# Patient Record
Sex: MALE | Race: WHITE | Employment: OTHER | URBAN - NONMETROPOLITAN AREA
[De-identification: names, ages, dates, MRNs, and addresses within clinical notes are randomized per-mention and may not be internally consistent; named-entity substitution may affect disease eponyms.]

---

## 2017-01-05 ENCOUNTER — COMMUNICATION - GICH (OUTPATIENT)
Dept: INTERNAL MEDICINE | Facility: OTHER | Age: 67
End: 2017-01-05

## 2017-01-06 ENCOUNTER — AMBULATORY - GICH (OUTPATIENT)
Dept: SCHEDULING | Facility: OTHER | Age: 67
End: 2017-01-06

## 2017-01-11 ENCOUNTER — COMMUNICATION - GICH (OUTPATIENT)
Dept: UROLOGY | Facility: OTHER | Age: 67
End: 2017-01-11

## 2017-01-13 ENCOUNTER — AMBULATORY - GICH (OUTPATIENT)
Dept: SCHEDULING | Facility: OTHER | Age: 67
End: 2017-01-13

## 2017-01-13 ENCOUNTER — COMMUNICATION - GICH (OUTPATIENT)
Dept: INTERNAL MEDICINE | Facility: OTHER | Age: 67
End: 2017-01-13

## 2017-02-03 ENCOUNTER — HISTORY (OUTPATIENT)
Dept: INTERNAL MEDICINE | Facility: OTHER | Age: 67
End: 2017-02-03

## 2017-02-03 ENCOUNTER — OFFICE VISIT - GICH (OUTPATIENT)
Dept: INTERNAL MEDICINE | Facility: OTHER | Age: 67
End: 2017-02-03

## 2017-02-03 DIAGNOSIS — R04.0 EPISTAXIS: ICD-10-CM

## 2017-02-03 DIAGNOSIS — D69.59 OTHER SECONDARY THROMBOCYTOPENIA: ICD-10-CM

## 2017-02-03 DIAGNOSIS — E78.2 MIXED HYPERLIPIDEMIA: ICD-10-CM

## 2017-02-03 DIAGNOSIS — J30.9 ALLERGIC RHINITIS: ICD-10-CM

## 2017-02-03 DIAGNOSIS — E11.9 TYPE 2 DIABETES MELLITUS WITHOUT COMPLICATIONS (H): ICD-10-CM

## 2017-02-03 DIAGNOSIS — J34.89 OTHER SPECIFIED DISORDERS OF NOSE AND NASAL SINUSES: ICD-10-CM

## 2017-02-03 DIAGNOSIS — I10 ESSENTIAL (PRIMARY) HYPERTENSION: ICD-10-CM

## 2017-02-03 DIAGNOSIS — T45.515A: ICD-10-CM

## 2017-02-03 DIAGNOSIS — Z79.4 LONG TERM CURRENT USE OF INSULIN (H): ICD-10-CM

## 2017-02-03 DIAGNOSIS — H10.13 ACUTE ATOPIC CONJUNCTIVITIS OF BOTH EYES: ICD-10-CM

## 2017-02-03 ASSESSMENT — PATIENT HEALTH QUESTIONNAIRE - PHQ9: SUM OF ALL RESPONSES TO PHQ QUESTIONS 1-9: 0

## 2017-02-07 ENCOUNTER — AMBULATORY - GICH (OUTPATIENT)
Dept: EDUCATION SERVICES | Facility: OTHER | Age: 67
End: 2017-02-07

## 2017-02-07 DIAGNOSIS — E11.9 TYPE 2 DIABETES MELLITUS WITHOUT COMPLICATIONS (H): ICD-10-CM

## 2017-02-07 DIAGNOSIS — Z79.4 LONG TERM CURRENT USE OF INSULIN (H): ICD-10-CM

## 2017-02-14 ENCOUNTER — COMMUNICATION - GICH (OUTPATIENT)
Dept: INTERNAL MEDICINE | Facility: OTHER | Age: 67
End: 2017-02-14

## 2017-02-14 DIAGNOSIS — I25.10 ATHEROSCLEROTIC HEART DISEASE OF NATIVE CORONARY ARTERY WITHOUT ANGINA PECTORIS: ICD-10-CM

## 2017-02-14 DIAGNOSIS — I25.83 CORONARY ATHEROSCLEROSIS DUE TO LIPID RICH PLAQUE (CODE): ICD-10-CM

## 2017-02-24 ENCOUNTER — AMBULATORY - GICH (OUTPATIENT)
Dept: SCHEDULING | Facility: OTHER | Age: 67
End: 2017-02-24

## 2017-03-12 ENCOUNTER — HISTORY (OUTPATIENT)
Dept: EMERGENCY MEDICINE | Facility: OTHER | Age: 67
End: 2017-03-12

## 2017-03-24 ENCOUNTER — COMMUNICATION - GICH (OUTPATIENT)
Dept: INTERNAL MEDICINE | Facility: OTHER | Age: 67
End: 2017-03-24

## 2017-03-30 ENCOUNTER — AMBULATORY - GICH (OUTPATIENT)
Dept: SCHEDULING | Facility: OTHER | Age: 67
End: 2017-03-30

## 2017-03-30 ENCOUNTER — HISTORY (OUTPATIENT)
Dept: INTERNAL MEDICINE | Facility: OTHER | Age: 67
End: 2017-03-30

## 2017-03-30 ENCOUNTER — OFFICE VISIT - GICH (OUTPATIENT)
Dept: INTERNAL MEDICINE | Facility: OTHER | Age: 67
End: 2017-03-30

## 2017-03-30 DIAGNOSIS — E11.65 TYPE 2 DIABETES MELLITUS WITH HYPERGLYCEMIA (H): ICD-10-CM

## 2017-03-30 DIAGNOSIS — Z79.4 LONG TERM CURRENT USE OF INSULIN (H): ICD-10-CM

## 2017-03-30 DIAGNOSIS — I10 ESSENTIAL (PRIMARY) HYPERTENSION: ICD-10-CM

## 2017-03-30 DIAGNOSIS — R05.9 COUGH: ICD-10-CM

## 2017-03-30 DIAGNOSIS — Z02.89 ENCOUNTER FOR OTHER ADMINISTRATIVE EXAMINATIONS: ICD-10-CM

## 2017-03-30 DIAGNOSIS — E11.9 TYPE 2 DIABETES MELLITUS WITHOUT COMPLICATIONS (H): ICD-10-CM

## 2017-03-30 DIAGNOSIS — N18.30 CHRONIC KIDNEY DISEASE, STAGE III (MODERATE) (H): ICD-10-CM

## 2017-03-30 DIAGNOSIS — G47.419 NARCOLEPSY WITHOUT CATAPLEXY: ICD-10-CM

## 2017-03-30 DIAGNOSIS — E78.2 MIXED HYPERLIPIDEMIA: ICD-10-CM

## 2017-03-30 DIAGNOSIS — E11.22 TYPE 2 DIABETES MELLITUS WITH DIABETIC CHRONIC KIDNEY DISEASE (H): ICD-10-CM

## 2017-03-30 LAB
A/G RATIO - HISTORICAL: 1.5 (ref 1–2)
ALBUMIN SERPL-MCNC: 4.6 G/DL (ref 3.5–5.7)
ALP SERPL-CCNC: 61 IU/L (ref 34–104)
ALT (SGPT) - HISTORICAL: 32 IU/L (ref 7–52)
ANION GAP - HISTORICAL: 12 (ref 5–18)
AST SERPL-CCNC: 26 IU/L (ref 13–39)
BILIRUB SERPL-MCNC: 0.7 MG/DL (ref 0.3–1)
BUN SERPL-MCNC: 30 MG/DL (ref 7–25)
BUN/CREAT RATIO - HISTORICAL: 16
CALCIUM SERPL-MCNC: 9.6 MG/DL (ref 8.6–10.3)
CHLORIDE SERPLBLD-SCNC: 102 MMOL/L (ref 98–107)
CHOL/HDL RATIO - HISTORICAL: 2.78
CHOLESTEROL TOTAL: 100 MG/DL
CO2 SERPL-SCNC: 25 MMOL/L (ref 21–31)
CREAT SERPL-MCNC: 1.85 MG/DL (ref 0.7–1.3)
ERYTHROCYTE [DISTWIDTH] IN BLOOD BY AUTOMATED COUNT: 12.6 % (ref 11.5–15.5)
ESTIMATED AVERAGE GLUCOSE: 194 MG/DL
GFR IF NOT AFRICAN AMERICAN - HISTORICAL: 37 ML/MIN/1.73M2
GLOBULIN - HISTORICAL: 3 G/DL (ref 2–3.7)
GLUCOSE SERPL-MCNC: 110 MG/DL (ref 70–105)
HCT VFR BLD AUTO: 46.4 % (ref 37–53)
HDLC SERPL-MCNC: 36 MG/DL (ref 23–92)
HEMOGLOBIN A1C MONITORING (POCT) - HISTORICAL: 8.4 % (ref 4–6.2)
HEMOGLOBIN: 15.5 G/DL (ref 13.5–17.5)
LDLC SERPL CALC-MCNC: 36 MG/DL
MCH RBC QN AUTO: 33.1 PG (ref 26–34)
MCHC RBC AUTO-ENTMCNC: 33.4 G/DL (ref 32–36)
MCV RBC AUTO: 99 FL (ref 80–100)
NON-HDL CHOLESTEROL - HISTORICAL: 64 MG/DL
PATIENT STATUS - HISTORICAL: NORMAL
PLATELET # BLD AUTO: 260 THOU/CU MM (ref 140–440)
PMV BLD: 6 FL (ref 6.5–11)
POTASSIUM SERPL-SCNC: 4.3 MMOL/L (ref 3.5–5.1)
PROT SERPL-MCNC: 7.6 G/DL (ref 6.4–8.9)
RED BLOOD COUNT - HISTORICAL: 4.69 MIL/CU MM (ref 4.3–5.9)
SODIUM SERPL-SCNC: 139 MMOL/L (ref 133–143)
TRIGL SERPL-MCNC: 141 MG/DL
WHITE BLOOD COUNT - HISTORICAL: 7.1 THOU/CU MM (ref 4.5–11)

## 2017-03-30 ASSESSMENT — PATIENT HEALTH QUESTIONNAIRE - PHQ9: SUM OF ALL RESPONSES TO PHQ QUESTIONS 1-9: 0

## 2017-03-31 ENCOUNTER — COMMUNICATION - GICH (OUTPATIENT)
Dept: INTERNAL MEDICINE | Facility: OTHER | Age: 67
End: 2017-03-31

## 2017-04-05 ENCOUNTER — AMBULATORY - GICH (OUTPATIENT)
Dept: SCHEDULING | Facility: OTHER | Age: 67
End: 2017-04-05

## 2017-04-05 ENCOUNTER — COMMUNICATION - GICH (OUTPATIENT)
Dept: INTERNAL MEDICINE | Facility: OTHER | Age: 67
End: 2017-04-05

## 2017-04-05 ENCOUNTER — AMBULATORY - GICH (OUTPATIENT)
Dept: FAMILY MEDICINE | Facility: OTHER | Age: 67
End: 2017-04-05

## 2017-04-06 ENCOUNTER — AMBULATORY - GICH (OUTPATIENT)
Dept: SCHEDULING | Facility: OTHER | Age: 67
End: 2017-04-06

## 2017-04-06 ENCOUNTER — COMMUNICATION - GICH (OUTPATIENT)
Dept: INTERNAL MEDICINE | Facility: OTHER | Age: 67
End: 2017-04-06

## 2017-04-12 ENCOUNTER — AMBULATORY - GICH (OUTPATIENT)
Dept: SCHEDULING | Facility: OTHER | Age: 67
End: 2017-04-12

## 2017-04-12 ENCOUNTER — COMMUNICATION - GICH (OUTPATIENT)
Dept: INTERNAL MEDICINE | Facility: OTHER | Age: 67
End: 2017-04-12

## 2017-05-09 ENCOUNTER — AMBULATORY - GICH (OUTPATIENT)
Dept: SCHEDULING | Facility: OTHER | Age: 67
End: 2017-05-09

## 2017-05-11 ENCOUNTER — COMMUNICATION - GICH (OUTPATIENT)
Dept: INTERNAL MEDICINE | Facility: OTHER | Age: 67
End: 2017-05-11

## 2017-05-11 DIAGNOSIS — E11.9 TYPE 2 DIABETES MELLITUS WITHOUT COMPLICATIONS (H): ICD-10-CM

## 2017-05-11 DIAGNOSIS — Z79.4 LONG TERM CURRENT USE OF INSULIN (H): ICD-10-CM

## 2017-05-15 ENCOUNTER — AMBULATORY - GICH (OUTPATIENT)
Dept: EDUCATION SERVICES | Facility: OTHER | Age: 67
End: 2017-05-15

## 2017-05-15 DIAGNOSIS — N18.30 CHRONIC KIDNEY DISEASE, STAGE III (MODERATE) (H): ICD-10-CM

## 2017-05-15 DIAGNOSIS — E11.65 TYPE 2 DIABETES MELLITUS WITH HYPERGLYCEMIA (H): ICD-10-CM

## 2017-05-15 DIAGNOSIS — E11.22 TYPE 2 DIABETES MELLITUS WITH DIABETIC CHRONIC KIDNEY DISEASE (H): ICD-10-CM

## 2017-06-05 ENCOUNTER — COMMUNICATION - GICH (OUTPATIENT)
Dept: INTERNAL MEDICINE | Facility: OTHER | Age: 67
End: 2017-06-05

## 2017-06-09 ENCOUNTER — COMMUNICATION - GICH (OUTPATIENT)
Dept: INTERNAL MEDICINE | Facility: OTHER | Age: 67
End: 2017-06-09

## 2017-06-09 ENCOUNTER — AMBULATORY - GICH (OUTPATIENT)
Dept: SCHEDULING | Facility: OTHER | Age: 67
End: 2017-06-09

## 2017-06-09 DIAGNOSIS — I25.83 CORONARY ATHEROSCLEROSIS DUE TO LIPID RICH PLAQUE (CODE): ICD-10-CM

## 2017-06-09 DIAGNOSIS — I25.10 ATHEROSCLEROTIC HEART DISEASE OF NATIVE CORONARY ARTERY WITHOUT ANGINA PECTORIS: ICD-10-CM

## 2017-06-13 ENCOUNTER — AMBULATORY - GICH (OUTPATIENT)
Dept: SCHEDULING | Facility: OTHER | Age: 67
End: 2017-06-13

## 2017-06-13 ENCOUNTER — COMMUNICATION - GICH (OUTPATIENT)
Dept: INTERNAL MEDICINE | Facility: OTHER | Age: 67
End: 2017-06-13

## 2017-06-29 ENCOUNTER — OFFICE VISIT - GICH (OUTPATIENT)
Dept: INTERNAL MEDICINE | Facility: OTHER | Age: 67
End: 2017-06-29

## 2017-06-29 ENCOUNTER — HISTORY (OUTPATIENT)
Dept: INTERNAL MEDICINE | Facility: OTHER | Age: 67
End: 2017-06-29

## 2017-06-29 DIAGNOSIS — M48.02 SPINAL STENOSIS OF CERVICAL REGION: ICD-10-CM

## 2017-06-29 DIAGNOSIS — E11.22 TYPE 2 DIABETES MELLITUS WITH DIABETIC CHRONIC KIDNEY DISEASE (H): ICD-10-CM

## 2017-06-29 DIAGNOSIS — M50.30 OTHER CERVICAL DISC DEGENERATION, UNSPECIFIED CERVICAL REGION: ICD-10-CM

## 2017-06-29 DIAGNOSIS — N18.30 CHRONIC KIDNEY DISEASE, STAGE III (MODERATE) (H): ICD-10-CM

## 2017-06-29 DIAGNOSIS — E78.2 MIXED HYPERLIPIDEMIA: ICD-10-CM

## 2017-06-29 DIAGNOSIS — L29.89 OTHER PRURITUS: ICD-10-CM

## 2017-06-29 DIAGNOSIS — M54.12 RADICULOPATHY OF CERVICAL REGION: ICD-10-CM

## 2017-06-29 DIAGNOSIS — E11.65 TYPE 2 DIABETES MELLITUS WITH HYPERGLYCEMIA (H): ICD-10-CM

## 2017-06-29 DIAGNOSIS — E03.9 HYPOTHYROIDISM: ICD-10-CM

## 2017-06-29 DIAGNOSIS — I10 ESSENTIAL (PRIMARY) HYPERTENSION: ICD-10-CM

## 2017-06-29 DIAGNOSIS — G47.419 NARCOLEPSY WITHOUT CATAPLEXY: ICD-10-CM

## 2017-06-29 DIAGNOSIS — Z02.89 ENCOUNTER FOR OTHER ADMINISTRATIVE EXAMINATIONS: ICD-10-CM

## 2017-06-29 DIAGNOSIS — M46.92 INFLAMMATORY SPONDYLOPATHY OF CERVICAL REGION (H): ICD-10-CM

## 2017-06-29 LAB
A/G RATIO - HISTORICAL: 1.7 (ref 1–2)
ALBUMIN SERPL-MCNC: 4.2 G/DL (ref 3.5–5.7)
ALP SERPL-CCNC: 58 IU/L (ref 34–104)
ALT (SGPT) - HISTORICAL: 26 IU/L (ref 7–52)
ANION GAP - HISTORICAL: 8 (ref 5–18)
AST SERPL-CCNC: 22 IU/L (ref 13–39)
BILIRUB SERPL-MCNC: 0.6 MG/DL (ref 0.3–1)
BUN SERPL-MCNC: 20 MG/DL (ref 7–25)
BUN/CREAT RATIO - HISTORICAL: 12
CALCIUM SERPL-MCNC: 9.1 MG/DL (ref 8.6–10.3)
CHLORIDE SERPLBLD-SCNC: 106 MMOL/L (ref 98–107)
CHOL/HDL RATIO - HISTORICAL: 3.06
CHOLESTEROL TOTAL: 101 MG/DL
CO2 SERPL-SCNC: 21 MMOL/L (ref 21–31)
CREAT SERPL-MCNC: 1.67 MG/DL (ref 0.7–1.3)
ERYTHROCYTE [DISTWIDTH] IN BLOOD BY AUTOMATED COUNT: 13.2 % (ref 11.5–15.5)
ESTIMATED AVERAGE GLUCOSE: 186 MG/DL
GFR IF NOT AFRICAN AMERICAN - HISTORICAL: 41 ML/MIN/1.73M2
GLOBULIN - HISTORICAL: 2.5 G/DL (ref 2–3.7)
GLUCOSE SERPL-MCNC: 121 MG/DL (ref 70–105)
HCT VFR BLD AUTO: 44.3 % (ref 37–53)
HDLC SERPL-MCNC: 33 MG/DL (ref 23–92)
HEMOGLOBIN A1C MONITORING (POCT) - HISTORICAL: 8.1 % (ref 4–6.2)
HEMOGLOBIN: 15 G/DL (ref 13.5–17.5)
LDLC SERPL CALC-MCNC: 35 MG/DL
MCH RBC QN AUTO: 32.8 PG (ref 26–34)
MCHC RBC AUTO-ENTMCNC: 33.9 G/DL (ref 32–36)
MCV RBC AUTO: 97 FL (ref 80–100)
NON-HDL CHOLESTEROL - HISTORICAL: 68 MG/DL
PATIENT STATUS - HISTORICAL: ABNORMAL
PLATELET # BLD AUTO: 207 THOU/CU MM (ref 140–440)
PMV BLD: 8.6 FL (ref 6.5–11)
POTASSIUM SERPL-SCNC: 5.3 MMOL/L (ref 3.5–5.1)
PROT SERPL-MCNC: 6.7 G/DL (ref 6.4–8.9)
RED BLOOD COUNT - HISTORICAL: 4.58 MIL/CU MM (ref 4.3–5.9)
SODIUM SERPL-SCNC: 135 MMOL/L (ref 133–143)
TRIGL SERPL-MCNC: 166 MG/DL
TSH - HISTORICAL: 0.43 UIU/ML (ref 0.34–5.6)
WHITE BLOOD COUNT - HISTORICAL: 5.4 THOU/CU MM (ref 4.5–11)

## 2017-06-29 ASSESSMENT — PATIENT HEALTH QUESTIONNAIRE - PHQ9: SUM OF ALL RESPONSES TO PHQ QUESTIONS 1-9: 0

## 2017-06-30 ENCOUNTER — COMMUNICATION - GICH (OUTPATIENT)
Dept: INTERNAL MEDICINE | Facility: OTHER | Age: 67
End: 2017-06-30

## 2017-08-01 ENCOUNTER — COMMUNICATION - GICH (OUTPATIENT)
Dept: INTERNAL MEDICINE | Facility: OTHER | Age: 67
End: 2017-08-01

## 2017-08-01 DIAGNOSIS — R21 RASH AND OTHER NONSPECIFIC SKIN ERUPTION: ICD-10-CM

## 2017-08-02 ENCOUNTER — COMMUNICATION - GICH (OUTPATIENT)
Dept: INTERNAL MEDICINE | Facility: OTHER | Age: 67
End: 2017-08-02

## 2017-08-08 ENCOUNTER — OFFICE VISIT - GICH (OUTPATIENT)
Dept: INTERNAL MEDICINE | Facility: OTHER | Age: 67
End: 2017-08-08

## 2017-08-08 ENCOUNTER — HISTORY (OUTPATIENT)
Dept: INTERNAL MEDICINE | Facility: OTHER | Age: 67
End: 2017-08-08

## 2017-08-08 DIAGNOSIS — K59.00 CONSTIPATION: ICD-10-CM

## 2017-08-08 DIAGNOSIS — E11.65 TYPE 2 DIABETES MELLITUS WITH HYPERGLYCEMIA (H): ICD-10-CM

## 2017-08-08 DIAGNOSIS — D69.59 OTHER SECONDARY THROMBOCYTOPENIA: ICD-10-CM

## 2017-08-08 DIAGNOSIS — T45.515A: ICD-10-CM

## 2017-08-08 DIAGNOSIS — E11.22 TYPE 2 DIABETES MELLITUS WITH DIABETIC CHRONIC KIDNEY DISEASE (H): ICD-10-CM

## 2017-08-08 DIAGNOSIS — N18.30 CHRONIC KIDNEY DISEASE, STAGE III (MODERATE) (H): ICD-10-CM

## 2017-08-08 DIAGNOSIS — G89.29 OTHER CHRONIC PAIN: ICD-10-CM

## 2017-08-08 DIAGNOSIS — M54.50 LOW BACK PAIN: ICD-10-CM

## 2017-08-08 ASSESSMENT — PATIENT HEALTH QUESTIONNAIRE - PHQ9: SUM OF ALL RESPONSES TO PHQ QUESTIONS 1-9: 0

## 2017-08-30 ENCOUNTER — OFFICE VISIT - GICH (OUTPATIENT)
Dept: INTERNAL MEDICINE | Facility: OTHER | Age: 67
End: 2017-08-30

## 2017-08-30 ENCOUNTER — HISTORY (OUTPATIENT)
Dept: INTERNAL MEDICINE | Facility: OTHER | Age: 67
End: 2017-08-30

## 2017-08-30 DIAGNOSIS — L03.116 CELLULITIS OF LEFT LOWER EXTREMITY: ICD-10-CM

## 2017-08-30 DIAGNOSIS — E11.65 TYPE 2 DIABETES MELLITUS WITH HYPERGLYCEMIA (H): ICD-10-CM

## 2017-08-30 DIAGNOSIS — I50.32 CHRONIC DIASTOLIC HEART FAILURE (H): ICD-10-CM

## 2017-08-30 DIAGNOSIS — I83.029 VARICOSE VEINS OF LEFT LOWER EXTREMITY WITH ULCER (H): ICD-10-CM

## 2017-08-30 DIAGNOSIS — N18.30 CHRONIC KIDNEY DISEASE, STAGE III (MODERATE) (H): ICD-10-CM

## 2017-08-30 DIAGNOSIS — L97.929 VARICOSE VEINS OF LEFT LOWER EXTREMITY WITH ULCER (H): ICD-10-CM

## 2017-08-30 DIAGNOSIS — E11.22 TYPE 2 DIABETES MELLITUS WITH DIABETIC CHRONIC KIDNEY DISEASE (H): ICD-10-CM

## 2017-08-30 DIAGNOSIS — R60.9 EDEMA: ICD-10-CM

## 2017-09-05 ENCOUNTER — OFFICE VISIT - GICH (OUTPATIENT)
Dept: INTERNAL MEDICINE | Facility: OTHER | Age: 67
End: 2017-09-05

## 2017-09-05 ENCOUNTER — HISTORY (OUTPATIENT)
Dept: INTERNAL MEDICINE | Facility: OTHER | Age: 67
End: 2017-09-05

## 2017-09-05 DIAGNOSIS — L97.929 VARICOSE VEINS OF LEFT LOWER EXTREMITY WITH ULCER (H): ICD-10-CM

## 2017-09-05 DIAGNOSIS — R60.9 EDEMA: ICD-10-CM

## 2017-09-05 DIAGNOSIS — L03.116 CELLULITIS OF LEFT LOWER EXTREMITY: ICD-10-CM

## 2017-09-05 DIAGNOSIS — I83.029 VARICOSE VEINS OF LEFT LOWER EXTREMITY WITH ULCER (H): ICD-10-CM

## 2017-09-19 ENCOUNTER — OFFICE VISIT - GICH (OUTPATIENT)
Dept: INTERNAL MEDICINE | Facility: OTHER | Age: 67
End: 2017-09-19

## 2017-09-19 ENCOUNTER — HISTORY (OUTPATIENT)
Dept: INTERNAL MEDICINE | Facility: OTHER | Age: 67
End: 2017-09-19

## 2017-09-19 DIAGNOSIS — R60.9 EDEMA: ICD-10-CM

## 2017-09-19 DIAGNOSIS — L97.929 VARICOSE VEINS OF LEFT LOWER EXTREMITY WITH ULCER (H): ICD-10-CM

## 2017-09-19 DIAGNOSIS — I83.029 VARICOSE VEINS OF LEFT LOWER EXTREMITY WITH ULCER (H): ICD-10-CM

## 2017-10-02 ENCOUNTER — COMMUNICATION - GICH (OUTPATIENT)
Dept: EDUCATION SERVICES | Facility: OTHER | Age: 67
End: 2017-10-02

## 2017-10-02 DIAGNOSIS — N18.30 CHRONIC KIDNEY DISEASE, STAGE III (MODERATE) (H): ICD-10-CM

## 2017-10-02 DIAGNOSIS — E11.22 TYPE 2 DIABETES MELLITUS WITH DIABETIC CHRONIC KIDNEY DISEASE (H): ICD-10-CM

## 2017-10-02 DIAGNOSIS — E11.65 TYPE 2 DIABETES MELLITUS WITH HYPERGLYCEMIA (H): ICD-10-CM

## 2017-10-04 ENCOUNTER — AMBULATORY - GICH (OUTPATIENT)
Dept: LAB | Facility: OTHER | Age: 67
End: 2017-10-04

## 2017-10-04 DIAGNOSIS — Z79.4 LONG TERM CURRENT USE OF INSULIN (H): ICD-10-CM

## 2017-10-04 DIAGNOSIS — I10 ESSENTIAL (PRIMARY) HYPERTENSION: ICD-10-CM

## 2017-10-04 DIAGNOSIS — E11.9 TYPE 2 DIABETES MELLITUS WITHOUT COMPLICATIONS (H): ICD-10-CM

## 2017-10-04 LAB
A/G RATIO - HISTORICAL: 1.7 (ref 1–2)
ALBUMIN SERPL-MCNC: 4.3 G/DL (ref 3.5–5.7)
ALP SERPL-CCNC: 63 IU/L (ref 34–104)
ALT (SGPT) - HISTORICAL: 29 IU/L (ref 7–52)
ANION GAP - HISTORICAL: 7 (ref 5–18)
AST SERPL-CCNC: 23 IU/L (ref 13–39)
BILIRUB SERPL-MCNC: 0.5 MG/DL (ref 0.3–1)
BUN SERPL-MCNC: 26 MG/DL (ref 7–25)
BUN/CREAT RATIO - HISTORICAL: 17
CALCIUM SERPL-MCNC: 9.5 MG/DL (ref 8.6–10.3)
CHLORIDE SERPLBLD-SCNC: 107 MMOL/L (ref 98–107)
CO2 SERPL-SCNC: 22 MMOL/L (ref 21–31)
CREAT SERPL-MCNC: 1.56 MG/DL (ref 0.7–1.3)
ERYTHROCYTE [DISTWIDTH] IN BLOOD BY AUTOMATED COUNT: 12.8 % (ref 11.5–15.5)
ESTIMATED AVERAGE GLUCOSE: 169 MG/DL
GFR IF NOT AFRICAN AMERICAN - HISTORICAL: 45 ML/MIN/1.73M2
GLOBULIN - HISTORICAL: 2.5 G/DL (ref 2–3.7)
GLUCOSE SERPL-MCNC: 126 MG/DL (ref 70–105)
HCT VFR BLD AUTO: 42.1 % (ref 37–53)
HEMOGLOBIN A1C MONITORING (POCT) - HISTORICAL: 7.5 % (ref 4–6.2)
HEMOGLOBIN: 14.1 G/DL (ref 13.5–17.5)
MCH RBC QN AUTO: 32.7 PG (ref 26–34)
MCHC RBC AUTO-ENTMCNC: 33.5 G/DL (ref 32–36)
MCV RBC AUTO: 98 FL (ref 80–100)
PLATELET # BLD AUTO: 213 THOU/CU MM (ref 140–440)
PMV BLD: 8.4 FL (ref 6.5–11)
POTASSIUM SERPL-SCNC: 5.1 MMOL/L (ref 3.5–5.1)
PROT SERPL-MCNC: 6.8 G/DL (ref 6.4–8.9)
RED BLOOD COUNT - HISTORICAL: 4.31 MIL/CU MM (ref 4.3–5.9)
SODIUM SERPL-SCNC: 136 MMOL/L (ref 133–143)
WHITE BLOOD COUNT - HISTORICAL: 5.4 THOU/CU MM (ref 4.5–11)

## 2017-10-05 ENCOUNTER — OFFICE VISIT - GICH (OUTPATIENT)
Dept: INTERNAL MEDICINE | Facility: OTHER | Age: 67
End: 2017-10-05

## 2017-10-05 ENCOUNTER — HISTORY (OUTPATIENT)
Dept: INTERNAL MEDICINE | Facility: OTHER | Age: 67
End: 2017-10-05

## 2017-10-05 DIAGNOSIS — Z79.4 LONG TERM CURRENT USE OF INSULIN (H): ICD-10-CM

## 2017-10-05 DIAGNOSIS — R60.9 EDEMA: ICD-10-CM

## 2017-10-05 DIAGNOSIS — N18.30 CHRONIC KIDNEY DISEASE, STAGE III (MODERATE) (H): ICD-10-CM

## 2017-10-05 DIAGNOSIS — T45.515A: ICD-10-CM

## 2017-10-05 DIAGNOSIS — E11.22 TYPE 2 DIABETES MELLITUS WITH DIABETIC CHRONIC KIDNEY DISEASE (H): ICD-10-CM

## 2017-10-05 DIAGNOSIS — E78.2 MIXED HYPERLIPIDEMIA: ICD-10-CM

## 2017-10-05 DIAGNOSIS — R09.89 OTHER SPECIFIED SYMPTOMS AND SIGNS INVOLVING THE CIRCULATORY AND RESPIRATORY SYSTEMS: ICD-10-CM

## 2017-10-05 DIAGNOSIS — Z02.89 ENCOUNTER FOR OTHER ADMINISTRATIVE EXAMINATIONS: ICD-10-CM

## 2017-10-05 DIAGNOSIS — I10 ESSENTIAL (PRIMARY) HYPERTENSION: ICD-10-CM

## 2017-10-05 DIAGNOSIS — Z23 ENCOUNTER FOR IMMUNIZATION: ICD-10-CM

## 2017-10-05 DIAGNOSIS — Z95.1 PRESENCE OF AORTOCORONARY BYPASS GRAFT: ICD-10-CM

## 2017-10-05 DIAGNOSIS — Z96.41 PRESENCE OF INSULIN PUMP: ICD-10-CM

## 2017-10-05 DIAGNOSIS — G47.419 NARCOLEPSY WITHOUT CATAPLEXY: ICD-10-CM

## 2017-10-05 DIAGNOSIS — D69.59 OTHER SECONDARY THROMBOCYTOPENIA: ICD-10-CM

## 2017-10-05 DIAGNOSIS — I50.32 CHRONIC DIASTOLIC HEART FAILURE (H): ICD-10-CM

## 2017-10-05 ASSESSMENT — PATIENT HEALTH QUESTIONNAIRE - PHQ9: SUM OF ALL RESPONSES TO PHQ QUESTIONS 1-9: 0

## 2017-10-27 ENCOUNTER — HISTORY (OUTPATIENT)
Dept: INTERNAL MEDICINE | Facility: OTHER | Age: 67
End: 2017-10-27

## 2017-10-27 ENCOUNTER — OFFICE VISIT - GICH (OUTPATIENT)
Dept: INTERNAL MEDICINE | Facility: OTHER | Age: 67
End: 2017-10-27

## 2017-10-27 ENCOUNTER — OFFICE VISIT - GICH (OUTPATIENT)
Dept: UROLOGY | Facility: OTHER | Age: 67
End: 2017-10-27

## 2017-10-27 ENCOUNTER — HISTORY (OUTPATIENT)
Dept: UROLOGY | Facility: OTHER | Age: 67
End: 2017-10-27

## 2017-10-27 DIAGNOSIS — D69.59 OTHER SECONDARY THROMBOCYTOPENIA: ICD-10-CM

## 2017-10-27 DIAGNOSIS — Z96.41 PRESENCE OF INSULIN PUMP: ICD-10-CM

## 2017-10-27 DIAGNOSIS — M62.838 OTHER MUSCLE SPASM: ICD-10-CM

## 2017-10-27 DIAGNOSIS — I25.2 OLD MYOCARDIAL INFARCTION: ICD-10-CM

## 2017-10-27 DIAGNOSIS — N18.30 CHRONIC KIDNEY DISEASE, STAGE III (MODERATE) (H): ICD-10-CM

## 2017-10-27 DIAGNOSIS — M54.41 LOW BACK PAIN WITH RIGHT-SIDED SCIATICA: ICD-10-CM

## 2017-10-27 DIAGNOSIS — M54.42 LOW BACK PAIN WITH LEFT-SIDED SCIATICA: ICD-10-CM

## 2017-10-27 DIAGNOSIS — N52.9 MALE ERECTILE DYSFUNCTION: ICD-10-CM

## 2017-10-27 DIAGNOSIS — Z79.4 LONG TERM CURRENT USE OF INSULIN (H): ICD-10-CM

## 2017-10-27 DIAGNOSIS — E11.22 TYPE 2 DIABETES MELLITUS WITH DIABETIC CHRONIC KIDNEY DISEASE (H): ICD-10-CM

## 2017-10-27 DIAGNOSIS — T45.515A: ICD-10-CM

## 2017-11-03 ENCOUNTER — COMMUNICATION - GICH (OUTPATIENT)
Dept: PHARMACY | Facility: OTHER | Age: 67
End: 2017-11-03

## 2017-11-03 ENCOUNTER — COMMUNICATION - GICH (OUTPATIENT)
Dept: INTERNAL MEDICINE | Facility: OTHER | Age: 67
End: 2017-11-03

## 2017-11-03 DIAGNOSIS — M54.42 LOW BACK PAIN WITH LEFT-SIDED SCIATICA: ICD-10-CM

## 2017-11-03 DIAGNOSIS — M54.41 LOW BACK PAIN WITH RIGHT-SIDED SCIATICA: ICD-10-CM

## 2017-11-13 ENCOUNTER — COMMUNICATION - GICH (OUTPATIENT)
Dept: INTERNAL MEDICINE | Facility: OTHER | Age: 67
End: 2017-11-13

## 2017-11-13 DIAGNOSIS — M54.50 LOW BACK PAIN: ICD-10-CM

## 2017-11-13 DIAGNOSIS — M47.9 SPONDYLOSIS: ICD-10-CM

## 2017-11-13 DIAGNOSIS — M48.02 SPINAL STENOSIS OF CERVICAL REGION: ICD-10-CM

## 2017-11-13 DIAGNOSIS — G89.29 OTHER CHRONIC PAIN: ICD-10-CM

## 2017-11-13 DIAGNOSIS — M50.30 OTHER CERVICAL DISC DEGENERATION, UNSPECIFIED CERVICAL REGION: ICD-10-CM

## 2017-11-21 ENCOUNTER — AMBULATORY - GICH (OUTPATIENT)
Dept: SCHEDULING | Facility: OTHER | Age: 67
End: 2017-11-21

## 2017-11-24 ENCOUNTER — COMMUNICATION - GICH (OUTPATIENT)
Dept: INTERNAL MEDICINE | Facility: OTHER | Age: 67
End: 2017-11-24

## 2017-12-02 ENCOUNTER — COMMUNICATION - GICH (OUTPATIENT)
Dept: INTERNAL MEDICINE | Facility: OTHER | Age: 67
End: 2017-12-02

## 2017-12-02 DIAGNOSIS — E11.9 TYPE 2 DIABETES MELLITUS WITHOUT COMPLICATIONS (H): ICD-10-CM

## 2017-12-02 DIAGNOSIS — Z79.4 LONG TERM CURRENT USE OF INSULIN (H): ICD-10-CM

## 2017-12-18 ENCOUNTER — HOSPITAL ENCOUNTER (OUTPATIENT)
Dept: RADIOLOGY | Facility: OTHER | Age: 67
End: 2017-12-18
Attending: INTERNAL MEDICINE

## 2017-12-18 ENCOUNTER — HISTORY (OUTPATIENT)
Dept: INTERNAL MEDICINE | Facility: OTHER | Age: 67
End: 2017-12-18

## 2017-12-18 ENCOUNTER — OFFICE VISIT - GICH (OUTPATIENT)
Dept: INTERNAL MEDICINE | Facility: OTHER | Age: 67
End: 2017-12-18

## 2017-12-18 ENCOUNTER — AMBULATORY - GICH (OUTPATIENT)
Dept: INTERNAL MEDICINE | Facility: OTHER | Age: 67
End: 2017-12-18

## 2017-12-18 DIAGNOSIS — M45.6 ANKYLOSING SPONDYLITIS OF LUMBAR REGION (H): ICD-10-CM

## 2017-12-18 DIAGNOSIS — M54.16 RADICULOPATHY OF LUMBAR REGION: ICD-10-CM

## 2017-12-18 DIAGNOSIS — G89.29 OTHER CHRONIC PAIN: ICD-10-CM

## 2017-12-18 DIAGNOSIS — E78.2 MIXED HYPERLIPIDEMIA: ICD-10-CM

## 2017-12-18 LAB
C-REACTIVE PROTEIN - HISTORICAL: <1 MG/DL
CHOL/HDL RATIO - HISTORICAL: 2.97
CHOLESTEROL TOTAL: 101 MG/DL
ERYTHROCYTE [SEDIMENTATION RATE] IN BLOOD: 7 MM/HR
HDLC SERPL-MCNC: 34 MG/DL (ref 23–92)
LDLC SERPL CALC-MCNC: 43 MG/DL
NON-HDL CHOLESTEROL - HISTORICAL: 67 MG/DL
PROVIDER ORDERDED STATUS - HISTORICAL: NORMAL
RHEUMATOID FACTOR - HISTORICAL: <14 IU/ML
TRIGL SERPL-MCNC: 119 MG/DL

## 2017-12-18 ASSESSMENT — PATIENT HEALTH QUESTIONNAIRE - PHQ9: SUM OF ALL RESPONSES TO PHQ QUESTIONS 1-9: 0

## 2017-12-19 LAB — CCP ANTIBODY,IGG/IGA - HISTORICAL: <4.6 CU

## 2017-12-20 ENCOUNTER — HOSPITAL ENCOUNTER (OUTPATIENT)
Dept: RADIOLOGY | Facility: OTHER | Age: 67
End: 2017-12-20
Attending: INTERNAL MEDICINE

## 2017-12-20 DIAGNOSIS — M54.16 RADICULOPATHY OF LUMBAR REGION: ICD-10-CM

## 2017-12-20 DIAGNOSIS — M45.6 ANKYLOSING SPONDYLITIS OF LUMBAR REGION (H): ICD-10-CM

## 2017-12-20 DIAGNOSIS — G89.29 OTHER CHRONIC PAIN: ICD-10-CM

## 2017-12-20 LAB
HLA-B27 QL: NEGATIVE
INTERPRETATION - HISTORICAL: NORMAL

## 2017-12-27 NOTE — PROGRESS NOTES
Patient Information     Patient Name MRMoses Oliveira [id number] Male 1950      Progress Notes by Jorge Barnett MD at 10/5/2017 11:00 AM     Author:  Jorge Barnett MD Service:  (none) Author Type:  Physician     Filed:  10/5/2017  1:35 PM Encounter Date:  10/5/2017 Status:  Signed     :  Jorge Barnett MD (Physician)            Nursing Notes:   Barbara Connor  10/5/2017 11:36 AM  Signed  Previous A1C is at goal of <8  HEMOGLOBIN A1C MONITORING (POCT)    Date Value   10/04/2017 7.5 % (H)   2013 7.4 % NGSP (H)     Urine microalbumin:creatine: 1.64  Foot exam unkown  Eye exam 2017   Patient is not a current smoker  Patient is on a daily aspirin  Patient is on a Statin.  Blood pressure today of  is at the goal of <139/89 for diabetics.    Barbara Connor LPN..............10/5/2017 11:23 AM    Moses Whittaker presents to clinic today for:   Chief Complaint    Patient presents with      Diabetes     HPI: Mr. Whittaker is a 67 y.o. male who presents today for evaluation of above.     (E11.22,  N18.3,  Z79.4) Controlled type 2 diabetes mellitus with stage 3 chronic kidney disease, with long-term current use of insulin (HC)  (primary encounter diagnosis)  (Z96.41) Insulin pump in place  (D69.59,  T45.515A) Platelet inhibition due to Plavix -- On Plavix (Brand Name due to generic intolerance) For Life.   (E78.2) Mixed hyperlipidemia  (I10) Hypertension  (I50.32) Chronic diastolic heart failure - Mild - 2014 ECHO - EF 65%  (Z23) Need for vaccination with 13-polyvalent pneumococcal conjugate vaccine  (Z95.1) S/P CABG x 2  (R60.9) Edema, unspecified type  (R09.89) Ejection fraction < 50%  (I50.32) Chronic diastolic heart failure (HC)  (G47.419) Narcolepsy without cataplexy  (Z02.89) Pain medication agreement - 2017     Diabetes - now controlled. Having problems with his insulin pump. thinks he might have to go on insulin shots for a while.  He wants to get the insulin pump  without a connection tube.    -- For the longest time, his diabetes was uncontrolled.  Chart updated today.  HEMOGLOBIN A1C MONITORING (POCT)    Date Value   10/04/2017 7.5 % (H)   2013 7.4 % NGSP (H)     patient has coronary artery disease, lifetime need of platelets.  He uses brand name due to intolerance of generic Lidex.  Needs refills.    Hyperlipidemia, currently on Crestor 10 mg daily.  Last Lipids:  Chol: 2017 101   166  HDL: 2017 33   LDL: 2017 35    hypertension, currently well controlled.  Tolerating medications.  Needs refills today.    Diastolic heart failure, chronic, has been stable.  Denies exertional chest pain or heaviness.  Has some chronic lower extremity edema issues.  Does take diuretics.  He's been wearing his compression stockings more recently.  Doesn't watch his salt intake but has a hard time with this at times.    Narcolepsy with cataplexy, chronic issue.  Takes combination of dextroamphetamine as well as methylphenidate.   website reviewed.  No abnormal findings noted.  Proper medication use and misuse reviewed.  Patient has been using medications appropriately.  -- Medications refilled.    Mr. Stokes Body mass index is 30.51 kg/(m^2). This is out of the normal range for a 67 y.o. Normal range for ages 18+ is between 18.5 and 24.9. To lose weight we reviewed risks and benefits of appropriate options such as diet, exercise, and medications. Patient's strategy will be  self-directed nutrition plan and self-directed exercise program   BP Readings from Last 1 Encounters:10/05/17 : 138/74  Mr. Stokes blood pressure is out of the normal range for adults. Per JNC-8 guidelines normal adult blood pressure is < 120/80, pre-hypertensive is between 120/80 and 139/89, and hypertension is 140/90 or greater. Risks of hypertension were discussed. Patient's strategy will be weight loss, increased activity and reduced salt intake    Functional Capacity: > 4 METS.    Reports that he can climb a flight of stairs without any chest pain/heaviness or shortness of breath.   Patient reports no current symptoms of fevers, chills, nausea/vomiting.   No cough. No shortness of breath.   No change in bowel/bladder habits. No melena, hematochezia. No Hematuria.   No rashes. No palpitations.  No orthopnea/paroxysmal nocturnal dyspnea   No vision or hearing issues.   No significant mood issues   No bruising.     [place]:  No flowsheet data found.    PHQ9:  PHQ Depression Screening 9/19/2017 10/5/2017   Date of PHQ exam (doc flow) 9/19/2017 10/5/2017   1. Lack of interest/pleasure 0 - Not at all 0 - Not at all   2. Feeling down/depressed 0 - Not at all 0 - Not at all   PHQ-2 TOTAL SCORE 0 0   3. Trouble sleeping - 0 - Not at all   4. Decreased energy - 0 - Not at all   5. Appetite change - 0 - Not at all   6. Feelings of failure - 0 - Not at all   7. Trouble concentrating - 0 - Not at all   8. Activity level - 0 - Not at all   9. Hurting yourself - 0 - Not at all   PHQ-9 TOTAL SCORE - 0   PHQ-9 Severity Level - none   Functional Impairment - not difficult at all   Some recent data might be hidden          I have personally reviewed the past medical history, past surgical history, medications, allergies, family and social history as listed below, on 10/5/2017.    Patient Active Problem List       Diagnosis  Date Noted     Intermittent constipation  08/08/2017     Chronic low back pain  08/08/2017     Skin rash  08/01/2017     Controlled type 2 diabetes mellitus with stage 3 chronic kidney disease, with long-term current use of insulin (HC)  03/30/2017     Erectile dysfunction  09/26/2016     Trigger point with neck pain  07/14/2016     Trigger point with back pain  07/14/2016     Controlled type 2 diabetes mellitus with insulin therapy (HC)  03/10/2016     Insulin pump in place  03/10/2016     Pain medication agreement - 6/29/2017 12/17/2015     Trigger point of extremity  12/17/2015      Myofacial muscle pain  12/17/2015     Greater trochanteric bursitis of left hip  06/24/2015     Blister of toe of right foot  12/09/2014     Cervical stenosis of spinal canal  06/17/2014     Facet arthritis of cervical region (HC)  06/17/2014     Degenerative disc disease, cervical  06/17/2014     Left arm numbness  06/05/2014     Cervical radicular pain  06/05/2014     Left atrial enlargement - Moderate - 1/20/2014 ECHO  01/23/2014     S/P coronary artery stent placement  01/10/2014     Old inferior wall myocardial infarction  01/10/2014     S/P CABG x 2  01/10/2014     Hypertension  01/10/2014     Platelet inhibition due to Plavix -- On Plavix (Brand Name due to generic intolerance) For Life.   01/10/2014     Myalgia  01/10/2014     CKD (chronic kidney disease) stage 3, GFR 30-59 ml/min  01/10/2014     Chronic diastolic heart failure - Mild - 1/20/2014 ECHO - EF 65%  01/10/2014     1/20/2014 ECHO FINAL IMPRESSION:   1. Normal left ventricular size and systolic function. Estimated ejection fraction 65%.   2. Mild increase in wall thickness of the ventricular septum with hypokinesis of the basilar segment of the ventricular septum and inferior wall.   3. Mild to moderate left atrial enlargement.   4. Trace tricuspid regurgitation.   5. Mild left ventricular diastolic dysfunction.         ACP (advance care planning)  12/05/2013     Diabetic neuropathy, painful (HC)  04/22/2013     G E R D  05/17/2012     OBESITY  05/17/2012     NARCOLEPSY       Total dose recommended by pulmonology he is dextro- amphetamine 40 mg plus   methylphenidate 40 mg in divided doses per day.  Total of 80 mg of short   acting amphetamines daily.          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       ANGIOPLASTY  10/01    Angioplasty with 2 vessel CABG the following week from the lt internal mammary to the lt anterior descending and right radial free graft       ANGIOPLASTY  04/2003     APPENDECTOMY OPEN  1967     CARPAL TUNNEL RELEASE  11/15/01    Right carpal tunnel release by Dr. Mace       CARPAL TUNNEL RELEASE  01/2004    Left carpal tunnel release        COLONOSCOPY  01/08/2016    -- Dr. De LaC ruz -- polypectomy        COLONOSCOPY SCREENING  1999     IR ANGIOGRAM FEMORAL/EXTREMITY (IA)  09/03    Unremarkable angiogram at [facility]       IR ANGIOGRAM FEMORAL/EXTREMITY (IA)  2/26/2007    Unremarkable coronary angiogram at [facility].       PTCA  10/05/2004    Angioplasty        PTCA  02/2005    Angioplasty with stent.         PTCA  03/02/2005    Angioplasty with stent.        PTCA  09/23/2005    Angioplasty without stent       ROTATOR CUFF REPAIR  02/06/2006    Left rotator cuff repair and bone spur removal by Dr. Giraldo in [place]       Current Outpatient Prescriptions       Medication  Sig Dispense Refill     alcohol swabs (ALCOHOL PREP PADS) For home use. 1 box 0     aspirin 325 mg tablet Take  by mouth.       blood sugar diagnostic (ONE TOUCH ULTRA TEST) strip  Dispense test strips covered by the patient insurance. Test 10 times per day. 900 Each 3     Blood-Glucose Meter (ACCU-CHEK ABBI PLUS METER) Dispense glucose meter, test strips and lancets covered by the patient insurance. Test 10 times per day. 1 Device 0     clopidogrel (PLAVIX) 75 mg tablet Take 1 tablet by mouth once daily. 90 tablet 4     codeine-guaiFENesin (ROBITUSSIN AC)  mg/5 mL liquid Take 10 mL by mouth every 4 hours if needed for Cough. Max dose 60 mL per 24 hrs. 200 mL 0     desoximetasone 0.25% topical (TOPICORT) 0.25 % cream Apply twice daily 180 g 3     Dextroamphetamine Sulfate (DEXTROSTAT) 10 mg tablet Take 1 tablet by mouth four times daily - for 10/5/2017 360 tablet 0     Dextroamphetamine Sulfate (DEXTROSTAT) 10 mg tablet Take 1 tablet by mouth four times daily - for 11/30/17 360 tablet 0     Dextromethorphan-guaFENesin (MUCINEX DM)  mg tablet Take 1 tablet by mouth 2 times daily if needed for Cough. - OTC / Generic Okay 60 tablet 1     diclofenac (VOLTAREN) 75 mg delayed-release tablet TAKE 1 TABLET BY MOUTH FOUR TIMES DAILY 360 tablet 3     docusate (COLACE) 100 mg capsule Take 1-2 capsules by mouth 2 times daily. -- AS needed for constipation prevention 120 capsule 3     fexofenadine (ALLEGRA ALLERGY) 60 mg tablet Take 1 tablet by mouth 2 times daily. AS NEEDED for Allergy symptoms 60 tablet 3     fish oil-omega-3 fatty acids (FISH OIL) 1,000-340 mg capsule Take  by mouth.       flaxseed oil 1,000 mg cap Take  by mouth.       furosemide (LASIX) 40 mg tablet TAKE 2 TO 4 TABLETS BY MOUTH DAILY OR AS INSTRUCTED FOR LEG SWELLING 360 tablet 4     HUMALOG 100 unit/mL injection INJECT UNDER THE SKIN USING INSULIN PUMP. MAX DOSE  UNITS DAILY. 180 mL 1     hydrALAZINE (APRESOLINE) 25 mg tablet Take 1-2 tablets by mouth 4 times daily. - Take lowest effective dose for blood pressure management 720 tablet 3     HYDROcodone-acetaminophen, 5-325 mg, (NORCO) per tablet Take 1 tablet by  "mouth every 6 hours if needed  for Pain - for on or after 07/27/17 60 tablet 0     Insulin Needles, Disposable, (BD INSULIN PEN NEEDLE UF) 29 x 1/2 \" For administering insulin at home. 600 Each 2     ipratropium (ATROVENT HFA) 17 mcg/actuation inhaler Inhale 2 Puffs by mouth 4 times daily. 1 Inhaler 0     IV Administration Set (INFUSION SET) iset As directed.  0     lancets (ONE TOUCH ULTRASOFT LANCETS) Test 10 times per day. 600 Each 6     levothyroxine (SYNTHROID) 125 mcg tablet Take 1 tablet by mouth every morning. 90 tablet 4     MEDICAL SUPPLY, MISCELLANEOUS (GRADUATED COMPRESSION STOCKINGS) For personal use. Length: calf Strength: 20-30 mmHg 1 Packet 1     methylphenidate HCl (RITALIN) 10 mg tablet One tablet by mouth four times daily - for 10/5/2017 360 tablet 0     methylphenidate HCl (RITALIN) 10 mg tablet One tablet by mouth four times daily - for 11/30/17 360 tablet 0     metoprolol succinate (TOPROL XL) 50 mg sustained-release tablet Take 1 tablet by mouth 2 times daily. 180 tablet 3     nitroglycerin (NITROSTAT) 0.4 mg sublingual tablet Place 1 tablet under the tongue every 5 minutes if needed for Chest Pain. 3 Bottle 1     nystatin (MYCOSTATIN) cream Apply  topically to affected area(s) 2 times daily. For yeast skin fold rash -- vs Topical OTC Anti-fungals okay - monistat, lotrimin 30 g 3     Psyllium Seed-Sucrose (METAMUCIL, SUGAR,) powder Take 1 tsp by mouth 3 times daily. - for constipation prevention  0     ranitidine (ZANTAC) 150 mg tablet Take 1 tablet by mouth 2 times daily. 180 tablet 3     ranolazine (RANEXA) 500 mg Controlled-Release tablet Take 2 tablets by mouth 2 times daily. -- cancel other Rx -- give 3 month supply 360 tablet 3     rosuvastatin (CRESTOR) 10 mg tablet TAKE 1 TABLET BY MOUTH TWICE DAILY 180 tablet 3     sodium chloride-aloe vera (SALINE NASAL, ALOE VERA,) nasal gel Inhale  in the nostril(s) every hour if needed for Nasal Dryness. 14.1 g 3     Sub-Q Infusion Pump Access " (ACCU-CHEK SPIRIT CARTRIDGE SYS) [facility] As directed.  0     valsartan (DIOVAN) 160 mg tablet Take 1 tablet by mouth 2 times daily. 180 tablet 3     Allergies     Allergen  Reactions     Gabapentin Mental Status Change     Bextra [Valdecoxib] Edema     Lipitor [Atorvastatin] Hives     Lisinopril Cough     Lyrica [Pregabalin] Mental Status Change     Novolog [Insulin Aspart] Rash     Family History       Problem   Relation Age of Onset     Heart Disease  Mother      Heart problems       Heart Disease  Other      Heart problems       Heart Disease  Other      Heart problems       Other  Son      Ankylosing spondylitis        Other  Brother       with sudden death following shoulder surgery 2012 -- was off his Plavix for 10 days pre-operatively.       Family Status     Relation  Status     Brother      with sudden death following shoulder surgery 2012 -- was off his Plavix for 10 days pre-operatively.      Son Alive     Mother      Other      Other      Son      Brother      Social History     Social History        Marital status:       Spouse name: N/A     Number of children:  N/A     Years of education:  N/A     Social History Main Topics        Smoking status:  Never Smoker     Smokeless tobacco:  Never Used     Alcohol use  No     Drug use:  No     Sexual activity:  Yes     Partners: Female     Other Topics  Concern     Not on file      Social History Narrative     He is on Social Security Disability for coronary artery disease and cervical arthritis and disk disease.   Dax obed.  No tobacco.             Pertinent ROS was performed and was negative as noted in HPI above.     EXAM:   Vitals:     10/05/17 1130   BP: 138/74   Pulse: 68   Weight: 94.4 kg (208 lb 2 oz)     BP Readings from Last 3 Encounters:    10/05/17 138/74   17 138/60   17 138/76     Wt Readings from Last 3 Encounters:    10/05/17 94.4 kg (208 lb 2 oz)   17 93 kg (205 lb)   17 93.4 kg (206  "lb)     Estimated body mass index is 30.51 kg/(m^2) as calculated from the following:    Height as of 9/19/17: 1.759 m (5' 9.25\").    Weight as of this encounter: 94.4 kg (208 lb 2 oz).     EXAM:  Constitutional: Pleasant, alert, appropriate appearance for age. No acute distress  Eyes:  Extraocular muscles intact, Sclera non-icteric, Conjunctiva without erythema  Lymphatic Exam: Non-palpable nodes in neck, clavicular regions  Pulmonary: Lungs are clear to auscultation bilaterally, without wheezes or crackles  Cardiovascular Exam: regular rate and rhythm, 1-2+ pedal edema present  Gastrointestinal Exam: Obese, Soft, non-tender, non-distended, positive bowel sounds  Integument: No abnormal rashes, sores, or ulcerations noted  Neurologic Exam: CN 3-12 grossly intact   Musculoskeletal Exam: Moves upper and lower extremities symmetrically, No focal weakness  Gait and station appear grossly normal  Psychiatric Exam: Awake and Alert, Affect and mood appropriate  Speech is fluent, Thought process is normal    INVESTIGATIONS:  Results for orders placed or performed in visit on 10/04/17      CBC W PLT NO DIFF      Result  Value Ref Range    WHITE BLOOD COUNT         5.4 4.5 - 11.0 thou/cu mm    RED BLOOD COUNT           4.31 4.30 - 5.90 mil/cu mm    HEMOGLOBIN                14.1 13.5 - 17.5 g/dL    HEMATOCRIT                42.1 37.0 - 53.0 %    MCV                       98 80 - 100 fL    MCH                       32.7 26.0 - 34.0 pg    MCHC                      33.5 32.0 - 36.0 g/dL    RDW                       12.8 11.5 - 15.5 %    PLATELET COUNT            213 140 - 440 thou/cu mm    MPV                       8.4 6.5 - 11.0 fL   COMP METABOLIC PANEL      Result  Value Ref Range    SODIUM 136 133 - 143 mmol/L    POTASSIUM 5.1 3.5 - 5.1 mmol/L    CHLORIDE 107 98 - 107 mmol/L    CO2,TOTAL 22 21 - 31 mmol/L    ANION GAP 7 5 - 18                    GLUCOSE 126 (H) 70 - 105 mg/dL    CALCIUM 9.5 8.6 - 10.3 mg/dL    BUN 26 (H) 7 " - 25 mg/dL    CREATININE 1.56 (H) 0.70 - 1.30 mg/dL    BUN/CREAT RATIO           17                    GFR if African American 54 (L) >60 ml/min/1.73m2    GFR if not African American 45 (L) >60 ml/min/1.73m2    ALBUMIN 4.3 3.5 - 5.7 g/dL    PROTEIN,TOTAL 6.8 6.4 - 8.9 g/dL    GLOBULIN                  2.5 2.0 - 3.7 g/dL    A/G RATIO 1.7 1.0 - 2.0                    BILIRUBIN,TOTAL 0.5 0.3 - 1.0 mg/dL    ALK PHOSPHATASE 63 34 - 104 IU/L    ALT (SGPT) 29 7 - 52 IU/L    AST (SGOT) 23 13 - 39 IU/L   HEMOGLOBIN A1C MONITORING (POCT)      Result  Value Ref Range    HEMOGLOBIN A1C MONITORING (POCT) 7.5 (H) 4.0 - 6.2 %    ESTIMATED AVERAGE GLUCOSE  169 mg/dL       ASSESSMENT AND PLAN:  Moses was seen today for diabetes.    Diagnoses and all orders for this visit:    Controlled type 2 diabetes mellitus with stage 3 chronic kidney disease, with long-term current use of insulin (HC)    Insulin pump in place    Platelet inhibition due to Plavix -- On Plavix (Brand Name due to generic intolerance) For Life.   -     clopidogrel (PLAVIX) 75 mg tablet; Take 1 tablet by mouth once daily.    Mixed hyperlipidemia    Hypertension  -     furosemide (LASIX) 40 mg tablet; TAKE 2 TO 4 TABLETS BY MOUTH DAILY OR AS INSTRUCTED FOR LEG SWELLING  -     hydrALAZINE (APRESOLINE) 25 mg tablet; Take 1-2 tablets by mouth 4 times daily. - Take lowest effective dose for blood pressure management    Chronic diastolic heart failure - Mild - 1/20/2014 ECHO - EF 65%  -     furosemide (LASIX) 40 mg tablet; TAKE 2 TO 4 TABLETS BY MOUTH DAILY OR AS INSTRUCTED FOR LEG SWELLING    Need for vaccination with 13-polyvalent pneumococcal conjugate vaccine  -     Cancel: OMNI PREVNAR 13 (AKA PNEUMOCOCCAL VACCINE 13-VALENT IM)    S/P CABG x 2  -     diclofenac (VOLTAREN) 75 mg delayed-release tablet; TAKE 1 TABLET BY MOUTH FOUR TIMES DAILY    Edema, unspecified type  -     furosemide (LASIX) 40 mg tablet; TAKE 2 TO 4 TABLETS BY MOUTH DAILY OR AS INSTRUCTED FOR LEG  SWELLING    Ejection fraction < 50%  -     furosemide (LASIX) 40 mg tablet; TAKE 2 TO 4 TABLETS BY MOUTH DAILY OR AS INSTRUCTED FOR LEG SWELLING    Chronic diastolic heart failure (HC)  -     furosemide (LASIX) 40 mg tablet; TAKE 2 TO 4 TABLETS BY MOUTH DAILY OR AS INSTRUCTED FOR LEG SWELLING    Narcolepsy without cataplexy  -     methylphenidate HCl (RITALIN) 10 mg tablet; One tablet by mouth four times daily - for 10/5/2017  -     Dextroamphetamine Sulfate (DEXTROSTAT) 10 mg tablet; Take 1 tablet by mouth four times daily - for 10/5/2017  -     methylphenidate HCl (RITALIN) 10 mg tablet; One tablet by mouth four times daily - for 11/30/17  -     Dextroamphetamine Sulfate (DEXTROSTAT) 10 mg tablet; Take 1 tablet by mouth four times daily - for 11/30/17    Pain medication agreement - 6/29/2017  -     methylphenidate HCl (RITALIN) 10 mg tablet; One tablet by mouth four times daily - for 10/5/2017  -     Dextroamphetamine Sulfate (DEXTROSTAT) 10 mg tablet; Take 1 tablet by mouth four times daily - for 10/5/2017  -     methylphenidate HCl (RITALIN) 10 mg tablet; One tablet by mouth four times daily - for 11/30/17  -     Dextroamphetamine Sulfate (DEXTROSTAT) 10 mg tablet; Take 1 tablet by mouth four times daily - for 11/30/17      lab results and schedule of future lab studies reviewed with patient, reviewed diet, exercise and weight control, recommended sodium restriction, cardiovascular risk and specific lipid/LDL goals reviewed, specific diabetic recommendations low cholesterol diet, weight control and daily exercise discussed, home glucose monitoring emphasized, foot care discussed and Podiatry visits discussed, annual eye examinations at Ophthalmology discussed, glycohemoglobin and other lab monitoring discussed and long term diabetic complications discussed, use of aspirin to prevent MI and TIA's discussed    -- Expected clinical course discussed   -- Medications and their side effects discussed    Moses quintanilla  also recommended to eat a heart-healthy diet, do regular aerobic exercises, maintain a desirable body weight, and avoid tobacco products. These recommendations are from the American Heart Association (AHA) which stresses the importance of lifestyle changes to lower cardiovascular disease risk.     Return in about 3 months (around 1/5/2018) for -- labs in 91+ days with Diabetes clinic appointment with Dr. Barnett 1-2 days later..    Patient Instructions     Labs are looking better.    Kidney function/creatinine has improved.    Hemoglobin A1c is now controlled.    Medications refilled.     Results for orders placed or performed in visit on 10/04/17      CBC W PLT NO DIFF      Result  Value Ref Range    WHITE BLOOD COUNT         5.4 4.5 - 11.0 thou/cu mm    RED BLOOD COUNT           4.31 4.30 - 5.90 mil/cu mm    HEMOGLOBIN                14.1 13.5 - 17.5 g/dL    HEMATOCRIT                42.1 37.0 - 53.0 %    MCV                       98 80 - 100 fL    MCH                       32.7 26.0 - 34.0 pg    MCHC                      33.5 32.0 - 36.0 g/dL    RDW                       12.8 11.5 - 15.5 %    PLATELET COUNT            213 140 - 440 thou/cu mm    MPV                       8.4 6.5 - 11.0 fL   COMP METABOLIC PANEL      Result  Value Ref Range    SODIUM 136 133 - 143 mmol/L    POTASSIUM 5.1 3.5 - 5.1 mmol/L    CHLORIDE 107 98 - 107 mmol/L    CO2,TOTAL 22 21 - 31 mmol/L    ANION GAP 7 5 - 18                    GLUCOSE 126 (H) 70 - 105 mg/dL    CALCIUM 9.5 8.6 - 10.3 mg/dL    BUN 26 (H) 7 - 25 mg/dL    CREATININE 1.56 (H) 0.70 - 1.30 mg/dL    BUN/CREAT RATIO           17                    GFR if African American 54 (L) >60 ml/min/1.73m2    GFR if not African American 45 (L) >60 ml/min/1.73m2    ALBUMIN 4.3 3.5 - 5.7 g/dL    PROTEIN,TOTAL 6.8 6.4 - 8.9 g/dL    GLOBULIN                  2.5 2.0 - 3.7 g/dL    A/G RATIO 1.7 1.0 - 2.0                    BILIRUBIN,TOTAL 0.5 0.3 - 1.0 mg/dL    ALK PHOSPHATASE 63 34 - 104  IU/L    ALT (SGPT) 29 7 - 52 IU/L    AST (SGOT) 23 13 - 39 IU/L   HEMOGLOBIN A1C MONITORING (POCT)      Result  Value Ref Range    HEMOGLOBIN A1C MONITORING (POCT) 7.5 (H) 4.0 - 6.2 %    ESTIMATED AVERAGE GLUCOSE  169 mg/dL          Return for Diabetes labs and clinic follow-up appointment every 3 to 4 months.  --- (Go for about 91 to 100 days)    Schedule lab only appointment --- A few days AFTER: 01/03/18     Schedule clinic appointment with Dr. Barnett -- Same day as labs, or 1-2 days later.     Insurance companies are now grading you and I on your blood sugar control -- Goal is to get your A1c down to 7.9% or lower and NO Smoking!    -- Medicare and most insurance companies, will only cover Hemoglobin A1c labs to be rechecked every 91+ days.      HEMOGLOBIN A1C MONITORING (POCT)    Date Value   10/04/2017 7.5 % (H)   04/02/2013 7.4 % NGSP (H)        Next follow-up appointment with Dr. Barnett should be scheduled:  -- Approximately a few days AFTER: 01/03/18      Jorge Barnett MD

## 2017-12-27 NOTE — PROGRESS NOTES
Patient Information     Patient Name MRN Moses Fairbanks [id number] Male 1950      Progress Notes by Jorge Barnett MD at 2017  2:20 PM     Author:  Jorge Barnett MD Service:  (none) Author Type:  Physician     Filed:  2017  6:04 PM Encounter Date:  2017 Status:  Signed     :  Jorge Barnett MD (Physician)            Nursing Notes:   Barbara Connor  2017  2:40 PM  Signed  Patient presents to the clinic for medication management, last administration of Dextroamphetamine was around 830 today, Methylphenidate was yesterday afternoon.  Patient reports lower back and bilateral hip pain noted over the past several weeks.  Patient denies trauma at this time.      Barbara Connor LPN        2017 2:20 PM    Moses Whittaker presents to clinic today for:   Chief Complaint    Patient presents with      Medication Management     Back Pain     HPI: Mr. Whittaker is a 67 y.o. male who presents today for evaluation of above.     (E11.22,  E11.65,  N18.3) Uncontrolled type 2 diabetes mellitus with stage 3 chronic kidney disease, without long-term current use of insulin (HC)  (primary encounter diagnosis)  (G47.419) NARCOLEPSY  (Z02.89) Pain medication agreement - 2017  (M50.30) Degenerative disc disease, cervical  (M46.92) Facet arthritis of cervical region (HC)  (M48.02) Cervical stenosis of spinal canal  (M54.12) Cervical radicular pain  (L29.8) Jock itch  (I10) Hypertension  (E78.2) Mixed hyperlipidemia  (E03.9) Hypothyroidism, unspecified type     Patient presents for medication follow up as well as diabetes follow-up.  Last hemoglobin A1c was quite elevated.  He is due for labs today.  -- Repeat hemoglobin A1c today shows ongoing elevated glucose levels.    Narcolepsy, chronic.  Doing well with his dextroamphetamine and Ritalin.  Without them he can't drive - he falls asleep.    Controlled substance agreement with degenerative cervical disc disease, facet  arthritis, cervical stenosis with radicular cervical pain   + having a lot more back pain - rarely using hydrocodone.  Like a refill today.  He's had some back injections in the past.    Jock itch - reports got yeast / body fungus into the groin... Thinks got from his wife, who has body fungus exposure.   Tried one OTC -- didn't really help.     Hypertension, controlled today.  Seems to be tolerating medications.  Check labs.    Hyperlipidemia, taking Crestor 10 mg daily.  Last Lipids:  Chol: 2017 101   166  HDL: 2017 33   LDL: 2017 35    Hypothyroidism, taking oral replacement.  Check labs today.  TSH (uIU/mL)    Date Value   2017 0.43     Mr. Whittaker's Body mass index is 30.35 kg/(m^2). This is out of the normal range for a 67 y.o. Normal range for ages 18+ is between 18.5 and 24.9. To lose weight we reviewed risks and benefits of appropriate options such as diet, exercise, and medications. Patient's strategy will be  self-directed nutrition plan and self-directed exercise program   BP Readings from Last 1 Encounters:17 : 138/80  Mr. Stokes blood pressure is out of the normal range for adults. Per JNC-8 guidelines normal adult blood pressure is < 120/80, pre-hypertensive is between 120/80 and 139/89, and hypertension is 140/90 or greater. Risks of hypertension were discussed. Patient's strategy will be weight loss, increased activity and reduced salt intake    Functional Capacity: > or about 4 METS.     + Ranexa has REALLY helped with his exertional angina. Currently up to 3 tablet daily - slowly increased dose, was at BID, now TID.     Patient reports no current symptoms of fevers, chills, nausea/vomiting.   No cough. No shortness of breath.   No change in bowel/bladder habits. No melena, hematochezia. No Hematuria.   No palpitations.  No orthopnea/paroxysmal nocturnal dyspnea   No vision or hearing issues.   No significant mood issues   No bruising.     [place]:  No  flowsheet data found.    PHQ9:  PHQ Depression Screening 3/30/2017 6/29/2017   Date of PHQ exam (doc flow) 3/30/2017 6/29/2017   1. Lack of interest/pleasure 0 - Not at all 0 - Not at all   2. Feeling down/depressed 0 - Not at all 0 - Not at all   PHQ-2 TOTAL SCORE 0 0   3. Trouble sleeping 0 - Not at all 0 - Not at all   4. Decreased energy 0 - Not at all 0 - Not at all   5. Appetite change 0 - Not at all 0 - Not at all   6. Feelings of failure 0 - Not at all 0 - Not at all   7. Trouble concentrating 0 - Not at all 0 - Not at all   8. Activity level 0 - Not at all 0 - Not at all   9. Hurting yourself 0 - Not at all 0 - Not at all   PHQ-9 TOTAL SCORE 0 0   PHQ-9 Severity Level none none   Functional Impairment not difficult at all not difficult at all        I have personally reviewed the past medical history, past surgical history, medications, allergies, family and social history as listed below, on 6/29/2017.    Patient Active Problem List       Diagnosis  Date Noted     Uncontrolled type 2 diabetes mellitus with stage 3 chronic kidney disease, without long-term current use of insulin (HC)  03/30/2017     Erectile dysfunction  09/26/2016     Trigger point with neck pain  07/14/2016     Trigger point with back pain  07/14/2016     Thrombophlebitis of superficial veins of left lower extremity - 6/17/2016 06/17/2016     Controlled type 2 diabetes mellitus with insulin therapy (HC)  03/10/2016     Insulin pump in place  03/10/2016     Pain medication agreement - 6/29/2017 12/17/2015     Trigger point of extremity  12/17/2015     Myofacial muscle pain  12/17/2015     Greater trochanteric bursitis of left hip  06/24/2015     Blister of toe of right foot  12/09/2014     Cervical stenosis of spinal canal  06/17/2014     Facet arthritis of cervical region (HC)  06/17/2014     Degenerative disc disease, cervical  06/17/2014     Left arm numbness  06/05/2014     Cervical radicular pain  06/05/2014     Left atrial  enlargement - Moderate - 1/20/2014 ECHO  01/23/2014     S/P coronary artery stent placement  01/10/2014     Old inferior wall myocardial infarction  01/10/2014     S/P CABG x 2  01/10/2014     Hypertension  01/10/2014     Platelet inhibition due to Plavix -- On Plavix (Brand Name due to generic intolerance) For Life.   01/10/2014     Myalgia  01/10/2014     CKD (chronic kidney disease) stage 3, GFR 30-59 ml/min  01/10/2014     Chronic diastolic heart failure - Mild - 1/20/2014 ECHO - EF 65%  01/10/2014     1/20/2014 ECHO FINAL IMPRESSION:   1. Normal left ventricular size and systolic function. Estimated ejection fraction 65%.   2. Mild increase in wall thickness of the ventricular septum with hypokinesis of the basilar segment of the ventricular septum and inferior wall.   3. Mild to moderate left atrial enlargement.   4. Trace tricuspid regurgitation.   5. Mild left ventricular diastolic dysfunction.         ACP (advance care planning)  12/05/2013     Diabetic neuropathy, painful (HC)  04/22/2013     G E R D  05/17/2012     OBESITY  05/17/2012     NARCOLEPSY       Total dose recommended by pulmonology he is dextro- amphetamine 40 mg plus   methylphenidate 40 mg in divided doses per day.  Total of 80 mg of short   acting amphetamines daily.          DIASTASIS RECTI  10/06/2011     C A D  09/08/2011     ERECTILE DYSFUNCTION  06/30/2011     ANGINA  12/10/2010     ANKLE EDEMA, CHRONIC  10/25/2010     HYPOTHYROIDISM       BACK PAIN, LUMBAR, WITH RADICULOPATHY       OSTEOARTHRITIS, CERVICAL SPINE       DISC DISEASE, CERVICAL       Mixed hyperlipidemia       PEPTIC ULCER DISEASE       Diabetes mellitus type 2, controlled (HC)       Past Medical History:     Diagnosis  Date     CAD (coronary artery disease)     Coronary artery disease, post angioplasty      Carpal tunnel syndrome      Edema     Edema secondary to Bextra      Heart disease     Multiple coronary stents       Helicobacter pylori gastritis      Hematoma  11/2006    Right calf hematoma      Maxillary sinusitis      NARCOLEPSY      Rheumatic fever     Rheumatic fever as a child without valvular heart disease       Past Surgical History:      Procedure  Laterality Date     ANGIOPLASTY  12/00, 04/04/04    Repeat angioplasty with lt internal mammary to the lt anterior descending and lt radial artery from aorta to the diagonal.       ANGIOPLASTY  10/01    Angioplasty with 2 vessel CABG the following week from the lt internal mammary to the lt anterior descending and right radial free graft       ANGIOPLASTY  04/2003     APPENDECTOMY OPEN  1967     CARPAL TUNNEL RELEASE  11/15/01    Right carpal tunnel release by Dr. Mace       CARPAL TUNNEL RELEASE  01/2004    Left carpal tunnel release        COLONOSCOPY SCREENING  1999     IR ANGIOGRAM FEMORAL/EXTREMITY (IA)  09/03    Unremarkable angiogram at [facility]       IR ANGIOGRAM FEMORAL/EXTREMITY (IA)  2/26/2007    Unremarkable coronary angiogram at [facility].       PTCA  10/05/2004    Angioplasty        PTCA  02/2005    Angioplasty with stent.         PTCA  03/02/2005    Angioplasty with stent.        PTCA  09/23/2005    Angioplasty without stent       ROTATOR CUFF REPAIR  02/06/2006    Left rotator cuff repair and bone spur removal by Dr. Giraldo in [place]       Current Outpatient Prescriptions       Medication  Sig Dispense Refill     alcohol swabs (ALCOHOL PREP PADS) For home use. 1 box 0     aspirin 325 mg tablet Take  by mouth.       blood sugar diagnostic (ONE TOUCH ULTRA TEST) strip Dispense test strips covered by the patient insurance. Test 10 times per day. 900 Each 3     Blood-Glucose Meter (ACCU-CHEK ABBI PLUS METER) Dispense glucose meter, test strips and lancets covered by the patient insurance. Test 10 times per day. 1 Device 0     clopidogrel (PLAVIX) 75 mg tablet Take 1 tablet by mouth once daily. 90 tablet 4     codeine-guaiFENesin (ROBITUSSIN AC)  mg/5 mL liquid Take 10 mL by mouth every 4 hours if needed for  "Cough. Max dose 60 mL per 24 hrs. 200 mL 0     desoximetasone 0.25% topical (TOPICORT) 0.25 % cream Apply twice daily 180 g 1     Dextroamphetamine Sulfate (DEXTROSTAT) 10 mg tablet Take 1 tablet by mouth four times daily - for on or after 6/29/2017 360 tablet 0     Dextromethorphan-guaFENesin (MUCINEX DM)  mg tablet Take 1 tablet by mouth 2 times daily if needed for Cough. - OTC / Generic Okay 60 tablet 1     diclofenac (VOLTAREN) 75 mg delayed-release tablet TAKE 1 TABLET BY MOUTH FOUR TIMES DAILY 360 tablet 3     fexofenadine (ALLEGRA ALLERGY) 60 mg tablet Take 1 tablet by mouth 2 times daily. AS NEEDED for Allergy symptoms 60 tablet 3     fish oil-omega-3 fatty acids (FISH OIL) 1,000-340 mg capsule Take  by mouth.       flaxseed oil 1,000 mg cap Take  by mouth.       furosemide (LASIX) 40 mg tablet TAKE 2 TO 4 TABLETS BY MOUTH DAILY OR AS INSTRUCTED FOR LEG SWELLING 360 tablet 4     HUMALOG 100 unit/mL injection INJECT UNDER THE SKIN USING INSULIN PUMP. MAX DOSE  UNITS DAILY. 180 mL 1     hydrALAZINE (APRESOLINE) 25 mg tablet Take 1-2 tablets by mouth 4 times daily. - Take lowest effective dose for blood pressure management 720 tablet 3     HYDROcodone-acetaminophen, 5-325 mg, (NORCO) per tablet Take 1 tablet by mouth every 6 hours if needed  for Pain - for on or after 07/27/17 60 tablet 0     Insulin Needles, Disposable, (BD INSULIN PEN NEEDLE UF) 29 x 1/2 \" For administering insulin at home. 600 Each 2     ipratropium (ATROVENT HFA) 17 mcg/actuation inhaler Inhale 2 Puffs by mouth 4 times daily. 1 Inhaler 0     IV Administration Set (INFUSION SET) iset As directed.  0     lancets (ONE TOUCH ULTRASOFT LANCETS) Test 10 times per day. 600 Each 6     levothyroxine (SYNTHROID) 125 mcg tablet Take 1 tablet by mouth every morning. 90 tablet 4     methylphenidate (RITALIN) 10 mg tablet One tablet by mouth four times daily - for on or after 6/29/2017 360 tablet 0     metoprolol succinate (TOPROL XL) 50 mg " sustained-release tablet Take 1 tablet by mouth 2 times daily. 180 tablet 3     nitroglycerin (NITROSTAT) 0.4 mg sublingual tablet Place 1 tablet under the tongue every 5 minutes if needed for Chest Pain. 3 Bottle 1     nystatin (MYCOSTATIN) cream Apply  topically to affected area(s) 2 times daily. For yeast skin fold rash -- vs Topical OTC Anti-fungals okay - monistat, lotrimin 30 g 3     ranitidine (ZANTAC) 150 mg tablet Take 1 tablet by mouth 2 times daily. 180 tablet 3     ranolazine (RANEXA) 500 mg Controlled-Release tablet Take 2 tablets by mouth 2 times daily. -- cancel other Rx -- give 3 month supply 360 tablet 3     rosuvastatin (CRESTOR) 10 mg tablet TAKE 1 TABLET BY MOUTH TWICE DAILY 180 tablet 3     sodium chloride-aloe vera (SALINE NASAL, ALOE VERA,) nasal gel Inhale  in the nostril(s) every hour if needed for Nasal Dryness. 14.1 g 3     Sub-Q Infusion Pump Access (ACCU-CHEK SPIRIT CARTRIDGE SYS) misc As directed.  0     valsartan (DIOVAN) 160 mg tablet Take 1 tablet by mouth 2 times daily. 180 tablet 3     Allergies     Allergen  Reactions     Gabapentin Mental Status Change     Bextra [Valdecoxib] Edema     Lipitor [Atorvastatin] Hives     Lisinopril Cough     Lyrica [Pregabalin] Mental Status Change     Novolog [Insulin Aspart] Rash     Family History       Problem   Relation Age of Onset     Heart Disease  Mother      Heart problems       Heart Disease  Other      Heart problems       Heart Disease  Other      Heart problems       Other  Son      Ankylosing spondylitis        Other  Brother       with sudden death following shoulder surgery 2012 -- was off his Plavix for 10 days pre-operatively.       Family Status     Relation  Status     Brother      with sudden death following shoulder surgery 2012 -- was off his Plavix for 10 days pre-operatively.      Son Alive     Mother      Other      Other      Son      Brother      Social History     Social History         "Marital status:       Spouse name: N/A     Number of children:  N/A     Years of education:  N/A     Social History Main Topics       Smoking status: Never Smoker     Smokeless tobacco: Never Used     Alcohol use No     Drug use: No     Sexual activity: Yes     Other Topics  Concern     Not on file      Social History Narrative     He is on Social Security Disability for coronary artery disease and cervical arthritis and disk disease.   Jakobd obed.  No tobacco.               Pertinent ROS was performed and was negative as noted in HPI above.     EXAM:   Vitals:     06/29/17 1421   BP: 138/80   Pulse: 64   Weight: 93.9 kg (207 lb)     BP Readings from Last 3 Encounters:    06/29/17 138/80   03/30/17 134/76   03/12/17 154/66     Wt Readings from Last 3 Encounters:    06/29/17 93.9 kg (207 lb)   03/30/17 93.6 kg (206 lb 6 oz)   03/12/17 94.3 kg (208 lb)     Estimated body mass index is 30.35 kg/(m^2) as calculated from the following:    Height as of 3/30/17: 1.759 m (5' 9.25\").    Weight as of this encounter: 93.9 kg (207 lb).     EXAM:  Constitutional: Pleasant, alert, appropriate appearance for age. No acute distress  Lymphatic Exam: Non-palpable nodes in neck, clavicular regions  Pulmonary: Lungs are clear to auscultation bilaterally, without wheezes or crackles  Cardiovascular Exam: regular rate and rhythm, trace pedal edema present  Gastrointestinal Exam: Obese, Soft, non-tender, non-distended, positive bowel sounds  Integument: No abnormal sores, or ulcerations noted -- declined groin evaluation.   Neurologic Exam: CN 3-12 grossly intact   Musculoskeletal Exam: Moves upper and lower extremities symmetrically, No focal weakness  Gait and station appear grossly normal  Psychiatric Exam: Awake and Alert, Affect and mood appropriate  Speech is fluent, Thought process is normal    INVESTIGATIONS:  Results for orders placed or performed in visit on 06/29/17      CBC W PLT NO DIFF      Result  Value Ref Range    " WHITE BLOOD COUNT         5.4 4.5 - 11.0 thou/cu mm    RED BLOOD COUNT           4.58 4.30 - 5.90 mil/cu mm    HEMOGLOBIN                15.0 13.5 - 17.5 g/dL    HEMATOCRIT                44.3 37.0 - 53.0 %    MCV                       97 80 - 100 fL    MCH                       32.8 26.0 - 34.0 pg    MCHC                      33.9 32.0 - 36.0 g/dL    RDW                       13.2 11.5 - 15.5 %    PLATELETCOUNT            207 140 - 440 thou/cu mm    MPV                       8.6 6.5 - 11.0 fL   COMP METABOLIC PANEL      Result  Value Ref Range    SODIUM 135 133 - 143 mmol/L    POTASSIUM 5.3 (H) 3.5 - 5.1 mmol/L    CHLORIDE 106 98 - 107 mmol/L    CO2,TOTAL 21 21 - 31 mmol/L    ANION GAP 8 5 - 18                    GLUCOSE 121 (H) 70 - 105 mg/dL    CALCIUM 9.1 8.6 - 10.3 mg/dL    BUN 20 7 - 25 mg/dL    CREATININE 1.67 (H) 0.70 - 1.30 mg/dL    BUN/CREAT RATIO           12                    GFR if African American 50 (L) >60 ml/min/1.73m2    GFR if not  41 (L) >60 ml/min/1.73m2    ALBUMIN 4.2 3.5 - 5.7 g/dL    PROTEIN,TOTAL 6.7 6.4 - 8.9 g/dL    GLOBULIN                  2.5 2.0 - 3.7 g/dL    A/G RATIO 1.7 1.0 - 2.0                    BILIRUBIN,TOTAL 0.6 0.3 - 1.0 mg/dL    ALK PHOSPHATASE 58 34 - 104 IU/L    ALT (SGPT) 26 7 - 52 IU/L    AST (SGOT) 22 13 - 39 IU/L   HEMOGLOBIN A1C MONITORING (POCT)      Result  Value Ref Range    HEMOGLOBIN A1C MONITORING (POCT) 8.1 (H) 4.0 - 6.2 %    ESTIMATED AVERAGE GLUCOSE  186 mg/dL   LIPID PANEL      Result  Value Ref Range    CHOLESTEROL,TOTAL 101 <200 mg/dL    TRIGLYCERIDES 166 (H) <150 mg/dL    HDL CHOLESTEROL 33 23 - 92 mg/dL    NON-HDL CHOLESTEROL 68 <145 mg/dl    CHOL/HDL RATIO            3.06 <4.50                    LDL CHOLESTEROL 35 <100 mg/dL    PATIENT STATUS            NON-FASTING                   TSH      Result  Value Ref Range    TSH 0.43 0.34 - 5.60 uIU/mL       ASSESSMENT AND PLAN:  Moses was seen today for medication management and back  pain.    Diagnoses and all orders for this visit:    Uncontrolled type 2 diabetes mellitus with stage 3 chronic kidney disease, without long-term current use of insulin (HC)  -     HEMOGLOBIN A1C MONITORING (POCT)    NARCOLEPSY  -     Discontinue: Dextroamphetamine Sulfate (DEXTROSTAT) 10 mg tablet; Take 1 tablet by mouth four times daily - for on or after 08/24/17  -     Discontinue: methylphenidate (RITALIN) 10 mg tablet; One tablet by mouth four times daily - for on or after 08/24/17  -     Dextroamphetamine Sulfate (DEXTROSTAT) 10 mg tablet; Take 1 tablet by mouth four times daily - for on or after 6/29/2017  -     methylphenidate (RITALIN) 10 mg tablet; One tablet by mouth four times daily - for on or after 6/29/2017    Pain medication agreement - 6/29/2017  -     Discontinue: Dextroamphetamine Sulfate (DEXTROSTAT) 10 mg tablet; Take 1 tablet by mouth four times daily - for on or after 08/24/17  -     HYDROcodone-acetaminophen, 5-325 mg, (NORCO) per tablet; Take 1 tablet by mouth every 6 hours if needed  for Pain - for on or after 07/27/17  -     Discontinue: methylphenidate (RITALIN) 10 mg tablet; One tablet by mouth four times daily - for on or after 08/24/17  -     Dextroamphetamine Sulfate (DEXTROSTAT) 10 mg tablet; Take 1 tablet by mouth four times daily - for on or after 6/29/2017  -     methylphenidate (RITALIN) 10 mg tablet; One tablet by mouth four times daily - for on or after 6/29/2017    Degenerative disc disease, cervical  -     HYDROcodone-acetaminophen, 5-325 mg, (NORCO) per tablet; Take 1 tablet by mouth every 6 hours if needed  for Pain - for on or after 07/27/17    Facet arthritis of cervical region (HC)  -     HYDROcodone-acetaminophen, 5-325 mg, (NORCO) per tablet; Take 1 tablet by mouth every 6 hours if needed  for Pain - for on or after 07/27/17    Cervical stenosis of spinal canal  -     HYDROcodone-acetaminophen, 5-325 mg, (NORCO) per tablet; Take 1 tablet by mouth every 6 hours if  needed  for Pain - for on or after 07/27/17    Cervical radicular pain  -     HYDROcodone-acetaminophen, 5-325 mg, (NORCO) per tablet; Take 1 tablet by mouth every 6 hours if needed  for Pain - for on or after 07/27/17    Jock itch  -     nystatin (MYCOSTATIN) cream; Apply  topically to affected area(s) 2 times daily. For yeast skin fold rash -- vs Topical OTC Anti-fungals okay - monistat, lotrimin    Hypertension  -     CBC W PLT NO DIFF  -     COMP METABOLIC PANEL    Mixed hyperlipidemia  -     LIPID PANEL    Hypothyroidism, unspecified type  -     TSH; Future  -     TSH      lab results and schedule of future lab studies reviewed with patient, reviewed diet, exercise and weight control, recommended sodium restriction, cardiovascular risk and specific lipid/LDL goals reviewed, use of Plavix to prevent MI and TIA's discussed    -- Expected clinical course discussed   -- Medications and their side effects discussed    Return in about 3 months (around 9/29/2017) for -- labs in 91+ days with Diabetes clinic appointment with Dr. Barnett 1-2 days later..    Patient Instructions     1. NARCOLEPSY  - Dextroamphetamine Sulfate (DEXTROSTAT) 10 mg tablet; Take 1 tablet by mouth four times daily - for on or after 08/24/17  Dispense: 360 tablet; Refill: 0  - methylphenidate (RITALIN) 10 mg tablet; One tablet by mouth four times daily - for on or after 08/24/17  Dispense: 360 tablet; Refill: 0    2. Pain medication agreement - 6/29/2017  - Dextroamphetamine Sulfate (DEXTROSTAT) 10 mg tablet; Take 1 tablet by mouth four times daily - for on or after 08/24/17  Dispense: 360 tablet; Refill: 0  - HYDROcodone-acetaminophen, 5-325 mg, (NORCO) per tablet; Take 1 tablet by mouth every 6 hours if needed  for Pain - for on or after 07/27/17  Dispense: 60 tablet; Refill: 0  - methylphenidate (RITALIN) 10 mg tablet; One tablet by mouth four times daily - for on or after 08/24/17  Dispense: 360 tablet; Refill: 0    3. Degenerative disc  disease, cervical  - HYDROcodone-acetaminophen, 5-325 mg, (NORCO) per tablet; Take 1 tablet by mouth every 6 hours if needed  for Pain - for on or after 07/27/17  Dispense: 60 tablet; Refill: 0    4. Facet arthritis of cervical region (HC)  - HYDROcodone-acetaminophen, 5-325 mg, (NORCO) per tablet; Take 1 tablet by mouth every 6 hours if needed  for Pain - for on or after 07/27/17  Dispense: 60 tablet; Refill: 0    5. Cervical stenosis of spinal canal  - HYDROcodone-acetaminophen, 5-325 mg, (NORCO) per tablet; Take 1 tablet by mouth every 6 hours if needed  for Pain - for on or after 07/27/17  Dispense: 60 tablet; Refill: 0    6. Cervical radicular pain  - HYDROcodone-acetaminophen, 5-325 mg, (NORCO) per tablet; Take 1 tablet by mouth every 6 hours if needed  for Pain - for on or after 07/27/17  Dispense: 60 tablet; Refill: 0    7. Jock itch  - nystatin (MYCOSTATIN) cream; Apply  topically to affected area(s) 2 times daily. For yeast skin fold rash -- vs Topical OTC Anti-fungals okay - monistat, lotrimin  Dispense: 30 g; Refill: 3      Diabetic labs today.       Return for Diabetes labs and clinic follow-up appointment every 3 to 4 months.  --- (Go for about 91 to 100 days)    Schedule lab only appointment --- A few days AFTER: 09/27/17     Schedule clinic appointment with Dr. Barnett -- Same day as labs, or 1-2 days later.     Insurance companies are now grading you and I on your blood sugar control -- Goal is to get your A1c down to 7.9% or lower and NO Smoking!    -- Medicare and most insurance companies, will only cover Hemoglobin A1c labs to be rechecked every 91+ days.      HEMOGLOBIN A1C MONITORING (POCT)    Date Value   03/30/2017 8.4 % (H)   04/02/2013 7.4 % NGSP (H)        Next follow-up appointment with Dr. Barnett should be scheduled:  -- Approximately a few days AFTER: 09/27/17      Jorge Barnett MD

## 2017-12-27 NOTE — PROGRESS NOTES
Patient Information     Patient Name MRN Sex Moses Ott [id number] Male 1950      Progress Notes by Carolin Zamudio NP at 2017  3:20 PM     Author:  Carolin Zamudio NP Service:  (none) Author Type:  PHYS- Nurse Practitioner     Filed:  2017  4:12 PM Encounter Date:  2017 Status:  Signed     :  Carolin Zamudio NP (PHYS- Nurse Practitioner)            SUBJECTIVE:    Moses Whittaker is a 67 y.o. male who presents for wound follow-up    HPI  he was seen in clinic one week ago for venous stasis ulcer and mild cellulitis. He was started on Keflex. He continues on the antibiotic and has been taking it as prescribed. He denies pain in the left leg. He does feel that the wounds are improving. He has not noticed any redness or warmth. He has been wearing antiembolism hose but does not have compression stockings. He has not been elevating legs above heart. He states that he cut out his sodium intake. He feels the wounds and edema are improving.  Allergies     Allergen  Reactions     Gabapentin Mental Status Change     Bextra [Valdecoxib] Edema     Lipitor [Atorvastatin] Hives     Lisinopril Cough     Lyrica [Pregabalin] Mental Status Change     Novolog [Insulin Aspart] Rash   ,   Current Outpatient Prescriptions on File Prior to Visit       Medication  Sig Dispense Refill     alcohol swabs (ALCOHOL PREP PADS) For home use. 1 box 0     aspirin 325 mg tablet Take  by mouth.       blood sugar diagnostic (ONE TOUCH ULTRA TEST) strip Dispense test strips covered by the patient insurance. Test 10 times per day. 900 Each 3     Blood-Glucose Meter (ACCU-CHEK ABBI PLUS METER) Dispense glucose meter, test strips and lancets covered by the patient insurance. Test 10 times per day. 1 Device 0     cephalexin (KEFLEX) 500 mg capsule Take 1 capsule by mouth 3 times daily for 7 days. 21 capsule 0     clopidogrel (PLAVIX) 75 mg tablet Take 1 tablet by mouth once daily. 90 tablet 4  "    codeine-guaiFENesin (ROBITUSSIN AC)  mg/5 mL liquid Take 10 mL by mouth every 4 hours if needed for Cough. Max dose 60 mL per 24 hrs. 200 mL 0     desoximetasone 0.25% topical (TOPICORT) 0.25 % cream Apply twice daily 180 g 3     Dextroamphetamine Sulfate (DEXTROSTAT) 10 mg tablet Take 1 tablet by mouth four times daily - for on or after 6/29/2017 360 tablet 0     Dextromethorphan-guaFENesin (MUCINEX DM)  mg tablet Take 1 tablet by mouth 2 times daily if needed for Cough. - OTC / Generic Okay 60 tablet 1     diclofenac (VOLTAREN) 75 mg delayed-release tablet TAKE 1 TABLET BY MOUTH FOUR TIMES DAILY 360 tablet 3     docusate (COLACE) 100 mg capsule Take 1-2 capsules by mouth 2 times daily. -- AS needed for constipation prevention 120 capsule 3     fexofenadine (ALLEGRA ALLERGY) 60 mg tablet Take 1 tablet by mouth 2 times daily. AS NEEDED for Allergy symptoms 60 tablet 3     fish oil-omega-3 fatty acids (FISH OIL) 1,000-340 mg capsule Take  by mouth.       flaxseed oil 1,000 mg cap Take  by mouth.       furosemide (LASIX) 40 mg tablet TAKE 2 TO 4 TABLETS BY MOUTH DAILY OR AS INSTRUCTED FOR LEG SWELLING 360 tablet 4     HUMALOG 100 unit/mL injection INJECT UNDER THE SKIN USING INSULIN PUMP. MAX DOSE  UNITS DAILY. 180 mL 1     hydrALAZINE (APRESOLINE) 25 mg tablet Take 1-2 tablets by mouth 4 times daily. - Take lowest effective dose for blood pressure management 720 tablet 3     HYDROcodone-acetaminophen, 5-325 mg, (NORCO) per tablet Take 1 tablet by mouth every 6 hours if needed  for Pain - for on or after 07/27/17 60 tablet 0     Insulin Needles, Disposable, (BD INSULIN PEN NEEDLE UF) 29 x 1/2 \" For administering insulin at home. 600 Each 2     ipratropium (ATROVENT HFA) 17 mcg/actuation inhaler Inhale 2 Puffs by mouth 4 times daily. 1 Inhaler 0     IV Administration Set (INFUSION SET) iset As directed.  0     lancets (ONE TOUCH ULTRASOFT LANCETS) Test 10 times per day. 600 Each 6     " levothyroxine (SYNTHROID) 125 mcg tablet Take 1 tablet by mouth every morning. 90 tablet 4     methylphenidate (RITALIN) 10 mg tablet One tablet by mouth four times daily - for on or after 6/29/2017 360 tablet 0     metoprolol succinate (TOPROL XL) 50 mg sustained-release tablet Take 1 tablet by mouth 2 times daily. 180 tablet 3     nitroglycerin (NITROSTAT) 0.4 mg sublingual tablet Place 1 tablet under the tongue every 5 minutes if needed for Chest Pain. 3 Bottle 1     nystatin (MYCOSTATIN) cream Apply  topically to affected area(s) 2 times daily. For yeast skin fold rash -- vs Topical OTC Anti-fungals okay - monistat, lotrimin 30 g 3     Psyllium Seed-Sucrose (METAMUCIL, SUGAR,) powder Take 1 tsp by mouth 3 times daily. - for constipation prevention  0     ranitidine (ZANTAC) 150 mg tablet Take 1 tablet by mouth 2 times daily. 180 tablet 3     ranolazine (RANEXA) 500 mg Controlled-Release tablet Take 2 tablets by mouth 2 times daily. -- cancel other Rx -- give 3 month supply 360 tablet 3     rosuvastatin (CRESTOR) 10 mg tablet TAKE 1 TABLET BY MOUTH TWICE DAILY 180 tablet 3     sodium chloride-aloe vera (SALINE NASAL, ALOE VERA,) nasal gel Inhale  in the nostril(s) every hour if needed for Nasal Dryness. 14.1 g 3     Sub-Q Infusion Pump Access (ACCU-CHEK SPIRIT CARTRIDGE SYS) misc As directed.  0     valsartan (DIOVAN) 160 mg tablet Take 1 tablet by mouth 2 times daily. 180 tablet 3     No current facility-administered medications on file prior to visit.     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 Affect normal. Alert and oriented ×4. Left lower extremity with 1-2 plus edema. There are 2 small superficial ulcers noted. These are located at the left lateral calf. Proximal ulcer measures 1.5 x 0.3 cm and distal ulcer measures 0.2 x 0.2 cm with granulating wound bed. He has multiple varicosities in the lower extremities. Evidence of cellulitis to include erythema, warmth and tenderness have resolved. Left foot DP PT intact. Capillary refill less than 3 seconds. Wounds are cleansed with saline wash and 4 inch Allevyn gentle border lite dressings applied    ASSESSMENT/PLAN:    ICD-10-CM    1. Venous ulcer of left lower extremity with varicose veins (HC) I83.029 MEDICAL SUPPLY, MISCELLANEOUS (GRADUATED COMPRESSION STOCKINGS)   2. Cellulitis of left lower extremity L03.116    3. Chronic edema R60.9         Plan:  Prescription sent for compression stockings 20-30 mmHg to be placed in the morning and removed at bedtime. He is to wear these every day to reduce edema. He will continue with dressing change every other day. He will cleanse wound with soapy water, rinse clear and patent dry then applied the Allevyn dressing which is provided to him. He should be changed every other day.  He will finish out course of antibiotics and if symptoms of cellulitis return he needs to be seen otherwise will see him back in 10 days.        "

## 2017-12-27 NOTE — PROGRESS NOTES
Patient Information     Patient Name Moses Coffey [id number] Male 1950      Progress Notes by Jason Moran MD at 10/27/2017  9:15 AM     Author:  Jason Moran MD Service:  (none) Author Type:  Physician     Filed:  10/27/2017  9:57 AM Encounter Date:  10/27/2017 Status:  Signed     :  Jason Moran MD (Physician)            Type of Visit  Established    Chief Complaint  ED    HPI  Mr. Whittaker is a 67 y.o. male who follows up with ED.  His ED issues started about 8 years ago.  He has tried rubber bands in the past.  Currently he is using Trimix VI 0.41mL.  He rates his erectile rigidity as 8-9/10 with treatment.  He has avoided PDE5 inhibitors in the past due to nitro use.  He did have one erection in the last year that approached 5 hours.  This was a one time event.      Review of Systems  I reviewed the ROS the patient today.    Nursing Notes:   Anny Laurent  10/27/2017  9:41 AM  Unsigned  Patient is here today for a follow up erectile dysfunction.     Review of Systems:    Weight loss:    yes     Recent fever/chills:  No   Night sweats:   yes  Current skin rash:  No   Recent hair loss:  No  Heat intolerance:  No   Cold intolerance:  yes  Chest pain:   yes   Palpitations:   No  Shortness of breath:  No   Wheezing:   yes  Constipation:    yes   Diarrhea:   yes   Nausea:   yes   Vomiting:   No   Kidney/side pain:  yes   Back pain:   yes  Frequent headaches:  No   Dizziness:     No  Leg swelling:   yes   Calf pain:    Yes    Anny Laurent LPN......................10/27/2017 9:41 AM    Family History  Family History       Problem   Relation Age of Onset     Heart Disease  Mother      Heart problems       Heart Disease  Other      Heart problems       Heart Disease  Other      Heart problems       Other  Son      Ankylosing spondylitis        Other  Brother       with sudden death following shoulder surgery 2012 -- was off his Plavix for 10 days pre-operatively.          Physical Exam  Vitals:     10/27/17 0942   BP: 160/72   Resp: 20   Weight: 92.1 kg (203 lb)   Constitutional: No acute distress.  Alert and cooperative   Head: NCAT  Eyes: Conjunctivae normal  Cardiovascular: Regular rate.  Pulmonary/Chest: Respirations are even and non-labored bilaterally, no audible wheezing  Abdominal: Soft. No distension, tenderness, masses or guarding.   Neurological: A + O x 3.  Cranial Nerves II-XII grossly intact.  Extremities: JAGDISH x 4, Warm. No clubbing.  No cyanosis.    Skin: Pink, warm and dry.  No visible rashes noted.  Psychiatric:  Normal mood and affect  Back:  No left CVA tenderness.  No right CVA tenderness.  Genitourinary:  Nonpalpable bladder    Assessment  Mr. Whittaker is a 67 y.o. male who follows up with ED doing well on Trimix.  We again discussed the importance of need for treatment for erections over 4 hours.    Plan  Continue Trimix VI 0.41mL.  Follow up in 1 year or sooner if there are issues

## 2017-12-27 NOTE — PROGRESS NOTES
Patient Information     Patient Name MRN Moses Fairbanks [id number] Male 1950      Progress Notes by oJrge Barnett MD at 2017 12:40 PM     Author:  Jorge Barnett MD Service:  (none) Author Type:  Physician     Filed:  2017  1:35 PM Encounter Date:  2017 Status:  Signed     :  Jorge Barnett MD (Physician)            Nursing Notes:   Barbara Connor  2017  1:15 PM  Signed  Patient presents to the clinic for concerns about constipation and lower back pain noted over the past month.    Barbara Connor LPN        2017 12:53 PM    Moses Whittaker presents to clinic today for:   Chief Complaint    Patient presents with      Constipation     Back Pain     HPI: Mr. Whittaker is a 67 y.o. male who presents today for evaluation of above.     (K59.00) Intermittent constipation  (primary encounter diagnosis)  (E11.22,  E11.65,  N18.3) Uncontrolled type 2 diabetes mellitus with stage 3 chronic kidney disease, without long-term current use of insulin (HC)  (D69.59,  T45.515A) Platelet inhibition due to Plavix -- On Plavix (Brand Name due to generic intolerance) For Life.   (M54.5,  G89.29) Chronic low back pain, unspecified back pain laterality, with sciatica presence unspecified     Intermittent constipation, states that he's been not having a bowel movement for up to 4 days at a time.  He gets a lot of low back pressure and pain after he has waited a number of days.  After having a bowel movement his back pain gets better.  He also notes his back pain is better if he has more frequent bowel movements or even having one every day.    He just bought some Metamucil but has not yet started it.  He is not taking any stool softeners.    Advised that he start Metamucil daily, consider changing to Benefiber if getting a lot of gas and flatulence, otherwise also starting some stool softeners like docusate or Colace.  Prescription printed for docusate/Colace today.    Uncontrolled type  2 diabetes, insulin-dependent.  Uses insulin pump.  States that his highest blood sugar for many days now was 177 yesterday.  2 days ago his morning blood sugar was 79.  This morning was 53.  Reports his average blood sugar for the past week or so has been anywhere from 100 up to 150.    + Reports that he typically gets in trouble when he falls asleep and then almost as a sleepwalking eating, binge eating episode.  He has is once in a while and then his blood sugars in the morning are really high.    Patient has peripheral vascular disease as well as coronary artery disease.  Needs Plavix for life.    Mr. Whittaker's Body mass index is 29.96 kg/(m^2). This is out of the normal range for a 67 y.o. Normal range for ages 18+ is between 18.5 and 24.9. To lose weight we reviewed risks and benefits of appropriate options such as diet, exercise, and medications. Patient's strategy will be  self-directed nutrition plan and self-directed exercise program   BP Readings from Last 1 Encounters:08/08/17 : 136/82  Mr. Stokes blood pressure is out of the normal range for adults. Per JNC-8 guidelines normal adult blood pressure is < 120/80, pre-hypertensive is between 120/80 and 139/89, and hypertension is 140/90 or greater. Risks of hypertension were discussed. Patient's strategy will be weight loss, increased activity and reduced salt intake    Functional Capacity: > 4 METS.   Reports that he can climb a flight of stairs without any chest pain/heaviness or shortness of breath.   Patient reports no current symptoms of fevers, chills, nausea/vomiting.   No cough. No shortness of breath.   No change in bladder habits. No melena, hematochezia. No Hematuria.     --> No bloody or tarry stools.  He is wondering about need for colonoscopy.  He had one performed in early January 2016.  Polyp was removed.  Surgeon recommended recheck in 3 years.    No rashes. No palpitations.  No orthopnea/paroxysmal nocturnal dyspnea   No vision or  hearing issues.   No significant mood issues   -- he is wondering about getting back steroid injections, advised he will need to be off Plavix for probably one week is also causes blood sugars to escalate.  Advised against random back steroid injections.  He does find the inversion table helpful if used sparingly.    No bruising.     [place]:  No flowsheet data found.    PHQ9:  PHQ Depression Screening 6/29/2017 8/8/2017   Date of PHQ exam (doc flow) 6/29/2017 8/8/2017   1. Lack of interest/pleasure 0 - Not at all 0 - Not at all   2. Feeling down/depressed 0 - Not at all 0 - Not at all   PHQ-2 TOTAL SCORE 0 0   3. Trouble sleeping 0 - Not at all 0 - Not at all   4. Decreased energy 0 - Not at all 0 - Not at all   5. Appetite change 0 - Not at all 0 - Not at all   6. Feelings of failure 0 - Not at all 0 - Not at all   7. Trouble concentrating 0 - Not at all 0 - Not at all   8. Activity level 0 - Not at all 0 - Not at all   9. Hurting yourself 0 - Not at all 0 - Not at all   PHQ-9 TOTAL SCORE 0 0   PHQ-9 Severity Level none none   Functional Impairment not difficult at all not difficult at all   Some recent data might be hidden          I have personally reviewed the past medical history, past surgical history, medications, allergies, family and social history as listed below, on 8/8/2017.    Patient Active Problem List       Diagnosis  Date Noted     Intermittent constipation  08/08/2017     Chronic low back pain  08/08/2017     Skin rash  08/01/2017     Uncontrolled type 2 diabetes mellitus with stage 3 chronic kidney disease, without long-term current use of insulin (HC)  03/30/2017     Erectile dysfunction  09/26/2016     Trigger point with neck pain  07/14/2016     Trigger point with back pain  07/14/2016     Thrombophlebitis of superficial veins of left lower extremity - 6/17/2016 06/17/2016     Controlled type 2 diabetes mellitus with insulin therapy (HC)  03/10/2016     Insulin pump in place  03/10/2016     Pain  medication agreement - 6/29/2017 12/17/2015     Trigger point of extremity  12/17/2015     Myofacial muscle pain  12/17/2015     Greater trochanteric bursitis of left hip  06/24/2015     Blister of toe of right foot  12/09/2014     Cervical stenosis of spinal canal  06/17/2014     Facet arthritis of cervical region (HC)  06/17/2014     Degenerative disc disease, cervical  06/17/2014     Left arm numbness  06/05/2014     Cervical radicular pain  06/05/2014     Left atrial enlargement - Moderate - 1/20/2014 ECHO  01/23/2014     S/P coronary artery stent placement  01/10/2014     Old inferior wall myocardial infarction  01/10/2014     S/P CABG x 2  01/10/2014     Hypertension  01/10/2014     Platelet inhibition due to Plavix -- On Plavix (Brand Name due to generic intolerance) For Life.   01/10/2014     Myalgia  01/10/2014     CKD (chronic kidney disease) stage 3, GFR 30-59 ml/min  01/10/2014     Chronic diastolic heart failure - Mild - 1/20/2014 ECHO - EF 65%  01/10/2014     1/20/2014 ECHO FINAL IMPRESSION:   1. Normal left ventricular size and systolic function. Estimated ejection fraction 65%.   2. Mild increase in wall thickness of the ventricular septum with hypokinesis of the basilar segment of the ventricular septum and inferior wall.   3. Mild to moderate left atrial enlargement.   4. Trace tricuspid regurgitation.   5. Mild left ventricular diastolic dysfunction.         ACP (advance care planning)  12/05/2013     Diabetic neuropathy, painful (HC)  04/22/2013     G E R D  05/17/2012     OBESITY  05/17/2012     NARCOLEPSY       Total dose recommended by pulmonology he is dextro- amphetamine 40 mg plus   methylphenidate 40 mg in divided doses per day.  Total of 80 mg of short   acting amphetamines daily.          DIASTASIS RECTI  10/06/2011     C A D  09/08/2011     ERECTILE DYSFUNCTION  06/30/2011     ANGINA  12/10/2010     ANKLE EDEMA, CHRONIC  10/25/2010     HYPOTHYROIDISM       BACK PAIN, LUMBAR, WITH  RADICULOPATHY       OSTEOARTHRITIS, CERVICAL SPINE       DISC DISEASE, CERVICAL       Mixed hyperlipidemia       PEPTIC ULCER DISEASE       Diabetes mellitus type 2, controlled (HC)       Past Medical History:     Diagnosis  Date     CAD (coronary artery disease)     Coronary artery disease, post angioplasty      Carpal tunnel syndrome      Edema     Edema secondary to Bextra      Heart disease     Multiple coronary stents       Helicobacter pylori gastritis      Hematoma 11/2006    Right calf hematoma      Maxillary sinusitis      NARCOLEPSY      Rheumatic fever     Rheumatic fever as a child without valvular heart disease       Past Surgical History:      Procedure  Laterality Date     ANGIOPLASTY  12/00, 04/04/04    Repeat angioplasty with lt internal mammary to the lt anterior descending and lt radial artery from aorta to the diagonal.       ANGIOPLASTY  10/01    Angioplasty with 2 vessel CABG the following week from the lt internal mammary to the lt anterior descending and right radial free graft       ANGIOPLASTY  04/2003     APPENDECTOMY OPEN  1967     CARPAL TUNNEL RELEASE  11/15/01    Right carpal tunnel release by Dr. Mace       CARPAL TUNNEL RELEASE  01/2004    Left carpal tunnel release        COLONOSCOPY  01/08/2016    -- Dr. De La Cruz -- polypectomy        COLONOSCOPY SCREENING  1999     IR ANGIOGRAM FEMORAL/EXTREMITY (IA)  09/03    Unremarkable angiogram at [facility]       IR ANGIOGRAM FEMORAL/EXTREMITY (IA)  2/26/2007    Unremarkable coronary angiogram at [facility].       PTCA  10/05/2004    Angioplasty        PTCA  02/2005    Angioplasty with stent.         PTCA  03/02/2005    Angioplasty with stent.        PTCA  09/23/2005    Angioplasty without stent       ROTATOR CUFF REPAIR  02/06/2006    Left rotator cuff repair and bone spur removal by Dr. Giraldo in [place]       Current Outpatient Prescriptions       Medication  Sig Dispense Refill     alcohol swabs (ALCOHOL PREP PADS) For home use. 1 box 0     aspirin 325  mg tablet Take  by mouth.       blood sugar diagnostic (ONE TOUCH ULTRA TEST) strip Dispense test strips covered by the patient insurance. Test 10 times per day. 900 Each 3     Blood-Glucose Meter (ACCU-CHEK ABBI PLUS METER) Dispense glucose meter, test strips and lancets covered by the patient insurance. Test 10 times per day. 1 Device 0     clopidogrel (PLAVIX) 75 mg tablet Take 1 tablet by mouth once daily. 90 tablet 4     codeine-guaiFENesin (ROBITUSSIN AC)  mg/5 mL liquid Take 10 mL by mouth every 4 hours if needed for Cough. Max dose 60 mL per 24 hrs. 200 mL 0     desoximetasone 0.25% topical (TOPICORT) 0.25 % cream Apply twice daily 180 g 3     Dextroamphetamine Sulfate (DEXTROSTAT) 10 mg tablet Take 1 tablet by mouth four times daily - for on or after 6/29/2017 360 tablet 0     Dextromethorphan-guaFENesin (MUCINEX DM)  mg tablet Take 1 tablet by mouth 2 times daily if needed for Cough. - OTC / Generic Okay 60 tablet 1     diclofenac (VOLTAREN) 75 mg delayed-release tablet TAKE 1 TABLET BY MOUTH FOUR TIMES DAILY 360 tablet 3     docusate (COLACE) 100 mg capsule Take 1-2 capsules by mouth 2 times daily. -- AS needed for constipation prevention 120 capsule 3     fexofenadine (ALLEGRA ALLERGY) 60 mg tablet Take 1 tablet by mouth 2 times daily. AS NEEDED for Allergy symptoms 60 tablet 3     fish oil-omega-3 fatty acids (FISH OIL) 1,000-340 mg capsule Take  by mouth.       flaxseed oil 1,000 mg cap Take  by mouth.       furosemide (LASIX) 40 mg tablet TAKE 2 TO 4 TABLETS BY MOUTH DAILY OR AS INSTRUCTED FOR LEG SWELLING 360 tablet 4     HUMALOG 100 unit/mL injection INJECT UNDER THE SKIN USING INSULIN PUMP. MAX DOSE  UNITS DAILY. 180 mL 1     hydrALAZINE (APRESOLINE) 25 mg tablet Take 1-2 tablets by mouth 4 times daily. - Take lowest effective dose for blood pressure management 720 tablet 3     HYDROcodone-acetaminophen, 5-325 mg, (NORCO) per tablet Take 1 tablet by mouth every 6 hours if needed   "for Pain - for on or after 07/27/17 60 tablet 0     Insulin Needles, Disposable, (BD INSULIN PEN NEEDLE UF) 29 x 1/2 \" For administering insulin at home. 600 Each 2     ipratropium (ATROVENT HFA) 17 mcg/actuation inhaler Inhale 2 Puffs by mouth 4 times daily. 1 Inhaler 0     IV Administration Set (INFUSION SET) iset As directed.  0     lancets (ONE TOUCH ULTRASOFT LANCETS) Test 10 times per day. 600 Each 6     levothyroxine (SYNTHROID) 125 mcg tablet Take 1 tablet by mouth every morning. 90 tablet 4     methylphenidate (RITALIN) 10 mg tablet One tablet by mouth four times daily - for on or after 6/29/2017 360 tablet 0     metoprolol succinate (TOPROL XL) 50 mg sustained-release tablet Take 1 tablet by mouth 2 times daily. 180 tablet 3     nitroglycerin (NITROSTAT) 0.4 mg sublingual tablet Place 1 tablet under the tongue every 5 minutes if needed for Chest Pain. 3 Bottle 1     nystatin (MYCOSTATIN) cream Apply  topically to affected area(s) 2 times daily. For yeast skin fold rash -- vs Topical OTC Anti-fungals okay - monistat, lotrimin 30 g 3     Psyllium Seed-Sucrose (METAMUCIL, SUGAR,) powder Take 1 tsp by mouth 3 times daily. - for constipation prevention  0     ranitidine (ZANTAC) 150 mg tablet Take 1 tablet by mouth 2 times daily. 180 tablet 3     ranolazine (RANEXA) 500 mg Controlled-Release tablet Take 2 tablets by mouth 2 times daily. -- cancel other Rx -- give 3 month supply 360 tablet 3     rosuvastatin (CRESTOR) 10 mg tablet TAKE 1 TABLET BY MOUTH TWICE DAILY 180 tablet 3     sodium chloride-aloe vera (SALINE NASAL, ALOE VERA,) nasal gel Inhale  in the nostril(s) every hour if needed for Nasal Dryness. 14.1 g 3     Sub-Q Infusion Pump Access (ACCU-CHEK SPIRIT CARTRIDGE SYS) misc As directed.  0     valsartan (DIOVAN) 160 mg tablet Take 1 tablet by mouth 2 times daily. 180 tablet 3     Allergies     Allergen  Reactions     Gabapentin Mental Status Change     Bextra [Valdecoxib] Edema     Lipitor " "[Atorvastatin] Hives     Lisinopril Cough     Lyrica [Pregabalin] Mental Status Change     Novolog [Insulin Aspart] Rash     Family History       Problem   Relation Age of Onset     Heart Disease  Mother      Heart problems       Heart Disease  Other      Heart problems       Heart Disease  Other      Heart problems       Other  Son      Ankylosing spondylitis        Other  Brother       with sudden death following shoulder surgery 2012 -- was off his Plavix for 10 days pre-operatively.       Family Status     Relation  Status     Brother      with sudden death following shoulder surgery 2012 -- was off his Plavix for 10 days pre-operatively.      Son Alive     Mother      Other      Other      Son      Brother      Social History     Social History        Marital status:       Spouse name: N/A     Number of children:  N/A     Years of education:  N/A     Social History Main Topics       Smoking status: Never Smoker     Smokeless tobacco: Never Used     Alcohol use No     Drug use: No     Sexual activity: Yes     Other Topics  Concern     Not on file      Social History Narrative     He is on Social Security Disability for coronary artery disease and cervical arthritis and disk disease.   Jakobd obed.  No tobacco.             Pertinent ROS was performed and was negative as noted in HPI above.     EXAM:   Vitals:     17 1254   BP: 136/82   Pulse: 64   Temp: 97.9  F (36.6  C)   TempSrc: Tympanic   Weight: 92.7 kg (204 lb 6 oz)     BP Readings from Last 3 Encounters:    17 136/82   17 138/80   17 134/76     Wt Readings from Last 3 Encounters:    17 92.7 kg (204 lb 6 oz)   17 93.9 kg (207 lb)   17 93.6 kg (206 lb 6 oz)     Estimated body mass index is 29.96 kg/(m^2) as calculated from the following:    Height as of 3/30/17: 1.759 m (5' 9.25\").    Weight as of this encounter: 92.7 kg (204 lb 6 oz).     EXAM:  Constitutional: Pleasant, alert, " appropriate appearance for age. No acute distress  Lymphatic Exam: Non-palpable nodes in neck, clavicular regions  Pulmonary: Lungs are clear to auscultation bilaterally, without wheezes or crackles  Cardiovascular Exam: regular rate and rhythm, trace pedal edema present  Gastrointestinal Exam: Obese, Soft, non-tender, non-distended, positive bowel sounds  Integument: No abnormal rashes, sores, or ulcerations noted  Neurologic Exam: CN 3-12 grossly intact   Musculoskeletal Exam: Moves upper and lower extremities symmetrically, No focal weakness  Gait and station appear grossly normal  Psychiatric Exam: Awake and Alert, Affect and mood appropriate  Speech is fluent, Thought process is normal    INVESTIGATIONS:  Results for orders placed or performed in visit on 06/29/17      CBC W PLT NO DIFF      Result  Value Ref Range    WHITE BLOOD COUNT         5.4 4.5 - 11.0 thou/cu mm    RED BLOOD COUNT           4.58 4.30 - 5.90 mil/cu mm    HEMOGLOBIN                15.0 13.5 - 17.5 g/dL    HEMATOCRIT                44.3 37.0 - 53.0 %    MCV                       97 80 - 100 fL    MCH                       32.8 26.0 - 34.0 pg    MCHC                      33.9 32.0 - 36.0 g/dL    RDW                       13.2 11.5 - 15.5 %    PLATELET COUNT            207 140 - 440 thou/cu mm    MPV                       8.6 6.5 - 11.0 fL   COMP METABOLIC PANEL      Result  Value Ref Range    SODIUM 135 133 - 143 mmol/L    POTASSIUM 5.3 (H) 3.5 - 5.1 mmol/L    CHLORIDE 106 98 - 107 mmol/L    CO2,TOTAL 21 21 - 31 mmol/L    ANION GAP 8 5 - 18                    GLUCOSE 121 (H) 70 - 105 mg/dL    CALCIUM 9.1 8.6 - 10.3 mg/dL    BUN 20 7 - 25 mg/dL    CREATININE 1.67 (H) 0.70 - 1.30 mg/dL    BUN/CREAT RATIO           12                    GFR if African American 50 (L) >60 ml/min/1.73m2    GFR if not  41 (L) >60 ml/min/1.73m2    ALBUMIN 4.2 3.5 - 5.7 g/dL    PROTEIN,TOTAL 6.7 6.4 - 8.9 g/dL    GLOBULIN                  2.5 2.0 -  3.7 g/dL    A/G RATIO 1.7 1.0 - 2.0                    BILIRUBIN,TOTAL 0.6 0.3 - 1.0 mg/dL    ALK PHOSPHATASE 58 34 - 104 IU/L    ALT (SGPT) 26 7 - 52 IU/L    AST (SGOT) 22 13 - 39 IU/L   HEMOGLOBIN A1C MONITORING (POCT)      Result  Value Ref Range    HEMOGLOBIN A1C MONITORING (POCT) 8.1 (H) 4.0 - 6.2 %    ESTIMATED AVERAGE GLUCOSE  186 mg/dL   LIPID PANEL      Result  Value Ref Range    CHOLESTEROL,TOTAL 101 <200 mg/dL    TRIGLYCERIDES 166 (H) <150 mg/dL    HDL CHOLESTEROL 33 23 - 92 mg/dL    NON-HDL CHOLESTEROL 68 <145 mg/dl    CHOL/HDL RATIO            3.06 <4.50                    LDL CHOLESTEROL 35 <100 mg/dL    PATIENT STATUS            NON-FASTING                   TSH      Result  Value Ref Range    TSH 0.43 0.34 - 5.60 uIU/mL       ASSESSMENT AND PLAN:  Moses was seen today for constipation and back pain.    Diagnoses and all orders for this visit:    Intermittent constipation  -     docusate (COLACE) 100 mg capsule; Take 1-2 capsules by mouth 2 times daily. -- AS needed for constipation prevention    Uncontrolled type 2 diabetes mellitus with stage 3 chronic kidney disease, without long-term current use of insulin (HC)    Platelet inhibition due to Plavix -- On Plavix (Brand Name due to generic intolerance) For Life.     Chronic low back pain, unspecified back pain laterality, with sciatica presence unspecified      lab results and schedule of future lab studies reviewed with patient, reviewed diet, exercise and weight control, cardiovascular risk and specific lipid/LDL goals reviewed, specific diabetic recommendations low cholesterol diet, weight control and daily exercise discussed, home glucose monitoring emphasized, foot care discussed and Podiatry visits discussed, annual eye examinations at Ophthalmology discussed, glycohemoglobin and other lab monitoring discussed and long term diabetic complications discussed, use of aspirin and plavix to prevent MI and TIA's discussed    -- Expected clinical  course discussed   -- Medications and their side effects discussed    Return in about 2 months (around 10/8/2017) for -- labs every 91+ days with Diabetes clinic appointment with Dr. Barnett 1-2 days later..    Patient Instructions     1. Intermittent constipation  Start:   - Psyllium Seed-Sucrose (METAMUCIL, SUGAR,) powder; Take 1 tsp by mouth 3 times daily. - for constipation prevention; Refill: 0    - docusate (COLACE) 100 mg capsule; Take 1-2 capsules by mouth 2 times daily. -- AS needed for constipation prevention  Dispense: 120 capsule; Refill: 3    -- Make sure you're drinking an adequate amount of water on a daily basis.  This can also contribute to constipation.    2. Uncontrolled type 2 diabetes mellitus with stage 3 chronic kidney disease, without long-term current use of insulin (HC)  -- continue insulin adjustment for your diabetes.     3. Platelet inhibition due to Plavix -- On Plavix (Brand Name due to generic intolerance) For Life.   -- really don't recommend going off Plavix unless really needed.     Return for Diabetes labs and clinic follow-up appointment every 3 to 4 months.  --- (Go for about 91 to 100 days)    Schedule lab only appointment --- A few days AFTER: 10/07/17     Schedule clinic appointment with Dr. Barnett -- Same day as labs, or 1-2 days later.     Insurance companies are now grading you and I on your blood sugar control -- Goal is to get your A1c down to 7.9% or lower and NO Smoking!    -- Medicare and most insurance companies, will only cover Hemoglobin A1c labs to be rechecked every 91+ days.      HEMOGLOBIN A1C MONITORING (POCT)    Date Value   06/29/2017 8.1 % (H)   04/02/2013 7.4 % NGSP (H)        Next follow-up appointment with Dr. Barnett should be scheduled:  -- Approximately a few days AFTER: 10/07/17      Index Faroese All languages   Constipation   ________________________________________________________________________  KEY POINTS    Constipation means that you have  bowel movements less often than normal for you, or that are very hard to pass.    Constipation can often be treated at home by drinking enough liquid to keep your urine light yellow, eating fiber and exercising regularly. Your provider may recommend a stool softener or laxative to help.    If constipation lasts a long time, happens often or you also have other symptoms, you should contact your healthcare provider.  ________________________________________________________________________  What is constipation?  Constipation is a very common condition that happens to almost everyone. It means that you have bowel movements less often than normal for you, such as fewer than 3 times a week. Bowel movements can be very hard and sometimes like small irene.  Normal bowel movements vary from person to person. For some people, having a bowel movement 3 times a day is normal. For others, 3 times a week may be normal.   Constipation that bothers you for 12 weeks or more out of the year, even if it s off and on, is called chronic constipation.   What is the cause?  You may have constipation because:    You wait too long to go to the bathroom after you feel the need to go.    You don t eat enough fiber.    You don t drink enough liquids.    You don't get enough exercise.    You overuse some types of laxatives.    You are taking a medicine that has a side effect of constipation, such as iron pills, antidepressants, or narcotic pain medicine.  Other possible causes are:    Pregnancy    Depression or stress    Some medical conditions and diseases that can cause a partial blockage in your bowels  What are the symptoms?  Symptoms may include:    Small, hard, or dry bowel movements    Uncomfortable or painful bowel movements that are hard to pass    A longer time than usual between bowel movements    Feeling full and heavy, like you have a full belly  How is it treated?  Most of the time you don t need to see your healthcare provider  for treatment. Here are some things you can do to relieve constipation:    Add more fiber to your diet by eating whole-grain bread and cereal, beans, bran muffins, brown rice, and fresh fruit and vegetables.    Exercise regularly. For example, if you are able, walk for at least 30 minutes every day. Remember to start slow (5 to 10 minutes at first, then increase your time each day or two) and check with your healthcare provider before you add any new exercise.    Drink enough liquids each day to keep your urine light yellow in color.    Go to the bathroom whenever you feel like you need to go. Don t wait.  If taking care of yourself at home does not relieve your constipation, your healthcare provider may be able to help. Your provider may recommend a stool softener or laxative to help you have more bowel movements.    Stool softeners are medicines that make your bowel movements easier to pass.    Bulk laxatives, such as fiber supplements, pull water into the bowels. Extra water in the bowel makes the stool larger, softer, and easier to pass.    Lubricant laxatives, such as mineral oil, keep water in the bowels, which makes the stool softer and easier to pass.    Stimulant laxatives, such as milk of magnesia and some other laxatives, help the bowel muscles push the stool through the bowel.  Laxatives may be used for a short time. Try not to use them for more than 1 week. Many people find fiber supplements, like Metamucil, Citrucel, or other psyllium products, to be helpful, but sometimes these products can make constipation worse.  Enemas are another way to help you have a bowel movement. Your healthcare provider will tell you how to give yourself an enema if he or she recommends it.  Tell your healthcare provider if:    You have both bouts of constipation and bouts of diarrhea.    You have pain during bowel movements or for some time afterward.    Your bowel movements are dark or tar-colored or have blood in  them.    You are losing weight without trying.  Tell your healthcare provider about all of the medicines and supplements that you take. Ask if any of the products you are using may be causing constipation.  If you have developed constipation recently and it s lasted 3 or more weeks, see your health care provider to make sure you don t have a medical problem causing the constipation.  How can I prevent constipation?  You may be able to prevent constipation by drinking plenty of water; eating fresh fruits, vegetables and whole grains; and exercising regularly.   Developed by [employer].  Adult Advisor 2016.3 published by [employer].  Last modified: 2015-02-12  Last reviewed: 2014-12-05  This content is reviewed periodically and is subject to change as new health information becomes available. The information is intended to inform and educate and is not a replacement for medical evaluation, advice, diagnosis or treatment by a healthcare professional.  References   Adult Advisor 2016.3 Index    Copyright   2016 [employer], a division of [employer] All rights reserved.           Jorge Barnett MD

## 2017-12-27 NOTE — PROGRESS NOTES
Patient Information     Patient Name MRN Sex Moses Ott [id number] Male 1950      Progress Notes by Carolin Zamudio NP at 2017  3:00 PM     Author:  Carolin Zamudio NP Service:  (none) Author Type:  PHYS- Nurse Practitioner     Filed:  2017  3:56 PM Encounter Date:  2017 Status:  Signed     :  Carolin Zamudio NP (PHYS- Nurse Practitioner)            SUBJECTIVE:    Moses Whittaker is a 67 y.o. male who presents for leg wound and redness.    HPI  he comes to clinic today for a wound at the left leg that is been present for the past 3 weeks. He has had recurrent venous stasis ulcers. He has chronic edema both lower extremities. He is supposed to be wearing compression stockings but he does not do that. He has not been elevating his legs. He did notice this morning that there was some redness along the side of the left lateral leg and he is concerned that he may be developing a cellulitis again. He has had this happen in the past and as needed antibiotics. There is some tenderness over the reddened area. He doesn't feel his legs are more swollen than usual. He states the swelling in both legs goes down overnight. He does have chronic diastolic heart failure. He saw his cardiologist last week who recommended that he be seen by wound care clinic. He also has uncontrolled type 2 diabetes. He states that recently his sugars have been better. His last A1c was in  and was elevated. He has an insulin pump. He denies shortness of breath, chest pain, chest pressure, deep calf pain, worsening of edema.  He has had some problems with bleeding from the wound as he is on both Plavix 75 mg daily and aspirin 325 mg daily. It is not bleeding at this time but has bled intermittently if his pants leg wraps on the wound. He has been covering the wound with a Band-Aid and using antibiotic ointment.  Allergies     Allergen  Reactions     Gabapentin Mental Status Change      Bextra [Valdecoxib] Edema     Lipitor [Atorvastatin] Hives     Lisinopril Cough     Lyrica [Pregabalin] Mental Status Change     Novolog [Insulin Aspart] Rash   ,   Current Outpatient Prescriptions on File Prior to Visit       Medication  Sig Dispense Refill     alcohol swabs (ALCOHOL PREP PADS) For home use. 1 box 0     aspirin 325 mg tablet Take  by mouth.       blood sugar diagnostic (ONE TOUCH ULTRA TEST) strip Dispense test strips covered by the patient insurance. Test 10 times per day. 900 Each 3     Blood-Glucose Meter (ACCU-CHEK ABBI PLUS METER) Dispense glucose meter, test strips and lancets covered by the patient insurance. Test 10 times per day. 1 Device 0     clopidogrel (PLAVIX) 75 mg tablet Take 1 tablet by mouth once daily. 90 tablet 4     codeine-guaiFENesin (ROBITUSSIN AC)  mg/5 mL liquid Take 10 mL by mouth every 4 hours if needed for Cough. Max dose 60 mL per 24 hrs. 200 mL 0     desoximetasone 0.25% topical (TOPICORT) 0.25 % cream Apply twice daily 180 g 3     Dextroamphetamine Sulfate (DEXTROSTAT) 10 mg tablet Take 1 tablet by mouth four times daily - for on or after 6/29/2017 360 tablet 0     Dextromethorphan-guaFENesin (MUCINEX DM)  mg tablet Take 1 tablet by mouth 2 times daily if needed for Cough. - OTC / Generic Okay 60 tablet 1     diclofenac (VOLTAREN) 75 mg delayed-release tablet TAKE 1 TABLET BY MOUTH FOUR TIMES DAILY 360 tablet 3     docusate (COLACE) 100 mg capsule Take 1-2 capsules by mouth 2 times daily. -- AS needed for constipation prevention 120 capsule 3     fexofenadine (ALLEGRA ALLERGY) 60 mg tablet Take 1 tablet by mouth 2 times daily. AS NEEDED for Allergy symptoms 60 tablet 3     fish oil-omega-3 fatty acids (FISH OIL) 1,000-340 mg capsule Take  by mouth.       flaxseed oil 1,000 mg cap Take  by mouth.       furosemide (LASIX) 40 mg tablet TAKE 2 TO 4 TABLETS BY MOUTH DAILY OR AS INSTRUCTED FOR LEG SWELLING 360 tablet 4     HUMALOG 100 unit/mL injection INJECT  "UNDER THE SKIN USING INSULIN PUMP. MAX DOSE  UNITS DAILY. 180 mL 1     hydrALAZINE (APRESOLINE) 25 mg tablet Take 1-2 tablets by mouth 4 times daily. - Take lowest effective dose for blood pressure management 720 tablet 3     HYDROcodone-acetaminophen, 5-325 mg, (NORCO) per tablet Take 1 tablet by mouth every 6 hours if needed  for Pain - for on or after 07/27/17 60 tablet 0     Insulin Needles, Disposable, (BD INSULIN PEN NEEDLE UF) 29 x 1/2 \" For administering insulin at home. 600 Each 2     ipratropium (ATROVENT HFA) 17 mcg/actuation inhaler Inhale 2 Puffs by mouth 4 times daily. 1 Inhaler 0     IV Administration Set (INFUSION SET) iset As directed.  0     lancets (ONE TOUCH ULTRASOFT LANCETS) Test 10 times per day. 600 Each 6     levothyroxine (SYNTHROID) 125 mcg tablet Take 1 tablet by mouth every morning. 90 tablet 4     methylphenidate (RITALIN) 10 mg tablet One tablet by mouth four times daily - for on or after 6/29/2017 360 tablet 0     metoprolol succinate (TOPROL XL) 50 mg sustained-release tablet Take 1 tablet by mouth 2 times daily. 180 tablet 3     nitroglycerin (NITROSTAT) 0.4 mg sublingual tablet Place 1 tablet under the tongue every 5 minutes if needed for Chest Pain. 3 Bottle 1     nystatin (MYCOSTATIN) cream Apply  topically to affected area(s) 2 times daily. For yeast skin fold rash -- vs Topical OTC Anti-fungals okay - monistat, lotrimin 30 g 3     Psyllium Seed-Sucrose (METAMUCIL, SUGAR,) powder Take 1 tsp by mouth 3 times daily. - for constipation prevention  0     ranitidine (ZANTAC) 150 mg tablet Take 1 tablet by mouth 2 times daily. 180 tablet 3     ranolazine (RANEXA) 500 mg Controlled-Release tablet Take 2 tablets by mouth 2 times daily. -- cancel other Rx -- give 3 month supply 360 tablet 3     rosuvastatin (CRESTOR) 10 mg tablet TAKE 1 TABLET BY MOUTH TWICE DAILY 180 tablet 3     sodium chloride-aloe vera (SALINE NASAL, ALOE VERA,) nasal gel Inhale  in the nostril(s) every hour if " "needed for Nasal Dryness. 14.1 g 3     Sub-Q Infusion Pump Access (ACCU-CHEK SPIRIT CARTRIDGE SYS) [facility] As directed.  0     valsartan (DIOVAN) 160 mg tablet Take 1 tablet by mouth 2 times daily. 180 tablet 3     No current facility-administered medications on file prior to visit.     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 Affect normal. Alert and oriented ×4. Skin color pink. Mucous murmurs moist. Lung fields clear to auscultation throughout. Cardiovascular regular, no S3 auscultated. It bilateral extremities with 2+ edema. Hemosiderin changes to skin of lower extremities noted and is bilateral. There is a raised intact 0.5 cm blister along the left lateral calf and a venous stasis ulcer measuring 1.3 x 0.7 cm distal to this blister. No purulent drainage. There is a small amount of serosanguineous drainage there is mild erythema along the left lateral lower extremity just above the ankle. Some tenderness with palpation to this area. No deep calf tenderness. Negative Homans sign. DPPT 2+ bilaterally. Capillary refill less than 3 seconds.  Wound is cleansed with saline wash and 3 angina Allevyn gentle border lite dressing applied over the venous stasis ulcer and the blister.    ASSESSMENT/PLAN:    ICD-10-CM    1. Venous ulcer of left lower extremity with varicose " veins (HC) I83.029    2. Cellulitis of left lower extremity L03.116 cephalexin (KEFLEX) 500 mg capsule   3. Chronic edema R60.9    4. Chronic diastolic heart failure - Mild - 1/20/2014 ECHO - EF 65% I50.32    5. Uncontrolled type 2 diabetes mellitus with stage 3 chronic kidney disease, without long-term current use of insulin (HC) E11.22      E11.65      N18.3         Plan:  Patient has venous stasis ulcer with mild cellulitis. He has chronic venous stasis edema. Encouraged him to begin wearing his compression stockings every day and to try elevating legs above heart 1 hour 3 times daily to her to reduce the edema. Explained the importance in compression therapy for the healing of venous stasis ulcers and to prevent recurrence. He also has chronic diastolic heart failure which is stable. Saw cardiology last week. He has poorly controlled diabetes. He has an insulin pump. He states his sugars are improving. Explained that hyperglycemia slows wound healing and increases risk of infections. We'll also treat him with Keflex 500 mg 3 times daily for the next week. He will follow-up in wound clinic next Tuesday however he needs to be seen sooner if he develops increased swelling, increased pain, fever or worsening of the area of cellulitis or the wound. If all going well next week May consider Unna boot.

## 2017-12-28 NOTE — TELEPHONE ENCOUNTER
Patient Information     Patient Name MRN Moses Fairbanks [id number] Male 1950      Telephone Encounter by Jorge Barnett MD at 2017  9:29 AM     Author:  Jorge Barnett MD Service:  (none) Author Type:  Physician     Filed:  2017  9:30 AM Encounter Date:  2017 Status:  Signed     :  Jorge Barnett MD (Physician)            Recommend avoiding the pool at this time.    Okay to use some Zyrtec and Benadryl -- as needed for itching.    If he develops any signs or symptoms of cellulitis, needs evaluation.    Jorge Barnett MD

## 2017-12-28 NOTE — TELEPHONE ENCOUNTER
Patient Information     Patient Name MRN Moses Fairbanks [id number] Male 1950      Telephone Encounter by Crystal Chau at 2017 10:34 AM     Author:  Crystal Chau Service:  (none) Author Type:  (none)     Filed:  2017 10:35 AM Encounter Date:  2017 Status:  Signed     :  Crystal Chau            Patient requesting work in for 17 for leg issue.

## 2017-12-28 NOTE — TELEPHONE ENCOUNTER
Patient Information     Patient Name MRN Moses Fairbanks [id number] Male 1950      Telephone Encounter by Radha Trammell RN at 10/2/2017  4:29 PM     Author:  Radha Trammell RN Service:  (none) Author Type:  NURS- Registered Nurse     Filed:  10/2/2017  4:35 PM Encounter Date:  10/2/2017 Status:  Signed     :  Radha Trammell RN (NURS- Registered Nurse)            Patient requests HbA1c lab order.  Last A1c 8.1%, 2017.     If accepted as written, please sign pending order.    Thank you,    Radha Trammell RN, BSN, CDE  10/2/2017 4:30 PM

## 2017-12-28 NOTE — PROGRESS NOTES
Patient Information     Patient Name MRN Sex Moses Ott [id number] Male 1950      Progress Notes by Carolin Zamudio NP at 2017  3:20 PM     Author:  Carolin Zamudio NP Service:  (none) Author Type:  PHYS- Nurse Practitioner     Filed:  2017  3:57 PM Encounter Date:  2017 Status:  Signed     :  Carolin Zamudio NP (PHYS- Nurse Practitioner)            SUBJECTIVE:    Moses Whittaker is a 67 y.o. male who presents for follow up on venous stasis ulcer    HPI  patient reports that both venous stasis ulcers have healed. He has been wearing compression stockings off and on. If he wears shorts that he will not wear compression stockings but does wear them with long pants. He is not wearing them today. He states that yesterday he scraped his lateral leg with his fingernail. He did have a bandage over this. There has been scant drainage. It is not painful. He has found that the compression stockings to help the edema. He has been afebrile. There is been no redness or warmth.  Allergies     Allergen  Reactions     Gabapentin Mental Status Change     Bextra [Valdecoxib] Edema     Lipitor [Atorvastatin] Hives     Lisinopril Cough     Lyrica [Pregabalin] Mental Status Change     Novolog [Insulin Aspart] Rash   ,   Current Outpatient Prescriptions on File Prior to Visit       Medication  Sig Dispense Refill     alcohol swabs (ALCOHOL PREP PADS) For home use. 1 box 0     aspirin 325 mg tablet Take  by mouth.       blood sugar diagnostic (ONE TOUCH ULTRA TEST) strip Dispense test strips covered by the patient insurance. Test 10 times per day. 900 Each 3     Blood-Glucose Meter (ACCU-CHEK ABBI PLUS METER) Dispense glucose meter, test strips and lancets covered by the patient insurance. Test 10 times per day. 1 Device 0     clopidogrel (PLAVIX) 75 mg tablet Take 1 tablet by mouth once daily. 90 tablet 4     codeine-guaiFENesin (ROBITUSSIN AC)  mg/5 mL liquid Take 10  "mL by mouth every 4 hours if needed for Cough. Max dose 60 mL per 24 hrs. 200 mL 0     desoximetasone 0.25% topical (TOPICORT) 0.25 % cream Apply twice daily 180 g 3     Dextroamphetamine Sulfate (DEXTROSTAT) 10 mg tablet Take 1 tablet by mouth four times daily - for on or after 6/29/2017 360 tablet 0     Dextromethorphan-guaFENesin (MUCINEX DM)  mg tablet Take 1 tablet by mouth 2 times daily if needed for Cough. - OTC / Generic Okay 60 tablet 1     diclofenac (VOLTAREN) 75 mg delayed-release tablet TAKE 1 TABLET BY MOUTH FOUR TIMES DAILY 360 tablet 3     docusate (COLACE) 100 mg capsule Take 1-2 capsules by mouth 2 times daily. -- AS needed for constipation prevention 120 capsule 3     fexofenadine (ALLEGRA ALLERGY) 60 mg tablet Take 1 tablet by mouth 2 times daily. AS NEEDED for Allergy symptoms 60 tablet 3     fish oil-omega-3 fatty acids (FISH OIL) 1,000-340 mg capsule Take  by mouth.       flaxseed oil 1,000 mg cap Take  by mouth.       furosemide (LASIX) 40 mg tablet TAKE 2 TO 4 TABLETS BY MOUTH DAILY OR AS INSTRUCTED FOR LEG SWELLING 360 tablet 4     HUMALOG 100 unit/mL injection INJECT UNDER THE SKIN USING INSULIN PUMP. MAX DOSE  UNITS DAILY. 180 mL 1     hydrALAZINE (APRESOLINE) 25 mg tablet Take 1-2 tablets by mouth 4 times daily. - Take lowest effective dose for blood pressure management 720 tablet 3     HYDROcodone-acetaminophen, 5-325 mg, (NORCO) per tablet Take 1 tablet by mouth every 6 hours if needed  for Pain - for on or after 07/27/17 60 tablet 0     Insulin Needles, Disposable, (BD INSULIN PEN NEEDLE UF) 29 x 1/2 \" For administering insulin at home. 600 Each 2     ipratropium (ATROVENT HFA) 17 mcg/actuation inhaler Inhale 2 Puffs by mouth 4 times daily. 1 Inhaler 0     IV Administration Set (INFUSION SET) iset As directed.  0     lancets (ONE TOUCH ULTRASOFT LANCETS) Test 10 times per day. 600 Each 6     levothyroxine (SYNTHROID) 125 mcg tablet Take 1 tablet by mouth every morning. 90 " tablet 4     MEDICAL SUPPLY, MISCELLANEOUS (GRADUATED COMPRESSION STOCKINGS) For personal use. Length: calf Strength: 20-30 mmHg 1 Packet 1     methylphenidate (RITALIN) 10 mg tablet One tablet by mouth four times daily - for on or after 6/29/2017 360 tablet 0     metoprolol succinate (TOPROL XL) 50 mg sustained-release tablet Take 1 tablet by mouth 2 times daily. 180 tablet 3     nitroglycerin (NITROSTAT) 0.4 mg sublingual tablet Place 1 tablet under the tongue every 5 minutes if needed for Chest Pain. 3 Bottle 1     nystatin (MYCOSTATIN) cream Apply  topically to affected area(s) 2 times daily. For yeast skin fold rash -- vs Topical OTC Anti-fungals okay - monistat, lotrimin 30 g 3     Psyllium Seed-Sucrose (METAMUCIL, SUGAR,) powder Take 1 tsp by mouth 3 times daily. - for constipation prevention  0     ranitidine (ZANTAC) 150 mg tablet Take 1 tablet by mouth 2 times daily. 180 tablet 3     ranolazine (RANEXA) 500 mg Controlled-Release tablet Take 2 tablets by mouth 2 times daily. -- cancel other Rx -- give 3 month supply 360 tablet 3     rosuvastatin (CRESTOR) 10 mg tablet TAKE 1 TABLET BY MOUTH TWICE DAILY 180 tablet 3     sodium chloride-aloe vera (SALINE NASAL, ALOE VERA,) nasal gel Inhale  in the nostril(s) every hour if needed for Nasal Dryness. 14.1 g 3     Sub-Q Infusion Pump Access (ACCU-CHEK SPIRIT CARTRIDGE SYS) [facility] As directed.  0     valsartan (DIOVAN) 160 mg tablet Take 1 tablet by mouth 2 times daily. 180 tablet 3     No current facility-administered medications on file prior to visit.     and   Past Medical History:     Diagnosis  Date     CAD (coronary artery disease)     Coronary artery disease, post angioplasty      Carpal tunnel syndrome      Edema     Edema secondary to Bextra      Heart disease     Multiple coronary stents       Helicobacter pylori gastritis      Hematoma 11/2006    Right calf hematoma      Maxillary sinusitis      NARCOLEPSY      Rheumatic fever     Rheumatic fever as a  "child without valvular heart disease         REVIEW OF SYSTEMS:  ROS  see history of present illness  OBJECTIVE:  /60  Temp 98.4  F (36.9  C) (Tympanic)  Ht 1.759 m (5' 9.25\")  Wt 93 kg (205 lb)  BMI 30.06 kg/m2    EXAM:   Physical Exam  pleasant gentleman without acute distress. Affect normal. Alert and oriented ×4. Bilateral lower extremities with trace-1+ edema. He has hemosiderin staining. Venous ulcers are healed but he does have a new ulcer along the left lateral calf which measures 0 .5 x 1 cm with pink wound bed. Scant drainage. No surrounding erythema or warmth. Wound is cleansed with saline wash and Band-Aid applied. He is not wearing compression stockings today  ASSESSMENT/PLAN:    ICD-10-CM    1. Venous ulcer of left lower extremity with varicose veins (HC) I83.029    2. Chronic edema R60.9         Plan:  Encouraged compression stocking and and leg elevation twice daily every day. As for this new ulcer recommend cleaning with soapy water, rinse clear and covering with a Band-Aid and change every 1-2 days. Should not leave open to air. If not healed over the next 10 days follow-up, sooner if getting worse especially if symptoms of infection develop.        "

## 2017-12-28 NOTE — TELEPHONE ENCOUNTER
Patient Information     Patient Name MRN Moses Fairbanks [id number] Male 1950      Telephone Encounter by Barbara Connor at 2017 11:00 AM     Author:  Barbara Connor Service:  (none) Author Type:  (none)     Filed:  2017 11:13 AM Encounter Date:  2017 Status:  Signed     :  Barbara Connor            Patient notified that lab result letter was sent today.  Patient only inquired about A1C value at this time.  Patient notified of A1C value at this time.    Barbara Connor LPN        2017 11:13 AM

## 2017-12-28 NOTE — TELEPHONE ENCOUNTER
Patient Information     Patient Name MRN Moses Fairbanks [id number] Male 1950      Telephone Encounter by Barbara Connor at 2017 10:02 AM     Author:  Barbara Connor Service:  (none) Author Type:  (none)     Filed:  2017 10:02 AM Encounter Date:  2017 Status:  Signed     :  Barbara Connor enrollment form completed and awaiting MD signature at this time.    Barbara Connor LPN        2017 10:02 AM

## 2017-12-28 NOTE — TELEPHONE ENCOUNTER
"Patient Information     Patient Name MRN Moses Fairbanks [id number] Male 1950      Telephone Encounter by Harriet Hackett RN at 2017 12:18 PM     Author:  Harriet Hackett RN  Service:  (none) Author Type:  NURS- Registered Nurse     Filed:  2017  1:57 PM  Encounter Date:  2017 Status:  Addendum     :  Harriet Hackett RN (NURS- Registered Nurse)        Related Notes: Original Note by Harriet Hackett RN (NURS- Registered Nurse) filed at 2017 12:41 PM            Patient reports on going symptoms of back pain, constipation, testicle 'ache'.   Patient is aware PCP is out this week and adamantly refuses to see another provider.  Patient states he is well aware of his health care needs and will seek immediate medical attention with any changes. Patient is requesting a work in with PCP when returns. Message left for PCP's nurse to contact patient as requested.     Harriet Hackett RN ....................  2017   12:39 PM  Reason for Disposition    [1] MODERATE back pain (e.g., interferes with normal activities) AND [2] present > 3 days    Answer Assessment - Initial Assessment Questions  1. ONSET: \"When did the pain begin?\"       Back and testicle pain - two weeks -guessing. Thinking I must of strain something but don't think so anymore. Testicles ache. I hope it isn't my kidneys or my bowels. Sometimes constipated. Kidney stones?  No blood in urine. No fever  2. LOCATION: \"Where does it hurt?\" (upper, mid or lower back)      Lower back and I don't feel in both all the time  3. SEVERITY: \"How bad is the pain?\"  (e.g., Scale 1-10; mild, moderate, or severe)    - MILD (1-3): doesn't interfere with normal activities     - MODERATE (4-7): interferes with normal activities or awakens from sleep     - SEVERE (8-10): excruciating pain, unable to do any normal activities       3-10  4. PATTERN: \"Is the pain constant?\" (e.g., yes, no; constant, intermittent)       intermittent  5. RADIATION: " "\"Does the pain shoot into your legs or elsewhere?\"      Testicles , hips , above tailbone  6. CAUSE:  \"What do you think is causing the back pain?\"       I don't know - no sharp pain - no blood in urine  7. BACK OVERUSE:  \"Any recent lifting of heavy objects, strenuous work or exercise?\"      No   8. MEDICATIONS: \"What have you taken so far for the pain?\" (e.g., nothing, acetaminophen, NSAIDS)      Antiinflammatory   9. NEUROLOGIC SYMPTOMS: \"Do you have any weakness, numbness, or problems with bowel/bladder control?\"  Feel kind of weak   10. OTHER SYMPTOMS: \"Do you have any other symptoms?\" (e.g., fever, abdominal pain, burning with urination, blood in urine)        No   11. PREGNANCY: \"Is there any chance you are pregnant?\" (e.g., yes, no; LMP)        na    Protocols used: ADULT BACK PAIN-A-AH          "

## 2017-12-28 NOTE — TELEPHONE ENCOUNTER
Patient Information     Patient Name MRN Moses Fairbanks [id number] Male 1950      Telephone Encounter by Barbara Connor at 2017  2:20 PM     Author:  Barbara Connor Service:  (none) Author Type:  (none)     Filed:  2017  2:23 PM Encounter Date:  2017 Status:  Signed     :  Barbara Connor            Patient placed with Jorge Barnett MD on 17, at 1240 for multiple concerns.    Barbara Connor LPN        2017 2:23 PM

## 2017-12-28 NOTE — PROGRESS NOTES
Patient Information     Patient Name MRN Sex Moses Ott [id number] Male 1950      Progress Notes by Jorge Barnett MD at 10/27/2017 10:20 AM     Author:  Jorge Barnett MD Service:  (none) Author Type:  Physician     Filed:  10/27/2017  4:28 PM Encounter Date:  10/27/2017 Status:  Signed     :  Jorge Barnett MD (Physician)            Nursing Notes:   Barbara Connor  10/27/2017 10:40 AM  Signed  Patient presents for gradual increase in back pain and tightness over the past several days.  Patient reports difficulty with ambulation, sitting helps relieve some of the back pain.      Previous A1C is at goal of <8  HEMOGLOBIN A1C MONITORING (POCT)    Date Value   10/04/2017 7.5 % (H)   2013 7.4 % NGSP (H)     Urine microalbumin:creatine: 1.64  Foot exam unknown  Eye exam 2017  Patient is not a current smoker  Patient is on a daily aspirin  Patient is on a Statin.  Blood pressure today of   is at the goal of <139/89 for diabetics.    Barbara DUKES Nikolas LPN..............10/27/2017 10:19 AM    Moses Whittaker presents to clinic today for:   Chief Complaint    Patient presents with      Back Pain     HPI: Mr. Whittaker is a 67 y.o. male who presents today for evaluation of above.     (M54.42,  M54.41) Acute bilateral low back pain with bilateral sciatica  (primary encounter diagnosis)  (M62.838) Muscle spasm of both lower legs  (E11.22,  N18.3,  Z79.4) Controlled type 2 diabetes mellitus with stage 3 chronic kidney disease, with long-term current use of insulin (HC)  (Z96.41) Insulin pump in place  (D69.59,  T45.515A) Platelet inhibition due to Plavix -- On Plavix (Brand Name due to generic intolerance) For Life.   (I25.2) Old inferior wall myocardial infarction     Patient presents for evaluation of acute low back pain.  States bilateral.  There is some radicular pain down the legs, left side seems worse than right.  States he has had oral prednisone in the past, he is aware that this  will cause his blood sugars to go up.  He is wondering about this as an option for acute treatment.  He is also wondering about some muscle spasm medication options.  -- Start trial of Medrol.  Start trial of muscle relaxers.    Reports the pain is fairly localized in the back.  He did go to the chiropractor a few times which has helped.  States that he woke up this morning and actually felt quite good.  For the last 4-5 nights he's had a really hard time with the pain.  States that prior to going to the chiropractor his pain was greater than 10 out of 10 completely uncontrolled and nothing really seemed to help it.  He even tried 10 mg of hydrocodone and states that that barely took the edge off from a 10 down to a 7.  The shooting pain goes down to about the mid thigh posteriorly.  No bowel or bladder incontinence issues.  No leg sores.  No focal weakness.  Symptoms started in the past week or so.    Diabetes, has insulin pump.  Most recent numbers have been controlled.  He did have uncontrolled diabetes for a while.  HEMOGLOBIN A1C MONITORING (POCT)    Date Value   10/04/2017 7.5 % (H)   04/02/2013 7.4 % NGSP (H)     patient has history of heart disease, needs lifelong platelet inhibition with Plavix.  Denies exertional chest pain or heaviness.    Mr. Whittaker's Body mass index is 29.62 kg/(m^2). This is out of the normal range for a 67 y.o. Normal range for ages 18+ is between 18.5 and 24.9. To lose weight we reviewed risks and benefits of appropriate options such as diet, exercise, and medications. Patient's strategy will be  none; patient is not ready to act   BP Readings from Last 1 Encounters:10/27/17 : 158/78  Mr. Stokes blood pressure is out of the normal range for adults. Per JNC-8 guidelines normal adult blood pressure is < 120/80, pre-hypertensive is between 120/80 and 139/89, and hypertension is 140/90 or greater. Risks of hypertension were discussed. Patient's strategy will be reduced salt  intake    Functional Capacity: normally > 4 METS.   Patient reports no current symptoms of fevers, chills, nausea/vomiting.   No cough. No shortness of breath.   No change in bowel/bladder habits. No melena, hematochezia. No Hematuria.   No rashes. No palpitations.  No orthopnea/paroxysmal nocturnal dyspnea   No vision or hearing issues.   No significant mood issues   No bruising.     [place]:  No flowsheet data found.    PHQ9:  PHQ Depression Screening 10/5/2017 10/27/2017   Date of PHQ exam (doc flow) 10/5/2017 10/27/2017   1. Lack of interest/pleasure 0 - Not at all 0 - Not at all   2. Feeling down/depressed 0 - Not at all 0 - Not at all   PHQ-2 TOTAL SCORE 0 0   3. Trouble sleeping 0 - Not at all 0 - Not at all   4. Decreased energy 0 - Not at all 0 - Not at all   5. Appetite change 0 - Not at all 0 - Not at all   6. Feelings of failure 0 - Not at all 0 - Not at all   7. Trouble concentrating 0 - Not at all 0 - Not at all   8. Activity level 0 - Not at all 0 - Not at all   9. Hurting yourself 0 - Not at all 0 - Not at all   PHQ-9 TOTAL SCORE 0 0   PHQ-9 Severity Level none none   Functional Impairment not difficult at all not difficult at all   Some recent data might be hidden          I have personally reviewed the past medical history, past surgical history, medications, allergies, family and social history as listed below, on 10/27/2017.    Patient Active Problem List       Diagnosis  Date Noted     Acute bilateral low back pain with bilateral sciatica  10/27/2017     Intermittent constipation  08/08/2017     Chronic low back pain  08/08/2017     Skin rash  08/01/2017     Controlled type 2 diabetes mellitus with stage 3 chronic kidney disease, with long-term current use of insulin (HC)  03/30/2017     Erectile dysfunction  09/26/2016     Trigger point with neck pain  07/14/2016     Trigger point with back pain  07/14/2016     Insulin pump in place  03/10/2016     Pain medication agreement - 6/29/2017 12/17/2015      Trigger point of extremity  12/17/2015     Myofacial muscle pain  12/17/2015     Greater trochanteric bursitis of left hip  06/24/2015     Blister of toe of right foot  12/09/2014     Cervical stenosis of spinal canal  06/17/2014     Facet arthritis of cervical region (HC)  06/17/2014     Degenerative disc disease, cervical  06/17/2014     Left arm numbness  06/05/2014     Cervical radicular pain  06/05/2014     Left atrial enlargement - Moderate - 1/20/2014 ECHO  01/23/2014     S/P coronary artery stent placement  01/10/2014     Old inferior wall myocardial infarction  01/10/2014     S/P CABG x 2  01/10/2014     Hypertension  01/10/2014     Platelet inhibition due to Plavix -- On Plavix (Brand Name due to generic intolerance) For Life.   01/10/2014     Myalgia  01/10/2014     CKD (chronic kidney disease) stage 3, GFR 30-59 ml/min  01/10/2014     Chronic diastolic heart failure - Mild - 1/20/2014 ECHO - EF 65%  01/10/2014     1/20/2014 ECHO FINAL IMPRESSION:   1. Normal left ventricular size and systolic function. Estimated ejection fraction 65%.   2. Mild increase in wall thickness of the ventricular septum with hypokinesis of the basilar segment of the ventricular septum and inferior wall.   3. Mild to moderate left atrial enlargement.   4. Trace tricuspid regurgitation.   5. Mild left ventricular diastolic dysfunction.         ACP (advance care planning)  12/05/2013     Diabetic neuropathy, painful (HC)  04/22/2013     G E R D  05/17/2012     OBESITY  05/17/2012     NARCOLEPSY       Total dose recommended by pulmonology he is dextro- amphetamine 40 mg plus   methylphenidate 40 mg in divided doses per day.  Total of 80 mg of short   acting amphetamines daily.          DIASTASIS RECTI  10/06/2011     C A D  09/08/2011     ERECTILE DYSFUNCTION  06/30/2011     ANGINA  12/10/2010     ANKLE EDEMA, CHRONIC  10/25/2010     HYPOTHYROIDISM       BACK PAIN, LUMBAR, WITH RADICULOPATHY       OSTEOARTHRITIS, CERVICAL SPINE        DISC DISEASE, CERVICAL       Mixed hyperlipidemia       PEPTIC ULCER DISEASE       Past Medical History:     Diagnosis  Date     CAD (coronary artery disease)     Coronary artery disease, post angioplasty      Carpal tunnel syndrome      Edema     Edema secondary to Bextra      Heart disease     Multiple coronary stents       Helicobacter pylori gastritis      Hematoma 11/2006    Right calf hematoma      Maxillary sinusitis      NARCOLEPSY      Rheumatic fever     Rheumatic fever as a child without valvular heart disease       Past Surgical History:      Procedure  Laterality Date     ANGIOPLASTY  12/00, 04/04/04    Repeat angioplasty with lt internal mammary to the lt anterior descending and lt radial artery from aorta to the diagonal.       ANGIOPLASTY  10/01    Angioplasty with 2 vessel CABG the following week from the lt internal mammary to the lt anterior descending and right radial free graft       ANGIOPLASTY  04/2003     APPENDECTOMY OPEN  1967     CARPAL TUNNEL RELEASE  11/15/01    Right carpal tunnel release by Dr. Mace       CARPAL TUNNEL RELEASE  01/2004    Left carpal tunnel release        COLONOSCOPY  01/08/2016    -- Dr. De La Cruz -- polypectomy        COLONOSCOPY SCREENING  1999     IR ANGIOGRAM FEMORAL/EXTREMITY (IA)  09/03    Unremarkable angiogram at [facility]       IR ANGIOGRAM FEMORAL/EXTREMITY (IA)  2/26/2007    Unremarkable coronary angiogram at [facility].       PTCA  10/05/2004    Angioplasty        PTCA  02/2005    Angioplasty with stent.         PTCA  03/02/2005    Angioplasty with stent.        PTCA  09/23/2005    Angioplasty without stent       ROTATOR CUFF REPAIR  02/06/2006    Left rotator cuff repair and bone spur removal by Dr. Giraldo in [place]       Current Outpatient Prescriptions       Medication  Sig Dispense Refill     alcohol swabs (ALCOHOL PREP PADS) For home use. 1 box 0     aspirin 325 mg tablet Take  by mouth.       blood sugar diagnostic (ONE TOUCH ULTRA TEST) strip Dispense test  strips covered by the patient insurance. Test 10 times per day. 900 Each 3     Blood-Glucose Meter (ACCU-CHEK ABBI PLUS METER) Dispense glucose meter, test strips and lancets covered by the patient insurance. Test 10 times per day. 1 Device 0     clopidogrel (PLAVIX) 75 mg tablet Take 1 tablet by mouth once daily. 90 tablet 4     codeine-guaiFENesin (ROBITUSSIN AC)  mg/5 mL liquid Take 10 mL by mouth every 4 hours if needed for Cough. Max dose 60 mL per 24 hrs. 200 mL 0     desoximetasone 0.25% topical (TOPICORT) 0.25 % cream Apply twice daily 180 g 3     Dextroamphetamine Sulfate (DEXTROSTAT) 10 mg tablet Take 1 tablet by mouth four times daily - for 10/5/2017 360 tablet 0     Dextroamphetamine Sulfate (DEXTROSTAT) 10 mg tablet Take 1 tablet by mouth four times daily - for 11/30/17 360 tablet 0     Dextromethorphan-guaFENesin (MUCINEX DM)  mg tablet Take 1 tablet by mouth 2 times daily if needed for Cough. - OTC / Generic Okay 60 tablet 1     diclofenac (VOLTAREN) 75 mg delayed-release tablet TAKE 1 TABLET BY MOUTH FOUR TIMES DAILY 360 tablet 3     docusate (COLACE) 100 mg capsule Take 1-2 capsules by mouth 2 times daily. -- AS needed for constipation prevention 120 capsule 3     fexofenadine (ALLEGRA ALLERGY) 60 mg tablet Take 1 tablet by mouth 2 times daily. AS NEEDED for Allergy symptoms 60 tablet 3     fish oil-omega-3 fatty acids (FISH OIL) 1,000-340 mg capsule Take  by mouth.       flaxseed oil 1,000 mg cap Take  by mouth.       furosemide (LASIX) 40 mg tablet TAKE 2 TO 4 TABLETS BY MOUTH DAILY OR AS INSTRUCTED FOR LEG SWELLING 360 tablet 4     HUMALOG 100 unit/mL injection INJECT UNDER THE SKIN USING INSULIN PUMP. MAX DOSE  UNITS DAILY. 180 mL 1     hydrALAZINE (APRESOLINE) 25 mg tablet Take 1-2 tablets by mouth 4 times daily. - Take lowest effective dose for blood pressure management 720 tablet 3     HYDROcodone-acetaminophen, 5-325 mg, (NORCO) per tablet Take 1 tablet by mouth every 6  "hours if needed  for Pain - for on or after 07/27/17 60 tablet 0     Insulin Needles, Disposable, (BD INSULIN PEN NEEDLE UF) 29 x 1/2 \" For administering insulin at home. 600 Each 2     ipratropium (ATROVENT HFA) 17 mcg/actuation inhaler Inhale 2 Puffs by mouth 4 times daily. 1 Inhaler 0     IV Administration Set (INFUSION SET) iset As directed.  0     lancets (ONE TOUCH ULTRASOFT LANCETS) Test 10 times per day. 600 Each 6     levothyroxine (SYNTHROID) 125 mcg tablet Take 1 tablet by mouth every morning. 90 tablet 4     MEDICAL SUPPLY, MISCELLANEOUS (GRADUATED COMPRESSION STOCKINGS) For personal use. Length: calf Strength: 20-30 mmHg 1 Packet 1     methylphenidate HCl (RITALIN) 10 mg tablet One tablet by mouth four times daily - for 10/5/2017 360 tablet 0     methylphenidate HCl (RITALIN) 10 mg tablet One tablet by mouth four times daily - for 11/30/17 360 tablet 0     methylPREDNISolone (MEDROL DOSEPAK) 4 mg tablet Take by mouth as instructed per packaging. -- check cost vs prednisone type dose pack 1 Package 0     metoprolol succinate (TOPROL XL) 50 mg sustained-release tablet Take 1 tablet by mouth 2 times daily. 180 tablet 3     nitroglycerin (NITROSTAT) 0.4 mg sublingual tablet Place 1 tablet under the tongue every 5 minutes if needed for Chest Pain. 3 Bottle 1     nystatin (MYCOSTATIN) cream Apply  topically to affected area(s) 2 times daily. For yeast skin fold rash -- vs Topical OTC Anti-fungals okay - monistat, lotrimin 30 g 3     Psyllium Seed-Sucrose (METAMUCIL, SUGAR,) powder Take 1 tsp by mouth 3 times daily. - for constipation prevention  0     ranitidine (ZANTAC) 150 mg tablet Take 1 tablet by mouth 2 times daily. 180 tablet 3     ranolazine (RANEXA) 500 mg Controlled-Release tablet Take 2 tablets by mouth 2 times daily. -- cancel other Rx -- give 3 month supply 360 tablet 3     rosuvastatin (CRESTOR) 10 mg tablet TAKE 1 TABLET BY MOUTH TWICE DAILY 180 tablet 3     sodium chloride-aloe vera (SALINE " NASAL, ALOE VERA,) nasal gel Inhale  in the nostril(s) every hour if needed for Nasal Dryness. 14.1 g 3     Sub-Q Infusion Pump Access (ACCU-CHEK SPIRIT CARTRIDGE SYS) [facility] [place].  0     tiZANidine (ZANAFLEX) 2 mg tablet Take 1-2 tablets by mouth every 6 hours if needed for Muscle Spasm. 40 tablet 1     valsartan (DIOVAN) 160 mg tablet Take 1 tablet by mouth 2 times daily. 180 tablet 3     Allergies     Allergen  Reactions     Gabapentin Mental Status Change     Bextra [Valdecoxib] Edema     Lipitor [Atorvastatin] Hives     Lisinopril Cough     Lyrica [Pregabalin] Mental Status Change     Novolog [Insulin Aspart] Rash     Family History       Problem   Relation Age of Onset     Heart Disease  Mother      Heart problems       Heart Disease  Other      Heart problems       Heart Disease  Other      Heart problems       Other  Son      Ankylosing spondylitis        Other  Brother       with sudden death following shoulder surgery 2012 -- was off his Plavix for 10 days pre-operatively.       Family Status     Relation  Status     Brother      with sudden death following shoulder surgery 2012 -- was off his Plavix for 10 days pre-operatively.      Son Alive     Mother      Other      Other      Son      Brother      Social History     Social History        Marital status:       Spouse name: N/A     Number of children:  N/A     Years of education:  N/A     Social History Main Topics        Smoking status:  Never Smoker     Smokeless tobacco:  Never Used     Alcohol use  No     Drug use:  No     Sexual activity:  Yes     Partners: Female     Other Topics  Concern     Not on file      Social History Narrative     He is on Social Security Disability for coronary artery disease and cervical arthritis and disk disease.   Dax clay.  No tobacco.             Pertinent ROS was performed and was negative as noted in HPI above.     EXAM:   Vitals:     10/27/17 1021   BP: 158/78   Pulse: 60  "  Weight: 91.6 kg (202 lb)     BP Readings from Last 3 Encounters:    10/27/17 158/78   10/27/17 160/72   10/05/17 138/74     Wt Readings from Last 3 Encounters:    10/27/17 91.6 kg (202 lb)   10/27/17 92.1 kg (203 lb)   10/05/17 94.4 kg (208 lb 2 oz)     Estimated body mass index is 29.62 kg/(m^2) as calculated from the following:    Height as of 9/19/17: 1.759 m (5' 9.25\").    Weight as of this encounter: 91.6 kg (202 lb).     EXAM:  Constitutional: Pleasant, alert, appropriate appearance for age. No acute distress  Lymphatic Exam: Non-palpable nodes in neck, clavicular regions  Pulmonary: Lungs are clear to auscultation bilaterally, without wheezes or crackles  Cardiovascular Exam: regular rate and rhythm  Gastrointestinal Exam: Soft, non-tender, non-distended, positive bowel sounds  Integument: No abnormal rashes, sores, or ulcerations noted  Neurologic Exam: DTRs symmetric at knees.  Musculoskeletal Exam: Left gluteal muscles and upper posterior thigh muscles - muscle spasticity noted to palpation.  Needs to change position frequently due to pain.  Psychiatric Exam: Awake and Alert, Affect and mood appropriate  Speech is fluent, Thought process is normal    INVESTIGATIONS:  Results for orders placed or performed in visit on 10/04/17      CBC W PLT NO DIFF      Result  Value Ref Range    WHITE BLOOD COUNT         5.4 4.5 - 11.0 thou/cu mm    RED BLOOD COUNT           4.31 4.30 - 5.90 mil/cu mm    HEMOGLOBIN                14.1 13.5 - 17.5 g/dL    HEMATOCRIT                42.1 37.0 - 53.0 %    MCV                       98 80 - 100 fL    MCH                       32.7 26.0 - 34.0 pg    MCHC                      33.5 32.0 - 36.0 g/dL    RDW                       12.8 11.5 - 15.5 %    PLATELET COUNT            213 140 - 440 thou/cu mm    MPV                       8.4 6.5 - 11.0 fL   COMP METABOLIC PANEL      Result  Value Ref Range    SODIUM 136 133 - 143 mmol/L    POTASSIUM 5.1 3.5 - 5.1 mmol/L    CHLORIDE 107 98 " - 107 mmol/L    CO2,TOTAL 22 21 - 31 mmol/L    ANION GAP 7 5 - 18                    GLUCOSE 126 (H) 70 - 105 mg/dL    CALCIUM 9.5 8.6 - 10.3 mg/dL    BUN 26 (H) 7 - 25 mg/dL    CREATININE 1.56 (H) 0.70 - 1.30 mg/dL    BUN/CREAT RATIO           17                    GFR if African American 54 (L) >60 ml/min/1.73m2    GFR if not African American 45 (L) >60 ml/min/1.73m2    ALBUMIN 4.3 3.5 - 5.7 g/dL    PROTEIN,TOTAL 6.8 6.4 - 8.9 g/dL    GLOBULIN                  2.5 2.0 - 3.7 g/dL    A/G RATIO 1.7 1.0 - 2.0                    BILIRUBIN,TOTAL 0.5 0.3 - 1.0 mg/dL    ALK PHOSPHATASE 63 34 - 104 IU/L    ALT (SGPT) 29 7 - 52 IU/L    AST (SGOT) 23 13 - 39 IU/L   HEMOGLOBIN A1C MONITORING (POCT)      Result  Value Ref Range    HEMOGLOBIN A1C MONITORING (POCT) 7.5 (H) 4.0 - 6.2 %    ESTIMATED AVERAGE GLUCOSE  169 mg/dL       ASSESSMENT AND PLAN:  Moses was seen today for back pain.    Diagnoses and all orders for this visit:    Acute bilateral low back pain with bilateral sciatica  -     methylPREDNISolone (MEDROL DOSEPAK) 4 mg tablet; Take by mouth as instructed per packaging. -- check cost vs prednisone type dose pack    Muscle spasm of both lower legs  -     tiZANidine (ZANAFLEX) 2 mg tablet; Take 1-2 tablets by mouth every 6 hours if needed for Muscle Spasm.    Controlled type 2 diabetes mellitus with stage 3 chronic kidney disease, with long-term current use of insulin (HC)    Insulin pump in place    Platelet inhibition due to Plavix -- On Plavix (Brand Name due to generic intolerance) For Life.     Old inferior wall myocardial infarction    lab results and schedule of future lab studies reviewed with patient, reviewed diet, exercise and weight control, recommended sodium restriction    -- Expected clinical course discussed   -- Medications and their side effects discussed    Moses is also recommended to eat a heart-healthy diet, do regular aerobic exercises, maintain a desirable body weight, and avoid tobacco  products. These recommendations are from the American Heart Association (AHA) which stresses the importance of lifestyle changes to lower cardiovascular disease risk.    Return if symptoms worsen or fail to improve.    Patient Instructions   1. Acute bilateral low back pain with bilateral sciatica  - methylPREDNISolone (MEDROL DOSEPAK) 4 mg tablet; Take by mouth as instructed per packaging. -- check cost vs prednisone type dose pack  Dispense: 1 Package; Refill: 0    2. Muscle spasm of both lower legs  - tiZANidine (ZANAFLEX) 2 mg tablet; Take 1-2 tablets by mouth every 6 hours if needed for Muscle Spasm.  Dispense: 40 tablet; Refill: 1    Continue chiropractic care.   Continue daily stretching.     Take medications as able, as needed.     -- medrol / prednisone -- will make your glucose levels go way up, increase insulin use as needed to cover the higher glucose levels.     Return as needed for follow-up with Dr. Barnett.    Clinic : [phone]  Appointment line: [phone]      Jorge Barnett MD

## 2017-12-28 NOTE — TELEPHONE ENCOUNTER
Patient Information     Patient Name MRN Moses Fairbanks [id number] Male 1950      Telephone Encounter by Le La LPN Student at 2017 10:25 AM     Author:  Le La LPN Student Service:  (none) Author Type:  NURS- Student Practical Nurse     Filed:  2017 10:26 AM Encounter Date:  2017 Status:  Signed     :  Le La LPN Student (NURS- Student Practical Nurse)            PT NEEDS FORM FILLED OUT, HE IS WONDERING IF HE CAN JUST DROP IT OFF. PLEASE CALL HIM BACK.    Le La, ... 2017 1025

## 2017-12-28 NOTE — TELEPHONE ENCOUNTER
Patient Information     Patient Name MRN Moses Fairbanks [id number] Male 1950      Telephone Encounter by Barbara Connor at 2017  9:30 AM     Author:  Barbara Connor Service:  (none) Author Type:  (none)     Filed:  2017  9:30 AM Encounter Date:  2017 Status:  Signed     :  Barbara Connor            Additional information provided for [employer] form and faxed back.    Barbara Connor LPN        2017 9:30 AM

## 2017-12-28 NOTE — TELEPHONE ENCOUNTER
Patient Information     Patient Name MRN Moses Fairbanks [id number] Male 1950      Telephone Encounter by Barbara Connor at 2017 12:20 PM     Author:  Barbara Connor Service:  (none) Author Type:  (none)     Filed:  2017 12:30 PM Encounter Date:  2017 Status:  Signed     :  Barbara Connor            Patient reports that Prednisone had minimal effect with low back/ bilateral hip discomfort.  Patient was wondering if he should get a refill on the prednisone.  Pain in low back radiates to bilateral legs, 1-2/10 sitting and 8-10/10 with range of motion described as sharp.  Patient was wondering if he could get a referral to [facility] for aquatic therapy-order pending.  Patient continues to have ongoing concerns about upper back pain and bilateral testicle pain.  Patient is hoping aquatic therapy may help with overall wellness.        Please advise.      Barbara Connor LPN        2017 12:27 PM

## 2017-12-28 NOTE — TELEPHONE ENCOUNTER
Patient Information     Patient Name MRN Moses Fairbanks [id number] Male 1950      Telephone Encounter by Sinai Cook at 2017 10:31 AM     Author:  Sinai Cook Service:  (none) Author Type:  (none)     Filed:  2017 10:32 AM Encounter Date:  2017 Status:  Signed     :  Sinai Cook            Patient has a form that needs to be filled out for his [employer]. He states ASAP, explained that he can drop it off. If Jorge Barnett MD is able to fill it out without a visit he will, if not a nurse will call him back and help him set up a appointment.  Sinai Cook LPN.......................... 2017  10:31 AM

## 2017-12-28 NOTE — PATIENT INSTRUCTIONS
Patient Information     Patient Name MRN Moses Fairbanks [id number] Male 1950      Patient Instructions by Jorge Barnett MD at 10/5/2017 11:00 AM     Author:  Jorge Barnett MD  Service:  (none) Author Type:  Physician     Filed:  10/5/2017 11:44 AM  Encounter Date:  10/5/2017 Status:  Addendum     :  Jorge Barnett MD (Physician)        Related Notes: Original Note by Jorge Barnett MD (Physician) filed at 10/5/2017 11:40 AM            Labs are looking better.    Kidney function/creatinine has improved.    Hemoglobin A1c is now controlled.    Medications refilled.     Results for orders placed or performed in visit on 10/04/17      CBC W PLT NO DIFF      Result  Value Ref Range    WHITE BLOOD COUNT         5.4 4.5 - 11.0 thou/cu mm    RED BLOOD COUNT           4.31 4.30 - 5.90 mil/cu mm    HEMOGLOBIN                14.1 13.5 - 17.5 g/dL    HEMATOCRIT                42.1 37.0 - 53.0 %    MCV                       98 80 - 100 fL    MCH                       32.7 26.0 - 34.0 pg    MCHC                      33.5 32.0 - 36.0 g/dL    RDW                       12.8 11.5 - 15.5 %    PLATELET COUNT            213 140 - 440 thou/cu mm    MPV                       8.4 6.5 - 11.0 fL   COMP METABOLIC PANEL      Result  Value Ref Range    SODIUM 136 133 - 143 mmol/L    POTASSIUM 5.1 3.5 - 5.1 mmol/L    CHLORIDE 107 98 - 107 mmol/L    CO2,TOTAL 22 21 - 31 mmol/L    ANION GAP 7 5 - 18                    GLUCOSE 126 (H) 70 - 105 mg/dL    CALCIUM 9.5 8.6 - 10.3 mg/dL    BUN 26 (H) 7 - 25 mg/dL    CREATININE 1.56 (H) 0.70 - 1.30 mg/dL    BUN/CREAT RATIO           17                    GFR if African American 54 (L) >60 ml/min/1.73m2    GFR if not African American 45 (L) >60 ml/min/1.73m2    ALBUMIN 4.3 3.5 - 5.7 g/dL    PROTEIN,TOTAL 6.8 6.4 - 8.9 g/dL    GLOBULIN                  2.5 2.0 - 3.7 g/dL    A/G RATIO 1.7 1.0 - 2.0                    BILIRUBIN,TOTAL 0.5 0.3 - 1.0 mg/dL    ALK  PHOSPHATASE 63 34 - 104 IU/L    ALT (SGPT) 29 7 - 52 IU/L    AST (SGOT) 23 13 - 39 IU/L   HEMOGLOBIN A1C MONITORING (POCT)      Result  Value Ref Range    HEMOGLOBIN A1C MONITORING (POCT) 7.5 (H) 4.0 - 6.2 %    ESTIMATED AVERAGE GLUCOSE  169 mg/dL          Return for Diabetes labs and clinic follow-up appointment every 3 to 4 months.  --- (Go for about 91 to 100 days)    Schedule lab only appointment --- A few days AFTER: 01/03/18     Schedule clinic appointment with Dr. Barnett -- Same day as labs, or 1-2 days later.     Insurance companies are now grading you and I on your blood sugar control -- Goal is to get your A1c down to 7.9% or lower and NO Smoking!    -- Medicare and most insurance companies, will only cover Hemoglobin A1c labs to be rechecked every 91+ days.      HEMOGLOBIN A1C MONITORING (POCT)    Date Value   10/04/2017 7.5 % (H)   04/02/2013 7.4 % NGSP (H)        Next follow-up appointment with Dr. Barnett should be scheduled:  -- Approximately a few days AFTER: 01/03/18

## 2017-12-28 NOTE — TELEPHONE ENCOUNTER
Patient Information     Patient Name MRN Moses Fairbanks [id number] Male 1950      Telephone Encounter by Barbara Connor at 2017  9:46 AM     Author:  Barbara Connor Service:  (none) Author Type:  (none)     Filed:  2017  9:46 AM Encounter Date:  2017 Status:  Signed     :  Barbara Connor            Patient notified of providers note.      Barbara Connor LPN        2017 9:46 AM

## 2017-12-28 NOTE — TELEPHONE ENCOUNTER
Patient Information     Patient Name MRN Moses Fairbanks [id number] Male 1950      Telephone Encounter by Harriet Hackett RN at 2017  1:57 PM     Author:  Harriet Hackett RN Service:  (none) Author Type:  NURS- Registered Nurse     Filed:  2017  1:58 PM Encounter Date:  2017 Status:  Signed     :  Harriet Hackett RN (NURS- Registered Nurse)            Will route to [facility] per PCP's Nurse. Harriet Hackett RN ....................  2017   1:58 PM

## 2017-12-28 NOTE — TELEPHONE ENCOUNTER
Patient Information     Patient Name MRN Moses Fairbanks [id number] Male 1950      Telephone Encounter by Barbara Connor at 2017 11:44 AM     Author:  Barbara Connor Service:  (none) Author Type:  (none)     Filed:  2017 11:45 AM Encounter Date:  2017 Status:  Signed     :  Barbara Connor            [facility] staff received additional information about recent form, patient has been enrolled at this time.  No further action is needed at this time per [facility] staff.    Barbara Connor LPN        2017 11:45 AM

## 2017-12-28 NOTE — TELEPHONE ENCOUNTER
Patient Information     Patient Name MRN Moses Fairbanks [id number] Male 1950      Telephone Encounter by Getachew Perez at 2017 12:03 PM     Author:  Getachew Perez Service:  (none) Author Type:  (none)     Filed:  2017 12:05 PM Encounter Date:  2017 Status:  Signed     :  Getachew Perez            DJS-Pt called and stated he is having back pain and would like to make sure it is not his kidneys. He scheduled an appt for 17, but stated he would like to get in sooner if possible. Did inform pt that DJS is out of the office this week.  Thank you,  Getachew Perez ....................  2017   12:05 PM

## 2017-12-28 NOTE — TELEPHONE ENCOUNTER
"Patient Information     Patient Name MRN Moses Fairbanks [id number] Male 1950      Telephone Encounter by Barbara Connor at 2017  8:48 AM     Author:  Barbara Connor Service:  (none) Author Type:  (none)     Filed:  2017  9:07 AM Encounter Date:  2017 Status:  Signed     :  Barbara Connor            Patient reports that he noticed a rash on his legs on the  after using the pool.  Patient used the pool again on the , rash then spread to belly, back, armpits, both legs and both hips.  Patient feels that this is related to the Chlorine, areas not affected were covered by swim suit.  Patient did have warmth and a lot of swelling of both legs but that has decreased, \"my legs still feel a little tight, not as bad as last week.\"  Patient did have concerns about Cellulitis of the legs but is no longer concerned because the swelling went down.  Patient has been taking 50 mg of Benadryl at HS for the past 2 nights and tried topical Topicort with minimal effect.  Patient reports an unspecified full body before and was resolved with in 3 days with the combination of Zyrtec and Benadryl.  Patient was wondering if he should try that combination again?      Please advise.      Barbara Connor LPN        2017 9:07 AM          "

## 2017-12-28 NOTE — TELEPHONE ENCOUNTER
Patient Information     Patient Name MRN Moses Fairbanks [id number] Male 1950      Telephone Encounter by Radha Trammell RN at 10/3/2017  2:30 PM     Author:  Radha Trammell RN Service:  (none) Author Type:  NURS- Registered Nurse     Filed:  10/3/2017  2:31 PM Encounter Date:  10/2/2017 Status:  Signed     :  Radha Trammell RN (NURS- Registered Nurse)            Noted.  Thank you.    Radha Trammell RN, BSN, CDE  10/3/2017 2:30 PM

## 2017-12-28 NOTE — PATIENT INSTRUCTIONS
Patient Information     Patient Name MRN Moses Fairbanks [id number] Male 1950      Patient Instructions by Jorge Barnett MD at 2017  1:33 PM     Author:  Jorge Barnett MD  Service:  (none) Author Type:  Physician     Filed:  2017  1:34 PM  Encounter Date:  2017 Status:  Addendum     :  Jorge Barnett MD (Physician)        Related Notes: Original Note by Jorge Barnett MD (Physician) filed at 2017  1:33 PM            1. Intermittent constipation  Start:   - Psyllium Seed-Sucrose (METAMUCIL, SUGAR,) powder; Take 1 tsp by mouth 3 times daily. - for constipation prevention; Refill: 0    - docusate (COLACE) 100 mg capsule; Take 1-2 capsules by mouth 2 times daily. -- AS needed for constipation prevention  Dispense: 120 capsule; Refill: 3    -- Make sure you're drinking an adequate amount of water on a daily basis.  This can also contribute to constipation.    2. Uncontrolled type 2 diabetes mellitus with stage 3 chronic kidney disease, without long-term current use of insulin (HC)  -- continue insulin adjustment for your diabetes.     3. Platelet inhibition due to Plavix -- On Plavix (Brand Name due to generic intolerance) For Life.   -- really don't recommend going off Plavix unless really needed.     Return for Diabetes labs and clinic follow-up appointment every 3 to 4 months.  --- (Go for about 91 to 100 days)    Schedule lab only appointment --- A few days AFTER: 10/07/17     Schedule clinic appointment with Dr. Barnett -- Same day as labs, or 1-2 days later.     Insurance companies are now grading you and I on your blood sugar control -- Goal is to get your A1c down to 7.9% or lower and NO Smoking!    -- Medicare and most insurance companies, will only cover Hemoglobin A1c labs to be rechecked every 91+ days.      HEMOGLOBIN A1C MONITORING (POCT)    Date Value   2017 8.1 % (H)   2013 7.4 % NGSP (H)        Next follow-up appointment with Dr. Barnett  should be scheduled:  -- Approximately a few days AFTER: 10/07/17      Index Polish All languages   Constipation   ________________________________________________________________________  KEY POINTS    Constipation means that you have bowel movements less often than normal for you, or that are very hard to pass.    Constipation can often be treated at home by drinking enough liquid to keep your urine light yellow, eating fiber and exercising regularly. Your provider may recommend a stool softener or laxative to help.    If constipation lasts a long time, happens often or you also have other symptoms, you should contact your healthcare provider.  ________________________________________________________________________  What is constipation?  Constipation is a very common condition that happens to almost everyone. It means that you have bowel movements less often than normal for you, such as fewer than 3 times a week. Bowel movements can be very hard and sometimes like small irene.  Normal bowel movements vary from person to person. For some people, having a bowel movement 3 times a day is normal. For others, 3 times a week may be normal.   Constipation that bothers you for 12 weeks or more out of the year, even if it s off and on, is called chronic constipation.   What is the cause?  You may have constipation because:    You wait too long to go to the bathroom after you feel the need to go.    You don t eat enough fiber.    You don t drink enough liquids.    You don't get enough exercise.    You overuse some types of laxatives.    You are taking a medicine that has a side effect of constipation, such as iron pills, antidepressants, or narcotic pain medicine.  Other possible causes are:    Pregnancy    Depression or stress    Some medical conditions and diseases that can cause a partial blockage in your bowels  What are the symptoms?  Symptoms may include:    Small, hard, or dry bowel movements    Uncomfortable or  painful bowel movements that are hard to pass    A longer time than usual between bowel movements    Feeling full and heavy, like you have a full belly  How is it treated?  Most of the time you don t need to see your healthcare provider for treatment. Here are some things you can do to relieve constipation:    Add more fiber to your diet by eating whole-grain bread and cereal, beans, bran muffins, brown rice, and fresh fruit and vegetables.    Exercise regularly. For example, if you are able, walk for at least 30 minutes every day. Remember to start slow (5 to 10 minutes at first, then increase your time each day or two) and check with your healthcare provider before you add any new exercise.    Drink enough liquids each day to keep your urine light yellow in color.    Go to the bathroom whenever you feel like you need to go. Don t wait.  If taking care of yourself at home does not relieve your constipation, your healthcare provider may be able to help. Your provider may recommend a stool softener or laxative to help you have more bowel movements.    Stool softeners are medicines that make your bowel movements easier to pass.    Bulk laxatives, such as fiber supplements, pull water into the bowels. Extra water in the bowel makes the stool larger, softer, and easier to pass.    Lubricant laxatives, such as mineral oil, keep water in the bowels, which makes the stool softer and easier to pass.    Stimulant laxatives, such as milk of magnesia and some other laxatives, help the bowel muscles push the stool through the bowel.  Laxatives may be used for a short time. Try not to use them for more than 1 week. Many people find fiber supplements, like Metamucil, Citrucel, or other psyllium products, to be helpful, but sometimes these products can make constipation worse.  Enemas are another way to help you have a bowel movement. Your healthcare provider will tell you how to give yourself an enema if he or she recommends  it.  Tell your healthcare provider if:    You have both bouts of constipation and bouts of diarrhea.    You have pain during bowel movements or for some time afterward.    Your bowel movements are dark or tar-colored or have blood in them.    You are losing weight without trying.  Tell your healthcare provider about all of the medicines and supplements that you take. Ask if any of the products you are using may be causing constipation.  If you have developed constipation recently and it s lasted 3 or more weeks, see your health care provider to make sure you don t have a medical problem causing the constipation.  How can I prevent constipation?  You may be able to prevent constipation by drinking plenty of water; eating fresh fruits, vegetables and whole grains; and exercising regularly.   Developed by [employer].  Adult Advisor 2016.3 published by [employer].  Last modified: 2015-02-12  Last reviewed: 2014-12-05  This content is reviewed periodically and is subject to change as new health information becomes available. The information is intended to inform and educate and is not a replacement for medical evaluation, advice, diagnosis or treatment by a healthcare professional.  References   Adult Advisor 2016.3 Index    Copyright   2016 [employer] a division of [employer] All rights reserved.

## 2017-12-28 NOTE — TELEPHONE ENCOUNTER
Patient Information     Patient Name MRN Moses Fairbanks [id number] Male 1950      Telephone Encounter by Barbara Connor at 2017 11:40 AM     Author:  Barbara Connor Service:  (none) Author Type:  (none)     Filed:  2017 11:41 AM Encounter Date:  2017 Status:  Signed     :  Barbara Connor            Patient needs a new prescription to be picked up for Ranexa, this will be faxed with previously completed form.    Barbara Connor LPN        2017 11:40 AM

## 2017-12-28 NOTE — TELEPHONE ENCOUNTER
Patient Information     Patient Name MRN Moses Fairbanks [id number] Male 1950      Telephone Encounter by Barbara Connor at 11/3/2017  1:43 PM     Author:  Barbara Connor Service:  (none) Author Type:  (none)     Filed:  11/3/2017  1:45 PM Encounter Date:  11/3/2017 Status:  Signed     :  Barbara Connor            Patient was asking about Prednisone prescription.  Medrol was ERX to [facility] retail pharmacy, patient notified.      Barbara Connor LPN        11/3/2017 1:44 PM

## 2017-12-28 NOTE — TELEPHONE ENCOUNTER
Patient Information     Patient Name MRN Moses Fairbanks [id number] Male 1950      Telephone Encounter by Concepcion Pederson RPh at 11/3/2017 10:26 AM     Author:  Concepcion Pederson RPh Service:  (none) Author Type:  PHARM- Pharmacist     Filed:  11/3/2017 10:27 AM Encounter Date:  11/3/2017 Status:  Signed     :  Concepcion Pederson RPh (PHARM- Pharmacist)            Pain is starting to improve with use of 1 medrol dose winston. Pt would like to use another winston if possible for further pain relief. He has only been on 1x 5 day course of steroid therapy at this point. Please let pharmacy know if this is not approved. Thanks

## 2017-12-29 ENCOUNTER — COMMUNICATION - GICH (OUTPATIENT)
Dept: INTERNAL MEDICINE | Facility: OTHER | Age: 67
End: 2017-12-29

## 2017-12-29 DIAGNOSIS — I10 ESSENTIAL (PRIMARY) HYPERTENSION: ICD-10-CM

## 2017-12-29 NOTE — PATIENT INSTRUCTIONS
Patient Information     Patient Name MRMoses Oliveira [id number] Male 1950      Patient Instructions by Jorge Barnett MD at 2017  2:20 PM     Author:  Jorge Barnett MD Service:  (none) Author Type:  Physician     Filed:  2017  2:51 PM Encounter Date:  2017 Status:  Signed     :  Jorge Barnett MD (Physician)            1. NARCOLEPSY  - Dextroamphetamine Sulfate (DEXTROSTAT) 10 mg tablet; Take 1 tablet by mouth four times daily - for on or after 17  Dispense: 360 tablet; Refill: 0  - methylphenidate (RITALIN) 10 mg tablet; One tablet by mouth four times daily - for on or after 17  Dispense: 360 tablet; Refill: 0    2. Pain medication agreement - 2017  - Dextroamphetamine Sulfate (DEXTROSTAT) 10 mg tablet; Take 1 tablet by mouth four times daily - for on or after 17  Dispense: 360 tablet; Refill: 0  - HYDROcodone-acetaminophen, 5-325 mg, (NORCO) per tablet; Take 1 tablet by mouth every 6 hours if needed  for Pain - for on or after 17  Dispense: 60 tablet; Refill: 0  - methylphenidate (RITALIN) 10 mg tablet; One tablet by mouth four times daily - for on or after 17  Dispense: 360 tablet; Refill: 0    3. Degenerative disc disease, cervical  - HYDROcodone-acetaminophen, 5-325 mg, (NORCO) per tablet; Take 1 tablet by mouth every 6 hours if needed  for Pain - for on or after 17  Dispense: 60 tablet; Refill: 0    4. Facet arthritis of cervical region (HC)  - HYDROcodone-acetaminophen, 5-325 mg, (NORCO) per tablet; Take 1 tablet by mouth every 6 hours if needed  for Pain - for on or after 17  Dispense: 60 tablet; Refill: 0    5. Cervical stenosis of spinal canal  - HYDROcodone-acetaminophen, 5-325 mg, (NORCO) per tablet; Take 1 tablet by mouth every 6 hours if needed  for Pain - for on or after 17  Dispense: 60 tablet; Refill: 0    6. Cervical radicular pain  - HYDROcodone-acetaminophen, 5-325 mg, (NORCO) per tablet;  Take 1 tablet by mouth every 6 hours if needed  for Pain - for on or after 07/27/17  Dispense: 60 tablet; Refill: 0    7. Jock itch  - nystatin (MYCOSTATIN) cream; Apply  topically to affected area(s) 2 times daily. For yeast skin fold rash -- vs Topical OTC Anti-fungals okay - monistat, lotrimin  Dispense: 30 g; Refill: 3      Diabetic labs today.       Return for Diabetes labs and clinic follow-up appointment every 3 to 4 months.  --- (Go for about 91 to 100 days)    Schedule lab only appointment --- A few days AFTER: 09/27/17     Schedule clinic appointment with Dr. Barnett -- Same day as labs, or 1-2 days later.     Insurance companies are now grading you and I on your blood sugar control -- Goal is to get your A1c down to 7.9% or lower and NO Smoking!    -- Medicare and most insurance companies, will only cover Hemoglobin A1c labs to be rechecked every 91+ days.      HEMOGLOBIN A1C MONITORING (POCT)    Date Value   03/30/2017 8.4 % (H)   04/02/2013 7.4 % NGSP (H)        Next follow-up appointment with Dr. Barnett should be scheduled:  -- Approximately a few days AFTER: 09/27/17

## 2017-12-29 NOTE — PATIENT INSTRUCTIONS
Patient Information     Patient Name MRN Sex Moses Ott [id number] Male 1950      Patient Instructions by Jorge Barnett MD at 10/27/2017 10:20 AM     Author:  Jorge Barnett MD Service:  (none) Author Type:  Physician     Filed:  10/27/2017 10:45 AM Encounter Date:  10/27/2017 Status:  Signed     :  Jorge Barnett MD (Physician)            1. Acute bilateral low back pain with bilateral sciatica  - methylPREDNISolone (MEDROL DOSEPAK) 4 mg tablet; Take by mouth as instructed per packaging. -- check cost vs prednisone type dose pack  Dispense: 1 Package; Refill: 0    2. Muscle spasm of both lower legs  - tiZANidine (ZANAFLEX) 2 mg tablet; Take 1-2 tablets by mouth every 6 hours if needed for Muscle Spasm.  Dispense: 40 tablet; Refill: 1    Continue chiropractic care.   Continue daily stretching.     Take medications as able, as needed.     -- medrol / prednisone -- will make your glucose levels go way up, increase insulin use as needed to cover the higher glucose levels.     Return as needed for follow-up with Dr. Barnett.    Clinic : [phone]  Appointment line: [phone]

## 2017-12-30 NOTE — NURSING NOTE
Patient Information     Patient Name MRN Moses Fairbanks [id number] Male 1950      Nursing Note by Barbara Connor at 10/5/2017 11:00 AM     Author:  Barbara Connor Service:  (none) Author Type:  (none)     Filed:  10/5/2017 11:36 AM Encounter Date:  10/5/2017 Status:  Signed     :  Barbara Connor            Previous A1C is at goal of <8  HEMOGLOBIN A1C MONITORING (POCT)    Date Value   10/04/2017 7.5 % (H)   2013 7.4 % NGSP (H)     Urine microalbumin:creatine: 1.64  Foot exam unkown  Eye exam 2017   Patient is not a current smoker  Patient is on a daily aspirin  Patient is on a Statin.  Blood pressure today of  is at the goal of <139/89 for diabetics.    Barbara Connor LPN..............10/5/2017 11:23 AM

## 2017-12-30 NOTE — NURSING NOTE
Patient Information     Patient Name MRMoses Oliveira [id number] Male 1950      Nursing Note by Anny Laurent at 10/27/2017  9:15 AM     Author:  Anny Laurent Service:  (none) Author Type:  (none)     Filed:  10/27/2017  9:55 AM Encounter Date:  10/27/2017 Status:  Signed     :  Anny Laurent            Patient is here today for a follow up erectile dysfunction.     Review of Systems:    Weight loss:    yes     Recent fever/chills:  No   Night sweats:   yes  Current skin rash:  No   Recent hair loss:  No  Heat intolerance:  No   Cold intolerance:  yes  Chest pain:   yes   Palpitations:   No  Shortness of breath:  No   Wheezing:   yes  Constipation:    yes   Diarrhea:   yes   Nausea:   yes   Vomiting:   No   Kidney/side pain:  yes   Back pain:   yes  Frequent headaches:  No   Dizziness:     No  Leg swelling:   yes   Calf pain:    Yes    Anny Laurent LPN......................10/27/2017 9:41 AM

## 2017-12-30 NOTE — NURSING NOTE
Patient Information     Patient Name MRN Moses Fairbanks [id number] Male 1950      Nursing Note by Shanice Vidales LPN at 2017  3:00 PM     Author:  Shanice Vidales LPN Service:  (none) Author Type:  NURS- Licensed Practical Nurse     Filed:  2017  3:28 PM Encounter Date:  2017 Status:  Signed     :  Shanice Vidales LPN (NURS- Licensed Practical Nurse)            Moses Whittaker is a 67 y.o. male here today for wound care consult for his left leg.  Shanice Vidales LPN.........2017   3:20 PM

## 2017-12-30 NOTE — NURSING NOTE
Patient Information     Patient Name MRN Moses Fairbanks [id number] Male 1950      Nursing Note by Barbara Connor at 10/27/2017 10:20 AM     Author:  Barbara Connor Service:  (none) Author Type:  (none)     Filed:  10/27/2017 10:40 AM Encounter Date:  10/27/2017 Status:  Signed     :  Barbara Connor            Patient presents for gradual increase in back pain and tightness over the past several days.  Patient reports difficulty with ambulation, sitting helps relieve some of the back pain.      Previous A1C is at goal of <8  HEMOGLOBIN A1C MONITORING (POCT)    Date Value   10/04/2017 7.5 % (H)   2013 7.4 % NGSP (H)     Urine microalbumin:creatine: 1.64  Foot exam unknown  Eye exam 2017  Patient is not a current smoker  Patient is on a daily aspirin  Patient is on a Statin.  Blood pressure today of   is at the goal of <139/89 for diabetics.    Barbara Connor LPN..............10/27/2017 10:19 AM

## 2017-12-30 NOTE — NURSING NOTE
Patient Information     Patient Name MRN Moses Fairbanks [id number] Male 1950      Nursing Note by Shanice Vidales LPN at 2017  3:20 PM     Author:  Shanice Vidales LPN Service:  (none) Author Type:  NURS- Licensed Practical Nurse     Filed:  2017  3:54 PM Encounter Date:  2017 Status:  Signed     :  Shanice Vidales LPN (NURS- Licensed Practical Nurse)            Moses Whittaker is a 67 y.o. male here today for wound care of the left leg.  Shanice Vidales LPN.........2017   3:40 PM

## 2017-12-30 NOTE — NURSING NOTE
Patient Information     Patient Name MRN Moses Fairbanks [id number] Male 1950      Nursing Note by Barbara Connor at 2017  2:20 PM     Author:  Barbara Connor Service:  (none) Author Type:  (none)     Filed:  2017  2:40 PM Encounter Date:  2017 Status:  Signed     :  Barbara Connor            Patient presents to the clinic for medication management, last administration of Dextroamphetamine was around 830 today, Methylphenidate was yesterday afternoon.  Patient reports lower back and bilateral hip pain noted over the past several weeks.  Patient denies trauma at this time.      Barbara Connor LPN        2017 2:20 PM

## 2017-12-30 NOTE — NURSING NOTE
Patient Information     Patient Name MRN Moses Fairbanks [id number] Male 1950      Nursing Note by Barbara Connor at 2017 12:40 PM     Author:  Barbara Connor Service:  (none) Author Type:  (none)     Filed:  2017  1:15 PM Encounter Date:  2017 Status:  Signed     :  Barbara Connor            Patient presents to the clinic for concerns about constipation and lower back pain noted over the past month.    Barbara Connor LPN        2017 12:53 PM

## 2017-12-30 NOTE — NURSING NOTE
Patient Information     Patient Name MRN Moses Fairbanks [id number] Male 1950      Nursing Note by Shanice Vidales LPN at 2017  3:20 PM     Author:  Shanice Vidales LPN Service:  (none) Author Type:  NURS- Licensed Practical Nurse     Filed:  2017  3:48 PM Encounter Date:  2017 Status:  Signed     :  Shanice Vidales LPN (NURS- Licensed Practical Nurse)            Moses Whittaker is a 67 y.o. male here today for left leg wound care.  Shanice Vidales LPN.........2017   3:31 PM

## 2018-01-02 ENCOUNTER — COMMUNICATION - GICH (OUTPATIENT)
Dept: INTERNAL MEDICINE | Facility: OTHER | Age: 68
End: 2018-01-02

## 2018-01-02 NOTE — TELEPHONE ENCOUNTER
Patient Information     Patient Name MRMoses Oliveira [id number] Male 1950      Telephone Encounter by Albina Russell at 2017  9:35 AM     Author:  Albina Russell Service:  (none) Author Type:  (none)     Filed:  2017  9:37 AM Encounter Date:  2017 Status:  Signed     :  Albina Russell            Had questions on the dosage of the Trimix. He is getting a supply from [facility]. He will call back on 17 after he gets the medication to verify that the correct dosage was sent.  Albina Russell LPN  2017  9:36 AM

## 2018-01-02 NOTE — TELEPHONE ENCOUNTER
Patient Information     Patient Name MRN Moses Fairbanks [id number] Male 1950      Telephone Encounter by Barbara Connor at 2017  4:59 PM     Author:  Barbara Connor Service:  (none) Author Type:  (none)     Filed:  2017  4:59 PM Encounter Date:  2017 Status:  Signed     :  Barbara Connor            Left message to call back  ...................Barbara Connor LPN  2017   4:59 PM

## 2018-01-02 NOTE — TELEPHONE ENCOUNTER
Patient Information     Patient Name MRN Moses Fairbanks [id number] Male 1950      Telephone Encounter by Barbara Connor at 2017  9:34 AM     Author:  Barbara Connor Service:  (none) Author Type:  (none)     Filed:  2017  9:35 AM Encounter Date:  2017 Status:  Signed     :  Barbara Connor            Patient had questions about ED medication prescribed by Dr. Moran.  Primary nurse in urology reports they will call back patient at this time to assist.    Barbara Connor LPN        2017 9:35 AM

## 2018-01-02 NOTE — TELEPHONE ENCOUNTER
Patient Information     Patient Name MRMoses Oliveira [id number] Male 1950      Telephone Encounter by Albina Russell at 2017  2:30 PM     Author:  Albina Russell Service:  (none) Author Type:  (none)     Filed:  2017  2:34 PM Encounter Date:  2017 Status:  Signed     :  Albina Russell            Stated that the Trimix form [facility] is stronger than the Trimix from [facility]. Asking what dosage he should be taking. Offered a appointment with Dr. Moran. Refused. Wants staff to ask him. Please advise.  Albina Russell LPN  2017  2:33 PM

## 2018-01-03 ENCOUNTER — HISTORY (OUTPATIENT)
Dept: INTERNAL MEDICINE | Facility: OTHER | Age: 68
End: 2018-01-03

## 2018-01-03 ENCOUNTER — COMMUNICATION - GICH (OUTPATIENT)
Dept: UROLOGY | Facility: OTHER | Age: 68
End: 2018-01-03

## 2018-01-03 ENCOUNTER — OFFICE VISIT - GICH (OUTPATIENT)
Dept: INTERNAL MEDICINE | Facility: OTHER | Age: 68
End: 2018-01-03

## 2018-01-03 DIAGNOSIS — M48.02 SPINAL STENOSIS OF CERVICAL REGION: ICD-10-CM

## 2018-01-03 DIAGNOSIS — M99.83 OTHER BIOMECHANICAL LESIONS OF LUMBAR REGION (CODE): ICD-10-CM

## 2018-01-03 DIAGNOSIS — M54.10 RADICULOPATHY: ICD-10-CM

## 2018-01-03 DIAGNOSIS — N50.812 PAIN IN LEFT TESTICLE: ICD-10-CM

## 2018-01-03 DIAGNOSIS — M54.12 RADICULOPATHY OF CERVICAL REGION: ICD-10-CM

## 2018-01-03 DIAGNOSIS — Z02.89 ENCOUNTER FOR OTHER ADMINISTRATIVE EXAMINATIONS: ICD-10-CM

## 2018-01-03 DIAGNOSIS — R31.9 HEMATURIA: ICD-10-CM

## 2018-01-03 DIAGNOSIS — M46.92 INFLAMMATORY SPONDYLOPATHY OF CERVICAL REGION (H): ICD-10-CM

## 2018-01-03 DIAGNOSIS — M50.30 OTHER CERVICAL DISC DEGENERATION, UNSPECIFIED CERVICAL REGION: ICD-10-CM

## 2018-01-03 DIAGNOSIS — G47.419 NARCOLEPSY WITHOUT CATAPLEXY: ICD-10-CM

## 2018-01-03 LAB
BACTERIA URINE: NORMAL BACTERIA/HPF
BILIRUB UR QL: NEGATIVE
CLARITY, URINE: CLEAR CLARITY
COLOR UR: YELLOW COLOR
EPITHELIAL CELLS: NORMAL EPI/HPF
GLUCOSE URINE: NEGATIVE MG/DL
KETONES UR QL: NEGATIVE MG/DL
LEUKOCYTE ESTERASE URINE: NEGATIVE
NITRITE UR QL STRIP: NEGATIVE
OCCULT BLOOD,URINE - HISTORICAL: NEGATIVE
PH UR: 5.5 [PH]
PROTEIN QUALITATIVE,URINE - HISTORICAL: 30 MG/DL
RBC - HISTORICAL: NORMAL /HPF
SP GR UR STRIP: 1.02
UROBILINOGEN,QUALITATIVE - HISTORICAL: NORMAL EU/DL
WBC - HISTORICAL: NORMAL /HPF

## 2018-01-03 ASSESSMENT — PATIENT HEALTH QUESTIONNAIRE - PHQ9: SUM OF ALL RESPONSES TO PHQ QUESTIONS 1-9: 0

## 2018-01-03 NOTE — TELEPHONE ENCOUNTER
Patient Information     Patient Name MRN Moses Fairbanks [id number] Male 1950      Telephone Encounter by Gosselin, Norma J at 2017 11:08 AM     Author:  Gosselin, Norma J Service:  (none) Author Type:  (none)     Filed:  2017 11:28 AM Encounter Date:  2017 Status:  Signed     :  Gosselin, Norma J            Fill out a Quantity limit override for insurance to fill 360 tablets.   Faxed to [facility].  Norma J Gosselin LPN....................  2017   11:28 AM

## 2018-01-03 NOTE — PROGRESS NOTES
"Patient Information     Patient Name MRN Moses Fairbanks [id number] Male 1950      Progress Notes by Radha Trammell RN at 2017  2:30 PM     Author:  Radha Trammell RN Service:  (none) Author Type:  NURS- Registered Nurse     Filed:  2017  8:43 AM Encounter Date:  2017 Status:  Signed     :  Radha Trammell RN (NURS- Registered Nurse)            Diabetes Education Progress Note  General Pump Follow Up Training    Visit included: Exercise, Sensitivity/Correction Factors, Bolus Calculator, Basal Rate, BG Monitoring, Target Blood Glucose, Hyper/Hypoglycemia, Basic Feature/Programming, Multiple Basal Rates and Insulin to Carb Ratio     SUBJECTIVE: Moses Whittaker is a 66 y.o. male who has type 2 diabetes and is here for his General Pump Follow Up training visit.  Patient has had type 2 diabetes for 39 years and has utilized insulin pump therapy for 4 years.    OBJECTIVE:  Testing frequency: multiple times daily  BG average 208 mg/dl.  BG target range (mg/dl): pre-meal: , 2 hr pp: less than 180 mg/dl at bedtime: 100-140 mg/dl.    Pump settings   See Patient Instructions    ASSESSMENT: Patient visits and brings his insulin cartridge with insulin to practice filling the cartridge without bubbles.  Patient completed task without air bubble issues.  Encourage patient to use cartridge one time only and dispose.      Insulin pump upload shows:  Patient filling cartridges, changing infusion sites and filling cannula, as recommended.  Patient is using Bolus Advice feature for meals and most snacks.    BG results:  Patient shows 49% BG in target, 45% above target, 6% below target and 0% hypo.     Average BG:  Fasting 142, pre-Lunch 115. pre-Supper 134, and bedtime 366 mg/dl.     Patient doing well with BG control during the day, but is still consuming large amounts of CHO for his bed snack and falls asleep afterwards.  He states, \"Then I wake up 3 hours later, test my BG and take " "correction.  I know this is not what I should be doing, but am nervous to take the insulin before I eat as I might go low.\"    Discussed CHO and timing of insulin to prevent hypo- and hyper- glycemia after meals.  Patient shares he feels comfortable waiting 10 - 15 minutes after eating to take insulin for CHO.  Discussed taking BG correction insulin before he begins to eat to help decrease hyperglycemia after the meal.  Patient agrees, \"I just have to start doing this.\"    Recommend patient follow up in 1 - 2 weeks to help motivate him to stick to his new plan.    Educational Handouts Given:  Diabetes Success Plan    Patient did verbalize understanding of education as evidenced by stating need to continue testing BG 4+ daily to see if current treatment plan is adequate for DM2 control.     Patient  did demonstrate understanding of education today as evidenced by goal setting.     PLAN:  Insulin Pump Recommendations:  1. No new changes recommended at this time.  2. Begin to enter carb grams within 10 - 15 minutes after eating the bed snack (instead of waiting 3 hours).    3. Remember to test your blood sugar before the bed snack and take correction before you eat, if needed.            Self-Directed Behavior Goal/s set by patient:  (1) Try this new way to help decrease highs during the night.        Time spent with patient was 90 minutes.    Radha Trammell RN, BSN, CDE, CPT  2/7/2017 3:01 PM              "

## 2018-01-03 NOTE — TELEPHONE ENCOUNTER
Patient Information     Patient Name MRN Moses Fairbanks [id number] Male 1950      Telephone Encounter by Albina Russell at 2017  2:50 PM     Author:  Albina Russell Service:  (none) Author Type:  (none)     Filed:  2017  2:51 PM Encounter Date:  2017 Status:  Signed     :  Albina Russell            Spoke with Dr. Moran and he said for pt to use 0.40 ml for the injection dosage for the Trimix. Pt notified.  Albina Russell LPN  2017  2:51 PM

## 2018-01-03 NOTE — TELEPHONE ENCOUNTER
Patient Information     Patient Name MRN Moses Fairbanks [id number] Male 1950      Telephone Encounter by Gosselin, Norma J at 2017  9:57 AM     Author:  Gosselin, Norma J Service:  (none) Author Type:  (none)     Filed:  2017 10:04 AM Encounter Date:  2017 Status:  Signed     :  Gosselin, Norma J            He is going to call his insurance company and find out why they are not covering this amount of medication.    Diclofenac 75 mg tablet.  They will not provide more than 60 tablets/30days.  He will call back after he speaks with his insurance.  Norma J Gosselin LPN....................  2017   10:03 AM

## 2018-01-03 NOTE — NURSING NOTE
Patient Information     Patient Name MRN Moses Fairbanks [id number] Male 1950      Nursing Note by Barbara Connor at 2/3/2017  2:30 PM     Author:  Barbara Connor Service:  (none) Author Type:  (none)     Filed:  2/3/2017  3:05 PM Encounter Date:  2/3/2017 Status:  Signed     :  Barbara Connor            Patient presents to the clinic for watery eyes, red nasal drainage, productive cough with yellow/green/red sputum, post nasal drip, decrease in food/fluid intake, bilateral ear discomfort, fatigue and dry throat noted over the past couple of weeks.  Patient denies fevers/chills at this time.  Patient tried OTC Claritin with no effect.  Glucose has been elevated over the past 3 weeks.  Last eye exam was 1 year ago, upcoming appointment next week.    Barbara Connor LPN        2/3/2017 2:50 PM

## 2018-01-03 NOTE — PATIENT INSTRUCTIONS
Patient Information     Patient Name MRN Moses Fairbanks [id number] Male 1950      Patient Instructions by Jorge Barnett MD at 2/3/2017  2:30 PM     Author:  Jorge Barnett MD  Service:  (none) Author Type:  Physician     Filed:  2/3/2017  3:19 PM  Encounter Date:  2/3/2017 Status:  Addendum     :  Jorge Barnett MD (Physician)        Related Notes: Original Note by Jorge Barnett MD (Physician) filed at 2/3/2017  3:14 PM            NO More candles / smoke exposure.     Get rid of anything that could be causing allergy symptoms.     1. Allergic conjunctivitis and rhinitis, bilateral  2. Nasal dryness  3. Recurrent epistaxis    - fexofenadine (ALLEGRA ALLERGY) 60 mg tablet; Take 1 tablet by mouth 2 times daily. AS NEEDED for Allergy symptoms  Dispense: 60 tablet; Refill: 3    - sodium chloride-aloe vera (SALINE NASAL, ALOE VERA,) nasal gel; Inhale  in the nostril(s) every hour if needed for Nasal Dryness.  Dispense: 14.1 g; Refill: 3     Consider trial of Zyrtec, Claritin, or Allegra -- as needed to help with hives or allergies.      Return for Diabetes labs and clinic follow-up appointment every 3 to 4 months.  --- (Go for about 91 to 100 days)    Schedule lab only appointment --- A few days AFTER: 4 months from now    Schedule clinic appointment with Dr. Barnett -- Same day as labs, or 1-2 days later.     Insurance companies are now grading you and I on your blood sugar control -- Goal is to get your A1c down to 7.9% or lower and NO Smoking!    -- Medicare and most insurance companies, will only cover Hemoglobin A1c labs to be rechecked every 91+ days.      HEMOGLOBIN A1C MONITORING (POCT)    Date Value   10/19/2016 7.4 % (H)   2013 7.4 % NGSP (H)     HEMOGLOBIN A1C GIH (%)    Date Value   2012 8.0 (H)        Next follow-up appointment with Dr. Barnett should be scheduled:  -- Approximately a few days AFTER: 4 months from now        Return as needed for follow-up  with Dr. Barnett.    Clinic : [phone]  Appointment line: [phone]

## 2018-01-03 NOTE — PATIENT INSTRUCTIONS
Patient Information     Patient Name MRN Moses Fairbanks [id number] Male 1950      Patient Instructions by Radha Trammell RN at 2017  2:30 PM     Author:  Radha Trammell RN  Service:  (none) Author Type:  NURS- Registered Nurse     Filed:  2017  4:26 PM  Encounter Date:  2017 Status:  Addendum     :  Radha Trammell RN (NURS- Registered Nurse)        Related Notes: Original Note by Radha Trammell RN (NURS- Registered Nurse) filed at 2017  4:23 PM            Diabetes Goals and Reminders  Your A1c test should be done every 3 months.  Your goal is less than 7%.   Your last result is:  HEMOGLOBIN A1C MONITORING (POCT)    Date Value   10/19/2016 7.4 % (H)   2013 7.4 % NGSP (H)     HEMOGLOBIN A1C GIH (%)    Date Value   2012 8.0 (H)       Your LDL cholesterol test should be done at least once a year.    Your last result is:  LDL CHOLESTEROL (mg/dL)    Date Value   2016 26       Have Blood pressure and weight checked every three months.  Your blood pressure goal is 130/80 or less.  Your last blood pressure reading was: 134/78    Test your blood sugar 4+ times per day.  Your home blood glucose target ranges are:  Before meals:   2 hours after a meal: Less than 180  Bedtime: 100-140    Avoid all tobacco.  Follow your healthy diet and exercise plan.  See the eye doctor every year.  See the dentist every six months.  Have kidney function tested yearly.    Take all medicine as prescribed.  Please let me know if you are having symptoms you don t expect or if you wish to stop any medicine.    Current Pump settings   Pump Type: AccuChek Spirit Combo  Insulin Type: Humalog      Basal: 2.90 units/hour at 12:00am - 12:00am  Total basal in 24 hours = 69.60 units      Bolus:  Insulin to Carb Ratio:    1 unit per 5 grams of carbohydrate at 12:00am - 8:00am.  1 unit per 5 grams of carbohydrate at 8:00am - 1:00pm  1 unit per 5 grams of carbohydrate at 1:00pm -  9:00pm.  1 unit per 5 grams of carbohydrate at 9:00pm - 12:00am      Insulin Sensitivity:    1 unit of insulin will drop your blood sugar 5 mg/dl at 12:00am - 8:00am  1 unit of insulin will drop your blood sugar 8 mg/dl at 8:00am - 12:00am      Target Blood Glucose:     at 12:00am - 12:00am      Active Insulin: 4 hours  Total Daily Dose average: 130.11 units  Basal %: 49.5%  Bolus %: 50.5%      Follow Up Plan  Your future lab plan is:  HgA1c in at anytime, order is waiting.    If you need your cholesterol checked at your next appointment, you should fast 8 to 10 hours before your appointment.  Do not eat or drink anything besides water.  Drink plenty of water and take all your usual medicine.    SUMMARY FOR TODAY'S VISIT    1.  Recommended pump adjustments:    A.  No new settings recommended at this time.    B.  Begin to enter carb grams within 10 - 15 minutes after eating the bed snack (instead of waiting 3 hours).  Remember to test your blood sugar before the bed snack and take correction before you eat, if needed.        2.  Follow up in 1 - 2 weeks.        Radha Trammell, RN, BSN, CDE, CPT  2/7/2017 2:54 PM

## 2018-01-03 NOTE — TELEPHONE ENCOUNTER
Patient Information     Patient Name MRMoses Oliveira [id number] Male 1950      Telephone Encounter by Whitney Guevara at 2017  3:04 PM     Author:  Whitney Guevara Service:  (none) Author Type:  (none)     Filed:  2017  3:07 PM Encounter Date:  2017 Status:  Signed     :  Whitney Guevara            Patient says he wants a prescription for ranexa QID to [facility] . He says he couldn't afford before but thinks he can get a discount now . Has been taking once daily .  Whitney Guevara LPN ....................2017  3:07 PM

## 2018-01-04 NOTE — TELEPHONE ENCOUNTER
Patient Information     Patient Name MRN Moses Fairbanks [id number] Male 1950      Telephone Encounter by Barbara Connor at 3/31/2017  3:43 PM     Author:  Barbara Connor Service:  (none) Author Type:  (none)     Filed:  3/31/2017  4:17 PM Encounter Date:  3/31/2017 Status:  Signed     :  Barbara Connor            Patient indicates that insurance company is faxing over a quantity limitation for Crestor.  Patient indicates that tier exceptions are needed at this time for the following medications: Dextroamphetamine, levothyroxine, metoprolol succinate, hydralazine, methylphenidate.    Barbara Connor LPN        3/31/2017 4:10 PM

## 2018-01-04 NOTE — TELEPHONE ENCOUNTER
Patient Information     Patient Name MRMoses Oliveira [id number] Male 1950      Telephone Encounter by Francy Fuentes LPN at 3/24/2017  1:44 PM     Author:  Francy Fuentes LPN Service:  (none) Author Type:  NURS- Licensed Practical Nurse     Filed:  3/24/2017  1:45 PM Encounter Date:  3/24/2017 Status:  Signed     :  Francy Fuentes LPN (NURS- Licensed Practical Nurse)            Contacted the patient and let him know that his pcp is out of the office until 3-28 and that his nurse will call him back to set up a appointment at that time.  Francy Fuentes LPN......3/24/2017  1:45 PM

## 2018-01-04 NOTE — TELEPHONE ENCOUNTER
Patient Information     Patient Name MRN Moses Fairbanks [id number] Male 1950      Telephone Encounter by Barbara Connor at 2017  2:16 PM     Author:  Barbara Connor Service:  (none) Author Type:  (none)     Filed:  2017  2:17 PM Encounter Date:  3/31/2017 Status:  Signed     :  Barbara Connor            Patient not available at this time, generic voicemail.    Barbara Connor LPN        2017 2:16 PM

## 2018-01-04 NOTE — TELEPHONE ENCOUNTER
Patient Information     Patient Name MRN Moses Fairbanks [id number] Male 1950      Telephone Encounter by Yamilex Conklin at 2017  4:17 PM     Author:  Yamilex Conklin Service:  (none) Author Type:  (none)     Filed:  2017  4:20 PM Encounter Date:  2017 Status:  Signed     :  Yamilex Conklin            Patient called to check on the status of the Quantity Limit forms being done for his prescriptions. Per nurse Barbara, they are almost done and will be completed and faxed in by the end of today.

## 2018-01-04 NOTE — TELEPHONE ENCOUNTER
Patient Information     Patient Name MRN Moses Fairbanks [id number] Male 1950      Telephone Encounter by Barbara Connor at 2017  9:52 AM     Author:  Barbara Connor Service:  (none) Author Type:  (none)     Filed:  2017  9:59 AM Encounter Date:  2017 Status:  Signed     :  Barbara Connor             indicates that the Dextroamphetamine does not need a quantity limit form due to the quantity limit is 6 per day and patient is utilizing 4 per day.  Representative Garfield was able to verbally change the Dextroamphetamine to a Tier Exception.  Then several questions were asked about the Metoprolol Succinate, patient has not utilized Atenolol or Metoprolol IR (no documentation in Epic at this time).  Patient needs sustained release due to the current cocktail of Hypertension medications is keeping patient stable at this time.    Representative indicates that this information will be sent to review at this time.    Barbara Connor LPN        2017 9:59 AM

## 2018-01-04 NOTE — TELEPHONE ENCOUNTER
Patient Information     Patient Name MRN Moses Fairbanks [id number] Male 1950      Telephone Encounter by Barbara Connor at 3/31/2017  1:02 PM     Author:  Barbara Connor Service:  (none) Author Type:  (none)     Filed:  3/31/2017  1:03 PM Encounter Date:  3/31/2017 Status:  Signed     :  Barbara Connor            Prior authorization for Crestor was completed and faxed yesterday and then scanned into the chart yesterday.  Patient notified.    Barbara Connor LPN        3/31/2017 1:03 PM

## 2018-01-04 NOTE — TELEPHONE ENCOUNTER
Patient Information     Patient Name MRN Moses Fairbanks [id number] Male 1950      Telephone Encounter by Jorge Barnett MD at 3/28/2017 11:44 AM     Author:  Jorge Barnett MD Service:  (none) Author Type:  Physician     Filed:  3/28/2017 11:44 AM Encounter Date:  3/24/2017 Status:  Signed     :  Jorge Barnett MD (Physician)            Noted.   appt Thursday opened.     Jorge Barnett MD

## 2018-01-04 NOTE — NURSING NOTE
Patient Information     Patient Name MRN Moses Fairbanks [id number] Male 1950      Nursing Note by Barbara Connor at 3/30/2017  4:10 PM     Author:  Barbara Connor Service:  (none) Author Type:  (none)     Filed:  3/30/2017  4:44 PM Encounter Date:  3/30/2017 Status:  Signed     :  Barbara Connor            Patient presents to the clinic for medication management.  Patient express concerns about still having a cough since diagnosed with Influenza A.    Barbara Connor LPN        3/30/2017 4:30 PM

## 2018-01-04 NOTE — TELEPHONE ENCOUNTER
Patient Information     Patient Name Moses Coffey [id number] Male 1950      Telephone Encounter by Ela Ariza at 3/24/2017 11:44 AM     Author:  Ela Ariza Service:  (none) Author Type:  (none)     Filed:  3/24/2017 11:47 AM Encounter Date:  3/24/2017 Status:  Signed     :  Ela Ariza            Patient needs refills for 2 prescriptions that require a visit with [facility] to refill. Patient will be out of meds on the . [facility] is currently booked next week with the exception of Same Day appts. Please call patient.     Ela Ariza ....................  3/24/2017   11:45 AM

## 2018-01-04 NOTE — TELEPHONE ENCOUNTER
Patient Information     Patient Name MRN Moses Fairbanks [id number] Male 1950      Telephone Encounter by Barbara Connor at 3/28/2017 10:55 AM     Author:  Barbara Cnonor Service:  (none) Author Type:  (none)     Filed:  3/28/2017 10:57 AM Encounter Date:  3/24/2017 Status:  Signed     :  Barbara Connor            Patient indicates that he has 4-5 days worth of ADHD medications left.  Patient also express concerns about still having a productive cough with clear sputum noted since DX: of influenza A on 3-12-17.   Patient uses OTC cough syrup due to codeine making patient feel drowsy.  Patient indicates that Thursday/friday afternoon would work best at this time.    Please advise.    Barbara Connor LPN        3/28/2017 10:57 AM

## 2018-01-04 NOTE — TELEPHONE ENCOUNTER
Patient Information     Patient Name MRN Moses Fairbanks [id number] Male 1950      Telephone Encounter by Kayleigh Rubio at 3/28/2017 12:01 PM     Author:  Kayleigh Rubio Service:  (none) Author Type:  (none)     Filed:  3/28/2017 12:01 PM Encounter Date:  3/24/2017 Status:  Signed     :  Kayleigh Rubio            Spoke with Patient, he is scheduled for Thursday.  Kayleigh Rubio ....................  3/28/2017   12:01 PM

## 2018-01-04 NOTE — TELEPHONE ENCOUNTER
Patient Information     Patient Name MRN Moses Fairbanks [id number] Male 1950      Telephone Encounter by Barbara Connor at 2017  9:07 AM     Author:  Barbara Connor Service:  (none) Author Type:  (none)     Filed:  2017  9:08 AM Encounter Date:  3/31/2017 Status:  Signed     :  Barbara Connor            Tier exception and quantity limit forms for medications completed, faxed to the following: [facility] pharmacy and [facility] pharm[facility].  Forms sent to be scanned at this time.    Barbara Connor LPN        2017 9:08 AM

## 2018-01-04 NOTE — TELEPHONE ENCOUNTER
Patient Information     Patient Name MRMoses Oliveira [id number] Male 1950      Telephone Encounter by Barbara Connor at 2017  1:30 PM     Author:  Barbara Connor Service:  (none) Author Type:  (none)     Filed:  2017  1:30 PM Encounter Date:  2017 Status:  Signed     :  Barbara Connor            See other telephone note.    Barbara oCnnor LPN        2017 1:30 PM

## 2018-01-04 NOTE — TELEPHONE ENCOUNTER
Patient Information     Patient Name MRMoses Oliveira [id number] Male 1950      Telephone Encounter by Barbara Connor at 2017  4:37 PM     Author:  Barbara Connor Service:  (none) Author Type:  (none)     Filed:  2017  4:38 PM Encounter Date:  2017 Status:  Signed     :  Barbara Connor            Insurance company indicates that the Rosuvastatin is still approved and they will refax the information to [facility] at this time.    Barbara Connor LPN        2017 4:38 PM

## 2018-01-04 NOTE — TELEPHONE ENCOUNTER
Patient Information     Patient Name MRMoses Oliveira [id number] Male 1950      Telephone Encounter by Barbara Connor at 2017 11:57 AM     Author:  Barbara Connor Service:  (none) Author Type:  (none)     Filed:  2017 12:28 PM Encounter Date:  3/31/2017 Status:  Signed     :  Barbara Connor            Pharmacist indicates that patient had to fill recent adderall prescriptions at [facility] because the PA that was completed last year indicate [facility] not [facility].  A new PA will need to be completed at a future date and pharmacist indicates that she will call/fax when it is time.    Barbara Connor LPN        2017 11:59 AM

## 2018-01-04 NOTE — PATIENT INSTRUCTIONS
Patient Information     Patient Name MRN Moses Fairbanks [id number] Male 1950      Patient Instructions by Jorge Barnett MD at 3/30/2017  4:10 PM     Author:  Jorge Barnett MD  Service:  (none) Author Type:  Physician     Filed:  3/30/2017  4:57 PM  Encounter Date:  3/30/2017 Status:  Addendum     :  Jorge Barnett MD (Physician)        Related Notes: Original Note by Jorge Barnett MD (Physician) filed at 3/30/2017  4:54 PM            1. Persistent cough for 3 weeks or longer  - Dextromethorphan-guaFENesin (MUCINEX DM)  mg tablet; Take 1 tablet by mouth 2 times daily if needed for Cough. - OTC / Generic Okay  Dispense: 60 tablet; Refill: 1    2. NARCOLEPSY  - methylphenidate (RITALIN) 10 mg tablet; One tablet by mouth four times daily - for on or after 17  Dispense: 360 tablet; Refill: 0  - Dextroamphetamine Sulfate (DEXTROSTAT) 10 mg tablet; Take 1 tablet by mouth four times daily - for on or after 17  Dispense: 360 tablet; Refill: 0    3. Pain medication agreement - 2015  - methylphenidate (RITALIN) 10 mg tablet; One tablet by mouth four times daily - for on or after 17  Dispense: 360 tablet; Refill: 0  - Dextroamphetamine Sulfate (DEXTROSTAT) 10 mg tablet; Take 1 tablet by mouth four times daily - for on or after 17  Dispense: 360 tablet; Refill: 0    4. Mixed hyperlipidemia  - rosuvastatin (CRESTOR) 10 mg tablet; TAKE 1 TABLET BY MOUTH TWICE DAILY  Dispense: 180 tablet; Refill: 3      Use Atrovent inhaler if needed for cough.       Return in 2.5 to 3 months for Med Management appointment and medication refills (BEFORE YOU RUN OUT OF Controlled Medications), or sooner as needed for follow-up with Dr. Barnett.     Clinic : [phone]  Appointment line: [phone]          Return for Diabetes labs and clinic follow-up appointment every 3 to 4 months.  --- (Go for about 91 to 100 days)    Schedule lab only appointment --- A few  days AFTER: 06/28/17     Schedule clinic appointment with Dr. Barnett -- Same day as labs, or 1-2 days later.     Insurance companies are now grading you and I on your blood sugar control -- Goal is to get your A1c down to 7.9% or lower and NO Smoking!    -- Medicare and most insurance companies, will only cover Hemoglobin A1c labs to be rechecked every 91+ days.      HEMOGLOBIN A1C MONITORING (POCT)    Date Value   10/19/2016 7.4 % (H)   04/02/2013 7.4 % NGSP (H)     HEMOGLOBIN A1C GIH (%)    Date Value   07/24/2012 8.0 (H)        Next follow-up appointment with Dr. Barnett should be scheduled:  -- Approximately a few days AFTER: 06/28/17

## 2018-01-05 ENCOUNTER — AMBULATORY - GICH (OUTPATIENT)
Dept: LAB | Facility: OTHER | Age: 68
End: 2018-01-05

## 2018-01-05 DIAGNOSIS — I10 ESSENTIAL (PRIMARY) HYPERTENSION: ICD-10-CM

## 2018-01-05 DIAGNOSIS — E11.9 TYPE 2 DIABETES MELLITUS WITHOUT COMPLICATIONS (H): ICD-10-CM

## 2018-01-05 DIAGNOSIS — R31.9 HEMATURIA: ICD-10-CM

## 2018-01-05 DIAGNOSIS — Z79.4 LONG TERM CURRENT USE OF INSULIN (H): ICD-10-CM

## 2018-01-05 DIAGNOSIS — E78.2 MIXED HYPERLIPIDEMIA: ICD-10-CM

## 2018-01-05 LAB
A/G RATIO - HISTORICAL: 2 (ref 1–2)
ALBUMIN SERPL-MCNC: 4.4 G/DL (ref 3.5–5.7)
ALP SERPL-CCNC: 67 IU/L (ref 34–104)
ALT (SGPT) - HISTORICAL: 26 IU/L (ref 7–52)
ANION GAP - HISTORICAL: 6 (ref 5–18)
AST SERPL-CCNC: 23 IU/L (ref 13–39)
BILIRUB SERPL-MCNC: 0.4 MG/DL (ref 0.3–1)
BUN SERPL-MCNC: 24 MG/DL (ref 7–25)
BUN/CREAT RATIO - HISTORICAL: 15
CALCIUM SERPL-MCNC: 9 MG/DL (ref 8.6–10.3)
CHLORIDE SERPLBLD-SCNC: 107 MMOL/L (ref 98–107)
CHOL/HDL RATIO - HISTORICAL: 3.84
CHOLESTEROL TOTAL: 119 MG/DL
CO2 SERPL-SCNC: 24 MMOL/L (ref 21–31)
CREAT SERPL-MCNC: 1.63 MG/DL (ref 0.7–1.3)
ERYTHROCYTE [DISTWIDTH] IN BLOOD BY AUTOMATED COUNT: 13.1 % (ref 11.5–15.5)
ESTIMATED AVERAGE GLUCOSE: 180 MG/DL
GFR IF NOT AFRICAN AMERICAN - HISTORICAL: 43 ML/MIN/1.73M2
GLOBULIN - HISTORICAL: 2.2 G/DL (ref 2–3.7)
GLUCOSE SERPL-MCNC: 95 MG/DL (ref 70–105)
HCT VFR BLD AUTO: 42.5 % (ref 37–53)
HDLC SERPL-MCNC: 31 MG/DL (ref 23–92)
HEMOGLOBIN A1C MONITORING (POCT) - HISTORICAL: 7.9 % (ref 4–6.2)
HEMOGLOBIN: 13.7 G/DL (ref 13.5–17.5)
LDLC SERPL CALC-MCNC: 9 MG/DL
MCH RBC QN AUTO: 32.4 PG (ref 26–34)
MCHC RBC AUTO-ENTMCNC: 32.2 G/DL (ref 32–36)
MCV RBC AUTO: 101 FL (ref 80–100)
NON-HDL CHOLESTEROL - HISTORICAL: 88 MG/DL
PLATELET # BLD AUTO: 228 THOU/CU MM (ref 140–440)
PMV BLD: 8.7 FL (ref 6.5–11)
POTASSIUM SERPL-SCNC: 5.1 MMOL/L (ref 3.5–5.1)
PROT SERPL-MCNC: 6.6 G/DL (ref 6.4–8.9)
PROVIDER ORDERDED STATUS - HISTORICAL: ABNORMAL
PSA TOTAL (DIAGNOSTIC) - HISTORICAL: 1.52 NG/ML
RED BLOOD COUNT - HISTORICAL: 4.23 MIL/CU MM (ref 4.3–5.9)
SODIUM SERPL-SCNC: 137 MMOL/L (ref 133–143)
TRIGL SERPL-MCNC: 394 MG/DL
WHITE BLOOD COUNT - HISTORICAL: 6.7 THOU/CU MM (ref 4.5–11)

## 2018-01-05 NOTE — PROGRESS NOTES
Patient Information     Patient Name MRN Sex Moses Ott [id number] Male 1950      Progress Notes by Radha Trammell RN at 5/15/2017  8:30 AM     Author:  Radha Trammell RN Service:  (none) Author Type:  NURS- Registered Nurse     Filed:  5/15/2017  5:18 PM Encounter Date:  5/15/2017 Status:  Signed     :  Radha Trammell RN (NURS- Registered Nurse)            Diabetes Education Progress Note  Insulin Pump Management Training    Visit included: Pump Functions, Sensitivity/Correction Factors, Bolus Calculator, Basal Rate, BG Monitoring, Temporary Basal Rates, Target Blood Glucose, Dual/Square/Combination/Extended Wave Boluses, Hyper/Hypoglycemia, Basic Feature/Programming, Bolus types: Meal and Correction, Multiple Basal Rates, Insulin to Carb Ratio and Nutrition/Carbohydrate Counting in Grams     SUBJECTIVE: Moses Whittaker is a 67 y.o. male who has type 2 diabetes and is here for his Pump training visit.  Patient has had type 2 diabetes for 39 years and has utilized insulin pump therapy for 4 years.    OBJECTIVE:  Testing frequency: multiple times daily (6 - 7 times daily)  14-day BG average 209 mg/dl (range 55 - 493 mg/dl).  BG target range (mg/dl): pre-meal: , 2 hr pp: less than 180 mg/dl at bedtime: 100-140 mg/dl.    Pump settings   See Patient Instructions    ASSESSMENT: Understands basic pump functions. Understands protocol of trouble shooting when BGs are high. Practiced extended features of the pump. Discussed and practiced sensitivity and target blood glucoses and when to use them.  Understands the importance of site change every 3 days.  Can state amount of insulin needed to fill reservoir for 3 days, using daily totals and average amount used.      Insulin pump upload shows:  Patient filling cartridges, changing infusion sites and filling cannula, as recommended.  Observed patient's technique for replacing cartridge, filling tubing, infusion set placement, etc.   "Patient used appropriate technique.  Patient is using Bolus Advice feature for some meals and most snacks.  Focused on cutting back on CHO at mealtimes, recommend 60 grams per meal, 0 - 15 grams per snack, limiting snacks to help with weight loss and tighter BG control.      BG results:  Patient shows 52% BG in target, 43% above target, 5% below target and 0% hypo.     Average BG:  Fasting 147, pre-Lunch 155, pre-Supper 150, and bedtime 315 mg/dl.      Discussed D5 Health Goals and patient has met 4 of 5 goals at this time.  (BP less than 140/90, ASA therapy as recommended, statin therapy as recommended, A1c less than 8%, tobacco free).    Discussed 10-year risk of heart disease or stroke, patient ACC-AHA ASCVD risk calculator result is 27.8%.  Discussed recommendations:  Patient is taking aspirin, a statin and blood pressure is under good control.    Focus:  \"Let's try hard to get the A1c under tighter control to help decrease this risk factor even more.\"    Educational Handouts Given:  Site Rotation guide, My Insulin Plan.    Patient did verbalize understanding of education as evidenced by stating need to continue testing BG 6 - 7 x daily to see if current treatment plan is adequate for DM2 control.     Patient  did demonstrate understanding of education today as evidenced by goal setting.       PLAN:  Insulin Pump Recommendations:  1. Keep current settings at this time and focus on decreasing CHO intake at the bed snack.      Self-Directed Behavior Goal/s set by patient:  (1) Try to decrease my high BG readings during the night to help lower my A1c, by consuming less carb at night.      Time spent with patient was 90 minutes.    Radha Trammell RN, BSN, CDE, CPT  5/15/2017 3:24 PM              "

## 2018-01-05 NOTE — TELEPHONE ENCOUNTER
Patient Information     Patient Name MRMoses Oliveira [id number] Male 1950      Telephone Encounter by Penelope Lozada RN at 2017  3:49 PM     Author:  Penelope Lozada RN Service:  (none) Author Type:  NURS- Registered Nurse     Filed:  2017  3:51 PM Encounter Date:  2017 Status:  Signed     :  Penelope Lozada RN (NURS- Registered Nurse)            Diabetes    Office visit in the past 12 months or per provider note.    Last visit with CATALINA MCDOWELL was on: 2017 in [facility]  Next visit with CATALINA MCDOWELL is on: No future appointment listed with this provider  Next visit with Internal Medicine is on: No future appointment listed in this department    Lab test requirements:  HgbA1c annually or per provider note.  HEMOGLOBIN A1C MONITORING (POCT)    Date Value   2017 8.4 % (H)   2013 7.4 % NGSP (H)       Max refill for 12 months from last office visit or per provider note.  Prescription refilled per RN Medication Refill Policy.................... PENELOPE LOZADA RN ....................  2017   3:49 PM

## 2018-01-05 NOTE — PATIENT INSTRUCTIONS
Patient Information     Patient Name MRN Moses Fairbanks [id number] Male 1950      Patient Instructions by Radha Trammell RN at 5/15/2017  8:30 AM     Author:  Radha Trammell RN Service:  (none) Author Type:  NURS- Registered Nurse     Filed:  5/15/2017  4:23 PM Encounter Date:  5/15/2017 Status:  Signed     :  Radha Trammell RN (NURS- Registered Nurse)            Diabetes Goals and Reminders  Your A1c test should be done every 3 months.  Your goal is less than 7.4%.   Your last result is:  HEMOGLOBIN A1C MONITORING (POCT)    Date Value   2017 8.4 % (H)   2013 7.4 % NGSP (H)       Your LDL cholesterol test should be done at least once a year.    Your last result is:  LDL CHOLESTEROL (mg/dL)    Date Value   2017 36       Have Blood pressure and weight checked every three months.  Your blood pressure goal is 140/90 or less.  Your last blood pressure reading was: 134/76    Test your blood sugar 4+ times per day.  Your home blood glucose target ranges are:  Before meals:   2 hours after a meal: Less than 180  Bedtime: 100-140    Avoid all tobacco.  Follow your healthy diet and exercise plan.  See the eye doctor every year.  See the dentist every six months.  Have kidney function tested yearly.    Take all medicine as prescribed.  Please let me know if you are having symptoms you don t expect or if you wish to stop any medicine.    Current Pump settings   Pump Type: AccuChek Spirit Combo  Insulin Type: Humalog      Basal: 2.90 units/hour at 12:00am - 12:00am  Total basal in 24 hours = 69.60 units      Bolus:  Insulin to Carb Ratio:    1 unit per 5 grams of carbohydrate at 12:00am - 12:00am      Insulin Sensitivity:    1 unit of insulin will drop your blood sugar 6 mg/dl at 12:00am - 12:00am      Target Blood Glucose:     at 12:00am - 12:00am      Active Insulin: 4 hours  Total Daily Dose average: 124.66 units  Basal %: 51.3 %  Bolus %: 48.7 %    Follow Up  Plan  Your future lab plan is:  HgA1c at the end of June 2017.    If you need your cholesterol checked at your next appointment, you should fast 8 to 10 hours before your appointment.  Do not eat or drink anything besides water.  Drink plenty of water and take all your usual medicine.    SUMMARY FOR TODAY'S VISIT    1. Recommend carbohydrate counting, 60 grams per meal, 0 - 15 grams per snack (limiting snacks to help with weight loss).    2.  Try to enter all carb consumed into your pump before you eat the carb to help gain tighter control of blood sugars.    3.  Recommend low to moderate exercise, such as walking, working up to a minimum of 30 minutes most days, as tolerated.     4.  Discussed D5 Health Goals and you have met 4 of 5 goals at this time.  (BP less than 140/90, ASA therapy as recommended, statin therapy as recommended, A1c less than 8%, tobacco free).    5.  10-year risk of heart disease or stroke is 27.8%.  You are taking aspirin, a statin and blood pressure is under good control, let's try hard to get the A1c under tighter control to help decrease this risk factor even more.    6.  Please follow up for continued diabetes education, as needed..      Radha Trammell RN, BSN, CDE, CPT  5/15/2017 2:42 PM

## 2018-01-08 ENCOUNTER — HISTORY (OUTPATIENT)
Dept: UROLOGY | Facility: OTHER | Age: 68
End: 2018-01-08

## 2018-01-08 ENCOUNTER — AMBULATORY - GICH (OUTPATIENT)
Dept: SCHEDULING | Facility: OTHER | Age: 68
End: 2018-01-08

## 2018-01-08 ENCOUNTER — OFFICE VISIT - GICH (OUTPATIENT)
Dept: UROLOGY | Facility: OTHER | Age: 68
End: 2018-01-08

## 2018-01-08 DIAGNOSIS — R31.9 HEMATURIA: ICD-10-CM

## 2018-01-08 DIAGNOSIS — N50.812 PAIN IN LEFT TESTICLE: ICD-10-CM

## 2018-01-08 DIAGNOSIS — F52.8 OTHER SEXUAL DYSFUNCTION NOT DUE TO A SUBSTANCE OR KNOWN PHYSIOLOGICAL CONDITION: ICD-10-CM

## 2018-01-09 ENCOUNTER — AMBULATORY - GICH (OUTPATIENT)
Dept: SCHEDULING | Facility: OTHER | Age: 68
End: 2018-01-09

## 2018-01-09 ENCOUNTER — COMMUNICATION - GICH (OUTPATIENT)
Dept: INTERNAL MEDICINE | Facility: OTHER | Age: 68
End: 2018-01-09

## 2018-01-16 ENCOUNTER — COMMUNICATION - GICH (OUTPATIENT)
Dept: INTERNAL MEDICINE | Facility: OTHER | Age: 68
End: 2018-01-16

## 2018-01-16 DIAGNOSIS — E03.4 ACQUIRED ATROPHY OF THYROID: ICD-10-CM

## 2018-01-24 ENCOUNTER — HISTORY (OUTPATIENT)
Dept: INTERNAL MEDICINE | Facility: OTHER | Age: 68
End: 2018-01-24

## 2018-01-24 ENCOUNTER — OFFICE VISIT - GICH (OUTPATIENT)
Dept: INTERNAL MEDICINE | Facility: OTHER | Age: 68
End: 2018-01-24

## 2018-01-24 DIAGNOSIS — I25.2 OLD MYOCARDIAL INFARCTION: ICD-10-CM

## 2018-01-24 DIAGNOSIS — Z95.5 PRESENCE OF CORONARY ANGIOPLASTY IMPLANT AND GRAFT: ICD-10-CM

## 2018-01-24 DIAGNOSIS — R21 RASH AND OTHER NONSPECIFIC SKIN ERUPTION: ICD-10-CM

## 2018-01-24 DIAGNOSIS — K27.9 PEPTIC ULCER WITHOUT HEMORRHAGE OR PERFORATION: ICD-10-CM

## 2018-01-24 DIAGNOSIS — I10 ESSENTIAL (PRIMARY) HYPERTENSION: ICD-10-CM

## 2018-01-24 DIAGNOSIS — Z95.1 PRESENCE OF AORTOCORONARY BYPASS GRAFT: ICD-10-CM

## 2018-01-24 ASSESSMENT — PATIENT HEALTH QUESTIONNAIRE - PHQ9: SUM OF ALL RESPONSES TO PHQ QUESTIONS 1-9: 0

## 2018-01-26 ENCOUNTER — AMBULATORY - GICH (OUTPATIENT)
Dept: SCHEDULING | Facility: OTHER | Age: 68
End: 2018-01-26

## 2018-01-27 VITALS — DIASTOLIC BLOOD PRESSURE: 80 MMHG | HEART RATE: 64 BPM | SYSTOLIC BLOOD PRESSURE: 138 MMHG | WEIGHT: 207 LBS

## 2018-01-27 VITALS
SYSTOLIC BLOOD PRESSURE: 134 MMHG | SYSTOLIC BLOOD PRESSURE: 160 MMHG | BODY MASS INDEX: 29.76 KG/M2 | OXYGEN SATURATION: 97 % | WEIGHT: 203 LBS | RESPIRATION RATE: 20 BRPM | BODY MASS INDEX: 30.57 KG/M2 | TEMPERATURE: 99 F | DIASTOLIC BLOOD PRESSURE: 76 MMHG | WEIGHT: 206.38 LBS | DIASTOLIC BLOOD PRESSURE: 72 MMHG | HEIGHT: 69 IN | HEART RATE: 72 BPM

## 2018-01-27 VITALS
BODY MASS INDEX: 30.51 KG/M2 | OXYGEN SATURATION: 98 % | SYSTOLIC BLOOD PRESSURE: 138 MMHG | TEMPERATURE: 98.6 F | WEIGHT: 206 LBS | SYSTOLIC BLOOD PRESSURE: 138 MMHG | HEIGHT: 69 IN | DIASTOLIC BLOOD PRESSURE: 76 MMHG | HEIGHT: 69 IN | HEIGHT: 69 IN | WEIGHT: 205 LBS | BODY MASS INDEX: 30.96 KG/M2 | TEMPERATURE: 97.8 F | TEMPERATURE: 98.4 F | DIASTOLIC BLOOD PRESSURE: 60 MMHG | WEIGHT: 209 LBS | BODY MASS INDEX: 30.36 KG/M2 | HEART RATE: 99 BPM

## 2018-01-27 VITALS — DIASTOLIC BLOOD PRESSURE: 78 MMHG | SYSTOLIC BLOOD PRESSURE: 158 MMHG | HEART RATE: 60 BPM | WEIGHT: 202 LBS

## 2018-01-27 VITALS
WEIGHT: 208.13 LBS | TEMPERATURE: 97.9 F | HEART RATE: 68 BPM | DIASTOLIC BLOOD PRESSURE: 82 MMHG | WEIGHT: 204.38 LBS | SYSTOLIC BLOOD PRESSURE: 136 MMHG | BODY MASS INDEX: 29.96 KG/M2 | DIASTOLIC BLOOD PRESSURE: 74 MMHG | SYSTOLIC BLOOD PRESSURE: 138 MMHG | BODY MASS INDEX: 30.51 KG/M2 | HEART RATE: 64 BPM

## 2018-01-27 VITALS
TEMPERATURE: 97.7 F | HEIGHT: 69 IN | BODY MASS INDEX: 30.07 KG/M2 | DIASTOLIC BLOOD PRESSURE: 60 MMHG | WEIGHT: 203 LBS | SYSTOLIC BLOOD PRESSURE: 134 MMHG

## 2018-01-31 ENCOUNTER — DOCUMENTATION ONLY (OUTPATIENT)
Dept: FAMILY MEDICINE | Facility: OTHER | Age: 68
End: 2018-01-31

## 2018-01-31 PROBLEM — E03.9 HYPOTHYROIDISM: Status: ACTIVE | Noted: 2018-01-31

## 2018-01-31 PROBLEM — E11.22 CONTROLLED TYPE 2 DIABETES MELLITUS WITH STAGE 3 CHRONIC KIDNEY DISEASE, WITH LONG-TERM CURRENT USE OF INSULIN (H): Status: ACTIVE | Noted: 2017-03-30

## 2018-01-31 PROBLEM — M54.50 CHRONIC LOW BACK PAIN: Status: ACTIVE | Noted: 2017-08-08

## 2018-01-31 PROBLEM — Z79.4 CONTROLLED TYPE 2 DIABETES MELLITUS WITH STAGE 3 CHRONIC KIDNEY DISEASE, WITH LONG-TERM CURRENT USE OF INSULIN (H): Status: ACTIVE | Noted: 2017-03-30

## 2018-01-31 PROBLEM — M47.9 OSTEOARTHRITIS OF SPINE: Status: ACTIVE | Noted: 2018-01-31

## 2018-01-31 PROBLEM — R21 SKIN RASH: Status: ACTIVE | Noted: 2017-08-01

## 2018-01-31 PROBLEM — N18.30 CONTROLLED TYPE 2 DIABETES MELLITUS WITH STAGE 3 CHRONIC KIDNEY DISEASE, WITH LONG-TERM CURRENT USE OF INSULIN (H): Status: ACTIVE | Noted: 2017-03-30

## 2018-01-31 PROBLEM — M48.061 NEUROFORAMINAL STENOSIS OF LUMBAR SPINE: Status: ACTIVE | Noted: 2018-01-03

## 2018-01-31 PROBLEM — M54.42 ACUTE BILATERAL LOW BACK PAIN WITH BILATERAL SCIATICA: Status: ACTIVE | Noted: 2017-10-27

## 2018-01-31 PROBLEM — E78.2 MIXED HYPERLIPIDEMIA: Status: ACTIVE | Noted: 2018-01-31

## 2018-01-31 PROBLEM — K27.9 PEPTIC ULCER DISEASE: Status: ACTIVE | Noted: 2018-01-31

## 2018-01-31 PROBLEM — M54.10 RADICULAR LOW BACK PAIN: Status: ACTIVE | Noted: 2018-01-03

## 2018-01-31 PROBLEM — M50.30 DEGENERATION OF CERVICAL INTERVERTEBRAL DISC: Status: ACTIVE | Noted: 2018-01-31

## 2018-01-31 PROBLEM — G47.419 NARCOLEPSY: Status: ACTIVE | Noted: 2018-01-31

## 2018-01-31 PROBLEM — K59.09 INTERMITTENT CONSTIPATION: Status: ACTIVE | Noted: 2017-08-08

## 2018-01-31 PROBLEM — M54.41 ACUTE BILATERAL LOW BACK PAIN WITH BILATERAL SCIATICA: Status: ACTIVE | Noted: 2017-10-27

## 2018-01-31 PROBLEM — G89.29 CHRONIC LOW BACK PAIN: Status: ACTIVE | Noted: 2017-08-08

## 2018-01-31 RX ORDER — ASPIRIN 325 MG
325 TABLET ORAL DAILY
COMMUNITY
Start: 2005-03-01 | End: 2020-02-18

## 2018-01-31 RX ORDER — ADHESIVE TAPE 1.5"X360"
1 TAPE, NON-MEDICATED TOPICAL
Status: ON HOLD | COMMUNITY
Start: 2017-02-03 | End: 2023-01-01

## 2018-01-31 RX ORDER — LEVOTHYROXINE SODIUM 125 UG/1
1 TABLET ORAL EVERY MORNING
COMMUNITY
Start: 2016-12-21 | End: 2018-06-29

## 2018-01-31 RX ORDER — ROSUVASTATIN CALCIUM 10 MG/1
1 TABLET, COATED ORAL 2 TIMES DAILY
COMMUNITY
Start: 2017-03-30 | End: 2018-04-21

## 2018-01-31 RX ORDER — RANOLAZINE 500 MG/1
2 TABLET, EXTENDED RELEASE ORAL 2 TIMES DAILY
COMMUNITY
Start: 2017-06-09 | End: 2018-05-03

## 2018-01-31 RX ORDER — NITROGLYCERIN 0.4 MG/1
1 TABLET SUBLINGUAL EVERY 5 MIN PRN
Status: ON HOLD | COMMUNITY
Start: 2016-12-21 | End: 2020-03-15

## 2018-01-31 RX ORDER — HYDROCODONE BITARTRATE AND ACETAMINOPHEN 5; 325 MG/1; MG/1
1 TABLET ORAL EVERY 6 HOURS PRN
COMMUNITY
Start: 2018-01-03 | End: 2018-05-03

## 2018-01-31 RX ORDER — FEXOFENADINE HCL 60 MG/1
1 TABLET, FILM COATED ORAL 2 TIMES DAILY PRN
COMMUNITY
Start: 2017-02-03 | End: 2018-03-09

## 2018-01-31 RX ORDER — DICLOFENAC SODIUM 75 MG/1
1 TABLET, DELAYED RELEASE ORAL 4 TIMES DAILY
COMMUNITY
Start: 2017-10-05 | End: 2019-01-28

## 2018-01-31 RX ORDER — NYSTATIN 100000 U/G
CREAM TOPICAL
COMMUNITY
Start: 2017-06-29 | End: 2018-03-09

## 2018-01-31 RX ORDER — METHYLPHENIDATE HYDROCHLORIDE 10 MG/1
1 TABLET ORAL 4 TIMES DAILY
COMMUNITY
Start: 2018-01-03 | End: 2018-06-25

## 2018-01-31 RX ORDER — VALSARTAN 160 MG/1
1 TABLET ORAL 2 TIMES DAILY
COMMUNITY
Start: 2018-01-03 | End: 2018-07-05

## 2018-01-31 RX ORDER — ELECTROLYTES/DEXTROSE
SOLUTION, ORAL ORAL
COMMUNITY
Start: 2014-01-10

## 2018-01-31 RX ORDER — BLOOD-GLUCOSE METER
EACH MISCELLANEOUS
COMMUNITY
Start: 2014-07-23

## 2018-01-31 RX ORDER — FUROSEMIDE 40 MG
2-4 TABLET ORAL DAILY
COMMUNITY
Start: 2017-10-05 | End: 2018-11-27

## 2018-01-31 RX ORDER — METOPROLOL SUCCINATE 50 MG/1
1 TABLET, EXTENDED RELEASE ORAL 2 TIMES DAILY
COMMUNITY
Start: 2016-12-21 | End: 2018-11-27

## 2018-01-31 RX ORDER — DEXTROAMPHETAMINE SULFATE 10 MG/1
1 TABLET ORAL 4 TIMES DAILY
COMMUNITY
Start: 2018-01-03 | End: 2018-03-09 | Stop reason: ALTCHOICE

## 2018-01-31 RX ORDER — CLOPIDOGREL BISULFATE 75 MG/1
1 TABLET ORAL DAILY
COMMUNITY
Start: 2017-10-05 | End: 2018-10-08

## 2018-01-31 RX ORDER — HYDRALAZINE HYDROCHLORIDE 25 MG/1
1-2 TABLET, FILM COATED ORAL 4 TIMES DAILY
COMMUNITY
Start: 2017-10-05 | End: 2018-11-27

## 2018-01-31 RX ORDER — METHYLPHENIDATE HYDROCHLORIDE 10 MG/1
1 TABLET ORAL 4 TIMES DAILY
COMMUNITY
Start: 2018-01-03 | End: 2018-07-03

## 2018-01-31 RX ORDER — DOCUSATE SODIUM 100 MG/1
1-2 CAPSULE, LIQUID FILLED ORAL 2 TIMES DAILY PRN
COMMUNITY
Start: 2017-08-08 | End: 2020-03-15

## 2018-01-31 RX ORDER — METHYLPHENIDATE HYDROCHLORIDE 10 MG/1
1 TABLET ORAL 4 TIMES DAILY
COMMUNITY
Start: 2018-01-03 | End: 2018-03-09

## 2018-01-31 RX ORDER — LANCETS
EACH MISCELLANEOUS
COMMUNITY
Start: 2012-12-27

## 2018-01-31 RX ORDER — CODEINE PHOSPHATE/GUAIFENESIN 10-100MG/5
10 LIQUID (ML) ORAL EVERY 4 HOURS PRN
COMMUNITY
Start: 2017-03-12 | End: 2018-03-09

## 2018-01-31 RX ORDER — METHYLPREDNISOLONE 4 MG
TABLET, DOSE PACK ORAL
COMMUNITY
Start: 2017-11-03 | End: 2018-03-09

## 2018-01-31 RX ORDER — TIZANIDINE 2 MG/1
1-2 TABLET ORAL EVERY 6 HOURS PRN
COMMUNITY
Start: 2017-10-27 | End: 2019-03-08

## 2018-01-31 RX ORDER — DEXTROAMPHETAMINE SULFATE 10 MG/1
1 TABLET ORAL 4 TIMES DAILY
COMMUNITY
Start: 2018-01-03 | End: 2018-06-25

## 2018-01-31 RX ORDER — VITAMIN E 268 MG
1 CAPSULE ORAL DAILY
COMMUNITY
Start: 2007-02-15 | End: 2021-09-17

## 2018-01-31 RX ORDER — DESOXIMETASONE 2.5 MG/G
1 CREAM TOPICAL 2 TIMES DAILY
COMMUNITY
Start: 2017-08-01 | End: 2019-01-28

## 2018-02-04 ASSESSMENT — PATIENT HEALTH QUESTIONNAIRE - PHQ9
SUM OF ALL RESPONSES TO PHQ QUESTIONS 1-9: 0

## 2018-02-09 VITALS
DIASTOLIC BLOOD PRESSURE: 78 MMHG | BODY MASS INDEX: 28.59 KG/M2 | HEART RATE: 64 BPM | SYSTOLIC BLOOD PRESSURE: 138 MMHG | WEIGHT: 195 LBS

## 2018-02-09 VITALS
HEART RATE: 60 BPM | BODY MASS INDEX: 29.93 KG/M2 | DIASTOLIC BLOOD PRESSURE: 68 MMHG | SYSTOLIC BLOOD PRESSURE: 134 MMHG | WEIGHT: 204.13 LBS

## 2018-02-09 VITALS
WEIGHT: 203 LBS | SYSTOLIC BLOOD PRESSURE: 138 MMHG | DIASTOLIC BLOOD PRESSURE: 78 MMHG | RESPIRATION RATE: 16 BRPM | BODY MASS INDEX: 29.76 KG/M2

## 2018-02-09 VITALS
WEIGHT: 205.25 LBS | HEART RATE: 72 BPM | DIASTOLIC BLOOD PRESSURE: 78 MMHG | SYSTOLIC BLOOD PRESSURE: 138 MMHG | BODY MASS INDEX: 30.09 KG/M2

## 2018-02-10 ASSESSMENT — PATIENT HEALTH QUESTIONNAIRE - PHQ9: SUM OF ALL RESPONSES TO PHQ QUESTIONS 1-9: 0

## 2018-02-11 ASSESSMENT — PATIENT HEALTH QUESTIONNAIRE - PHQ9
SUM OF ALL RESPONSES TO PHQ QUESTIONS 1-9: 0
SUM OF ALL RESPONSES TO PHQ QUESTIONS 1-9: 0

## 2018-02-12 NOTE — PROGRESS NOTES
Patient Information     Patient Name MRN Moses Fairbanks [id number] Male 1950      Progress Notes by Jorge Barnett MD at 2017  9:00 AM     Author:  Jorge Barnett MD Service:  (none) Author Type:  Physician     Filed:  2017  5:33 PM Encounter Date:  2017 Status:  Signed     :  Jorge Barnett MD (Physician)            Nursing Notes:   Barbara Connor  2017  9:32 AM  Signed  Patient presents to the clinic for follow up with back pain.      Previous A1C is at goal of <8  HEMOGLOBIN A1C MONITORING (POCT)    Date Value   10/04/2017 7.5 % (H)   2013 7.4 % NGSP (H)     Urine microalbumin:creatine: 1.64  Foot exam unknown  Eye exam 2017    Patient is not a current smoker  Patient is on a daily aspirin  Patient is on a Statin.  Blood pressure today of   is at the goal of <139/89 for diabetics.    Barbara Connor LPN..............2017 9:21 AM    Moses Whittaker presents to clinic today for:   Chief Complaint    Patient presents with      Follow Up     HPI: Mr. Whittaker is a 67 y.o. male who presents today for evaluation of above.     (M54.16,  G89.29) Chronic radicular low back pain  (primary encounter diagnosis)  (M45.6) Ankylosing spondylitis of lumbar region (HC)   (E78.2) Mixed hyperlipidemia     Patient presents for follow-up of low back pain.  His daughter was just diagnosed with ankylosing spondylitis, son was previously diagnosed with this.  He is wondering if he has a same problem.  Has been having more frequent and more significant radicular pain down the legs.  Low back pain localized in the back as well.  -- Reports back pain is currently 5 out of 10.  Sometimes gets quite severe.  Pain was shooting down to his left ankle, down to his right heel.  The radicular pain on the left leg is new in the past few weeks/months.  States symptoms of an slowly worsening the past couple of months.  Current pain medications do very little to help with his  symptoms.  -- He would like some back imaging checked, some lab work checked.  Discussed potential back injection, he would like to have one ordered.  -- Patient is on Plavix due to his coronary artery disease, likely will need to have Plavix held for a few days before back injection.      Hyperlipidemia, currently on Crestor 10 mg.  Would like labs rechecked today.    Mr. Whittaker's Body mass index is 28.59 kg/(m^2). This is out of the normal range for a 67 y.o. Normal range for ages 18+ is between 18.5 and 24.9. To lose weight we reviewed risks and benefits of appropriate options such as diet, exercise, and medications. Patient's strategy will be  self-directed nutrition plan and self-directed exercise program   BP Readings from Last 1 Encounters:12/18/17 : 138/78  Mr. Stokes blood pressure is out of the normal range for adults. Per JNC-8 guidelines normal adult blood pressure is < 120/80, pre-hypertensive is between 120/80 and 139/89, and hypertension is 140/90 or greater. Risks of hypertension were discussed. Patient's strategy will be weight loss, increased activity and reduced salt intake    Functional Capacity: about 4 METS. + limited at times due to low back - more progressive symptoms.   Patient reports no current symptoms of fevers, chills, nausea/vomiting.   No cough. No shortness of breath.   No change in bowel/bladder habits. No melena, hematochezia. No Hematuria.   No rashes. No palpitations.  No orthopnea/paroxysmal nocturnal dyspnea   No vision or hearing issues.   No significant mood issues   No bruising.     [place]:  No flowsheet data found.    PHQ9:  PHQ Depression Screening 10/27/2017 12/18/2017   Date of PHQ exam (doc flow) 10/27/2017 12/18/2017   1. Lack of interest/pleasure 0 - Not at all 0 - Not at all   2. Feeling down/depressed 0 - Not at all 0 - Not at all   PHQ-2 TOTAL SCORE 0 0   3. Trouble sleeping 0 - Not at all 0 - Not at all   4. Decreased energy 0 - Not at all 0 - Not at all   5.  Appetite change 0 - Not at all 0 - Not at all   6. Feelings of failure 0 - Not at all 0 - Not at all   7. Trouble concentrating 0 - Not at all 0 - Not at all   8. Activity level 0 - Not at all 0 - Not at all   9. Hurting yourself 0 - Not at all 0 - Not at all   PHQ-9 TOTAL SCORE 0 0   PHQ-9 Severity Level none none   Functional Impairment not difficult at all not difficult at all   Some recent data might be hidden          I have personally reviewed the past medical history, past surgical history, medications, allergies, family and social history as listed below, on 12/18/2017.    Patient Active Problem List       Diagnosis  Date Noted     Acute bilateral low back pain with bilateral sciatica  10/27/2017     Intermittent constipation  08/08/2017     Chronic low back pain  08/08/2017     Skin rash  08/01/2017     Controlled type 2 diabetes mellitus with stage 3 chronic kidney disease, with long-term current use of insulin (HC)  03/30/2017     Erectile dysfunction  09/26/2016     Trigger point with neck pain  07/14/2016     Trigger point with back pain  07/14/2016     Insulin pump in place  03/10/2016     Pain medication agreement - 6/29/2017 12/17/2015     Trigger point of extremity  12/17/2015     Myofacial muscle pain  12/17/2015     Greater trochanteric bursitis of left hip  06/24/2015     Blister of toe of right foot  12/09/2014     Cervical stenosis of spinal canal  06/17/2014     Facet arthritis of cervical region (HC)  06/17/2014     Degenerative disc disease, cervical  06/17/2014     Left arm numbness  06/05/2014     Cervical radicular pain  06/05/2014     Left atrial enlargement - Moderate - 1/20/2014 ECHO  01/23/2014     S/P coronary artery stent placement  01/10/2014     Old inferior wall myocardial infarction  01/10/2014     S/P CABG x 2  01/10/2014     Hypertension  01/10/2014     Platelet inhibition due to Plavix -- On Plavix (Brand Name due to generic intolerance) For Life.   01/10/2014     Myalgia   01/10/2014     CKD (chronic kidney disease) stage 3, GFR 30-59 ml/min  01/10/2014     Chronic diastolic heart failure - Mild - 1/20/2014 ECHO - EF 65%  01/10/2014     1/20/2014 ECHO FINAL IMPRESSION:   1. Normal left ventricular size and systolic function. Estimated ejection fraction 65%.   2. Mild increase in wall thickness of the ventricular septum with hypokinesis of the basilar segment of the ventricular septum and inferior wall.   3. Mild to moderate left atrial enlargement.   4. Trace tricuspid regurgitation.   5. Mild left ventricular diastolic dysfunction.         ACP (advance care planning)  12/05/2013     Diabetic neuropathy, painful (HC)  04/22/2013     G E R D  05/17/2012     OBESITY  05/17/2012     NARCOLEPSY       Total dose recommended by pulmonology he is dextro- amphetamine 40 mg plus   methylphenidate 40 mg in divided doses per day.  Total of 80 mg of short   acting amphetamines daily.          DIASTASIS RECTI  10/06/2011     C A D  09/08/2011     ERECTILE DYSFUNCTION  06/30/2011     ANGINA  12/10/2010     ANKLE EDEMA, CHRONIC  10/25/2010     HYPOTHYROIDISM       BACK PAIN, LUMBAR, WITH RADICULOPATHY       OSTEOARTHRITIS, CERVICAL SPINE       DISC DISEASE, CERVICAL       Mixed hyperlipidemia       PEPTIC ULCER DISEASE       Past Medical History:     Diagnosis  Date     CAD (coronary artery disease)     Coronary artery disease, post angioplasty      Carpal tunnel syndrome      Edema     Edema secondary to Bextra      Heart disease     Multiple coronary stents       Helicobacter pylori gastritis      Hematoma 11/2006    Right calf hematoma      Maxillary sinusitis      NARCOLEPSY      Rheumatic fever     Rheumatic fever as a child without valvular heart disease       Past Surgical History:      Procedure  Laterality Date     ANGIOPLASTY  12/00, 04/04/04    Repeat angioplasty with lt internal mammary to the lt anterior descending and lt radial artery from aorta to the diagonal.       ANGIOPLASTY   10/01    Angioplasty with 2 vessel CABG the following week from the lt internal mammary to the lt anterior descending and right radial free graft       ANGIOPLASTY  04/2003     APPENDECTOMY OPEN  1967     CARPAL TUNNEL RELEASE  11/15/01    Right carpal tunnel release by Dr. Mace       CARPAL TUNNEL RELEASE  01/2004    Left carpal tunnel release        COLONOSCOPY  01/08/2016    -- Dr. De La Cruz -- polypectomy        COLONOSCOPY SCREENING  1999     IR ANGIOGRAM FEMORAL/EXTREMITY (IA)  09/03    Unremarkable angiogram at [facility]       IR ANGIOGRAM FEMORAL/EXTREMITY (IA)  2/26/2007    Unremarkable coronary angiogram at [facility].       PTCA  10/05/2004    Angioplasty        PTCA  02/2005    Angioplasty with stent.         PTCA  03/02/2005    Angioplasty with stent.        PTCA  09/23/2005    Angioplasty without stent       ROTATOR CUFF REPAIR  02/06/2006    Left rotator cuff repair and bone spur removal by Dr. Giraldo in [place]       Current Outpatient Prescriptions       Medication  Sig Dispense Refill     alcohol swabs (ALCOHOL PREP PADS) For home use. 1 box 0     aspirin 325 mg tablet Take  by mouth.       blood sugar diagnostic (ONE TOUCH ULTRA TEST) strip Dispense test strips covered by the patient insurance. Test 10 times per day. 900 Each 3     Blood-Glucose Meter (ACCU-CHEK ABBI PLUS METER) Dispense glucose meter, test strips and lancets covered by the patient insurance. Test 10 times per day. 1 Device 0     clopidogrel (PLAVIX) 75 mg tablet Take 1 tablet by mouth once daily. 90 tablet 4     codeine-guaiFENesin (ROBITUSSIN AC)  mg/5 mL liquid Take 10 mL by mouth every 4 hours if needed for Cough. Max dose 60 mL per 24 hrs. 200 mL 0     desoximetasone 0.25% topical (TOPICORT) 0.25 % cream Apply twice daily 180 g 3     Dextroamphetamine Sulfate (DEXTROSTAT) 10 mg tablet Take 1 tablet by mouth four times daily - for 10/5/2017 360 tablet 0     Dextroamphetamine Sulfate (DEXTROSTAT) 10 mg tablet Take 1 tablet by mouth  "four times daily - for 11/30/17 360 tablet 0     Dextromethorphan-guaFENesin (MUCINEX DM)  mg tablet Take 1 tablet by mouth 2 times daily if needed for Cough. - OTC / Generic Okay 60 tablet 1     diclofenac (VOLTAREN) 75 mg delayed-release tablet TAKE 1 TABLET BY MOUTH FOUR TIMES DAILY 360 tablet 3     docusate (COLACE) 100 mg capsule Take 1-2 capsules by mouth 2 times daily. -- AS needed for constipation prevention 120 capsule 3     fexofenadine (ALLEGRA ALLERGY) 60 mg tablet Take 1 tablet by mouth 2 times daily. AS NEEDED for Allergy symptoms 60 tablet 3     fish oil-omega-3 fatty acids (FISH OIL) 1,000-340 mg capsule Take  by mouth.       flaxseed oil 1,000 mg cap Take  by mouth.       furosemide (LASIX) 40 mg tablet TAKE 2 TO 4 TABLETS BY MOUTH DAILY OR AS INSTRUCTED FOR LEG SWELLING 360 tablet 4     HUMALOG 100 unit/mL injection INJECT UNDER THE SKIN USING INSULIN PUMP. MAX DOSE  UNITS DAILY. 180 mL 0     hydrALAZINE (APRESOLINE) 25 mg tablet Take 1-2 tablets by mouth 4 times daily. - Take lowest effective dose for blood pressure management 720 tablet 3     HYDROcodone-acetaminophen, 5-325 mg, (NORCO) per tablet Take 1 tablet by mouth every 6 hours if needed  for Pain - for on or after 07/27/17 60 tablet 0     Insulin Needles, Disposable, (BD INSULIN PEN NEEDLE UF) 29 x 1/2 \" For administering insulin at home. 600 Each 2     ipratropium (ATROVENT HFA) 17 mcg/actuation inhaler Inhale 2 Puffs by mouth 4 times daily. 1 Inhaler 0     IV Administration Set (INFUSION SET) iset As directed.  0     lancets (ONE TOUCH ULTRASOFT LANCETS) Test 10 times per day. 600 Each 6     levothyroxine (SYNTHROID) 125 mcg tablet Take 1 tablet by mouth every morning. 90 tablet 4     MEDICAL SUPPLY, MISCELLANEOUS (GRADUATED COMPRESSION STOCKINGS) For personal use. Length: calf Strength: 20-30 mmHg 1 Packet 1     methylphenidate HCl (RITALIN) 10 mg tablet One tablet by mouth four times daily - for 10/5/2017 360 tablet 0     " methylphenidate HCl (RITALIN) 10 mg tablet One tablet by mouth four times daily - for 11/30/17 360 tablet 0     methylPREDNISolone (MEDROL DOSEPAK) 4 mg tablet Take by mouth as instructed per packaging. -- check cost vs prednisone type dose pack 1 Package 0     metoprolol succinate (TOPROL XL) 50 mg sustained-release tablet Take 1 tablet by mouth 2 times daily. 180 tablet 3     nitroglycerin (NITROSTAT) 0.4 mg sublingual tablet Place 1 tablet under the tongue every 5 minutes if needed for Chest Pain. 3 Bottle 1     nystatin (MYCOSTATIN) cream Apply  topically to affected area(s) 2 times daily. For yeast skin fold rash -- vs Topical OTC Anti-fungals okay - monistat, lotrimin 30 g 3     Psyllium Seed-Sucrose (METAMUCIL, SUGAR,) powder Take 1 tsp by mouth 3 times daily. - for constipation prevention  0     ranitidine (ZANTAC) 150 mg tablet Take 1 tablet by mouth 2 times daily. 180 tablet 3     ranolazine (RANEXA) 500 mg Controlled-Release tablet Take 2 tablets by mouth 2 times daily. -- cancel other Rx -- give 3 month supply 360 tablet 3     rosuvastatin (CRESTOR) 10 mg tablet TAKE 1 TABLET BY MOUTH TWICE DAILY 180 tablet 3     sodium chloride-aloe vera (SALINE NASAL, ALOE VERA,) nasal gel Inhale  in the nostril(s) every hour if needed for Nasal Dryness. 14.1 g 3     Sub-Q Infusion Pump Access (ACCU-CHEK SPIRIT CARTRIDGE SYS) misc As directed.  0     tiZANidine (ZANAFLEX) 2 mg tablet Take 1-2 tablets by mouth every 6 hours if needed for Muscle Spasm. 40 tablet 1     valsartan (DIOVAN) 160 mg tablet Take 1 tablet by mouth 2 times daily. 180 tablet 3     Allergies      Allergen   Reactions     Gabapentin  Mental Status Change     Bextra [Valdecoxib]  Edema     Lipitor [Atorvastatin]  Hives     Lisinopril  Cough     Lyrica [Pregabalin]  Mental Status Change     Novolog [Insulin Aspart]  Rash     Other [Unlisted Allergen (Include Detail In Comments)]  Hives     Chlorine water      Family History       Problem   Relation  "Age of Onset     Heart Disease  Mother      Heart problems       Heart Disease  Other      Heart problems       Heart Disease  Other      Heart problems       Ankylosing spondylitis  Son      Ankylosing spondylitis        Other  Brother       with sudden death following shoulder surgery 2012 -- was off his Plavix for 10 days pre-operatively.       Ankylosing spondylitis  Daughter      -- Dx 2017       Family Status     Relation  Status     Brother      with sudden death following shoulder surgery 2012 -- was off his Plavix for 10 days pre-operatively.      Son Alive     Mother      Other      Other      Son      Brother      Daughter      Social History     Social History        Marital status:       Spouse name: N/A     Number of children:  N/A     Years of education:  N/A     Social History Main Topics        Smoking status:  Never Smoker     Smokeless tobacco:  Never Used     Alcohol use  No     Drug use:  No     Sexual activity:  Yes     Partners: Female     Other Topics  Concern     Not on file      Social History Narrative     He is on Social Security Disability for coronary artery disease and cervical arthritis and disk disease.   Dax obed.  No tobacco.             Pertinent ROS was performed and was negative as noted in HPI above.     EXAM:   Vitals:     17 0923   BP: 138/78   Cuff Site: Right Arm   Position: Sitting   Cuff Size: Adult Regular   Pulse: 64   Weight: 88.5 kg (195 lb)     BP Readings from Last 3 Encounters:    17 138/78   10/27/17 158/78   10/27/17 160/72     Wt Readings from Last 3 Encounters:    17 88.5 kg (195 lb)   10/27/17 91.6 kg (202 lb)   10/27/17 92.1 kg (203 lb)     Estimated body mass index is 28.59 kg/(m^2) as calculated from the following:    Height as of 17: 1.759 m (5' 9.25\").    Weight as of this encounter: 88.5 kg (195 lb).     EXAM:  Constitutional: well groomed / good hygiene, casual dress  Lymphatic Exam: " Non-palpable nodes in neck, clavicular regions  Pulmonary: Lungs are clear to auscultation bilaterally, without wheezes or crackles  Cardiovascular Exam: regular rate and rhythm, trace pedal edema present  Gastrointestinal Exam: Obese  Integument: No abnormal rashes, sores, or ulcerations noted  Neurologic Exam: CN 3-12 grossly intact   Musculoskeletal Exam: walks with limp, moves slowly.   Psychiatric Exam: Awake and Alert, Affect and mood appropriate  Speech is fluent, Thought process is normal    INVESTIGATIONS:  Results for orders placed or performed in visit on 12/18/17      LIPID PANEL      Result  Value Ref Range    CHOLESTEROL,TOTAL 101 <200 mg/dL    TRIGLYCERIDES 119 <150 mg/dL    HDL CHOLESTEROL 34 23 - 92 mg/dL    NON-HDL CHOLESTEROL 67 <145 mg/dl    CHOL/HDL RATIO            2.97 <4.50                    LDL CHOLESTEROL 43 <100 mg/dL    PROVIDER ORDERED STATUS RANDOM    SEDIMENTATION RATE      Result  Value Ref Range    SEDIMENTATION RATE        7 <21 mm/hr   RHEUMATOID FACTOR      Result  Value Ref Range    RHEUMATOID FACTOR QUANT. <14.0 <14.0 IU/mL   CRP, inflammatory      Result  Value Ref Range    C-REACTIVE PROTEIN <1.000 <1.000 mg/dL     12/18/2017 - XR SPINE LUMBAR 3 VIEWS     HISTORY:  Chronic radicular low back pain.     TECHNIQUE: 3 views of lumbosacral spine.     COMPARISON: 06/19/2009.     FINDINGS:     Lumbar vertebral body heights are preserved. There is trace retrolisthesis of L2 on L3 and anterolisthesis of L5 on S1. No pars defect is identified. There is moderate levoscoliosis with associated advanced asymmetric degenerative disc and facet disease.       IMPRESSION:      Advanced degenerative changes associated with moderate levoscoliosis.       Electronically Signed By: Oscar Sorto on 12/18/2017 10:50 AM    ASSESSMENT AND PLAN:  Moses was seen today for follow up.    Diagnoses and all orders for this visit:    Chronic radicular low back pain  -     SEDIMENTATION RATE; Future  -      RHEUMATOID FACTOR; Future  -     HLA B27; Future  -     CCP; Future  -     CRP, inflammatory; Future  -     Cancel: XR SPINE LUMBAR 6 VIEWS W FLEX EXT; Future  -     MR SPINE LUMBAR WO; Future  -     AMB CONSULT TO PHYSICAL THERAPY; Future  -     SEDIMENTATION RATE  -     RHEUMATOID FACTOR  -     HLA B27  -     CCP  -     CRP, inflammatory    Ankylosing spondylitis of lumbar region (HC)   -     HLA B27; Future  -     Cancel: XR SPINE LUMBAR 6 VIEWS W FLEX EXT; Future  -     MR SPINE LUMBAR WO; Future  -     AMB CONSULT TO PHYSICAL THERAPY; Future  -     HLA B27    Mixed hyperlipidemia  -     LIPID PANEL    lab results and schedule of future lab studies reviewed with patient, recommended sodium restriction    -- Expected clinical course discussed   -- Medications and their side effects discussed    Moses is also recommended to eat a heart-healthy diet, do regular aerobic exercises, maintain a desirable body weight, and avoid tobacco products. These recommendations are from the American Heart Association (AHA) which stresses the importance of lifestyle changes to lower cardiovascular disease risk.    Return if symptoms worsen or fail to improve.    Patient Instructions   1. Chronic radicular low back pain  - SEDIMENTATION RATE; Future  - RHEUMATOID FACTOR; Future  - HLA B27; Future  - CCP; Future  - CRP, inflammatory; Future  - XR SPINE LUMBAR 6 VIEWS W FLEX EXT; Future  - MR SPINE LUMBAR WO; Future    2. Ankylosing spondylitis of lumbar region (HC)    ---- concerned about recent diagnosis of daughter.    ---- + Degenerative low back changes noted on previous xrays.     - HLA B27; Future    - XR SPINE LUMBAR 6 VIEWS W FLEX EXT -- today.     - MR SPINE LUMBAR WO  - they will call with date/time of appointment.        Physical therapy referral sent  - they will call with date/time of appointment.        Return as needed for follow-up with Dr. Barnett.    Clinic : [phone]  Appointment line:  [phone]      Jorge Barnett MD

## 2018-02-12 NOTE — TELEPHONE ENCOUNTER
Patient Information     Patient Name MRN Moses Fairbanks [id number] Male 1950      Telephone Encounter by Shanice Vidales LPN at 2017 11:21 AM     Author:  Shanice Vidales LPN Service:  (none) Author Type:  NURS- Licensed Practical Nurse     Filed:  2017 11:39 AM Encounter Date:  2017 Status:  Signed     :  Shanice Vidales LPN (NURS- Licensed Practical Nurse)            Patient verified last name and date of birth. Stated that he is in need of some tier exception forms completed for his medications to lower them from a tier 2 down to a tier 1.   He needs tier exceptions for the following medications:  Diclofenac;  Levothyroixine;  Metoprolol succinate;  Ranitidine.  Patient is also needing a lower tier on his desoximetasone but is going to be calling the insurance company to see about a tier 1 medication that is comparable.  Shanice Vidales LPN.........2017   11:39 AM

## 2018-02-12 NOTE — NURSING NOTE
Patient Information     Patient Name MRN Moses Fairbanks [id number] Male 1950      Nursing Note by Barbara Connor at 2017  9:00 AM     Author:  Barbara Connor Service:  (none) Author Type:  (none)     Filed:  2017  9:32 AM Encounter Date:  2017 Status:  Signed     :  Barbara Connor            Patient presents to the clinic for follow up with back pain.      Previous A1C is at goal of <8  HEMOGLOBIN A1C MONITORING (POCT)    Date Value   10/04/2017 7.5 % (H)   2013 7.4 % NGSP (H)     Urine microalbumin:creatine: 1.64  Foot exam unknown  Eye exam 2017    Patient is not a current smoker  Patient is on a daily aspirin  Patient is on a Statin.  Blood pressure today of   is at the goal of <139/89 for diabetics.    Barbara Connor LPN..............2017 9:21 AM

## 2018-02-12 NOTE — TELEPHONE ENCOUNTER
Patient Information     Patient Name MRMoses Oliveira [id number] Male 1950      Telephone Encounter by Margie Longo RN at 2017  3:02 PM     Author:  Margie Longo RN Service:  (none) Author Type:  NURS- Registered Nurse     Filed:  2017  3:06 PM Encounter Date:  2017 Status:  Signed     :  Margie Longo RN (NURS- Registered Nurse)            Diabetes    Office visit in the past 12 months or per provider note.    Last visit with CATALINA MCDOWELL was on: 10/27/2017 in [facility]  Next visit with CATALINA MCDOWELL is on: 2018 in [facility]  Next visit with Internal Medicine is on: 2018 in [facility]    Lab test requirements:  HgbA1c annually or per provider note.  HEMOGLOBIN A1C MONITORING (POCT)    Date Value   10/04/2017 7.5 % (H)   2013 7.4 % NGSP (H)       Max refill for 12 months from last office visit or per provider note.  Prescription refilled per RN Medication Refill Policy.................... MARGIE LONGO RN ....................  2017   3:05 PM

## 2018-02-12 NOTE — TELEPHONE ENCOUNTER
Patient Information     Patient Name MRN Moses Fairbanks [id number] Male 1950      Telephone Encounter by Shanice Vidales LPN at 2017  2:47 PM     Author:  Shanice Vidales LPN Service:  (none) Author Type:  NURS- Licensed Practical Nurse     Filed:  2017  2:49 PM Encounter Date:  2017 Status:  Signed     :  Shanice Vidales LPN (NURS- Licensed Practical Nurse)            Patient returning call, verified patient's last name and date of birth. Stated that he is in need of a tier exception form for the desoximetasone as well.  Shanice Vidales LPN.........2017   2:48 PM

## 2018-02-12 NOTE — TELEPHONE ENCOUNTER
Patient Information     Patient Name MRN Moses Fairbanks [id number] Male 1950      Telephone Encounter by Shanice Vidales LPN at 2017  1:31 PM     Author:  Shanice Vidales LPN Service:  (none) Author Type:  NURS- Licensed Practical Nurse     Filed:  2017  1:31 PM Encounter Date:  2017 Status:  Signed     :  Shanice Vidales LPN (NURS- Licensed Practical Nurse)            Left message for patient to return call.  Shanice Vidales LPN.........2017   1:31 PM

## 2018-02-12 NOTE — PROGRESS NOTES
Patient Information     Patient Name MRN Sex Moses Ott [id number] Male 1950      Progress Notes by Eleanor Edgar at 2017  2:01 PM     Author:  Eleanor Edgar Service:  (none) Author Type:  Other Clinical Staff     Filed:  2017  2:01 PM Date of Service:  2017  2:01 PM Status:  Signed     :  Eleanor Edgar (Other Clinical Staff)            Falls Risk Criteria:    Age 65 and older or under age 4        Sensory deficits    Poor vision    Use of ambulatory aides    Impaired judgment    Unable to walk independently    Meets High Risk criteria for fa             1.  Do you have dizziness or vertigo?    no                    2.  Do you need help standing or walking?   no                 3.  Have you fallen within the last 6 months?    no           4.  Has the patient been fasting?      no       If any risks are marked Yes, the following interventions are utilized:    Do not leave patient unattended     Assist patient in the dressing room and bathroom    Have ambulatory aides available throughout procedure    Involve patient s family if available

## 2018-02-12 NOTE — PATIENT INSTRUCTIONS
Patient Information     Patient Name MRN Sex Moses Ott [id number] Male 1950      Patient Instructions by Jorge Barnett MD at 2017  9:00 AM     Author:  Jorge Barnett MD  Service:  (none) Author Type:  Physician     Filed:  2017  9:47 AM  Encounter Date:  2017 Status:  Addendum     :  Jorge Barnett MD (Physician)        Related Notes: Original Note by Jorge Barnett MD (Physician) filed at 2017  9:40 AM            1. Chronic radicular low back pain  - SEDIMENTATION RATE; Future  - RHEUMATOID FACTOR; Future  - HLA B27; Future  - CCP; Future  - CRP, inflammatory; Future  - XR SPINE LUMBAR 6 VIEWS W FLEX EXT; Future  - MR SPINE LUMBAR WO; Future    2. Ankylosing spondylitis of lumbar region (HC)    ---- concerned about recent diagnosis of daughter.    ---- + Degenerative low back changes noted on previous xrays.     - HLA B27; Future    - XR SPINE LUMBAR 6 VIEWS W FLEX EXT -- today.     - MR SPINE LUMBAR WO  - they will call with date/time of appointment.        Physical therapy referral sent  - they will call with date/time of appointment.        Return as needed for follow-up with Dr. Barnett.    Clinic : [phone]  Appointment line: [phone]

## 2018-02-13 NOTE — NURSING NOTE
Patient Information     Patient Name MRMoses Oliveira [id number] Male 1950      Nursing Note by Barbara Connor at 1/3/2018  8:40 AM     Author:  Barbara Connor Service:  (none) Author Type:  (none)     Filed:  1/3/2018  9:01 AM Encounter Date:  1/3/2018 Status:  Signed     :  Barbara Connor            Patient presents to the clinic for bloody discharge from his penis after intercourse yesterday.  Patient denies any other bloody discharge at this time.    Previous A1C is at goal of <8  HEMOGLOBIN A1C MONITORING (POCT)    Date Value   10/04/2017 7.5 % (H)   2013 7.4 % NGSP (H)     Urine microalbumin:creatine: 1.64  Foot exam unknown-declined  Eye exam 2017    Patient is not a current smoker  Patient is on a daily aspirin  Patient is on a Statin.  Blood pressure today of 140/78 is at the goal of <139/89 for diabetics.    Barbara Connor LPN..............1/3/2018 8:59 AM

## 2018-02-13 NOTE — TELEPHONE ENCOUNTER
Patient Information     Patient Name MRN Moses Fairbanks [id number] Male 1950      Telephone Encounter by Harriet Hackett RN at 2018  7:43 AM     Author:  Harriet Hackett RN Service:  (none) Author Type:  NURS- Registered Nurse     Filed:  2018  7:45 AM Encounter Date:  2018 Status:  Signed     :  Harriet Hackett RN (NURS- Registered Nurse)            Hypothyroidism  Office visit in the past 12 months or per provider note.  Last visit with CATALINA MCDOWELL was on: 2018 in [facility]  Next visit with CATALINA MCDOWELL is on: No future appointment listed with this provider  Next visit with Internal Medicine is on: No future appointment listed in this department  Lab testing requirements:  TSH annually  TSH (uIU/mL)    Date Value   2017 0.43   Max refill for 12 months from last office visit or per provider note.  Prescription refilled per RN Medication Refill Policy.................... Harriet Hackett RN ....................  2018   7:45 AM

## 2018-02-13 NOTE — NURSING NOTE
Patient Information     Patient Name MRN Moses Fairbanks [id number] Male 1950      Nursing Note by Barbara Connor at 2018 11:20 AM     Author:  Barbara Connor Service:  (none) Author Type:  (none)     Filed:  2018 11:54 AM Encounter Date:  2018 Status:  Signed     :  Barbara Connor            Previous A1C is at goal of <8  HEMOGLOBIN A1C MONITORING (POCT)    Date Value   2018 7.9 % (H)   2013 7.4 % NGSP (H)     Urine microalbumin:creatine: 1.64  Foot exam unknown-declines today  Eye exam 2017    Tobacco User No  Patient is on a daily aspirin  Patient is on a Statin.  Blood pressure today of   is at the goal of <139/89 for diabetics.    Patient presents to the clinic for concerns about pressure in testicles primarily the left one.  Patient would like to would like tier exceptions with the following medications: Nitro, Ranitidine, Desoximetasone, Metoprolol.    Barbara Connor LPN        2018 11:38 AM

## 2018-02-13 NOTE — PROGRESS NOTES
Patient Information     Patient Name MRN Moses Fairbanks [id number] Male 1950      Progress Notes by Jason Moran MD at 2018  8:45 AM     Author:  Jason Moran MD  Service:  (none) Author Type:  Physician     Filed:  2018  9:55 AM  Encounter Date:  2018 Status:  Addendum     :  Jason oMran MD (Physician)        Related Notes: Original Note by Jason Moran MD (Physician) filed at 2018  9:52 AM            Type of Visit  EST    Chief Complaint  Hematospermia  ED    HPI  Mr. Whittaker is a 67 y.o. male with history of ED who presents with hematospermia.  Patient has noticed blood in semen starting about 10 days ago.  No blood in urine.  Had recent PSA.  No dysuria or pelvic pain.    Also with ED.  Using Trimix VI, 0.4mL per dose with good results.  Starting using Trimix 2 years ago.  No erections over 4 hours.      Family History  Family History       Problem   Relation Age of Onset     Heart Disease  Mother      Heart problems       Heart Disease  Other      Heart problems       Heart Disease  Other      Heart problems       Ankylosing spondylitis  Son      Ankylosing spondylitis        Other  Brother       with sudden death following shoulder surgery 2012 -- was off his Plavix for 10 days pre-operatively.       Ankylosing spondylitis  Daughter      -- Dx 2017         Review of Systems  I personally reviewed the ROS with the patient.    Nursing Notes:   Albina Russell  2018  9:48 AM  Signed  Here for testicular pain.  Review of Systems:    Weight loss:    No     Recent fever/chills:  No   Night sweats:   Yes  Current skin rash:  No   Recent hair loss:  No  Heat intolerance:  No   Cold intolerance:  No  Chest pain:   Yes   Palpitations:   No  Shortness of breath:  No   Wheezing:   No  Constipation:    No   Diarrhea:   No   Nausea:   No   Vomiting:   No   Kidney/side pain:  Yes   Back pain:   Yes  Frequent headaches:  No   Dizziness:     No  Leg  swelling:   Yes   Calf pain:    Yes    Albina Russell LPN  1/8/2018  9:27 AM            Physical Exam  Vitals:     01/08/18 0929   BP: 138/78   Cuff Site: Right Arm   Position: Sitting   Cuff Size: Adult Large   Resp: 16   Weight: 92.1 kg (203 lb)     Constitutional: No acute distress.  Alert and cooperative   Head: NCAT  Eyes: Conjunctivae normal  Cardiovascular: Regular rate.  Pulmonary/Chest: Respirations are even and non-labored bilaterally, no audible wheezing  Abdominal: Soft. No distension, tenderness, masses or guarding.   Neurological: A + O x 3.  Cranial Nerves II-XII grossly intact.  Extremities: JAGDISH x 4, Warm. No clubbing.  No cyanosis.    Skin: Pink, warm and dry.  No visible rashes noted.  Psychiatric:  Normal mood and affect  Back:  No left CVA tenderness.  No right CVA tenderness.  Genitourinary:  Nonpalpable bladder  :  Prostate 30-40g, no nodules, symmetric    Labs  CREATININE (mg/dL)    Date Value   01/05/2018 1.63 (H)       Recent Labs       01/03/18   0937   COLOR  Yellow   CLARITY  Clear   SPECGRAV  1.025   PHURINE  5.5   UROBILINOGEN  Normal   PROTEINUA  30 A       Recent Labs       01/03/18   0937   GLUCOSEUA  Negative   KETONESUA  Negative   BLOODUA  Negative   NITRITE  Negative   LEUKOCYTE  Negative     Lab Results      Component  Value Date/Time    WBCUA 0-2 01/03/2018 09:37 AM    RBCUA None Seen 01/03/2018 09:37 AM    BACTERIAUA Few 01/03/2018 09:37 AM    EPITHELIALUA None Seen 01/03/2018 09:37 AM       Assessment  Mr. Whittaker is a 67 y.o. male who presents with ED and hematospermia.  Normal OMAYRA, low PSA.    Plan  Continue Trimix VI, 0.4mL per dose for ED.  With normal PSA and OMAYRA no further testing regarding hematospermia

## 2018-02-13 NOTE — TELEPHONE ENCOUNTER
Patient Information     Patient Name MRMoses Oliveira [id number] Male 1950      Telephone Encounter by Ina Rod CNA at 2018  3:20 PM     Author:  Ina Rod CNA Service:  (none) Author Type:  NURS- Nurse Assist/Care Tech     Filed:  2018  3:22 PM Encounter Date:  2018 Status:  Signed     :  Ina Rod CNA (NURS- Nurse Assist/Care Tech)            Patient called in regards to questions about bleeding. Patient says he does not want to disclose information from where he is bleeding. Please call patient back in regards to this.

## 2018-02-13 NOTE — TELEPHONE ENCOUNTER
Patient Information     Patient Name MRN Moses Fairbanks [id number] Male 1950      Telephone Encounter by Kayleigh Rubio at 1/3/2018  9:47 AM     Author:  Kayleigh Rubio Service:  (none) Author Type:  (none)     Filed:  1/3/2018  9:50 AM Encounter Date:  1/3/2018 Status:  Signed     :  Kayleigh Rubio            Patient saw DJS today and a Urology referral was placed for Hematuria, unspecified type and Left testicular pain.  The soonest Moran was available wasn't until 2018.  He didn't like that due to the sensitivity he is having.  He would like to be worked in this week if possible.  Please call to discuss and advise.  Kayleigh Rubio ....................  1/3/2018   9:48 AM

## 2018-02-13 NOTE — PATIENT INSTRUCTIONS
Patient Information     Patient Name MRN Sex Moses Ott [id number] Male 1950      Patient Instructions by Jorge Barnett MD at 1/3/2018  8:40 AM     Author:  Jorge Barnett MD  Service:  (none) Author Type:  Physician     Filed:  1/3/2018  9:22 AM  Encounter Date:  1/3/2018 Status:  Addendum     :  Jorge Barnett MD (Physician)        Related Notes: Original Note by Jorge Barnett MD (Physician) filed at 1/3/2018  9:15 AM            2017 -- MR SPINE LUMBAR WO     HISTORY: Chronic radicular low back pain. .      TECHNIQUE: Sagittal T1, T2 and STIR as well as axial T2 images of the lumbar spine were obtained.     COMPARISON: 2017.     FINDINGS:       Leftward lumbar curvature is redemonstrated. There is degenerative anterolisthesis of L5 on S1 without associated pars defect. Sagittal lordosis is otherwise preserved. Vertebral body heights are preserved.  The vertebral body heights are preserved.  The marrow signal is notable for endplate related degenerative change at L3-4.     The distal cord and conus medullaris have a normal caliber and morphology.  The conus terminates at L2.  No abnormal cord signal is seen in the distal cord or conus.  There are no masses in the spinal canal or paravertebral soft tissues.     Degenerative disk disease includes diffuse desiccation with focal moderate disc height loss at L3-4.     At L1-2, the central spinal canal and neural foramina are patent.     At L2-3, there is a minimal diffuse disc bulge. The central spinal canal and neural foramina are patent.     At L3-4, there is a large diffuse disc bulge with moderate facet and ligamentum flavum hypertrophy. Spinal stenosis is moderate to severe. Foraminal stenoses are severe on the right and moderate on the left.     At L4-5, there is a moderate diffuse disc bulge with severe facet and ligamentum flavum hypertrophy. A right facet effusion is present. Spinal stenosis is moderate to  severe. Foraminal stenoses are severe on the right moderate to severe on the left.     At L5-S1, there is uncovering of the disc, a mild diffuse disc bulge with severe facet hypertrophy. Spinal stenosis is mild with left greater than right subarticular zone narrowing. There is severe left and moderate to severe right foraminal stenosis.     IMPRESSION:     Advanced degenerative changes at L3-4, L4-5 and L5-S1.     Electronically Signed By: Oscar Sorto on 12/20/2017 2:47 PM    1. Hematuria, unspecified type  - URINALYSIS W REFLEX MICROSCOPIC IF POSITIVE; Future  - AMB CONSULT TO UROLOGY; Future  - PSA, TOTAL; Future    2. Left testicular pain  - URINALYSIS W REFLEX MICROSCOPIC IF POSITIVE; Future  - AMB CONSULT TO UROLOGY; Future    3. Narcolepsy without cataplexy  - Dextroamphetamine Sulfate (DEXTROSTAT) 10 mg tablet; Take 1 tablet by mouth four times daily - for 01/31/18  Dispense: 360 tablet; Refill: 0  - Dextroamphetamine Sulfate (DEXTROSTAT) 10 mg tablet; Take 1 tablet by mouth four times daily - for 1/3/2018  Dispense: 360 tablet; Refill: 0  - methylphenidate HCl (RITALIN) 10 mg tablet; One tablet by mouth four times daily - for 01/31/18  Dispense: 360 tablet; Refill: 0  - methylphenidate HCl (RITALIN) 10 mg tablet; One tablet by mouth four times daily - for 1/3/2018  Dispense: 360 tablet; Refill: 0  - Dextroamphetamine Sulfate (DEXTROSTAT) 10 mg tablet; Take 1 tablet by mouth four times daily - for on or after 02/28/18  Dispense: 360 tablet; Refill: 0  - methylphenidate HCl (RITALIN) 10 mg tablet; One tablet by mouth four times daily - for on or after 02/28/18  Dispense: 360 tablet; Refill: 0    4. Pain medication agreement - 6/29/2017  - Dextroamphetamine Sulfate (DEXTROSTAT) 10 mg tablet; Take 1 tablet by mouth four times daily - for 01/31/18  Dispense: 360 tablet; Refill: 0  - Dextroamphetamine Sulfate (DEXTROSTAT) 10 mg tablet; Take 1 tablet by mouth four times daily - for 1/3/2018  Dispense: 360 tablet;  Refill: 0  - HYDROcodone-acetaminophen, 5-325 mg, (NORCO) per tablet; Take 1 tablet by mouth every 6 hours if needed  for Pain - for on or after 1/3/2018  Dispense: 60 tablet; Refill: 0  - methylphenidate HCl (RITALIN) 10 mg tablet; One tablet by mouth four times daily - for 01/31/18  Dispense: 360 tablet; Refill: 0  - methylphenidate HCl (RITALIN) 10 mg tablet; One tablet by mouth four times daily - for 1/3/2018  Dispense: 360 tablet; Refill: 0  - Dextroamphetamine Sulfate (DEXTROSTAT) 10 mg tablet; Take 1 tablet by mouth four times daily - for on or after 02/28/18  Dispense: 360 tablet; Refill: 0  - methylphenidate HCl (RITALIN) 10 mg tablet; One tablet by mouth four times daily - for on or after 02/28/18  Dispense: 360 tablet; Refill: 0    5. Degenerative disc disease, cervical  - HYDROcodone-acetaminophen, 5-325 mg, (NORCO) per tablet; Take 1 tablet by mouth every 6 hours if needed  for Pain - for on or after 1/3/2018  Dispense: 60 tablet; Refill: 0    6. Facet arthritis of cervical region (HC)  7. Cervical stenosis of spinal canal  8. Cervical radicular pain  - HYDROcodone-acetaminophen, 5-325 mg, (NORCO) per tablet; Take 1 tablet by mouth every 6 hours if needed  for Pain - for on or after 1/3/2018  Dispense: 60 tablet; Refill: 0    9. Neuroforaminal stenosis of lumbar spine - Moderately severe / Severe - MRI 12/20/2017  10. Radicular low back pain.    -- Neurosurgery and Physical Medicine and Rehabilitation (PMR) referral sent.    - they will call with date/time of appointment.        Keep lab only appointment on Friday 1/5.     Return in approximately 4 month(s), or sooner as needed for follow-up with Dr. Barnett.  -- Diabetes follow-up appointment.     Clinic : [phone]  Appointment line: [phone]

## 2018-02-13 NOTE — PROGRESS NOTES
Patient Information     Patient Name MRN Sex Moses Ott [id number] Male 1950      Progress Notes by Jorge Barnett MD at 2018 11:20 AM     Author:  Jorge Barnett MD Service:  (none) Author Type:  Physician     Filed:  2018  1:31 PM Encounter Date:  2018 Status:  Signed     :  Jorge Barnett MD (Physician)            Nursing Notes:   Barbara Connor  2018 11:54 AM  Signed  Previous A1C is at goal of <8  HEMOGLOBIN A1C MONITORING (POCT)    Date Value   2018 7.9 % (H)   2013 7.4 % NGSP (H)     Urine microalbumin:creatine: 1.64  Foot exam unknown-declines today  Eye exam 2017    Tobacco User No  Patient is on a daily aspirin  Patient is on a Statin.  Blood pressure today of   is at the goal of <139/89 for diabetics.    Patient presents to the clinic for concerns about pressure in testicles primarily the left one.  Patient would like to would like tier exceptions with the following medications: Nitro, Ranitidine, Desoximetasone, Metoprolol.    Barbara ConnorSARAHY        2018 11:38 AM      Moses Whittaker presents to clinic today for:   Chief Complaint    Patient presents with      Medication Management     HPI: Mr. Whittaker is a 67 y.o. male who presents today for evaluation of above.     (I10) Hypertension  (primary encounter diagnosis)  (K27.9) Peptic ulcer  (R21) Skin rash  (Z95.5) S/P coronary artery stent placement  (I25.2) Old inferior wall myocardial infarction  (Z95.1) S/P CABG x 2     Hypertension, currently controlled.  Tolerating current medication regimen.  We updated his allergy list today, he wanted numerous medications added.  He tolerates Toprol-XL but did not tolerate atenolol.  He tolerates Zantac/ranitidine but did not tolerate Pepcid.  He also tolerated his current topical steroid but did not tolerate the generic one his insurance prefers to cover.  Prescriptions were refilled for his preferred, better tolerated medications  today.      having a lot of chronic low back issues, feels that this is probably causing some of the radiating pain to his testicles and groin.  Has follow up with a back surgeon at the end of next month.    History of old MI, taking Plavix.  Doing well with Toprol-XL, Needs refills today.  Has only rarely needed to use his sublingual nitroglycerin.    Mr. Sosas Body mass index is 29.93 kg/(m^2). This is out of the normal range for a 67 y.o. Normal range for ages 18+ is between 18.5 and 24.9. To lose weight we reviewed risks and benefits of appropriate options such as diet, exercise, and medications. Patient's strategy will be  none; patient is not ready to act   BP Readings from Last 1 Encounters:01/24/18 : 134/68  Mr. Stokes blood pressure is out of the normal range for adults. Per JNC-8 guidelines normal adult blood pressure is < 120/80, pre-hypertensive is between 120/80 and 139/89, and hypertension is 140/90 or greater. Risks of hypertension were discussed. Patient's strategy will be reduced salt intake    Functional Capacity: about 4 METS.   Reports that he can climb a flight of stairs without any chest pain/heaviness or shortness of breath.   Patient reports no current symptoms of fevers, chills, nausea/vomiting.   No cough. No shortness of breath.   No change in bowel/bladder habits. No melena, hematochezia. No Hematuria.   No rashes. No palpitations.  No orthopnea/paroxysmal nocturnal dyspnea   No vision or hearing issues.   No significant mood issues   No bruising.     [place]:  No flowsheet data found.    PHQ9:  PHQ Depression Screening 1/3/2018 1/24/2018   Date of PHQ exam (doc flow) 1/3/2018 1/24/2018   1. Lack of interest/pleasure 0 - Not at all 0 - Not at all   2. Feeling down/depressed 0 - Not at all 0 - Not at all   PHQ-2 TOTAL SCORE 0 0   3. Trouble sleeping 0 - Not at all 0 - Not at all   4. Decreased energy 0 - Not at all 0 - Not at all   5. Appetite change 0 - Not at all 0 - Not at all   6.  Feelings of failure 0 - Not at all 0 - Not at all   7. Trouble concentrating 0 - Not at all 0 - Not at all   8. Activity level 0 - Not at all 0 - Not at all   9. Hurting yourself 0 - Not at all 0 - Not at all   PHQ-9 TOTAL SCORE 0 0   PHQ-9 Severity Level none none   Functional Impairment not difficult at all not difficult at all   Some recent data might be hidden          I have personally reviewed the past medical history, past surgical history, medications, allergies, family and social history as listed below, on 1/24/2018.    Patient Active Problem List       Diagnosis  Date Noted     Neuroforaminal stenosis of lumbar spine - Moderately severe / Severe - MRI 12/20/2017 01/03/2018     Radicular low back pain  01/03/2018     Acute bilateral low back pain with bilateral sciatica  10/27/2017     Intermittent constipation  08/08/2017     Chronic low back pain  08/08/2017     Skin rash  08/01/2017     Controlled type 2 diabetes mellitus with stage 3 chronic kidney disease, with long-term current use of insulin (HC)  03/30/2017     Erectile dysfunction  09/26/2016     Trigger point with neck pain  07/14/2016     Trigger point with back pain  07/14/2016     Insulin pump in place  03/10/2016     Pain medication agreement - 6/29/2017 12/17/2015     Trigger point of extremity  12/17/2015     Myofacial muscle pain  12/17/2015     Greater trochanteric bursitis of left hip  06/24/2015     Blister of toe of right foot  12/09/2014     Cervical stenosis of spinal canal  06/17/2014     Facet arthritis of cervical region (HC)  06/17/2014     Degenerative disc disease, cervical  06/17/2014     Left arm numbness  06/05/2014     Cervical radicular pain  06/05/2014     Left atrial enlargement - Moderate - 1/20/2014 ECHO  01/23/2014     S/P coronary artery stent placement  01/10/2014     Old inferior wall myocardial infarction  01/10/2014     S/P CABG x 2  01/10/2014     Hypertension  01/10/2014     Platelet inhibition due to Plavix  -- On Plavix (Brand Name due to generic intolerance) For Life.   01/10/2014     Myalgia  01/10/2014     CKD (chronic kidney disease) stage 3, GFR 30-59 ml/min  01/10/2014     Chronic diastolic heart failure - Mild - 1/20/2014 ECHO - EF 65%  01/10/2014     1/20/2014 ECHO FINAL IMPRESSION:   1. Normal left ventricular size and systolic function. Estimated ejection fraction 65%.   2. Mild increase in wall thickness of the ventricular septum with hypokinesis of the basilar segment of the ventricular septum and inferior wall.   3. Mild to moderate left atrial enlargement.   4. Trace tricuspid regurgitation.   5. Mild left ventricular diastolic dysfunction.         ACP (advance care planning)  12/05/2013     Diabetic neuropathy, painful (HC)  04/22/2013     G E R D  05/17/2012     OBESITY  05/17/2012     NARCOLEPSY       Total dose recommended by pulmonology he is dextro- amphetamine 40 mg plus   methylphenidate 40 mg in divided doses per day.  Total of 80 mg of short   acting amphetamines daily.          DIASTASIS RECTI  10/06/2011     C A D  09/08/2011     ERECTILE DYSFUNCTION  06/30/2011     ANGINA  12/10/2010     ANKLE EDEMA, CHRONIC  10/25/2010     HYPOTHYROIDISM       BACK PAIN, LUMBAR, WITH RADICULOPATHY       OSTEOARTHRITIS, CERVICAL SPINE       DISC DISEASE, CERVICAL       Mixed hyperlipidemia       PEPTIC ULCER DISEASE       Past Medical History:     Diagnosis  Date     CAD (coronary artery disease)     Coronary artery disease, post angioplasty      Carpal tunnel syndrome      Edema     Edema secondary to Bextra      Heart disease     Multiple coronary stents       Helicobacter pylori gastritis      Hematoma 11/2006    Right calf hematoma      Maxillary sinusitis      NARCOLEPSY      Rheumatic fever     Rheumatic fever as a child without valvular heart disease       Past Surgical History:      Procedure  Laterality Date     ANGIOPLASTY  12/00, 04/04/04    Repeat angioplasty with lt internal mammary to the lt  anterior descending and lt radial artery from aorta to the diagonal.       ANGIOPLASTY  10/01    Angioplasty with 2 vessel CABG the following week from the lt internal mammary to the lt anterior descending and right radial free graft       ANGIOPLASTY  04/2003     APPENDECTOMY OPEN  1967     CARPAL TUNNEL RELEASE  11/15/01    Right carpal tunnel release by Dr. Mace       CARPAL TUNNEL RELEASE  01/2004    Left carpal tunnel release        COLONOSCOPY  01/08/2016    -- Dr. De La Cruz -- polypectomy        COLONOSCOPY SCREENING  1999     IR ANGIOGRAM FEMORAL/EXTREMITY (IA)  09/03    Unremarkable angiogram at [facility]       IR ANGIOGRAM FEMORAL/EXTREMITY (IA)  2/26/2007    Unremarkable coronary angiogram at [facility].       PTCA  10/05/2004    Angioplasty        PTCA  02/2005    Angioplasty with stent.         PTCA  03/02/2005    Angioplasty with stent.        PTCA  09/23/2005    Angioplasty without stent       ROTATOR CUFF REPAIR  02/06/2006    Left rotator cuff repair and bone spur removal by Dr. Giraldo in [place]       Current Outpatient Prescriptions       Medication  Sig Dispense Refill     alcohol swabs (ALCOHOL PREP PADS) For home use. 1 box 0     aspirin 325 mg tablet Take  by mouth.       blood sugar diagnostic (ONE TOUCH ULTRA TEST) strip Dispense test strips covered by the patient insurance. Test 10 times per day. 900 Each 3     Blood-Glucose Meter (ACCU-CHEK ABBI PLUS METER) Dispense glucose meter, test strips and lancets covered by the patient insurance. Test 10 times per day. 1 Device 0     clopidogrel (PLAVIX) 75 mg tablet Take 1 tablet by mouth once daily. 90 tablet 4     codeine-guaiFENesin (ROBITUSSIN AC)  mg/5 mL liquid Take 10 mL by mouth every 4 hours if needed for Cough. Max dose 60 mL per 24 hrs. 200 mL 0     desoximetasone 0.25% topical (TOPICORT) 0.25 % cream Apply twice daily 180 g 3     Dextroamphetamine Sulfate (DEXTROSTAT) 10 mg tablet Take 1 tablet by mouth four times daily - for 01/31/18 360  "tablet 0     Dextroamphetamine Sulfate (DEXTROSTAT) 10 mg tablet Take 1 tablet by mouth four times daily - for 1/3/2018 360 tablet 0     Dextroamphetamine Sulfate (DEXTROSTAT) 10 mg tablet Take 1 tablet by mouth four times daily - for on or after 02/28/18 360 tablet 0     Dextromethorphan-guaFENesin (MUCINEX DM)  mg tablet Take 1 tablet by mouth 2 times daily if needed for Cough. - OTC / Generic Okay 60 tablet 1     diclofenac (VOLTAREN) 75 mg delayed-release tablet TAKE 1 TABLET BY MOUTH FOUR TIMES DAILY 360 tablet 3     DIOVAN 160 mg tablet TAKE ONE TABLET BY MOUTH TWICE DAILY 180 tablet 1     docusate (COLACE) 100 mg capsule Take 1-2 capsules by mouth 2 times daily. -- AS needed for constipation prevention 120 capsule 3     fexofenadine (ALLEGRA ALLERGY) 60 mg tablet Take 1 tablet by mouth 2 times daily. AS NEEDED for Allergy symptoms 60 tablet 3     fish oil-omega-3 fatty acids (FISH OIL) 1,000-340 mg capsule Take  by mouth.       flaxseed oil 1,000 mg cap Take  by mouth.       furosemide (LASIX) 40 mg tablet TAKE 2 TO 4 TABLETS BY MOUTH DAILY OR AS INSTRUCTED FOR LEG SWELLING 360 tablet 4     HUMALOG 100 unit/mL injection INJECT UNDER THE SKIN USING INSULIN PUMP. MAX DOSE  UNITS DAILY. 180 mL 0     hydrALAZINE (APRESOLINE) 25 mg tablet Take 1-2 tablets by mouth 4 times daily. - Take lowest effective dose for blood pressure management 720 tablet 3     HYDROcodone-acetaminophen, 5-325 mg, (NORCO) per tablet Take 1 tablet by mouth every 6 hours if needed  for Pain - for on or after 1/3/2018 60 tablet 0     Insulin Needles, Disposable, (BD INSULIN PEN NEEDLE UF) 29 x 1/2 \" For administering insulin at home. 600 Each 2     ipratropium (ATROVENT HFA) 17 mcg/actuation inhaler Inhale 2 Puffs by mouth 4 times daily. 1 Inhaler 0     IV Administration Set (INFUSION SET) iset As directed.  0     lancets (ONE TOUCH ULTRASOFT LANCETS) Test 10 times per day. 600 Each 6     levothyroxine (SYNTHROID) 125 mcg tablet " TAKE ONE TABLET BY MOUTH ONCE DAILY IN THE MORNING 90 tablet 1     MEDICAL SUPPLY, MISCELLANEOUS (GRADUATED COMPRESSION STOCKINGS) For personal use. Length: calf Strength: 20-30 mmHg 1 Packet 1     methylphenidate HCl (RITALIN) 10 mg tablet One tablet by mouth four times daily - for 01/31/18 360 tablet 0     methylphenidate HCl (RITALIN) 10 mg tablet One tablet by mouth four times daily - for 1/3/2018 360 tablet 0     methylphenidate HCl (RITALIN) 10 mg tablet One tablet by mouth four times daily - for on or after 02/28/18 360 tablet 0     methylPREDNISolone (MEDROL DOSEPAK) 4 mg tablet Take by mouth as instructed per packaging. -- check cost vs prednisone type dose pack 1 Package 0     metoprolol succinate (TOPROL XL) 50 mg sustained-release tablet Take 1 tablet by mouth 2 times daily. 180 tablet 3     nitroglycerin (NITROSTAT) 0.4 mg sublingual tablet Place 1 tablet under the tongue every 5 minutes if needed for Chest Pain. 3 Bottle 1     nystatin (MYCOSTATIN) cream Apply  topically to affected area(s) 2 times daily. For yeast skin fold rash -- vs Topical OTC Anti-fungals okay - monistat, lotrimin 30 g 3     Psyllium Seed-Sucrose (METAMUCIL, SUGAR,) powder Take 1 tsp by mouth 3 times daily. - for constipation prevention  0     ranitidine (ZANTAC) 150 mg tablet Take 1 tablet by mouth 2 times daily. 180 tablet 3     ranolazine (RANEXA) 500 mg Controlled-Release tablet Take 2 tablets by mouth 2 times daily. -- cancel other Rx -- give 3 month supply 360 tablet 3     rosuvastatin (CRESTOR) 10 mg tablet TAKE 1 TABLET BY MOUTH TWICE DAILY 180 tablet 3     sodium chloride-aloe vera (SALINE NASAL, ALOE VERA,) nasal gel Inhale  in the nostril(s) every hour if needed for Nasal Dryness. 14.1 g 3     Sub-Q Infusion Pump Access (ACCU-CHEK SPIRIT CARTRIDGE SYS) [facility] As directed.  0     tiZANidine (ZANAFLEX) 2 mg tablet Take 1-2 tablets by mouth every 6 hours if needed for Muscle Spasm. 40 tablet 1     Allergies      Allergen    Reactions     Atenolol  Intolerance-Can't Take     Atenolol caused constipation and Indigestion -- Toprol XL (Metoprolol Succinate ER) worked and tolerated well      Famotidine  Other - Describe In Comment Field     + Caused Headache and Rash -- tolerated Ranitidine and worked well.      Gabapentin  Mental Status Change     Hydrocortisone  Other - Describe In Comment Field     Burning and stinging sensation with Hydrocortisone - doesn't help itching or rash -- tolerated Desoximetasone cream and worked well.       Bextra [Valdecoxib]  Edema     Lipitor [Atorvastatin]  Hives     Lisinopril  Cough     Lyrica [Pregabalin]  Mental Status Change     Novolog [Insulin Aspart]  Rash     Other [Unlisted Allergen (Include Detail In Comments)]  Hives     Chlorine water      Family History       Problem   Relation Age of Onset     Heart Disease  Mother      Heart problems       Heart Disease  Other      Heart problems       Heart Disease  Other      Heart problems       Ankylosing spondylitis  Son      Ankylosing spondylitis        Other  Brother       with sudden death following shoulder surgery 2012 -- was off his Plavix for 10 days pre-operatively.       Ankylosing spondylitis  Daughter      -- Dx 2017       Family Status     Relation  Status     Brother      with sudden death following shoulder surgery 2012 -- was off his Plavix for 10 days pre-operatively.      Son Alive     Mother      Other      Other      Son      Brother      Daughter      Social History     Social History        Marital status:       Spouse name: N/A     Number of children:  N/A     Years of education:  N/A     Social History Main Topics        Smoking status:  Never Smoker     Smokeless tobacco:  Never Used     Alcohol use  No     Drug use:  No     Sexual activity:  Yes     Partners: Female     Other Topics  Concern     Not on file      Social History Narrative     He is on Social Security Disability for coronary  "artery disease and cervical arthritis and disk disease.   Dax obed.  No tobacco.             Pertinent ROS was performed and was negative as noted in HPI above.     EXAM:   Vitals:     01/24/18 1141   BP: 134/68   Cuff Site: Right Arm   Position: Sitting   Cuff Size: Adult Regular   Pulse: 60   Weight: 92.6 kg (204 lb 2 oz)     BP Readings from Last 3 Encounters:    01/24/18 134/68   01/08/18 138/78   01/03/18 138/78     Wt Readings from Last 3 Encounters:    01/24/18 92.6 kg (204 lb 2 oz)   01/08/18 92.1 kg (203 lb)   01/03/18 93.1 kg (205 lb 4 oz)     Estimated body mass index is 29.93 kg/(m^2) as calculated from the following:    Height as of 9/19/17: 1.759 m (5' 9.25\").    Weight as of this encounter: 92.6 kg (204 lb 2 oz).     EXAM:  Constitutional: well groomed / good hygiene, casual dress  ENT: Normocephalic, Atraumatic  Lymphatic Exam: Non-palpable nodes in neck, clavicular regions  Pulmonary: Lungs are clear to auscultation bilaterally, without wheezes or crackles  Cardiovascular Exam: regular rate and rhythm, no pedal edema  Gastrointestinal Exam: Obese  Soft, non-tender, non-distended, positive bowel sounds   Integument: No abnormal rashes, sores, or ulcerations noted  Neurologic Exam: CN 3-12 grossly intact   Musculoskeletal Exam: Moves upper extremities symmetrically, No focal weakness  Psychiatric Exam: Awake and Alert, Affect and mood appropriate    INVESTIGATIONS:  Results for orders placed or performed in visit on 01/05/18      CBC W PLT NO DIFF      Result  Value Ref Range    WHITE BLOOD COUNT         6.7 4.5 - 11.0 thou/cu mm    RED BLOOD COUNT           4.23 (L) 4.30 - 5.90 mil/cu mm    HEMOGLOBIN                13.7 13.5 - 17.5 g/dL    HEMATOCRIT                42.5 37.0 - 53.0 %    MCV                       101 (H) 80 - 100 fL    MCH                       32.4 26.0 - 34.0 pg    MCHC                      32.2 32.0 - 36.0 g/dL    RDW                       13.1 11.5 - 15.5 %    PLATELET COUNT   "          228 140 - 440 thou/cu mm    MPV                       8.7 6.5 - 11.0 fL   COMP METABOLIC PANEL      Result  Value Ref Range    SODIUM 137 133 - 143 mmol/L    POTASSIUM 5.1 3.5 - 5.1 mmol/L    CHLORIDE 107 98 - 107 mmol/L    CO2,TOTAL 24 21 - 31 mmol/L    ANION GAP 6 5 - 18                    GLUCOSE 95 70 - 105 mg/dL    CALCIUM 9.0 8.6 - 10.3 mg/dL    BUN 24 7 - 25 mg/dL    CREATININE 1.63 (H) 0.70 - 1.30 mg/dL    BUN/CREAT RATIO           15                    GFR if African American 52 (L) >60 ml/min/1.73m2    GFR if not  43 (L) >60 ml/min/1.73m2    ALBUMIN 4.4 3.5 - 5.7 g/dL    PROTEIN,TOTAL 6.6 6.4 - 8.9 g/dL    GLOBULIN                  2.2 2.0 - 3.7 g/dL    A/G RATIO 2.0 1.0 - 2.0                    BILIRUBIN,TOTAL 0.4 0.3 - 1.0 mg/dL    ALK PHOSPHATASE 67 34 - 104 IU/L    ALT (SGPT) 26 7 - 52 IU/L    AST (SGOT) 23 13 - 39 IU/L   HEMOGLOBIN A1C MONITORING (POCT)      Result  Value Ref Range    HEMOGLOBIN A1C MONITORING (POCT) 7.9 (H) 4.0 - 6.2 %    ESTIMATED AVERAGE GLUCOSE  180 mg/dL   LIPID PANEL      Result  Value Ref Range    CHOLESTEROL,TOTAL 119 <200 mg/dL    TRIGLYCERIDES 394 (H) <150 mg/dL    HDL CHOLESTEROL 31 23 - 92 mg/dL    NON-HDL CHOLESTEROL 88 <145 mg/dl    CHOL/HDL RATIO            3.84 <4.50                    LDL CHOLESTEROL 9 <100 mg/dL    PROVIDER ORDERED STATUS RANDOM    PSA, TOTAL      Result  Value Ref Range    PSA TOTAL (DIAGNOSTIC) 1.525 <=3.100 ng/mL       ASSESSMENT AND PLAN:  Moses was seen today for medication management.    Diagnoses and all orders for this visit:    Hypertension  -     metoprolol succinate (TOPROL XL) 50 mg sustained-release tablet; Take 1 tablet by mouth 2 times daily.    Peptic ulcer  -     ranitidine (ZANTAC) 150 mg tablet; Take 1 tablet by mouth 2 times daily.    Skin rash  -     desoximetasone 0.25% topical (TOPICORT) 0.25 % cream; Apply twice daily    S/P coronary artery stent placement  -     metoprolol succinate (TOPROL XL) 50  mg sustained-release tablet; Take 1 tablet by mouth 2 times daily.    Old inferior wall myocardial infarction  -     metoprolol succinate (TOPROL XL) 50 mg sustained-release tablet; Take 1 tablet by mouth 2 times daily.    S/P CABG x 2  -     metoprolol succinate (TOPROL XL) 50 mg sustained-release tablet; Take 1 tablet by mouth 2 times daily.      lab results and schedule of future lab studies reviewed with patient, reviewed diet, exercise and weight control, recommended sodium restriction    -- Expected clinical course discussed   -- Medications and their side effects discussed    Moses is also recommended to eat a heart-healthy diet, do regular aerobic exercises, maintain a desirable body weight, and avoid tobacco products. These recommendations are from the American Heart Association (AHA) which stresses the importance of lifestyle changes to lower cardiovascular disease risk.    Return if symptoms worsen or fail to improve.    Patient Instructions     Allergies      Allergen   Reactions     Atenolol  Intolerance-Can't Take     Atenolol caused constipation and Indigestion -- Toprol XL (Metoprolol Succinate ER) worked and tolerated well      Famotidine  Other - Describe In Comment Field     + Caused Headache and Rash -- tolerated Ranitidine and worked well.      Gabapentin  Mental Status Change     Hydrocortisone  Other - Describe In Comment Field     Burning and stinging sensation with Hydrocortisone - doesn't help itching or rash -- tolerated Desoximetasone cream and worked well.       Bextra [Valdecoxib]  Edema     Lipitor [Atorvastatin]  Hives     Lisinopril  Cough     Lyrica [Pregabalin]  Mental Status Change     Novolog [Insulin Aspart]  Rash     Other [Unlisted Allergen (Include Detail In Comments)]  Hives     Chlorine water        Medications refilled...    1. Peptic ulcer  - ranitidine (ZANTAC) 150 mg tablet; Take 1 tablet by mouth 2 times daily.  Dispense: 180 tablet; Refill: 3    2. Skin rash  -  desoximetasone 0.25% topical (TOPICORT) 0.25 % cream; Apply twice daily  Dispense: 180 g; Refill: 3    3. S/P coronary artery stent placement  4. Old inferior wall myocardial infarction  5. S/P CABG x 2  6. Hypertension  - metoprolol succinate (TOPROL XL) 50 mg sustained-release tablet; Take 1 tablet by mouth 2 times daily.  Dispense: 180 tablet; Refill: 3      Return as needed for follow-up with Dr. Barnett.    Clinic : [phone]  Appointment line: [phone]      Jorge Barnett MD

## 2018-02-13 NOTE — TELEPHONE ENCOUNTER
"Patient Information     Patient Name MRN Moses Fairbanks [id number] Male 1950      Telephone Encounter by Margie Calderón RN at 1/3/2018 10:29 AM     Author:  Margie Calderón RN Service:  (none) Author Type:  NURS- Registered Nurse     Filed:  1/3/2018 10:34 AM Encounter Date:  2017 Status:  Signed     :  Margie Calderón RN (NURS- Registered Nurse)            Angiotensin Receptor Blockers (ARB)    Office visit in the past 12 months or per provider note.    Last visit with CATALINA MCDOWELL was on: 2017 in [facility]  Next visit with CATALINA MCDOWELL is on: 2018 in [facility]  Next visit with Internal Medicine is on: 2018 in [facility]    Lab test requirements:  Creatinine and Potassium annually, if ordering lab, order BMP.  CREATININE (mg/dL)    Date Value   10/04/2017 1.56 (H)     POTASSIUM (mmol/L)    Date Value   10/04/2017 5.1       Max refill for 12 months from last office visit or per provider note    Per OV on 10/5/17  \"  Patient Instructions      Labs are looking better.    Kidney function/creatinine has improved.    Hemoglobin A1c is now controlled\"    Prescription refilled per RN Medication Refill Policy.................... MARGIE CALDERÓN RN ....................  1/3/2018   10:34 AM                "

## 2018-02-13 NOTE — TELEPHONE ENCOUNTER
"Patient Information     Patient Name MRN Moses Fairbanks [id number] Male 1950      Telephone Encounter by Margie Longo RN at 2018  4:33 PM     Author:  Margie Longo RN Service:  (none) Author Type:  NURS- Registered Nurse     Filed:  2018  4:58 PM Encounter Date:  2018 Status:  Signed     :  Margie Longo RN (NURS- Registered Nurse)            Spoke with patient and patient states he had some bleeding from penis during shower today. Patient states has had issues with lower back due to vertebrae. Patient states that he feels pressure in the lower abdominal area.  States may be has a little more frequency than usual after furosemide is out of system.  Patient states is on Plavix and aspirin.  Patient states he urinated and maybe noticed a slight tinge of blood on his underwear from earlier but did not really notice any in toilet.  Denies pain with urination but states has pressure.  Denies fever  Informed patient he should be [facility] and patient states he will not [facility].   He only wants to see Dr. Lambert. Per Dr lambert he can see him tomorrow at 8:40 and patient to arrive earlier to leave urine.  Informed patient of this and he verbalized understanding.  Informed patient if bleeding continues or symptoms worsen he should be seen tonight in ER. Patient states he will.  Patient scheduled for tomorrow with Dr. Lambert.  MARGIE LONGO, GLENDA ....................  2018   4:56 PM    Reason for Disposition    Urinating more frequently than usual (i.e., frequency)    Answer Assessment - Initial Assessment Questions  1. SYMPTOM: \"What's the main symptom you're concerned about?\" (e.g., frequency, incontinence)      Blood from penis this afternoon in shower, patient unsure if from urine, states he may have more frequency than usual when after furosemide pill.  2. ONSET: \"When did the  ________  start?\"      This afternoon after shower  3. PAIN: " "\"Is there any pain?\" If so, ask: \"How bad is it?\" (Scale: 1-10; mild, moderate, severe)      States is having abdominal pressure, having testicle tenderness, states has low back pain but has had this for quite some time.  4. CAUSE: \"What do you think is causing the symptoms?\"      unsure  5. OTHER SYMPTOMS: \"Do you have any other symptoms?\" (e.g., fever, flank pain, blood in urine, pain with urination)      Unsure of other symptoms. Patient states has not gone to bathroom since noticing blood from penis after shower. Denies fever.  6. PREGNANCY: \"Is there any chance you are pregnant?\" \"When was your last menstrual period?\"      n/a    Protocols used: ADULT URINARY SYMPTOMS-A-AH            "

## 2018-02-13 NOTE — PATIENT INSTRUCTIONS
Patient Information     Patient Name MRN Moses Fairbanks [id number] Male 1950      Patient Instructions by Jorge Barnett MD at 2018 11:20 AM     Author:  Jorge Barnett MD Service:  (none) Author Type:  Physician     Filed:  2018 12:04 PM Encounter Date:  2018 Status:  Signed     :  Jorge Barnett MD (Physician)            Allergies      Allergen   Reactions     Atenolol  Intolerance-Can't Take     Atenolol caused constipation and Indigestion -- Toprol XL (Metoprolol Succinate ER) worked and tolerated well      Famotidine  Other - Describe In Comment Field     + Caused Headache and Rash -- tolerated Ranitidine and worked well.      Gabapentin  Mental Status Change     Hydrocortisone  Other - Describe In Comment Field     Burning and stinging sensation with Hydrocortisone - doesn't help itching or rash -- tolerated Desoximetasone cream and worked well.       Bextra [Valdecoxib]  Edema     Lipitor [Atorvastatin]  Hives     Lisinopril  Cough     Lyrica [Pregabalin]  Mental Status Change     Novolog [Insulin Aspart]  Rash     Other [Unlisted Allergen (Include Detail In Comments)]  Hives     Chlorine water        Medications refilled...    1. Peptic ulcer  - ranitidine (ZANTAC) 150 mg tablet; Take 1 tablet by mouth 2 times daily.  Dispense: 180 tablet; Refill: 3    2. Skin rash  - desoximetasone 0.25% topical (TOPICORT) 0.25 % cream; Apply twice daily  Dispense: 180 g; Refill: 3    3. S/P coronary artery stent placement  4. Old inferior wall myocardial infarction  5. S/P CABG x 2  6. Hypertension  - metoprolol succinate (TOPROL XL) 50 mg sustained-release tablet; Take 1 tablet by mouth 2 times daily.  Dispense: 180 tablet; Refill: 3      Return as needed for follow-up with Dr. Barnett.    Clinic : [phone]  Appointment line: [phone]

## 2018-02-13 NOTE — NURSING NOTE
Patient Information     Patient Name MRMoses Oliveira [id number] Male 1950      Nursing Note by Albina Russell at 2018  8:45 AM     Author:  Albina Russell Service:  (none) Author Type:  (none)     Filed:  2018  9:48 AM Encounter Date:  2018 Status:  Signed     :  Albina Russell            Here for testicular pain.  Review of Systems:    Weight loss:    No     Recent fever/chills:  No   Night sweats:   Yes  Current skin rash:  No   Recent hair loss:  No  Heat intolerance:  No   Cold intolerance:  No  Chest pain:   Yes   Palpitations:   No  Shortness of breath:  No   Wheezing:   No  Constipation:    No   Diarrhea:   No   Nausea:   No   Vomiting:   No   Kidney/side pain:  Yes   Back pain:   Yes  Frequent headaches:  No   Dizziness:     No  Leg swelling:   Yes   Calf pain:    Yes    Albina Russell LPN  2018  9:27 AM

## 2018-02-13 NOTE — TELEPHONE ENCOUNTER
Patient Information     Patient Name MRMoses Oliveira [id number] Male 1950      Telephone Encounter by Barbara Connor at 2018 10:37 AM     Author:  Barbara Connor Service:  (none) Author Type:  (none)     Filed:  2018 10:38 AM Encounter Date:  2018 Status:  Signed     :  Barbara Connor            Request for information in regards to Metoprolol Succinate from [facility].    Barbara Connor LPN        2018 10:38 AM

## 2018-02-13 NOTE — PROGRESS NOTES
Patient Information     Patient Name MRMoses Oliveira [id number] Male 1950      Progress Notes by Jorge Barnett MD at 1/3/2018  8:40 AM     Author:  Jorge Barnett MD Service:  (none) Author Type:  Physician     Filed:  1/3/2018  9:40 AM Encounter Date:  1/3/2018 Status:  Signed     :  Jorge Barnett MD (Physician)            Nursing Notes:   Barbara Connor  1/3/2018  9:01 AM  Signed  Patient presents to the clinic for bloody discharge from his penis after intercourse yesterday.  Patient denies any other bloody discharge at this time.    Previous A1C is at goal of <8  HEMOGLOBIN A1C MONITORING (POCT)    Date Value   10/04/2017 7.5 % (H)   2013 7.4 % NGSP (H)     Urine microalbumin:creatine: 1.64  Foot exam unknown-declined  Eye exam 2017    Patient is not a current smoker  Patient is on a daily aspirin  Patient is on a Statin.  Blood pressure today of 140/78 is at the goal of <139/89 for diabetics.    Barbara Connor LPN..............1/3/2018 8:59 AM      Moses Whittaker presents to clinic today for:   Chief Complaint    Patient presents with      Urinary Problem     HPI: Mr. Whittaker is a 67 y.o. male who presents today for evaluation of above.     (R31.9) Hematuria, unspecified type  (primary encounter diagnosis)  (N50.812) Left testicular pain  (G47.419) Narcolepsy without cataplexy  (Z02.89) Pain medication agreement - 2017  (M50.30) Degenerative disc disease, cervical  (M46.92) Facet arthritis of cervical region (HC)  (M48.02) Cervical stenosis of spinal canal  (M54.12) Cervical radicular pain  (M99.83) Neuroforaminal stenosis of lumbar spine - Moderately severe / Severe - MRI 2017  (M54.10) Radicular low back pain     Patient reports having hematuria yesterday.  More bloody penile discharge after sexual intercourse rather than blood in the urine.  States that he was dripping bloody discharge from the end of his meatus.  This is better today.  He had a  lot of testicular, left-sided pain overnight last night.  States that both testicles are bit more sore and sensitive for a while now.  He would like to see urology.  Referral sent.    Narcolepsy, chronic, stable on current medication regimen.  Needs refills today.    Cervical spinal stenosis, rarely using hydrocodone.  He still has at least had tablets left from his last prescription.  He isn't having to use a bit more, in the recent past due to more significant pain issues.  He does not want to use them regularly.  States his lower back is been much more of a problem lately.  He is wondering about injections versus therapy versus neurosurgical consultation.  Recommend seeing neurosurgery.  His MRI was just recently obtained and showed significant neural foraminal stenosis bilaterally.    Patient has heart disease and takes aspirin Plavix.  Advised that he not stop these -- unless absolutely necessary -- due to significant coronary artery disease.  He has high risk of stent thrombosis.  Advised, to avoid back injections and to discuss with surgery to see if patient is a surgical candidate given his current back anatomy.    Mr. Sosas Body mass index is 30.09 kg/(m^2). This is out of the normal range for a 67 y.o. Normal range for ages 18+ is between 18.5 and 24.9. To lose weight we reviewed risks and benefits of appropriate options such as diet, exercise, and medications. Patient's strategy will be  none; patient is not ready to act   BP Readings from Last 1 Encounters:01/03/18 : 138/78  Mr. Stokes blood pressure is out of the normal range for adults. Per JNC-8 guidelines normal adult blood pressure is < 120/80, pre-hypertensive is between 120/80 and 139/89, and hypertension is 140/90 or greater. Risks of hypertension were discussed. Patient's strategy will be weight loss and reduced salt intake    Functional Capacity: about 4 METS. + Having more chronic, more continuous radicular pain down the legs.  States  that he has significant back pain when standing up.  Much better when sitting down.  Patient reports no current symptoms of fevers, chills, nausea/vomiting.   No cough. No shortness of breath.   No change in bowel/bladder habits. No melena, hematochezia.     + Bloody penile discharge noted yesterday.  States that this happened after having sexual intercourse.  Reports history of prostatitis in the past.  No fevers.  No blood noted this morning.  She would like to see urology.  Check urinalysis.  Check PSA.     No rashes. No palpitations.  No orthopnea/paroxysmal nocturnal dyspnea   No vision or hearing issues.   No significant mood issues   No bruising.     [place]:  No flowsheet data found.    PHQ9:  PHQ Depression Screening 12/18/2017 1/3/2018   Date of PHQ exam (doc flow) 12/18/2017 1/3/2018   1. Lack of interest/pleasure 0 - Not at all 0 - Not at all   2. Feeling down/depressed 0 - Not at all 0 - Not at all   PHQ-2 TOTAL SCORE 0 0   3. Trouble sleeping 0 - Not at all 0 - Not at all   4. Decreased energy 0 - Not at all 0 - Not at all   5. Appetite change 0 - Not at all 0 - Not at all   6. Feelings of failure 0 - Not at all 0 - Not at all   7. Trouble concentrating 0 - Not at all 0 - Not at all   8. Activity level 0 - Not at all 0 - Not at all   9. Hurting yourself 0 - Not at all 0 - Not at all   PHQ-9 TOTAL SCORE 0 0   PHQ-9 Severity Level none none   Functional Impairment not difficult at all not difficult at all   Some recent data might be hidden          I have personally reviewed the past medical history, past surgical history, medications, allergies, family and social history as listed below, on 1/3/2018.    Patient Active Problem List       Diagnosis  Date Noted     Neuroforaminal stenosis of lumbar spine - Moderately severe / Severe - MRI 12/20/2017 01/03/2018     Radicular low back pain  01/03/2018     Acute bilateral low back pain with bilateral sciatica  10/27/2017     Intermittent constipation  08/08/2017      Chronic low back pain  08/08/2017     Skin rash  08/01/2017     Controlled type 2 diabetes mellitus with stage 3 chronic kidney disease, with long-term current use of insulin (HC)  03/30/2017     Erectile dysfunction  09/26/2016     Trigger point with neck pain  07/14/2016     Trigger point with back pain  07/14/2016     Insulin pump in place  03/10/2016     Pain medication agreement - 6/29/2017 12/17/2015     Trigger point of extremity  12/17/2015     Myofacial muscle pain  12/17/2015     Greater trochanteric bursitis of left hip  06/24/2015     Blister of toe of right foot  12/09/2014     Cervical stenosis of spinal canal  06/17/2014     Facet arthritis of cervical region (HC)  06/17/2014     Degenerative disc disease, cervical  06/17/2014     Left arm numbness  06/05/2014     Cervical radicular pain  06/05/2014     Left atrial enlargement - Moderate - 1/20/2014 ECHO  01/23/2014     S/P coronary artery stent placement  01/10/2014     Old inferior wall myocardial infarction  01/10/2014     S/P CABG x 2  01/10/2014     Hypertension  01/10/2014     Platelet inhibition due to Plavix -- On Plavix (Brand Name due to generic intolerance) For Life.   01/10/2014     Myalgia  01/10/2014     CKD (chronic kidney disease) stage 3, GFR 30-59 ml/min  01/10/2014     Chronic diastolic heart failure - Mild - 1/20/2014 ECHO - EF 65%  01/10/2014     1/20/2014 ECHO FINAL IMPRESSION:   1. Normal left ventricular size and systolic function. Estimated ejection fraction 65%.   2. Mild increase in wall thickness of the ventricular septum with hypokinesis of the basilar segment of the ventricular septum and inferior wall.   3. Mild to moderate left atrial enlargement.   4. Trace tricuspid regurgitation.   5. Mild left ventricular diastolic dysfunction.         ACP (advance care planning)  12/05/2013     Diabetic neuropathy, painful (HC)  04/22/2013     G E R D  05/17/2012     OBESITY  05/17/2012     NARCOLEPSY       Total dose  recommended by pulmonology he is dextro- amphetamine 40 mg plus   methylphenidate 40 mg in divided doses per day.  Total of 80 mg of short   acting amphetamines daily.          DIASTASIS RECTI  10/06/2011     C A D  09/08/2011     ERECTILE DYSFUNCTION  06/30/2011     ANGINA  12/10/2010     ANKLE EDEMA, CHRONIC  10/25/2010     HYPOTHYROIDISM       BACK PAIN, LUMBAR, WITH RADICULOPATHY       OSTEOARTHRITIS, CERVICAL SPINE       DISC DISEASE, CERVICAL       Mixed hyperlipidemia       PEPTIC ULCER DISEASE       Past Medical History:     Diagnosis  Date     CAD (coronary artery disease)     Coronary artery disease, post angioplasty      Carpal tunnel syndrome      Edema     Edema secondary to Bextra      Heart disease     Multiple coronary stents       Helicobacter pylori gastritis      Hematoma 11/2006    Right calf hematoma      Maxillary sinusitis      NARCOLEPSY      Rheumatic fever     Rheumatic fever as a child without valvular heart disease       Past Surgical History:      Procedure  Laterality Date     ANGIOPLASTY  12/00, 04/04/04    Repeat angioplasty with lt internal mammary to the lt anterior descending and lt radial artery from aorta to the diagonal.       ANGIOPLASTY  10/01    Angioplasty with 2 vessel CABG the following week from the lt internal mammary to the lt anterior descending and right radial free graft       ANGIOPLASTY  04/2003     APPENDECTOMY OPEN  1967     CARPAL TUNNEL RELEASE  11/15/01    Right carpal tunnel release by Dr. Mace       CARPAL TUNNEL RELEASE  01/2004    Left carpal tunnel release        COLONOSCOPY  01/08/2016    -- Dr. De La Cruz -- polypectomy        COLONOSCOPY SCREENING  1999     IR ANGIOGRAM FEMORAL/EXTREMITY (IA)  09/03    Unremarkable angiogram at [facility]       IR ANGIOGRAM FEMORAL/EXTREMITY (IA)  2/26/2007    Unremarkable coronary angiogram at [facility].       PTCA  10/05/2004    Angioplasty        PTCA  02/2005    Angioplasty with stent.         PTCA  03/02/2005    Angioplasty with  stent.        PTCA  09/23/2005    Angioplasty without stent       ROTATOR CUFF REPAIR  02/06/2006    Left rotator cuff repair and bone spur removal by Dr. Giraldo in [place]       Current Outpatient Prescriptions       Medication  Sig Dispense Refill     alcohol swabs (ALCOHOL PREP PADS) For home use. 1 box 0     aspirin 325 mg tablet Take  by mouth.       blood sugar diagnostic (ONE TOUCH ULTRA TEST) strip Dispense test strips covered by the patient insurance. Test 10 times per day. 900 Each 3     Blood-Glucose Meter (ACCU-CHEK ABBI PLUS METER) Dispense glucose meter, test strips and lancets covered by the patient insurance. Test 10 times per day. 1 Device 0     clopidogrel (PLAVIX) 75 mg tablet Take 1 tablet by mouth once daily. 90 tablet 4     codeine-guaiFENesin (ROBITUSSIN AC)  mg/5 mL liquid Take 10 mL by mouth every 4 hours if needed for Cough. Max dose 60 mL per 24 hrs. 200 mL 0     desoximetasone 0.25% topical (TOPICORT) 0.25 % cream Apply twice daily 180 g 3     Dextroamphetamine Sulfate (DEXTROSTAT) 10 mg tablet Take 1 tablet by mouth four times daily - for 01/31/18 360 tablet 0     Dextroamphetamine Sulfate (DEXTROSTAT) 10 mg tablet Take 1 tablet by mouth four times daily - for 1/3/2018 360 tablet 0     Dextroamphetamine Sulfate (DEXTROSTAT) 10 mg tablet Take 1 tablet by mouth four times daily - for on or after 02/28/18 360 tablet 0     Dextromethorphan-guaFENesin (MUCINEX DM)  mg tablet Take 1 tablet by mouth 2 times daily if needed for Cough. - OTC / Generic Okay 60 tablet 1     diclofenac (VOLTAREN) 75 mg delayed-release tablet TAKE 1 TABLET BY MOUTH FOUR TIMES DAILY 360 tablet 3     docusate (COLACE) 100 mg capsule Take 1-2 capsules by mouth 2 times daily. -- AS needed for constipation prevention 120 capsule 3     fexofenadine (ALLEGRA ALLERGY) 60 mg tablet Take 1 tablet by mouth 2 times daily. AS NEEDED for Allergy symptoms 60 tablet 3     fish oil-omega-3 fatty acids (FISH OIL)  "1,000-340 mg capsule Take  by mouth.       flaxseed oil 1,000 mg cap Take  by mouth.       furosemide (LASIX) 40 mg tablet TAKE 2 TO 4 TABLETS BY MOUTH DAILY OR AS INSTRUCTED FOR LEG SWELLING 360 tablet 4     HUMALOG 100 unit/mL injection INJECT UNDER THE SKIN USING INSULIN PUMP. MAX DOSE  UNITS DAILY. 180 mL 0     hydrALAZINE (APRESOLINE) 25 mg tablet Take 1-2 tablets by mouth 4 times daily. - Take lowest effective dose for blood pressure management 720 tablet 3     HYDROcodone-acetaminophen, 5-325 mg, (NORCO) per tablet Take 1 tablet by mouth every 6 hours if needed  for Pain - for on or after 1/3/2018 60 tablet 0     Insulin Needles, Disposable, (BD INSULIN PEN NEEDLE UF) 29 x 1/2 \" For administering insulin at home. 600 Each 2     ipratropium (ATROVENT HFA) 17 mcg/actuation inhaler Inhale 2 Puffs by mouth 4 times daily. 1 Inhaler 0     IV Administration Set (INFUSION SET) iset As directed.  0     lancets (ONE TOUCH ULTRASOFT LANCETS) Test 10 times per day. 600 Each 6     levothyroxine (SYNTHROID) 125 mcg tablet Take 1 tablet by mouth every morning. 90 tablet 4     MEDICAL SUPPLY, MISCELLANEOUS (GRADUATED COMPRESSION STOCKINGS) For personal use. Length: calf Strength: 20-30 mmHg 1 Packet 1     methylphenidate HCl (RITALIN) 10 mg tablet One tablet by mouth four times daily - for 01/31/18 360 tablet 0     methylphenidate HCl (RITALIN) 10 mg tablet One tablet by mouth four times daily - for 1/3/2018 360 tablet 0     methylphenidate HCl (RITALIN) 10 mg tablet One tablet by mouth four times daily - for on or after 02/28/18 360 tablet 0     methylPREDNISolone (MEDROL DOSEPAK) 4 mg tablet Take by mouth as instructed per packaging. -- check cost vs prednisone type dose pack 1 Package 0     metoprolol succinate (TOPROL XL) 50 mg sustained-release tablet Take 1 tablet by mouth 2 times daily. 180 tablet 3     nitroglycerin (NITROSTAT) 0.4 mg sublingual tablet Place 1 tablet under the tongue every 5 minutes if needed " for Chest Pain. 3 Bottle 1     nystatin (MYCOSTATIN) cream Apply  topically to affected area(s) 2 times daily. For yeast skin fold rash -- vs Topical OTC Anti-fungals okay - monistat, lotrimin 30 g 3     Psyllium Seed-Sucrose (METAMUCIL, SUGAR,) powder Take 1 tsp by mouth 3 times daily. - for constipation prevention  0     ranitidine (ZANTAC) 150 mg tablet Take 1 tablet by mouth 2 times daily. 180 tablet 3     ranolazine (RANEXA) 500 mg Controlled-Release tablet Take 2 tablets by mouth 2 times daily. -- cancel other Rx -- give 3 month supply 360 tablet 3     rosuvastatin (CRESTOR) 10 mg tablet TAKE 1 TABLET BY MOUTH TWICE DAILY 180 tablet 3     sodium chloride-aloe vera (SALINE NASAL, ALOE VERA,) nasal gel Inhale  in the nostril(s) every hour if needed for Nasal Dryness. 14.1 g 3     Sub-Q Infusion Pump Access (ACCU-CHEK SPIRIT CARTRIDGE SYS) misc As directed.  0     tiZANidine (ZANAFLEX) 2 mg tablet Take 1-2 tablets by mouth every 6 hours if needed for Muscle Spasm. 40 tablet 1     valsartan (DIOVAN) 160 mg tablet Take 1 tablet by mouth 2 times daily. 180 tablet 3     Allergies      Allergen   Reactions     Gabapentin  Mental Status Change     Bextra [Valdecoxib]  Edema     Lipitor [Atorvastatin]  Hives     Lisinopril  Cough     Lyrica [Pregabalin]  Mental Status Change     Novolog [Insulin Aspart]  Rash     Other [Unlisted Allergen (Include Detail In Comments)]  Hives     Chlorine water      Family History       Problem   Relation Age of Onset     Heart Disease  Mother      Heart problems       Heart Disease  Other      Heart problems       Heart Disease  Other      Heart problems       Ankylosing spondylitis  Son      Ankylosing spondylitis        Other  Brother       with sudden death following shoulder surgery 2012 -- was off his Plavix for 10 days pre-operatively.       Ankylosing spondylitis  Daughter      -- Dx        Family Status     Relation  Status     Brother      with sudden  "death following shoulder surgery April 2012 -- was off his Plavix for 10 days pre-operatively.      Son Alive     Mother      Other      Other      Son      Brother      Daughter      Social History     Social History        Marital status:       Spouse name: N/A     Number of children:  N/A     Years of education:  N/A     Social History Main Topics        Smoking status:  Never Smoker     Smokeless tobacco:  Never Used     Alcohol use  No     Drug use:  No     Sexual activity:  Yes     Partners: Female     Other Topics  Concern     Not on file      Social History Narrative     He is on Social Security Disability for coronary artery disease and cervical arthritis and disk disease.   Dax obed.  No tobacco.               Pertinent ROS was performed and was negative as noted in HPI above.     EXAM:   Vitals:     01/03/18 0857   BP: 138/78   Cuff Site: Right Arm   Position: Sitting   Cuff Size: Adult Regular   Pulse: 72   Weight: 93.1 kg (205 lb 4 oz)     BP Readings from Last 3 Encounters:    01/03/18 138/78   12/18/17 138/78   10/27/17 158/78     Wt Readings from Last 3 Encounters:    01/03/18 93.1 kg (205 lb 4 oz)   12/18/17 88.5 kg (195 lb)   10/27/17 91.6 kg (202 lb)     Estimated body mass index is 30.09 kg/(m^2) as calculated from the following:    Height as of 9/19/17: 1.759 m (5' 9.25\").    Weight as of this encounter: 93.1 kg (205 lb 4 oz).     EXAM:  Constitutional: well groomed / good hygiene, casual dress  ENT: Normocephalic, Atraumatic, Thyroid without nodules or tenderness   Nose/Mouth: Oral pharynx without erythema or exudates, Nose is patent bilaterally, no rhinorrhea and Dental hygeine adequate   Eyes:  Extraocular muscles intact, Sclera non-icteric, Conjunctiva without erythema  Lymphatic Exam: Non-palpable nodes in neck, clavicular regions  Pulmonary: Lungs are clear to auscultation bilaterally, without wheezes or crackles  Cardiovascular Exam: regular rate and rhythm, trace pedal edema " present  Gastrointestinal Exam: Obese  Genitourinary Exam: Urogenital exam Deferred to Urology   Integument: No abnormal rashes, sores, or ulcerations noted  Neurologic Exam: CN 3-12 grossly intact   Musculoskeletal Exam: Walks with slight limp.  Moves slowly.    Psychiatric Exam: Awake and Alert, Affect and mood appropriate  Speech is fluent, Thought process is normal    INVESTIGATIONS:  Results for orders placed or performed in visit on 12/18/17      LIPID PANEL      Result  Value Ref Range    CHOLESTEROL,TOTAL 101 <200 mg/dL    TRIGLYCERIDES 119 <150 mg/dL    HDL CHOLESTEROL 34 23 - 92 mg/dL    NON-HDL CHOLESTEROL 67 <145 mg/dl    CHOL/HDL RATIO            2.97 <4.50                    LDL CHOLESTEROL 43 <100 mg/dL    PROVIDER ORDERED STATUS RANDOM    SEDIMENTATION RATE      Result  Value Ref Range    SEDIMENTATION RATE        7 <21 mm/hr   RHEUMATOID FACTOR      Result  Value Ref Range    RHEUMATOID FACTOR QUANT. <14.0 <14.0 IU/mL   HLA B27      Result  Value Ref Range    HLA B27 ANTIGEN           Negative Not Applicable    INTERPRETATION SEE COMMENTS    CCP      Result  Value Ref Range    CCP Antibody,IgG/IgA <4.6 <=19.9 CU   CRP, inflammatory      Result  Value Ref Range    C-REACTIVE PROTEIN <1.000 <1.000 mg/dL       ASSESSMENT AND PLAN:  Moses was seen today for urinary problem.    Diagnoses and all orders for this visit:    Hematuria, unspecified type  -     URINALYSIS W REFLEX MICROSCOPIC IF POSITIVE; Future  -     AMB CONSULT TO UROLOGY; Future  -     PSA, TOTAL; Future  -     URINALYSIS W REFLEX MICROSCOPIC IF POSITIVE    Left testicular pain  -     URINALYSIS W REFLEX MICROSCOPIC IF POSITIVE; Future  -     AMB CONSULT TO UROLOGY; Future  -     URINALYSIS W REFLEX MICROSCOPIC IF POSITIVE    Narcolepsy without cataplexy  -     Dextroamphetamine Sulfate (DEXTROSTAT) 10 mg tablet; Take 1 tablet by mouth four times daily - for 01/31/18  -     Dextroamphetamine Sulfate (DEXTROSTAT) 10 mg tablet; Take 1  tablet by mouth four times daily - for 1/3/2018  -     methylphenidate HCl (RITALIN) 10 mg tablet; One tablet by mouth four times daily - for 01/31/18  -     methylphenidate HCl (RITALIN) 10 mg tablet; One tablet by mouth four times daily - for 1/3/2018  -     Dextroamphetamine Sulfate (DEXTROSTAT) 10 mg tablet; Take 1 tablet by mouth four times daily - for on or after 02/28/18  -     methylphenidate HCl (RITALIN) 10 mg tablet; One tablet by mouth four times daily - for on or after 02/28/18    Pain medication agreement - 6/29/2017  -     Dextroamphetamine Sulfate (DEXTROSTAT) 10 mg tablet; Take 1 tablet by mouth four times daily - for 01/31/18  -     Dextroamphetamine Sulfate (DEXTROSTAT) 10 mg tablet; Take 1 tablet by mouth four times daily - for 1/3/2018  -     HYDROcodone-acetaminophen, 5-325 mg, (NORCO) per tablet; Take 1 tablet by mouth every 6 hours if needed  for Pain - for on or after 1/3/2018  -     methylphenidate HCl (RITALIN) 10 mg tablet; One tablet by mouth four times daily - for 01/31/18  -     methylphenidate HCl (RITALIN) 10 mg tablet; One tablet by mouth four times daily - for 1/3/2018  -     Dextroamphetamine Sulfate (DEXTROSTAT) 10 mg tablet; Take 1 tablet by mouth four times daily - for on or after 02/28/18  -     methylphenidate HCl (RITALIN) 10 mg tablet; One tablet by mouth four times daily - for on or after 02/28/18    Degenerative disc disease, cervical  -     HYDROcodone-acetaminophen, 5-325 mg, (NORCO) per tablet; Take 1 tablet by mouth every 6 hours if needed  for Pain - for on or after 1/3/2018    Facet arthritis of cervical region (HC)  -     HYDROcodone-acetaminophen, 5-325 mg, (NORCO) per tablet; Take 1 tablet by mouth every 6 hours if needed  for Pain - for on or after 1/3/2018    Cervical stenosis of spinal canal  -     HYDROcodone-acetaminophen, 5-325 mg, (NORCO) per tablet; Take 1 tablet by mouth every 6 hours if needed  for Pain - for on or after 1/3/2018    Cervical radicular  pain  -     HYDROcodone-acetaminophen, 5-325 mg, (NORCO) per tablet; Take 1 tablet by mouth every 6 hours if needed  for Pain - for on or after 1/3/2018    Neuroforaminal stenosis of lumbar spine - Moderately severe / Severe - MRI 12/20/2017  -     AMB CONSULT TO NEUROSURGEON; Future  -     AMB CONSULT TO PHYSICAL MEDICINE REHAB; Future    Radicular low back pain  -     AMB CONSULT TO NEUROSURGEON; Future  -     AMB CONSULT TO PHYSICAL MEDICINE REHAB; Future      reviewed diet, exercise and weight control, recommended sodium restriction, cardiovascular risk and specific lipid/LDL goals reviewed, specific diabetic recommendations low cholesterol diet, weight control and daily exercise discussed and home glucose monitoring emphasized, use of aspirin and plavix to prevent MI and TIA's discussed    -- Expected clinical course discussed   -- Medications and their side effects discussed    Moses is also recommended to eat a heart-healthy diet, do regular aerobic exercises, maintain a desirable body weight, and avoid tobacco products. These recommendations are from the American Heart Association (AHA) which stresses the importance of lifestyle changes to lower cardiovascular disease risk.     Return in about 4 months (around 5/3/2018) for -- Diabetes follow-up appointment. .    Patient Instructions   12/20/2017 -- MR SPINE LUMBAR WO     HISTORY: Chronic radicular low back pain. .      TECHNIQUE: Sagittal T1, T2 and STIR as well as axial T2 images of the lumbar spine were obtained.     COMPARISON: 12/18/2017.     FINDINGS:       Leftward lumbar curvature is redemonstrated. There is degenerative anterolisthesis of L5 on S1 without associated pars defect. Sagittal lordosis is otherwise preserved. Vertebral body heights are preserved.  The vertebral body heights are preserved.  The marrow signal is notable for endplate related degenerative change at L3-4.     The distal cord and conus medullaris have a normal caliber and  morphology.  The conus terminates at L2.  No abnormal cord signal is seen in the distal cord or conus.  There are no masses in the spinal canal or paravertebral soft tissues.     Degenerative disk disease includes diffuse desiccation with focal moderate disc height loss at L3-4.     At L1-2, the central spinal canal and neural foramina are patent.     At L2-3, there is a minimal diffuse disc bulge. The central spinal canal and neural foramina are patent.     At L3-4, there is a large diffuse disc bulge with moderate facet and ligamentum flavum hypertrophy. Spinal stenosis is moderate to severe. Foraminal stenoses are severe on the right and moderate on the left.     At L4-5, there is a moderate diffuse disc bulge with severe facet and ligamentum flavum hypertrophy. A right facet effusion is present. Spinal stenosis is moderate to severe. Foraminal stenoses are severe on the right moderate to severe on the left.     At L5-S1, there is uncovering of the disc, a mild diffuse disc bulge with severe facet hypertrophy. Spinal stenosis is mild with left greater than right subarticular zone narrowing. There is severe left and moderate to severe right foraminal stenosis.     IMPRESSION:     Advanced degenerative changes at L3-4, L4-5 and L5-S1.     Electronically Signed By: Oscar Sorto on 12/20/2017 2:47 PM    1. Hematuria, unspecified type  - URINALYSIS W REFLEX MICROSCOPIC IF POSITIVE; Future  - AMB CONSULT TO UROLOGY; Future  - PSA, TOTAL; Future    2. Left testicular pain  - URINALYSIS W REFLEX MICROSCOPIC IF POSITIVE; Future  - AMB CONSULT TO UROLOGY; Future    3. Narcolepsy without cataplexy  - Dextroamphetamine Sulfate (DEXTROSTAT) 10 mg tablet; Take 1 tablet by mouth four times daily - for 01/31/18  Dispense: 360 tablet; Refill: 0  - Dextroamphetamine Sulfate (DEXTROSTAT) 10 mg tablet; Take 1 tablet by mouth four times daily - for 1/3/2018  Dispense: 360 tablet; Refill: 0  - methylphenidate HCl (RITALIN) 10 mg  tablet; One tablet by mouth four times daily - for 01/31/18  Dispense: 360 tablet; Refill: 0  - methylphenidate HCl (RITALIN) 10 mg tablet; One tablet by mouth four times daily - for 1/3/2018  Dispense: 360 tablet; Refill: 0  - Dextroamphetamine Sulfate (DEXTROSTAT) 10 mg tablet; Take 1 tablet by mouth four times daily - for on or after 02/28/18  Dispense: 360 tablet; Refill: 0  - methylphenidate HCl (RITALIN) 10 mg tablet; One tablet by mouth four times daily - for on or after 02/28/18  Dispense: 360 tablet; Refill: 0    4. Pain medication agreement - 6/29/2017  - Dextroamphetamine Sulfate (DEXTROSTAT) 10 mg tablet; Take 1 tablet by mouth four times daily - for 01/31/18  Dispense: 360 tablet; Refill: 0  - Dextroamphetamine Sulfate (DEXTROSTAT) 10 mg tablet; Take 1 tablet by mouth four times daily - for 1/3/2018  Dispense: 360 tablet; Refill: 0  - HYDROcodone-acetaminophen, 5-325 mg, (NORCO) per tablet; Take 1 tablet by mouth every 6 hours if needed  for Pain - for on or after 1/3/2018  Dispense: 60 tablet; Refill: 0  - methylphenidate HCl (RITALIN) 10 mg tablet; One tablet by mouth four times daily - for 01/31/18  Dispense: 360 tablet; Refill: 0  - methylphenidate HCl (RITALIN) 10 mg tablet; One tablet by mouth four times daily - for 1/3/2018  Dispense: 360 tablet; Refill: 0  - Dextroamphetamine Sulfate (DEXTROSTAT) 10 mg tablet; Take 1 tablet by mouth four times daily - for on or after 02/28/18  Dispense: 360 tablet; Refill: 0  - methylphenidate HCl (RITALIN) 10 mg tablet; One tablet by mouth four times daily - for on or after 02/28/18  Dispense: 360 tablet; Refill: 0    5. Degenerative disc disease, cervical  - HYDROcodone-acetaminophen, 5-325 mg, (NORCO) per tablet; Take 1 tablet by mouth every 6 hours if needed  for Pain - for on or after 1/3/2018  Dispense: 60 tablet; Refill: 0    6. Facet arthritis of cervical region (HC)  7. Cervical stenosis of spinal canal  8. Cervical radicular pain  -  HYDROcodone-acetaminophen, 5-325 mg, (NORCO) per tablet; Take 1 tablet by mouth every 6 hours if needed  for Pain - for on or after 1/3/2018  Dispense: 60 tablet; Refill: 0    9. Neuroforaminal stenosis of lumbar spine - Moderately severe / Severe - MRI 12/20/2017  10. Radicular low back pain.    -- Neurosurgery and Physical Medicine and Rehabilitation (PMR) referral sent.    - they will call with date/time of appointment.        Keep lab only appointment on Friday 1/5.     Return in approximately 4 month(s), or sooner as needed for follow-up with Dr. Barnett.  -- Diabetes follow-up appointment.     Clinic : [phone]  Appointment line: [phone]      Jorge Barnett MD

## 2018-02-13 NOTE — TELEPHONE ENCOUNTER
Patient Information     Patient Name MRMoses Oliveira [id number] Male 1950      Telephone Encounter by Albina Russell at 1/3/2018  3:56 PM     Author:  Albina Russell Service:  (none) Author Type:  (none)     Filed:  1/3/2018  3:57 PM Encounter Date:  1/3/2018 Status:  Signed     :  Albina Russell            Per Dr. Moran he can be seen on Monday.  called him to inform him about it.  Albina Russell LPN  1/3/2018  3:56 PM

## 2018-02-15 ENCOUNTER — TRANSFERRED RECORDS (OUTPATIENT)
Dept: HEALTH INFORMATION MANAGEMENT | Facility: OTHER | Age: 68
End: 2018-02-15

## 2018-02-28 DIAGNOSIS — Z79.4 CONTROLLED TYPE 2 DIABETES MELLITUS WITH STAGE 3 CHRONIC KIDNEY DISEASE, WITH LONG-TERM CURRENT USE OF INSULIN (H): Primary | ICD-10-CM

## 2018-02-28 DIAGNOSIS — N18.30 CONTROLLED TYPE 2 DIABETES MELLITUS WITH STAGE 3 CHRONIC KIDNEY DISEASE, WITH LONG-TERM CURRENT USE OF INSULIN (H): Primary | ICD-10-CM

## 2018-02-28 DIAGNOSIS — E11.22 CONTROLLED TYPE 2 DIABETES MELLITUS WITH STAGE 3 CHRONIC KIDNEY DISEASE, WITH LONG-TERM CURRENT USE OF INSULIN (H): Primary | ICD-10-CM

## 2018-03-01 ENCOUNTER — HOSPITAL ENCOUNTER (OUTPATIENT)
Dept: MRI IMAGING | Facility: OTHER | Age: 68
Discharge: HOME | End: 2018-03-01
Attending: NEUROLOGICAL SURGERY | Admitting: NEUROLOGICAL SURGERY
Payer: MEDICARE

## 2018-03-01 DIAGNOSIS — M54.2 NECK PAIN: ICD-10-CM

## 2018-03-01 PROCEDURE — 72141 MRI NECK SPINE W/O DYE: CPT

## 2018-03-05 ENCOUNTER — TRANSFERRED RECORDS (OUTPATIENT)
Dept: HEALTH INFORMATION MANAGEMENT | Facility: OTHER | Age: 68
End: 2018-03-05

## 2018-03-06 NOTE — TELEPHONE ENCOUNTER
Prescription approved per [facility]. Warning overridden as patient has been taking Humalog with no reaction.   Carine Paulson RN on 3/6/2018 at 2:42 PM

## 2018-03-09 ENCOUNTER — OFFICE VISIT (OUTPATIENT)
Dept: INTERNAL MEDICINE | Facility: OTHER | Age: 68
End: 2018-03-09
Attending: INTERNAL MEDICINE
Payer: MEDICARE

## 2018-03-09 VITALS
TEMPERATURE: 98.3 F | WEIGHT: 204.4 LBS | DIASTOLIC BLOOD PRESSURE: 72 MMHG | HEART RATE: 60 BPM | SYSTOLIC BLOOD PRESSURE: 130 MMHG | BODY MASS INDEX: 29.97 KG/M2

## 2018-03-09 DIAGNOSIS — R68.89 FLU-LIKE SYMPTOMS: Primary | ICD-10-CM

## 2018-03-09 DIAGNOSIS — R05.9 COUGH: ICD-10-CM

## 2018-03-09 PROBLEM — E11.9 DIABETES MELLITUS, TYPE II (H): Status: ACTIVE | Noted: 2017-03-30

## 2018-03-09 LAB
FLUAV+FLUBV RNA SPEC QL NAA+PROBE: NEGATIVE
FLUAV+FLUBV RNA SPEC QL NAA+PROBE: NEGATIVE
RSV RNA SPEC NAA+PROBE: NEGATIVE
SPECIMEN SOURCE: NORMAL

## 2018-03-09 PROCEDURE — 87631 RESP VIRUS 3-5 TARGETS: CPT | Performed by: INTERNAL MEDICINE

## 2018-03-09 PROCEDURE — G0463 HOSPITAL OUTPT CLINIC VISIT: HCPCS

## 2018-03-09 PROCEDURE — 99214 OFFICE O/P EST MOD 30 MIN: CPT | Performed by: INTERNAL MEDICINE

## 2018-03-09 RX ORDER — ALBUTEROL SULFATE 90 UG/1
1-2 AEROSOL, METERED RESPIRATORY (INHALATION) EVERY 4 HOURS PRN
Qty: 1 INHALER | Refills: 1 | Status: SHIPPED | OUTPATIENT
Start: 2018-03-09 | End: 2019-04-11

## 2018-03-09 ASSESSMENT — PAIN SCALES - GENERAL: PAINLEVEL: SEVERE PAIN (6)

## 2018-03-09 NOTE — PROGRESS NOTES
Nursing Notes:   Stephan Akbar, LPN  3/9/2018  1:35 PM  Signed  Moses presents to the clinic today for concerns of flu like symptoms. Patient states that his symptoms include headache, sore throat, nausea and a cough. These symptoms have been going on for 3 days.      Stephan Akbar, SARAHY 03/09/18 1:17 PM            Nursing note reviewed with patient.  Accurracy and completeness verified.   Mr. Whittaker is a 68 year old male who:  Patient presents with:  Cough  Nausea  Headache    HPI     ICD-10-CM    1. Flu-like symptoms R68.89 albuterol (PROAIR HFA/PROVENTIL HFA/VENTOLIN HFA) 108 (90 BASE) MCG/ACT Inhaler     Influenza A and B and RSV PCR   2. Cough R05 albuterol (PROAIR HFA/PROVENTIL HFA/VENTOLIN HFA) 108 (90 BASE) MCG/ACT Inhaler     Influenza A and B and RSV PCR     + Patient states that he has been having significant headaches bodyaches muscle aches started a couple days ago.  Coughing has been worsening the last couple days.  Has had some chills and sweats.    Wife is sick at home with fevers.    Patient has had similar respiratory infections in the past and did very well with some kind of antitussive Delsym cough suppressant as well as an inhaler.  He would like a prescription for albuterol inhaler today.    States that he is having sore throat and some runny nose as well.    He wants influenza testing today.  No rashes or sores.  No excessive bruising or bleeding.  Bowel habits are normal.  No change in urinary habits.  Mood is good.    Blood sugars have been higher the last couple days.    Functional Capacity: about 4 METS.      Review of Systems     REMINGTON:   No flowsheet data found.  PHQ9:  PHQ-9 SCORE 1/3/2018 1/24/2018 3/9/2018   Total Score 0 0 0       I have personally reviewed the past medical history, past surgical history, medications, allergies, family and social history as listed below, on 3/9/2018.    Allergies   Allergen Reactions     Famotidine Other (See Comments)     + Caused Headache and  Rash -- tolerated Ranitidine and worked well.     Gabapentin      Other reaction(s): Mental Status Change     Hydrocortisone Other (See Comments)     Burning and stinging sensation with Hydrocortisone - doesn't help itching or rash -- tolerated Desoximetasone cream and worked well.      Atorvastatin Hives     Lisinopril Cough     No Clinical Screening - See Comments Hives     Chlorine water     Pregabalin      Other reaction(s): Mental Status Change     Valdecoxib Swelling     Insulin Aspart Rash       Current Outpatient Prescriptions   Medication Sig Dispense Refill     albuterol (PROAIR HFA/PROVENTIL HFA/VENTOLIN HFA) 108 (90 BASE) MCG/ACT Inhaler Inhale 1-2 puffs into the lungs every 4 hours as needed for shortness of breath / dyspnea or wheezing 1 Inhaler 1     HUMALOG 100 UNIT/ML injection INJECT UNDER THE SKIN USING INSULIN PUMP. MAX DOSE  UNITS DAILY. 180 mL 0     [employer] ALCOHOL SWABS PADS For home use.       aspirin (GOODSENSE ASPIRIN) 325 MG tablet Take  by mouth.       blood glucose monitoring (ONETOUCH ULTRA) test strip Dispense test strips covered by the patient insurance. Test 10 times per day.       Blood Glucose Monitoring Suppl (GLUCOCOM BLOOD GLUCOSE MONITOR) WAQAS Dispense glucose meter, test strips and lancets covered by the patient insurance. Test 10 times per day.       clopidogrel (PLAVIX) 75 MG tablet Take 1 tablet by mouth daily       desoximetasone (TOPICORT) 0.25 % cream Apply 1 Application topically 2 times daily       dextroamphetamine (DEXTROSTAT) 10 MG tablet Take 1 tablet by mouth 4 times daily - for 01/31/18       diclofenac (VOLTAREN) 75 MG EC tablet Take 1 tablet by mouth 4 times daily       valsartan (DIOVAN) 160 MG tablet Take 1 tablet by mouth 2 times daily       Omega-3 Fatty Acids (FISH OIL PO) Take  by mouth.       Flaxseed, Linseed, (FLAXSEED OIL) 1000 MG CAPS Take  by mouth.       furosemide (LASIX) 40 MG tablet Take 2-4 tablets by mouth daily Or as instructed for leg  swelling       hydrALAZINE (APRESOLINE) 25 MG tablet Take 1-2 tablets by mouth 4 times daily - Take lowest effective dose for blood pressure management       HYDROcodone-acetaminophen (NORCO) 5-325 MG per tablet Take 1 tablet by mouth every 6 hours as needed for pain - for on or after 1/3/2018       Insulin Pen Needle (PEN NEEDLES) 29G X 12MM MISC For administering insulin at home.       IV Sets-Tubing (SOLUTION ADMINISTRATION SET) MISC As directed.       thin (NO BRAND SPECIFIED) lancets Test 10 times per day.       levothyroxine (SYNTHROID/LEVOTHROID) 125 MCG tablet Take 1 tablet by mouth every morning       methylphenidate (RITALIN) 10 MG tablet Take 1 tablet by mouth 4 times daily - for 01/31/18       methylphenidate (RITALIN) 10 MG tablet Take 1 tablet by mouth 4 times daily - for on or after 01/28/18       metoprolol succinate (TOPROL-XL) 50 MG 24 hr tablet Take 1 tablet by mouth 2 times daily       nitroGLYcerin (NITROSTAT) 0.4 MG sublingual tablet Place 1 tablet under the tongue every 5 minutes as needed for chest pain       ranitidine (ZANTAC) 150 MG tablet Take 1 tablet by mouth 2 times daily       ranolazine (RANEXA) 500 MG 12 hr tablet Take 2 tablets by mouth 2 times daily - cancel other Rx - give 3 month supply       rosuvastatin (CRESTOR) 10 MG tablet Take 1 tablet by mouth 2 times daily       Saline GEL Spray 1 spray in nostril every hour as needed For Nasal Dryness       docusate sodium (COLACE) 100 MG capsule Take 1-2 capsules by mouth 2 times daily as needed for constipation prevention       ipratropium (ATROVENT HFA) 17 MCG/ACT Inhaler Inhale 2 puffs into the lungs 4 times daily       PSYLLIUM PO Take 1 tsp. by mouth 3 times daily - for constipation prevention       tiZANidine (ZANAFLEX) 2 MG tablet Take 1-2 tablets by mouth every 6 hours as needed for muscle spasms          Patient Active Problem List    Diagnosis Date Noted     Thoracic or lumbosacral neuritis or radiculitis, unspecified  01/31/2018     Priority: Medium     Degeneration of cervical intervertebral disc 01/31/2018     Priority: Medium     Hypothyroidism 01/31/2018     Priority: Medium     Overview:   IMO Update 10/11       Mixed hyperlipidemia 01/31/2018     Priority: Medium     Narcolepsy 01/31/2018     Priority: Medium     Overview:   Total dose recommended by pulmonology he is dextro- amphetamine 40 mg plus   methylphenidate 40 mg in divided doses per day.  Total of 80 mg of short   acting amphetamines daily.       Osteoarthritis of spine 01/31/2018     Priority: Medium     Peptic ulcer disease 01/31/2018     Priority: Medium     Neuroforaminal stenosis of lumbar spine 01/03/2018     Priority: Medium     Radicular low back pain 01/03/2018     Priority: Medium     Acute bilateral low back pain with bilateral sciatica 10/27/2017     Priority: Medium     Chronic low back pain 08/08/2017     Priority: Medium     Intermittent constipation 08/08/2017     Priority: Medium     Skin rash 08/01/2017     Priority: Medium     Diabetes mellitus, type II (H) 03/30/2017     Priority: Medium     Overview:   Followed by Dr. Little  IMO Update 10/11       Erectile dysfunction 09/26/2016     Priority: Medium     Trigger point with back pain 07/14/2016     Priority: Medium     Trigger point with neck pain 07/14/2016     Priority: Medium     Insulin pump in place 03/10/2016     Priority: Medium     Myofacial muscle pain 12/17/2015     Priority: Medium     Pain medication agreement 12/17/2015     Priority: Medium     Trigger point of extremity 12/17/2015     Priority: Medium     Greater trochanteric bursitis of left hip 06/24/2015     Priority: Medium     Blister of toe of right foot 12/09/2014     Priority: Medium     Cervical stenosis of spinal canal 06/17/2014     Priority: Medium     Degenerative disc disease, cervical 06/17/2014     Priority: Medium     Facet arthritis of cervical region (H) 06/17/2014     Priority: Medium     Cervical radicular  pain 06/05/2014     Priority: Medium     Left arm numbness 06/05/2014     Priority: Medium     Left atrial enlargement 01/23/2014     Priority: Medium     Chronic diastolic heart failure (H) 01/10/2014     Priority: Medium     Overview:   1/20/2014 ECHO FINAL IMPRESSION:   1. Normal left ventricular size and systolic function. Estimated ejection fraction 65%.   2. Mild increase in wall thickness of the ventricular septum with hypokinesis of the basilar segment of the ventricular septum and inferior wall.   3. Mild to moderate left atrial enlargement.   4. Trace tricuspid regurgitation.   5. Mild left ventricular diastolic dysfunction.        CKD (chronic kidney disease) stage 3, GFR 30-59 ml/min 01/10/2014     Priority: Medium     Hypertension 01/10/2014     Priority: Medium     Myalgia 01/10/2014     Priority: Medium     Old inferior wall myocardial infarction 01/10/2014     Priority: Medium     Platelet inhibition due to Plavix 01/10/2014     Priority: Medium     S/P CABG x 2 01/10/2014     Priority: Medium     S/P coronary artery stent placement 01/10/2014     Priority: Medium     ACP (advance care planning) 12/05/2013     Priority: Medium     Painful diabetic neuropathy (H) 04/22/2013     Priority: Medium     Esophageal reflux 05/17/2012     Priority: Medium     Obesity 05/17/2012     Priority: Medium     Diastasis of muscle 10/06/2011     Priority: Medium     Coronary atherosclerosis 09/08/2011     Priority: Medium     Psychosexual dysfunction with inhibited sexual excitement 06/30/2011     Priority: Medium     Angina pectoris (H) 12/10/2010     Priority: Medium     Edema 10/25/2010     Priority: Medium     Chest pain 02/15/2007     Priority: Medium     Overview:   IMO Update 10/11       Congestive heart failure (H) 02/15/2007     Priority: Medium     Overview:   IMO Update 10/11       Coronary atherosclerosis of native coronary artery 02/15/2007     Priority: Medium     Overview:   IMO Update 10/11       Other  specified forms of chronic ischemic heart disease 02/15/2007     Priority: Medium     Peripheral vascular disease (H) 02/15/2007     Priority: Medium     Overview:   IMO Update 10/11       Postsurgical percutaneous transluminal coronary angioplasty status 02/15/2007     Priority: Medium     Overview:   IMO Update 10/11       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     Gastritis without bleeding     No Comments Provided     Heart disease     Multiple coronary stents     Narcolepsy without cataplexy     No Comments Provided     Other injury of unspecified body region, initial encounter (CODE)     11/2006,Right calf hematoma     Rheumatic fever without heart involvement     Rheumatic fever as a child without valvular heart disease     Past Surgical History:   Procedure Laterality Date     ANGIOPLASTY      12/00, 04/04/04,Repeat angioplasty with lt internal mammary to the lt anterior descending and lt radial artery from aorta to the diagonal.     ANGIOPLASTY      10/01,Angioplasty with 2 vessel CABG the following week from the lt internal mammary to the lt anterior descending and right radial free graft     ANGIOPLASTY      04/2003     ARTHROSCOPY SHOULDER ROTATOR CUFF REPAIR      02/06/2006,Left rotator cuff repair and bone spur removal by Dr. Giraldo in [place]     COLONOSCOPY      1999     COLONOSCOPY      01/08/2016,-- Dr. De La Cruz -- polypectomy     OTHER SURGICAL HISTORY      09/03,913426,IR ANGIOGRAM FEMORAL/EXTREMITY ([place]),Unremarkable angiogram at [facility]     OTHER SURGICAL HISTORY      10/05/2004,,PTCA,Angioplasty     OTHER SURGICAL HISTORY      02/2005,,PTCA,Angioplasty with stent.     OTHER SURGICAL HISTORY       2005,,PTCA,Angioplasty with stent.     OTHER SURGICAL HISTORY      2005,,PTCA,Angioplasty without stent     OTHER SURGICAL HISTORY      2007,909274,IR ANGIOGRAM FEMORAL/EXTREMITY ([place]),Unremarkable coronary angiogram at [facility].     OTHER SURGICAL HISTORY      1967,SUR38,APPENDECTOMY OPEN     RELEASE CARPAL TUNNEL      11/15/01,Right carpal tunnel release by Dr. Mace     RELEASE CARPAL TUNNEL      2004,Left carpal tunnel release     Social History     Social History     Marital status:      Spouse name: N/A     Number of children: N/A     Years of education: N/A     Social History Main Topics     Smoking status: Never Smoker     Smokeless tobacco: Never Used     Alcohol use No     Drug use: No      Comment: Drug use: No     Sexual activity: Yes     Partners: Female     Other Topics Concern     None     Social History Narrative    He is on Social Security Disability for coronary artery disease and cervical arthritis and disk disease.   Dax clay.  No tobacco.     Family History   Problem Relation Age of Onset     HEART DISEASE Mother      Heart Disease,Heart problems     HEART DISEASE Other      Heart Disease,Heart problems     HEART DISEASE Other      Heart Disease,Heart problems     Ankylosing Spondylitis Son      Ankylosing spondylitis,Ankylosing spondylitis     Other - See Comments Brother       with sudden death following shoulder surgery 2012 -- was off his Plavix for 10 days pre-operatively.     Ankylosing Spondylitis Daughter      Ankylosing spondylitis,-- Dx        EXAM:   Vitals:    18 1329   BP: 130/72   BP Location: Right arm   Patient Position: Sitting   Cuff Size: Adult Regular   Pulse: 60   Temp: 98.3  F (36.8  C)   TempSrc: Tympanic   Weight: 204 lb 6.4 oz (92.7 kg)       Current Pain Score: Severe Pain (6)     BP Readings from Last 3 Encounters:   18 130/72   18 134/68   18 138/78    Wt Readings from Last 3 Encounters:  "  03/09/18 204 lb 6.4 oz (92.7 kg)   01/24/18 204 lb 2 oz (92.6 kg)   01/08/18 203 lb (92.1 kg)      Estimated body mass index is 29.97 kg/(m^2) as calculated from the following:    Height as of 9/19/17: 5' 9.25\" (1.759 m).    Weight as of this encounter: 204 lb 6.4 oz (92.7 kg).     Physical Exam   Constitutional: He appears well-developed and well-nourished. No distress.   Eyes: Conjunctivae are normal. No scleral icterus.   Neck: No thyromegaly present.   Cardiovascular: Normal rate and regular rhythm.    Pulmonary/Chest: Effort normal. No respiratory distress.   Frequent coughing.  Nonproductive.  Few basilar crackles and scattered wheezes bilaterally.  Mild tachypnea.   Musculoskeletal: He exhibits no tenderness or deformity.   Lymphadenopathy:     He has no cervical adenopathy.   Neurological: He is alert. No cranial nerve deficit.   Skin: Skin is warm and dry.   Somewhat pale appearing   Psychiatric: He has a normal mood and affect.       INVESTIGATIONS:  Results for orders placed or performed in visit on 03/09/18   Influenza A and B and RSV PCR   Result Value Ref Range    Specimen Description Nasal     Influenza A PCR Negative NEG^Negative    Influenza B PCR Negative NEG^Negative    Resp Syncytial Virus Negative NEG^Negative       ASSESSMENT AND PLAN:  Problem List Items Addressed This Visit     None      Visit Diagnoses     Flu-like symptoms    -  Primary    Relevant Medications    albuterol (PROAIR HFA/PROVENTIL HFA/VENTOLIN HFA) 108 (90 BASE) MCG/ACT Inhaler    Other Relevant Orders    Influenza A and B and RSV PCR (Completed)    Cough        Relevant Medications    albuterol (PROAIR HFA/PROVENTIL HFA/VENTOLIN HFA) 108 (90 BASE) MCG/ACT Inhaler    Other Relevant Orders    Influenza A and B and RSV PCR (Completed)        reviewed diet, exercise and weight control, recommended sodium restriction  -- Expected clinical course discussed    -- Medications and their side effects discussed    Patient Instructions "   Delsym can be dosed 10-20 mg by mouth every 4 hours or 30 mg every 6-8 hours.    1. Flu-like symptoms  - albuterol (PROAIR HFA/PROVENTIL HFA/VENTOLIN HFA) 108 (90 BASE) MCG/ACT Inhaler; Inhale 1-2 puffs into the lungs every 4 hours as needed for shortness of breath / dyspnea or wheezing  Dispense: 1 Inhaler; Refill: 1  - Influenza A and B and RSV PCR    2. Cough  - albuterol (PROAIR HFA/PROVENTIL HFA/VENTOLIN HFA) 108 (90 BASE) MCG/ACT Inhaler; Inhale 1-2 puffs into the lungs every 4 hours as needed for shortness of breath / dyspnea or wheezing  Dispense: 1 Inhaler; Refill: 1  - Influenza A and B and RSV PCR    Return as needed for follow-up with Dr. Barnett.    Clinic : [phone]  Appointment line: [phone]      Jorge Barnett MD  Internal Medicine  [facility]

## 2018-03-09 NOTE — PATIENT INSTRUCTIONS
Delsym can be dosed 10-20 mg by mouth every 4 hours or 30 mg every 6-8 hours.    1. Flu-like symptoms  - albuterol (PROAIR HFA/PROVENTIL HFA/VENTOLIN HFA) 108 (90 BASE) MCG/ACT Inhaler; Inhale 1-2 puffs into the lungs every 4 hours as needed for shortness of breath / dyspnea or wheezing  Dispense: 1 Inhaler; Refill: 1  - Influenza A and B and RSV PCR    2. Cough  - albuterol (PROAIR HFA/PROVENTIL HFA/VENTOLIN HFA) 108 (90 BASE) MCG/ACT Inhaler; Inhale 1-2 puffs into the lungs every 4 hours as needed for shortness of breath / dyspnea or wheezing  Dispense: 1 Inhaler; Refill: 1  - Influenza A and B and RSV PCR    Return as needed for follow-up with Dr. Barnett.    Clinic : [phone]  Appointment line: [phone]

## 2018-03-09 NOTE — LETTER
Moses Whittaker  [address] [address]    3/9/2018      Dear Moses Whittaker,    The result of your recent tests are included below:    Influenza A, B, and RSV testing are returned negative/normal.    Results for orders placed or performed in visit on 03/09/18   Influenza A and B and RSV PCR   Result Value Ref Range    Specimen Description Nasal     Influenza A PCR Negative NEG^Negative    Influenza B PCR Negative NEG^Negative    Resp Syncytial Virus Negative NEG^Negative       If you have any further questions or problems, please contact my office at [phone] and schedule an appointment.    Clinic : [phone]  Appointment line: [phone]     Thank you,    Jorge Barnett MD    Internal Medicine  [facility]     Reviewed and electronically signed by provider.

## 2018-03-09 NOTE — MR AVS SNAPSHOT
After Visit Summary   3/9/2018    Moess Whittaker    MRN: [id number]           Patient Information     Date Of Birth          1950        Visit Information        Provider Department      3/9/2018 3:00 PM Jorge Barnett MD [facility]        Today's Diagnoses     Flu-like symptoms    -  1    Cough          Care Instructions    Delsym can be dosed 10-20 mg by mouth every 4 hours or 30 mg every 6-8 hours.    1. Flu-like symptoms  - albuterol (PROAIR HFA/PROVENTIL HFA/VENTOLIN HFA) 108 (90 BASE) MCG/ACT Inhaler; Inhale 1-2 puffs into the lungs every 4 hours as needed for shortness of breath / dyspnea or wheezing  Dispense: 1 Inhaler; Refill: 1  - Influenza A and B and RSV PCR    2. Cough  - albuterol (PROAIR HFA/PROVENTIL HFA/VENTOLIN HFA) 108 (90 BASE) MCG/ACT Inhaler; Inhale 1-2 puffs into the lungs every 4 hours as needed for shortness of breath / dyspnea or wheezing  Dispense: 1 Inhaler; Refill: 1  - Influenza A and B and RSV PCR    Return as needed for follow-up with Dr. Barnett.    Clinic : [phone]  Appointment line: [phone]            Follow-ups after your visit        Follow-up notes from your care team     Return if symptoms worsen or fail to improve.      Your next 10 appointments already scheduled     Mar 09, 2018  3:00 PM CST   [facility]    [address]  [facility] [id number] [phone]            May 03, 2018 11:20 AM CDT   [facility] MD   [facility]    [address]  [facility] [address]   [phone]              Who to contact     If you have questions or need follow up information about today's clinic visit or your schedule please contact [facility] directly at [phone].  Normal or non-critical lab and imaging results will be  communicated to you by MyChart, letter or phone within 4 business days after the clinic has received the results. If you do not hear from us within 7 days, please contact the clinic through MyChart or phone. If you have a critical or abnormal lab result, we will notify you by phone as soon as possible.  Submit refill requests through [facility] or call your pharmacy and they will forward the refill request to us. Please allow 3 business days for your refill to be completed.          Additional Information About Your Visit        Care EveryWhere ID     This is your Care EveryWhere ID. This could be used by other organizations to access your [place] medical records  [id number]        Your Vitals Were     Pulse Temperature BMI (Body Mass Index)             60 98.3  F (36.8  C) (Tympanic) 29.97 kg/m2          Blood Pressure from Last 3 Encounters:   03/09/18 130/72   01/24/18 134/68   01/08/18 138/78    Weight from Last 3 Encounters:   03/09/18 204 lb 6.4 oz (92.7 kg)   01/24/18 204 lb 2 oz (92.6 kg)   01/08/18 203 lb (92.1 kg)              We Performed the Following     Influenza A and B and RSV PCR          Today's Medication Changes          These changes are accurate as of 3/9/18  2:53 PM.  If you have any questions, ask your nurse or doctor.               Start taking these medicines.        Dose/Directions    albuterol 108 (90 BASE) MCG/ACT Inhaler   Commonly known as:  PROAIR HFA/PROVENTIL HFA/VENTOLIN HFA   Used for:  Flu-like symptoms, Cough   Started by:  Jorge Barnett MD        Dose:  1-2 puff   Inhale 1-2 puffs into the lungs every 4 hours as needed for shortness of breath / dyspnea or wheezing   Quantity:  1 Inhaler   Refills:  1         These medicines have changed or have updated prescriptions.        Dose/Directions    dextroamphetamine 10 MG tablet   Commonly known as:  DEXTROSTAT   This may have changed:  Another medication with the same name was removed. Continue taking this medication, and follow  the directions you see here.   Changed by:  Jorge Barnett MD        Dose:  1 tablet   Take 1 tablet by mouth 4 times daily - for 01/31/18   Refills:  0            Where to get your medicines      These medications were sent to [facility] - [place], [place] - [address]  [address], [facility] [address]     Phone:  [phone]     albuterol 108 (90 BASE) MCG/ACT Inhaler                Primary Care Provider Office Phone # Fax #    Jorge Barnett [phone]       [address] [facility] [address]        Equal Access to Services     [facility]: Hadii aad rubio hadasho Sokatrin, waaxda luqadaha, qaybta kaalmada adejunioryada, aries lu . So [facility] [phone].    ATENCIÓN: Si habla español, tiene a hsu disposición servicios gratuitos de asistencia lingüística. [facility] [phone].    We comply with applicable federal civil rights laws and [place] laws. We do not discriminate on the basis of race, color, national origin, age, disability, sex, sexual orientation, or gender identity.            Thank you!     Thank you for choosing [facility] AND [facility] [place]  for your care. Our goal is always to provide you with excellent care. Hearing back from our patients is one way we can continue to improve our services. Please take a few minutes to complete the written survey that you may receive in the mail after your visit with us. Thank you!             Your Updated Medication List - Protect others around you: Learn how to safely use, store and throw away your medicines at www.disposemymeds.org.          This list is accurate as of 3/9/18  2:53 PM.  Always use your most recent med list.                   Brand Name Dispense Instructions for use Diagnosis    albuterol 108 (90 BASE) MCG/ACT Inhaler    PROAIR HFA/PROVENTIL HFA/VENTOLIN HFA    1 Inhaler    Inhale 1-2 puffs into the lungs every 4 hours as needed for shortness of breath  / dyspnea or wheezing    Flu-like symptoms, Cough       clopidogrel 75 MG tablet    PLAVIX     Take 1 tablet by mouth daily        [employer] ALCOHOL SWABS Pads      For home use.        desoximetasone 0.25 % cream    TOPICORT     Apply 1 Application topically 2 times daily        dextroamphetamine 10 MG tablet    DEXTROSTAT     Take 1 tablet by mouth 4 times daily - for 01/31/18        diclofenac 75 MG EC tablet    VOLTAREN     Take 1 tablet by mouth 4 times daily        DIOVAN 160 MG tablet   Generic drug:  valsartan      Take 1 tablet by mouth 2 times daily        docusate sodium 100 MG capsule    COLACE     Take 1-2 capsules by mouth 2 times daily as needed for constipation prevention        FISH OIL PO      Take  by mouth.        flaxseed oil 1000 MG Caps      Take  by mouth.        furosemide 40 MG tablet    LASIX     Take 2-4 tablets by mouth daily Or as instructed for leg swelling        GLUCOCOM BLOOD GLUCOSE MONITOR Cecile      Dispense glucose meter, test strips and lancets covered by the patient insurance. Test 10 times per day.        GOODSENSE ASPIRIN 325 MG tablet   Generic drug:  aspirin      Take  by mouth.        humaLOG 100 UNIT/ML injection   Generic drug:  insulin lispro     180 mL    INJECT UNDER THE SKIN USING INSULIN PUMP. MAX DOSE  UNITS DAILY.    Controlled type 2 diabetes mellitus with stage 3 chronic kidney disease, with long-term current use of insulin (H)       hydrALAZINE 25 MG tablet    APRESOLINE     Take 1-2 tablets by mouth 4 times daily - Take lowest effective dose for blood pressure management        HYDROcodone-acetaminophen 5-325 MG per tablet    NORCO     Take 1 tablet by mouth every 6 hours as needed for pain - for on or after 1/3/2018        ipratropium 17 MCG/ACT Inhaler    ATROVENT HFA     Inhale 2 puffs into the lungs 4 times daily        levothyroxine 125 MCG tablet    SYNTHROID/LEVOTHROID     Take 1 tablet by mouth every morning        * methylphenidate 10 MG tablet     RITALIN     Take 1 tablet by mouth 4 times daily - for 01/31/18        * methylphenidate 10 MG tablet    RITALIN     Take 1 tablet by mouth 4 times daily - for on or after 01/28/18        metoprolol succinate 50 MG 24 hr tablet    TOPROL-XL     Take 1 tablet by mouth 2 times daily        nitroGLYcerin 0.4 MG sublingual tablet    NITROSTAT     Place 1 tablet under the tongue every 5 minutes as needed for chest pain        ONETOUCH ULTRA test strip   Generic drug:  blood glucose monitoring      Dispense test strips covered by the patient insurance. Test 10 times per day.        Pen Needles 29G X 12MM Misc      For administering insulin at home.        PSYLLIUM PO      Take 1 tsp. by mouth 3 times daily - for constipation prevention        ranitidine 150 MG tablet    ZANTAC     Take 1 tablet by mouth 2 times daily        ranolazine 500 MG 12 hr tablet    RANEXA     Take 2 tablets by mouth 2 times daily - cancel other Rx - give 3 month supply        rosuvastatin 10 MG tablet    CRESTOR     Take 1 tablet by mouth 2 times daily        Saline Gel      Spray 1 spray in nostril every hour as needed For Nasal Dryness        Solution Administration Set Misc      As directed.        thin lancets    NO BRAND SPECIFIED     Test 10 times per day.        tiZANidine 2 MG tablet    ZANAFLEX     Take 1-2 tablets by mouth every 6 hours as needed for muscle spasms        * Notice:  This list has 2 medication(s) that are the same as other medications prescribed for you. Read the directions carefully, and ask your doctor or other care provider to review them with you.

## 2018-03-09 NOTE — NURSING NOTE
Moses presents to the clinic today for concerns of flu like symptoms. Patient states that his symptoms include headache, sore throat, nausea and a cough. These symptoms have been going on for 3 days.      Stephan Akbar LPN 03/09/18 1:17 PM

## 2018-03-10 ASSESSMENT — PATIENT HEALTH QUESTIONNAIRE - PHQ9: SUM OF ALL RESPONSES TO PHQ QUESTIONS 1-9: 0

## 2018-03-13 ENCOUNTER — TELEPHONE (OUTPATIENT)
Dept: INTERNAL MEDICINE | Facility: OTHER | Age: 68
End: 2018-03-13

## 2018-03-13 NOTE — TELEPHONE ENCOUNTER
Noted. Sounds like he has a bad viral illness. tamiflu would not help - and has no utility at this time.   Symptomatic treatments OTC recommended.   Jorge Barnett

## 2018-03-13 NOTE — TELEPHONE ENCOUNTER
Patient states he is still experiencing low grade fever, cough, sore throat, lethargy, headache, fatigue and nausea.  He is requesting medication and utilizes [facility] pharmacy.  Carine Alonso LPN............3/13/2018 3:38 PM

## 2018-03-30 ENCOUNTER — TELEPHONE (OUTPATIENT)
Dept: INTERNAL MEDICINE | Facility: OTHER | Age: 68
End: 2018-03-30

## 2018-04-03 ENCOUNTER — TELEPHONE (OUTPATIENT)
Dept: INTERNAL MEDICINE | Facility: OTHER | Age: 68
End: 2018-04-03

## 2018-04-04 NOTE — TELEPHONE ENCOUNTER
[employer] form completed and patient notified it is at [address] window.    Barbara Connor LPN 4/4/2018 5:02 PM

## 2018-04-11 ENCOUNTER — TELEPHONE (OUTPATIENT)
Dept: INTERNAL MEDICINE | Facility: OTHER | Age: 68
End: 2018-04-11

## 2018-04-11 NOTE — TELEPHONE ENCOUNTER
Radha in Diabetic Education notified of this information.      Barbara Connor LPN 4/11/2018 4:18 PM

## 2018-04-11 NOTE — TELEPHONE ENCOUNTER
Patient is calling to inform this writer that Rashmi Aguila needs MD approval to send out diabetic supplies.  Patient informed that out Diabetic Educator Radha usually receives this information.    Radha is currently seeing patients and note was left to contact this writer when available.      Barbara Connor LPN 4/11/2018 1:29 PM

## 2018-04-21 DIAGNOSIS — E78.2 MIXED HYPERLIPIDEMIA: Primary | ICD-10-CM

## 2018-04-24 RX ORDER — ROSUVASTATIN CALCIUM 10 MG/1
TABLET, COATED ORAL
Qty: 180 TABLET | Refills: 3 | Status: SHIPPED | OUTPATIENT
Start: 2018-04-24 | End: 2019-07-18

## 2018-04-25 NOTE — TELEPHONE ENCOUNTER
Spoke with [facility] representative, Navin.  She states a prescription order has been sent to patient's PCP, Dr. Jorge Quintanilla.  Corrected doctor's last name to Dr. Barnett.  She will resend order for diabetes supplies to fax [phone].      She states if patient needs supplies before order is received, Dr. Barnett can call [facility] with VO.  Direct phone number to call:  [phone].    Radha Trammell RN, BSN, CDE  4/25/2018 5:44 PM

## 2018-04-30 NOTE — TELEPHONE ENCOUNTER
On the phone for 10 minutes-on hold for most of it.  Representative from [facility] indicates that diabetic testing supplies were sent out to the patient and received by the patient on 4-24-18.  Representative indicates no further action is needed at this time.        Barbara Connor LPN 4/30/2018 10:21 AM

## 2018-05-02 ENCOUNTER — TELEPHONE (OUTPATIENT)
Dept: INTERNAL MEDICINE | Facility: OTHER | Age: 68
End: 2018-05-02

## 2018-05-02 NOTE — TELEPHONE ENCOUNTER
Incoming letter from [employer] indicating that income documentation was missing, , invalid or illegible.    Patient reports that he already received a phone call from [employer] company in regards to this information.      Barbara Connor LPN 2018 8:46 AM

## 2018-05-03 ENCOUNTER — OFFICE VISIT (OUTPATIENT)
Dept: INTERNAL MEDICINE | Facility: OTHER | Age: 68
End: 2018-05-03
Attending: INTERNAL MEDICINE
Payer: MEDICARE

## 2018-05-03 ENCOUNTER — TELEPHONE (OUTPATIENT)
Dept: INTERNAL MEDICINE | Facility: OTHER | Age: 68
End: 2018-05-03

## 2018-05-03 VITALS
SYSTOLIC BLOOD PRESSURE: 136 MMHG | HEART RATE: 72 BPM | DIASTOLIC BLOOD PRESSURE: 76 MMHG | WEIGHT: 207 LBS | BODY MASS INDEX: 30.35 KG/M2

## 2018-05-03 DIAGNOSIS — N18.30 TYPE 2 DIABETES MELLITUS WITH STAGE 3 CHRONIC KIDNEY DISEASE, WITH LONG-TERM CURRENT USE OF INSULIN (H): Primary | ICD-10-CM

## 2018-05-03 DIAGNOSIS — M50.30 DEGENERATIVE DISC DISEASE, CERVICAL: ICD-10-CM

## 2018-05-03 DIAGNOSIS — I10 BENIGN ESSENTIAL HYPERTENSION: ICD-10-CM

## 2018-05-03 DIAGNOSIS — E11.22 TYPE 2 DIABETES MELLITUS WITH STAGE 3 CHRONIC KIDNEY DISEASE, WITH LONG-TERM CURRENT USE OF INSULIN (H): Primary | ICD-10-CM

## 2018-05-03 DIAGNOSIS — M54.10 RADICULAR LOW BACK PAIN: ICD-10-CM

## 2018-05-03 DIAGNOSIS — E03.4 HYPOTHYROIDISM DUE TO ACQUIRED ATROPHY OF THYROID: ICD-10-CM

## 2018-05-03 DIAGNOSIS — Z96.41 INSULIN PUMP IN PLACE: ICD-10-CM

## 2018-05-03 DIAGNOSIS — Z02.89 PAIN MEDICATION AGREEMENT: ICD-10-CM

## 2018-05-03 DIAGNOSIS — Z79.4 TYPE 2 DIABETES MELLITUS WITH STAGE 3 CHRONIC KIDNEY DISEASE, WITH LONG-TERM CURRENT USE OF INSULIN (H): Primary | ICD-10-CM

## 2018-05-03 DIAGNOSIS — I25.118 ATHEROSCLEROSIS OF NATIVE CORONARY ARTERY OF NATIVE HEART WITH STABLE ANGINA PECTORIS (H): Primary | ICD-10-CM

## 2018-05-03 DIAGNOSIS — I50.32 CHRONIC DIASTOLIC HEART FAILURE (H): ICD-10-CM

## 2018-05-03 DIAGNOSIS — E78.2 MIXED HYPERLIPIDEMIA: ICD-10-CM

## 2018-05-03 LAB
ALBUMIN SERPL-MCNC: 4.1 G/DL (ref 3.5–5.7)
ALBUMIN UR-MCNC: 30 MG/DL
ALP SERPL-CCNC: 53 U/L (ref 34–104)
ALT SERPL W P-5'-P-CCNC: 24 U/L (ref 7–52)
ANION GAP SERPL CALCULATED.3IONS-SCNC: 7 MMOL/L (ref 3–14)
APPEARANCE UR: CLEAR
AST SERPL W P-5'-P-CCNC: 19 U/L (ref 13–39)
BILIRUB SERPL-MCNC: 0.5 MG/DL (ref 0.3–1)
BILIRUB UR QL STRIP: NEGATIVE
BUN SERPL-MCNC: 23 MG/DL (ref 7–25)
CALCIUM SERPL-MCNC: 9.9 MG/DL (ref 8.6–10.3)
CHLORIDE SERPL-SCNC: 105 MMOL/L (ref 98–107)
CHOLEST SERPL-MCNC: 89 MG/DL
CO2 SERPL-SCNC: 26 MMOL/L (ref 21–31)
COLOR UR AUTO: YELLOW
CREAT SERPL-MCNC: 1.42 MG/DL (ref 0.7–1.3)
CREAT UR-MCNC: 103 MG/DL
ERYTHROCYTE [DISTWIDTH] IN BLOOD BY AUTOMATED COUNT: 12.4 % (ref 10–15)
GFR SERPL CREATININE-BSD FRML MDRD: 50 ML/MIN/1.7M2
GLUCOSE SERPL-MCNC: 112 MG/DL (ref 70–105)
GLUCOSE UR STRIP-MCNC: 250 MG/DL
HBA1C MFR BLD: 7.8 % (ref 4–6)
HCT VFR BLD AUTO: 41 % (ref 40–53)
HDLC SERPL-MCNC: 34 MG/DL (ref 23–92)
HGB BLD-MCNC: 14 G/DL (ref 13.3–17.7)
HGB UR QL STRIP: NEGATIVE
KETONES UR STRIP-MCNC: NEGATIVE MG/DL
LDLC SERPL CALC-MCNC: 27 MG/DL
LEUKOCYTE ESTERASE UR QL STRIP: NEGATIVE
MCH RBC QN AUTO: 32.3 PG (ref 26.5–33)
MCHC RBC AUTO-ENTMCNC: 34.1 G/DL (ref 31.5–36.5)
MCV RBC AUTO: 95 FL (ref 78–100)
MICROALBUMIN UR-MCNC: 508 MG/L
MICROALBUMIN/CREAT UR: 493.2 MG/G CR (ref 0–17)
NITRATE UR QL: NEGATIVE
NONHDLC SERPL-MCNC: 55 MG/DL
PH UR STRIP: 5.5 PH (ref 5–7)
PLATELET # BLD AUTO: 202 10E9/L (ref 150–450)
POTASSIUM SERPL-SCNC: 4.1 MMOL/L (ref 3.5–5.1)
PROT SERPL-MCNC: 6.4 G/DL (ref 6.4–8.9)
RBC # BLD AUTO: 4.34 10E12/L (ref 4.4–5.9)
RBC #/AREA URNS AUTO: NORMAL /HPF
SODIUM SERPL-SCNC: 138 MMOL/L (ref 134–144)
SOURCE: ABNORMAL
SP GR UR STRIP: >1.03 (ref 1–1.03)
TRIGL SERPL-MCNC: 139 MG/DL
TSH SERPL DL<=0.05 MIU/L-ACNC: 0.39 IU/ML (ref 0.34–5.6)
UROBILINOGEN UR STRIP-ACNC: 0.2 EU/DL (ref 0.2–1)
WBC # BLD AUTO: 7 10E9/L (ref 4–11)
WBC #/AREA URNS AUTO: NORMAL /HPF

## 2018-05-03 PROCEDURE — 81001 URINALYSIS AUTO W/SCOPE: CPT | Performed by: INTERNAL MEDICINE

## 2018-05-03 PROCEDURE — 80053 COMPREHEN METABOLIC PANEL: CPT | Performed by: INTERNAL MEDICINE

## 2018-05-03 PROCEDURE — 83036 HEMOGLOBIN GLYCOSYLATED A1C: CPT | Performed by: INTERNAL MEDICINE

## 2018-05-03 PROCEDURE — 82043 UR ALBUMIN QUANTITATIVE: CPT | Performed by: INTERNAL MEDICINE

## 2018-05-03 PROCEDURE — G0463 HOSPITAL OUTPT CLINIC VISIT: HCPCS

## 2018-05-03 PROCEDURE — 85027 COMPLETE CBC AUTOMATED: CPT | Performed by: INTERNAL MEDICINE

## 2018-05-03 PROCEDURE — 84443 ASSAY THYROID STIM HORMONE: CPT | Performed by: INTERNAL MEDICINE

## 2018-05-03 PROCEDURE — 99214 OFFICE O/P EST MOD 30 MIN: CPT | Performed by: INTERNAL MEDICINE

## 2018-05-03 PROCEDURE — 36415 COLL VENOUS BLD VENIPUNCTURE: CPT | Performed by: INTERNAL MEDICINE

## 2018-05-03 PROCEDURE — 80061 LIPID PANEL: CPT | Performed by: INTERNAL MEDICINE

## 2018-05-03 RX ORDER — RANOLAZINE 500 MG/1
1000 TABLET, EXTENDED RELEASE ORAL 2 TIMES DAILY
Qty: 360 TABLET | Refills: 3 | Status: SHIPPED | OUTPATIENT
Start: 2018-05-03 | End: 2018-05-03

## 2018-05-03 RX ORDER — RANOLAZINE 500 MG/1
1000 TABLET, EXTENDED RELEASE ORAL 2 TIMES DAILY
Qty: 360 TABLET | Refills: 3 | Status: SHIPPED | OUTPATIENT
Start: 2018-05-03 | End: 2018-05-10

## 2018-05-03 RX ORDER — HYDROCODONE BITARTRATE AND ACETAMINOPHEN 5; 325 MG/1; MG/1
1 TABLET ORAL EVERY 6 HOURS PRN
Qty: 60 TABLET | Refills: 0 | Status: SHIPPED | OUTPATIENT
Start: 2018-05-03 | End: 2018-08-09

## 2018-05-03 ASSESSMENT — ENCOUNTER SYMPTOMS
WOUND: 0
MYALGIAS: 0
DIARRHEA: 0
PALPITATIONS: 0
FEVER: 0
NECK PAIN: 1
EYE PAIN: 0
CONFUSION: 0
BRUISES/BLEEDS EASILY: 0
AGITATION: 0
ARTHRALGIAS: 1
VOMITING: 0
NAUSEA: 0
CHILLS: 0
FATIGUE: 1
DIZZINESS: 0
BACK PAIN: 1
LIGHT-HEADEDNESS: 0
SHORTNESS OF BREATH: 1
ABDOMINAL PAIN: 0
COUGH: 0
NECK STIFFNESS: 1
HEMATURIA: 0
DYSURIA: 0
WHEEZING: 0

## 2018-05-03 ASSESSMENT — ANXIETY QUESTIONNAIRES
1. FEELING NERVOUS, ANXIOUS, OR ON EDGE: NOT AT ALL
7. FEELING AFRAID AS IF SOMETHING AWFUL MIGHT HAPPEN: NOT AT ALL
5. BEING SO RESTLESS THAT IT IS HARD TO SIT STILL: NOT AT ALL
IF YOU CHECKED OFF ANY PROBLEMS ON THIS QUESTIONNAIRE, HOW DIFFICULT HAVE THESE PROBLEMS MADE IT FOR YOU TO DO YOUR WORK, TAKE CARE OF THINGS AT HOME, OR GET ALONG WITH OTHER PEOPLE: NOT DIFFICULT AT ALL
6. BECOMING EASILY ANNOYED OR IRRITABLE: NOT AT ALL
2. NOT BEING ABLE TO STOP OR CONTROL WORRYING: NOT AT ALL
GAD7 TOTAL SCORE: 0
3. WORRYING TOO MUCH ABOUT DIFFERENT THINGS: NOT AT ALL

## 2018-05-03 ASSESSMENT — PAIN SCALES - GENERAL: PAINLEVEL: SEVERE PAIN (6)

## 2018-05-03 ASSESSMENT — PATIENT HEALTH QUESTIONNAIRE - PHQ9: 5. POOR APPETITE OR OVEREATING: NOT AT ALL

## 2018-05-03 NOTE — TELEPHONE ENCOUNTER
Rx for Ranexa extended release 500 mg 2 tablets twice daily.  [facility].   Patient wants to take (2) 500 mg tablets because it easier for him to swallow.  Norma J. Gosselin LPN....................  5/3/2018   2:33 PM

## 2018-05-03 NOTE — PROGRESS NOTES
Nursing Notes:   Barbara Connor LPN  5/3/2018 11:38 AM  Signed  Previous A1C is at goal of <8  No results found for: A1C  Urine microalbumin:creatine: n/a  Foot exam unknown-declines today  Eye exam 02/2018    Tobacco User no  Patient is on a daily aspirin  Patient is on a Statin.  Blood pressure today of:     BP Readings from Last 1 Encounters:   03/09/18 130/72      is at the goal of <139/89 for diabetics.    Patient presents to the clinic for diabetic check, last administration of Pella was last night.    Barbara Connor LPN 5/3/2018 11:31 AM          Nursing note reviewed with patient.  Accurracy and completeness verified.   Mr. Whittaker is a 68 year old male who:  Patient presents with:  Diabetes    HPI     ICD-10-CM    1. Type 2 diabetes mellitus with stage 3 chronic kidney disease, with long-term current use of insulin (H) E11.22 Comprehensive metabolic panel    N18.3 CBC with platelets    Z79.4 Albumin Random Urine Quantitative with Creat Ratio     Hemoglobin A1c     *UA reflex to Microscopic     Comprehensive metabolic panel     CBC with platelets     Albumin Random Urine Quantitative with Creat Ratio     Hemoglobin A1c     *UA reflex to Microscopic     Urine Microscopic   2. Mixed hyperlipidemia E78.2 Lipid Profile     Lipid Profile   3. Chronic diastolic heart failure (H) I50.32    4. Insulin pump in place Z96.41    5. Benign essential hypertension I10    6. Hypothyroidism due to acquired atrophy of thyroid E03.4 Thyrotropin GH     Thyrotropin GH   7. Radicular low back pain M54.10 HYDROcodone-acetaminophen (NORCO) 5-325 MG per tablet   8. Degenerative disc disease, cervical M50.30 HYDROcodone-acetaminophen (NORCO) 5-325 MG per tablet   9. Pain medication agreement Z02.89 HYDROcodone-acetaminophen (NORCO) 5-325 MG per tablet     Diabetes - currently controlled. Uses insulin pump.  Checks his blood sugars multiple times daily.  Blood sugars have been fairly well controlled.  Labs due today.    Hyperlipidemia,  currently on statin therapy.  Takes half tablet twice a day otherwise he gets significant myalgias.  If he splits the dose in half -- is able to have the discomfort of his cholesterol medication spread out throughout the day which he prefers otherwise he gets significant aches and pains and stiffness for half today and cannot really function.    Hypertension, currently well controlled.  Tolerating medication.  Labs today.    Hypothyroidism, taking oral replacement.  Check labs.    Radicular low back pain with degenerative cervical disc disease.  Intermittently using hydrocodone.  Needs refills today.  [facility] website reviewed.  No abnormal findings noted.  Proper medication use and misuse reviewed.  Patient has been using medications appropriately.  Prescriptions refilled x60 tablets.     Functional Capacity: about 4 METS.   Review of Systems   Constitutional: Positive for fatigue. Negative for chills and fever.   HENT: Negative for congestion and hearing loss.    Eyes: Negative for pain and visual disturbance.   Respiratory: Positive for shortness of breath. Negative for cough and wheezing.    Cardiovascular: Positive for chest pain and leg swelling. Negative for palpitations.        + chest pains are much worse recently - ran out of Ranexa.     Needs smaller dose, 4x daily - due to gagging on larger pills.has hard time swallowing the 1000 mg tablets.    Gastrointestinal: Negative for abdominal pain, diarrhea, nausea and vomiting.   Endocrine: Negative for cold intolerance and heat intolerance.   Genitourinary: Negative for dysuria and hematuria.   Musculoskeletal: Positive for arthralgias, back pain, gait problem, neck pain and neck stiffness. Negative for myalgias.   Skin: Negative for pallor, rash and wound.   Allergic/Immunologic: Negative for immunocompromised state.   Neurological: Negative for dizziness and light-headedness.   Hematological: Does not bruise/bleed easily.   Psychiatric/Behavioral: Negative for  agitation and confusion.        REMINGTON:   REMINGTON-7 SCORE 5/3/2018   Total Score 0     PHQ9:  PHQ-9 SCORE 1/24/2018 3/9/2018 5/3/2018   Total Score 0 0 0       I have personally reviewed the past medical history, past surgical history, medications, allergies, family and social history as listed below, on 5/3/2018.    Allergies   Allergen Reactions     Famotidine Other (See Comments)     + Caused Headache and Rash -- tolerated Ranitidine and worked well.     Gabapentin      Other reaction(s): Mental Status Change     Hydrocortisone Other (See Comments)     Burning and stinging sensation with Hydrocortisone - doesn't help itching or rash -- tolerated Desoximetasone cream and worked well.      Atorvastatin Hives     Lisinopril Cough     No Clinical Screening - See Comments Hives     Chlorine water     Pregabalin      Other reaction(s): Mental Status Change     Valdecoxib Swelling     Insulin Aspart Rash       Current Outpatient Prescriptions   Medication Sig Dispense Refill     albuterol (PROAIR HFA/PROVENTIL HFA/VENTOLIN HFA) 108 (90 BASE) MCG/ACT Inhaler Inhale 1-2 puffs into the lungs every 4 hours as needed for shortness of breath / dyspnea or wheezing 1 Inhaler 1     aspirin (GOODSENSE ASPIRIN) 325 MG tablet Take  by mouth.       blood glucose monitoring (ONETOUCH ULTRA) test strip Dispense test strips covered by the patient insurance. Test 10 times per day.       Blood Glucose Monitoring Suppl (GLUCOCOM BLOOD GLUCOSE MONITOR) WAQAS Dispense glucose meter, test strips and lancets covered by the patient insurance. Test 10 times per day.       clopidogrel (PLAVIX) 75 MG tablet Take 1 tablet by mouth daily       [employer] ALCOHOL SWABS PADS For home use.       desoximetasone (TOPICORT) 0.25 % cream Apply 1 Application topically 2 times daily       dextroamphetamine (DEXTROSTAT) 10 MG tablet Take 1 tablet by mouth 4 times daily - for 01/31/18       diclofenac (VOLTAREN) 75 MG EC tablet Take 1 tablet by mouth 4 times daily        docusate sodium (COLACE) 100 MG capsule Take 1-2 capsules by mouth 2 times daily as needed for constipation prevention       Flaxseed, Linseed, (FLAXSEED OIL) 1000 MG CAPS Take  by mouth.       furosemide (LASIX) 40 MG tablet Take 2-4 tablets by mouth daily Or as instructed for leg swelling       HUMALOG 100 UNIT/ML injection INJECT UNDER THE SKIN USING INSULIN PUMP. MAX DOSE  UNITS DAILY. 180 mL 0     hydrALAZINE (APRESOLINE) 25 MG tablet Take 1-2 tablets by mouth 4 times daily - Take lowest effective dose for blood pressure management       HYDROcodone-acetaminophen (NORCO) 5-325 MG per tablet Take 1 tablet by mouth every 6 hours as needed for severe pain 60 tablet 0     Insulin Pen Needle (PEN NEEDLES) 29G X 12MM MISC For administering insulin at home.       ipratropium (ATROVENT HFA) 17 MCG/ACT Inhaler Inhale 2 puffs into the lungs 4 times daily       IV Sets-Tubing (SOLUTION ADMINISTRATION SET) MISC As directed.       levothyroxine (SYNTHROID/LEVOTHROID) 125 MCG tablet Take 1 tablet by mouth every morning       methylphenidate (RITALIN) 10 MG tablet Take 1 tablet by mouth 4 times daily - for 01/31/18       methylphenidate (RITALIN) 10 MG tablet Take 1 tablet by mouth 4 times daily - for on or after 01/28/18       metoprolol succinate (TOPROL-XL) 50 MG 24 hr tablet Take 1 tablet by mouth 2 times daily       nitroGLYcerin (NITROSTAT) 0.4 MG sublingual tablet Place 1 tablet under the tongue every 5 minutes as needed for chest pain       Omega-3 Fatty Acids (FISH OIL PO) Take  by mouth.       PSYLLIUM PO Take 1 tsp. by mouth 3 times daily - for constipation prevention       ranitidine (ZANTAC) 150 MG tablet Take 1 tablet by mouth 2 times daily       rosuvastatin (CRESTOR) 10 MG tablet TAKE ONE TABLET BY MOUTH TWICE DAILY 180 tablet 3     Saline GEL Spray 1 spray in nostril every hour as needed For Nasal Dryness       thin (NO BRAND SPECIFIED) lancets Test 10 times per day.       tiZANidine (ZANAFLEX) 2 MG  tablet Take 1-2 tablets by mouth every 6 hours as needed for muscle spasms       valsartan (DIOVAN) 160 MG tablet Take 1 tablet by mouth 2 times daily       ranolazine (RANEXA) 500 MG 12 hr tablet Take 2 tablets (1,000 mg) by mouth 2 times daily - cancel other Rx - give 3 month supply 360 tablet 3        Patient Active Problem List    Diagnosis Date Noted     Degeneration of cervical intervertebral disc 01/31/2018     Priority: Medium     Hypothyroidism due to acquired atrophy of thyroid 01/31/2018     Priority: Medium     Mixed hyperlipidemia 01/31/2018     Priority: Medium     Narcolepsy 01/31/2018     Priority: Medium     Overview:   Total dose recommended by pulmonology he is dextro- amphetamine 40 mg plus   methylphenidate 40 mg in divided doses per day.  Total of 80 mg of short   acting amphetamines daily.       Osteoarthritis of spine 01/31/2018     Priority: Medium     Peptic ulcer disease 01/31/2018     Priority: Medium     Neuroforaminal stenosis of lumbar spine 01/03/2018     Priority: Medium     Radicular low back pain 01/03/2018     Priority: Medium     Acute bilateral low back pain with bilateral sciatica 10/27/2017     Priority: Medium     Chronic low back pain 08/08/2017     Priority: Medium     Intermittent constipation 08/08/2017     Priority: Medium     Skin rash 08/01/2017     Priority: Medium     Type 2 diabetes mellitus with stage 3 chronic kidney disease, with long-term current use of insulin (H) 03/30/2017     Priority: Medium     Erectile dysfunction 09/26/2016     Priority: Medium     Trigger point with back pain 07/14/2016     Priority: Medium     Trigger point with neck pain 07/14/2016     Priority: Medium     Insulin pump in place 03/10/2016     Priority: Medium     Myofacial muscle pain 12/17/2015     Priority: Medium     Pain medication agreement 12/17/2015     Priority: Medium     Trigger point of extremity 12/17/2015     Priority: Medium     Greater trochanteric bursitis of left hip  06/24/2015     Priority: Medium     Blister of toe of right foot 12/09/2014     Priority: Medium     Cervical stenosis of spinal canal 06/17/2014     Priority: Medium     Degenerative disc disease, cervical 06/17/2014     Priority: Medium     Facet arthritis of cervical region (H) 06/17/2014     Priority: Medium     Cervical radicular pain 06/05/2014     Priority: Medium     Left arm numbness 06/05/2014     Priority: Medium     Left atrial enlargement 01/23/2014     Priority: Medium     Chronic diastolic heart failure (H) 01/10/2014     Priority: Medium     Overview:   1/20/2014 ECHO FINAL IMPRESSION:   1. Normal left ventricular size and systolic function. Estimated ejection fraction 65%.   2. Mild increase in wall thickness of the ventricular septum with hypokinesis of the basilar segment of the ventricular septum and inferior wall.   3. Mild to moderate left atrial enlargement.   4. Trace tricuspid regurgitation.   5. Mild left ventricular diastolic dysfunction.        CKD (chronic kidney disease) stage 3, GFR 30-59 ml/min 01/10/2014     Priority: Medium     Benign essential hypertension 01/10/2014     Priority: Medium     Myalgia 01/10/2014     Priority: Medium     Old inferior wall myocardial infarction 01/10/2014     Priority: Medium     Platelet inhibition due to Plavix 01/10/2014     Priority: Medium     S/P CABG x 2 01/10/2014     Priority: Medium     S/P coronary artery stent placement 01/10/2014     Priority: Medium     ACP (advance care planning) 12/05/2013     Priority: Medium     Painful diabetic neuropathy (H) 04/22/2013     Priority: Medium     Esophageal reflux 05/17/2012     Priority: Medium     Obesity 05/17/2012     Priority: Medium     Diastasis of muscle 10/06/2011     Priority: Medium     Coronary atherosclerosis 09/08/2011     Priority: Medium     Psychosexual dysfunction with inhibited sexual excitement 06/30/2011     Priority: Medium     Angina pectoris (H) 12/10/2010     Priority: Medium      Edema 10/25/2010     Priority: Medium     Chest pain 02/15/2007     Priority: Medium     Overview:   IMO Update 10/11       Congestive heart failure (H) 02/15/2007     Priority: Medium     Overview:   IMO Update 10/11       Atherosclerosis of native coronary artery of native heart with stable angina pectoris (H) 02/15/2007     Priority: Medium     Overview:   IMO Update 10/11       Other specified forms of chronic ischemic heart disease 02/15/2007     Priority: Medium     Peripheral vascular disease (H) 02/15/2007     Priority: Medium     Overview:   IMO Update 10/11       Postsurgical percutaneous transluminal coronary angioplasty status 02/15/2007     Priority: Medium     Overview:   IMO Update 10/11       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     Gastritis without bleeding     No Comments Provided     Heart disease     Multiple coronary stents     Narcolepsy without cataplexy     No Comments Provided     Other injury of unspecified body region, initial encounter (CODE)     11/2006,Right calf hematoma     Rheumatic fever without heart involvement     Rheumatic fever as a child without valvular heart disease     Past Surgical History:   Procedure Laterality Date     ANGIOPLASTY      12/00, 04/04/04,Repeat angioplasty with lt internal mammary to the lt anterior descending and lt radial artery from aorta to the diagonal.     ANGIOPLASTY      10/01,Angioplasty with 2 vessel CABG the following week from the lt internal mammary to the lt anterior descending and right radial free graft     ANGIOPLASTY      04/2003     ARTHROSCOPY SHOULDER ROTATOR CUFF REPAIR      02/06/2006,Left rotator cuff repair and bone spur removal by Dr. Giraldo in  [place]     COLONOSCOPY           COLONOSCOPY      2016,-- Dr. De La Cruz -- polypectomy     OTHER SURGICAL HISTORY      ,[id number],IR ANGIOGRAM FEMORAL/EXTREMITY ([place]),Unremarkable angiogram at [facility]     OTHER SURGICAL HISTORY      10/05/2004,,PTCA,Angioplasty     OTHER SURGICAL HISTORY      2005,,PTCA,Angioplasty with stent.     OTHER SURGICAL HISTORY      2005,,PTCA,Angioplasty with stent.     OTHER SURGICAL HISTORY      2005,,PTCA,Angioplasty without stent     OTHER SURGICAL HISTORY      2007,[id number],IR ANGIOGRAM FEMORAL/EXTREMITY ([place]),Unremarkable coronary angiogram at [facility].     OTHER SURGICAL HISTORY      1967,SUR38,APPENDECTOMY OPEN     RELEASE CARPAL TUNNEL      11/15/01,Right carpal tunnel release by Dr. Mace     RELEASE CARPAL TUNNEL      2004,Left carpal tunnel release     Social History     Social History     Marital status:      Spouse name: N/A     Number of children: N/A     Years of education: N/A     Social History Main Topics     Smoking status: Never Smoker     Smokeless tobacco: Never Used     Alcohol use No     Drug use: No     Sexual activity: Yes     Partners: Female     Other Topics Concern     None     Social History Narrative    He is on Social Security Disability for coronary artery disease and cervical arthritis and disk disease.   Dax obed.  No tobacco.     Family History   Problem Relation Age of Onset     HEART DISEASE Mother      Heart Disease,Heart problems     HEART DISEASE Other      Heart Disease,Heart problems     HEART DISEASE Other      Heart Disease,Heart problems     Ankylosing Spondylitis Son      Ankylosing spondylitis,Ankylosing spondylitis     Other - See Comments Brother       with sudden death following shoulder surgery 2012 -- was off his Plavix for 10 days pre-operatively.     Ankylosing Spondylitis Daughter      Ankylosing spondylitis,-- Dx 2017       EXAM:   Vitals:    18 1132   BP: 136/76   BP  "Location: Right arm   Patient Position: Sitting   Cuff Size: Adult Regular   Pulse: 72   Weight: 207 lb (93.9 kg)       Current Pain Score: Severe Pain (6)     BP Readings from Last 3 Encounters:   05/03/18 136/76   03/09/18 130/72   01/24/18 134/68    Wt Readings from Last 3 Encounters:   05/03/18 207 lb (93.9 kg)   03/09/18 204 lb 6.4 oz (92.7 kg)   01/24/18 204 lb 2 oz (92.6 kg)      Estimated body mass index is 30.35 kg/(m^2) as calculated from the following:    Height as of 9/19/17: 5' 9.25\" (1.759 m).    Weight as of this encounter: 207 lb (93.9 kg).     Physical Exam   Constitutional: He appears well-developed and well-nourished. No distress.   HENT:   Head: Normocephalic and atraumatic.   Eyes: Conjunctivae are normal. No scleral icterus.   Neck: No thyromegaly present.   Cardiovascular: Normal rate and regular rhythm.    Pulmonary/Chest: Effort normal. No respiratory distress.   Abdominal: Soft. There is no tenderness.   Musculoskeletal: He exhibits edema and tenderness.   Lymphadenopathy:     He has no cervical adenopathy.   Neurological: He is alert. No cranial nerve deficit.   Skin: Skin is warm and dry.   Psychiatric: He has a normal mood and affect.       INVESTIGATIONS:  Results for orders placed or performed in visit on 05/03/18   Comprehensive metabolic panel   Result Value Ref Range    Sodium 138 134 - 144 mmol/L    Potassium 4.1 3.5 - 5.1 mmol/L    Chloride 105 98 - 107 mmol/L    Carbon Dioxide 26 21 - 31 mmol/L    Anion Gap 7 3 - 14 mmol/L    Glucose 112 (H) 70 - 105 mg/dL    Urea Nitrogen 23 7 - 25 mg/dL    Creatinine 1.42 (H) 0.70 - 1.30 mg/dL    GFR Estimate 50 (L) >60 mL/min/1.7m2    GFR Estimate If Black 60 (L) >60 mL/min/1.7m2    Calcium 9.9 8.6 - 10.3 mg/dL    Bilirubin Total 0.5 0.3 - 1.0 mg/dL    Albumin 4.1 3.5 - 5.7 g/dL    Protein Total 6.4 6.4 - 8.9 g/dL    Alkaline Phosphatase 53 34 - 104 U/L    ALT 24 7 - 52 U/L    AST 19 13 - 39 U/L   CBC with platelets   Result Value Ref Range    " WBC 7.0 4.0 - 11.0 10e9/L    RBC Count 4.34 (L) 4.4 - 5.9 10e12/L    Hemoglobin 14.0 13.3 - 17.7 g/dL    Hematocrit 41.0 40.0 - 53.0 %    MCV 95 78 - 100 fl    MCH 32.3 26.5 - 33.0 pg    MCHC 34.1 31.5 - 36.5 g/dL    RDW 12.4 10.0 - 15.0 %    Platelet Count 202 150 - 450 10e9/L   Albumin Random Urine Quantitative with Creat Ratio   Result Value Ref Range    Creatinine Urine 103 mg/dL    Albumin Urine mg/L 508 mg/L    Albumin Urine mg/g Cr 493.20 (H) 0 - 17 mg/g Cr   Hemoglobin A1c   Result Value Ref Range    Hemoglobin A1C 7.8 (H) 4.0 - 6.0 %   Lipid Profile   Result Value Ref Range    Cholesterol 89 <200 mg/dL    Triglycerides 139 <150 mg/dL    HDL Cholesterol 34 23 - 92 mg/dL    LDL Cholesterol Calculated 27 <100 mg/dL    Non HDL Cholesterol 55 <130 mg/dL   *UA reflex to Microscopic   Result Value Ref Range    Color Urine Yellow     Appearance Urine Clear     Glucose Urine 250 (A) NEG^Negative mg/dL    Bilirubin Urine Negative NEG^Negative    Ketones Urine Negative NEG^Negative mg/dL    Specific Gravity Urine >1.030 1.003 - 1.035    Blood Urine Negative NEG^Negative    pH Urine 5.5 5.0 - 7.0 pH    Protein Albumin Urine 30 (A) NEG^Negative mg/dL    Urobilinogen Urine 0.2 0.2 - 1.0 EU/dL    Nitrite Urine Negative NEG^Negative    Leukocyte Esterase Urine Negative NEG^Negative    Source Unspecified Urine    Thyrotropin GH   Result Value Ref Range    Thyrotropin 0.39 0.34 - 5.60 IU/mL   Urine Microscopic   Result Value Ref Range    WBC Urine 0 - 5 OTO5^0 - 5 /HPF    RBC Urine O - 2 OTO2^O - 2 /HPF       ASSESSMENT AND PLAN:  Problem List Items Addressed This Visit        Nervous and Auditory    Radicular low back pain    Relevant Medications    HYDROcodone-acetaminophen (NORCO) 5-325 MG per tablet       Endocrine    Type 2 diabetes mellitus with stage 3 chronic kidney disease, with long-term current use of insulin (H) - Primary    Relevant Orders    Comprehensive metabolic panel    CBC with platelets    Albumin Random  Urine Quantitative with Creat Ratio    Hemoglobin A1c    *UA reflex to Microscopic    Urine Microscopic (Completed)    Hypothyroidism due to acquired atrophy of thyroid    Relevant Orders    Thyrotropin GH    Mixed hyperlipidemia    Relevant Orders    Lipid Profile       Circulatory    Chronic diastolic heart failure (H)    Benign essential hypertension       Musculoskeletal and Integumentary    Degenerative disc disease, cervical    Relevant Medications    HYDROcodone-acetaminophen (NORCO) 5-325 MG per tablet       Other    Insulin pump in place    Pain medication agreement    Relevant Medications    HYDROcodone-acetaminophen (NORCO) 5-325 MG per tablet        reviewed diet, exercise and weight control, recommended sodium restriction, cardiovascular risk and specific lipid/LDL goals reviewed, specific diabetic recommendations low cholesterol diet, weight control and daily exercise discussed and home glucose monitoring taught, technique demonstrated, use of Plavix to prevent MI and TIA's discussed  -- Expected clinical course discussed    -- Medications and their side effects discussed    Patient Instructions     Labs today.   Medications refilled.     Return for Diabetes labs and clinic follow-up appointment every 3 to 4 months.  --- (Go for about 91 to 100 days)    Aspects of Diabetes we can improve:  Hemoglobin A1c No results found for: A1C Goal range is under 8. Best is 6.5 to 7   Blood Pressure 136/76 Goal to keep less than 140/90   Tobacco  reports that he has never smoked. He has never used smokeless tobacco. Goal to abstain from tobacco   Aspirin or Plavix Plavix Aspirin or Plavix reduces risk of heart disease and stroke   ACE/ARB Valsartan These medications reduce risk of kidney disease   Cholesterol Rosuvastatin Statins reduce risk of heart disease and stroke   Eye Exam -- Do Yearly -- Annual diabetic eye exam   Healthy weight Body mass index is 30.35 kg/(m^2). Goal BMI under 30, best is under 25.      --  Trying to exercise daily (goal at least 20 min/day) with moderate aerobic activity   -- Eat healthy (resources from ADA at http://www.diabetes.org/)   -- Taking good care of my feet. Consider seeing the Podiatrist   -- Check blood sugars as directed, record in log book and bring to every appointment      Schedule lab only appointment --- A few days AFTER: 8/1/18   Schedule clinic appointment with Dr. Barnett -- Same day as labs, or 1-2 days later.     Insurance companies are now grading you and I on your blood sugar control -- Goal is to get your A1c down to 7.9% or lower and NO Smoking!    -- Medicare and most insurance companies, will only cover Hemoglobin A1c labs to be rechecked every 91+ days.      No results found for: A1C    Next follow-up appointment with Dr. Barnett should be scheduled:  -- Approximately a few days AFTER: 8/1/18       Jorge Barnett MD  Internal Medicine  [facility] and [facility]

## 2018-05-03 NOTE — PATIENT INSTRUCTIONS
Labs today.   Medications refilled.     Return for Diabetes labs and clinic follow-up appointment every 3 to 4 months.  --- (Go for about 91 to 100 days)    Aspects of Diabetes we can improve:  Hemoglobin A1c No results found for: A1C Goal range is under 8. Best is 6.5 to 7   Blood Pressure 136/76 Goal to keep less than 140/90   Tobacco  reports that he has never smoked. He has never used smokeless tobacco. Goal to abstain from tobacco   Aspirin or Plavix Plavix Aspirin or Plavix reduces risk of heart disease and stroke   ACE/ARB Valsartan These medications reduce risk of kidney disease   Cholesterol Rosuvastatin Statins reduce risk of heart disease and stroke   Eye Exam -- Do Yearly -- Annual diabetic eye exam   Healthy weight Body mass index is 30.35 kg/(m^2). Goal BMI under 30, best is under 25.      -- Trying to exercise daily (goal at least 20 min/day) with moderate aerobic activity   -- Eat healthy (resources from ADA at http://www.diabetes.org/)   -- Taking good care of my feet. Consider seeing the Podiatrist   -- Check blood sugars as directed, record in log book and bring to every appointment      Schedule lab only appointment --- A few days AFTER: 8/1/18   Schedule clinic appointment with Dr. Barnett -- Same day as labs, or 1-2 days later.     Insurance companies are now grading you and I on your blood sugar control -- Goal is to get your A1c down to 7.9% or lower and NO Smoking!    -- Medicare and most insurance companies, will only cover Hemoglobin A1c labs to be rechecked every 91+ days.      No results found for: A1C    Next follow-up appointment with Dr. Barnett should be scheduled:  -- Approximately a few days AFTER: 8/1/18

## 2018-05-03 NOTE — NURSING NOTE
Previous A1C is at goal of <8  No results found for: A1C  Urine microalbumin:creatine: n/a  Foot exam unknown-declines today  Eye exam 02/2018    Tobacco User no  Patient is on a daily aspirin  Patient is on a Statin.  Blood pressure today of:     BP Readings from Last 1 Encounters:   03/09/18 130/72      is at the goal of <139/89 for diabetics.    Patient presents to the clinic for diabetic check, last administration of Manning was last night.    Barbara Connor LPN 5/3/2018 11:31 AM

## 2018-05-03 NOTE — LETTER
Moses Whittaker  [address] [address]    5/4/2018      Dear Moses Whittaker,    The result of your recent tests are included below:    Diabetes is controlled, but barely....    To help with weight loss and improve blood sugar control....    -- Try to avoid Carbohydrates as much as possible -- breads, pasta, baked goods, cakes, oatmeal, cold cereal, potatoes.   These are turned to sugar in one metabolic conversion, cause insulin secretion and increased fat deposition / weight gain.      -- Eat more lean meats, proteins, eggs, nuts.      Results for orders placed or performed in visit on 05/03/18   Comprehensive metabolic panel   Result Value Ref Range    Sodium 138 134 - 144 mmol/L    Potassium 4.1 3.5 - 5.1 mmol/L    Chloride 105 98 - 107 mmol/L    Carbon Dioxide 26 21 - 31 mmol/L    Anion Gap 7 3 - 14 mmol/L    Glucose 112 (H) 70 - 105 mg/dL    Urea Nitrogen 23 7 - 25 mg/dL    Creatinine 1.42 (H) 0.70 - 1.30 mg/dL    GFR Estimate 50 (L) >60 mL/min/1.7m2    GFR Estimate If Black 60 (L) >60 mL/min/1.7m2    Calcium 9.9 8.6 - 10.3 mg/dL    Bilirubin Total 0.5 0.3 - 1.0 mg/dL    Albumin 4.1 3.5 - 5.7 g/dL    Protein Total 6.4 6.4 - 8.9 g/dL    Alkaline Phosphatase 53 34 - 104 U/L    ALT 24 7 - 52 U/L    AST 19 13 - 39 U/L   CBC with platelets   Result Value Ref Range    WBC 7.0 4.0 - 11.0 10e9/L    RBC Count 4.34 (L) 4.4 - 5.9 10e12/L    Hemoglobin 14.0 13.3 - 17.7 g/dL    Hematocrit 41.0 40.0 - 53.0 %    MCV 95 78 - 100 fl    MCH 32.3 26.5 - 33.0 pg    MCHC 34.1 31.5 - 36.5 g/dL    RDW 12.4 10.0 - 15.0 %    Platelet Count 202 150 - 450 10e9/L   Albumin Random Urine Quantitative with Creat Ratio   Result Value Ref Range    Creatinine Urine 103 mg/dL    Albumin Urine mg/L 508 mg/L    Albumin Urine mg/g Cr 493.20 (H) 0 - 17 mg/g Cr   Hemoglobin A1c   Result Value Ref Range    Hemoglobin A1C 7.8 (H) 4.0 - 6.0 %   Lipid Profile   Result Value Ref Range    Cholesterol 89 <200 mg/dL     Triglycerides 139 <150 mg/dL    HDL Cholesterol 34 23 - 92 mg/dL    LDL Cholesterol Calculated 27 <100 mg/dL    Non HDL Cholesterol 55 <130 mg/dL   *UA reflex to Microscopic   Result Value Ref Range    Color Urine Yellow     Appearance Urine Clear     Glucose Urine 250 (A) NEG^Negative mg/dL    Bilirubin Urine Negative NEG^Negative    Ketones Urine Negative NEG^Negative mg/dL    Specific Gravity Urine >1.030 1.003 - 1.035    Blood Urine Negative NEG^Negative    pH Urine 5.5 5.0 - 7.0 pH    Protein Albumin Urine 30 (A) NEG^Negative mg/dL    Urobilinogen Urine 0.2 0.2 - 1.0 EU/dL    Nitrite Urine Negative NEG^Negative    Leukocyte Esterase Urine Negative NEG^Negative    Source Unspecified Urine    Thyrotropin GH   Result Value Ref Range    Thyrotropin 0.39 0.34 - 5.60 IU/mL   Urine Microscopic   Result Value Ref Range    WBC Urine 0 - 5 OTO5^0 - 5 /HPF    RBC Urine O - 2 OTO2^O - 2 /HPF       If you have any further questions or problems, please contact my office at [phone] and schedule an appointment.    Clinic : [phone]  Appointment line: [phone]     Thank you,    Jorge Barnett MD    Internal Medicine  [facility]     Reviewed and electronically signed by provider.

## 2018-05-03 NOTE — MR AVS SNAPSHOT
After Visit Summary   5/3/2018    Moses Whittaker    MRN: [id number]           Patient Information     Date Of Birth          1950        Visit Information        Provider Department      5/3/2018 11:20 AM Jorge Barnett MD [facility]        Today's Diagnoses     Type 2 diabetes mellitus with stage 3 chronic kidney disease, with long-term current use of insulin (H)    -  1    Mixed hyperlipidemia        Chronic diastolic heart failure (H)        Insulin pump in place        Benign essential hypertension        Hypothyroidism due to acquired atrophy of thyroid        Radicular low back pain        Degenerative disc disease, cervical        Pain medication agreement          Care Instructions    Labs today.   Medications refilled.     Return for Diabetes labs and clinic follow-up appointment every 3 to 4 months.  --- (Go for about 91 to 100 days)    Aspects of Diabetes we can improve:  Hemoglobin A1c No results found for: A1C Goal range is under 8. Best is 6.5 to 7   Blood Pressure 136/76 Goal to keep less than 140/90   Tobacco  reports that he has never smoked. He has never used smokeless tobacco. Goal to abstain from tobacco   Aspirin or Plavix Plavix Aspirin or Plavix reduces risk of heart disease and stroke   ACE/ARB Valsartan These medications reduce risk of kidney disease   Cholesterol Rosuvastatin Statins reduce risk of heart disease and stroke   Eye Exam -- Do Yearly -- Annual diabetic eye exam   Healthy weight Body mass index is 30.35 kg/(m^2). Goal BMI under 30, best is under 25.      -- Trying to exercise daily (goal at least 20 min/day) with moderate aerobic activity   -- Eat healthy (resources from ADA at http://www.diabetes.org/)   -- Taking good care of my feet. Consider seeing the Podiatrist   -- Check blood sugars as directed, record in log book and bring to every appointment      Schedule lab only appointment --- A few days AFTER: 8/1/18   Schedule clinic  appointment with Dr. Barnett -- Same day as labs, or 1-2 days later.     Insurance companies are now grading you and I on your blood sugar control -- Goal is to get your A1c down to 7.9% or lower and NO Smoking!    -- Medicare and most insurance companies, will only cover Hemoglobin A1c labs to be rechecked every 91+ days.      No results found for: A1C    Next follow-up appointment with Dr. Barnett should be scheduled:  -- Approximately a few days AFTER: 8/1/18             Follow-ups after your visit        Follow-up notes from your care team     Return in about 3 months (around 8/3/2018) for -- labs in 91+ days with Diabetes clinic appointment with Dr. Barnett 1-2 days later..      Future tests that were ordered for you today     Open Standing Orders        Priority Remaining Interval Expires Ordered    Comprehensive metabolic panel Routine 4/4 Every 3 Months 5/3/2019 5/3/2018    CBC with platelets Routine 4/4 Every 3 Months 5/3/2019 5/3/2018    Albumin Random Urine Quantitative with Creat Ratio Routine 4/4 Every 3 Months 5/3/2019 5/3/2018    Hemoglobin A1c Routine 4/4 Every 3 Months 5/3/2019 5/3/2018    Lipid Profile Routine 4/4 Every 3 Months 5/3/2019 5/3/2018    *UA reflex to Microscopic Routine 4/4 Every 3 Months 5/3/2019 5/3/2018    Thyrotropin GH Routine 4/4 Every 3 Months 5/3/2019 5/3/2018            Who to contact     If you have questions or need follow up information about today's clinic visit or your schedule please contact [facility] directly at [phone].  Normal or non-critical lab and imaging results will be communicated to you by MyChart, letter or phone within 4 business days after the clinic has received the results. If you do not hear from us within 7 days, please contact the clinic through MyChart or phone. If you have a critical or abnormal lab result, we will notify you by phone as soon as possible.  Submit refill requests through [employer] or call your pharmacy and  they will forward the refill request to us. Please allow 3 business days for your refill to be completed.          Additional Information About Your Visit        Care EveryWhere ID     This is your Care EveryWhere ID. This could be used by other organizations to access your [facility] medical records  [id number]        Your Vitals Were     Pulse BMI (Body Mass Index)                72 30.35 kg/m2           Blood Pressure from Last 3 Encounters:   05/03/18 136/76   03/09/18 130/72   01/24/18 134/68    Weight from Last 3 Encounters:   05/03/18 207 lb (93.9 kg)   03/09/18 204 lb 6.4 oz (92.7 kg)   01/24/18 204 lb 2 oz (92.6 kg)                 Today's Medication Changes          These changes are accurate as of 5/3/18 11:49 AM.  If you have any questions, ask your nurse or doctor.               These medicines have changed or have updated prescriptions.        Dose/Directions    HYDROcodone-acetaminophen 5-325 MG per tablet   Commonly known as:  NORCO   This may have changed:    - reasons to take this  - additional instructions   Used for:  Radicular low back pain, Degenerative disc disease, cervical, Pain medication agreement   Changed by:  Jorge Barnett MD        Dose:  1 tablet   Take 1 tablet by mouth every 6 hours as needed for severe pain   Quantity:  60 tablet   Refills:  0            Where to get your medicines      Some of these will need a paper prescription and others can be bought over the counter.  Ask your nurse if you have questions.     Bring a paper prescription for each of these medications     HYDROcodone-acetaminophen 5-325 MG per tablet               Information about OPIOIDS     PRESCRIPTION OPIOIDS: WHAT YOU NEED TO KNOW   You have a prescription for an opioid (narcotic) pain medicine. Opioids can cause addiction. If you have a history of chemical dependency of any type, you are at a higher risk of becoming addicted to opioids. Only take this medicine after all other options have been tried.  Take it for as short a time and as few doses as possible.     Do not:    Drive. If you drive while taking these medicines, you could be arrested for driving under the influence (DUI).    Operate heavy machinery    Do any other dangerous activities while taking these medicines.     Drink any alcohol while taking these medicines.      Take with any other medicines that contain acetaminophen. Read all labels carefully. Look for the word  acetaminophen  or  Tylenol.  Ask your pharmacist if you have questions or are unsure.    Store your pills in a secure place, locked if possible. We will not replace any lost or stolen medicine. If you don t finish your medicine, please throw away (dispose) as directed by your pharmacist. The [place] Pollution Control Agency has more information about safe disposal: https://www.pca.[facility].[place]/living-green/managing-unwanted-medications    All opioids tend to cause constipation. Drink plenty of water and eat foods that have a lot of fiber, such as fruits, vegetables, prune juice, apple juice and high-fiber cereal. Take a laxative (Miralax, milk of magnesia, Colace, Senna) if you don t move your bowels at least every other day.          Primary Care Provider Office Phone # Fax #    Jorge Barnett [phone] [address] [facility] [address]        Equal Access to Services     MEGAN FUNK AH: Bimal mirandao Sokatrin, waaxda luqadaha, qaybta kaalmada adeegyada, aries garsia. So [facility] [phone].    ATENCIÓN: Si habla español, tiene a hsu disposición servicios gratuitos de asistencia lingüística. Llame al [phone].    We comply with applicable federal civil rights laws and [place] laws. We do not discriminate on the basis of race, color, national origin, age, disability, sex, sexual orientation, or gender identity.            Thank you!     Thank you for choosing [facility]  for your care. Our  goal is always to provide you with excellent care. Hearing back from our patients is one way we can continue to improve our services. Please take a few minutes to complete the written survey that you may receive in the mail after your visit with us. Thank you!             Your Updated Medication List - Protect others around you: Learn how to safely use, store and throw away your medicines at www.disposemymeds.org.          This list is accurate as of 5/3/18 11:49 AM.  Always use your most recent med list.                   Brand Name Dispense Instructions for use Diagnosis    albuterol 108 (90 Base) MCG/ACT Inhaler    PROAIR HFA/PROVENTIL HFA/VENTOLIN HFA    1 Inhaler    Inhale 1-2 puffs into the lungs every 4 hours as needed for shortness of breath / dyspnea or wheezing    Flu-like symptoms, Cough       clopidogrel 75 MG tablet    PLAVIX     Take 1 tablet by mouth daily        [employer] ALCOHOL SWABS Pads      For home use.        desoximetasone 0.25 % cream    TOPICORT     Apply 1 Application topically 2 times daily        dextroamphetamine 10 MG tablet    DEXTROSTAT     Take 1 tablet by mouth 4 times daily - for 01/31/18        diclofenac 75 MG EC tablet    VOLTAREN     Take 1 tablet by mouth 4 times daily        DIOVAN 160 MG tablet   Generic drug:  valsartan      Take 1 tablet by mouth 2 times daily        docusate sodium 100 MG capsule    COLACE     Take 1-2 capsules by mouth 2 times daily as needed for constipation prevention        FISH OIL PO      Take  by mouth.        flaxseed oil 1000 MG Caps      Take  by mouth.        furosemide 40 MG tablet    LASIX     Take 2-4 tablets by mouth daily Or as instructed for leg swelling        GLUCOCOM BLOOD GLUCOSE MONITOR Cecile      Dispense glucose meter, test strips and lancets covered by the patient insurance. Test 10 times per day.        GOODSENSE ASPIRIN 325 MG tablet   Generic drug:  aspirin      Take  by mouth.        humaLOG 100 UNIT/ML injection   Generic drug:   insulin lispro     180 mL    INJECT UNDER THE SKIN USING INSULIN PUMP. MAX DOSE  UNITS DAILY.    Controlled type 2 diabetes mellitus with stage 3 chronic kidney disease, with long-term current use of insulin (H)       hydrALAZINE 25 MG tablet    APRESOLINE     Take 1-2 tablets by mouth 4 times daily - Take lowest effective dose for blood pressure management        HYDROcodone-acetaminophen 5-325 MG per tablet    NORCO    60 tablet    Take 1 tablet by mouth every 6 hours as needed for severe pain    Radicular low back pain, Degenerative disc disease, cervical, Pain medication agreement       ipratropium 17 MCG/ACT Inhaler    ATROVENT HFA     Inhale 2 puffs into the lungs 4 times daily        levothyroxine 125 MCG tablet    SYNTHROID/LEVOTHROID     Take 1 tablet by mouth every morning        * methylphenidate 10 MG tablet    RITALIN     Take 1 tablet by mouth 4 times daily - for 01/31/18        * methylphenidate 10 MG tablet    RITALIN     Take 1 tablet by mouth 4 times daily - for on or after 01/28/18        metoprolol succinate 50 MG 24 hr tablet    TOPROL-XL     Take 1 tablet by mouth 2 times daily        nitroGLYcerin 0.4 MG sublingual tablet    NITROSTAT     Place 1 tablet under the tongue every 5 minutes as needed for chest pain        ONETOUCH ULTRA test strip   Generic drug:  blood glucose monitoring      Dispense test strips covered by the patient insurance. Test 10 times per day.        Pen Needles 29G X 12MM Misc      For administering insulin at home.        PSYLLIUM PO      Take 1 tsp. by mouth 3 times daily - for constipation prevention        ranitidine 150 MG tablet    ZANTAC     Take 1 tablet by mouth 2 times daily        ranolazine 500 MG 12 hr tablet    RANEXA     Take 2 tablets by mouth 2 times daily - cancel other Rx - give 3 month supply        rosuvastatin 10 MG tablet    CRESTOR    180 tablet    TAKE ONE TABLET BY MOUTH TWICE DAILY    Mixed hyperlipidemia       Saline Gel      Spray 1  spray in nostril every hour as needed For Nasal Dryness        Solution Administration Set Misc      As directed.        thin lancets    NO BRAND SPECIFIED     Test 10 times per day.        tiZANidine 2 MG tablet    ZANAFLEX     Take 1-2 tablets by mouth every 6 hours as needed for muscle spasms        * Notice:  This list has 2 medication(s) that are the same as other medications prescribed for you. Read the directions carefully, and ask your doctor or other care provider to review them with you.

## 2018-05-04 ASSESSMENT — PATIENT HEALTH QUESTIONNAIRE - PHQ9: SUM OF ALL RESPONSES TO PHQ QUESTIONS 1-9: 0

## 2018-05-04 ASSESSMENT — ANXIETY QUESTIONNAIRES: GAD7 TOTAL SCORE: 0

## 2018-05-07 ENCOUNTER — TELEPHONE (OUTPATIENT)
Dept: INTERNAL MEDICINE | Facility: OTHER | Age: 68
End: 2018-05-07

## 2018-05-10 DIAGNOSIS — I25.118 ATHEROSCLEROSIS OF NATIVE CORONARY ARTERY OF NATIVE HEART WITH STABLE ANGINA PECTORIS (H): ICD-10-CM

## 2018-05-10 RX ORDER — RANOLAZINE 500 MG/1
1000 TABLET, EXTENDED RELEASE ORAL 2 TIMES DAILY
Qty: 360 TABLET | Refills: 0 | Status: SHIPPED | OUTPATIENT
Start: 2018-05-10 | End: 2018-07-24

## 2018-05-10 NOTE — TELEPHONE ENCOUNTER
[email or web address] they need a RX for patient's Ranexa to be sent as it was sent to the wrong location it needs to be sent [email or web address] fax # is [phone].Anny Laurent LPN......................5/10/2018 10:40 AM

## 2018-05-10 NOTE — TELEPHONE ENCOUNTER
Spoke with patient he said pharmacy is saying his Ranexa RX is wrong at his pharmacy, will call pharmacy and verify RX.Anny Laurent LPN......................5/10/2018 10:23 AM

## 2018-06-01 DIAGNOSIS — E11.22 CONTROLLED TYPE 2 DIABETES MELLITUS WITH STAGE 3 CHRONIC KIDNEY DISEASE, WITH LONG-TERM CURRENT USE OF INSULIN (H): ICD-10-CM

## 2018-06-01 DIAGNOSIS — Z79.4 CONTROLLED TYPE 2 DIABETES MELLITUS WITH STAGE 3 CHRONIC KIDNEY DISEASE, WITH LONG-TERM CURRENT USE OF INSULIN (H): ICD-10-CM

## 2018-06-01 DIAGNOSIS — N18.30 CONTROLLED TYPE 2 DIABETES MELLITUS WITH STAGE 3 CHRONIC KIDNEY DISEASE, WITH LONG-TERM CURRENT USE OF INSULIN (H): ICD-10-CM

## 2018-06-05 NOTE — TELEPHONE ENCOUNTER
This is a Refill request from: [email or web address]  Name of Medication and Dose:  HUMALOG 100 unit/mL injection    Quantity requested:  180 mL  Last fill date: 3/6/2018  Last Office Visit:  5/3/2018  Narcotic Agreement Signed: SAYDA  PCP: Jorge Barnett MD  Associated Diagnosis: Controlled type 2 diabetes mellitus without complication, with long-term current use of insulin (HC)    Phoebe Braswell LPN Supervisor 6/5/2018   2:25 PM

## 2018-06-25 ENCOUNTER — TELEPHONE (OUTPATIENT)
Dept: INTERNAL MEDICINE | Facility: OTHER | Age: 68
End: 2018-06-25

## 2018-06-25 DIAGNOSIS — G47.419 PRIMARY NARCOLEPSY WITHOUT CATAPLEXY: Primary | ICD-10-CM

## 2018-06-25 DIAGNOSIS — G47.419 NARCOLEPSY: ICD-10-CM

## 2018-06-25 RX ORDER — DEXTROAMPHETAMINE SULFATE 10 MG/1
10 TABLET ORAL 4 TIMES DAILY
Qty: 360 TABLET | Refills: 0 | Status: SHIPPED | OUTPATIENT
Start: 2018-06-25 | End: 2018-07-03

## 2018-06-25 RX ORDER — METHYLPHENIDATE HYDROCHLORIDE 10 MG/1
10 TABLET ORAL 4 TIMES DAILY
Qty: 360 TABLET | Refills: 0 | Status: SHIPPED | OUTPATIENT
Start: 2018-06-25 | End: 2018-07-03

## 2018-06-25 NOTE — TELEPHONE ENCOUNTER
I can only get Tier Exception's, PA's, Quantity Limit Exceptions, Etc on RX's that are prescribed by one of our providers so I will be listing Dr. Barnett on these than, that's another reason that I need them updated in the chart.  Also, per the patient this is for a 90 days supply.  So I was going to do it for a quantity of 360 per 90 days.  Is this correct?  Please let me know when they're updated in the chart and if this is the correct quantity.  Gwen Johnson on 6/25/2018 at 2:02 PM

## 2018-06-25 NOTE — TELEPHONE ENCOUNTER
Called and spoke to patient. I had him give me the names, dose, sig., and past prescribing physicians.  Greg Louis MD was his first proscriber, then Dr. Galan, then Dr. Roly Jarrett.  This is for his Narcolepsy.    Whitney Camacho CMA..............6/25/2018........1:42 PM

## 2018-06-25 NOTE — TELEPHONE ENCOUNTER
This patient left me a voicemail wanting me to get Tier Exceptions on both dextroamphetamine (DEXTROSTAT) & methylphenidate (RITALIN) but they're both listed as historic meds so they're missing the DX, amount to dispense, number of refills, whether it's ASHANTI or not, prescriber, and pharmacy in the chart/med list.  I can't get Tier Exceptions without all of this information.  Can you please update/fix them in th is patient's chart/med list and let me know when they are done so that I can proceed?  Gwen Johnson on 6/25/2018 at 1:18 PM

## 2018-06-26 ENCOUNTER — TELEPHONE (OUTPATIENT)
Dept: INTERNAL MEDICINE | Facility: OTHER | Age: 68
End: 2018-06-26

## 2018-06-26 NOTE — TELEPHONE ENCOUNTER
Tier Exceptions sent for Methylphenidate & Dextroamphetamine Sulfate.  wGen Johnson on 6/26/2018 at 1:11 PM

## 2018-06-26 NOTE — TELEPHONE ENCOUNTER
Patient informed that prescriptions have been updated.  Patient requested Tier Exception at this time.      Barbara Connor LPN 6/26/2018 12:52 PM

## 2018-06-26 NOTE — TELEPHONE ENCOUNTER
Left message to call back...................Barbara Connor LPN  6/26/2018   8:24 AM         Signed prescriptions at [address] window.      Barbara Connor LPN 6/26/2018 8:24 AM

## 2018-06-26 NOTE — TELEPHONE ENCOUNTER
Noted.  Prescription printed ×1 prescription each.  These are controlled medications and typically needs clinic appointment for refills for this.  Jorge Barnett MD

## 2018-06-29 DIAGNOSIS — E03.4 HYPOTHYROIDISM DUE TO ACQUIRED ATROPHY OF THYROID: Primary | ICD-10-CM

## 2018-06-29 RX ORDER — LEVOTHYROXINE SODIUM 125 UG/1
TABLET ORAL
Qty: 90 TABLET | Refills: 2 | Status: SHIPPED | OUTPATIENT
Start: 2018-06-29 | End: 2019-04-19

## 2018-06-29 NOTE — TELEPHONE ENCOUNTER
Prescription approved per [facility] Refill Protocol.  LOV and TSH: 5/3/18    Margie Calderón RN on 6/29/2018 at 2:47 PM

## 2018-07-03 ENCOUNTER — TELEPHONE (OUTPATIENT)
Dept: INTERNAL MEDICINE | Facility: OTHER | Age: 68
End: 2018-07-03

## 2018-07-03 DIAGNOSIS — G47.419 PRIMARY NARCOLEPSY WITHOUT CATAPLEXY: ICD-10-CM

## 2018-07-03 RX ORDER — DEXTROAMPHETAMINE SULFATE 10 MG/1
10 TABLET ORAL 4 TIMES DAILY
Qty: 120 TABLET | Refills: 0 | Status: SHIPPED | OUTPATIENT
Start: 2018-07-03 | End: 2018-08-09

## 2018-07-03 RX ORDER — METHYLPHENIDATE HYDROCHLORIDE 10 MG/1
10 TABLET ORAL 4 TIMES DAILY
Qty: 120 TABLET | Refills: 0 | Status: SHIPPED | OUTPATIENT
Start: 2018-07-03 | End: 2018-08-09

## 2018-07-03 NOTE — TELEPHONE ENCOUNTER
Patient indicates that he is going to need a 30 day supply of Dextroamphetamine and Methylphenidate.  90 day supply prescription was at [address] window and is now back at this nursing station.  Patient is in the process of finding out what pharmacy is considered a preferred pharmacy per his insurance and if they carry the specific  that he requests.  Patient is willing to come in ASAP to get these prescriptions because he is out.      Barbara Connor LPN 7/3/2018 2:25 PM

## 2018-07-05 DIAGNOSIS — I10 HYPERTENSION, UNSPECIFIED TYPE: Primary | ICD-10-CM

## 2018-07-09 NOTE — TELEPHONE ENCOUNTER
PLEASE REVIEW, SIGN AND SEND AS APPROPRIATE: THANK YOU.    VALSARTAN 160MG TAB  Last Written Prescription Date:  1/3/18  Last Fill Quantity: 180,   # refills: 1  Last Office Visit: 5/3/18  Future Office visit:    Next 5 appointments (look out 90 days)     Aug 09, 2018 11:20 AM CDT   Office Visit with Jorge Barnett MD   [facility] [place] [facility]    [address]  [place] [address]   [phone]                   Routing refill request to provider for review/approval because:  Angiotensin-II Receptors Failed7/9 1:15 PM   - Normal serum creatinine on file in past 12 months     Ela Hernandez, RN on 7/9/2018 at 1:21 PM

## 2018-07-10 RX ORDER — VALSARTAN 160 MG/1
TABLET ORAL
Qty: 180 TABLET | Refills: 3 | Status: SHIPPED | OUTPATIENT
Start: 2018-07-10 | End: 2018-08-09

## 2018-07-16 ENCOUNTER — TELEPHONE (OUTPATIENT)
Dept: INTERNAL MEDICINE | Facility: OTHER | Age: 68
End: 2018-07-16

## 2018-07-17 ENCOUNTER — TELEPHONE (OUTPATIENT)
Dept: INTERNAL MEDICINE | Facility: OTHER | Age: 68
End: 2018-07-17

## 2018-07-17 NOTE — TELEPHONE ENCOUNTER
"Patient is wondering if he can an office visit to discuss medication changes.  Patient says that pharmacy recalled Valsartan-\"bad ingredient.\"  Patient also had to change manufacture with the Plavix.  Patient indicates that he will contact the previous manufacture but if they do not provide any of the local pharmacies he may need to change the medication.    Patient indicates that he has enough medication to last until next week.    Barbara Connor LPN 7/17/2018 9:37 AM       "

## 2018-07-17 NOTE — TELEPHONE ENCOUNTER
Patient asked for Diagnosis and Code for Ritalin and Dextrostat.      Barbara Connor LPN 7/17/2018 3:26 PM

## 2018-07-17 NOTE — TELEPHONE ENCOUNTER
Noted. Have him talk with [facility] pharmacist -- we can try to get things squared out.   Jorge Barnett MD

## 2018-07-23 NOTE — PROGRESS NOTES
Patient Information     Patient Name  Moses Whittaker MRN  [id number] Sex  Male   1950      Letter by Jorge Barnett MD at      Author:  Jorge Barnett MD Service:  (none) Author Type:  (none)    Filed:   Encounter Date:  2018 Status:  (Other)            Moses Whittaker  [address] [facility] [address]          2018    Dear Mr. Whittaker:    Following are the tests completed during your last clinic visit.  The results of these tests are included below.      Hemoglobin A1c has gone up.    Your kidney function/creatinine has improved.        To help with weight loss and improve blood sugar control....    -- Try to avoid Carbohydrates as much as possible -- breads, pasta, baked goods, cakes, oatmeal, cold cereal, potatoes.   These are turned to sugar in one metabolic conversion, cause insulin secretion and increased fat deposition / weight gain.      -- Eat more lean meats, proteins, eggs, nuts.       Results for orders placed or performed in visit on 18      CBC W PLT NO DIFF      Result  Value Ref Range    WHITE BLOOD COUNT         6.7 4.5 - 11.0 thou/cu mm    RED BLOOD COUNT           4.23 (L) 4.30 - 5.90 mil/cu mm    HEMOGLOBIN                13.7 13.5 - 17.5 g/dL    HEMATOCRIT                42.5 37.0 - 53.0 %    MCV                       101 (H) 80 - 100 fL    MCH                       32.4 26.0 - 34.0 pg    MCHC                      32.2 32.0 - 36.0 g/dL    RDW                       13.1 11.5 - 15.5 %    PLATELET COUNT            228 140 - 440 thou/cu mm    MPV                       8.7 6.5 - 11.0 fL   COMP METABOLIC PANEL      Result  Value Ref Range    SODIUM 137 133 - 143 mmol/L    POTASSIUM 5.1 3.5 - 5.1 mmol/L    CHLORIDE 107 98 - 107 mmol/L    CO2,TOTAL 24 21 - 31 mmol/L    ANION GAP 6 5 - 18                    GLUCOSE 95 70 - 105 mg/dL    CALCIUM 9.0 8.6 - 10.3 mg/dL    BUN 24 7 - 25 mg/dL    CREATININE 1.63 (H) 0.70 - 1.30 mg/dL    BUN/CREAT RATIO            15                    GFR if African American 52 (L) >60 ml/min/1.73m2    GFR if not  43 (L) >60 ml/min/1.73m2    ALBUMIN 4.4 3.5 - 5.7 g/dL    PROTEIN,TOTAL 6.6 6.4 - 8.9 g/dL    GLOBULIN                  2.2 2.0 - 3.7 g/dL    A/G RATIO 2.0 1.0 - 2.0                    BILIRUBIN,TOTAL 0.4 0.3 - 1.0 mg/dL    ALK PHOSPHATASE 67 34 - 104 IU/L    ALT (SGPT) 26 7 - 52 IU/L    AST (SGOT) 23 13 - 39 IU/L   HEMOGLOBIN A1C MONITORING (POCT)      Result  Value Ref Range    HEMOGLOBIN A1C MONITORING (POCT) 7.9 (H) 4.0 - 6.2 %    ESTIMATED AVERAGE GLUCOSE  180 mg/dL   LIPID PANEL      Result  Value Ref Range    CHOLESTEROL,TOTAL 119 <200 mg/dL    TRIGLYCERIDES 394 (H) <150 mg/dL    HDL CHOLESTEROL 31 23 - 92 mg/dL    NON-HDL CHOLESTEROL 88 <145 mg/dl    CHOL/HDL RATIO            3.84 <4.50                    LDL CHOLESTEROL 9 <100 mg/dL    PROVIDER ORDERED STATUS RANDOM    PSA, TOTAL      Result  Value Ref Range    PSA TOTAL (DIAGNOSTIC) 1.525 <=3.100 ng/mL         If you have any further questions or problems contact my office at  [phone]   --- or send [employer] message --- otherwise schedule an appointment.    Clinic : [phone]  Appointment line: [phone]     Thank you,    Jorge Barnett MD    Internal Medicine  [facility] and [facility]     Reviewed and electronically signed by provider.

## 2018-07-23 NOTE — PROGRESS NOTES
Patient Information     Patient Name  Moses Whittaker MRN  [id number] Sex  Male   1950      Letter by Jorge Barnett MD at      Author:  Jorge Barnett MD Service:  (none) Author Type:  (none)    Filed:   Encounter Date:  3/30/2017 Status:  (Other)           Moses Whittaker  [address] [facility] [address]          2017    Dear Mr. Whittaker:    Following are the tests completed during your last clinic visit.  The results of these tests are included below.      Your diabetes is now uncontrolled.  We need to get this under better control.    Insurance companies are now grading you and I on your blood sugar control -- Goal is to get your A1c down to 7.9% or lower!    -- Medicare and most insurance companies, will only cover Hemoglobin A1c labs to be rechecked every 91+ days.  (Schedule appointment every  days)    HEMOGLOBIN A1C MONITORING (POCT)    Date Value   2017 8.4 % (H)       Return for Diabetes labs and clinic follow-up appointment every 3 to 4 months.  --- (Go for about 91 to 100 days)        Schedule lab only appointment --- A few days AFTER: 17     Schedule clinic appointment with Dr. Barnett -- Same day as labs, or 1-2 days later.     Insurance companies are now grading you and I on your blood sugar control -- Goal is to get your A1c down to 7.9% or lower and NO Smoking!    -- Medicare and most insurance companies, will only cover Hemoglobin A1c labs to be rechecked every 91+ days.      HEMOGLOBIN A1C MONITORING (POCT)    Date Value   2017 8.4 % (H)   2013 7.4 % NGSP (H)     HEMOGLOBIN A1C GIH (%)    Date Value   2012 8.0 (H)        Next follow-up appointment with Dr. Barnett should be scheduled:  -- Approximately a few days AFTER: 17        Results for orders placed or performed in visit on 17      CBC W PLT NO DIFF      Result  Value Ref Range    WHITE BLOOD COUNT         7.1 4.5 - 11.0 thou/cu mm    RED BLOOD COUNT            4.69 4.30 - 5.90 mil/cu mm    HEMOGLOBIN                15.5 13.5 - 17.5 g/dL    HEMATOCRIT                46.4 37.0 - 53.0 %    MCV                       99 80 - 100 fL    MCH                       33.1 26.0 - 34.0 pg    MCHC                      33.4 32.0 - 36.0 g/dL    RDW                       12.6 11.5 - 15.5 %    PLATELET COUNT            260 140 - 440 thou/cu mm    MPV                       6.0 (L) 6.5 - 11.0 fL   COMP METABOLIC PANEL      Result  Value Ref Range    SODIUM 139 133 - 143 mmol/L    POTASSIUM 4.3 3.5 - 5.1 mmol/L    CHLORIDE 102 98 - 107 mmol/L    CO2,TOTAL 25 21 - 31 mmol/L    ANION GAP 12 5 - 18                    GLUCOSE 110 (H) 70 - 105 mg/dL    CALCIUM 9.6 8.6 - 10.3 mg/dL    BUN 30 (H) 7 - 25 mg/dL    CREATININE 1.85 (H) 0.70 - 1.30 mg/dL    BUN/CREAT RATIO           16                    GFR if African American 45 (L) >60 ml/min/1.73m2    GFR if not African American 37 (L) >60 ml/min/1.73m2    ALBUMIN 4.6 3.5 - 5.7 g/dL    PROTEIN,TOTAL 7.6 6.4 - 8.9 g/dL    GLOBULIN                  3.0 2.0 - 3.7 g/dL    A/G RATIO 1.5 1.0 - 2.0                    BILIRUBIN,TOTAL 0.7 0.3 - 1.0 mg/dL    ALK PHOSPHATASE 61 34 - 104 IU/L    ALT (SGPT) 32 7 - 52 IU/L    AST (SGOT) 26 13 - 39 IU/L   HEMOGLOBIN A1C MONITORING (POCT)      Result  Value Ref Range    HEMOGLOBIN A1C MONITORING (POCT) 8.4 (H) 4.0 - 6.2 %    ESTIMATED AVERAGE GLUCOSE  194 mg/dL   LIPID PANEL      Result  Value Ref Range    CHOLESTEROL,TOTAL 100 <200 mg/dL    TRIGLYCERIDES 141 <150 mg/dL    HDL CHOLESTEROL 36 23 - 92 mg/dL    NON-HDL CHOLESTEROL 64 <145 mg/dl    CHOL/HDL RATIO            2.78 <4.50                    LDL CHOLESTEROL 36 <100 mg/dL    PATIENT STATUS            NOT GIVEN                         If you have any further questions or problems contact my office at  [phone]   --- or send [employer] message --- otherwise schedule an appointment.    Clinic : [phone]  Appointment line: [phone]     Thank  Jorge heck MD    Internal Medicine  [facility] and [facility]     Reviewed and electronically signed by provider.

## 2018-07-23 NOTE — PROGRESS NOTES
Patient Information     Patient Name  Moses Whittaker MRN  [id number] Sex  Male   1950      Letter by Jorge Barnett MD at      Author:  Jorge Barnett MD Service:  (none) Author Type:  (none)    Filed:   Date of Service:   Status:  (Other)           Moses Whittaker  [address] [facility] [address]          2017    Dear Mr. Whittaker:    Following are the tests completed during your last clinic visit.  The results of these tests are included below.      2017 - MR SPINE LUMBAR WO    HISTORY: Chronic radicular low back pain. .     TECHNIQUE: Sagittal T1, T2 and STIR as well as axial T2 images of the lumbar spine were obtained.    COMPARISON: 2017.    FINDINGS:      Leftward lumbar curvature is redemonstrated. There is degenerative anterolisthesis of L5 on S1 without associated pars defect. Sagittal lordosis is otherwise preserved. Vertebral body heights are preserved.  The vertebral body heights are preserved.  The marrow signal is notable for endplate related degenerative change at L3-4.    The distal cord and conus medullaris have a normal caliber and morphology.  The conus terminates at L2.  No abnormal cord signal is seen in the distal cord or conus.  There are no masses in the spinal canal or paravertebral soft tissues.    Degenerative disk disease includes diffuse desiccation with focal moderate disc height loss at L3-4.    At L1-2, the central spinal canal and neural foramina are patent.    At L2-3, there is a minimal diffuse disc bulge. The central spinal canal and neural foramina are patent.    At L3-4, there is a large diffuse disc bulge with moderate facet and ligamentum flavum hypertrophy. Spinal stenosis is moderate to severe. Foraminal stenoses are severe on the right and moderate on the left.    At L4-5, there is a moderate diffuse disc bulge with severe facet and ligamentum flavum hypertrophy. A right facet effusion is present. Spinal stenosis is  moderate to severe. Foraminal stenoses are severe on the right moderate to severe on the left.    At L5-S1, there is uncovering of the disc, a mild diffuse disc bulge with severe facet hypertrophy. Spinal stenosis is mild with left greater than right subarticular zone narrowing. There is severe left and moderate to severe right foraminal stenosis.    IMPRESSION:    Advanced degenerative changes at L3-4, L4-5 and L5-S1.    Electronically Signed By: Oscar Sorto on 12/20/2017 2:47 PM    If you have any further questions or problems contact my office at  [phone]   --- or send [employer] message --- otherwise schedule an appointment.    Clinic : [phone]  Appointment line: [phone]     Thank you,    Jorge Barnett MD    Internal Medicine  [facility] and [facility]     Reviewed and electronically signed by provider.

## 2018-07-23 NOTE — PROGRESS NOTES
Patient Information     Patient Name  Moses Whittaker MRN  [id number] Sex  Male   1950      Letter by Jorge Barnett MD at      Author:  Jorge Barnett MD Service:  (none) Author Type:  (none)    Filed:   Encounter Date:  2017 Status:  (Other)           Moses Whittaker  [address] [facility] [address]          2017    Dear Mr. Whittaker:    Following are the tests completed during your last clinic visit.  The results of these tests are included below.      Your HLA-B27 labs came back normal.    Rheumatoid arthritis lab was normal.    Results for orders placed or performed in visit on 17      LIPID PANEL      Result  Value Ref Range    CHOLESTEROL,TOTAL 101 <200 mg/dL    TRIGLYCERIDES 119 <150 mg/dL    HDL CHOLESTEROL 34 23 - 92 mg/dL    NON-HDL CHOLESTEROL 67 <145 mg/dl    CHOL/HDL RATIO            2.97 <4.50                    LDL CHOLESTEROL 43 <100 mg/dL    PROVIDER ORDERED STATUS RANDOM    SEDIMENTATION RATE      Result  Value Ref Range    SEDIMENTATION RATE        7 <21 mm/hr   RHEUMATOID FACTOR      Result  Value Ref Range    RHEUMATOID FACTOR QUANT. <14.0 <14.0 IU/mL   HLA B27      Result  Value Ref Range    HLA B27 ANTIGEN           Negative Not Applicable    INTERPRETATION SEE COMMENTS    CCP      Result  Value Ref Range    CCP Antibody,IgG/IgA <4.6 <=19.9 CU   CRP, inflammatory      Result  Value Ref Range    C-REACTIVE PROTEIN <1.000 <1.000 mg/dL         If you have any further questions or problems contact my office at  [phone]   --- or send [employer] message --- otherwise schedule an appointment.    Clinic : [phone]  Appointment line: [phone]     Thank you,    Jorge Barnett MD    Internal Medicine  [facility] and [facility]     Reviewed and electronically signed by provider.

## 2018-07-23 NOTE — PROGRESS NOTES
Patient Information     Patient Name  Moses Whittaker MRN  [id number] Sex  Male   1950      Letter by Jorge Barnett MD at      Author:  Jorge Barnett MD Service:  (none) Author Type:  (none)    Filed:   Encounter Date:  2017 Status:  (Other)           Moses Whittaker  [address] [facility] [address]          2017    Dear Mr. Whittaker:    Following are the tests completed during your last clinic visit.  The results of these tests are included below.      Kidney function is stable - and has actually improved slightly.    Hemoglobin A1c is still uncontrolled.  -- Try to walk after every meal, this can lower your blood sugar spikes after eating.  -- Increase your insulin dosing if needed to help improve your blood sugars.    Blood counts are normal.    Thyroid - TSH - is normal.    Cholesterol levels look good.    -- labs in 91+ days with Diabetes clinic appointment with Dr. Barnett 1-2 days later.    Results for orders placed or performed in visit on 17      CBC W PLT NO DIFF      Result  Value Ref Range    WHITE BLOOD COUNT         5.4 4.5 - 11.0 thou/cu mm    RED BLOOD COUNT           4.58 4.30 - 5.90 mil/cu mm    HEMOGLOBIN                15.0 13.5 - 17.5 g/dL    HEMATOCRIT                44.3 37.0 - 53.0 %    MCV                       97 80 - 100 fL    MCH                       32.8 26.0 - 34.0 pg    MCHC                      33.9 32.0 - 36.0 g/dL    RDW                       13.2 11.5 - 15.5 %    PLATELET COUNT            207 140 - 440 thou/cu mm    MPV                       8.6 6.5 - 11.0 fL   COMP METABOLIC PANEL      Result  Value Ref Range    SODIUM 135 133 - 143 mmol/L    POTASSIUM 5.3 (H) 3.5 - 5.1 mmol/L    CHLORIDE 106 98 - 107 mmol/L    CO2,TOTAL 21 21 - 31 mmol/L    ANION GAP 8 5 - 18                    GLUCOSE 121 (H) 70 - 105 mg/dL    CALCIUM 9.1 8.6 - 10.3 mg/dL    BUN 20 7 - 25 mg/dL    CREATININE 1.67 (H) 0.70 - 1.30 mg/dL    BUN/CREAT RATIO            12                    GFR if African American 50 (L) >60 ml/min/1.73m2    GFR if not  41 (L) >60 ml/min/1.73m2    ALBUMIN 4.2 3.5 - 5.7 g/dL    PROTEIN,TOTAL 6.7 6.4 - 8.9 g/dL    GLOBULIN                  2.5 2.0 - 3.7 g/dL    A/G RATIO 1.7 1.0 - 2.0                    BILIRUBIN,TOTAL 0.6 0.3 - 1.0 mg/dL    ALK PHOSPHATASE 58 34 - 104 IU/L    ALT (SGPT) 26 7 - 52 IU/L    AST (SGOT) 22 13 - 39 IU/L   HEMOGLOBIN A1C MONITORING (POCT)      Result  Value Ref Range    HEMOGLOBIN A1C MONITORING (POCT) 8.1 (H) 4.0 - 6.2 %    ESTIMATED AVERAGE GLUCOSE  186 mg/dL   LIPID PANEL      Result  Value Ref Range    CHOLESTEROL,TOTAL 101 <200 mg/dL    TRIGLYCERIDES 166 (H) <150 mg/dL    HDL CHOLESTEROL 33 23 - 92 mg/dL    NON-HDL CHOLESTEROL 68 <145 mg/dl    CHOL/HDL RATIO            3.06 <4.50                    LDL CHOLESTEROL 35 <100 mg/dL    PATIENT STATUS            NON-FASTING                   TSH      Result  Value Ref Range    TSH 0.43 0.34 - 5.60 uIU/mL         If you have any further questions or problems contact my office at  [phone]   --- or send [employer] message --- otherwise schedule an appointment.    Clinic : [phone]  Appointment line: [phone]     Thank you,    Jorge Barnett MD    Internal Medicine  [facility] and [facility]     Reviewed and electronically signed by provider.

## 2018-07-24 DIAGNOSIS — I25.118 ATHEROSCLEROSIS OF NATIVE CORONARY ARTERY OF NATIVE HEART WITH STABLE ANGINA PECTORIS (H): ICD-10-CM

## 2018-07-24 RX ORDER — RANOLAZINE 500 MG/1
1000 TABLET, EXTENDED RELEASE ORAL 2 TIMES DAILY
Qty: 360 TABLET | Refills: 3 | Status: SHIPPED | OUTPATIENT
Start: 2018-07-24 | End: 2019-03-08

## 2018-07-24 NOTE — PROGRESS NOTES
Patient Information     Patient Name  Moses Whittaker MRN  [id number] Sex  Male   1950      Letter by Jorge Barnett MD at      Author:  Jorge Barnett MD Service:  (none) Author Type:  (none)    Filed:   Encounter Date:  1/3/2018 Status:  (Other)           Moses Whittaker  [address] [facility] [address]          2018    Dear Mr. Whittaker:    Following are the tests completed during your last clinic visit.  The results of these tests are included below.      Results for orders placed or performed in visit on 18      URINALYSIS W REFLEX MICROSCOPIC IF POSITIVE      Result  Value Ref Range    COLOR                     Yellow Yellow Color    CLARITY                   Clear Clear Clarity    SPECIFIC GRAVITY,URINE    1.025 1.010, 1.015, 1.020, 1.025                    PH,URINE                  5.5 6.0, 7.0, 8.0, 5.5, 6.5, 7.5, 8.5                    UROBILINOGEN,QUALITATIVE  Normal Normal EU/dl    PROTEIN, URINE 30 (A) Negative mg/dL    GLUCOSE, URINE Negative Negative mg/dL    KETONES,URINE             Negative Negative mg/dL    BILIRUBIN,URINE           Negative Negative                    OCCULT BLOOD,URINE        Negative Negative                    NITRITE                   Negative Negative                    LEUKOCYTE ESTERASE        Negative Negative                   URINALYSIS MICROSCOPIC      Result  Value Ref Range    RBC None Seen 0-2, None Seen /HPF    WBC 0-2 0-2, 3-5, None Seen /HPF    BACTERIA                  Few None Seen, Rare, Occasional, Few Bacteria/HPF    EPITHELIAL CELLS          None Seen None Seen, Few Epi/HPF         If you have any further questions or problems contact my office at  [phone]   --- or send [employer] message --- otherwise schedule an appointment.    Clinic : [phone]  Appointment line: [phone]     Thank you,    Jorge Barnett MD    Internal Medicine  [facility] and [facility]     Reviewed and  electronically signed by provider.

## 2018-07-24 NOTE — PROGRESS NOTES
Patient Information     Patient Name  Moses Whittaker MRN  [id number] Sex  Male   1950      Letter by Jorge Barnett MD at      Author:  Jorge Barnett MD Service:  (none) Author Type:  (none)    Filed:   Encounter Date:  2017 Status:  (Other)           Moses Whittaker  [address] [facility] [address]          2017    Dear Mr. Whittaker:    Following are the tests completed during your last clinic visit.  The results of these tests are included below.      2017 - XR SPINE LUMBAR 3 VIEWS     HISTORY:  Chronic radicular low back pain.     TECHNIQUE: 3 views of lumbosacral spine.     COMPARISON: 2009.     FINDINGS:     Lumbar vertebral body heights are preserved. There is trace retrolisthesis of L2 on L3 and anterolisthesis of L5 on S1. No pars defect is identified. There is moderate levoscoliosis with associated advanced asymmetric degenerative disc and facet disease.       IMPRESSION:      Advanced degenerative changes associated with moderate levoscoliosis.       Electronically Signed By: Oscar Sorto on 2017 10:50 AM    Results for orders placed or performed in visit on 17      LIPID PANEL      Result  Value Ref Range    CHOLESTEROL,TOTAL 101 <200 mg/dL    TRIGLYCERIDES 119 <150 mg/dL    HDL CHOLESTEROL 34 23 - 92 mg/dL    NON-HDL CHOLESTEROL 67 <145 mg/dl    CHOL/HDL RATIO            2.97 <4.50                    LDL CHOLESTEROL 43 <100 mg/dL    PROVIDER ORDERED STATUS RANDOM    SEDIMENTATION RATE      Result  Value Ref Range    SEDIMENTATION RATE        7 <21 mm/hr   RHEUMATOID FACTOR      Result  Value Ref Range    RHEUMATOID FACTOR QUANT. <14.0 <14.0 IU/mL   CRP, inflammatory      Result  Value Ref Range    C-REACTIVE PROTEIN <1.000 <1.000 mg/dL         If you have any further questions or problems contact my office at  [phone]   --- or send [employer] message --- otherwise schedule an appointment.    Clinic :  [phone]  Appointment line: [phone]     Thank you,    Jorge Barnett MD    Internal Medicine  [facility] and [facility]     Reviewed and electronically signed by provider.

## 2018-07-25 ENCOUNTER — TELEPHONE (OUTPATIENT)
Dept: INTERNAL MEDICINE | Facility: OTHER | Age: 68
End: 2018-07-25

## 2018-07-25 NOTE — TELEPHONE ENCOUNTER
[facility] sent the request for the ICD-10 code for Renexa.  Code added and this was faxed back./        Barbara Connor LPN 7/25/2018 8:29 AM

## 2018-08-09 ENCOUNTER — OFFICE VISIT (OUTPATIENT)
Dept: INTERNAL MEDICINE | Facility: OTHER | Age: 68
End: 2018-08-09
Attending: INTERNAL MEDICINE
Payer: MEDICARE

## 2018-08-09 VITALS
WEIGHT: 203 LBS | HEART RATE: 64 BPM | BODY MASS INDEX: 29.76 KG/M2 | DIASTOLIC BLOOD PRESSURE: 84 MMHG | SYSTOLIC BLOOD PRESSURE: 144 MMHG

## 2018-08-09 DIAGNOSIS — G47.419 PRIMARY NARCOLEPSY WITHOUT CATAPLEXY: ICD-10-CM

## 2018-08-09 DIAGNOSIS — M54.10 RADICULAR LOW BACK PAIN: ICD-10-CM

## 2018-08-09 DIAGNOSIS — Z02.89 PAIN MEDICATION AGREEMENT: ICD-10-CM

## 2018-08-09 DIAGNOSIS — E78.2 MIXED HYPERLIPIDEMIA: ICD-10-CM

## 2018-08-09 DIAGNOSIS — M50.30 DEGENERATIVE DISC DISEASE, CERVICAL: ICD-10-CM

## 2018-08-09 DIAGNOSIS — E03.4 HYPOTHYROIDISM DUE TO ACQUIRED ATROPHY OF THYROID: ICD-10-CM

## 2018-08-09 DIAGNOSIS — I10 BENIGN ESSENTIAL HYPERTENSION: Primary | ICD-10-CM

## 2018-08-09 LAB
ALBUMIN SERPL-MCNC: 3.9 G/DL (ref 3.5–5.7)
ALBUMIN UR-MCNC: 100 MG/DL
ALP SERPL-CCNC: 49 U/L (ref 34–104)
ALT SERPL W P-5'-P-CCNC: 28 U/L (ref 7–52)
ANION GAP SERPL CALCULATED.3IONS-SCNC: 5 MMOL/L (ref 3–14)
APPEARANCE UR: CLEAR
AST SERPL W P-5'-P-CCNC: 23 U/L (ref 13–39)
BILIRUB SERPL-MCNC: 0.5 MG/DL (ref 0.3–1)
BILIRUB UR QL STRIP: NEGATIVE
BUN SERPL-MCNC: 24 MG/DL (ref 7–25)
CALCIUM SERPL-MCNC: 9.4 MG/DL (ref 8.6–10.3)
CHLORIDE SERPL-SCNC: 107 MMOL/L (ref 98–107)
CHOLEST SERPL-MCNC: 89 MG/DL
CO2 SERPL-SCNC: 26 MMOL/L (ref 21–31)
COLOR UR AUTO: YELLOW
CREAT SERPL-MCNC: 1.5 MG/DL (ref 0.7–1.3)
ERYTHROCYTE [DISTWIDTH] IN BLOOD BY AUTOMATED COUNT: 12.8 % (ref 10–15)
GFR SERPL CREATININE-BSD FRML MDRD: 47 ML/MIN/1.7M2
GLUCOSE SERPL-MCNC: 84 MG/DL (ref 70–105)
GLUCOSE UR STRIP-MCNC: 250 MG/DL
HBA1C MFR BLD: 8 % (ref 4–6)
HCT VFR BLD AUTO: 42.7 % (ref 40–53)
HDLC SERPL-MCNC: 30 MG/DL (ref 23–92)
HGB BLD-MCNC: 14.2 G/DL (ref 13.3–17.7)
HGB UR QL STRIP: NEGATIVE
KETONES UR STRIP-MCNC: NEGATIVE MG/DL
LDLC SERPL CALC-MCNC: 38 MG/DL
LEUKOCYTE ESTERASE UR QL STRIP: NEGATIVE
MCH RBC QN AUTO: 32.6 PG (ref 26.5–33)
MCHC RBC AUTO-ENTMCNC: 33.3 G/DL (ref 31.5–36.5)
MCV RBC AUTO: 98 FL (ref 78–100)
MUCOUS THREADS #/AREA URNS LPF: PRESENT /LPF
NITRATE UR QL: NEGATIVE
NONHDLC SERPL-MCNC: 59 MG/DL
PH UR STRIP: 6 PH (ref 5–9)
PLATELET # BLD AUTO: 191 10E9/L (ref 150–450)
POTASSIUM SERPL-SCNC: 4.8 MMOL/L (ref 3.5–5.1)
PROT SERPL-MCNC: 6.7 G/DL (ref 6.4–8.9)
RBC # BLD AUTO: 4.35 10E12/L (ref 4.4–5.9)
RBC #/AREA URNS AUTO: ABNORMAL /HPF
SODIUM SERPL-SCNC: 138 MMOL/L (ref 134–144)
SOURCE: ABNORMAL
SP GR UR STRIP: >1.03 (ref 1–1.03)
TRIGL SERPL-MCNC: 106 MG/DL
TSH SERPL DL<=0.05 MIU/L-ACNC: 0.41 IU/ML (ref 0.34–5.6)
UROBILINOGEN UR STRIP-ACNC: 0.2 EU/DL (ref 0.2–1)
WBC # BLD AUTO: 5.7 10E9/L (ref 4–11)
WBC #/AREA URNS AUTO: ABNORMAL /HPF

## 2018-08-09 PROCEDURE — G0463 HOSPITAL OUTPT CLINIC VISIT: HCPCS

## 2018-08-09 PROCEDURE — 80053 COMPREHEN METABOLIC PANEL: CPT | Performed by: INTERNAL MEDICINE

## 2018-08-09 PROCEDURE — 85027 COMPLETE CBC AUTOMATED: CPT | Performed by: INTERNAL MEDICINE

## 2018-08-09 PROCEDURE — 84443 ASSAY THYROID STIM HORMONE: CPT | Performed by: INTERNAL MEDICINE

## 2018-08-09 PROCEDURE — 36415 COLL VENOUS BLD VENIPUNCTURE: CPT | Performed by: INTERNAL MEDICINE

## 2018-08-09 PROCEDURE — 83036 HEMOGLOBIN GLYCOSYLATED A1C: CPT | Performed by: INTERNAL MEDICINE

## 2018-08-09 PROCEDURE — 82043 UR ALBUMIN QUANTITATIVE: CPT | Performed by: INTERNAL MEDICINE

## 2018-08-09 PROCEDURE — 80061 LIPID PANEL: CPT | Performed by: INTERNAL MEDICINE

## 2018-08-09 PROCEDURE — 99214 OFFICE O/P EST MOD 30 MIN: CPT | Performed by: INTERNAL MEDICINE

## 2018-08-09 PROCEDURE — 81001 URINALYSIS AUTO W/SCOPE: CPT | Performed by: INTERNAL MEDICINE

## 2018-08-09 RX ORDER — HYDROCODONE BITARTRATE AND ACETAMINOPHEN 5; 325 MG/1; MG/1
1 TABLET ORAL EVERY 6 HOURS PRN
Qty: 60 TABLET | Refills: 0 | Status: SHIPPED | OUTPATIENT
Start: 2018-09-06 | End: 2018-08-09

## 2018-08-09 RX ORDER — LOSARTAN POTASSIUM 50 MG/1
50 TABLET ORAL
Qty: 180 TABLET | Refills: 3 | Status: SHIPPED | OUTPATIENT
Start: 2018-08-09 | End: 2018-11-27

## 2018-08-09 RX ORDER — METHYLPHENIDATE HYDROCHLORIDE 10 MG/1
10 TABLET ORAL 4 TIMES DAILY
Qty: 360 TABLET | Refills: 0 | Status: SHIPPED | OUTPATIENT
Start: 2018-08-09 | End: 2018-09-19

## 2018-08-09 RX ORDER — HYDROCODONE BITARTRATE AND ACETAMINOPHEN 5; 325 MG/1; MG/1
1 TABLET ORAL EVERY 6 HOURS PRN
Qty: 60 TABLET | Refills: 0 | Status: SHIPPED | OUTPATIENT
Start: 2018-08-09 | End: 2018-08-09

## 2018-08-09 RX ORDER — DEXTROAMPHETAMINE SULFATE 10 MG/1
10 TABLET ORAL 4 TIMES DAILY
Qty: 360 TABLET | Refills: 0 | Status: SHIPPED | OUTPATIENT
Start: 2018-08-09 | End: 2018-09-19

## 2018-08-09 RX ORDER — HYDROCODONE BITARTRATE AND ACETAMINOPHEN 5; 325 MG/1; MG/1
1 TABLET ORAL EVERY 6 HOURS PRN
Qty: 60 TABLET | Refills: 0 | Status: SHIPPED | OUTPATIENT
Start: 2018-10-04 | End: 2019-12-19

## 2018-08-09 ASSESSMENT — ANXIETY QUESTIONNAIRES
GAD7 TOTAL SCORE: 0
1. FEELING NERVOUS, ANXIOUS, OR ON EDGE: NOT AT ALL
6. BECOMING EASILY ANNOYED OR IRRITABLE: NOT AT ALL
IF YOU CHECKED OFF ANY PROBLEMS ON THIS QUESTIONNAIRE, HOW DIFFICULT HAVE THESE PROBLEMS MADE IT FOR YOU TO DO YOUR WORK, TAKE CARE OF THINGS AT HOME, OR GET ALONG WITH OTHER PEOPLE: NOT DIFFICULT AT ALL
2. NOT BEING ABLE TO STOP OR CONTROL WORRYING: NOT AT ALL
3. WORRYING TOO MUCH ABOUT DIFFERENT THINGS: NOT AT ALL
5. BEING SO RESTLESS THAT IT IS HARD TO SIT STILL: NOT AT ALL
7. FEELING AFRAID AS IF SOMETHING AWFUL MIGHT HAPPEN: NOT AT ALL

## 2018-08-09 ASSESSMENT — PATIENT HEALTH QUESTIONNAIRE - PHQ9: 5. POOR APPETITE OR OVEREATING: NOT AT ALL

## 2018-08-09 ASSESSMENT — PAIN SCALES - GENERAL: PAINLEVEL: EXTREME PAIN (8)

## 2018-08-09 ASSESSMENT — ENCOUNTER SYMPTOMS
CONFUSION: 0
CHILLS: 0
EYE PAIN: 0
VOMITING: 0
AGITATION: 0
HEMATURIA: 0
DYSURIA: 0
LIGHT-HEADEDNESS: 0
ARTHRALGIAS: 0
FATIGUE: 0
ABDOMINAL PAIN: 0
MYALGIAS: 0
WHEEZING: 0
PALPITATIONS: 0
DIZZINESS: 0
DIARRHEA: 0
BRUISES/BLEEDS EASILY: 0
NAUSEA: 0
FEVER: 0
COUGH: 0
SHORTNESS OF BREATH: 0

## 2018-08-09 NOTE — PATIENT INSTRUCTIONS
Medications refilled.     Labs today.     To help with weight loss and improve blood sugar control....    -- Try to avoid Carbohydrates as much as possible -- breads, pasta, baked goods, cakes, oatmeal, cold cereal, potatoes.   These are turned to sugar in one metabolic conversion, cause insulin secretion and increased fat deposition / weight gain.      -- Eat more lean meats, proteins, eggs, nuts.      Return in approximately 3 month(s), or sooner as needed for follow-up with Dr. Barnett.  - Med Refills and Diabetes follow-up.     Clinic : [phone]  Appointment line: [phone]

## 2018-08-09 NOTE — MR AVS SNAPSHOT
After Visit Summary   8/9/2018    Moses Whittaker    MRN: [id number]           Patient Information     Date Of Birth          1950        Visit Information        Provider Department      8/9/2018 11:20 AM Jorge Barnett MD [facility]        Today's Diagnoses     Benign essential hypertension    -  1    Radicular low back pain        Degenerative disc disease, cervical        Pain medication agreement - 8/9/2018        Primary narcolepsy without cataplexy        Type 2 diabetes mellitus with stage 3 chronic kidney disease, with long-term current use of insulin (H)          Care Instructions    Medications refilled.     Labs today.     To help with weight loss and improve blood sugar control....    -- Try to avoid Carbohydrates as much as possible -- breads, pasta, baked goods, cakes, oatmeal, cold cereal, potatoes.   These are turned to sugar in one metabolic conversion, cause insulin secretion and increased fat deposition / weight gain.      -- Eat more lean meats, proteins, eggs, nuts.      Return in approximately 3 month(s), or sooner as needed for follow-up with Dr. Barnett.  - Med Refills and Diabetes follow-up.     Clinic : [phone]  Appointment line: [phone]            Follow-ups after your visit        Follow-up notes from your care team     Return in about 3 months (around 11/9/2018) for - Med Refills.      Who to contact     If you have questions or need follow up information about today's clinic visit or your schedule please contact [facility] directly at [phone].  Normal or non-critical lab and imaging results will be communicated to you by MyChart, letter or phone within 4 business days after the clinic has received the results. If you do not hear from us within 7 days, please contact the clinic through MyChart or phone. If you have a critical or abnormal lab result, we will notify you by phone as soon as  "possible.  Submit refill requests through [employer] or call your pharmacy and they will forward the refill request to us. Please allow 3 business days for your refill to be completed.          Additional Information About Your Visit        [employer] Information     [employer] lets you send messages to your doctor, view your test results, renew your prescriptions, schedule appointments and more. To sign up, go to [email or web address] . Click on \"Log in\" on the left side of the screen, which will take you to the Welcome page. Then click on \"Sign up Now\" on the right side of the page.     You will be asked to enter the access code listed below, as well as some personal information. Please follow the directions to create your username and password.     Your access code is: [email or web address]  Expires: 2018 12:33 PM     Your access code will  in 90 days. If you need help or a new code, please call your [facility] clinic or [phone].        Care EveryWhere ID     This is your Care EveryWhere ID. This could be used by other organizations to access your [facility] medical records  [id number]        Your Vitals Were     Pulse BMI (Body Mass Index)                64 29.76 kg/m2           Blood Pressure from Last 3 Encounters:   18 144/84   18 136/76   18 130/72    Weight from Last 3 Encounters:   18 203 lb (92.1 kg)   18 207 lb (93.9 kg)   18 204 lb 6.4 oz (92.7 kg)              Today, you had the following     No orders found for display         Today's Medication Changes          These changes are accurate as of 18 12:33 PM.  If you have any questions, ask your nurse or doctor.               Start taking these medicines.        Dose/Directions    HYDROcodone-acetaminophen 5-325 MG per tablet   Commonly known as:  NORCO   Used for:  Radicular low back pain, Degenerative disc disease, cervical, Pain medication agreement   Started by:  Jorge Barnett MD        Dose:  1 tablet   Start " taking on:  10/4/2018   Take 1 tablet by mouth every 6 hours as needed for severe pain   Quantity:  60 tablet   Refills:  0       losartan 50 MG tablet   Commonly known as:  COZAAR   Used for:  Benign essential hypertension   Started by:  Jorge Barnett MD        Dose:  50 mg   Take 1 tablet (50 mg) by mouth 2 times daily -- START when out of Valsartan -- needs BID -- has trouble swallowing large pills   Quantity:  180 tablet   Refills:  3         Stop taking these medicines if you haven't already. Please contact your care team if you have questions.     valsartan 160 MG tablet   Commonly known as:  DIOVAN   Stopped by:  Jorge Barnett MD                Where to get your medicines      These medications were sent to [facility] [address] [place] [id number]     Phone:  [phone]     losartan 50 MG tablet         Some of these will need a paper prescription and others can be bought over the counter.  Ask your nurse if you have questions.     Bring a paper prescription for each of these medications     dextroamphetamine 10 MG tablet    HYDROcodone-acetaminophen 5-325 MG per tablet    methylphenidate 10 MG tablet               Information about OPIOIDS     PRESCRIPTION OPIOIDS: WHAT YOU NEED TO KNOW   We gave you an opioid (narcotic) pain medicine. It is important to manage your pain, but opioids are not always the best choice. You should first try all the other options your care team gave you. Take this medicine for as short a time (and as few doses) as possible.    Some activities can increase your pain, such as bandage changes or therapy sessions. It may help to take your pain medicine 30 to 60 minutes before these activities. Reduce your stress by getting enough sleep, working on hobbies you enjoy and practicing relaxation or meditation. Talk to your care team about ways to manage your pain beyond prescription opioids.    These medicines  have risks:    DO NOT drive when on new or higher doses of pain medicine. These medicines can affect your alertness and reaction times, and you could be arrested for driving under the influence (DUI). If you need to use opioids long-term, talk to your care team about driving.    DO NOT operate heavy machinery    DO NOT do any other dangerous activities while taking these medicines.    DO NOT drink any alcohol while taking these medicines.     If the opioid prescribed includes acetaminophen, DO NOT take with any other medicines that contain acetaminophen. Read all labels carefully. Look for the word  acetaminophen  or  Tylenol.  Ask your pharmacist if you have questions or are unsure.    You can get addicted to pain medicines, especially if you have a history of addiction (chemical, alcohol or substance dependence). Talk to your care team about ways to reduce this risk.    All opioids tend to cause constipation. Drink plenty of water and eat foods that have a lot of fiber, such as fruits, vegetables, prune juice, apple juice and high-fiber cereal. Take a laxative (Miralax, milk of magnesia, Colace, Senna) if you don t move your bowels at least every other day. Other side effects include upset stomach, sleepiness, dizziness, throwing up, tolerance (needing more of the medicine to have the same effect), physical dependence and slowed breathing.    Store your pills in a secure place, locked if possible. We will not replace any lost or stolen medicine. If you don t finish your medicine, please throw away (dispose) as directed by your pharmacist. The [place] [employer]ution [employer] has more information about safe disposal: [email or web address]         Primary Care Provider Office Phone # Fax #    Jorge Barnett [phone] [address] [place] [address]        Equal Access to Services     MEGAN FUNK AH: Bimal Gruber  watommieda lurichelleadaha, qaybta kaalkadeem petit, aries marksaasusy ah. So [facility] [phone].    ATENCIÓN: Si amaya lomas, tiene a hsu disposición servicios gratuitos de asistencia lingüística. Bryanna al [phone].    We comply with applicable federal civil rights laws and [place] laws. We do not discriminate on the basis of race, color, national origin, age, disability, sex, sexual orientation, or gender identity.            Thank you!     Thank you for choosing [facility]  for your care. Our goal is always to provide you with excellent care. Hearing back from our patients is one way we can continue to improve our services. Please take a few minutes to complete the written survey that you may receive in the mail after your visit with us. Thank you!             Your Updated Medication List - Protect others around you: Learn how to safely use, store and throw away your medicines at www.disposemymeds.org.          This list is accurate as of 8/9/18 12:33 PM.  Always use your most recent med list.                   Brand Name Dispense Instructions for use Diagnosis    albuterol 108 (90 Base) MCG/ACT Inhaler    PROAIR HFA/PROVENTIL HFA/VENTOLIN HFA    1 Inhaler    Inhale 1-2 puffs into the lungs every 4 hours as needed for shortness of breath / dyspnea or wheezing    Flu-like symptoms, Cough       clopidogrel 75 MG tablet    PLAVIX     Take 1 tablet by mouth daily        [employer] ALCOHOL SWABS Pads      For home use.        desoximetasone 0.25 % cream    TOPICORT     Apply 1 Application topically 2 times daily        dextroamphetamine 10 MG tablet    DEXTROSTAT    360 tablet    Take 1 tablet (10 mg) by mouth 4 times daily    Primary narcolepsy without cataplexy       diclofenac 75 MG EC tablet    VOLTAREN     Take 1 tablet by mouth 4 times daily        docusate sodium 100 MG capsule    COLACE     Take 1-2 capsules by mouth 2 times daily as needed for constipation prevention         FISH OIL PO      Take  by mouth.        flaxseed oil 1000 MG Caps      Take  by mouth.        furosemide 40 MG tablet    LASIX     Take 2-4 tablets by mouth daily Or as instructed for leg swelling        GLUCOCOM BLOOD GLUCOSE MONITOR Cecile      Dispense glucose meter, test strips and lancets covered by the patient insurance. Test 10 times per day.        GOODSENSE ASPIRIN 325 MG tablet   Generic drug:  aspirin      Take  by mouth.        humaLOG 100 UNIT/ML injection   Generic drug:  insulin lispro     180 mL    INJECT UNDER THE SKIN USING INSULIN PUMP. MAX DAILY DOSE  UNITS    Controlled type 2 diabetes mellitus with stage 3 chronic kidney disease, with long-term current use of insulin (H)       hydrALAZINE 25 MG tablet    APRESOLINE     Take 1-2 tablets by mouth 4 times daily - Take lowest effective dose for blood pressure management        HYDROcodone-acetaminophen 5-325 MG per tablet   Start taking on:  10/4/2018    NORCO    60 tablet    Take 1 tablet by mouth every 6 hours as needed for severe pain    Radicular low back pain, Degenerative disc disease, cervical, Pain medication agreement       ipratropium 17 MCG/ACT Inhaler    ATROVENT HFA     Inhale 2 puffs into the lungs 4 times daily        levothyroxine 125 MCG tablet    SYNTHROID/LEVOTHROID    90 tablet    TAKE ONE TABLET BY MOUTH ONCE DAILY IN THE MORNING    Hypothyroidism due to acquired atrophy of thyroid       losartan 50 MG tablet    COZAAR    180 tablet    Take 1 tablet (50 mg) by mouth 2 times daily -- START when out of Valsartan -- needs BID -- has trouble swallowing large pills    Benign essential hypertension       methylphenidate 10 MG tablet    RITALIN    360 tablet    Take 1 tablet (10 mg) by mouth 4 times daily    Primary narcolepsy without cataplexy       metoprolol succinate 50 MG 24 hr tablet    TOPROL-XL     Take 1 tablet by mouth 2 times daily        nitroGLYcerin 0.4 MG sublingual tablet    NITROSTAT     Place 1 tablet under the  tongue every 5 minutes as needed for chest pain        ONETOUCH ULTRA test strip   Generic drug:  blood glucose monitoring      Dispense test strips covered by the patient insurance. Test 10 times per day.        Pen Needles 29G X 12MM Misc      For administering insulin at home.        PSYLLIUM PO      Take 1 tsp. by mouth 3 times daily - for constipation prevention        ranitidine 150 MG tablet    ZANTAC     Take 1 tablet by mouth 2 times daily        ranolazine 500 MG 12 hr tablet    RANEXA    360 tablet    Take 2 tablets (1,000 mg) by mouth 2 times daily - cancel other Rx - give 3 month supply    Atherosclerosis of native coronary artery of native heart with stable angina pectoris (H)       rosuvastatin 10 MG tablet    CRESTOR    180 tablet    TAKE ONE TABLET BY MOUTH TWICE DAILY    Mixed hyperlipidemia       Saline Gel      Spray 1 spray in nostril every hour as needed For Nasal Dryness        Solution Administration Set Misc      As directed.        thin lancets    NO BRAND SPECIFIED     Test 10 times per day.        tiZANidine 2 MG tablet    ZANAFLEX     Take 1-2 tablets by mouth every 6 hours as needed for muscle spasms

## 2018-08-09 NOTE — PROGRESS NOTES
"Nursing Notes:   Barbara Connor LPN  8/9/2018 12:20 PM  Signed  Patient presents to the clinic for multiple concerns: Laceration on the back of the right lower leg (2 weeks), sores/discoloration of both feet with concerns about losing the nail on the big toe of both feet.    Previous A1C is at goal of <8  Lab Results   Component Value Date    A1C 7.8 05/03/2018     Urine microalbumin:creatine: n/a  Foot exam unknown-declines  Eye exam     Tobacco User no  Patient is on a daily aspirin  Patient is on a Statin.  Blood pressure today of:     BP Readings from Last 1 Encounters:   05/03/18 136/76      is at the goal of <139/89 for diabetics.    Chief Complaint   Patient presents with     Clinic Care Coordination - Follow-up       Initial There were no vitals taken for this visit. Estimated body mass index is 30.35 kg/(m^2) as calculated from the following:    Height as of 9/19/17: 5' 9.25\" (1.759 m).    Weight as of 5/3/18: 207 lb (93.9 kg).  Medication Reconciliation: complete    Barbara Connor LPN    Nursing note reviewed with patient.  Accurracy and completeness verified.   Mr. Whittaker is a 68 year old male who:  Patient presents with:  Clinic Care Coordination - Follow-up    HPI     ICD-10-CM    1. Benign essential hypertension I10 losartan (COZAAR) 50 MG tablet     Urine Microscopic   2. Radicular low back pain M54.10 HYDROcodone-acetaminophen (NORCO) 5-325 MG per tablet     DISCONTINUED: HYDROcodone-acetaminophen (NORCO) 5-325 MG per tablet     DISCONTINUED: HYDROcodone-acetaminophen (NORCO) 5-325 MG per tablet   3. Degenerative disc disease, cervical M50.30 HYDROcodone-acetaminophen (NORCO) 5-325 MG per tablet     DISCONTINUED: HYDROcodone-acetaminophen (NORCO) 5-325 MG per tablet     DISCONTINUED: HYDROcodone-acetaminophen (NORCO) 5-325 MG per tablet   4. Pain medication agreement - 8/9/2018 Z02.89 HYDROcodone-acetaminophen (NORCO) 5-325 MG per tablet     DISCONTINUED: HYDROcodone-acetaminophen (NORCO) " 5-325 MG per tablet     DISCONTINUED: HYDROcodone-acetaminophen (NORCO) 5-325 MG per tablet   5. Primary narcolepsy without cataplexy G47.419 methylphenidate (RITALIN) 10 MG tablet     dextroamphetamine (DEXTROSTAT) 10 MG tablet   6. Mixed hyperlipidemia E78.2 Lipid Profile   7. Hypothyroidism due to acquired atrophy of thyroid E03.4 Thyrotropin GH   8. Uncontrolled type 2 diabetes mellitus with stage 3 chronic kidney disease, with long-term current use of insulin (H) E11.22 Comprehensive metabolic panel    E11.65 CBC with platelets    N18.3 Hemoglobin A1c    Z79.4 Albumin Random Urine Quantitative with Creat Ratio     *UA reflex to Microscopic     Hypertension, currently on valsartan.  Due to medication issues, drug impurities, needs to change to a different ARB.  Start losartan.  Patient has significant issues with swallowing large pills, many of his pills he takes half dose twice daily rather than full dose once daily to his pills are smaller to swallow.  Start losartan 50 mg twice daily.    Radicular low back pain with cervical disc disease.  Still regularly using [place].   website reviewed.  No abnormal findings noted.  Proper medication use and misuse reviewed.  Patient has been using medications appropriately.  Her scription is refilled ×3 months.  Patient is not on any benzodiazepines.  He continues to limit use as much as able.    Narcolepsy without cataplexy, still using dextroamphetamine and Ritalin.  Adjusts dose throughout the day based on his activity and symptoms.  Refills today.  Typically gets 3 month supply per prescription.    Type 2 diabetes, has been controlled, labs today actually show hemoglobin A1c is uncontrolled.  Hemoglobin A1c now 8.0%.    Hyperlipidemia - continues on low-dose crestor. Tolerating okay. Any higher dose and gets myalgias.     Hypothyroidism - taking oral replacement. Tolerating well.     Functional Capacity: about 4 METS.   Reports that he can climb a flight of stairs  without any chest pain/heaviness or shortness of breath.   No orthopnea/paroxysmal nocturnal dyspnea  Review of Systems   Constitutional: Negative for chills, fatigue and fever.   HENT: Negative for congestion and hearing loss.    Eyes: Negative for pain and visual disturbance.   Respiratory: Negative for cough, shortness of breath and wheezing.    Cardiovascular: Negative for chest pain and palpitations.   Gastrointestinal: Negative for abdominal pain, diarrhea, nausea and vomiting.   Endocrine: Negative for cold intolerance and heat intolerance.   Genitourinary: Negative for dysuria and hematuria.   Musculoskeletal: Negative for arthralgias and myalgias.   Skin: Negative for pallor.        + few foot sores. Medial right 2nd toe. Left 4th toe.      + pre-ulcerative foot callouses noted on exam today.    Allergic/Immunologic: Negative for immunocompromised state.   Neurological: Negative for dizziness and light-headedness.        Narcolepsy   Hematological: Does not bruise/bleed easily.   Psychiatric/Behavioral: Negative for agitation and confusion.      REMINGTON:   REMINGTON-7 SCORE 5/3/2018 8/9/2018   Total Score 0 0     PHQ9:  PHQ-9 SCORE 3/9/2018 5/3/2018 8/9/2018   Total Score 0 0 0       I have personally reviewed the past medical history, past surgical history, medications, allergies, family and social history as listed below, on 8/9/2018.    Allergies   Allergen Reactions     Famotidine Other (See Comments)     + Caused Headache and Rash -- tolerated Ranitidine and worked well.     Gabapentin      Other reaction(s): Mental Status Change     Hydrocortisone Other (See Comments)     Burning and stinging sensation with Hydrocortisone - doesn't help itching or rash -- tolerated Desoximetasone cream and worked well.      Atorvastatin Hives     Lisinopril Cough     No Clinical Screening - See Comments Hives     Chlorine water     Pregabalin      Other reaction(s): Mental Status Change     Valdecoxib Swelling     Insulin  Aspart Rash       Current Outpatient Prescriptions   Medication Sig Dispense Refill     albuterol (PROAIR HFA/PROVENTIL HFA/VENTOLIN HFA) 108 (90 BASE) MCG/ACT Inhaler Inhale 1-2 puffs into the lungs every 4 hours as needed for shortness of breath / dyspnea or wheezing 1 Inhaler 1     aspirin (GOODSENSE ASPIRIN) 325 MG tablet Take  by mouth.       blood glucose monitoring (ONETOUCH ULTRA) test strip Dispense test strips covered by the patient insurance. Test 10 times per day.       Blood Glucose Monitoring Suppl (GLUCOCOM BLOOD GLUCOSE MONITOR) WAQAS Dispense glucose meter, test strips and lancets covered by the patient insurance. Test 10 times per day.       clopidogrel (PLAVIX) 75 MG tablet Take 1 tablet by mouth daily       [employer] ALCOHOL SWABS PADS For home use.       desoximetasone (TOPICORT) 0.25 % cream Apply 1 Application topically 2 times daily       dextroamphetamine (DEXTROSTAT) 10 MG tablet Take 1 tablet (10 mg) by mouth 4 times daily 360 tablet 0     diclofenac (VOLTAREN) 75 MG EC tablet Take 1 tablet by mouth 4 times daily       docusate sodium (COLACE) 100 MG capsule Take 1-2 capsules by mouth 2 times daily as needed for constipation prevention       Flaxseed, Linseed, (FLAXSEED OIL) 1000 MG CAPS Take  by mouth.       furosemide (LASIX) 40 MG tablet Take 2-4 tablets by mouth daily Or as instructed for leg swelling       HUMALOG 100 UNIT/ML injection INJECT UNDER THE SKIN USING INSULIN PUMP. MAX DAILY DOSE  UNITS 180 mL 3     hydrALAZINE (APRESOLINE) 25 MG tablet Take 1-2 tablets by mouth 4 times daily - Take lowest effective dose for blood pressure management       [START ON 10/4/2018] HYDROcodone-acetaminophen (NORCO) 5-325 MG per tablet Take 1 tablet by mouth every 6 hours as needed for severe pain 60 tablet 0     Insulin Pen Needle (PEN NEEDLES) 29G X 12MM MISC For administering insulin at home.       ipratropium (ATROVENT HFA) 17 MCG/ACT Inhaler Inhale 2 puffs into the lungs 4 times daily        IV Sets-Tubing (SOLUTION ADMINISTRATION SET) MISC As directed.       levothyroxine (SYNTHROID/LEVOTHROID) 125 MCG tablet TAKE ONE TABLET BY MOUTH ONCE DAILY IN THE MORNING 90 tablet 2     losartan (COZAAR) 50 MG tablet Take 1 tablet (50 mg) by mouth 2 times daily -- START when out of Valsartan -- needs BID -- has trouble swallowing large pills 180 tablet 3     methylphenidate (RITALIN) 10 MG tablet Take 1 tablet (10 mg) by mouth 4 times daily 360 tablet 0     metoprolol succinate (TOPROL-XL) 50 MG 24 hr tablet Take 1 tablet by mouth 2 times daily       nitroGLYcerin (NITROSTAT) 0.4 MG sublingual tablet Place 1 tablet under the tongue every 5 minutes as needed for chest pain       Omega-3 Fatty Acids (FISH OIL PO) Take  by mouth.       PSYLLIUM PO Take 1 tsp. by mouth 3 times daily - for constipation prevention       ranitidine (ZANTAC) 150 MG tablet Take 1 tablet by mouth 2 times daily       ranolazine (RANEXA) 500 MG 12 hr tablet Take 2 tablets (1,000 mg) by mouth 2 times daily - cancel other Rx - give 3 month supply 360 tablet 3     rosuvastatin (CRESTOR) 10 MG tablet TAKE ONE TABLET BY MOUTH TWICE DAILY 180 tablet 3     Saline GEL Spray 1 spray in nostril every hour as needed For Nasal Dryness       thin (NO BRAND SPECIFIED) lancets Test 10 times per day.       tiZANidine (ZANAFLEX) 2 MG tablet Take 1-2 tablets by mouth every 6 hours as needed for muscle spasms          Patient Active Problem List    Diagnosis Date Noted     Degeneration of cervical intervertebral disc 01/31/2018     Priority: Medium     Hypothyroidism due to acquired atrophy of thyroid 01/31/2018     Priority: Medium     Mixed hyperlipidemia 01/31/2018     Priority: Medium     Primary narcolepsy without cataplexy 01/31/2018     Priority: Medium     Overview:   Total dose recommended by pulmonology he is dextro- amphetamine 40 mg plus   methylphenidate 40 mg in divided doses per day.  Total of 80 mg of short   acting amphetamines daily.        Osteoarthritis of spine 01/31/2018     Priority: Medium     Peptic ulcer disease 01/31/2018     Priority: Medium     Neuroforaminal stenosis of lumbar spine 01/03/2018     Priority: Medium     Radicular low back pain 01/03/2018     Priority: Medium     Acute bilateral low back pain with bilateral sciatica 10/27/2017     Priority: Medium     Chronic low back pain 08/08/2017     Priority: Medium     Intermittent constipation 08/08/2017     Priority: Medium     Skin rash 08/01/2017     Priority: Medium     Uncontrolled type 2 diabetes mellitus with stage 3 chronic kidney disease, with long-term current use of insulin (H) 03/30/2017     Priority: Medium     Erectile dysfunction 09/26/2016     Priority: Medium     Trigger point with back pain 07/14/2016     Priority: Medium     Trigger point with neck pain 07/14/2016     Priority: Medium     Insulin pump in place 03/10/2016     Priority: Medium     Myofacial muscle pain 12/17/2015     Priority: Medium     Pain medication agreement - 8/9/2018 12/17/2015     Priority: Medium     Trigger point of extremity 12/17/2015     Priority: Medium     Greater trochanteric bursitis of left hip 06/24/2015     Priority: Medium     Blister of toe of right foot 12/09/2014     Priority: Medium     Cervical stenosis of spinal canal 06/17/2014     Priority: Medium     Degenerative disc disease, cervical 06/17/2014     Priority: Medium     Facet arthritis of cervical region (H) 06/17/2014     Priority: Medium     Cervical radicular pain 06/05/2014     Priority: Medium     Left arm numbness 06/05/2014     Priority: Medium     Left atrial enlargement 01/23/2014     Priority: Medium     Chronic diastolic heart failure (H) 01/10/2014     Priority: Medium     Overview:   1/20/2014 ECHO FINAL IMPRESSION:   1. Normal left ventricular size and systolic function. Estimated ejection fraction 65%.   2. Mild increase in wall thickness of the ventricular septum with hypokinesis of the basilar segment of  the ventricular septum and inferior wall.   3. Mild to moderate left atrial enlargement.   4. Trace tricuspid regurgitation.   5. Mild left ventricular diastolic dysfunction.        CKD (chronic kidney disease) stage 3, GFR 30-59 ml/min 01/10/2014     Priority: Medium     Benign essential hypertension 01/10/2014     Priority: Medium     Myalgia 01/10/2014     Priority: Medium     Old inferior wall myocardial infarction 01/10/2014     Priority: Medium     Platelet inhibition due to Plavix 01/10/2014     Priority: Medium     S/P CABG x 2 01/10/2014     Priority: Medium     S/P coronary artery stent placement 01/10/2014     Priority: Medium     ACP (advance care planning) 12/05/2013     Priority: Medium     Painful diabetic neuropathy (H) 04/22/2013     Priority: Medium     Esophageal reflux 05/17/2012     Priority: Medium     Obesity 05/17/2012     Priority: Medium     Diastasis of muscle 10/06/2011     Priority: Medium     Coronary atherosclerosis 09/08/2011     Priority: Medium     Psychosexual dysfunction with inhibited sexual excitement 06/30/2011     Priority: Medium     Angina pectoris (H) 12/10/2010     Priority: Medium     Edema 10/25/2010     Priority: Medium     Chest pain 02/15/2007     Priority: Medium     Overview:   IMO Update 10/11       Congestive heart failure (H) 02/15/2007     Priority: Medium     Overview:   IMO Update 10/11       Atherosclerosis of native coronary artery of native heart with stable angina pectoris (H) 02/15/2007     Priority: Medium     Overview:   IMO Update 10/11       Other specified forms of chronic ischemic heart disease 02/15/2007     Priority: Medium     Peripheral vascular disease (H) 02/15/2007     Priority: Medium     Overview:   IMO Update 10/11       Postsurgical percutaneous transluminal coronary angioplasty status 02/15/2007     Priority: Medium     Overview:   IMO Update 10/11       Past Medical History:   Diagnosis Date     Atherosclerotic heart disease of native  coronary artery without angina pectoris     Coronary artery disease, post angioplasty     Carpal tunnel syndrome     No Comments Provided     Chronic maxillary sinusitis     No Comments Provided     Edema     Edema secondary to Bextra     Gastritis without bleeding     No Comments Provided     Heart disease     Multiple coronary stents     Narcolepsy without cataplexy     No Comments Provided     Other injury of unspecified body region, initial encounter (CODE)     11/2006,Right calf hematoma     Rheumatic fever without heart involvement     Rheumatic fever as a child without valvular heart disease     Past Surgical History:   Procedure Laterality Date     ANGIOPLASTY      12/00, 04/04/04,Repeat angioplasty with lt internal mammary to the lt anterior descending and lt radial artery from aorta to the diagonal.     ANGIOPLASTY      10/01,Angioplasty with 2 vessel CABG the following week from the lt internal mammary to the lt anterior descending and right radial free graft     ANGIOPLASTY      04/2003     ARTHROSCOPY SHOULDER ROTATOR CUFF REPAIR      02/06/2006,Left rotator cuff repair and bone spur removal by Dr. Giraldo in [place]     COLONOSCOPY      1999     COLONOSCOPY      01/08/2016,-- Dr. De La Cruz -- polypectomy     OTHER SURGICAL HISTORY      09/03,729361,IR ANGIOGRAM FEMORAL/EXTREMITY ([place]),Unremarkable angiogram at [facility]     OTHER SURGICAL HISTORY      10/05/2004,,PTCA,Angioplasty     OTHER SURGICAL HISTORY      02/2005,,PTCA,Angioplasty with stent.     OTHER SURGICAL HISTORY      03/02/2005,,PTCA,Angioplasty with stent.     OTHER SURGICAL HISTORY      09/23/2005,,PTCA,Angioplasty without stent     OTHER SURGICAL HISTORY      2/26/2007,[id number],IR ANGIOGRAM FEMORAL/EXTREMITY ([place]),Unremarkable coronary angiogram at [facility].     OTHER SURGICAL HISTORY      1967,SUR38,APPENDECTOMY OPEN     RELEASE CARPAL TUNNEL      11/15/01,Right carpal tunnel release by Dr. Mace     RELEASE CARPAL TUNNEL       "2004,Left carpal tunnel release     Social History     Social History     Marital status:      Spouse name: N/A     Number of children: N/A     Years of education: N/A     Social History Main Topics     Smoking status: Never Smoker     Smokeless tobacco: Never Used     Alcohol use No     Drug use: No     Sexual activity: Yes     Partners: Female     Other Topics Concern     None     Social History Narrative    He is on Social Security Disability for coronary artery disease and cervical arthritis and disk disease.   Dax obed.  No tobacco.     Family History   Problem Relation Age of Onset     HEART DISEASE Mother      Heart Disease,Heart problems     HEART DISEASE Other      Heart Disease,Heart problems     HEART DISEASE Other      Heart Disease,Heart problems     Ankylosing Spondylitis Son      Ankylosing spondylitis,Ankylosing spondylitis     Other - See Comments Brother       with sudden death following shoulder surgery 2012 -- was off his Plavix for 10 days pre-operatively.     Ankylosing Spondylitis Daughter      Ankylosing spondylitis,-- Dx 2017       EXAM:   Vitals:    18 1156   BP: 144/84   BP Location: Right arm   Patient Position: Sitting   Cuff Size: Adult Regular   Pulse: 64   Weight: 203 lb (92.1 kg)       Current Pain Score: Extreme Pain (8)     BP Readings from Last 3 Encounters:   18 144/84   18 136/76   18 130/72    Wt Readings from Last 3 Encounters:   18 203 lb (92.1 kg)   18 207 lb (93.9 kg)   18 204 lb 6.4 oz (92.7 kg)      Estimated body mass index is 29.76 kg/(m^2) as calculated from the following:    Height as of 17: 5' 9.25\" (1.759 m).    Weight as of this encounter: 203 lb (92.1 kg).     Physical Exam   Constitutional: He appears well-developed and well-nourished.   Eyes: No scleral icterus.   Cardiovascular: Normal rate and regular rhythm.    Pulmonary/Chest: Effort normal.   Abdominal: Soft.   Insulin pump noted.  "   Musculoskeletal: He exhibits edema. He exhibits no tenderness.   Lymphadenopathy:     He has no cervical adenopathy.   Neurological: He is alert. No cranial nerve deficit.   Skin: Skin is warm and dry. Rash noted.   + pre-ulcerative foot callouses noted on exam today.    Psychiatric: He has a normal mood and affect.      INVESTIGATIONS:  Results for orders placed or performed in visit on 08/09/18   Comprehensive metabolic panel   Result Value Ref Range    Sodium 138 134 - 144 mmol/L    Potassium 4.8 3.5 - 5.1 mmol/L    Chloride 107 98 - 107 mmol/L    Carbon Dioxide 26 21 - 31 mmol/L    Anion Gap 5 3 - 14 mmol/L    Glucose 84 70 - 105 mg/dL    Urea Nitrogen 24 7 - 25 mg/dL    Creatinine 1.50 (H) 0.70 - 1.30 mg/dL    GFR Estimate 47 (L) >60 mL/min/1.7m2    GFR Estimate If Black 56 (L) >60 mL/min/1.7m2    Calcium 9.4 8.6 - 10.3 mg/dL    Bilirubin Total 0.5 0.3 - 1.0 mg/dL    Albumin 3.9 3.5 - 5.7 g/dL    Protein Total 6.7 6.4 - 8.9 g/dL    Alkaline Phosphatase 49 34 - 104 U/L    ALT 28 7 - 52 U/L    AST 23 13 - 39 U/L   CBC with platelets   Result Value Ref Range    WBC 5.7 4.0 - 11.0 10e9/L    RBC Count 4.35 (L) 4.4 - 5.9 10e12/L    Hemoglobin 14.2 13.3 - 17.7 g/dL    Hematocrit 42.7 40.0 - 53.0 %    MCV 98 78 - 100 fl    MCH 32.6 26.5 - 33.0 pg    MCHC 33.3 31.5 - 36.5 g/dL    RDW 12.8 10.0 - 15.0 %    Platelet Count 191 150 - 450 10e9/L   Hemoglobin A1c   Result Value Ref Range    Hemoglobin A1C 8.0 (H) 4.0 - 6.0 %   Lipid Profile   Result Value Ref Range    Cholesterol 89 <200 mg/dL    Triglycerides 106 <150 mg/dL    HDL Cholesterol 30 23 - 92 mg/dL    LDL Cholesterol Calculated 38 <100 mg/dL    Non HDL Cholesterol 59 <130 mg/dL   Thyrotropin GH   Result Value Ref Range    Thyrotropin 0.41 0.34 - 5.60 IU/mL   *UA reflex to Microscopic   Result Value Ref Range    Color Urine Yellow     Appearance Urine Clear     Glucose Urine 250 (A) NEG^Negative mg/dL    Bilirubin Urine Negative NEG^Negative    Ketones Urine  Negative NEG^Negative mg/dL    Specific Gravity Urine >1.030 (H) 1.000 - 1.030    Blood Urine Negative NEG^Negative    pH Urine 6.0 5.0 - 9.0 pH    Protein Albumin Urine 100 (A) NEG^Negative mg/dL    Urobilinogen Urine 0.2 0.2 - 1.0 EU/dL    Nitrite Urine Negative NEG^Negative    Leukocyte Esterase Urine Negative NEG^Negative    Source Midstream Urine    Urine Microscopic   Result Value Ref Range    WBC Urine 0 - 5 OTO5^0 - 5 /HPF    RBC Urine O - 2 OTO2^O - 2 /HPF    Mucous Urine Present (A) NEG^Negative /LPF       ASSESSMENT AND PLAN:  Problem List Items Addressed This Visit        Nervous and Auditory    Radicular low back pain    Relevant Medications    HYDROcodone-acetaminophen (NORCO) 5-325 MG per tablet (Start on 10/4/2018)       Endocrine    Uncontrolled type 2 diabetes mellitus with stage 3 chronic kidney disease, with long-term current use of insulin (H)    Hypothyroidism due to acquired atrophy of thyroid    Mixed hyperlipidemia       Circulatory    Benign essential hypertension - Primary    Relevant Medications    losartan (COZAAR) 50 MG tablet    Other Relevant Orders    Urine Microscopic (Completed)       Musculoskeletal and Integumentary    Degenerative disc disease, cervical    Relevant Medications    HYDROcodone-acetaminophen (NORCO) 5-325 MG per tablet (Start on 10/4/2018)       Other    Primary narcolepsy without cataplexy    Relevant Medications    methylphenidate (RITALIN) 10 MG tablet    dextroamphetamine (DEXTROSTAT) 10 MG tablet    Pain medication agreement - 8/9/2018    Relevant Medications    HYDROcodone-acetaminophen (NORCO) 5-325 MG per tablet (Start on 10/4/2018)        reviewed diet, exercise and weight control, recommended sodium restriction, cardiovascular risk and specific lipid/LDL goals reviewed, specific diabetic recommendations low cholesterol diet, weight control and daily exercise discussed and home glucose monitoring taught, technique demonstrated, use of aspirin to prevent MI  and TIA's discussed  -- Expected clinical course discussed    -- Medications and their side effects discussed    Patient Instructions   Medications refilled.     Labs today.     To help with weight loss and improve blood sugar control....    -- Try to avoid Carbohydrates as much as possible -- breads, pasta, baked goods, cakes, oatmeal, cold cereal, potatoes.   These are turned to sugar in one metabolic conversion, cause insulin secretion and increased fat deposition / weight gain.      -- Eat more lean meats, proteins, eggs, nuts.      Return in approximately 3 month(s), or sooner as needed for follow-up with Dr. Barnett.  - Med Refills and Diabetes follow-up.     Clinic : [phone]  Appointment line: [phone]      Jorge Barnett MD  Internal Medicine

## 2018-08-09 NOTE — LETTER
Moses Whittaker  [address] [facility] [address]    8/10/2018      Dear Moses Whittaker,    The result of your recent tests are included below:    Cholesterol levels look good.    Your diabetes control is very poor.  Hemoglobin A1c is now 8%.    To help with weight loss and improve blood sugar control....    -- Try to avoid Carbohydrates as much as possible -- breads, pasta, baked goods, cakes, oatmeal, cold cereal, potatoes.   These are turned to sugar in one metabolic conversion, cause insulin secretion and increased fat deposition / weight gain.      -- Eat more lean meats, proteins, eggs, nuts.     Results for orders placed or performed in visit on 08/09/18   Comprehensive metabolic panel   Result Value Ref Range    Sodium 138 134 - 144 mmol/L    Potassium 4.8 3.5 - 5.1 mmol/L    Chloride 107 98 - 107 mmol/L    Carbon Dioxide 26 21 - 31 mmol/L    Anion Gap 5 3 - 14 mmol/L    Glucose 84 70 - 105 mg/dL    Urea Nitrogen 24 7 - 25 mg/dL    Creatinine 1.50 (H) 0.70 - 1.30 mg/dL    GFR Estimate 47 (L) >60 mL/min/1.7m2    GFR Estimate If Black 56 (L) >60 mL/min/1.7m2    Calcium 9.4 8.6 - 10.3 mg/dL    Bilirubin Total 0.5 0.3 - 1.0 mg/dL    Albumin 3.9 3.5 - 5.7 g/dL    Protein Total 6.7 6.4 - 8.9 g/dL    Alkaline Phosphatase 49 34 - 104 U/L    ALT 28 7 - 52 U/L    AST 23 13 - 39 U/L   CBC with platelets   Result Value Ref Range    WBC 5.7 4.0 - 11.0 10e9/L    RBC Count 4.35 (L) 4.4 - 5.9 10e12/L    Hemoglobin 14.2 13.3 - 17.7 g/dL    Hematocrit 42.7 40.0 - 53.0 %    MCV 98 78 - 100 fl    MCH 32.6 26.5 - 33.0 pg    MCHC 33.3 31.5 - 36.5 g/dL    RDW 12.8 10.0 - 15.0 %    Platelet Count 191 150 - 450 10e9/L   Hemoglobin A1c   Result Value Ref Range    Hemoglobin A1C 8.0 (H) 4.0 - 6.0 %   Lipid Profile   Result Value Ref Range    Cholesterol 89 <200 mg/dL    Triglycerides 106 <150 mg/dL    HDL Cholesterol 30 23 - 92 mg/dL    LDL Cholesterol Calculated 38 <100 mg/dL    Non HDL Cholesterol 59 <130 mg/dL    Thyrotropin GH   Result Value Ref Range    Thyrotropin 0.41 0.34 - 5.60 IU/mL   *UA reflex to Microscopic   Result Value Ref Range    Color Urine Yellow     Appearance Urine Clear     Glucose Urine 250 (A) NEG^Negative mg/dL    Bilirubin Urine Negative NEG^Negative    Ketones Urine Negative NEG^Negative mg/dL    Specific Gravity Urine >1.030 (H) 1.000 - 1.030    Blood Urine Negative NEG^Negative    pH Urine 6.0 5.0 - 9.0 pH    Protein Albumin Urine 100 (A) NEG^Negative mg/dL    Urobilinogen Urine 0.2 0.2 - 1.0 EU/dL    Nitrite Urine Negative NEG^Negative    Leukocyte Esterase Urine Negative NEG^Negative    Source Midstream Urine    Urine Microscopic   Result Value Ref Range    WBC Urine 0 - 5 OTO5^0 - 5 /HPF    RBC Urine O - 2 OTO2^O - 2 /HPF    Mucous Urine Present (A) NEG^Negative /LPF       If you have any further questions or problems, please contact my office at [phone] and schedule an appointment.    Clinic : [phone]  Appointment line: [phone]     Thank you,    Jorge Barnett MD    Internal Medicine  [facility]     Reviewed and electronically signed by provider.

## 2018-08-09 NOTE — NURSING NOTE
"Patient presents to the clinic for multiple concerns: Laceration on the back of the right lower leg (2 weeks), sores/discoloration of both feet with concerns about losing the nail on the big toe of both feet.    Previous A1C is at goal of <8  Lab Results   Component Value Date    A1C 7.8 05/03/2018     Urine microalbumin:creatine: n/a  Foot exam unknown-declines  Eye exam     Tobacco User no  Patient is on a daily aspirin  Patient is on a Statin.  Blood pressure today of:     BP Readings from Last 1 Encounters:   05/03/18 136/76      is at the goal of <139/89 for diabetics.    Chief Complaint   Patient presents with     Clinic Care Coordination - Follow-up       Initial There were no vitals taken for this visit. Estimated body mass index is 30.35 kg/(m^2) as calculated from the following:    Height as of 9/19/17: 5' 9.25\" (1.759 m).    Weight as of 5/3/18: 207 lb (93.9 kg).  Medication Reconciliation: complete    Barbara Connor LPN    "

## 2018-08-10 ENCOUNTER — TELEPHONE (OUTPATIENT)
Dept: INTERNAL MEDICINE | Facility: OTHER | Age: 68
End: 2018-08-10

## 2018-08-10 LAB
CREAT UR-MCNC: 174 MG/DL
MICROALBUMIN UR-MCNC: 780 MG/L
MICROALBUMIN/CREAT UR: 448.28 MG/G CR (ref 0–17)

## 2018-08-10 ASSESSMENT — ANXIETY QUESTIONNAIRES: GAD7 TOTAL SCORE: 0

## 2018-08-10 ASSESSMENT — PATIENT HEALTH QUESTIONNAIRE - PHQ9: SUM OF ALL RESPONSES TO PHQ QUESTIONS 1-9: 0

## 2018-08-10 NOTE — TELEPHONE ENCOUNTER
Received fax from [facility] that patient does not want to switch medication.     Roberta Caicedo on 8/10/2018 at 3:30 PM

## 2018-08-13 NOTE — TELEPHONE ENCOUNTER
After verification, patient notified of lab results per letter.  Carlie Molina LPN ....................8/13/2018   8:49 AM

## 2018-09-17 ENCOUNTER — TELEPHONE (OUTPATIENT)
Dept: INTERNAL MEDICINE | Facility: OTHER | Age: 68
End: 2018-09-17

## 2018-09-17 NOTE — TELEPHONE ENCOUNTER
DJS-Pt called and stated he needs to get in for a med refill and he would like to get in at the end of the week if possible. He requested to talk to Barbara didn't want to schedule anything at this time. He requested no message be left he would like to talk to someone in person. Will be home for a while now and than again this afternoon. Please call pt to advise.  Getachew Perez on 9/17/2018 at 1:36 PM

## 2018-09-18 NOTE — TELEPHONE ENCOUNTER
Patient placed with Jorge Barnett MD on Friday at 1:20 for medication refills.      Barbara Connor LPN 9/18/2018 10:29 AM

## 2018-09-19 ENCOUNTER — OFFICE VISIT (OUTPATIENT)
Dept: INTERNAL MEDICINE | Facility: OTHER | Age: 68
End: 2018-09-19
Attending: INTERNAL MEDICINE
Payer: COMMERCIAL

## 2018-09-19 VITALS
SYSTOLIC BLOOD PRESSURE: 148 MMHG | DIASTOLIC BLOOD PRESSURE: 80 MMHG | BODY MASS INDEX: 30.07 KG/M2 | WEIGHT: 205.13 LBS | HEART RATE: 60 BPM

## 2018-09-19 DIAGNOSIS — Z96.41 INSULIN PUMP IN PLACE: ICD-10-CM

## 2018-09-19 DIAGNOSIS — G47.419 PRIMARY NARCOLEPSY WITHOUT CATAPLEXY: Primary | ICD-10-CM

## 2018-09-19 DIAGNOSIS — Z02.89 PAIN MEDICATION AGREEMENT: ICD-10-CM

## 2018-09-19 PROCEDURE — 99214 OFFICE O/P EST MOD 30 MIN: CPT | Performed by: INTERNAL MEDICINE

## 2018-09-19 PROCEDURE — G0463 HOSPITAL OUTPT CLINIC VISIT: HCPCS

## 2018-09-19 RX ORDER — METHYLPHENIDATE HYDROCHLORIDE 10 MG/1
10 TABLET ORAL 4 TIMES DAILY
Qty: 360 TABLET | Refills: 0 | Status: SHIPPED | OUTPATIENT
Start: 2018-09-19 | End: 2018-09-19

## 2018-09-19 RX ORDER — DEXTROAMPHETAMINE SULFATE 10 MG/1
10 TABLET ORAL 4 TIMES DAILY
Qty: 360 TABLET | Refills: 0 | Status: SHIPPED | OUTPATIENT
Start: 2018-09-19 | End: 2018-09-19

## 2018-09-19 RX ORDER — METHYLPHENIDATE HYDROCHLORIDE 10 MG/1
10 TABLET ORAL 4 TIMES DAILY
Qty: 360 TABLET | Refills: 0 | Status: SHIPPED | OUTPATIENT
Start: 2018-11-14 | End: 2019-01-07

## 2018-09-19 RX ORDER — DEXTROAMPHETAMINE SULFATE 10 MG/1
10 TABLET ORAL 4 TIMES DAILY
Qty: 360 TABLET | Refills: 0 | Status: SHIPPED | OUTPATIENT
Start: 2018-11-14 | End: 2019-01-07

## 2018-09-19 ASSESSMENT — ANXIETY QUESTIONNAIRES
1. FEELING NERVOUS, ANXIOUS, OR ON EDGE: NOT AT ALL
5. BEING SO RESTLESS THAT IT IS HARD TO SIT STILL: NOT AT ALL
IF YOU CHECKED OFF ANY PROBLEMS ON THIS QUESTIONNAIRE, HOW DIFFICULT HAVE THESE PROBLEMS MADE IT FOR YOU TO DO YOUR WORK, TAKE CARE OF THINGS AT HOME, OR GET ALONG WITH OTHER PEOPLE: NOT DIFFICULT AT ALL
3. WORRYING TOO MUCH ABOUT DIFFERENT THINGS: NOT AT ALL
7. FEELING AFRAID AS IF SOMETHING AWFUL MIGHT HAPPEN: NOT AT ALL
2. NOT BEING ABLE TO STOP OR CONTROL WORRYING: NOT AT ALL
6. BECOMING EASILY ANNOYED OR IRRITABLE: NOT AT ALL
GAD7 TOTAL SCORE: 0

## 2018-09-19 ASSESSMENT — ENCOUNTER SYMPTOMS
DIZZINESS: 0
SHORTNESS OF BREATH: 0
LIGHT-HEADEDNESS: 0
ABDOMINAL PAIN: 0
BACK PAIN: 1
AGITATION: 0
FATIGUE: 0
ARTHRALGIAS: 1
EYE PAIN: 0
VOMITING: 0
BRUISES/BLEEDS EASILY: 0
NAUSEA: 0
WHEEZING: 0
COUGH: 0
HEMATURIA: 0
PALPITATIONS: 0
CONFUSION: 0
DYSURIA: 0
MYALGIAS: 0
FEVER: 0
DIARRHEA: 0
CHILLS: 0

## 2018-09-19 ASSESSMENT — PATIENT HEALTH QUESTIONNAIRE - PHQ9: 5. POOR APPETITE OR OVEREATING: NOT AT ALL

## 2018-09-19 ASSESSMENT — PAIN SCALES - GENERAL: PAINLEVEL: NO PAIN (0)

## 2018-09-19 NOTE — PATIENT INSTRUCTIONS
Medications refilled.     Return in approximately 6 month(s), or sooner as needed for follow-up with Dr. Barnett.  - Med Refills    Clinic : [phone]  Appointment line: [phone]

## 2018-09-19 NOTE — MR AVS SNAPSHOT
After Visit Summary   9/19/2018    Moses Whittaker    MRN: [id number]           Patient Information     Date Of Birth          1950        Visit Information        Provider Department      9/19/2018 10:40 AM Jorge Barnett MD [facility]        Today's Diagnoses     Primary narcolepsy without cataplexy    -  1    Pain medication agreement - 8/9/2018          Care Instructions    Medications refilled.     Return in approximately 6 month(s), or sooner as needed for follow-up with Dr. Barnett.  - Med Refills    Clinic : [phone]  Appointment line: [phone]            Follow-ups after your visit        Follow-up notes from your care team     Return in about 6 months (around 3/19/2019) for - Med Refills.      Your next 10 appointments already scheduled     Nov 09, 2018 11:20 AM CST   SHORT with Jorge Barnett MD   [facility] ([facility])    [address]  [facility] [address] [phone]              Who to contact     If you have questions or need follow up information about today's clinic visit or your schedule please contact [facility] directly at [phone].  Normal or non-critical lab and imaging results will be communicated to you by MyChart, letter or phone within 4 business days after the clinic has received the results. If you do not hear from us within 7 days, please contact the clinic through MyChart or phone. If you have a critical or abnormal lab result, we will notify you by phone as soon as possible.  Submit refill requests through [employer] or call your pharmacy and they will forward the refill request to us. Please allow 3 business days for your refill to be completed.          Additional Information About Your Visit        Care EveryWhere ID     This is your Care EveryWhere ID. This could be used by other organizations to access your [facility]  records  [id number]        Your Vitals Were     Pulse BMI (Body Mass Index)                60 30.07 kg/m2           Blood Pressure from Last 3 Encounters:   09/19/18 148/80   08/09/18 144/84   05/03/18 136/76    Weight from Last 3 Encounters:   09/19/18 205 lb 2 oz (93 kg)   08/09/18 203 lb (92.1 kg)   05/03/18 207 lb (93.9 kg)              Today, you had the following     No orders found for display         Today's Medication Changes          These changes are accurate as of 9/19/18 11:37 AM.  If you have any questions, ask your nurse or doctor.               Start taking these medicines.        Dose/Directions    dextroamphetamine 10 MG tablet   Commonly known as:  DEXTROSTAT   Used for:  Primary narcolepsy without cataplexy, Pain medication agreement   Started by:  Jorge Barnett MD        Dose:  10 mg   Start taking on:  11/14/2018   Take 1 tablet (10 mg) by mouth 4 times daily   Quantity:  360 tablet   Refills:  0       methylphenidate 10 MG tablet   Commonly known as:  RITALIN   Used for:  Primary narcolepsy without cataplexy, Pain medication agreement   Started by:  Jorge Barnett MD        Dose:  10 mg   Start taking on:  11/14/2018   Take 1 tablet (10 mg) by mouth 4 times daily   Quantity:  360 tablet   Refills:  0            Where to get your medicines      Some of these will need a paper prescription and others can be bought over the counter.  Ask your nurse if you have questions.     Bring a paper prescription for each of these medications     dextroamphetamine 10 MG tablet    methylphenidate 10 MG tablet                Primary Care Provider Office Phone # Fax #    Jorge Barnett [phone] [address]  [place] [address]        Equal Access to Services     [facility] : Bimal Gruber, rober lopez, aries rueda. So [facility] [phone].    ATENCIÓN: Si habla español, tiene a hsu disposición  servicios gratuitos de asistencia lingüística. Bryanna goddard [phone].    We comply with applicable federal civil rights laws and [place] laws. We do not discriminate on the basis of race, color, national origin, age, disability, sex, sexual orientation, or gender identity.            Thank you!     Thank you for choosing [facility] AND \[facility]\""  for your care. Our goal is always to provide you with excellent care. Hearing back from our patients is one way we can continue to improve our services. Please take a few minutes to complete the written survey that you may receive in the mail after your visit with us. Thank you!             Your Updated Medication List - Protect others around you: Learn how to safely use, store and throw away your medicines at www.disposemymeds.org.          This list is accurate as of 9/19/18 11:37 AM.  Always use your most recent med list.                   Brand Name Dispense Instructions for use Diagnosis    albuterol 108 (90 Base) MCG/ACT inhaler    PROAIR HFA/PROVENTIL HFA/VENTOLIN HFA    1 Inhaler    Inhale 1-2 puffs into the lungs every 4 hours as needed for shortness of breath / dyspnea or wheezing    Flu-like symptoms, Cough       clopidogrel 75 MG tablet    PLAVIX     Take 1 tablet by mouth daily        [employer] ALCOHOL SWABS Pads      For home use.        desoximetasone 0.25 % cream    TOPICORT     Apply 1 Application topically 2 times daily        dextroamphetamine 10 MG tablet   Start taking on:  11/14/2018    DEXTROSTAT    360 tablet    Take 1 tablet (10 mg) by mouth 4 times daily    Primary narcolepsy without cataplexy, Pain medication agreement       diclofenac 75 MG EC tablet    VOLTAREN     Take 1 tablet by mouth 4 times daily        docusate sodium 100 MG capsule    COLACE     Take 1-2 capsules by mouth 2 times daily as needed for constipation prevention        FISH OIL PO      Take  by mouth.        flaxseed oil 1000 MG Caps      Take  by mouth.        furosemide  40 MG tablet    LASIX     Take 2-4 tablets by mouth daily Or as instructed for leg swelling        GLUCOCOM BLOOD GLUCOSE MONITOR Cecile      Dispense glucose meter, test strips and lancets covered by the patient insurance. Test 10 times per day.        GOODSENSE ASPIRIN 325 MG tablet   Generic drug:  aspirin      Take  by mouth.        humaLOG 100 UNIT/ML injection   Generic drug:  insulin lispro     180 mL    INJECT UNDER THE SKIN USING INSULIN PUMP. MAX DAILY DOSE  UNITS    Controlled type 2 diabetes mellitus with stage 3 chronic kidney disease, with long-term current use of insulin (H)       hydrALAZINE 25 MG tablet    APRESOLINE     Take 1-2 tablets by mouth 4 times daily - Take lowest effective dose for blood pressure management        HYDROcodone-acetaminophen 5-325 MG per tablet   Start taking on:  10/4/2018    NORCO    60 tablet    Take 1 tablet by mouth every 6 hours as needed for severe pain    Radicular low back pain, Degenerative disc disease, cervical, Pain medication agreement       ipratropium 17 MCG/ACT Inhaler    ATROVENT HFA     Inhale 2 puffs into the lungs 4 times daily        levothyroxine 125 MCG tablet    SYNTHROID/LEVOTHROID    90 tablet    TAKE ONE TABLET BY MOUTH ONCE DAILY IN THE MORNING    Hypothyroidism due to acquired atrophy of thyroid       losartan 50 MG tablet    COZAAR    180 tablet    Take 1 tablet (50 mg) by mouth 2 times daily -- START when out of Valsartan -- needs BID -- has trouble swallowing large pills    Benign essential hypertension       methylphenidate 10 MG tablet   Start taking on:  11/14/2018    RITALIN    360 tablet    Take 1 tablet (10 mg) by mouth 4 times daily    Primary narcolepsy without cataplexy, Pain medication agreement       metoprolol succinate 50 MG 24 hr tablet    TOPROL-XL     Take 1 tablet by mouth 2 times daily        nitroGLYcerin 0.4 MG sublingual tablet    NITROSTAT     Place 1 tablet under the tongue every 5 minutes as needed for chest pain         ONETOUCH ULTRA test strip   Generic drug:  blood glucose monitoring      Dispense test strips covered by the patient insurance. Test 10 times per day.        Pen Needles 29G X 12MM Misc      For administering insulin at home.        PSYLLIUM PO      Take 1 tsp. by mouth 3 times daily - for constipation prevention        ranitidine 150 MG tablet    ZANTAC     Take 1 tablet by mouth 2 times daily        ranolazine 500 MG 12 hr tablet    RANEXA    360 tablet    Take 2 tablets (1,000 mg) by mouth 2 times daily - cancel other Rx - give 3 month supply    Atherosclerosis of native coronary artery of native heart with stable angina pectoris (H)       rosuvastatin 10 MG tablet    CRESTOR    180 tablet    TAKE ONE TABLET BY MOUTH TWICE DAILY    Mixed hyperlipidemia       Saline Gel      Spray 1 spray in nostril every hour as needed For Nasal Dryness        Solution Administration Set Misc      As directed.        thin lancets    NO BRAND SPECIFIED     Test 10 times per day.        tiZANidine 2 MG tablet    ZANAFLEX     Take 1-2 tablets by mouth every 6 hours as needed for muscle spasms

## 2018-09-19 NOTE — NURSING NOTE
"Patient presents to the clinic for medication management, last administration of Dextroamphetamine was today at 1015, Methylphenidate was last night about 1845/1900.  Contract updated on 8-9-18.       Chief Complaint   Patient presents with     Recheck Medication       Initial There were no vitals taken for this visit. Estimated body mass index is 29.76 kg/(m^2) as calculated from the following:    Height as of 9/19/17: 5' 9.25\" (1.759 m).    Weight as of 8/9/18: 203 lb (92.1 kg).  Medication Reconciliation: complete    Previous A1C is at goal of <8  Lab Results   Component Value Date    A1C 8.0 08/09/2018    A1C 7.8 05/03/2018     Urine microalbumin:creatine: n/a  Foot exam unknown-declines today  Eye exam 02/2018    Tobacco User no  Patient is on a daily aspirin  Patient is on a Statin.  Blood pressure today of:     BP Readings from Last 1 Encounters:   08/09/18 144/84      is not at the goal of <139/89 for diabetics.    Barbara Connor LPN on 9/19/2018 at 11:05 AM      "

## 2018-09-19 NOTE — PROGRESS NOTES
"Nursing Notes:   Barbara Connor LPN  9/19/2018 11:31 AM  Signed  Patient presents to the clinic for medication management, last administration of Dextroamphetamine was today at 1015, Methylphenidate was last night about 1845/1900.  Contract updated on 8-9-18.       Chief Complaint   Patient presents with     Recheck Medication       Initial There were no vitals taken for this visit. Estimated body mass index is 29.76 kg/(m^2) as calculated from the following:    Height as of 9/19/17: 5' 9.25\" (1.759 m).    Weight as of 8/9/18: 203 lb (92.1 kg).  Medication Reconciliation: complete    Previous A1C is at goal of <8  Lab Results   Component Value Date    A1C 8.0 08/09/2018    A1C 7.8 05/03/2018     Urine microalbumin:creatine: n/a  Foot exam unknown-declines today  Eye exam 02/2018    Tobacco User no  Patient is on a daily aspirin  Patient is on a Statin.  Blood pressure today of:     BP Readings from Last 1 Encounters:   08/09/18 144/84      is not at the goal of <139/89 for diabetics.    Barbara Connor LPN on 9/19/2018 at 11:05 AM      Nursing note reviewed with patient.  Accurracy and completeness verified.   Mr. Whittaker is a 68 year old male who:  Patient presents with:  Recheck Medication    HPI     ICD-10-CM    1. Primary narcolepsy without cataplexy G47.419 dextroamphetamine (DEXTROSTAT) 10 MG tablet     methylphenidate (RITALIN) 10 MG tablet     DISCONTINUED: methylphenidate (RITALIN) 10 MG tablet     DISCONTINUED: dextroamphetamine (DEXTROSTAT) 10 MG tablet   2. Pain medication agreement - 8/9/2018 Z02.89 dextroamphetamine (DEXTROSTAT) 10 MG tablet     methylphenidate (RITALIN) 10 MG tablet     DISCONTINUED: methylphenidate (RITALIN) 10 MG tablet     DISCONTINUED: dextroamphetamine (DEXTROSTAT) 10 MG tablet   3. Uncontrolled type 2 diabetes mellitus with stage 3 chronic kidney disease, with long-term current use of insulin (H) E11.22     E11.65     N18.3     Z79.4    4. Insulin pump in place Z96.41  "     Patient presents for medication refill follow-up.  He has chronic narcolepsy.  Cataplexy.  Current medication regimen has been working for him.  He has been on this current dosing for a number of years now.  He has not had any episodes of passing out while driving anytime recently.  No side effects reported.  Tolerating current medication regimen.  He has a group in the area that are helping him pay for his medications because they are quite expensive.   Proper medication use and misuse reviewed.  Patient has been using medications appropriately.  [facility] website reviewed.  No abnormal findings noted. Prescriptions refilled ×3 months with 1 refill.    Patient also has uncontrolled type 2 diabetes with insulin pump.  Blood sugars have still been high recently.  Advised that he really needs to cut down and his sugar and carbohydrate intake.    Functional Capacity: about 4 METS.   No orthopnea/paroxysmal nocturnal dyspnea  Review of Systems   Constitutional: Negative for chills, fatigue and fever.   HENT: Negative for congestion and hearing loss.    Eyes: Negative for pain and visual disturbance.   Respiratory: Negative for cough, shortness of breath and wheezing.    Cardiovascular: Negative for chest pain and palpitations.   Gastrointestinal: Negative for abdominal pain, diarrhea, nausea and vomiting.   Endocrine: Negative for cold intolerance and heat intolerance.   Genitourinary: Negative for dysuria and hematuria.   Musculoskeletal: Positive for arthralgias, back pain and gait problem. Negative for myalgias.   Skin: Negative for pallor.   Allergic/Immunologic: Negative for immunocompromised state.   Neurological: Negative for dizziness and light-headedness.   Hematological: Does not bruise/bleed easily.   Psychiatric/Behavioral: Negative for agitation and confusion.        + narcolepsy      REMINGTON:   REMINGTON-7 SCORE 5/3/2018 8/9/2018 9/19/2018   Total Score 0 0 0     PHQ9:  PHQ-9 SCORE 5/3/2018 8/9/2018 9/19/2018   Total  Score 0 0 0       I have personally reviewed the past medical history, past surgical history, medications, allergies, family and social history as listed below, on 9/19/2018.    Allergies   Allergen Reactions     Famotidine Other (See Comments)     + Caused Headache and Rash -- tolerated Ranitidine and worked well.     Gabapentin      Other reaction(s): Mental Status Change     Hydrocortisone Other (See Comments)     Burning and stinging sensation with Hydrocortisone - doesn't help itching or rash -- tolerated Desoximetasone cream and worked well.      Atorvastatin Hives     Lisinopril Cough     No Clinical Screening - See Comments Hives     Chlorine water     Pregabalin      Other reaction(s): Mental Status Change     Valdecoxib Swelling     Insulin Aspart Rash       Current Outpatient Prescriptions   Medication Sig Dispense Refill     albuterol (PROAIR HFA/PROVENTIL HFA/VENTOLIN HFA) 108 (90 BASE) MCG/ACT Inhaler Inhale 1-2 puffs into the lungs every 4 hours as needed for shortness of breath / dyspnea or wheezing 1 Inhaler 1     aspirin (GOODSENSE ASPIRIN) 325 MG tablet Take  by mouth.       blood glucose monitoring (ONETOUCH ULTRA) test strip Dispense test strips covered by the patient insurance. Test 10 times per day.       Blood Glucose Monitoring Suppl (GLUCOCOM BLOOD GLUCOSE MONITOR) WAQAS Dispense glucose meter, test strips and lancets covered by the patient insurance. Test 10 times per day.       clopidogrel (PLAVIX) 75 MG tablet Take 1 tablet by mouth daily       [employer] ALCOHOL SWABS PADS For home use.       desoximetasone (TOPICORT) 0.25 % cream Apply 1 Application topically 2 times daily       [START ON 11/14/2018] dextroamphetamine (DEXTROSTAT) 10 MG tablet Take 1 tablet (10 mg) by mouth 4 times daily 360 tablet 0     diclofenac (VOLTAREN) 75 MG EC tablet Take 1 tablet by mouth 4 times daily       docusate sodium (COLACE) 100 MG capsule Take 1-2 capsules by mouth 2 times daily as needed for constipation  prevention       Flaxseed, Linseed, (FLAXSEED OIL) 1000 MG CAPS Take  by mouth.       furosemide (LASIX) 40 MG tablet Take 2-4 tablets by mouth daily Or as instructed for leg swelling       HUMALOG 100 UNIT/ML injection INJECT UNDER THE SKIN USING INSULIN PUMP. MAX DAILY DOSE  UNITS 180 mL 3     hydrALAZINE (APRESOLINE) 25 MG tablet Take 1-2 tablets by mouth 4 times daily - Take lowest effective dose for blood pressure management       [START ON 10/4/2018] HYDROcodone-acetaminophen (NORCO) 5-325 MG per tablet Take 1 tablet by mouth every 6 hours as needed for severe pain 60 tablet 0     Insulin Pen Needle (PEN NEEDLES) 29G X 12MM MISC For administering insulin at home.       ipratropium (ATROVENT HFA) 17 MCG/ACT Inhaler Inhale 2 puffs into the lungs 4 times daily       IV Sets-Tubing (SOLUTION ADMINISTRATION SET) MISC As directed.       levothyroxine (SYNTHROID/LEVOTHROID) 125 MCG tablet TAKE ONE TABLET BY MOUTH ONCE DAILY IN THE MORNING 90 tablet 2     losartan (COZAAR) 50 MG tablet Take 1 tablet (50 mg) by mouth 2 times daily -- START when out of Valsartan -- needs BID -- has trouble swallowing large pills 180 tablet 3     [START ON 11/14/2018] methylphenidate (RITALIN) 10 MG tablet Take 1 tablet (10 mg) by mouth 4 times daily 360 tablet 0     metoprolol succinate (TOPROL-XL) 50 MG 24 hr tablet Take 1 tablet by mouth 2 times daily       nitroGLYcerin (NITROSTAT) 0.4 MG sublingual tablet Place 1 tablet under the tongue every 5 minutes as needed for chest pain       Omega-3 Fatty Acids (FISH OIL PO) Take  by mouth.       PSYLLIUM PO Take 1 tsp. by mouth 3 times daily - for constipation prevention       ranitidine (ZANTAC) 150 MG tablet Take 1 tablet by mouth 2 times daily       ranolazine (RANEXA) 500 MG 12 hr tablet Take 2 tablets (1,000 mg) by mouth 2 times daily - cancel other Rx - give 3 month supply 360 tablet 3     rosuvastatin (CRESTOR) 10 MG tablet TAKE ONE TABLET BY MOUTH TWICE DAILY 180 tablet 3      Saline GEL Spray 1 spray in nostril every hour as needed For Nasal Dryness       thin (NO BRAND SPECIFIED) lancets Test 10 times per day.       tiZANidine (ZANAFLEX) 2 MG tablet Take 1-2 tablets by mouth every 6 hours as needed for muscle spasms          Patient Active Problem List    Diagnosis Date Noted     Degeneration of cervical intervertebral disc 01/31/2018     Priority: Medium     Hypothyroidism due to acquired atrophy of thyroid 01/31/2018     Priority: Medium     Mixed hyperlipidemia 01/31/2018     Priority: Medium     Primary narcolepsy without cataplexy 01/31/2018     Priority: Medium     Overview:   Total dose recommended by pulmonology he is dextro- amphetamine 40 mg plus   methylphenidate 40 mg in divided doses per day.  Total of 80 mg of short   acting amphetamines daily.       Osteoarthritis of spine 01/31/2018     Priority: Medium     Peptic ulcer disease 01/31/2018     Priority: Medium     Neuroforaminal stenosis of lumbar spine 01/03/2018     Priority: Medium     Radicular low back pain 01/03/2018     Priority: Medium     Acute bilateral low back pain with bilateral sciatica 10/27/2017     Priority: Medium     Chronic low back pain 08/08/2017     Priority: Medium     Intermittent constipation 08/08/2017     Priority: Medium     Skin rash 08/01/2017     Priority: Medium     Uncontrolled type 2 diabetes mellitus with stage 3 chronic kidney disease, with long-term current use of insulin (H) 03/30/2017     Priority: Medium     Erectile dysfunction 09/26/2016     Priority: Medium     Trigger point with back pain 07/14/2016     Priority: Medium     Trigger point with neck pain 07/14/2016     Priority: Medium     Insulin pump in place 03/10/2016     Priority: Medium     Myofacial muscle pain 12/17/2015     Priority: Medium     Pain medication agreement - 8/9/2018 12/17/2015     Priority: Medium     Trigger point of extremity 12/17/2015     Priority: Medium     Greater trochanteric bursitis of left hip  06/24/2015     Priority: Medium     Blister of toe of right foot 12/09/2014     Priority: Medium     Cervical stenosis of spinal canal 06/17/2014     Priority: Medium     Degenerative disc disease, cervical 06/17/2014     Priority: Medium     Facet arthritis of cervical region (H) 06/17/2014     Priority: Medium     Cervical radicular pain 06/05/2014     Priority: Medium     Left arm numbness 06/05/2014     Priority: Medium     Left atrial enlargement 01/23/2014     Priority: Medium     Chronic diastolic heart failure (H) 01/10/2014     Priority: Medium     Overview:   1/20/2014 ECHO FINAL IMPRESSION:   1. Normal left ventricular size and systolic function. Estimated ejection fraction 65%.   2. Mild increase in wall thickness of the ventricular septum with hypokinesis of the basilar segment of the ventricular septum and inferior wall.   3. Mild to moderate left atrial enlargement.   4. Trace tricuspid regurgitation.   5. Mild left ventricular diastolic dysfunction.        CKD (chronic kidney disease) stage 3, GFR 30-59 ml/min 01/10/2014     Priority: Medium     Benign essential hypertension 01/10/2014     Priority: Medium     Myalgia 01/10/2014     Priority: Medium     Old inferior wall myocardial infarction 01/10/2014     Priority: Medium     Platelet inhibition due to Plavix 01/10/2014     Priority: Medium     S/P CABG x 2 01/10/2014     Priority: Medium     S/P coronary artery stent placement 01/10/2014     Priority: Medium     ACP (advance care planning) 12/05/2013     Priority: Medium     Painful diabetic neuropathy (H) 04/22/2013     Priority: Medium     Esophageal reflux 05/17/2012     Priority: Medium     Obesity 05/17/2012     Priority: Medium     Diastasis of muscle 10/06/2011     Priority: Medium     Coronary atherosclerosis 09/08/2011     Priority: Medium     Psychosexual dysfunction with inhibited sexual excitement 06/30/2011     Priority: Medium     Angina pectoris (H) 12/10/2010     Priority: Medium      Edema 10/25/2010     Priority: Medium     Chest pain 02/15/2007     Priority: Medium     Overview:   IMO Update 10/11       Congestive heart failure (H) 02/15/2007     Priority: Medium     Overview:   IMO Update 10/11       Atherosclerosis of native coronary artery of native heart with stable angina pectoris (H) 02/15/2007     Priority: Medium     Overview:   IMO Update 10/11       Other specified forms of chronic ischemic heart disease 02/15/2007     Priority: Medium     Peripheral vascular disease (H) 02/15/2007     Priority: Medium     Overview:   IMO Update 10/11       Postsurgical percutaneous transluminal coronary angioplasty status 02/15/2007     Priority: Medium     Overview:   IMO Update 10/11       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     Gastritis without bleeding     No Comments Provided     Heart disease     Multiple coronary stents     Narcolepsy without cataplexy     No Comments Provided     Other injury of unspecified body region, initial encounter (CODE)     11/2006,Right calf hematoma     Rheumatic fever without heart involvement     Rheumatic fever as a child without valvular heart disease     Past Surgical History:   Procedure Laterality Date     ANGIOPLASTY      12/00, 04/04/04,Repeat angioplasty with lt internal mammary to the lt anterior descending and lt radial artery from aorta to the diagonal.     ANGIOPLASTY      10/01,Angioplasty with 2 vessel CABG the following week from the lt internal mammary to the lt anterior descending and right radial free graft     ANGIOPLASTY      04/2003     ARTHROSCOPY SHOULDER ROTATOR CUFF REPAIR      02/06/2006,Left rotator cuff repair and bone spur removal by Dr. Giraldo in  [place]     COLONOSCOPY           COLONOSCOPY      2016,-- Dr. De La Cruz -- polypectomy     OTHER SURGICAL HISTORY      ,[id number],IR ANGIOGRAM FEMORAL/EXTREMITY ([place]),Unremarkable angiogram at [facility]     OTHER SURGICAL HISTORY      10/05/2004,,PTCA,Angioplasty     OTHER SURGICAL HISTORY      2005,,PTCA,Angioplasty with stent.     OTHER SURGICAL HISTORY      2005,,PTCA,Angioplasty with stent.     OTHER SURGICAL HISTORY      2005,,PTCA,Angioplasty without stent     OTHER SURGICAL HISTORY      2007,[id number],IR ANGIOGRAM FEMORAL/EXTREMITY ([place]),Unremarkable coronary angiogram at [facility].     OTHER SURGICAL HISTORY      1967,SUR38,APPENDECTOMY OPEN     RELEASE CARPAL TUNNEL      11/15/01,Right carpal tunnel release by Dr. Mace     RELEASE CARPAL TUNNEL      2004,Left carpal tunnel release     Social History     Social History     Marital status:      Spouse name: N/A     Number of children: N/A     Years of education: N/A     Social History Main Topics     Smoking status: Never Smoker     Smokeless tobacco: Never Used     Alcohol use No     Drug use: No     Sexual activity: Yes     Partners: Female     Other Topics Concern     None     Social History Narrative    He is on Social Security Disability for coronary artery disease and cervical arthritis and disk disease.   Dax obed.  No tobacco.     Family History   Problem Relation Age of Onset     HEART DISEASE Mother      Heart Disease,Heart problems     HEART DISEASE Other      Heart Disease,Heart problems     HEART DISEASE Other      Heart Disease,Heart problems     Ankylosing Spondylitis Son      Ankylosing spondylitis,Ankylosing spondylitis     Other - See Comments Brother       with sudden death following shoulder surgery 2012 -- was off his Plavix for 10 days pre-operatively.     Ankylosing Spondylitis Daughter      Ankylosing spondylitis,-- Dx 2017       EXAM:   Vitals:    18 1106   BP: 148/80   BP  "Location: Right arm   Patient Position: Sitting   Cuff Size: Adult Regular   Pulse: 60   Weight: 205 lb 2 oz (93 kg)       Current Pain Score: No Pain (0)     BP Readings from Last 3 Encounters:   09/19/18 148/80   08/09/18 144/84   05/03/18 136/76    Wt Readings from Last 3 Encounters:   09/19/18 205 lb 2 oz (93 kg)   08/09/18 203 lb (92.1 kg)   05/03/18 207 lb (93.9 kg)      Estimated body mass index is 30.07 kg/(m^2) as calculated from the following:    Height as of 9/19/17: 5' 9.25\" (1.759 m).    Weight as of this encounter: 205 lb 2 oz (93 kg).     Physical Exam   Constitutional: He appears well-developed and well-nourished.   Eyes: No scleral icterus.   Cardiovascular: Normal rate and regular rhythm.    Pulmonary/Chest: Effort normal. No respiratory distress.   Abdominal: Soft.   Musculoskeletal: He exhibits tenderness. He exhibits no edema.   Lymphadenopathy:     He has no cervical adenopathy.   Neurological: He is alert.   Skin: Skin is warm and dry.   Psychiatric: He has a normal mood and affect.        INVESTIGATIONS:  Results for orders placed or performed in visit on 08/09/18   Comprehensive metabolic panel   Result Value Ref Range    Sodium 138 134 - 144 mmol/L    Potassium 4.8 3.5 - 5.1 mmol/L    Chloride 107 98 - 107 mmol/L    Carbon Dioxide 26 21 - 31 mmol/L    Anion Gap 5 3 - 14 mmol/L    Glucose 84 70 - 105 mg/dL    Urea Nitrogen 24 7 - 25 mg/dL    Creatinine 1.50 (H) 0.70 - 1.30 mg/dL    GFR Estimate 47 (L) >60 mL/min/1.7m2    GFR Estimate If Black 56 (L) >60 mL/min/1.7m2    Calcium 9.4 8.6 - 10.3 mg/dL    Bilirubin Total 0.5 0.3 - 1.0 mg/dL    Albumin 3.9 3.5 - 5.7 g/dL    Protein Total 6.7 6.4 - 8.9 g/dL    Alkaline Phosphatase 49 34 - 104 U/L    ALT 28 7 - 52 U/L    AST 23 13 - 39 U/L   CBC with platelets   Result Value Ref Range    WBC 5.7 4.0 - 11.0 10e9/L    RBC Count 4.35 (L) 4.4 - 5.9 10e12/L    Hemoglobin 14.2 13.3 - 17.7 g/dL    Hematocrit 42.7 40.0 - 53.0 %    MCV 98 78 - 100 fl    MCH " 32.6 26.5 - 33.0 pg    MCHC 33.3 31.5 - 36.5 g/dL    RDW 12.8 10.0 - 15.0 %    Platelet Count 191 150 - 450 10e9/L   Hemoglobin A1c   Result Value Ref Range    Hemoglobin A1C 8.0 (H) 4.0 - 6.0 %   Lipid Profile   Result Value Ref Range    Cholesterol 89 <200 mg/dL    Triglycerides 106 <150 mg/dL    HDL Cholesterol 30 23 - 92 mg/dL    LDL Cholesterol Calculated 38 <100 mg/dL    Non HDL Cholesterol 59 <130 mg/dL   Thyrotropin GH   Result Value Ref Range    Thyrotropin 0.41 0.34 - 5.60 IU/mL   Albumin Random Urine Quantitative with Creat Ratio   Result Value Ref Range    Creatinine Urine 174 mg/dL    Albumin Urine mg/L 780 mg/L    Albumin Urine mg/g Cr 448.28 (H) 0 - 17 mg/g Cr   *UA reflex to Microscopic   Result Value Ref Range    Color Urine Yellow     Appearance Urine Clear     Glucose Urine 250 (A) NEG^Negative mg/dL    Bilirubin Urine Negative NEG^Negative    Ketones Urine Negative NEG^Negative mg/dL    Specific Gravity Urine >1.030 (H) 1.000 - 1.030    Blood Urine Negative NEG^Negative    pH Urine 6.0 5.0 - 9.0 pH    Protein Albumin Urine 100 (A) NEG^Negative mg/dL    Urobilinogen Urine 0.2 0.2 - 1.0 EU/dL    Nitrite Urine Negative NEG^Negative    Leukocyte Esterase Urine Negative NEG^Negative    Source Midstream Urine    Urine Microscopic   Result Value Ref Range    WBC Urine 0 - 5 OTO5^0 - 5 /HPF    RBC Urine O - 2 OTO2^O - 2 /HPF    Mucous Urine Present (A) NEG^Negative /LPF       ASSESSMENT AND PLAN:  Problem List Items Addressed This Visit        Endocrine    Uncontrolled type 2 diabetes mellitus with stage 3 chronic kidney disease, with long-term current use of insulin (H)       Other    Insulin pump in place    Primary narcolepsy without cataplexy - Primary    Relevant Medications    dextroamphetamine (DEXTROSTAT) 10 MG tablet (Start on 11/14/2018)    methylphenidate (RITALIN) 10 MG tablet (Start on 11/14/2018)    Pain medication agreement - 8/9/2018    Relevant Medications    dextroamphetamine  (DEXTROSTAT) 10 MG tablet (Start on 11/14/2018)    methylphenidate (RITALIN) 10 MG tablet (Start on 11/14/2018)        reviewed diet, exercise and weight control, recommended sodium restriction, cardiovascular risk and specific lipid/LDL goals reviewed  -- Expected clinical course discussed    -- Medications and their side effects discussed    Patient Instructions   Medications refilled.     Return in approximately 6 month(s), or sooner as needed for follow-up with Dr. Barnett.  - Med Refills    Clinic : [phone]  Appointment line: [phone]      Jorge Barnett MD  Internal Medicine  [facility]

## 2018-09-20 ASSESSMENT — PATIENT HEALTH QUESTIONNAIRE - PHQ9: SUM OF ALL RESPONSES TO PHQ QUESTIONS 1-9: 0

## 2018-09-20 ASSESSMENT — ANXIETY QUESTIONNAIRES: GAD7 TOTAL SCORE: 0

## 2018-10-08 DIAGNOSIS — I25.118 ATHEROSCLEROSIS OF NATIVE CORONARY ARTERY OF NATIVE HEART WITH STABLE ANGINA PECTORIS (H): Primary | ICD-10-CM

## 2018-10-08 RX ORDER — CLOPIDOGREL BISULFATE 75 MG/1
TABLET ORAL
Qty: 90 TABLET | Refills: 0 | Status: SHIPPED | OUTPATIENT
Start: 2018-10-08 | End: 2018-10-09

## 2018-10-08 NOTE — TELEPHONE ENCOUNTER
Called patient and informed him his Plavix was refilled.    Patient states his right arm has been feeling like he has a rash on it , like the hair is standing. States it is itchy, denies any open sores from itching.    Patient states that the only thing that he can think of that has change was the manufacture for his Plavix, he was going to speak with [facility] to have them change manufacturers.     Patient also states he is concerned about the change from Valsartan to Losartan.  States he does not feel that the Losartan is enough.  States he was on Valsartan 160mg twice daily and the Losartan is only 50 mg twice daily.    Patient states he did get it refilled and thought he will just try it and see what happens.  Patient will come in sometime and have BP checked with his home BP cuff to make sure it is working.    Will route to Dr. Barnett for review and any other suggestions for itchy arm or Losartan please advise patient, otherwise patient will follow through with as reported above.    Margie Calderón RN on 10/8/2018 at 4:17 PM

## 2018-10-08 NOTE — TELEPHONE ENCOUNTER
"Prescription approved per [facility] Refill Protocol.  LOV;   Per OV note on 1/3/18  \"Patient has heart disease and takes aspirin Plavix.  Advised that he not stop these -- unless absolutely necessary -- due to significant coronary artery disease.  He has high risk of stent thrombosis.\"  clopidogrel (PLAVIX) 75 mg tablet    Indications: Platelet inhibition due to Plavix Take 1 tablet by mouth once daily. 90 tablet   4 10/05/2017       LOV: 9/19/18  Margie Calderón RN on 10/8/2018 at 3:54 PM    "

## 2018-10-09 RX ORDER — CLOPIDOGREL BISULFATE 75 MG/1
75 TABLET ORAL DAILY
Qty: 90 TABLET | Refills: 3 | Status: SHIPPED | OUTPATIENT
Start: 2018-10-09 | End: 2019-10-21

## 2018-10-10 NOTE — TELEPHONE ENCOUNTER
Patient notified of Dr. Barnett response and verbalized understanding.    Margie Calderón RN on 10/10/2018 at 8:08 AM

## 2018-11-21 ENCOUNTER — TELEPHONE (OUTPATIENT)
Dept: INTERNAL MEDICINE | Facility: OTHER | Age: 68
End: 2018-11-21

## 2018-11-21 DIAGNOSIS — N18.30 TYPE 2 DIABETES MELLITUS WITH STAGE 3 CHRONIC KIDNEY DISEASE, WITH LONG-TERM CURRENT USE OF INSULIN (H): ICD-10-CM

## 2018-11-21 DIAGNOSIS — Z79.4 TYPE 2 DIABETES MELLITUS WITH STAGE 3 CHRONIC KIDNEY DISEASE, WITH LONG-TERM CURRENT USE OF INSULIN (H): ICD-10-CM

## 2018-11-21 DIAGNOSIS — E78.2 MIXED HYPERLIPIDEMIA: ICD-10-CM

## 2018-11-21 DIAGNOSIS — E03.4 HYPOTHYROIDISM DUE TO ACQUIRED ATROPHY OF THYROID: ICD-10-CM

## 2018-11-21 DIAGNOSIS — E11.22 TYPE 2 DIABETES MELLITUS WITH STAGE 3 CHRONIC KIDNEY DISEASE, WITH LONG-TERM CURRENT USE OF INSULIN (H): ICD-10-CM

## 2018-11-21 LAB
ALBUMIN SERPL-MCNC: 4.2 G/DL (ref 3.5–5.7)
ALP SERPL-CCNC: 55 U/L (ref 34–104)
ALT SERPL W P-5'-P-CCNC: 27 U/L (ref 7–52)
ANION GAP SERPL CALCULATED.3IONS-SCNC: 5 MMOL/L (ref 3–14)
AST SERPL W P-5'-P-CCNC: 21 U/L (ref 13–39)
BILIRUB SERPL-MCNC: 0.5 MG/DL (ref 0.3–1)
BUN SERPL-MCNC: 29 MG/DL (ref 7–25)
CALCIUM SERPL-MCNC: 9.4 MG/DL (ref 8.6–10.3)
CHLORIDE SERPL-SCNC: 106 MMOL/L (ref 98–107)
CHOLEST SERPL-MCNC: 92 MG/DL
CO2 SERPL-SCNC: 27 MMOL/L (ref 21–31)
CREAT SERPL-MCNC: 1.74 MG/DL (ref 0.7–1.3)
ERYTHROCYTE [DISTWIDTH] IN BLOOD BY AUTOMATED COUNT: 12.2 % (ref 10–15)
GFR SERPL CREATININE-BSD FRML MDRD: 39 ML/MIN/1.7M2
GLUCOSE SERPL-MCNC: 107 MG/DL (ref 70–105)
HBA1C MFR BLD: 7.5 % (ref 4–6)
HCT VFR BLD AUTO: 46.8 % (ref 40–53)
HDLC SERPL-MCNC: 34 MG/DL (ref 23–92)
HGB BLD-MCNC: 15.2 G/DL (ref 13.3–17.7)
LDLC SERPL CALC-MCNC: 41 MG/DL
MCH RBC QN AUTO: 32.1 PG (ref 26.5–33)
MCHC RBC AUTO-ENTMCNC: 32.5 G/DL (ref 31.5–36.5)
MCV RBC AUTO: 99 FL (ref 78–100)
NONHDLC SERPL-MCNC: 58 MG/DL
PLATELET # BLD AUTO: 187 10E9/L (ref 150–450)
POTASSIUM SERPL-SCNC: 4.6 MMOL/L (ref 3.5–5.1)
PROT SERPL-MCNC: 6.6 G/DL (ref 6.4–8.9)
RBC # BLD AUTO: 4.73 10E12/L (ref 4.4–5.9)
SODIUM SERPL-SCNC: 138 MMOL/L (ref 134–144)
TRIGL SERPL-MCNC: 86 MG/DL
TSH SERPL DL<=0.05 MIU/L-ACNC: 0.32 IU/ML (ref 0.34–5.6)
WBC # BLD AUTO: 5.7 10E9/L (ref 4–11)

## 2018-11-21 PROCEDURE — 85027 COMPLETE CBC AUTOMATED: CPT | Performed by: INTERNAL MEDICINE

## 2018-11-21 PROCEDURE — 83036 HEMOGLOBIN GLYCOSYLATED A1C: CPT | Performed by: INTERNAL MEDICINE

## 2018-11-21 PROCEDURE — 80061 LIPID PANEL: CPT | Performed by: INTERNAL MEDICINE

## 2018-11-21 PROCEDURE — 36415 COLL VENOUS BLD VENIPUNCTURE: CPT | Performed by: INTERNAL MEDICINE

## 2018-11-21 PROCEDURE — 84443 ASSAY THYROID STIM HORMONE: CPT | Performed by: INTERNAL MEDICINE

## 2018-11-21 PROCEDURE — 80053 COMPREHEN METABOLIC PANEL: CPT | Performed by: INTERNAL MEDICINE

## 2018-11-21 NOTE — TELEPHONE ENCOUNTER
Kidney function has dropped slightly, creatinine is a little higher -- make sure he is drinking enough water during the day.   A1c is controlled at 7.5%  Thyroid levels look good.    Jorge Barnett MD

## 2018-11-27 ENCOUNTER — OFFICE VISIT (OUTPATIENT)
Dept: INTERNAL MEDICINE | Facility: OTHER | Age: 68
End: 2018-11-27
Attending: INTERNAL MEDICINE
Payer: COMMERCIAL

## 2018-11-27 VITALS
HEART RATE: 72 BPM | WEIGHT: 201 LBS | DIASTOLIC BLOOD PRESSURE: 88 MMHG | BODY MASS INDEX: 29.47 KG/M2 | SYSTOLIC BLOOD PRESSURE: 138 MMHG

## 2018-11-27 DIAGNOSIS — R20.0 NUMBNESS OF RIGHT HAND: ICD-10-CM

## 2018-11-27 DIAGNOSIS — I10 BENIGN ESSENTIAL HYPERTENSION: ICD-10-CM

## 2018-11-27 DIAGNOSIS — N18.30 CKD (CHRONIC KIDNEY DISEASE) STAGE 3, GFR 30-59 ML/MIN (H): ICD-10-CM

## 2018-11-27 DIAGNOSIS — E11.22 CONTROLLED TYPE 2 DIABETES MELLITUS WITH STAGE 3 CHRONIC KIDNEY DISEASE, WITH LONG-TERM CURRENT USE OF INSULIN (H): Primary | ICD-10-CM

## 2018-11-27 DIAGNOSIS — I50.32 CHRONIC DIASTOLIC HEART FAILURE (H): ICD-10-CM

## 2018-11-27 DIAGNOSIS — Z79.4 CONTROLLED TYPE 2 DIABETES MELLITUS WITH STAGE 3 CHRONIC KIDNEY DISEASE, WITH LONG-TERM CURRENT USE OF INSULIN (H): Primary | ICD-10-CM

## 2018-11-27 DIAGNOSIS — K21.9 GASTROESOPHAGEAL REFLUX DISEASE, ESOPHAGITIS PRESENCE NOT SPECIFIED: ICD-10-CM

## 2018-11-27 DIAGNOSIS — N18.30 CONTROLLED TYPE 2 DIABETES MELLITUS WITH STAGE 3 CHRONIC KIDNEY DISEASE, WITH LONG-TERM CURRENT USE OF INSULIN (H): Primary | ICD-10-CM

## 2018-11-27 PROCEDURE — 99214 OFFICE O/P EST MOD 30 MIN: CPT | Performed by: INTERNAL MEDICINE

## 2018-11-27 PROCEDURE — G0463 HOSPITAL OUTPT CLINIC VISIT: HCPCS

## 2018-11-27 RX ORDER — HYDRALAZINE HYDROCHLORIDE 25 MG/1
25-50 TABLET, FILM COATED ORAL 4 TIMES DAILY
Qty: 360 TABLET | Refills: 11 | Status: SHIPPED | OUTPATIENT
Start: 2018-11-27 | End: 2019-10-21

## 2018-11-27 RX ORDER — FUROSEMIDE 40 MG
80-160 TABLET ORAL DAILY
Qty: 90 TABLET | Refills: 3 | Status: SHIPPED | OUTPATIENT
Start: 2018-11-27 | End: 2019-10-21

## 2018-11-27 RX ORDER — IRBESARTAN 150 MG/1
150-300 TABLET ORAL DAILY
Qty: 180 TABLET | Refills: 3 | Status: SHIPPED | OUTPATIENT
Start: 2018-11-27 | End: 2018-12-27

## 2018-11-27 RX ORDER — METOPROLOL SUCCINATE 50 MG/1
50 TABLET, EXTENDED RELEASE ORAL 2 TIMES DAILY
Qty: 180 TABLET | Refills: 3 | Status: SHIPPED | OUTPATIENT
Start: 2018-11-27 | End: 2019-10-21

## 2018-11-27 ASSESSMENT — ENCOUNTER SYMPTOMS
PALPITATIONS: 0
LIGHT-HEADEDNESS: 0
WHEEZING: 0
SHORTNESS OF BREATH: 0
FATIGUE: 0
NAUSEA: 0
NECK STIFFNESS: 1
AGITATION: 0
ABDOMINAL PAIN: 0
ARTHRALGIAS: 1
EYE PAIN: 0
CHILLS: 0
FEVER: 0
COUGH: 0
DIARRHEA: 0
VOMITING: 0
NECK PAIN: 1
HEMATURIA: 0
DYSURIA: 0
BACK PAIN: 1
CONFUSION: 0
HEADACHES: 1
MYALGIAS: 0
DIZZINESS: 0
BRUISES/BLEEDS EASILY: 0

## 2018-11-27 ASSESSMENT — ANXIETY QUESTIONNAIRES
7. FEELING AFRAID AS IF SOMETHING AWFUL MIGHT HAPPEN: NOT AT ALL
1. FEELING NERVOUS, ANXIOUS, OR ON EDGE: NOT AT ALL
IF YOU CHECKED OFF ANY PROBLEMS ON THIS QUESTIONNAIRE, HOW DIFFICULT HAVE THESE PROBLEMS MADE IT FOR YOU TO DO YOUR WORK, TAKE CARE OF THINGS AT HOME, OR GET ALONG WITH OTHER PEOPLE: NOT DIFFICULT AT ALL
2. NOT BEING ABLE TO STOP OR CONTROL WORRYING: NOT AT ALL
3. WORRYING TOO MUCH ABOUT DIFFERENT THINGS: NOT AT ALL
6. BECOMING EASILY ANNOYED OR IRRITABLE: NOT AT ALL
GAD7 TOTAL SCORE: 0
5. BEING SO RESTLESS THAT IT IS HARD TO SIT STILL: NOT AT ALL

## 2018-11-27 ASSESSMENT — PATIENT HEALTH QUESTIONNAIRE - PHQ9
5. POOR APPETITE OR OVEREATING: NOT AT ALL
SUM OF ALL RESPONSES TO PHQ QUESTIONS 1-9: 0

## 2018-11-27 ASSESSMENT — PAIN SCALES - GENERAL: PAINLEVEL: SEVERE PAIN (6)

## 2018-11-27 NOTE — PATIENT INSTRUCTIONS
Blood pressures are too high.....     Stop Losartan.     Start Irbesartan 150 mg daily -- increase to 300 mg daily after about 1 week.    EMG ordered  - they will call with date/time of appointment.       Blood pressure checks at home - check some in AM, some in Afternoon, some in Evening and record   -- bring these with you to your next appointment.     Goal blood pressures -- less than 140 and less than 90.    -- Ideally would like the numbers about 110-130 and 70-80.  -- If running higher or lower than this on regular basis, will need to adjust your medications.     Results for orders placed or performed in visit on 11/21/18   Comprehensive metabolic panel   Result Value Ref Range    Sodium 138 134 - 144 mmol/L    Potassium 4.6 3.5 - 5.1 mmol/L    Chloride 106 98 - 107 mmol/L    Carbon Dioxide 27 21 - 31 mmol/L    Anion Gap 5 3 - 14 mmol/L    Glucose 107 (H) 70 - 105 mg/dL    Urea Nitrogen 29 (H) 7 - 25 mg/dL    Creatinine 1.74 (H) 0.70 - 1.30 mg/dL    GFR Estimate 39 (L) >60 mL/min/1.7m2    GFR Estimate If Black 48 (L) >60 mL/min/1.7m2    Calcium 9.4 8.6 - 10.3 mg/dL    Bilirubin Total 0.5 0.3 - 1.0 mg/dL    Albumin 4.2 3.5 - 5.7 g/dL    Protein Total 6.6 6.4 - 8.9 g/dL    Alkaline Phosphatase 55 34 - 104 U/L    ALT 27 7 - 52 U/L    AST 21 13 - 39 U/L   CBC with platelets   Result Value Ref Range    WBC 5.7 4.0 - 11.0 10e9/L    RBC Count 4.73 4.4 - 5.9 10e12/L    Hemoglobin 15.2 13.3 - 17.7 g/dL    Hematocrit 46.8 40.0 - 53.0 %    MCV 99 78 - 100 fl    MCH 32.1 26.5 - 33.0 pg    MCHC 32.5 31.5 - 36.5 g/dL    RDW 12.2 10.0 - 15.0 %    Platelet Count 187 150 - 450 10e9/L   Hemoglobin A1c   Result Value Ref Range    Hemoglobin A1C 7.5 (H) 4.0 - 6.0 %   Lipid Profile   Result Value Ref Range    Cholesterol 92 <200 mg/dL    Triglycerides 86 <150 mg/dL    HDL Cholesterol 34 23 - 92 mg/dL    LDL Cholesterol Calculated 41 <100 mg/dL    Non HDL Cholesterol 58 <130 mg/dL   Thyrotropin GH   Result Value Ref Range     Thyrotropin 0.32 (L) 0.34 - 5.60 IU/mL        Return as needed for follow-up with Dr. Barnett.    Clinic : [phone]  Appointment line: [phone]

## 2018-11-27 NOTE — PROGRESS NOTES
"Nursing Notes:   Barbara Connor LPN  11/27/2018  4:47 PM  Signed  Patient presents to the clinic for medication management.  Patient express concerns about elevated blood pressure readings over the past couple of days.  Patient express concerns about right hand numbness on and off over the past month.  Last administration of Ritalin was yesterday around 1800, Dextrostat was yesterday around 1500/1600.  Norco was administered this am due to headache from elevated blood pressure.  Contract updated on 8-9-18.    Previous A1C is at goal of <8  Lab Results   Component Value Date    A1C 7.5 11/21/2018    A1C 8.0 08/09/2018    A1C 7.8 05/03/2018     Urine microalbumin:creatine: n/a  Foot exam unknown-declines today  Eye exam 02/2018    Tobacco User no  Patient is on a daily aspirin  Patient is on a Statin.  Blood pressure today of:     BP Readings from Last 1 Encounters:   09/19/18 148/80      is not at the goal of <139/89 for diabetics.    Chief Complaint   Patient presents with     Recheck Medication       Initial There were no vitals taken for this visit. Estimated body mass index is 30.07 kg/(m^2) as calculated from the following:    Height as of 9/19/17: 5' 9.25\" (1.759 m).    Weight as of 9/19/18: 205 lb 2 oz (93 kg).  Medication Reconciliation: complete    Barbara Connor LPN          Nursing note reviewed with patient.  Accurracy and completeness verified.   Mr. Whittaker is a 68 year old male who:  Patient presents with:  Recheck Medication      ICD-10-CM    1. Controlled type 2 diabetes mellitus with stage 3 chronic kidney disease, with long-term current use of insulin (H) E11.22     N18.3     Z79.4    2. CKD (chronic kidney disease) stage 3, GFR 30-59 ml/min (H) N18.3 hydrALAZINE (APRESOLINE) 25 MG tablet     metoprolol succinate (TOPROL-XL) 50 MG 24 hr tablet   3. Gastroesophageal reflux disease, esophagitis presence not specified K21.9 ranitidine (ZANTAC) 150 MG tablet   4. Benign essential hypertension I10 " irbesartan (AVAPRO) 150 MG tablet   5. Numbness of right hand R20.0 NEUROLOGY ADULT REFERRAL   6. Chronic diastolic heart failure (H) I50.32 furosemide (LASIX) 40 MG tablet     HPI  Type 2 diabetes, previously uncontrolled, now controlled -- he has been trying to really work on his diet better.  Has insulin pump.  Follows with diabetic education closely.    Labs just collected and A1c is now controlled.    Stage III chronic kidney disease, needs refills of his medications.  Advised to avoid NSAIDs.    Hypertension, blood pressures initially were high, states that they have been high at home a few times.  We will change from losartan over to Avapro.  150 mg initially likely then increasing up to 300 mg.  He will check his blood pressures intermittently at home and let us know if he still has a problem after increasing the dose.  Continue hydralazine and metoprolol.  Needs refills.    Heartburn and reflux, ongoing but improved with Zantac.  Needs refills.    Right hand numbness, he is concerned about nerve compression.  He wants to get EMG.  Orders placed.    Chronic diastolic heart failure, has been well controlled.  Continue Lasix.    Hypertension - BP initially 158/88, recheck was 138/88.   -- change medications. Stop losartan --> change to irbesartan.     Functional Capacity: about 4 METS.   No orthopnea/paroxysmal nocturnal dyspnea  Review of Systems   Constitutional: Negative for chills, fatigue and fever.   HENT: Negative for congestion and hearing loss.    Eyes: Negative for pain and visual disturbance.   Respiratory: Negative for cough, shortness of breath and wheezing.    Cardiovascular: Negative for chest pain and palpitations.   Gastrointestinal: Negative for abdominal pain, diarrhea, nausea and vomiting.   Endocrine: Negative for cold intolerance and heat intolerance.   Genitourinary: Negative for dysuria and hematuria.   Musculoskeletal: Positive for arthralgias, back pain, gait problem, neck pain and  neck stiffness. Negative for myalgias.   Skin: Negative for pallor.   Allergic/Immunologic: Negative for immunocompromised state.   Neurological: Positive for headaches (today). Negative for dizziness and light-headedness.   Hematological: Does not bruise/bleed easily.   Psychiatric/Behavioral: Negative for agitation and confusion.      REMINGTON:   REMINGTON-7 SCORE 8/9/2018 9/19/2018 11/27/2018   Total Score 0 0 0     PHQ9:  PHQ-9 SCORE 8/9/2018 9/19/2018 11/27/2018   PHQ-9 Total Score 0 0 0       I have personally reviewed the past medical history, past surgical history, medications, allergies, family and social history as listed below, on 11/27/2018.    Allergies   Allergen Reactions     Famotidine Other (See Comments)     + Caused Headache and Rash -- tolerated Ranitidine and worked well.     Gabapentin      Other reaction(s): Mental Status Change     Hydrocortisone Other (See Comments)     Burning and stinging sensation with Hydrocortisone - doesn't help itching or rash -- tolerated Desoximetasone cream and worked well.      Atorvastatin Hives     Lisinopril Cough     No Clinical Screening - See Comments Hives     Chlorine water     Pregabalin      Other reaction(s): Mental Status Change     Valdecoxib Swelling     Insulin Aspart Rash       Current Outpatient Prescriptions   Medication Sig Dispense Refill     albuterol (PROAIR HFA/PROVENTIL HFA/VENTOLIN HFA) 108 (90 BASE) MCG/ACT Inhaler Inhale 1-2 puffs into the lungs every 4 hours as needed for shortness of breath / dyspnea or wheezing 1 Inhaler 1     aspirin (GOODSENSE ASPIRIN) 325 MG tablet Take  by mouth.       blood glucose monitoring (ONETOUCH ULTRA) test strip Dispense test strips covered by the patient insurance. Test 10 times per day.       Blood Glucose Monitoring Suppl (GLUCOCOM BLOOD GLUCOSE MONITOR) WAQAS Dispense glucose meter, test strips and lancets covered by the patient insurance. Test 10 times per day.       clopidogrel (PLAVIX) 75 MG tablet Take 1  tablet (75 mg) by mouth daily 90 tablet 3     [employer] ALCOHOL SWABS PADS For home use.       desoximetasone (TOPICORT) 0.25 % cream Apply 1 Application topically 2 times daily       dextroamphetamine (DEXTROSTAT) 10 MG tablet Take 1 tablet (10 mg) by mouth 4 times daily 360 tablet 0     diclofenac (VOLTAREN) 75 MG EC tablet Take 1 tablet by mouth 4 times daily       docusate sodium (COLACE) 100 MG capsule Take 1-2 capsules by mouth 2 times daily as needed for constipation prevention       Flaxseed, Linseed, (FLAXSEED OIL) 1000 MG CAPS Take  by mouth.       furosemide (LASIX) 40 MG tablet Take 2-4 tablets ( mg) by mouth daily Or as instructed for leg swelling 90 tablet 3     HUMALOG 100 UNIT/ML injection INJECT UNDER THE SKIN USING INSULIN PUMP. MAX DAILY DOSE  UNITS 180 mL 3     hydrALAZINE (APRESOLINE) 25 MG tablet Take 1-2 tablets (25-50 mg) by mouth 4 times daily - Take lowest effective dose for blood pressure management 360 tablet 11     HYDROcodone-acetaminophen (NORCO) 5-325 MG per tablet Take 1 tablet by mouth every 6 hours as needed for severe pain 60 tablet 0     Insulin Pen Needle (PEN NEEDLES) 29G X 12MM MISC For administering insulin at home.       ipratropium (ATROVENT HFA) 17 MCG/ACT Inhaler Inhale 2 puffs into the lungs 4 times daily       irbesartan (AVAPRO) 150 MG tablet Take 1-2 tablets (150-300 mg) by mouth daily -- STOP Losartan -- needs 2 smaller pills daily 180 tablet 3     IV Sets-Tubing (SOLUTION ADMINISTRATION SET) MISC As directed.       levothyroxine (SYNTHROID/LEVOTHROID) 125 MCG tablet TAKE ONE TABLET BY MOUTH ONCE DAILY IN THE MORNING 90 tablet 2     methylphenidate (RITALIN) 10 MG tablet Take 1 tablet (10 mg) by mouth 4 times daily 360 tablet 0     metoprolol succinate (TOPROL-XL) 50 MG 24 hr tablet Take 1 tablet (50 mg) by mouth 2 times daily 180 tablet 3     nitroGLYcerin (NITROSTAT) 0.4 MG sublingual tablet Place 1 tablet under the tongue every 5 minutes as needed for chest  pain       Omega-3 Fatty Acids (FISH OIL PO) Take  by mouth.       PSYLLIUM PO Take 1 tsp. by mouth 3 times daily - for constipation prevention       ranitidine (ZANTAC) 150 MG tablet Take 1 tablet (150 mg) by mouth 2 times daily 180 tablet 3     ranolazine (RANEXA) 500 MG 12 hr tablet Take 2 tablets (1,000 mg) by mouth 2 times daily - cancel other Rx - give 3 month supply 360 tablet 3     rosuvastatin (CRESTOR) 10 MG tablet TAKE ONE TABLET BY MOUTH TWICE DAILY 180 tablet 3     Saline GEL Spray 1 spray in nostril every hour as needed For Nasal Dryness       thin (NO BRAND SPECIFIED) lancets Test 10 times per day.       tiZANidine (ZANAFLEX) 2 MG tablet Take 1-2 tablets by mouth every 6 hours as needed for muscle spasms          Patient Active Problem List    Diagnosis Date Noted     Degeneration of cervical intervertebral disc 01/31/2018     Priority: Medium     Hypothyroidism due to acquired atrophy of thyroid 01/31/2018     Priority: Medium     Mixed hyperlipidemia 01/31/2018     Priority: Medium     Primary narcolepsy without cataplexy 01/31/2018     Priority: Medium     Overview:   Total dose recommended by pulmonology he is dextro- amphetamine 40 mg plus   methylphenidate 40 mg in divided doses per day.  Total of 80 mg of short   acting amphetamines daily.       Osteoarthritis of spine 01/31/2018     Priority: Medium     Peptic ulcer disease 01/31/2018     Priority: Medium     Neuroforaminal stenosis of lumbar spine 01/03/2018     Priority: Medium     Radicular low back pain 01/03/2018     Priority: Medium     Acute bilateral low back pain with bilateral sciatica 10/27/2017     Priority: Medium     Chronic low back pain 08/08/2017     Priority: Medium     Intermittent constipation 08/08/2017     Priority: Medium     Skin rash 08/01/2017     Priority: Medium     Controlled type 2 diabetes mellitus with stage 3 chronic kidney disease, with long-term current use of insulin (H) 03/30/2017     Priority: Medium      Erectile dysfunction 09/26/2016     Priority: Medium     Trigger point with back pain 07/14/2016     Priority: Medium     Trigger point with neck pain 07/14/2016     Priority: Medium     Insulin pump in place 03/10/2016     Priority: Medium     Myofacial muscle pain 12/17/2015     Priority: Medium     Pain medication agreement - 8/9/2018 12/17/2015     Priority: Medium     Trigger point of extremity 12/17/2015     Priority: Medium     Greater trochanteric bursitis of left hip 06/24/2015     Priority: Medium     Blister of toe of right foot 12/09/2014     Priority: Medium     Cervical stenosis of spinal canal 06/17/2014     Priority: Medium     Degenerative disc disease, cervical 06/17/2014     Priority: Medium     Facet arthritis of cervical region (H) 06/17/2014     Priority: Medium     Cervical radicular pain 06/05/2014     Priority: Medium     Left arm numbness 06/05/2014     Priority: Medium     Left atrial enlargement 01/23/2014     Priority: Medium     Chronic diastolic heart failure (H) 01/10/2014     Priority: Medium     Overview:   1/20/2014 ECHO FINAL IMPRESSION:   1. Normal left ventricular size and systolic function. Estimated ejection fraction 65%.   2. Mild increase in wall thickness of the ventricular septum with hypokinesis of the basilar segment of the ventricular septum and inferior wall.   3. Mild to moderate left atrial enlargement.   4. Trace tricuspid regurgitation.   5. Mild left ventricular diastolic dysfunction.        CKD (chronic kidney disease) stage 3, GFR 30-59 ml/min (H) 01/10/2014     Priority: Medium     Benign essential hypertension 01/10/2014     Priority: Medium     Myalgia 01/10/2014     Priority: Medium     Old inferior wall myocardial infarction 01/10/2014     Priority: Medium     Platelet inhibition due to Plavix 01/10/2014     Priority: Medium     S/P CABG x 2 01/10/2014     Priority: Medium     S/P coronary artery stent placement 01/10/2014     Priority: Medium     ACP  (advance care planning) 12/05/2013     Priority: Medium     Painful diabetic neuropathy (H) 04/22/2013     Priority: Medium     Esophageal reflux 05/17/2012     Priority: Medium     Obesity 05/17/2012     Priority: Medium     Diastasis of muscle 10/06/2011     Priority: Medium     Coronary atherosclerosis 09/08/2011     Priority: Medium     Psychosexual dysfunction with inhibited sexual excitement 06/30/2011     Priority: Medium     Angina pectoris (H) 12/10/2010     Priority: Medium     Edema 10/25/2010     Priority: Medium     Chest pain 02/15/2007     Priority: Medium     Overview:   IMO Update 10/11       Atherosclerosis of native coronary artery of native heart with stable angina pectoris (H) 02/15/2007     Priority: Medium     Overview:   IMO Update 10/11       Other specified forms of chronic ischemic heart disease 02/15/2007     Priority: Medium     Peripheral vascular disease (H) 02/15/2007     Priority: Medium     Overview:   IMO Update 10/11       Postsurgical percutaneous transluminal coronary angioplasty status 02/15/2007     Priority: Medium     Overview:   IMO Update 10/11       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     Gastritis without bleeding     No Comments Provided     Heart disease     Multiple coronary stents     Narcolepsy without cataplexy     No Comments Provided     Other injury of unspecified body region, initial encounter (CODE)     11/2006,Right calf hematoma     Rheumatic fever without heart involvement     Rheumatic fever as a child without valvular heart disease     Past Surgical History:   Procedure Laterality Date     ANGIOPLASTY      12/00, 04/04/04,Repeat angioplasty with lt internal mammary to the lt  anterior descending and lt radial artery from aorta to the diagonal.     ANGIOPLASTY      10/01,Angioplasty with 2 vessel CABG the following week from the lt internal mammary to the lt anterior descending and right radial free graft     ANGIOPLASTY      04/2003     ARTHROSCOPY SHOULDER ROTATOR CUFF REPAIR      02/06/2006,Left rotator cuff repair and bone spur removal by Dr. Giraldo in [place]     COLONOSCOPY      1999     COLONOSCOPY      01/08/2016,-- Dr. De La Cruz -- polypectomy     OTHER SURGICAL HISTORY      09/03,[id number],IR ANGIOGRAM FEMORAL/EXTREMITY ([place]),Unremarkable angiogram at [facility]     OTHER SURGICAL HISTORY      10/05/2004,,PTCA,Angioplasty     OTHER SURGICAL HISTORY      02/2005,,PTCA,Angioplasty with stent.     OTHER SURGICAL HISTORY      03/02/2005,,PTCA,Angioplasty with stent.     OTHER SURGICAL HISTORY      09/23/2005,,PTCA,Angioplasty without stent     OTHER SURGICAL HISTORY      2/26/2007,[id number],IR ANGIOGRAM FEMORAL/EXTREMITY ([place]),Unremarkable coronary angiogram at [facility].     OTHER SURGICAL HISTORY      1967,SUR38,APPENDECTOMY OPEN     RELEASE CARPAL TUNNEL      11/15/01,Right carpal tunnel release by Dr. Mace     RELEASE CARPAL TUNNEL      01/2004,Left carpal tunnel release     Social History     Social History     Marital status:      Spouse name: N/A     Number of children: N/A     Years of education: N/A     Social History Main Topics     Smoking status: Never Smoker     Smokeless tobacco: Never Used     Alcohol use No     Drug use: No     Sexual activity: Yes     Partners: Female     Other Topics Concern     None     Social History Narrative    He is on Social Security Disability for coronary artery disease and cervical arthritis and disk disease.   Dax clay.  No tobacco.     Family History   Problem Relation Age of Onset     Heart Disease Mother      Heart Disease,Heart problems     Heart Disease Other      Heart Disease,Heart problems     Heart Disease Other      Heart  "Disease,Heart problems     Ankylosing Spondylitis Son      Ankylosing spondylitis,Ankylosing spondylitis     Other - See Comments Brother       with sudden death following shoulder surgery 2012 -- was off his Plavix for 10 days pre-operatively.     Ankylosing Spondylitis Daughter      Ankylosing spondylitis,-- Dx 2017       EXAM:   Vitals:    18 1637   BP: 138/88   BP Location: Right arm   Patient Position: Sitting   Cuff Size: Adult Regular   Pulse: 72   Weight: 201 lb (91.2 kg)       Current Pain Score: Severe Pain (6)     BP Readings from Last 3 Encounters:   18 138/88   18 148/80   18 144/84      Wt Readings from Last 3 Encounters:   18 201 lb (91.2 kg)   18 205 lb 2 oz (93 kg)   18 203 lb (92.1 kg)      Estimated body mass index is 29.47 kg/(m^2) as calculated from the following:    Height as of 17: 5' 9.25\" (1.759 m).    Weight as of this encounter: 201 lb (91.2 kg).     Physical Exam   Constitutional: He appears well-developed and well-nourished.   HENT:   Head: Normocephalic and atraumatic.   Eyes: No scleral icterus.   Cardiovascular: Normal rate and regular rhythm.    Abdominal: Soft.   Musculoskeletal: Normal range of motion.   Lymphadenopathy:     He has no cervical adenopathy.   Neurological: He is alert.   Skin: Skin is warm and dry. No rash noted.   Psychiatric: He has a normal mood and affect.      Procedures   INVESTIGATIONS:  Results for orders placed or performed in visit on 18   Comprehensive metabolic panel   Result Value Ref Range    Sodium 138 134 - 144 mmol/L    Potassium 4.6 3.5 - 5.1 mmol/L    Chloride 106 98 - 107 mmol/L    Carbon Dioxide 27 21 - 31 mmol/L    Anion Gap 5 3 - 14 mmol/L    Glucose 107 (H) 70 - 105 mg/dL    Urea Nitrogen 29 (H) 7 - 25 mg/dL    Creatinine 1.74 (H) 0.70 - 1.30 mg/dL    GFR Estimate 39 (L) >60 mL/min/1.7m2    GFR Estimate If Black 48 (L) >60 mL/min/1.7m2    Calcium 9.4 8.6 - 10.3 mg/dL    Bilirubin " Total 0.5 0.3 - 1.0 mg/dL    Albumin 4.2 3.5 - 5.7 g/dL    Protein Total 6.6 6.4 - 8.9 g/dL    Alkaline Phosphatase 55 34 - 104 U/L    ALT 27 7 - 52 U/L    AST 21 13 - 39 U/L   CBC with platelets   Result Value Ref Range    WBC 5.7 4.0 - 11.0 10e9/L    RBC Count 4.73 4.4 - 5.9 10e12/L    Hemoglobin 15.2 13.3 - 17.7 g/dL    Hematocrit 46.8 40.0 - 53.0 %    MCV 99 78 - 100 fl    MCH 32.1 26.5 - 33.0 pg    MCHC 32.5 31.5 - 36.5 g/dL    RDW 12.2 10.0 - 15.0 %    Platelet Count 187 150 - 450 10e9/L   Hemoglobin A1c   Result Value Ref Range    Hemoglobin A1C 7.5 (H) 4.0 - 6.0 %   Lipid Profile   Result Value Ref Range    Cholesterol 92 <200 mg/dL    Triglycerides 86 <150 mg/dL    HDL Cholesterol 34 23 - 92 mg/dL    LDL Cholesterol Calculated 41 <100 mg/dL    Non HDL Cholesterol 58 <130 mg/dL   Thyrotropin GH   Result Value Ref Range    Thyrotropin 0.32 (L) 0.34 - 5.60 IU/mL       ASSESSMENT AND PLAN:  Problem List Items Addressed This Visit        Digestive    Esophageal reflux    Relevant Medications    ranitidine (ZANTAC) 150 MG tablet       Endocrine    Controlled type 2 diabetes mellitus with stage 3 chronic kidney disease, with long-term current use of insulin (H) - Primary       Circulatory    Chronic diastolic heart failure (H)    Relevant Medications    furosemide (LASIX) 40 MG tablet    Benign essential hypertension    Relevant Medications    hydrALAZINE (APRESOLINE) 25 MG tablet    irbesartan (AVAPRO) 150 MG tablet       Urinary    CKD (chronic kidney disease) stage 3, GFR 30-59 ml/min (H)    Relevant Medications    hydrALAZINE (APRESOLINE) 25 MG tablet    metoprolol succinate (TOPROL-XL) 50 MG 24 hr tablet      Other Visit Diagnoses     Numbness of right hand        Relevant Orders    NEUROLOGY ADULT REFERRAL        reviewed diet, exercise and weight control, recommended sodium restriction, cardiovascular risk and specific lipid/LDL goals reviewed, specific diabetic recommendations low cholesterol diet, weight  control and daily exercise discussed  -- Expected clinical course discussed    -- Medications and their side effects discussed    Patient Instructions     Blood pressures are too high.....     Stop Losartan.     Start Irbesartan 150 mg daily -- increase to 300 mg daily after about 1 week.    EMG ordered  - they will call with date/time of appointment.       Blood pressure checks at home - check some in AM, some in Afternoon, some in Evening and record   -- bring these with you to your next appointment.     Goal blood pressures -- less than 140 and less than 90.    -- Ideally would like the numbers about 110-130 and 70-80.  -- If running higher or lower than this on regular basis, will need to adjust your medications.     Results for orders placed or performed in visit on 11/21/18   Comprehensive metabolic panel   Result Value Ref Range    Sodium 138 134 - 144 mmol/L    Potassium 4.6 3.5 - 5.1 mmol/L    Chloride 106 98 - 107 mmol/L    Carbon Dioxide 27 21 - 31 mmol/L    Anion Gap 5 3 - 14 mmol/L    Glucose 107 (H) 70 - 105 mg/dL    Urea Nitrogen 29 (H) 7 - 25 mg/dL    Creatinine 1.74 (H) 0.70 - 1.30 mg/dL    GFR Estimate 39 (L) >60 mL/min/1.7m2    GFR Estimate If Black 48 (L) >60 mL/min/1.7m2    Calcium 9.4 8.6 - 10.3 mg/dL    Bilirubin Total 0.5 0.3 - 1.0 mg/dL    Albumin 4.2 3.5 - 5.7 g/dL    Protein Total 6.6 6.4 - 8.9 g/dL    Alkaline Phosphatase 55 34 - 104 U/L    ALT 27 7 - 52 U/L    AST 21 13 - 39 U/L   CBC with platelets   Result Value Ref Range    WBC 5.7 4.0 - 11.0 10e9/L    RBC Count 4.73 4.4 - 5.9 10e12/L    Hemoglobin 15.2 13.3 - 17.7 g/dL    Hematocrit 46.8 40.0 - 53.0 %    MCV 99 78 - 100 fl    MCH 32.1 26.5 - 33.0 pg    MCHC 32.5 31.5 - 36.5 g/dL    RDW 12.2 10.0 - 15.0 %    Platelet Count 187 150 - 450 10e9/L   Hemoglobin A1c   Result Value Ref Range    Hemoglobin A1C 7.5 (H) 4.0 - 6.0 %   Lipid Profile   Result Value Ref Range    Cholesterol 92 <200 mg/dL    Triglycerides 86 <150 mg/dL    HDL  Cholesterol 34 23 - 92 mg/dL    LDL Cholesterol Calculated 41 <100 mg/dL    Non HDL Cholesterol 58 <130 mg/dL   Thyrotropin GH   Result Value Ref Range    Thyrotropin 0.32 (L) 0.34 - 5.60 IU/mL        Return as needed for follow-up with Dr. Barnett.    Clinic : [phone]  Appointment line: [phone]      Jorge Barnett MD  Internal Medicine  [facility]     Portions of this note were dictated using speech recognition software. The note has been proofread but errors in the text may have been overlooked. Please contact me if there are any concerns regarding the accuracy of the dictation.

## 2018-11-27 NOTE — MR AVS SNAPSHOT
After Visit Summary   11/27/2018    Moses Whittaker    MRN: [id number]           Patient Information     Date Of Birth          1950        Visit Information        Provider Department      11/27/2018 4:20 PM Jorge Barnett MD [facility]        Today's Diagnoses     Controlled type 2 diabetes mellitus with stage 3 chronic kidney disease, with long-term current use of insulin (H)    -  1    CKD (chronic kidney disease) stage 3, GFR 30-59 ml/min (H)        Gastroesophageal reflux disease, esophagitis presence not specified        Benign essential hypertension        Numbness of right hand        Chronic diastolic heart failure (H)          Care Instructions    Blood pressures are too high.....     Stop Losartan.     Start Irbesartan 150 mg daily -- increase to 300 mg daily after about 1 week.    EMG ordered  - they will call with date/time of appointment.       Blood pressure checks at home - check some in AM, some in Afternoon, some in Evening and record   -- bring these with you to your next appointment.     Goal blood pressures -- less than 140 and less than 90.    -- Ideally would like the numbers about 110-130 and 70-80.  -- If running higher or lower than this on regular basis, will need to adjust your medications.     Results for orders placed or performed in visit on 11/21/18   Comprehensive metabolic panel   Result Value Ref Range    Sodium 138 134 - 144 mmol/L    Potassium 4.6 3.5 - 5.1 mmol/L    Chloride 106 98 - 107 mmol/L    Carbon Dioxide 27 21 - 31 mmol/L    Anion Gap 5 3 - 14 mmol/L    Glucose 107 (H) 70 - 105 mg/dL    Urea Nitrogen 29 (H) 7 - 25 mg/dL    Creatinine 1.74 (H) 0.70 - 1.30 mg/dL    GFR Estimate 39 (L) >60 mL/min/1.7m2    GFR Estimate If Black 48 (L) >60 mL/min/1.7m2    Calcium 9.4 8.6 - 10.3 mg/dL    Bilirubin Total 0.5 0.3 - 1.0 mg/dL    Albumin 4.2 3.5 - 5.7 g/dL    Protein Total 6.6 6.4 - 8.9 g/dL    Alkaline Phosphatase 55 34 - 104 U/L     ALT 27 7 - 52 U/L    AST 21 13 - 39 U/L   CBC with platelets   Result Value Ref Range    WBC 5.7 4.0 - 11.0 10e9/L    RBC Count 4.73 4.4 - 5.9 10e12/L    Hemoglobin 15.2 13.3 - 17.7 g/dL    Hematocrit 46.8 40.0 - 53.0 %    MCV 99 78 - 100 fl    MCH 32.1 26.5 - 33.0 pg    MCHC 32.5 31.5 - 36.5 g/dL    RDW 12.2 10.0 - 15.0 %    Platelet Count 187 150 - 450 10e9/L   Hemoglobin A1c   Result Value Ref Range    Hemoglobin A1C 7.5 (H) 4.0 - 6.0 %   Lipid Profile   Result Value Ref Range    Cholesterol 92 <200 mg/dL    Triglycerides 86 <150 mg/dL    HDL Cholesterol 34 23 - 92 mg/dL    LDL Cholesterol Calculated 41 <100 mg/dL    Non HDL Cholesterol 58 <130 mg/dL   Thyrotropin GH   Result Value Ref Range    Thyrotropin 0.32 (L) 0.34 - 5.60 IU/mL        Return as needed for follow-up with Dr. Barnett.    Clinic : [phone]  Appointment line: [phone]            Follow-ups after your visit        Additional Services     NEUROLOGY ADULT REFERRAL       Your provider has referred you for the following:    EMG at [facility] - Dr. Mark  [address] [facility] [address]  Phone: [phone]    Please be aware that coverage of these services is subject to the terms and limitations of your health insurance plan.  Call member services at your health plan with any benefit or coverage questions.      Please bring the following with you to your appointment:    (1) Any X-Rays, CTs or MRIs which have been performed.  Contact the facility where they were done to arrange for  prior to your scheduled appointment.    (2) List of current medications  (3) This referral request   (4) Any documents/labs given to you for this referral                  Your next 10 appointments already scheduled     Dec 10, 2018  2:15 PM CST   [facility]    [address] [facility] [address] [phone]               Who to contact     If you have questions or need follow up information about today's clinic visit or your schedule please contact [facility] directly at [phone].  Normal or non-critical lab and imaging results will be communicated to you by MyChart, letter or phone within 4 business days after the clinic has received the results. If you do not hear from us within 7 days, please contact the clinic through MyChart or phone. If you have a critical or abnormal lab result, we will notify you by phone as soon as possible.  Submit refill requests through [employer] or call your pharmacy and they will forward the refill request to us. Please allow 3 business days for your refill to be completed.          Additional Information About Your Visit        Care EveryWhere ID     This is your Care EveryWhere ID. This could be used by other organizations to access your [facility] medical records  [id number]        Your Vitals Were     Pulse BMI (Body Mass Index)                72 29.47 kg/m2           Blood Pressure from Last 3 Encounters:   11/27/18 138/88   09/19/18 148/80   08/09/18 144/84    Weight from Last 3 Encounters:   11/27/18 201 lb (91.2 kg)   09/19/18 205 lb 2 oz (93 kg)   08/09/18 203 lb (92.1 kg)              We Performed the Following     NEUROLOGY ADULT REFERRAL          Today's Medication Changes          These changes are accurate as of 11/27/18  5:02 PM.  If you have any questions, ask your nurse or doctor.               Start taking these medicines.        Dose/Directions    irbesartan 150 MG tablet   Commonly known as:  AVAPRO   Used for:  Benign essential hypertension   Started by:  Jorge Barnett MD        Dose:  150-300 mg   Take 1-2 tablets (150-300 mg) by mouth daily -- STOP Losartan -- needs 2 smaller pills daily   Quantity:  180 tablet   Refills:  3         Stop taking these medicines if you haven't already. Please contact your care team if you have questions.      losartan 50 MG tablet   Commonly known as:  COZAAR   Stopped by:  Jorge Barnett MD                Where to get your medicines      These medications were sent to [facility] [address] [facility] [address] [address]     Phone:  [phone]     furosemide 40 MG tablet    hydrALAZINE 25 MG tablet    irbesartan 150 MG tablet    metoprolol succinate 50 MG 24 hr tablet    ranitidine 150 MG tablet                Primary Care Provider Office Phone # Fax #    Jorge Barnett [phone]       [address] [facility] [address]        Equal Access to Services     [facility]: Hadii aad rubio hadasho Soomaali, waaxda luqadaha, qaybta kaalmada adejunioryada, aries lu . So [facility] [phone].    ATENCIÓN: Si habla español, tiene a hsu disposición servicios gratuitos de asistencia lingüística. [facility] [phone].    We comply with applicable federal civil rights laws and [place] laws. We do not discriminate on the basis of race, color, national origin, age, disability, sex, sexual orientation, or gender identity.            Thank you!     Thank you for choosing [facility]  for your care. Our goal is always to provide you with excellent care. Hearing back from our patients is one way we can continue to improve our services. Please take a few minutes to complete the written survey that you may receive in the mail after your visit with us. Thank you!             Your Updated Medication List - Protect others around you: Learn how to safely use, store and throw away your medicines at www.disposemymeds.org.          This list is accurate as of 11/27/18  5:02 PM.  Always use your most recent med list.                   Brand Name Dispense Instructions for use Diagnosis    albuterol 108 (90 Base) MCG/ACT inhaler    PROAIR HFA/PROVENTIL HFA/VENTOLIN HFA    1 Inhaler    Inhale 1-2 puffs into the lungs  every 4 hours as needed for shortness of breath / dyspnea or wheezing    Flu-like symptoms, Cough       clopidogrel 75 MG tablet    PLAVIX    90 tablet    Take 1 tablet (75 mg) by mouth daily    Atherosclerosis of native coronary artery of native heart with stable angina pectoris (H)       [employer] ALCOHOL SWABS Pads      For home use.        desoximetasone 0.25 % external cream    TOPICORT     Apply 1 Application topically 2 times daily        dextroamphetamine 10 MG tablet    DEXTROSTAT    360 tablet    Take 1 tablet (10 mg) by mouth 4 times daily    Primary narcolepsy without cataplexy, Pain medication agreement       diclofenac 75 MG EC tablet    VOLTAREN     Take 1 tablet by mouth 4 times daily        docusate sodium 100 MG capsule    COLACE     Take 1-2 capsules by mouth 2 times daily as needed for constipation prevention        FISH OIL PO      Take  by mouth.        flaxseed oil 1000 MG Caps      Take  by mouth.        furosemide 40 MG tablet    LASIX    90 tablet    Take 2-4 tablets ( mg) by mouth daily Or as instructed for leg swelling    Chronic diastolic heart failure (H)       GLUCOCOM BLOOD GLUCOSE MONITOR Cecile      Dispense glucose meter, test strips and lancets covered by the patient insurance. Test 10 times per day.        GOODSENSE ASPIRIN 325 MG tablet   Generic drug:  aspirin      Take  by mouth.        humaLOG 100 UNIT/ML vial   Generic drug:  insulin lispro     180 mL    INJECT UNDER THE SKIN USING INSULIN PUMP. MAX DAILY DOSE  UNITS    Controlled type 2 diabetes mellitus with stage 3 chronic kidney disease, with long-term current use of insulin (H)       hydrALAZINE 25 MG tablet    APRESOLINE    360 tablet    Take 1-2 tablets (25-50 mg) by mouth 4 times daily - Take lowest effective dose for blood pressure management    CKD (chronic kidney disease) stage 3, GFR 30-59 ml/min (H)       HYDROcodone-acetaminophen 5-325 MG tablet    NORCO    60 tablet    Take 1 tablet by mouth every 6 hours  as needed for severe pain    Radicular low back pain, Degenerative disc disease, cervical, Pain medication agreement       ipratropium 17 MCG/ACT inhaler    ATROVENT HFA     Inhale 2 puffs into the lungs 4 times daily        irbesartan 150 MG tablet    AVAPRO    180 tablet    Take 1-2 tablets (150-300 mg) by mouth daily -- STOP Losartan -- needs 2 smaller pills daily    Benign essential hypertension       levothyroxine 125 MCG tablet    SYNTHROID/LEVOTHROID    90 tablet    TAKE ONE TABLET BY MOUTH ONCE DAILY IN THE MORNING    Hypothyroidism due to acquired atrophy of thyroid       methylphenidate 10 MG tablet    RITALIN    360 tablet    Take 1 tablet (10 mg) by mouth 4 times daily    Primary narcolepsy without cataplexy, Pain medication agreement       metoprolol succinate 50 MG 24 hr tablet    TOPROL-XL    180 tablet    Take 1 tablet (50 mg) by mouth 2 times daily    CKD (chronic kidney disease) stage 3, GFR 30-59 ml/min (H)       nitroGLYcerin 0.4 MG sublingual tablet    NITROSTAT     Place 1 tablet under the tongue every 5 minutes as needed for chest pain        ONETOUCH ULTRA test strip   Generic drug:  blood glucose monitoring      Dispense test strips covered by the patient insurance. Test 10 times per day.        Pen Needles 29G X 12MM Misc      For administering insulin at home.        PSYLLIUM PO      Take 1 tsp. by mouth 3 times daily - for constipation prevention        ranitidine 150 MG tablet    ZANTAC    180 tablet    Take 1 tablet (150 mg) by mouth 2 times daily    Gastroesophageal reflux disease, esophagitis presence not specified       ranolazine 500 MG 12 hr tablet    RANEXA    360 tablet    Take 2 tablets (1,000 mg) by mouth 2 times daily - cancel other Rx - give 3 month supply    Atherosclerosis of native coronary artery of native heart with stable angina pectoris (H)       rosuvastatin 10 MG tablet    CRESTOR    180 tablet    TAKE ONE TABLET BY MOUTH TWICE DAILY    Mixed hyperlipidemia        Saline Gel      Spray 1 spray in nostril every hour as needed For Nasal Dryness        Solution Administration Set Misc      As directed.        thin lancets    NO BRAND SPECIFIED     Test 10 times per day.        tiZANidine 2 MG tablet    ZANAFLEX     Take 1-2 tablets by mouth every 6 hours as needed for muscle spasms

## 2018-11-27 NOTE — NURSING NOTE
"Patient presents to the clinic for medication management.  Patient express concerns about elevated blood pressure readings over the past couple of days.  Patient express concerns about right hand numbness on and off over the past month.  Last administration of Ritalin was yesterday around 1800, Dextrostat was yesterday around 1500/1600.  Norco was administered this am due to headache from elevated blood pressure.  Contract updated on 8-9-18.    Previous A1C is at goal of <8  Lab Results   Component Value Date    A1C 7.5 11/21/2018    A1C 8.0 08/09/2018    A1C 7.8 05/03/2018     Urine microalbumin:creatine: n/a  Foot exam unknown-declines today  Eye exam 02/2018    Tobacco User no  Patient is on a daily aspirin  Patient is on a Statin.  Blood pressure today of:     BP Readings from Last 1 Encounters:   09/19/18 148/80      is not at the goal of <139/89 for diabetics.    Chief Complaint   Patient presents with     Recheck Medication       Initial There were no vitals taken for this visit. Estimated body mass index is 30.07 kg/(m^2) as calculated from the following:    Height as of 9/19/17: 5' 9.25\" (1.759 m).    Weight as of 9/19/18: 205 lb 2 oz (93 kg).  Medication Reconciliation: complete    Barbara Connor LPN          "

## 2018-11-28 ASSESSMENT — ANXIETY QUESTIONNAIRES: GAD7 TOTAL SCORE: 0

## 2018-12-10 ENCOUNTER — OFFICE VISIT (OUTPATIENT)
Dept: UROLOGY | Facility: OTHER | Age: 68
End: 2018-12-10
Attending: UROLOGY
Payer: COMMERCIAL

## 2018-12-10 ENCOUNTER — TELEPHONE (OUTPATIENT)
Dept: INTERNAL MEDICINE | Facility: OTHER | Age: 68
End: 2018-12-10

## 2018-12-10 VITALS — BODY MASS INDEX: 29.47 KG/M2 | WEIGHT: 201 LBS | HEART RATE: 60 BPM

## 2018-12-10 DIAGNOSIS — R20.0 BILATERAL ARM NUMBNESS AND TINGLING WHILE SLEEPING: ICD-10-CM

## 2018-12-10 DIAGNOSIS — N52.9 ERECTILE DYSFUNCTION, UNSPECIFIED ERECTILE DYSFUNCTION TYPE: Primary | ICD-10-CM

## 2018-12-10 DIAGNOSIS — R20.0 RIGHT ARM NUMBNESS: ICD-10-CM

## 2018-12-10 DIAGNOSIS — M54.41 ACUTE BILATERAL LOW BACK PAIN WITH BILATERAL SCIATICA: Primary | ICD-10-CM

## 2018-12-10 DIAGNOSIS — R20.2 BILATERAL ARM NUMBNESS AND TINGLING WHILE SLEEPING: ICD-10-CM

## 2018-12-10 DIAGNOSIS — M54.42 ACUTE BILATERAL LOW BACK PAIN WITH BILATERAL SCIATICA: Primary | ICD-10-CM

## 2018-12-10 PROCEDURE — G0463 HOSPITAL OUTPT CLINIC VISIT: HCPCS

## 2018-12-10 PROCEDURE — 99213 OFFICE O/P EST LOW 20 MIN: CPT | Performed by: UROLOGY

## 2018-12-10 ASSESSMENT — PAIN SCALES - GENERAL: PAINLEVEL: SEVERE PAIN (6)

## 2018-12-10 NOTE — PROGRESS NOTES
"Type of Visit  EST    Chief Complaint  ED    HPI  Mr. Whittaker is a 68 y.o. male with history of ED using intracavernosal injections.  Using Trimix VI, 0.42mL per dose with good results.  Starting using Trimix 3 years ago.  No erections over 4 hours.  He has no new concerns or questions today.  He is due for a refill.  He does keep it in the fridge.      Review of Systems  I personally reviewed the ROS with the patient.    Nursing Notes:   Anny Laurent LPN  12/10/2018  3:04 PM  Signed  Chief Complaint   Patient presents with     Follow Up     one year F/U  on Trimix        Initial Pulse 60   Wt 91.2 kg (201 lb)   BMI 29.47 kg/m    Estimated body mass index is 29.47 kg/m  as calculated from the following:    Height as of 9/19/17: 1.759 m (5' 9.25\").    Weight as of this encounter: 91.2 kg (201 lb).  Medication Reconciliation: complete    Anny Laurent LPN     Review of Systems:    Weight loss:    YES    Recent fever/chills:  No   Night sweats:   YES  Current skin rash:  YES   Recent hair loss:  No  Heat intolerance:  No   Cold intolerance:  YES  Chest pain:   No   Palpitations:   No  Shortness of breath:  No   Wheezing:   No  Constipation:    No   Diarrhea:   No   Nausea:   No   Vomiting:   No   Kidney/side pain:  YES   Back pain:   YES  Frequent headaches:  YES   Dizziness:     No  Leg swelling:   YES   Calf pain:    YES      Physical Exam  Pulse 60   Wt 91.2 kg (201 lb)   BMI 29.47 kg/m    Constitutional: No acute distress.  Alert and cooperative   Head: NCAT  Eyes: Conjunctivae normal  Cardiovascular: Regular rate.  Pulmonary/Chest: Respirations are even and non-labored bilaterally, no audible wheezing  Abdominal: Soft. No distension, tenderness, masses or guarding.   Neurological: A + O x 3.  Cranial Nerves II-XII grossly intact.  Extremities: JAGDISH x 4, Warm. No clubbing.  No cyanosis.    Skin: Pink, warm and dry.  No visible rashes noted.  Psychiatric:  Normal mood and affect  Back:  No left CVA " tenderness.  No right CVA tenderness.  Genitourinary:  Nonpalpable bladder  :  Prostate 30-40g, no nodules, symmetric    Labs  CREATININE (mg/dL)   Date Value   01/05/2018 1.63 (H)     Assessment  Mr. Whittaker is a 68 y.o. male who follows up with ED doing well with Trimix    Plan  Continue Trimix VI, 0.42mL per dose for ED.

## 2018-12-10 NOTE — NURSING NOTE
"Chief Complaint   Patient presents with     Follow Up     one year F/U  on Trimix        Initial Pulse 60   Wt 91.2 kg (201 lb)   BMI 29.47 kg/m   Estimated body mass index is 29.47 kg/m  as calculated from the following:    Height as of 9/19/17: 1.759 m (5' 9.25\").    Weight as of this encounter: 91.2 kg (201 lb).  Medication Reconciliation: complete    Anny Laurent LPN     Review of Systems:    Weight loss:    YES    Recent fever/chills:  No   Night sweats:   YES  Current skin rash:  YES   Recent hair loss:  No  Heat intolerance:  No   Cold intolerance:  YES  Chest pain:   No   Palpitations:   No  Shortness of breath:  No   Wheezing:   No  Constipation:    No   Diarrhea:   No   Nausea:   No   Vomiting:   No   Kidney/side pain:  YES   Back pain:   YES  Frequent headaches:  YES   Dizziness:     No  Leg swelling:   YES   Calf pain:    YES      "

## 2018-12-10 NOTE — TELEPHONE ENCOUNTER
EMG ordered of both legs, right arm  - they will call with date/time of appointment.     Jorge Barnett MD

## 2018-12-11 NOTE — TELEPHONE ENCOUNTER
Pt is also requesting EMG done on his left arm too. Order pending with previous request added.  Rowena Akbar

## 2018-12-27 ENCOUNTER — TELEPHONE (OUTPATIENT)
Dept: INTERNAL MEDICINE | Facility: OTHER | Age: 68
End: 2018-12-27

## 2018-12-27 ENCOUNTER — OFFICE VISIT (OUTPATIENT)
Dept: INTERNAL MEDICINE | Facility: OTHER | Age: 68
End: 2018-12-27
Attending: INTERNAL MEDICINE
Payer: MEDICARE

## 2018-12-27 VITALS
TEMPERATURE: 98.2 F | DIASTOLIC BLOOD PRESSURE: 88 MMHG | BODY MASS INDEX: 29.47 KG/M2 | WEIGHT: 201 LBS | HEART RATE: 72 BPM | SYSTOLIC BLOOD PRESSURE: 138 MMHG

## 2018-12-27 DIAGNOSIS — I50.32 CHRONIC DIASTOLIC HEART FAILURE (H): ICD-10-CM

## 2018-12-27 DIAGNOSIS — N18.30 CONTROLLED TYPE 2 DIABETES MELLITUS WITH STAGE 3 CHRONIC KIDNEY DISEASE, WITH LONG-TERM CURRENT USE OF INSULIN (H): ICD-10-CM

## 2018-12-27 DIAGNOSIS — E11.22 CONTROLLED TYPE 2 DIABETES MELLITUS WITH STAGE 3 CHRONIC KIDNEY DISEASE, WITH LONG-TERM CURRENT USE OF INSULIN (H): ICD-10-CM

## 2018-12-27 DIAGNOSIS — N18.30 CKD (CHRONIC KIDNEY DISEASE) STAGE 3, GFR 30-59 ML/MIN (H): ICD-10-CM

## 2018-12-27 DIAGNOSIS — Z79.4 CONTROLLED TYPE 2 DIABETES MELLITUS WITH STAGE 3 CHRONIC KIDNEY DISEASE, WITH LONG-TERM CURRENT USE OF INSULIN (H): ICD-10-CM

## 2018-12-27 DIAGNOSIS — I10 ESSENTIAL HYPERTENSION: Primary | ICD-10-CM

## 2018-12-27 LAB
ANION GAP SERPL CALCULATED.3IONS-SCNC: 6 MMOL/L (ref 3–14)
BUN SERPL-MCNC: 26 MG/DL (ref 7–25)
CALCIUM SERPL-MCNC: 9.3 MG/DL (ref 8.6–10.3)
CHLORIDE SERPL-SCNC: 102 MMOL/L (ref 98–107)
CO2 SERPL-SCNC: 28 MMOL/L (ref 21–31)
CREAT SERPL-MCNC: 1.5 MG/DL (ref 0.7–1.3)
GFR SERPL CREATININE-BSD FRML MDRD: 47 ML/MIN/{1.73_M2}
GLUCOSE SERPL-MCNC: 192 MG/DL (ref 70–105)
POTASSIUM SERPL-SCNC: 3.8 MMOL/L (ref 3.5–5.1)
SODIUM SERPL-SCNC: 136 MMOL/L (ref 134–144)

## 2018-12-27 PROCEDURE — G0463 HOSPITAL OUTPT CLINIC VISIT: HCPCS

## 2018-12-27 PROCEDURE — 99214 OFFICE O/P EST MOD 30 MIN: CPT | Performed by: INTERNAL MEDICINE

## 2018-12-27 PROCEDURE — 36415 COLL VENOUS BLD VENIPUNCTURE: CPT | Performed by: INTERNAL MEDICINE

## 2018-12-27 PROCEDURE — 80048 BASIC METABOLIC PNL TOTAL CA: CPT | Performed by: INTERNAL MEDICINE

## 2018-12-27 RX ORDER — IRBESARTAN 150 MG/1
150 TABLET ORAL 2 TIMES DAILY
Qty: 180 TABLET | Refills: 3 | COMMUNITY
Start: 2018-12-27 | End: 2019-10-21

## 2018-12-27 ASSESSMENT — ENCOUNTER SYMPTOMS
HEADACHES: 1
CHEST TIGHTNESS: 0
ADENOPATHY: 0
HEMATURIA: 0
PALPITATIONS: 0
FEVER: 0
WOUND: 0
SHORTNESS OF BREATH: 0
CONFUSION: 0
BRUISES/BLEEDS EASILY: 0
ARTHRALGIAS: 1
CHILLS: 0
ABDOMINAL PAIN: 0
NECK STIFFNESS: 1
NECK PAIN: 1
BACK PAIN: 1

## 2018-12-27 ASSESSMENT — ANXIETY QUESTIONNAIRES
3. WORRYING TOO MUCH ABOUT DIFFERENT THINGS: NOT AT ALL
1. FEELING NERVOUS, ANXIOUS, OR ON EDGE: NOT AT ALL
7. FEELING AFRAID AS IF SOMETHING AWFUL MIGHT HAPPEN: NOT AT ALL
6. BECOMING EASILY ANNOYED OR IRRITABLE: NOT AT ALL
2. NOT BEING ABLE TO STOP OR CONTROL WORRYING: NOT AT ALL
IF YOU CHECKED OFF ANY PROBLEMS ON THIS QUESTIONNAIRE, HOW DIFFICULT HAVE THESE PROBLEMS MADE IT FOR YOU TO DO YOUR WORK, TAKE CARE OF THINGS AT HOME, OR GET ALONG WITH OTHER PEOPLE: NOT DIFFICULT AT ALL
5. BEING SO RESTLESS THAT IT IS HARD TO SIT STILL: NOT AT ALL
GAD7 TOTAL SCORE: 0

## 2018-12-27 ASSESSMENT — PATIENT HEALTH QUESTIONNAIRE - PHQ9
5. POOR APPETITE OR OVEREATING: NOT AT ALL
SUM OF ALL RESPONSES TO PHQ QUESTIONS 1-9: 0

## 2018-12-27 ASSESSMENT — PAIN SCALES - GENERAL: PAINLEVEL: SEVERE PAIN (7)

## 2018-12-27 NOTE — PATIENT INSTRUCTIONS
Blood pressures have been too high...    Go up to 300 mg daily of Irbesartan.     Increase Hydralazine to 50 mg -- 4 times daily.     Continue metoprolol at 50 mg twice daily for now.   -- if blood pressures get controlled... Could consider reducing Metoprolol to help with your slow / low pulse.     -- we can send in smaller metoprolol pills if needed.       Blood pressure checks at home - check some in AM, some in Afternoon, some in Evening and record   -- bring these with you to your next appointment.     Goal blood pressures -- less than 140 and less than 90.    -- Ideally would like the numbers about 110-130 and 70-80.  -- If running higher or lower than this on regular basis, will need to adjust your medications.       -- Call with update in 2 to 3 weeks.     Return as needed for follow-up with Dr. Barnett.    Clinic : [phone]  Appointment line: [phone]

## 2018-12-27 NOTE — PROGRESS NOTES
"Nursing Notes:   Barbara Connor LPN  12/27/2018  5:00 PM  Signed  Patient presents to the clinic for follow up with ongoing hypertension concerns.      Previous A1C is at goal of <8  Lab Results   Component Value Date    A1C 7.5 11/21/2018    A1C 8.0 08/09/2018    A1C 7.8 05/03/2018     Urine microalbumin:creatine: n/a  Foot exam unknown-declines today  Eye exam 2/2018    Tobacco User no  Patient is on a daily aspirin  Patient is on a Statin.  Blood pressure today of:     BP Readings from Last 1 Encounters:   12/27/18 162/74      is not at the goal of <139/89 for diabetics.    Chief Complaint   Patient presents with     Clinic Care Coordination - Follow-up       Initial /74 (BP Location: Right arm, Patient Position: Sitting, Cuff Size: Adult Regular)   Pulse 72   Temp 98.2  F (36.8  C) (Tympanic)   Wt 91.2 kg (201 lb)   BMI 29.47 kg/m    Estimated body mass index is 29.47 kg/m  as calculated from the following:    Height as of 9/19/17: 1.759 m (5' 9.25\").    Weight as of this encounter: 91.2 kg (201 lb).  Medication Reconciliation: complete    Barbara Connor LPN    Nursing note reviewed with patient.  Accuracy and completeness verified.   Mr. Whittaker is a 68 year old male who:  Patient presents with:  Clinic Care Coordination - Follow-up      ICD-10-CM    1. Essential hypertension I10 Basic Metabolic Panel     Basic Metabolic Panel   2. Controlled type 2 diabetes mellitus with stage 3 chronic kidney disease, with long-term current use of insulin (H) E11.22     N18.3     Z79.4    3. Chronic diastolic heart failure (H) I50.32    4. CKD (chronic kidney disease) stage 3, GFR 30-59 ml/min (H) N18.3      HPI  Patient presents for blood pressure follow-up.  At his last appointment we made some changes and advised that he increased his medications if his blood pressures were not to goal range at home.  Unfortunately he did not do this and has been running with the low doses of the medications since he was here " last.  States that his blood pressures at home have been up into the 170 systolic.    He has had pulse ranges into the mid 40s when he has been up and active.    Advised increased dose of his ARB, increase hydralazine.  If necessary can reduce metoprolol at that time.    Diabetes has been well controlled.  He does have stage III chronic kidney disease.  Recheck metabolic panel today.    Chronic diastolic heart failure, appears stable.  Has some chronic leg swelling.  Takes 40 mg of Lasix most days.  Rarely ever takes more than that.    Functional Capacity: about 4 METS.   No orthopnea/paroxysmal nocturnal dyspnea  Review of Systems   Constitutional: Negative for chills and fever.   HENT: Negative for congestion.    Eyes: Negative for visual disturbance.   Respiratory: Negative for chest tightness and shortness of breath.    Cardiovascular: Positive for chest pain and leg swelling. Negative for palpitations.        + claudication.   + intermittent Angina   Gastrointestinal: Negative for abdominal pain.   Genitourinary: Negative for hematuria.   Musculoskeletal: Positive for arthralgias, back pain, gait problem, neck pain and neck stiffness.   Skin: Negative for rash and wound.   Neurological: Positive for headaches. Negative for syncope.   Hematological: Negative for adenopathy. Does not bruise/bleed easily.   Psychiatric/Behavioral: Negative for confusion.        REMINGTON:   REMINGTON-7 SCORE 9/19/2018 11/27/2018 12/27/2018   Total Score 0 0 0     PHQ9:  PHQ-9 SCORE 9/19/2018 11/27/2018 12/27/2018   PHQ-9 Total Score 0 0 0       I have personally reviewed the past medical history, past surgical history, medications, allergies, family and social history as listed below, on 12/27/2018.    Allergies   Allergen Reactions     Famotidine Other (See Comments)     + Caused Headache and Rash -- tolerated Ranitidine and worked well.     Gabapentin      Other reaction(s): Mental Status Change     Hydrocortisone Other (See Comments)      Burning and stinging sensation with Hydrocortisone - doesn't help itching or rash -- tolerated Desoximetasone cream and worked well.      Atorvastatin Hives     Lisinopril Cough     No Clinical Screening - See Comments Hives     Chlorine water     Norvasc [Amlodipine] Other (See Comments)     -- can't remember, didn't tolerate.      Pregabalin      Other reaction(s): Mental Status Change     Valdecoxib Swelling     Insulin Aspart Rash       Current Outpatient Medications   Medication Sig Dispense Refill     irbesartan (AVAPRO) 150 MG tablet Take 1 tablet (150 mg) by mouth 2 times daily -- needs 2 smaller pills daily 180 tablet 3     albuterol (PROAIR HFA/PROVENTIL HFA/VENTOLIN HFA) 108 (90 BASE) MCG/ACT Inhaler Inhale 1-2 puffs into the lungs every 4 hours as needed for shortness of breath / dyspnea or wheezing 1 Inhaler 1     aspirin (GOODSENSE ASPIRIN) 325 MG tablet Take  by mouth.       blood glucose monitoring (ONETOUCH ULTRA) test strip Dispense test strips covered by the patient insurance. Test 10 times per day.       Blood Glucose Monitoring Suppl ([employer] BLOOD GLUCOSE MONITOR) WAQAS Dispense glucose meter, test strips and lancets covered by the patient insurance. Test 10 times per day.       clopidogrel (PLAVIX) 75 MG tablet Take 1 tablet (75 mg) by mouth daily 90 tablet 3     [employer] ALCOHOL SWABS PADS For home use.       desoximetasone (TOPICORT) 0.25 % cream Apply 1 Application topically 2 times daily       dextroamphetamine (DEXTROSTAT) 10 MG tablet Take 1 tablet (10 mg) by mouth 4 times daily 360 tablet 0     diclofenac (VOLTAREN) 75 MG EC tablet Take 1 tablet by mouth 4 times daily       docusate sodium (COLACE) 100 MG capsule Take 1-2 capsules by mouth 2 times daily as needed for constipation prevention       Flaxseed, Linseed, (FLAXSEED OIL) 1000 MG CAPS Take  by mouth.       furosemide (LASIX) 40 MG tablet Take 2-4 tablets ( mg) by mouth daily Or as instructed for leg swelling 90 tablet 3      HUMALOG 100 UNIT/ML injection INJECT UNDER THE SKIN USING INSULIN PUMP. MAX DAILY DOSE  UNITS 180 mL 3     hydrALAZINE (APRESOLINE) 25 MG tablet Take 1-2 tablets (25-50 mg) by mouth 4 times daily - Take lowest effective dose for blood pressure management 360 tablet 11     HYDROcodone-acetaminophen (NORCO) 5-325 MG per tablet Take 1 tablet by mouth every 6 hours as needed for severe pain 60 tablet 0     Insulin Pen Needle (PEN NEEDLES) 29G X 12MM MISC For administering insulin at home.       ipratropium (ATROVENT HFA) 17 MCG/ACT Inhaler Inhale 2 puffs into the lungs 4 times daily       IV Sets-Tubing (SOLUTION ADMINISTRATION SET) MISC As directed.       levothyroxine (SYNTHROID/LEVOTHROID) 125 MCG tablet TAKE ONE TABLET BY MOUTH ONCE DAILY IN THE MORNING 90 tablet 2     methylphenidate (RITALIN) 10 MG tablet Take 1 tablet (10 mg) by mouth 4 times daily 360 tablet 0     metoprolol succinate (TOPROL-XL) 50 MG 24 hr tablet Take 1 tablet (50 mg) by mouth 2 times daily 180 tablet 3     nitroGLYcerin (NITROSTAT) 0.4 MG sublingual tablet Place 1 tablet under the tongue every 5 minutes as needed for chest pain       Omega-3 Fatty Acids (FISH OIL PO) Take  by mouth.       PSYLLIUM PO Take 1 tsp. by mouth 3 times daily - for constipation prevention       ranitidine (ZANTAC) 150 MG tablet Take 1 tablet (150 mg) by mouth 2 times daily 180 tablet 3     ranolazine (RANEXA) 500 MG 12 hr tablet Take 2 tablets (1,000 mg) by mouth 2 times daily - cancel other Rx - give 3 month supply 360 tablet 3     rosuvastatin (CRESTOR) 10 MG tablet TAKE ONE TABLET BY MOUTH TWICE DAILY 180 tablet 3     Saline GEL Spray 1 spray in nostril every hour as needed For Nasal Dryness       thin (NO BRAND SPECIFIED) lancets Test 10 times per day.       tiZANidine (ZANAFLEX) 2 MG tablet Take 1-2 tablets by mouth every 6 hours as needed for muscle spasms          Patient Active Problem List    Diagnosis Date Noted     Degeneration of cervical  intervertebral disc 01/31/2018     Priority: Medium     Hypothyroidism due to acquired atrophy of thyroid 01/31/2018     Priority: Medium     Mixed hyperlipidemia 01/31/2018     Priority: Medium     Primary narcolepsy without cataplexy 01/31/2018     Priority: Medium     Overview:   Total dose recommended by pulmonology he is dextro- amphetamine 40 mg plus   methylphenidate 40 mg in divided doses per day.  Total of 80 mg of short   acting amphetamines daily.       Osteoarthritis of spine 01/31/2018     Priority: Medium     Peptic ulcer disease 01/31/2018     Priority: Medium     Neuroforaminal stenosis of lumbar spine 01/03/2018     Priority: Medium     Radicular low back pain 01/03/2018     Priority: Medium     Acute bilateral low back pain with bilateral sciatica 10/27/2017     Priority: Medium     Chronic low back pain 08/08/2017     Priority: Medium     Intermittent constipation 08/08/2017     Priority: Medium     Skin rash 08/01/2017     Priority: Medium     Controlled type 2 diabetes mellitus with stage 3 chronic kidney disease, with long-term current use of insulin (H) 03/30/2017     Priority: Medium     Erectile dysfunction 09/26/2016     Priority: Medium     Trigger point with back pain 07/14/2016     Priority: Medium     Trigger point with neck pain 07/14/2016     Priority: Medium     Insulin pump in place 03/10/2016     Priority: Medium     Myofacial muscle pain 12/17/2015     Priority: Medium     Pain medication agreement - 8/9/2018 12/17/2015     Priority: Medium     Trigger point of extremity 12/17/2015     Priority: Medium     Greater trochanteric bursitis of left hip 06/24/2015     Priority: Medium     Blister of toe of right foot 12/09/2014     Priority: Medium     Cervical stenosis of spinal canal 06/17/2014     Priority: Medium     Degenerative disc disease, cervical 06/17/2014     Priority: Medium     Facet arthritis of cervical region (H) 06/17/2014     Priority: Medium     Cervical radicular  pain 06/05/2014     Priority: Medium     Left arm numbness 06/05/2014     Priority: Medium     Left atrial enlargement 01/23/2014     Priority: Medium     Chronic diastolic heart failure (H) 01/10/2014     Priority: Medium     Overview:   1/20/2014 ECHO FINAL IMPRESSION:   1. Normal left ventricular size and systolic function. Estimated ejection fraction 65%.   2. Mild increase in wall thickness of the ventricular septum with hypokinesis of the basilar segment of the ventricular septum and inferior wall.   3. Mild to moderate left atrial enlargement.   4. Trace tricuspid regurgitation.   5. Mild left ventricular diastolic dysfunction.        CKD (chronic kidney disease) stage 3, GFR 30-59 ml/min (H) 01/10/2014     Priority: Medium     Essential hypertension 01/10/2014     Priority: Medium     Myalgia 01/10/2014     Priority: Medium     Old inferior wall myocardial infarction 01/10/2014     Priority: Medium     Platelet inhibition due to Plavix 01/10/2014     Priority: Medium     S/P CABG x 2 01/10/2014     Priority: Medium     S/P coronary artery stent placement 01/10/2014     Priority: Medium     ACP (advance care planning) 12/05/2013     Priority: Medium     Painful diabetic neuropathy (H) 04/22/2013     Priority: Medium     Esophageal reflux 05/17/2012     Priority: Medium     Obesity 05/17/2012     Priority: Medium     Diastasis of muscle 10/06/2011     Priority: Medium     Coronary atherosclerosis 09/08/2011     Priority: Medium     Psychosexual dysfunction with inhibited sexual excitement 06/30/2011     Priority: Medium     Angina pectoris (H) 12/10/2010     Priority: Medium     Edema 10/25/2010     Priority: Medium     Chest pain 02/15/2007     Priority: Medium     Overview:   IMO Update 10/11       Atherosclerosis of native coronary artery of native heart with stable angina pectoris (H) 02/15/2007     Priority: Medium     Overview:   IMO Update 10/11       Other specified forms of chronic ischemic heart  disease 02/15/2007     Priority: Medium     Peripheral vascular disease (H) 02/15/2007     Priority: Medium     Overview:   IMO Update 10/11       Postsurgical percutaneous transluminal coronary angioplasty status 02/15/2007     Priority: Medium     Overview:   IMO Update 10/11       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     Gastritis without bleeding     No Comments Provided     Heart disease     Multiple coronary stents     Narcolepsy without cataplexy     No Comments Provided     Other injury of unspecified body region, initial encounter (CODE)     11/2006,Right calf hematoma     Rheumatic fever without heart involvement     Rheumatic fever as a child without valvular heart disease     Past Surgical History:   Procedure Laterality Date     ANGIOPLASTY      12/00, 04/04/04,Repeat angioplasty with lt internal mammary to the lt anterior descending and lt radial artery from aorta to the diagonal.     ANGIOPLASTY      10/01,Angioplasty with 2 vessel CABG the following week from the lt internal mammary to the lt anterior descending and right radial free graft     ANGIOPLASTY      04/2003     ARTHROSCOPY SHOULDER ROTATOR CUFF REPAIR      02/06/2006,Left rotator cuff repair and bone spur removal by Dr. Giraldo in [place]     COLONOSCOPY      1999     COLONOSCOPY      01/08/2016,-- Dr. De La Cruz -- polypectomy     OTHER SURGICAL HISTORY      09/03,616440,IR ANGIOGRAM FEMORAL/EXTREMITY ([place]),Unremarkable angiogram at [facility]     OTHER SURGICAL HISTORY      10/05/2004,,PTCA,Angioplasty     OTHER SURGICAL HISTORY      02/2005,,PTCA,Angioplasty with stent.     OTHER SURGICAL HISTORY      03/02/2005,,PTCA,Angioplasty with stent.     OTHER  SURGICAL HISTORY      2005,,PTCA,Angioplasty without stent     OTHER SURGICAL HISTORY      2007,[id number],IR ANGIOGRAM FEMORAL/EXTREMITY ([place]),Unremarkable coronary angiogram at [facility].     OTHER SURGICAL HISTORY      1967,SUR38,APPENDECTOMY OPEN     RELEASE CARPAL TUNNEL      11/15/01,Right carpal tunnel release by Dr. Mace     RELEASE CARPAL TUNNEL      2004,Left carpal tunnel release     Social History     Socioeconomic History     Marital status:      Spouse name: None     Number of children: None     Years of education: None     Highest education level: None   Social Needs     Financial resource strain: None     Food insecurity - worry: None     Food insecurity - inability: None     Transportation needs - medical: None     Transportation needs - non-medical: None   Occupational History     None   Tobacco Use     Smoking status: Never Smoker     Smokeless tobacco: Never Used   Substance and Sexual Activity     Alcohol use: No     Alcohol/week: 0.0 oz     Drug use: No     Sexual activity: Yes     Partners: Female   Other Topics Concern     Parent/sibling w/ CABG, MI or angioplasty before 65F 55M? Not Asked   Social History Narrative    He is on Social Security Disability for coronary artery disease and cervical arthritis and disk disease.   Dax clay.  No tobacco.     Family History   Problem Relation Age of Onset     Heart Disease Mother         Heart Disease,Heart problems     Heart Disease Other         Heart Disease,Heart problems     Heart Disease Other         Heart Disease,Heart problems     Ankylosing Spondylitis Son         Ankylosing spondylitis,Ankylosing spondylitis     Other - See Comments Brother          with sudden death following shoulder surgery 2012 -- was off his Plavix for 10 days pre-operatively.     Ankylosing Spondylitis Daughter         Ankylosing spondylitis,-- Dx        EXAM:   Vitals:    18 1644 18 1645 18 1711   BP: 176/80 162/74  "138/88   BP Location: Left arm Right arm    Patient Position: Sitting Sitting    Cuff Size: Adult Regular Adult Regular    Pulse: 72     Temp: 98.2  F (36.8  C)     TempSrc: Tympanic     Weight: 91.2 kg (201 lb)         Current Pain Score: Severe Pain (7)     BP Readings from Last 3 Encounters:   12/27/18 138/88   11/27/18 138/88   09/19/18 148/80      Wt Readings from Last 3 Encounters:   12/27/18 91.2 kg (201 lb)   12/10/18 91.2 kg (201 lb)   11/27/18 91.2 kg (201 lb)      Estimated body mass index is 29.47 kg/m  as calculated from the following:    Height as of 9/19/17: 1.759 m (5' 9.25\").    Weight as of this encounter: 91.2 kg (201 lb).     Physical Exam   Constitutional: He is oriented to person, place, and time. He appears well-developed and well-nourished.   HENT:   Head: Normocephalic and atraumatic.   Eyes: No scleral icterus.   Cardiovascular: Normal rate and regular rhythm.   Pulmonary/Chest: Effort normal. He has no wheezes.   Abdominal: Soft. There is no tenderness.   Musculoskeletal: He exhibits edema.   Lymphadenopathy:     He has no cervical adenopathy.   Neurological: He is alert and oriented to person, place, and time.   Skin: Skin is warm and dry.   Psychiatric: He has a normal mood and affect.      Procedures   INVESTIGATIONS:  Results for orders placed or performed in visit on 12/27/18   Basic Metabolic Panel   Result Value Ref Range    Sodium 136 134 - 144 mmol/L    Potassium 3.8 3.5 - 5.1 mmol/L    Chloride 102 98 - 107 mmol/L    Carbon Dioxide 28 21 - 31 mmol/L    Anion Gap 6 3 - 14 mmol/L    Glucose 192 (H) 70 - 105 mg/dL    Urea Nitrogen 26 (H) 7 - 25 mg/dL    Creatinine 1.50 (H) 0.70 - 1.30 mg/dL    GFR Estimate 47 (L) >60 mL/min/[1.73_m2]    GFR Estimate If Black 56 (L) >60 mL/min/[1.73_m2]    Calcium 9.3 8.6 - 10.3 mg/dL       ASSESSMENT AND PLAN:  Problem List Items Addressed This Visit        Endocrine    Controlled type 2 diabetes mellitus with stage 3 chronic kidney disease, with " long-term current use of insulin (H)       Circulatory    Chronic diastolic heart failure (H)    Essential hypertension - Primary    Relevant Medications    irbesartan (AVAPRO) 150 MG tablet    Other Relevant Orders    Basic Metabolic Panel (Completed)       Urinary    CKD (chronic kidney disease) stage 3, GFR 30-59 ml/min (H)        reviewed diet, exercise and weight control, recommended sodium restriction, cardiovascular risk and specific lipid/LDL goals reviewed, specific diabetic recommendations low cholesterol diet, weight control and daily exercise discussed, use of aspirin to prevent MI and TIA's discussed  -- Expected clinical course discussed    -- Medications and their side effects discussed    Patient Instructions   Blood pressures have been too high...    Go up to 300 mg daily of Irbesartan.     Increase Hydralazine to 50 mg -- 4 times daily.     Continue metoprolol at 50 mg twice daily for now.   -- if blood pressures get controlled... Could consider reducing Metoprolol to help with your slow / low pulse.     -- we can send in smaller metoprolol pills if needed.       Blood pressure checks at home - check some in AM, some in Afternoon, some in Evening and record   -- bring these with you to your next appointment.     Goal blood pressures -- less than 140 and less than 90.    -- Ideally would like the numbers about 110-130 and 70-80.  -- If running higher or lower than this on regular basis, will need to adjust your medications.       -- Call with update in 2 to 3 weeks.     Return as needed for follow-up with Dr. Barnett.    Clinic : [phone]  Appointment line: [phone]      Jorge Barnett MD  Internal Medicine  [facility]     Portions of this note were dictated using speech recognition software. The note has been proofread but errors in the text may have been overlooked. Please contact me if there are any concerns regarding the accuracy of the dictation.

## 2018-12-27 NOTE — LETTER
[facility]  [address]  [place] [address]      2018      Moses Whittaker  [address] [address]          Dear Moses Whittaker,    Thank you for your interest in becoming a [employer] user.     Please access the [employer] web site at [email or web address].     Your access code is: [email or web address]  Expires: 2019  4:49 PM    If you allow your access code to , or if you have any questions please call the [employer] representative at your primary clinic during normal clinic hours.     Sincerely,  [facility]

## 2018-12-27 NOTE — LETTER
December 27, 2018    Moses Whittaker  [address] [address]      Dear Moses Whittaker,    The result of your recent tests are included below:    Creatinine has improved, back to your baseline.    Random blood sugar is quite high.    Results for orders placed or performed in visit on 12/27/18   Basic Metabolic Panel   Result Value Ref Range    Sodium 136 134 - 144 mmol/L    Potassium 3.8 3.5 - 5.1 mmol/L    Chloride 102 98 - 107 mmol/L    Carbon Dioxide 28 21 - 31 mmol/L    Anion Gap 6 3 - 14 mmol/L    Glucose 192 (H) 70 - 105 mg/dL    Urea Nitrogen 26 (H) 7 - 25 mg/dL    Creatinine 1.50 (H) 0.70 - 1.30 mg/dL    GFR Estimate 47 (L) >60 mL/min/[1.73_m2]    GFR Estimate If Black 56 (L) >60 mL/min/[1.73_m2]    Calcium 9.3 8.6 - 10.3 mg/dL       If you have any further questions or problems, please contact my office at [phone] and schedule an appointment.    Clinic : [phone]  Appointment line: [phone]     Thank you,    Jorge Barnett MD    Internal Medicine  [facility]     Reviewed and electronically signed by provider.

## 2018-12-29 ASSESSMENT — ANXIETY QUESTIONNAIRES: GAD7 TOTAL SCORE: 0

## 2019-01-04 ENCOUNTER — TELEPHONE (OUTPATIENT)
Dept: INTERNAL MEDICINE | Facility: OTHER | Age: 69
End: 2019-01-04

## 2019-01-04 DIAGNOSIS — G47.419 PRIMARY NARCOLEPSY WITHOUT CATAPLEXY: ICD-10-CM

## 2019-01-04 DIAGNOSIS — Z02.89 PAIN MEDICATION AGREEMENT: ICD-10-CM

## 2019-01-04 RX ORDER — METHYLPHENIDATE HYDROCHLORIDE 10 MG/1
10 TABLET ORAL 4 TIMES DAILY
Qty: 360 TABLET | Refills: 0 | Status: CANCELLED | OUTPATIENT
Start: 2019-01-04

## 2019-01-04 RX ORDER — DEXTROAMPHETAMINE SULFATE 10 MG/1
10 TABLET ORAL 4 TIMES DAILY
Qty: 360 TABLET | Refills: 0 | Status: CANCELLED | OUTPATIENT
Start: 2019-01-04

## 2019-01-04 NOTE — TELEPHONE ENCOUNTER
Spoke with patient who confirmed his last name and birth date. Patient is calling to request refills of his methylphenidate and dextroamphetamine, patient was in on 12/27 and reports he forgot to request refills. Patient states he is out of both prescriptions.   Shelby Lima LPN 1/4/2019 12:16 PM

## 2019-01-04 NOTE — TELEPHONE ENCOUNTER
Patient does still has scripts and has not tried to submit to pharmacy.  Patient will try and submit and if has issue will let us know   Kim Crisostomo LPN.......1/4/2019 3:41 PM

## 2019-01-07 ENCOUNTER — OFFICE VISIT (OUTPATIENT)
Dept: INTERNAL MEDICINE | Facility: OTHER | Age: 69
End: 2019-01-07
Attending: INTERNAL MEDICINE
Payer: COMMERCIAL

## 2019-01-07 ENCOUNTER — TELEPHONE (OUTPATIENT)
Dept: INTERNAL MEDICINE | Facility: OTHER | Age: 69
End: 2019-01-07

## 2019-01-07 VITALS
HEIGHT: 68 IN | WEIGHT: 197.38 LBS | BODY MASS INDEX: 29.91 KG/M2 | DIASTOLIC BLOOD PRESSURE: 80 MMHG | SYSTOLIC BLOOD PRESSURE: 162 MMHG | HEART RATE: 72 BPM

## 2019-01-07 DIAGNOSIS — Z79.4 CONTROLLED TYPE 2 DIABETES MELLITUS WITH STAGE 3 CHRONIC KIDNEY DISEASE, WITH LONG-TERM CURRENT USE OF INSULIN (H): ICD-10-CM

## 2019-01-07 DIAGNOSIS — Z02.89 PAIN MEDICATION AGREEMENT: ICD-10-CM

## 2019-01-07 DIAGNOSIS — I25.118 ATHEROSCLEROSIS OF NATIVE CORONARY ARTERY OF NATIVE HEART WITH STABLE ANGINA PECTORIS (H): ICD-10-CM

## 2019-01-07 DIAGNOSIS — G47.419 PRIMARY NARCOLEPSY WITHOUT CATAPLEXY: Primary | ICD-10-CM

## 2019-01-07 DIAGNOSIS — E11.22 CONTROLLED TYPE 2 DIABETES MELLITUS WITH STAGE 3 CHRONIC KIDNEY DISEASE, WITH LONG-TERM CURRENT USE OF INSULIN (H): ICD-10-CM

## 2019-01-07 DIAGNOSIS — N18.30 CONTROLLED TYPE 2 DIABETES MELLITUS WITH STAGE 3 CHRONIC KIDNEY DISEASE, WITH LONG-TERM CURRENT USE OF INSULIN (H): ICD-10-CM

## 2019-01-07 PROCEDURE — 99214 OFFICE O/P EST MOD 30 MIN: CPT | Performed by: INTERNAL MEDICINE

## 2019-01-07 PROCEDURE — G0463 HOSPITAL OUTPT CLINIC VISIT: HCPCS

## 2019-01-07 RX ORDER — DEXTROAMPHETAMINE SULFATE 10 MG/1
10 TABLET ORAL 4 TIMES DAILY
Qty: 360 TABLET | Refills: 0 | Status: SHIPPED | OUTPATIENT
Start: 2019-01-07 | End: 2019-03-28

## 2019-01-07 RX ORDER — METHYLPHENIDATE HYDROCHLORIDE 10 MG/1
10 TABLET ORAL 4 TIMES DAILY
Qty: 360 TABLET | Refills: 0 | Status: SHIPPED | OUTPATIENT
Start: 2019-01-07 | End: 2019-03-28

## 2019-01-07 ASSESSMENT — ANXIETY QUESTIONNAIRES
IF YOU CHECKED OFF ANY PROBLEMS ON THIS QUESTIONNAIRE, HOW DIFFICULT HAVE THESE PROBLEMS MADE IT FOR YOU TO DO YOUR WORK, TAKE CARE OF THINGS AT HOME, OR GET ALONG WITH OTHER PEOPLE: NOT DIFFICULT AT ALL
1. FEELING NERVOUS, ANXIOUS, OR ON EDGE: NOT AT ALL
6. BECOMING EASILY ANNOYED OR IRRITABLE: NOT AT ALL
5. BEING SO RESTLESS THAT IT IS HARD TO SIT STILL: NOT AT ALL
7. FEELING AFRAID AS IF SOMETHING AWFUL MIGHT HAPPEN: NOT AT ALL
3. WORRYING TOO MUCH ABOUT DIFFERENT THINGS: NOT AT ALL
GAD7 TOTAL SCORE: 0
2. NOT BEING ABLE TO STOP OR CONTROL WORRYING: NOT AT ALL

## 2019-01-07 ASSESSMENT — ENCOUNTER SYMPTOMS
PALPITATIONS: 0
CHEST TIGHTNESS: 0
WOUND: 0
NECK PAIN: 1
WHEEZING: 0
HEMATURIA: 0
ADENOPATHY: 0
HEADACHES: 1
ARTHRALGIAS: 1
ABDOMINAL PAIN: 0
BRUISES/BLEEDS EASILY: 0
NECK STIFFNESS: 1
CHILLS: 0
SHORTNESS OF BREATH: 1
FEVER: 0
COUGH: 1
BACK PAIN: 1
CONFUSION: 0

## 2019-01-07 ASSESSMENT — PAIN SCALES - GENERAL: PAINLEVEL: SEVERE PAIN (6)

## 2019-01-07 ASSESSMENT — MIFFLIN-ST. JEOR: SCORE: 1636.85

## 2019-01-07 ASSESSMENT — PATIENT HEALTH QUESTIONNAIRE - PHQ9
SUM OF ALL RESPONSES TO PHQ QUESTIONS 1-9: 0
5. POOR APPETITE OR OVEREATING: NOT AT ALL

## 2019-01-07 NOTE — NURSING NOTE
"Patient presents to the clinic for Detrostat and Ritalin refills, last administration was over 24 hours ago-patient ran out.  Contract updated on 8-9-18.    Previous A1C is at goal of <8  Lab Results   Component Value Date    A1C 7.5 11/21/2018    A1C 8.0 08/09/2018    A1C 7.8 05/03/2018     Urine microalbumin:creatine: n/a  Foot exam declines today  Eye exam yearly    Tobacco User no  Patient is on a daily aspirin  Patient is on a Statin.  Blood pressure today of:     BP Readings from Last 1 Encounters:   01/07/19 162/80      is not at the goal of <139/89 for diabetics.    Chief Complaint   Patient presents with     Recheck Medication       Initial /80 (BP Location: Right arm, Patient Position: Sitting, Cuff Size: Adult Regular)   Pulse 72   Ht 1.715 m (5' 7.5\")   Wt 89.5 kg (197 lb 6 oz)   BMI 30.46 kg/m   Estimated body mass index is 30.46 kg/m  as calculated from the following:    Height as of this encounter: 1.715 m (5' 7.5\").    Weight as of this encounter: 89.5 kg (197 lb 6 oz).  Medication Reconciliation: complete    Barbara Connor LPN        "

## 2019-01-07 NOTE — TELEPHONE ENCOUNTER
Patient is due for controlled substance refills, patient is currently out of medications.  Patient coming in today at 11 to see Jorge Barnett MD for medication refills.      Barbara Connor LPN 1/7/2019 10:21 AM

## 2019-01-07 NOTE — PROGRESS NOTES
"Nursing Notes:   Barbara Connor LPN  1/7/2019 11:17 AM  Signed  Patient presents to the clinic for Detrostat and Ritalin refills, last administration was over 24 hours ago-patient ran out.  Contract updated on 8-9-18.    Previous A1C is at goal of <8  Lab Results   Component Value Date    A1C 7.5 11/21/2018    A1C 8.0 08/09/2018    A1C 7.8 05/03/2018     Urine microalbumin:creatine: n/a  Foot exam declines today  Eye exam yearly    Tobacco User no  Patient is on a daily aspirin  Patient is on a Statin.  Blood pressure today of:     BP Readings from Last 1 Encounters:   01/07/19 162/80      is not at the goal of <139/89 for diabetics.    Chief Complaint   Patient presents with     Recheck Medication       Initial /80 (BP Location: Right arm, Patient Position: Sitting, Cuff Size: Adult Regular)   Pulse 72   Ht 1.715 m (5' 7.5\")   Wt 89.5 kg (197 lb 6 oz)   BMI 30.46 kg/m    Estimated body mass index is 30.46 kg/m  as calculated from the following:    Height as of this encounter: 1.715 m (5' 7.5\").    Weight as of this encounter: 89.5 kg (197 lb 6 oz).  Medication Reconciliation: complete    Barbara Connor LPN        Nursing note reviewed with patient.  Accuracy and completeness verified.   Mr. Whittaker is a 68 year old male who:  Patient presents with:  Recheck Medication      ICD-10-CM    1. Primary narcolepsy without cataplexy G47.419 dextroamphetamine (DEXTROSTAT) 10 MG tablet     methylphenidate (RITALIN) 10 MG tablet   2. Pain medication agreement - 8/9/2018 Z02.89 dextroamphetamine (DEXTROSTAT) 10 MG tablet     methylphenidate (RITALIN) 10 MG tablet   3. Atherosclerosis of native coronary artery of native heart with stable angina pectoris (H) I25.118    4. Controlled type 2 diabetes mellitus with stage 3 chronic kidney disease, with long-term current use of insulin (H) E11.22     N18.3     Z79.4      HPI  Patient presents for follow-up of medication management.    Narcolepsy without cataplexy, " chronic.  Overall has been doing quite well with his Dextrostat and Ritalin.  His last prescription refill was in October.  He thought he had another paper prescription at home that was printed out in November but he cannot find it and needs prescription refilled today.  He would like to just do one prescription at a time, 3-month supply, rather than having any extra prescriptions at home because he has a tendency to lose things.  [facility] website reviewed.  No abnormal findings noted.  Proper medication use misuse reviewed.  Patient has been using medications appropriately.  Prescriptions refilled times 3 months.    Coronary artery disease, has stable angina.  Medications have been helping.  Advised to continue.  Blood pressures are well controlled.    Type 2 diabetes, uses insulin.  No hypoglycemia issues.  Blood sugars have been controlled.    + URI symptoms started in past 4 days. Intermittent dry cough.  No fevers or chills.    Functional Capacity: about 4 METS.   Review of Systems   Constitutional: Negative for chills and fever.   HENT: Negative for congestion.    Eyes: Negative for visual disturbance.   Respiratory: Positive for cough and shortness of breath. Negative for chest tightness and wheezing.    Cardiovascular: Positive for chest pain and leg swelling. Negative for palpitations.        + claudication.   + intermittent Angina   Gastrointestinal: Negative for abdominal pain.   Genitourinary: Negative for hematuria.   Musculoskeletal: Positive for arthralgias, back pain, gait problem, neck pain and neck stiffness.   Skin: Negative for rash and wound.   Neurological: Positive for headaches. Negative for syncope.   Hematological: Negative for adenopathy. Does not bruise/bleed easily.   Psychiatric/Behavioral: Negative for confusion.      REMINGTON:   REMINGTON-7 SCORE 11/27/2018 12/27/2018 1/7/2019   Total Score 0 0 0     PHQ9:  PHQ-9 SCORE 11/27/2018 12/27/2018 1/7/2019   PHQ-9 Total Score 0 0 0       I have personally  reviewed the past medical history, past surgical history, medications, allergies, family and social history as listed below, on 1/7/2019.    Allergies   Allergen Reactions     Famotidine Other (See Comments)     + Caused Headache and Rash -- tolerated Ranitidine and worked well.     Gabapentin      Other reaction(s): Mental Status Change     Hydrocortisone Other (See Comments)     Burning and stinging sensation with Hydrocortisone - doesn't help itching or rash -- tolerated Desoximetasone cream and worked well.      Atorvastatin Hives     Lisinopril Cough     No Clinical Screening - See Comments Hives     Chlorine water     Norvasc [Amlodipine] Other (See Comments)     -- can't remember, didn't tolerate.      Pregabalin      Other reaction(s): Mental Status Change     Valdecoxib Swelling     Insulin Aspart Rash       Current Outpatient Medications   Medication Sig Dispense Refill     dextroamphetamine (DEXTROSTAT) 10 MG tablet Take 1 tablet (10 mg) by mouth 4 times daily 360 tablet 0     methylphenidate (RITALIN) 10 MG tablet Take 1 tablet (10 mg) by mouth 4 times daily 360 tablet 0     albuterol (PROAIR HFA/PROVENTIL HFA/VENTOLIN HFA) 108 (90 BASE) MCG/ACT Inhaler Inhale 1-2 puffs into the lungs every 4 hours as needed for shortness of breath / dyspnea or wheezing 1 Inhaler 1     aspirin (GOODSENSE ASPIRIN) 325 MG tablet Take  by mouth.       blood glucose monitoring (ONETOUCH ULTRA) test strip Dispense test strips covered by the patient insurance. Test 10 times per day.       Blood Glucose Monitoring Suppl (GLUCOCOM BLOOD GLUCOSE MONITOR) WAQAS Dispense glucose meter, test strips and lancets covered by the patient insurance. Test 10 times per day.       clopidogrel (PLAVIX) 75 MG tablet Take 1 tablet (75 mg) by mouth daily 90 tablet 3     [employer] ALCOHOL SWABS PADS For home use.       desoximetasone (TOPICORT) 0.25 % cream Apply 1 Application topically 2 times daily       diclofenac (VOLTAREN) 75 MG EC tablet Take 1  tablet by mouth 4 times daily       docusate sodium (COLACE) 100 MG capsule Take 1-2 capsules by mouth 2 times daily as needed for constipation prevention       Flaxseed, Linseed, (FLAXSEED OIL) 1000 MG CAPS Take  by mouth.       furosemide (LASIX) 40 MG tablet Take 2-4 tablets ( mg) by mouth daily Or as instructed for leg swelling 90 tablet 3     HUMALOG 100 UNIT/ML injection INJECT UNDER THE SKIN USING INSULIN PUMP. MAX DAILY DOSE  UNITS 180 mL 3     hydrALAZINE (APRESOLINE) 25 MG tablet Take 1-2 tablets (25-50 mg) by mouth 4 times daily - Take lowest effective dose for blood pressure management 360 tablet 11     HYDROcodone-acetaminophen (NORCO) 5-325 MG per tablet Take 1 tablet by mouth every 6 hours as needed for severe pain 60 tablet 0     Insulin Pen Needle (PEN NEEDLES) 29G X 12MM MISC For administering insulin at home.       ipratropium (ATROVENT HFA) 17 MCG/ACT Inhaler Inhale 2 puffs into the lungs 4 times daily       irbesartan (AVAPRO) 150 MG tablet Take 1 tablet (150 mg) by mouth 2 times daily -- needs 2 smaller pills daily 180 tablet 3     IV Sets-Tubing (SOLUTION ADMINISTRATION SET) MISC As directed.       levothyroxine (SYNTHROID/LEVOTHROID) 125 MCG tablet TAKE ONE TABLET BY MOUTH ONCE DAILY IN THE MORNING 90 tablet 2     metoprolol succinate (TOPROL-XL) 50 MG 24 hr tablet Take 1 tablet (50 mg) by mouth 2 times daily 180 tablet 3     nitroGLYcerin (NITROSTAT) 0.4 MG sublingual tablet Place 1 tablet under the tongue every 5 minutes as needed for chest pain       Omega-3 Fatty Acids (FISH OIL PO) Take  by mouth.       PSYLLIUM PO Take 1 tsp. by mouth 3 times daily - for constipation prevention       ranitidine (ZANTAC) 150 MG tablet Take 1 tablet (150 mg) by mouth 2 times daily 180 tablet 3     ranolazine (RANEXA) 500 MG 12 hr tablet Take 2 tablets (1,000 mg) by mouth 2 times daily - cancel other Rx - give 3 month supply 360 tablet 3     rosuvastatin (CRESTOR) 10 MG tablet TAKE ONE TABLET  BY MOUTH TWICE DAILY 180 tablet 3     Saline GEL Spray 1 spray in nostril every hour as needed For Nasal Dryness       thin (NO BRAND SPECIFIED) lancets Test 10 times per day.       tiZANidine (ZANAFLEX) 2 MG tablet Take 1-2 tablets by mouth every 6 hours as needed for muscle spasms          Patient Active Problem List    Diagnosis Date Noted     Degeneration of cervical intervertebral disc 01/31/2018     Priority: Medium     Hypothyroidism due to acquired atrophy of thyroid 01/31/2018     Priority: Medium     Mixed hyperlipidemia 01/31/2018     Priority: Medium     Primary narcolepsy without cataplexy 01/31/2018     Priority: Medium     Overview:   Total dose recommended by pulmonology he is dextro- amphetamine 40 mg plus   methylphenidate 40 mg in divided doses per day.  Total of 80 mg of short   acting amphetamines daily.       Osteoarthritis of spine 01/31/2018     Priority: Medium     Peptic ulcer disease 01/31/2018     Priority: Medium     Neuroforaminal stenosis of lumbar spine 01/03/2018     Priority: Medium     Radicular low back pain 01/03/2018     Priority: Medium     Acute bilateral low back pain with bilateral sciatica 10/27/2017     Priority: Medium     Chronic low back pain 08/08/2017     Priority: Medium     Intermittent constipation 08/08/2017     Priority: Medium     Skin rash 08/01/2017     Priority: Medium     Controlled type 2 diabetes mellitus with stage 3 chronic kidney disease, with long-term current use of insulin (H) 03/30/2017     Priority: Medium     Erectile dysfunction 09/26/2016     Priority: Medium     Trigger point with back pain 07/14/2016     Priority: Medium     Trigger point with neck pain 07/14/2016     Priority: Medium     Insulin pump in place 03/10/2016     Priority: Medium     Myofacial muscle pain 12/17/2015     Priority: Medium     Pain medication agreement - 8/9/2018 12/17/2015     Priority: Medium     Trigger point of extremity 12/17/2015     Priority: Medium     Greater  trochanteric bursitis of left hip 06/24/2015     Priority: Medium     Blister of toe of right foot 12/09/2014     Priority: Medium     Cervical stenosis of spinal canal 06/17/2014     Priority: Medium     Degenerative disc disease, cervical 06/17/2014     Priority: Medium     Facet arthritis of cervical region (H) 06/17/2014     Priority: Medium     Cervical radicular pain 06/05/2014     Priority: Medium     Left arm numbness 06/05/2014     Priority: Medium     Left atrial enlargement 01/23/2014     Priority: Medium     Chronic diastolic heart failure (H) 01/10/2014     Priority: Medium     Overview:   1/20/2014 ECHO FINAL IMPRESSION:   1. Normal left ventricular size and systolic function. Estimated ejection fraction 65%.   2. Mild increase in wall thickness of the ventricular septum with hypokinesis of the basilar segment of the ventricular septum and inferior wall.   3. Mild to moderate left atrial enlargement.   4. Trace tricuspid regurgitation.   5. Mild left ventricular diastolic dysfunction.        CKD (chronic kidney disease) stage 3, GFR 30-59 ml/min (H) 01/10/2014     Priority: Medium     Essential hypertension 01/10/2014     Priority: Medium     Myalgia 01/10/2014     Priority: Medium     Old inferior wall myocardial infarction 01/10/2014     Priority: Medium     Platelet inhibition due to Plavix 01/10/2014     Priority: Medium     S/P CABG x 2 01/10/2014     Priority: Medium     S/P coronary artery stent placement 01/10/2014     Priority: Medium     ACP (advance care planning) 12/05/2013     Priority: Medium     Painful diabetic neuropathy (H) 04/22/2013     Priority: Medium     Esophageal reflux 05/17/2012     Priority: Medium     Obesity 05/17/2012     Priority: Medium     Diastasis of muscle 10/06/2011     Priority: Medium     Coronary atherosclerosis 09/08/2011     Priority: Medium     Psychosexual dysfunction with inhibited sexual excitement 06/30/2011     Priority: Medium     Angina pectoris (H)  12/10/2010     Priority: Medium     Edema 10/25/2010     Priority: Medium     Chest pain 02/15/2007     Priority: Medium     Overview:   IMO Update 10/11       Atherosclerosis of native coronary artery of native heart with stable angina pectoris (H) 02/15/2007     Priority: Medium     Overview:   IMO Update 10/11       Other specified forms of chronic ischemic heart disease 02/15/2007     Priority: Medium     Peripheral vascular disease (H) 02/15/2007     Priority: Medium     Overview:   IMO Update 10/11       Postsurgical percutaneous transluminal coronary angioplasty status 02/15/2007     Priority: Medium     Overview:   IMO Update 10/11       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     Gastritis without bleeding     No Comments Provided     Heart disease     Multiple coronary stents     Narcolepsy without cataplexy     No Comments Provided     Other injury of unspecified body region, initial encounter (CODE)     11/2006,Right calf hematoma     Rheumatic fever without heart involvement     Rheumatic fever as a child without valvular heart disease     Past Surgical History:   Procedure Laterality Date     ANGIOPLASTY      12/00, 04/04/04,Repeat angioplasty with lt internal mammary to the lt anterior descending and lt radial artery from aorta to the diagonal.     ANGIOPLASTY      10/01,Angioplasty with 2 vessel CABG the following week from the lt internal mammary to the lt anterior descending and right radial free graft     ANGIOPLASTY      04/2003     ARTHROSCOPY SHOULDER ROTATOR CUFF REPAIR      02/06/2006,Left rotator cuff repair and bone spur removal by Dr. Giraldo in [place]     COLONOSCOPY      1999     COLONOSCOPY  01/08/2016     01/08/2016,-- Dr. De La Cruz -- polypectomy     OTHER SURGICAL HISTORY      09/03,[id number],IR ANGIOGRAM FEMORAL/EXTREMITY ([place]),Unremarkable angiogram at [facility]     OTHER SURGICAL HISTORY      10/05/2004,,PTCA,Angioplasty     OTHER SURGICAL HISTORY      02/2005,,PTCA,Angioplasty with stent.     OTHER SURGICAL HISTORY      03/02/2005,,PTCA,Angioplasty with stent.     OTHER SURGICAL HISTORY      09/23/2005,,PTCA,Angioplasty without stent     OTHER SURGICAL HISTORY      2/26/2007,[id number],IR ANGIOGRAM FEMORAL/EXTREMITY ([place]),Unremarkable coronary angiogram at [facility].     OTHER SURGICAL HISTORY      1967,SUR38,APPENDECTOMY OPEN     RELEASE CARPAL TUNNEL      11/15/01,Right carpal tunnel release by Dr. Mace     RELEASE CARPAL TUNNEL      01/2004,Left carpal tunnel release     Social History     Socioeconomic History     Marital status:      Spouse name: None     Number of children: None     Years of education: None     Highest education level: None   Social Needs     Financial resource strain: None     Food insecurity - worry: None     Food insecurity - inability: None     Transportation needs - medical: None     Transportation needs - non-medical: None   Occupational History     None   Tobacco Use     Smoking status: Never Smoker     Smokeless tobacco: Never Used   Substance and Sexual Activity     Alcohol use: No     Alcohol/week: 0.0 oz     Drug use: No     Sexual activity: Yes     Partners: Female   Other Topics Concern     Parent/sibling w/ CABG, MI or angioplasty before 65F 55M? Not Asked   Social History Narrative    He is on Social Security Disability for coronary artery disease and cervical arthritis and disk disease.   Dax clay.  No tobacco.     Family History   Problem Relation Age of Onset     Heart Disease Mother         Heart Disease,Heart problems     Heart Disease Other         Heart Disease,Heart problems     Heart Disease Other         Heart Disease,Heart problems     Ankylosing  "Spondylitis Son         Ankylosing spondylitis,Ankylosing spondylitis     Other - See Comments Brother          with sudden death following shoulder surgery 2012 -- was off his Plavix for 10 days pre-operatively.     Ankylosing Spondylitis Daughter         Ankylosing spondylitis,-- Dx 2017       EXAM:   Vitals:    19 1111   BP: 162/80   BP Location: Right arm   Patient Position: Sitting   Cuff Size: Adult Regular   Pulse: 72   Weight: 89.5 kg (197 lb 6 oz)   Height: 1.715 m (5' 7.5\")       Current Pain Score: Severe Pain (6)     BP Readings from Last 3 Encounters:   19 162/80   18 138/88   18 138/88      Wt Readings from Last 3 Encounters:   19 89.5 kg (197 lb 6 oz)   18 91.2 kg (201 lb)   12/10/18 91.2 kg (201 lb)      Estimated body mass index is 30.46 kg/m  as calculated from the following:    Height as of this encounter: 1.715 m (5' 7.5\").    Weight as of this encounter: 89.5 kg (197 lb 6 oz).     Physical Exam   Constitutional: He is oriented to person, place, and time. He appears well-developed and well-nourished.   HENT:   Head: Normocephalic and atraumatic.   Eyes: No scleral icterus.   Cardiovascular: Normal rate and regular rhythm.   Pulmonary/Chest: Effort normal. He has no wheezes.   + dry cough noted   Abdominal: Soft. There is no tenderness.   Musculoskeletal: He exhibits edema.   Lymphadenopathy:     He has no cervical adenopathy.   Neurological: He is alert and oriented to person, place, and time.   Skin: Skin is warm and dry.   Psychiatric: He has a normal mood and affect.      Procedures   INVESTIGATIONS:  Results for orders placed or performed in visit on 18   Basic Metabolic Panel   Result Value Ref Range    Sodium 136 134 - 144 mmol/L    Potassium 3.8 3.5 - 5.1 mmol/L    Chloride 102 98 - 107 mmol/L    Carbon Dioxide 28 21 - 31 mmol/L    Anion Gap 6 3 - 14 mmol/L    Glucose 192 (H) 70 - 105 mg/dL    Urea Nitrogen 26 (H) 7 - 25 mg/dL    " Creatinine 1.50 (H) 0.70 - 1.30 mg/dL    GFR Estimate 47 (L) >60 mL/min/[1.73_m2]    GFR Estimate If Black 56 (L) >60 mL/min/[1.73_m2]    Calcium 9.3 8.6 - 10.3 mg/dL       ASSESSMENT AND PLAN:  Problem List Items Addressed This Visit        Nervous and Auditory    Atherosclerosis of native coronary artery of native heart with stable angina pectoris (H)       Endocrine    Controlled type 2 diabetes mellitus with stage 3 chronic kidney disease, with long-term current use of insulin (H)       Other    Primary narcolepsy without cataplexy - Primary    Relevant Medications    dextroamphetamine (DEXTROSTAT) 10 MG tablet    methylphenidate (RITALIN) 10 MG tablet    Pain medication agreement - 8/9/2018    Relevant Medications    dextroamphetamine (DEXTROSTAT) 10 MG tablet    methylphenidate (RITALIN) 10 MG tablet        recommended sodium restriction  -- Expected clinical course discussed    -- Medications and their side effects discussed    Patient Instructions   Start some chewable vit C tablets for your cold.     Start Zinc lozenges, use as directed on box.     1. Primary narcolepsy without cataplexy  - dextroamphetamine (DEXTROSTAT) 10 MG tablet; Take 1 tablet (10 mg) by mouth 4 times daily  Dispense: 360 tablet; Refill: 0  - methylphenidate (RITALIN) 10 MG tablet; Take 1 tablet (10 mg) by mouth 4 times daily  Dispense: 360 tablet; Refill: 0    2. Pain medication agreement - 8/9/2018  - dextroamphetamine (DEXTROSTAT) 10 MG tablet; Take 1 tablet (10 mg) by mouth 4 times daily  Dispense: 360 tablet; Refill: 0  - methylphenidate (RITALIN) 10 MG tablet; Take 1 tablet (10 mg) by mouth 4 times daily  Dispense: 360 tablet; Refill: 0    Return in approximately 3 month(s), or sooner as needed for follow-up with Dr. Barnett.  - Med Refills    Clinic : [phone]  Appointment line: [phone]      Jorge Barnett MD  Internal Medicine  [facility] and [facility]     Portions of this note were dictated using  speech recognition software. The note has been proofread but errors in the text may have been overlooked. Please contact me if there are any concerns regarding the accuracy of the dictation.

## 2019-01-07 NOTE — PATIENT INSTRUCTIONS
Start some chewable vit C tablets for your cold.     Start Zinc lozenges, use as directed on box.     1. Primary narcolepsy without cataplexy  - dextroamphetamine (DEXTROSTAT) 10 MG tablet; Take 1 tablet (10 mg) by mouth 4 times daily  Dispense: 360 tablet; Refill: 0  - methylphenidate (RITALIN) 10 MG tablet; Take 1 tablet (10 mg) by mouth 4 times daily  Dispense: 360 tablet; Refill: 0    2. Pain medication agreement - 8/9/2018  - dextroamphetamine (DEXTROSTAT) 10 MG tablet; Take 1 tablet (10 mg) by mouth 4 times daily  Dispense: 360 tablet; Refill: 0  - methylphenidate (RITALIN) 10 MG tablet; Take 1 tablet (10 mg) by mouth 4 times daily  Dispense: 360 tablet; Refill: 0    Return in approximately 3 month(s), or sooner as needed for follow-up with Dr. Barnett.  - Med Refills    Clinic : [phone]  Appointment line: [phone]

## 2019-01-09 ASSESSMENT — ANXIETY QUESTIONNAIRES: GAD7 TOTAL SCORE: 0

## 2019-01-14 ENCOUNTER — DOCUMENTATION ONLY (OUTPATIENT)
Dept: OTHER | Facility: CLINIC | Age: 69
End: 2019-01-14

## 2019-01-17 ENCOUNTER — NURSE TRIAGE (OUTPATIENT)
Dept: INTERNAL MEDICINE | Facility: OTHER | Age: 69
End: 2019-01-17

## 2019-01-17 NOTE — TELEPHONE ENCOUNTER
"Patient notified of providers note, patient declines to come in today.  Patient does not have any family members he would like me to call to get patient to the facility today-patient keeps stating,\"I am not going anywhere today.\"      Barbara Connor LPN 1/17/2019 1:24 PM      "

## 2019-01-17 NOTE — TELEPHONE ENCOUNTER
"S- Ongoing cough, blood clumps in phlegm    B - Hx of heart bypass, CHF. Symptoms started around last visit on 1-7-19, worsening since.      A - Fever on and off, highest 101 yesterday. Coughing severe with blood clumps in greenish phlegm. SOB worse with wheezing  Reported. Cough causes headaches and chest discomfort, ribs and chest are sore. Trying to drink fluids, but says \"i have a hard time with that.\" Reports low blood sugars, weak and too fatigued to be come seen today.      R - Office visit soon, or to come into ED. Per patient too ill to come today, but will have someone bring in or call 911 if symptoms worsening with SOB or chest pain. Wanted appointment tomorrow, scheduled with PCP at 2:20 tomorrow. Carine Paulson RN on 1/17/2019 at 12:45 PM     "

## 2019-01-17 NOTE — TELEPHONE ENCOUNTER
"  Additional Information    Negative: Severe difficulty breathing (e.g., struggling for each breath, speaks in single words)    Negative: Bluish lips, tongue, or face now    Negative: [1] Difficulty breathing AND [2] exposure to flames, smoke, or fumes    Negative: [1] Stridor AND [2] difficulty breathing    Negative: Sounds like a life-threatening emergency to the triager    Answer Assessment - Initial Assessment Questions  1. ONSET: \"When did the cough begin?\"       Since last office visit  2. SEVERITY: \"How bad is the cough today?\"       Coughing during the call, frequent   3. RESPIRATORY DISTRESS: \"Describe your breathing.\"       SOB with it, does feel very weak and light headed  4. FEVER: \"Do you have a fever?\" If so, ask: \"What is your temperature, how was it measured, and when did it start?\"      Fevers on and off, has been 101 at highest  5. SPUTUM: \"Describe the color of your sputum\" (clear, white, yellow, green)      Green   6. HEMOPTYSIS: \"Are you coughing up any blood?\" If so ask: \"How much?\" (flecks, streaks, tablespoons, etc.)      Yes, big clumps of blood in phlegm regularly for the last week and half  7. CARDIAC HISTORY: \"Do you have any history of heart disease?\" (e.g., heart attack, congestive heart failure)       Yes, hx of bypass surgery (his chest and ribs ache). Angina episode in shower one week ago.   8. LUNG HISTORY: \"Do you have any history of lung disease?\"  (e.g., pulmonary embolus, asthma, emphysema)      No   9. PE RISK FACTORS: \"Do you have a history of blood clots?\" (or: recent major surgery, recent prolonged travel, bedridden )      Hx of blood clot in heart  10. OTHER SYMPTOMS: \"Do you have any other symptoms?\" (e.g., runny nose, wheezing, chest pain)        Congested head and chest. SOB, not wheezy  11. PREGNANCY: \"Is there any chance you are pregnant?\" \"When was your last menstrual period?\"          N/A  12. TRAVEL: \"Have you traveled out of the country in the last month?\" (e.g., " travel history, exposures)        No    Protocols used: COUGH - ACUTE PRODUCTIVE-ADULT-AH

## 2019-01-17 NOTE — TELEPHONE ENCOUNTER
He should probably go to the emergency room today and get some IV fluids and evaluation completed --- before he ends up getting so sick he has to get hospitalized.    He should not wait to see me until tomorrow.  We could probably cancel his appointment with me tomorrow.       Jorge Barnett MD

## 2019-01-18 ENCOUNTER — OFFICE VISIT (OUTPATIENT)
Dept: INTERNAL MEDICINE | Facility: OTHER | Age: 69
End: 2019-01-18
Attending: INTERNAL MEDICINE
Payer: MEDICARE

## 2019-01-18 ENCOUNTER — HOSPITAL ENCOUNTER (OUTPATIENT)
Dept: GENERAL RADIOLOGY | Facility: OTHER | Age: 69
Discharge: HOME | End: 2019-01-18
Attending: INTERNAL MEDICINE | Admitting: INTERNAL MEDICINE
Payer: MEDICARE

## 2019-01-18 VITALS
DIASTOLIC BLOOD PRESSURE: 76 MMHG | RESPIRATION RATE: 20 BRPM | WEIGHT: 200.5 LBS | OXYGEN SATURATION: 98 % | SYSTOLIC BLOOD PRESSURE: 126 MMHG | BODY MASS INDEX: 30.94 KG/M2 | HEART RATE: 76 BPM | TEMPERATURE: 98.4 F

## 2019-01-18 DIAGNOSIS — Z79.4 CONTROLLED TYPE 2 DIABETES MELLITUS WITH STAGE 3 CHRONIC KIDNEY DISEASE, WITH LONG-TERM CURRENT USE OF INSULIN (H): ICD-10-CM

## 2019-01-18 DIAGNOSIS — R05.9 COUGH: ICD-10-CM

## 2019-01-18 DIAGNOSIS — R50.9 FEBRILE RESPIRATORY ILLNESS: ICD-10-CM

## 2019-01-18 DIAGNOSIS — J98.9 FEBRILE RESPIRATORY ILLNESS: Primary | ICD-10-CM

## 2019-01-18 DIAGNOSIS — R50.9 FEBRILE RESPIRATORY ILLNESS: Primary | ICD-10-CM

## 2019-01-18 DIAGNOSIS — J98.9 FEBRILE RESPIRATORY ILLNESS: ICD-10-CM

## 2019-01-18 DIAGNOSIS — R06.2 WHEEZING: ICD-10-CM

## 2019-01-18 DIAGNOSIS — E11.22 CONTROLLED TYPE 2 DIABETES MELLITUS WITH STAGE 3 CHRONIC KIDNEY DISEASE, WITH LONG-TERM CURRENT USE OF INSULIN (H): ICD-10-CM

## 2019-01-18 DIAGNOSIS — N18.30 CONTROLLED TYPE 2 DIABETES MELLITUS WITH STAGE 3 CHRONIC KIDNEY DISEASE, WITH LONG-TERM CURRENT USE OF INSULIN (H): ICD-10-CM

## 2019-01-18 PROCEDURE — 94640 AIRWAY INHALATION TREATMENT: CPT | Performed by: INTERNAL MEDICINE

## 2019-01-18 PROCEDURE — G0463 HOSPITAL OUTPT CLINIC VISIT: HCPCS | Mod: 25

## 2019-01-18 PROCEDURE — 25000125 ZZHC RX 250: Performed by: INTERNAL MEDICINE

## 2019-01-18 PROCEDURE — 71046 X-RAY EXAM CHEST 2 VIEWS: CPT

## 2019-01-18 PROCEDURE — 99214 OFFICE O/P EST MOD 30 MIN: CPT | Performed by: INTERNAL MEDICINE

## 2019-01-18 RX ORDER — IPRATROPIUM BROMIDE AND ALBUTEROL SULFATE 2.5; .5 MG/3ML; MG/3ML
3 SOLUTION RESPIRATORY (INHALATION) ONCE
Status: COMPLETED | OUTPATIENT
Start: 2019-01-18 | End: 2019-01-18

## 2019-01-18 RX ORDER — BENZONATATE 100 MG/1
100 CAPSULE ORAL 3 TIMES DAILY PRN
Qty: 30 CAPSULE | Refills: 1 | Status: SHIPPED | OUTPATIENT
Start: 2019-01-18 | End: 2019-03-08

## 2019-01-18 RX ADMIN — IPRATROPIUM BROMIDE AND ALBUTEROL SULFATE 3 ML: .5; 3 SOLUTION RESPIRATORY (INHALATION) at 14:37

## 2019-01-18 ASSESSMENT — ENCOUNTER SYMPTOMS
NECK STIFFNESS: 1
NECK PAIN: 1
FATIGUE: 1
ABDOMINAL PAIN: 0
HEADACHES: 1
WHEEZING: 1
BACK PAIN: 1
CHEST TIGHTNESS: 1
STRIDOR: 0
CONFUSION: 0
HEMATURIA: 0
SHORTNESS OF BREATH: 1
COUGH: 1
ARTHRALGIAS: 1
CHOKING: 0
PALPITATIONS: 0
WOUND: 0
BRUISES/BLEEDS EASILY: 0
ADENOPATHY: 0
FEVER: 1
MYALGIAS: 1
CHILLS: 1

## 2019-01-18 ASSESSMENT — ANXIETY QUESTIONNAIRES
2. NOT BEING ABLE TO STOP OR CONTROL WORRYING: NOT AT ALL
GAD7 TOTAL SCORE: 0
5. BEING SO RESTLESS THAT IT IS HARD TO SIT STILL: NOT AT ALL
3. WORRYING TOO MUCH ABOUT DIFFERENT THINGS: NOT AT ALL
IF YOU CHECKED OFF ANY PROBLEMS ON THIS QUESTIONNAIRE, HOW DIFFICULT HAVE THESE PROBLEMS MADE IT FOR YOU TO DO YOUR WORK, TAKE CARE OF THINGS AT HOME, OR GET ALONG WITH OTHER PEOPLE: NOT DIFFICULT AT ALL
1. FEELING NERVOUS, ANXIOUS, OR ON EDGE: NOT AT ALL
7. FEELING AFRAID AS IF SOMETHING AWFUL MIGHT HAPPEN: NOT AT ALL
6. BECOMING EASILY ANNOYED OR IRRITABLE: NOT AT ALL

## 2019-01-18 ASSESSMENT — PAIN SCALES - GENERAL: PAINLEVEL: SEVERE PAIN (6)

## 2019-01-18 ASSESSMENT — PATIENT HEALTH QUESTIONNAIRE - PHQ9
SUM OF ALL RESPONSES TO PHQ QUESTIONS 1-9: 0
5. POOR APPETITE OR OVEREATING: NOT AT ALL

## 2019-01-18 NOTE — NURSING NOTE
"Patient presents to the clinic for productive cough with red sputum, red nasal drainage, sore throat, post nasal drip and drainage from both ears over the past couple of weeks.  Fever/chills for the past week.      Previous A1C is at goal of <8  Lab Results   Component Value Date    A1C 7.5 11/21/2018    A1C 8.0 08/09/2018    A1C 7.8 05/03/2018     Urine microalbumin:creatine: n/a  Foot exam unknown-declines today  Eye exam 02/2018    Tobacco User no  Patient is on a daily aspirin  Patient is on a Statin.  Blood pressure today of:     BP Readings from Last 1 Encounters:   01/07/19 162/80      is not at the goal of <139/89 for diabetics.    Chief Complaint   Patient presents with     Cough       Initial /76 (BP Location: Right arm, Patient Position: Sitting, Cuff Size: Adult Regular)   Pulse 76   Temp 98.4  F (36.9  C) (Temporal)   Resp 20   Wt 90.9 kg (200 lb 8 oz)   SpO2 98%   BMI 30.94 kg/m   Estimated body mass index is 30.94 kg/m  as calculated from the following:    Height as of 1/7/19: 1.715 m (5' 7.5\").    Weight as of this encounter: 90.9 kg (200 lb 8 oz).  Medication Reconciliation: complete    Barbara Connor LPN          "

## 2019-01-18 NOTE — PROGRESS NOTES
"Nursing Notes:   Barbara Connor LPN  1/18/2019  2:10 PM  Signed  Patient presents to the clinic for productive cough with red sputum, red nasal drainage, sore throat, post nasal drip and drainage from both ears over the past couple of weeks.  Fever/chills for the past week.      Previous A1C is at goal of <8  Lab Results   Component Value Date    A1C 7.5 11/21/2018    A1C 8.0 08/09/2018    A1C 7.8 05/03/2018     Urine microalbumin:creatine: n/a  Foot exam unknown-declines today  Eye exam 02/2018    Tobacco User no  Patient is on a daily aspirin  Patient is on a Statin.  Blood pressure today of:     BP Readings from Last 1 Encounters:   01/07/19 162/80      is not at the goal of <139/89 for diabetics.    Chief Complaint   Patient presents with     Cough       Initial /76 (BP Location: Right arm, Patient Position: Sitting, Cuff Size: Adult Regular)   Pulse 76   Temp 98.4  F (36.9  C) (Temporal)   Resp 20   Wt 90.9 kg (200 lb 8 oz)   SpO2 98%   BMI 30.94 kg/m    Estimated body mass index is 30.94 kg/m  as calculated from the following:    Height as of 1/7/19: 1.715 m (5' 7.5\").    Weight as of this encounter: 90.9 kg (200 lb 8 oz).  Medication Reconciliation: complete    Barbara Connor LPN          Nursing note reviewed with patient.  Accuracy and completeness verified.   Mr. Whittaker is a 68 year old male who:  Patient presents with:  Cough      ICD-10-CM    1. Febrile respiratory illness J98.9 XR Chest 2 Views    R50.9 ipratropium - albuterol 0.5 mg/2.5 mg/3 mL (DUONEB) neb solution 3 mL     INHALATION/NEBULIZER TREATMENT, INITIAL     benzonatate (TESSALON) 100 MG capsule   2. Cough R05 XR Chest 2 Views     ipratropium - albuterol 0.5 mg/2.5 mg/3 mL (DUONEB) neb solution 3 mL     benzonatate (TESSALON) 100 MG capsule     Ipratropium-Albuterol (COMBIVENT RESPIMAT)  MCG/ACT inhaler   3. Controlled type 2 diabetes mellitus with stage 3 chronic kidney disease, with long-term current use of insulin (H) " E11.22     N18.3     Z79.4    4. Wheezing R06.2 XR Chest 2 Views     ipratropium - albuterol 0.5 mg/2.5 mg/3 mL (DUONEB) neb solution 3 mL     INHALATION/NEBULIZER TREATMENT, INITIAL     Ipratropium-Albuterol (COMBIVENT RESPIMAT)  MCG/ACT inhaler     HPI  Patient comes in with febrile respiratory illness.  States that he started getting sick back on January 8 or 9.  He was having some 101 degree fevers for 5 days ago.  Has had some low-grade temperatures since that time.    Wife was also recently sick.    States that he is short of breath at times.  Has had a lot of headaches and body aches in the last week or so.  Symptoms seem to be slowly improving.  He is feeling a little bit better today.    He has been using OTC Delsym cough syrup twice a day.    He has no inhalers at home.    Having a lot of bloody nasal discharge and coughing up chunks of phlegm sometimes with some bloody tinged sputum.  Having some postnasal drip.  Rhinorrhea.    Has some chronic edema.    He was worried about possible pneumonia versus influenza.    Type 2 diabetes, currently well controlled.  Using insulin.  Labs were collected recently.    He is worried about possible pneumonia.  Check chest x-ray today.  DuoNeb treatment given in clinic.  He felt this really helped his breathing.  Chest x-ray returned negative.  No pneumonia noted.    Functional Capacity: normally about  4 METS.   No orthopnea/paroxysmal nocturnal dyspnea  Review of Systems   Constitutional: Positive for chills, fatigue and fever.   HENT: Negative for congestion.    Eyes: Negative for visual disturbance.   Respiratory: Positive for cough, chest tightness, shortness of breath and wheezing. Negative for choking and stridor.    Cardiovascular: Positive for chest pain (chronic, stable) and leg swelling. Negative for palpitations.        + claudication.   + intermittent Angina   Gastrointestinal: Negative for abdominal pain.   Genitourinary: Negative for hematuria.    Musculoskeletal: Positive for arthralgias, back pain, gait problem, myalgias (+ in the past week or so), neck pain and neck stiffness.   Skin: Negative for rash and wound.   Neurological: Positive for headaches. Negative for syncope.   Hematological: Negative for adenopathy. Does not bruise/bleed easily.   Psychiatric/Behavioral: Negative for confusion.        REMINGTON:   REMINGTON-7 SCORE 12/27/2018 1/7/2019 1/18/2019   Total Score 0 0 0     PHQ9:  PHQ-9 SCORE 12/27/2018 1/7/2019 1/18/2019   PHQ-9 Total Score 0 0 0       I have personally reviewed the past medical history, past surgical history, medications, allergies, family and social history as listed below.     Allergies   Allergen Reactions     Famotidine Other (See Comments)     + Caused Headache and Rash -- tolerated Ranitidine and worked well.     Gabapentin      Other reaction(s): Mental Status Change     Hydrocortisone Other (See Comments)     Burning and stinging sensation with Hydrocortisone - doesn't help itching or rash -- tolerated Desoximetasone cream and worked well.      Atorvastatin Hives     Lisinopril Cough     No Clinical Screening - See Comments Hives     Chlorine water     Norvasc [Amlodipine] Other (See Comments)     -- can't remember, didn't tolerate.      Pregabalin      Other reaction(s): Mental Status Change     Valdecoxib Swelling     Insulin Aspart Rash       Current Outpatient Medications   Medication Sig Dispense Refill     albuterol (PROAIR HFA/PROVENTIL HFA/VENTOLIN HFA) 108 (90 BASE) MCG/ACT Inhaler Inhale 1-2 puffs into the lungs every 4 hours as needed for shortness of breath / dyspnea or wheezing 1 Inhaler 1     aspirin (GOODSENSE ASPIRIN) 325 MG tablet Take  by mouth.       benzonatate (TESSALON) 100 MG capsule Take 1 capsule (100 mg) by mouth 3 times daily as needed for cough 30 capsule 1     blood glucose monitoring (ONETOUCH ULTRA) test strip Dispense test strips covered by the patient insurance. Test 10 times per day.       Blood  Glucose Monitoring Suppl (GLUCOCOM BLOOD GLUCOSE MONITOR) WAQAS Dispense glucose meter, test strips and lancets covered by the patient insurance. Test 10 times per day.       clopidogrel (PLAVIX) 75 MG tablet Take 1 tablet (75 mg) by mouth daily 90 tablet 3     [employer] ALCOHOL SWABS PADS For home use.       desoximetasone (TOPICORT) 0.25 % cream Apply 1 Application topically 2 times daily       dextroamphetamine (DEXTROSTAT) 10 MG tablet Take 1 tablet (10 mg) by mouth 4 times daily 360 tablet 0     diclofenac (VOLTAREN) 75 MG EC tablet Take 1 tablet by mouth 4 times daily       docusate sodium (COLACE) 100 MG capsule Take 1-2 capsules by mouth 2 times daily as needed for constipation prevention       Flaxseed, Linseed, (FLAXSEED OIL) 1000 MG CAPS Take  by mouth.       furosemide (LASIX) 40 MG tablet Take 2-4 tablets ( mg) by mouth daily Or as instructed for leg swelling 90 tablet 3     HUMALOG 100 UNIT/ML injection INJECT UNDER THE SKIN USING INSULIN PUMP. MAX DAILY DOSE  UNITS 180 mL 3     hydrALAZINE (APRESOLINE) 25 MG tablet Take 1-2 tablets (25-50 mg) by mouth 4 times daily - Take lowest effective dose for blood pressure management 360 tablet 11     HYDROcodone-acetaminophen (NORCO) 5-325 MG per tablet Take 1 tablet by mouth every 6 hours as needed for severe pain 60 tablet 0     Insulin Pen Needle (PEN NEEDLES) 29G X 12MM MISC For administering insulin at home.       ipratropium (ATROVENT HFA) 17 MCG/ACT Inhaler Inhale 2 puffs into the lungs 4 times daily       Ipratropium-Albuterol (COMBIVENT RESPIMAT)  MCG/ACT inhaler Inhale 1 puff into the lungs 4 times daily 1 Inhaler 0     irbesartan (AVAPRO) 150 MG tablet Take 1 tablet (150 mg) by mouth 2 times daily -- needs 2 smaller pills daily 180 tablet 3     IV Sets-Tubing (SOLUTION ADMINISTRATION SET) MISC As directed.       levothyroxine (SYNTHROID/LEVOTHROID) 125 MCG tablet TAKE ONE TABLET BY MOUTH ONCE DAILY IN THE MORNING 90 tablet 2      methylphenidate (RITALIN) 10 MG tablet Take 1 tablet (10 mg) by mouth 4 times daily 360 tablet 0     metoprolol succinate (TOPROL-XL) 50 MG 24 hr tablet Take 1 tablet (50 mg) by mouth 2 times daily 180 tablet 3     nitroGLYcerin (NITROSTAT) 0.4 MG sublingual tablet Place 1 tablet under the tongue every 5 minutes as needed for chest pain       Omega-3 Fatty Acids (FISH OIL PO) Take  by mouth.       PSYLLIUM PO Take 1 tsp. by mouth 3 times daily - for constipation prevention       ranitidine (ZANTAC) 150 MG tablet Take 1 tablet (150 mg) by mouth 2 times daily 180 tablet 3     ranolazine (RANEXA) 500 MG 12 hr tablet Take 2 tablets (1,000 mg) by mouth 2 times daily - cancel other Rx - give 3 month supply 360 tablet 3     rosuvastatin (CRESTOR) 10 MG tablet TAKE ONE TABLET BY MOUTH TWICE DAILY 180 tablet 3     Saline GEL Spray 1 spray in nostril every hour as needed For Nasal Dryness       thin (NO BRAND SPECIFIED) lancets Test 10 times per day.       tiZANidine (ZANAFLEX) 2 MG tablet Take 1-2 tablets by mouth every 6 hours as needed for muscle spasms          Patient Active Problem List    Diagnosis Date Noted     Degeneration of cervical intervertebral disc 01/31/2018     Priority: Medium     Hypothyroidism due to acquired atrophy of thyroid 01/31/2018     Priority: Medium     Mixed hyperlipidemia 01/31/2018     Priority: Medium     Primary narcolepsy without cataplexy 01/31/2018     Priority: Medium     Overview:   Total dose recommended by pulmonology he is dextro- amphetamine 40 mg plus   methylphenidate 40 mg in divided doses per day.  Total of 80 mg of short   acting amphetamines daily.       Osteoarthritis of spine 01/31/2018     Priority: Medium     Peptic ulcer disease 01/31/2018     Priority: Medium     Neuroforaminal stenosis of lumbar spine 01/03/2018     Priority: Medium     Radicular low back pain 01/03/2018     Priority: Medium     Acute bilateral low back pain with bilateral sciatica 10/27/2017      Priority: Medium     Chronic low back pain 08/08/2017     Priority: Medium     Intermittent constipation 08/08/2017     Priority: Medium     Skin rash 08/01/2017     Priority: Medium     Controlled type 2 diabetes mellitus with stage 3 chronic kidney disease, with long-term current use of insulin (H) 03/30/2017     Priority: Medium     Erectile dysfunction 09/26/2016     Priority: Medium     Trigger point with back pain 07/14/2016     Priority: Medium     Trigger point with neck pain 07/14/2016     Priority: Medium     Insulin pump in place 03/10/2016     Priority: Medium     Myofacial muscle pain 12/17/2015     Priority: Medium     Pain medication agreement - 8/9/2018 12/17/2015     Priority: Medium     Trigger point of extremity 12/17/2015     Priority: Medium     Greater trochanteric bursitis of left hip 06/24/2015     Priority: Medium     Blister of toe of right foot 12/09/2014     Priority: Medium     Cervical stenosis of spinal canal 06/17/2014     Priority: Medium     Degenerative disc disease, cervical 06/17/2014     Priority: Medium     Facet arthritis of cervical region (H) 06/17/2014     Priority: Medium     Cervical radicular pain 06/05/2014     Priority: Medium     Left arm numbness 06/05/2014     Priority: Medium     Left atrial enlargement 01/23/2014     Priority: Medium     Chronic diastolic heart failure (H) 01/10/2014     Priority: Medium     Overview:   1/20/2014 ECHO FINAL IMPRESSION:   1. Normal left ventricular size and systolic function. Estimated ejection fraction 65%.   2. Mild increase in wall thickness of the ventricular septum with hypokinesis of the basilar segment of the ventricular septum and inferior wall.   3. Mild to moderate left atrial enlargement.   4. Trace tricuspid regurgitation.   5. Mild left ventricular diastolic dysfunction.        CKD (chronic kidney disease) stage 3, GFR 30-59 ml/min (H) 01/10/2014     Priority: Medium     Essential hypertension 01/10/2014     Priority:  Medium     Myalgia 01/10/2014     Priority: Medium     Old inferior wall myocardial infarction 01/10/2014     Priority: Medium     Platelet inhibition due to Plavix 01/10/2014     Priority: Medium     S/P CABG x 2 01/10/2014     Priority: Medium     S/P coronary artery stent placement 01/10/2014     Priority: Medium     Painful diabetic neuropathy (H) 04/22/2013     Priority: Medium     Esophageal reflux 05/17/2012     Priority: Medium     Obesity 05/17/2012     Priority: Medium     Diastasis of muscle 10/06/2011     Priority: Medium     Coronary atherosclerosis 09/08/2011     Priority: Medium     Psychosexual dysfunction with inhibited sexual excitement 06/30/2011     Priority: Medium     Angina pectoris (H) 12/10/2010     Priority: Medium     Edema 10/25/2010     Priority: Medium     Chest pain 02/15/2007     Priority: Medium     Overview:   IMO Update 10/11       Atherosclerosis of native coronary artery of native heart with stable angina pectoris (H) 02/15/2007     Priority: Medium     Overview:   IMO Update 10/11       Other specified forms of chronic ischemic heart disease 02/15/2007     Priority: Medium     Peripheral vascular disease (H) 02/15/2007     Priority: Medium     Overview:   IMO Update 10/11       Postsurgical percutaneous transluminal coronary angioplasty status 02/15/2007     Priority: Medium     Overview:   IMO Update 10/11       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     Gastritis without bleeding     No Comments Provided     Heart disease     Multiple coronary stents     Narcolepsy without cataplexy     No Comments Provided     Other injury of unspecified body region, initial encounter (CODE)     11/2006,Right calf  hematoma     Rheumatic fever without heart involvement     Rheumatic fever as a child without valvular heart disease     Past Surgical History:   Procedure Laterality Date     ANGIOPLASTY      12/00, 04/04/04,Repeat angioplasty with lt internal mammary to the lt anterior descending and lt radial artery from aorta to the diagonal.     ANGIOPLASTY      10/01,Angioplasty with 2 vessel CABG the following week from the lt internal mammary to the lt anterior descending and right radial free graft     ANGIOPLASTY      04/2003     ARTHROSCOPY SHOULDER ROTATOR CUFF REPAIR      02/06/2006,Left rotator cuff repair and bone spur removal by Dr. Giraldo in [place]     COLONOSCOPY      1999     COLONOSCOPY  01/08/2016 01/08/2016,-- Dr. De La Cruz -- polypectomy     OTHER SURGICAL HISTORY      09/03,769735,IR ANGIOGRAM FEMORAL/EXTREMITY ([place]),Unremarkable angiogram at [facility]     OTHER SURGICAL HISTORY      10/05/2004,,PTCA,Angioplasty     OTHER SURGICAL HISTORY      02/2005,,PTCA,Angioplasty with stent.     OTHER SURGICAL HISTORY      03/02/2005,,PTCA,Angioplasty with stent.     OTHER SURGICAL HISTORY      09/23/2005,,PTCA,Angioplasty without stent     OTHER SURGICAL HISTORY      2/26/2007,[id number],IR ANGIOGRAM FEMORAL/EXTREMITY ([place]),Unremarkable coronary angiogram at [facility].     OTHER SURGICAL HISTORY      1967,SUR38,APPENDECTOMY OPEN     RELEASE CARPAL TUNNEL      11/15/01,Right carpal tunnel release by Dr. Mace     RELEASE CARPAL TUNNEL      01/2004,Left carpal tunnel release     Social History     Socioeconomic History     Marital status:      Spouse name: None     Number of children: None     Years of education: None     Highest education level: None   Social Needs     Financial resource strain: None     Food insecurity - worry: None     Food insecurity - inability: None     Transportation needs - medical: None     Transportation needs - non-medical: None   Occupational History     None   Tobacco Use      "Smoking status: Never Smoker     Smokeless tobacco: Never Used   Substance and Sexual Activity     Alcohol use: No     Alcohol/week: 0.0 oz     Drug use: No     Sexual activity: Yes     Partners: Female   Other Topics Concern     Parent/sibling w/ CABG, MI or angioplasty before 65F 55M? Not Asked   Social History Narrative    He is on Social Security Disability for coronary artery disease and cervical arthritis and disk disease.   Dax clay.  No tobacco.     Family History   Problem Relation Age of Onset     Heart Disease Mother         Heart Disease,Heart problems     Heart Disease Other         Heart Disease,Heart problems     Heart Disease Other         Heart Disease,Heart problems     Ankylosing Spondylitis Son         Ankylosing spondylitis,Ankylosing spondylitis     Other - See Comments Brother          with sudden death following shoulder surgery 2012 -- was off his Plavix for 10 days pre-operatively.     Ankylosing Spondylitis Daughter         Ankylosing spondylitis,-- Dx 2017       EXAM:   Vitals:    19 1359   BP: 126/76   BP Location: Right arm   Patient Position: Sitting   Cuff Size: Adult Regular   Pulse: 76   Resp: 20   Temp: 98.4  F (36.9  C)   TempSrc: Temporal   SpO2: 98%   Weight: 90.9 kg (200 lb 8 oz)       Current Pain Score: Severe Pain (6)     BP Readings from Last 3 Encounters:   19 126/76   19 162/80   18 138/88      Wt Readings from Last 3 Encounters:   19 90.9 kg (200 lb 8 oz)   19 89.5 kg (197 lb 6 oz)   18 91.2 kg (201 lb)      Estimated body mass index is 30.94 kg/m  as calculated from the following:    Height as of 19: 1.715 m (5' 7.5\").    Weight as of this encounter: 90.9 kg (200 lb 8 oz).     Physical Exam   Constitutional:   Pale and ill-appearing.  Frequent cough.   HENT:   Clear rhinorrhea.  No oral pharyngeal exudates.   Eyes: Conjunctivae are normal. No scleral icterus.   Cardiovascular: Normal rate.   Pulmonary/Chest: He " has wheezes. He has rales.   Frequent coughing   Abdominal: Soft.   Musculoskeletal: He exhibits edema.   Lymphadenopathy:     He has no cervical adenopathy.   Neurological: He is alert.   Skin: There is pallor.   Psychiatric: He has a normal mood and affect.        Procedures   INVESTIGATIONS:  Results for orders placed or performed in visit on 12/27/18   Basic Metabolic Panel   Result Value Ref Range    Sodium 136 134 - 144 mmol/L    Potassium 3.8 3.5 - 5.1 mmol/L    Chloride 102 98 - 107 mmol/L    Carbon Dioxide 28 21 - 31 mmol/L    Anion Gap 6 3 - 14 mmol/L    Glucose 192 (H) 70 - 105 mg/dL    Urea Nitrogen 26 (H) 7 - 25 mg/dL    Creatinine 1.50 (H) 0.70 - 1.30 mg/dL    GFR Estimate 47 (L) >60 mL/min/[1.73_m2]    GFR Estimate If Black 56 (L) >60 mL/min/[1.73_m2]    Calcium 9.3 8.6 - 10.3 mg/dL       ASSESSMENT AND PLAN:  Problem List Items Addressed This Visit        Endocrine    Controlled type 2 diabetes mellitus with stage 3 chronic kidney disease, with long-term current use of insulin (H)      Other Visit Diagnoses     Febrile respiratory illness    -  Primary    Relevant Medications    ipratropium - albuterol 0.5 mg/2.5 mg/3 mL (DUONEB) neb solution 3 mL (Completed)    benzonatate (TESSALON) 100 MG capsule    Other Relevant Orders    XR Chest 2 Views (Completed)    INHALATION/NEBULIZER TREATMENT, INITIAL (Completed)    Cough        Relevant Medications    ipratropium - albuterol 0.5 mg/2.5 mg/3 mL (DUONEB) neb solution 3 mL (Completed)    benzonatate (TESSALON) 100 MG capsule    Ipratropium-Albuterol (COMBIVENT RESPIMAT)  MCG/ACT inhaler    Other Relevant Orders    XR Chest 2 Views (Completed)    Wheezing        Relevant Medications    ipratropium - albuterol 0.5 mg/2.5 mg/3 mL (DUONEB) neb solution 3 mL (Completed)    Ipratropium-Albuterol (COMBIVENT RESPIMAT)  MCG/ACT inhaler    Other Relevant Orders    XR Chest 2 Views (Completed)    INHALATION/NEBULIZER TREATMENT, INITIAL (Completed)     "    recommended sodium restriction  -- Expected clinical course discussed    -- Medications and their side effects discussed    Patient Instructions     1. Febrile respiratory illness  - XR Chest 2 Views; Future  - ipratropium - albuterol 0.5 mg/2.5 mg/3 mL (DUONEB) neb solution 3 mL; Take 3 mLs by nebulization once  - INHALATION/NEBULIZER TREATMENT, INITIAL  - benzonatate (TESSALON) 100 MG capsule; Take 1 capsule (100 mg) by mouth 3 times daily as needed for cough  Dispense: 30 capsule; Refill: 1    2. Cough  - XR Chest 2 Views; Future    -- No pneumonia on chest xray today.     START:   - ipratropium - albuterol 0.5 mg/2.5 mg/3 mL (DUONEB) neb solution 3 mL; Take 3 mLs by nebulization once  - benzonatate (TESSALON) 100 MG capsule; Take 1 capsule (100 mg) by mouth 3 times daily as needed for cough  Dispense: 30 capsule; Refill: 1  - Ipratropium-Albuterol (COMBIVENT RESPIMAT)  MCG/ACT inhaler; Inhale 1 puff into the lungs 4 times daily  Dispense: 1 Inhaler; Refill: 0    3. Controlled type 2 diabetes mellitus with stage 3 chronic kidney disease, with long-term current use of insulin (H)      4. Wheezing      Return as needed for follow-up with Dr. Barnett.    Clinic : [phone]  Appointment line: [phone]        Patient Education     Viral Syndrome (Adult)  A viral illness may cause a number of symptoms such as fever. Other symptoms depend on the part of the body that the virus affects. If it settles in your nose, throat, and lungs, it may cause cough, sore throat, congestion, runny nose, headache, earache and other ear symptoms, or shortness of breath. If it settles in your stomach and intestinal tract, it may cause nausea, vomiting, cramping, and diarrhea. Sometimes it causes generalized symptoms like \"aching all over,\" feeling tired, loss of energy, or loss of appetite.  A viral illness usually lasts anywhere from several days to several weeks, but sometimes it lasts longer. In some cases, a " more serious infection can look like a viral syndrome in the first few days of the illness. You may need another exam and additional tests to know the difference. Watch for the warning signs listed below for when to seek medical advice.  Home care  Follow these guidelines for taking care of yourself at home:    If symptoms are severe, rest at home for the first 2 to 3 days.    Stay away from cigarette smoke - both your smoke and the smoke from others.    You may use over-the-counter acetaminophen or ibuprofen for fever, muscle aching, and headache, unless another medicine was prescribed for this. If you have chronic liver or kidney disease or ever had a stomach ulcer or gastrointestinal bleeding, talk with your healthcare provider before using these medicines. No one who is younger than 18 and ill with a fever should take aspirin. It may cause severe disease or death.    Your appetite may be poor, so a light diet is fine. Avoid dehydration by drinking 8 to 12, 8-ounce glasses of fluids each day. This may include water; orange juice; lemonade; apple, grape, and cranberry juice; clear fruit drinks; electrolyte replacement and sports drinks; and decaffeinated teas and coffee. If you have been diagnosed with a kidney disease, ask your healthcare provider how much and what types of fluids you should drink to prevent dehydration. If you have kidney disease, drinking too much fluid can cause it build up in the your body and be dangerous to your health.    Over-the-counter remedies won't shorten the length of the illness but may be helpful for symptoms such as cough, sore throat, nasal and sinus congestion, or diarrhea. Don't use decongestants if you have high blood pressure.  Follow-up care  Follow up with your healthcare provider if you do not improve over the next week.  Call 911  Call 911 if any of the following occur:    Convulsion    Feeling weak, dizzy, or like you are going to faint    Chest pain, or more than mild  shortness of breath   When to seek medical advice  Call your healthcare provider right away if any of these occur:    Cough with lots of colored sputum (mucus) or blood in your sputum    Chest pain, shortness of breath, wheezing, or trouble breathing    Severe headache; face, neck, or ear pain    Severe, constant pain in the lower right side of your belly (abdominal)    Continued vomiting (can t keep liquids down)    Frequent diarrhea (more than 5 times a day); blood (red or black color) or mucus in diarrhea    Feeling weak, dizzy, or like you are going to faint    Extreme thirst    Fever of 100.4 F (38 C) or higher, or as directed by your healthcare provider  Date Last Reviewed: 4/1/2018 2000-2018 [employer]. [address] [place] [address], [place] [address]. All rights reserved. This information is not intended as a substitute for professional medical care. Always follow your healthcare professional's instructions.             Jorge Barnett MD  Internal Medicine  [facility]     Portions of this note were dictated using speech recognition software. The note has been proofread but errors in the text may have been overlooked. Please contact me if there are any concerns regarding the accuracy of the dictation.

## 2019-01-18 NOTE — PATIENT INSTRUCTIONS
"1. Febrile respiratory illness  - XR Chest 2 Views; Future  - ipratropium - albuterol 0.5 mg/2.5 mg/3 mL (DUONEB) neb solution 3 mL; Take 3 mLs by nebulization once  - INHALATION/NEBULIZER TREATMENT, INITIAL  - benzonatate (TESSALON) 100 MG capsule; Take 1 capsule (100 mg) by mouth 3 times daily as needed for cough  Dispense: 30 capsule; Refill: 1    2. Cough  - XR Chest 2 Views; Future    -- No pneumonia on chest xray today.     START:   - ipratropium - albuterol 0.5 mg/2.5 mg/3 mL (DUONEB) neb solution 3 mL; Take 3 mLs by nebulization once  - benzonatate (TESSALON) 100 MG capsule; Take 1 capsule (100 mg) by mouth 3 times daily as needed for cough  Dispense: 30 capsule; Refill: 1  - Ipratropium-Albuterol (COMBIVENT RESPIMAT)  MCG/ACT inhaler; Inhale 1 puff into the lungs 4 times daily  Dispense: 1 Inhaler; Refill: 0    3. Controlled type 2 diabetes mellitus with stage 3 chronic kidney disease, with long-term current use of insulin (H)      4. Wheezing      Return as needed for follow-up with Dr. Barnett.    Clinic : [phone]  Appointment line: [phone]        Patient Education     Viral Syndrome (Adult)  A viral illness may cause a number of symptoms such as fever. Other symptoms depend on the part of the body that the virus affects. If it settles in your nose, throat, and lungs, it may cause cough, sore throat, congestion, runny nose, headache, earache and other ear symptoms, or shortness of breath. If it settles in your stomach and intestinal tract, it may cause nausea, vomiting, cramping, and diarrhea. Sometimes it causes generalized symptoms like \"aching all over,\" feeling tired, loss of energy, or loss of appetite.  A viral illness usually lasts anywhere from several days to several weeks, but sometimes it lasts longer. In some cases, a more serious infection can look like a viral syndrome in the first few days of the illness. You may need another exam and additional tests to know the " difference. Watch for the warning signs listed below for when to seek medical advice.  Home care  Follow these guidelines for taking care of yourself at home:    If symptoms are severe, rest at home for the first 2 to 3 days.    Stay away from cigarette smoke - both your smoke and the smoke from others.    You may use over-the-counter acetaminophen or ibuprofen for fever, muscle aching, and headache, unless another medicine was prescribed for this. If you have chronic liver or kidney disease or ever had a stomach ulcer or gastrointestinal bleeding, talk with your healthcare provider before using these medicines. No one who is younger than 18 and ill with a fever should take aspirin. It may cause severe disease or death.    Your appetite may be poor, so a light diet is fine. Avoid dehydration by drinking 8 to 12, 8-ounce glasses of fluids each day. This may include water; orange juice; lemonade; apple, grape, and cranberry juice; clear fruit drinks; electrolyte replacement and sports drinks; and decaffeinated teas and coffee. If you have been diagnosed with a kidney disease, ask your healthcare provider how much and what types of fluids you should drink to prevent dehydration. If you have kidney disease, drinking too much fluid can cause it build up in the your body and be dangerous to your health.    Over-the-counter remedies won't shorten the length of the illness but may be helpful for symptoms such as cough, sore throat, nasal and sinus congestion, or diarrhea. Don't use decongestants if you have high blood pressure.  Follow-up care  Follow up with your healthcare provider if you do not improve over the next week.  Call 911  Call 911 if any of the following occur:    Convulsion    Feeling weak, dizzy, or like you are going to faint    Chest pain, or more than mild shortness of breath   When to seek medical advice  Call your healthcare provider right away if any of these occur:    Cough with lots of colored sputum  (mucus) or blood in your sputum    Chest pain, shortness of breath, wheezing, or trouble breathing    Severe headache; face, neck, or ear pain    Severe, constant pain in the lower right side of your belly (abdominal)    Continued vomiting (can t keep liquids down)    Frequent diarrhea (more than 5 times a day); blood (red or black color) or mucus in diarrhea    Feeling weak, dizzy, or like you are going to faint    Extreme thirst    Fever of 100.4 F (38 C) or higher, or as directed by your healthcare provider  Date Last Reviewed: 4/1/2018 2000-2018 [employer]. [address] [place] [address]. All rights reserved. This information is not intended as a substitute for professional medical care. Always follow your healthcare professional's instructions.

## 2019-01-19 ASSESSMENT — ANXIETY QUESTIONNAIRES: GAD7 TOTAL SCORE: 0

## 2019-01-21 ENCOUNTER — TRANSFERRED RECORDS (OUTPATIENT)
Dept: HEALTH INFORMATION MANAGEMENT | Facility: OTHER | Age: 69
End: 2019-01-21

## 2019-01-28 DIAGNOSIS — R21 SKIN RASH: Primary | ICD-10-CM

## 2019-01-28 DIAGNOSIS — Z95.1 S/P CABG X 2: ICD-10-CM

## 2019-01-29 RX ORDER — DICLOFENAC SODIUM 75 MG/1
TABLET, DELAYED RELEASE ORAL
Qty: 360 TABLET | Refills: 3 | Status: SHIPPED | OUTPATIENT
Start: 2019-01-29 | End: 2019-12-19

## 2019-01-29 RX ORDER — DESOXIMETASONE 2.5 MG/G
CREAM TOPICAL
Qty: 180 G | Refills: 3 | Status: SHIPPED | OUTPATIENT
Start: 2019-01-29 | End: 2020-02-13

## 2019-01-29 NOTE — TELEPHONE ENCOUNTER
Routing refill request to provider for review/approval because:  Medication is reported/historical  Labs out of range: creatinine  Protocol failed due to age and High potency steroid not ordered    LOV; 1/18/19  Margie Calderón RN on 1/29/2019 at 3:19 PM

## 2019-02-20 ENCOUNTER — TELEPHONE (OUTPATIENT)
Dept: INTERNAL MEDICINE | Facility: OTHER | Age: 69
End: 2019-02-20

## 2019-02-20 NOTE — TELEPHONE ENCOUNTER
DJS - Patient requesting an appointment for paperwork completed for medication and to discuss vasodialator.

## 2019-02-21 NOTE — TELEPHONE ENCOUNTER
Patient placed with Jorge Barnett MD on 3-7-19, for DM check and Rexena form completion-company requests an office visit with Dianna patel.      Barbara Connor LPN 2/21/2019 8:41 AM

## 2019-03-04 ENCOUNTER — MEDICAL CORRESPONDENCE (OUTPATIENT)
Dept: HEALTH INFORMATION MANAGEMENT | Facility: OTHER | Age: 69
End: 2019-03-04

## 2019-03-08 ENCOUNTER — OFFICE VISIT (OUTPATIENT)
Dept: INTERNAL MEDICINE | Facility: OTHER | Age: 69
End: 2019-03-08
Attending: INTERNAL MEDICINE
Payer: MEDICARE

## 2019-03-08 VITALS
HEART RATE: 64 BPM | RESPIRATION RATE: 20 BRPM | WEIGHT: 201.38 LBS | DIASTOLIC BLOOD PRESSURE: 80 MMHG | BODY MASS INDEX: 31.07 KG/M2 | SYSTOLIC BLOOD PRESSURE: 138 MMHG | TEMPERATURE: 97.5 F

## 2019-03-08 DIAGNOSIS — E03.4 HYPOTHYROIDISM DUE TO ACQUIRED ATROPHY OF THYROID: ICD-10-CM

## 2019-03-08 DIAGNOSIS — E78.2 MIXED HYPERLIPIDEMIA: ICD-10-CM

## 2019-03-08 DIAGNOSIS — E11.22 CONTROLLED TYPE 2 DIABETES MELLITUS WITH STAGE 3 CHRONIC KIDNEY DISEASE, WITH LONG-TERM CURRENT USE OF INSULIN (H): ICD-10-CM

## 2019-03-08 DIAGNOSIS — I10 ESSENTIAL HYPERTENSION: ICD-10-CM

## 2019-03-08 DIAGNOSIS — N18.30 CONTROLLED TYPE 2 DIABETES MELLITUS WITH STAGE 3 CHRONIC KIDNEY DISEASE, WITH LONG-TERM CURRENT USE OF INSULIN (H): ICD-10-CM

## 2019-03-08 DIAGNOSIS — Z79.4 CONTROLLED TYPE 2 DIABETES MELLITUS WITH STAGE 3 CHRONIC KIDNEY DISEASE, WITH LONG-TERM CURRENT USE OF INSULIN (H): ICD-10-CM

## 2019-03-08 DIAGNOSIS — I25.118 ATHEROSCLEROSIS OF NATIVE CORONARY ARTERY OF NATIVE HEART WITH STABLE ANGINA PECTORIS (H): Primary | ICD-10-CM

## 2019-03-08 LAB
ALBUMIN SERPL-MCNC: 4 G/DL (ref 3.5–5.7)
ALBUMIN UR-MCNC: 100 MG/DL
ALP SERPL-CCNC: 63 U/L (ref 34–104)
ALT SERPL W P-5'-P-CCNC: 24 U/L (ref 7–52)
ANION GAP SERPL CALCULATED.3IONS-SCNC: 5 MMOL/L (ref 3–14)
APPEARANCE UR: CLEAR
AST SERPL W P-5'-P-CCNC: 22 U/L (ref 13–39)
BILIRUB SERPL-MCNC: 0.6 MG/DL (ref 0.3–1)
BILIRUB UR QL STRIP: NEGATIVE
BUN SERPL-MCNC: 16 MG/DL (ref 7–25)
CALCIUM SERPL-MCNC: 9.1 MG/DL (ref 8.6–10.3)
CHLORIDE SERPL-SCNC: 103 MMOL/L (ref 98–107)
CHOLEST SERPL-MCNC: 101 MG/DL
CO2 SERPL-SCNC: 27 MMOL/L (ref 21–31)
COLOR UR AUTO: YELLOW
CREAT SERPL-MCNC: 1.51 MG/DL (ref 0.7–1.3)
CREAT UR-MCNC: 122 MG/DL
ERYTHROCYTE [DISTWIDTH] IN BLOOD BY AUTOMATED COUNT: 13 % (ref 10–15)
GFR SERPL CREATININE-BSD FRML MDRD: 46 ML/MIN/{1.73_M2}
GLUCOSE SERPL-MCNC: 150 MG/DL (ref 70–105)
GLUCOSE UR STRIP-MCNC: 250 MG/DL
HBA1C MFR BLD: 7.7 % (ref 4–6)
HCT VFR BLD AUTO: 46.3 % (ref 40–53)
HDLC SERPL-MCNC: 36 MG/DL (ref 23–92)
HGB BLD-MCNC: 15 G/DL (ref 13.3–17.7)
HGB UR QL STRIP: NEGATIVE
KETONES UR STRIP-MCNC: NEGATIVE MG/DL
LDLC SERPL CALC-MCNC: 45 MG/DL
LEUKOCYTE ESTERASE UR QL STRIP: NEGATIVE
MCH RBC QN AUTO: 32.1 PG (ref 26.5–33)
MCHC RBC AUTO-ENTMCNC: 32.4 G/DL (ref 31.5–36.5)
MCV RBC AUTO: 99 FL (ref 78–100)
MICROALBUMIN UR-MCNC: 1280 MG/L
MICROALBUMIN/CREAT UR: 1049.18 MG/G CR (ref 0–17)
NITRATE UR QL: NEGATIVE
NONHDLC SERPL-MCNC: 65 MG/DL
PH UR STRIP: 6 PH (ref 5–9)
PLATELET # BLD AUTO: 195 10E9/L (ref 150–450)
POTASSIUM SERPL-SCNC: 4.1 MMOL/L (ref 3.5–5.1)
PROT SERPL-MCNC: 6.7 G/DL (ref 6.4–8.9)
RBC # BLD AUTO: 4.67 10E12/L (ref 4.4–5.9)
RBC #/AREA URNS AUTO: NORMAL /HPF
SODIUM SERPL-SCNC: 135 MMOL/L (ref 134–144)
SOURCE: ABNORMAL
SP GR UR STRIP: 1.02 (ref 1–1.03)
TRIGL SERPL-MCNC: 101 MG/DL
TSH SERPL DL<=0.05 MIU/L-ACNC: 0.62 IU/ML (ref 0.34–5.6)
UROBILINOGEN UR STRIP-ACNC: 0.2 EU/DL (ref 0.2–1)
WBC # BLD AUTO: 5.8 10E9/L (ref 4–11)
WBC #/AREA URNS AUTO: NORMAL /HPF

## 2019-03-08 PROCEDURE — 80061 LIPID PANEL: CPT | Performed by: INTERNAL MEDICINE

## 2019-03-08 PROCEDURE — 99214 OFFICE O/P EST MOD 30 MIN: CPT | Performed by: INTERNAL MEDICINE

## 2019-03-08 PROCEDURE — 36415 COLL VENOUS BLD VENIPUNCTURE: CPT | Performed by: INTERNAL MEDICINE

## 2019-03-08 PROCEDURE — 84443 ASSAY THYROID STIM HORMONE: CPT | Performed by: INTERNAL MEDICINE

## 2019-03-08 PROCEDURE — 85027 COMPLETE CBC AUTOMATED: CPT | Performed by: INTERNAL MEDICINE

## 2019-03-08 PROCEDURE — G0463 HOSPITAL OUTPT CLINIC VISIT: HCPCS

## 2019-03-08 PROCEDURE — 81001 URINALYSIS AUTO W/SCOPE: CPT | Performed by: INTERNAL MEDICINE

## 2019-03-08 PROCEDURE — 80053 COMPREHEN METABOLIC PANEL: CPT | Performed by: INTERNAL MEDICINE

## 2019-03-08 PROCEDURE — 82043 UR ALBUMIN QUANTITATIVE: CPT | Performed by: INTERNAL MEDICINE

## 2019-03-08 PROCEDURE — 83036 HEMOGLOBIN GLYCOSYLATED A1C: CPT | Performed by: INTERNAL MEDICINE

## 2019-03-08 RX ORDER — RANOLAZINE 500 MG/1
1000 TABLET, EXTENDED RELEASE ORAL 2 TIMES DAILY
Qty: 360 TABLET | Refills: 3 | Status: SHIPPED | OUTPATIENT
Start: 2019-03-08 | End: 2020-02-18

## 2019-03-08 ASSESSMENT — ANXIETY QUESTIONNAIRES
3. WORRYING TOO MUCH ABOUT DIFFERENT THINGS: NOT AT ALL
6. BECOMING EASILY ANNOYED OR IRRITABLE: NOT AT ALL
7. FEELING AFRAID AS IF SOMETHING AWFUL MIGHT HAPPEN: NOT AT ALL
2. NOT BEING ABLE TO STOP OR CONTROL WORRYING: NOT AT ALL
5. BEING SO RESTLESS THAT IT IS HARD TO SIT STILL: NOT AT ALL
GAD7 TOTAL SCORE: 0
1. FEELING NERVOUS, ANXIOUS, OR ON EDGE: NOT AT ALL
IF YOU CHECKED OFF ANY PROBLEMS ON THIS QUESTIONNAIRE, HOW DIFFICULT HAVE THESE PROBLEMS MADE IT FOR YOU TO DO YOUR WORK, TAKE CARE OF THINGS AT HOME, OR GET ALONG WITH OTHER PEOPLE: NOT DIFFICULT AT ALL

## 2019-03-08 ASSESSMENT — ENCOUNTER SYMPTOMS
ABDOMINAL PAIN: 0
WHEEZING: 1
CONFUSION: 0
FATIGUE: 1
ARTHRALGIAS: 1
WOUND: 0
ADENOPATHY: 0
CHOKING: 0
CHILLS: 1
CHEST TIGHTNESS: 1
HEMATURIA: 0
NECK PAIN: 1
COUGH: 1
BACK PAIN: 1
SHORTNESS OF BREATH: 1
FEVER: 1
MYALGIAS: 1
STRIDOR: 0
PALPITATIONS: 0
BRUISES/BLEEDS EASILY: 0
HEADACHES: 1
NECK STIFFNESS: 1

## 2019-03-08 ASSESSMENT — PATIENT HEALTH QUESTIONNAIRE - PHQ9
SUM OF ALL RESPONSES TO PHQ QUESTIONS 1-9: 0
5. POOR APPETITE OR OVEREATING: NOT AT ALL

## 2019-03-08 ASSESSMENT — PAIN SCALES - GENERAL: PAINLEVEL: SEVERE PAIN (7)

## 2019-03-08 NOTE — LETTER
March 8, 2019    [facility]  [address] [facility] [address]      Dear [facility] Wh[facility],    The result of your recent tests are included below:    Thyrotropin/thyroid levels are normal.  Continue current Synthroid dose.    See previous letter for diabetes instructions.    Results for orders placed or performed in visit on 03/08/19   Lipid Profile   Result Value Ref Range    Cholesterol 101 <200 mg/dL    Triglycerides 101 <150 mg/dL    HDL Cholesterol 36 23 - 92 mg/dL    LDL Cholesterol Calculated 45 <100 mg/dL    Non HDL Cholesterol 65 <130 mg/dL   Thyrotropin GH   Result Value Ref Range    Thyrotropin 0.62 0.34 - 5.60 IU/mL   Hemoglobin A1c   Result Value Ref Range    Hemoglobin A1C 7.7 (H) 4.0 - 6.0 %   CBC with platelets   Result Value Ref Range    WBC 5.8 4.0 - 11.0 10e9/L    RBC Count 4.67 4.4 - 5.9 10e12/L    Hemoglobin 15.0 13.3 - 17.7 g/dL    Hematocrit 46.3 40.0 - 53.0 %    MCV 99 78 - 100 fl    MCH 32.1 26.5 - 33.0 pg    MCHC 32.4 31.5 - 36.5 g/dL    RDW 13.0 10.0 - 15.0 %    Platelet Count 195 150 - 450 10e9/L   Comprehensive metabolic panel   Result Value Ref Range    Sodium 135 134 - 144 mmol/L    Potassium 4.1 3.5 - 5.1 mmol/L    Chloride 103 98 - 107 mmol/L    Carbon Dioxide 27 21 - 31 mmol/L    Anion Gap 5 3 - 14 mmol/L    Glucose 150 (H) 70 - 105 mg/dL    Urea Nitrogen 16 7 - 25 mg/dL    Creatinine 1.51 (H) 0.70 - 1.30 mg/dL    GFR Estimate 46 (L) >60 mL/min/[1.73_m2]    GFR Estimate If Black 56 (L) >60 mL/min/[1.73_m2]    Calcium 9.1 8.6 - 10.3 mg/dL    Bilirubin Total 0.6 0.3 - 1.0 mg/dL    Albumin 4.0 3.5 - 5.7 g/dL    Protein Total 6.7 6.4 - 8.9 g/dL    Alkaline Phosphatase 63 34 - 104 U/L    ALT 24 7 - 52 U/L    AST 22 13 - 39 U/L   *UA reflex to Microscopic   Result Value Ref Range    Color Urine Yellow     Appearance Urine Clear     Glucose Urine 250 (A) NEG^Negative mg/dL    Bilirubin Urine Negative NEG^Negative    Ketones Urine Negative NEG^Negative mg/dL    Specific  Gravity Urine 1.025 1.000 - 1.030    Blood Urine Negative NEG^Negative    pH Urine 6.0 5.0 - 9.0 pH    Protein Albumin Urine 100 (A) NEG^Negative mg/dL    Urobilinogen Urine 0.2 0.2 - 1.0 EU/dL    Nitrite Urine Negative NEG^Negative    Leukocyte Esterase Urine Negative NEG^Negative    Source Unspecified Urine    Urine Microscopic   Result Value Ref Range    WBC Urine 0 - 5 OTO5^0 - 5 /HPF    RBC Urine O - 2 OTO2^O - 2 /HPF       If you have any further questions or problems, please contact my office at [phone] and schedule an appointment.    Clinic : [phone]  Appointment line: [phone]     Thank you,    Jorge Barnett MD    Internal Medicine  [facility]     Reviewed and electronically signed by provider.

## 2019-03-08 NOTE — PROGRESS NOTES
"Nursing Notes:   Barbara Connor LPN  3/8/2019 11:55 AM  Signed  Patient presents to the clinic for medication discussion Ranexa.    Previous A1C is at goal of <8  Lab Results   Component Value Date    A1C 7.5 11/21/2018    A1C 8.0 08/09/2018    A1C 7.8 05/03/2018     Urine microalbumin:creatine: n/a  Foot exam unknown-declines today  Eye exam 02/2019    Tobacco User no  Patient is on a daily aspirin  Patient is not on a Statin.  Blood pressure today of:     BP Readings from Last 1 Encounters:   01/18/19 126/76      is at the goal of <139/89 for diabetics.  Chief Complaint   Patient presents with     Recheck Medication       Initial /80 (BP Location: Right arm, Patient Position: Sitting, Cuff Size: Adult Regular)   Pulse 64   Temp 97.5  F (36.4  C) (Tympanic)   Resp 20   Wt 91.3 kg (201 lb 6 oz)   BMI 31.07 kg/m    Estimated body mass index is 31.07 kg/m  as calculated from the following:    Height as of 1/7/19: 1.715 m (5' 7.5\").    Weight as of this encounter: 91.3 kg (201 lb 6 oz).  Medication Reconciliation: complete    Barbara Connor LPN            Nursing note reviewed with patient.  Accuracy and completeness verified.   Mr. Whittaker is a 69 year old male who:  Patient presents with:  Recheck Medication      ICD-10-CM    1. Atherosclerosis of native coronary artery of native heart with stable angina pectoris (H) I25.118 ranolazine (RANEXA) 500 MG 12 hr tablet     Urine Microscopic   2. Controlled type 2 diabetes mellitus with stage 3 chronic kidney disease, with long-term current use of insulin (H) E11.22 Hemoglobin A1c    N18.3 CBC with platelets    Z79.4 Comprehensive metabolic panel     *UA reflex to Microscopic     Albumin Random Urine Quantitative with Creat Ratio   3. Mixed hyperlipidemia E78.2 Lipid Profile   4. Essential hypertension I10    5. Hypothyroidism due to acquired atrophy of thyroid E03.4 Thyrotropin GH     HPI  Coronary artery disease with stable angina pectoris, ongoing.  Needs " refills of Ranexa.     About 1 month ago - was in bathtub and got angina that lasted 30 minutes - (had been out of Ranexa for a couple days).     Continues on Plavix, Crestor    Diabetes, has been well controlled.  Uses insulin pump.  Needs labs.    Stage III chronic kidney disease, discussed avoidance of NSAIDs.  He does use diclofenac due to chronic arthritis pain.  He did not find Tylenol helpful.  Recheck labs.    Mixed hyperlipidemia, currently on Crestor.  Seems to be tolerating okay.  Recheck labs.    Hypertension, initial blood pressures were elevated.  Repeat was improved.  He typically gets blood pressure readings in the 130s systolic at home after sitting and resting for a while.  He has been having more arthritic pain recently and his blood pressures go up with that.    Hypothyroidism, taking oral replacement.  Check labs.  Seems to be doing well.  No side effects reported.    Functional Capacity: less than or about 4 METS. + gets angina (gets quickly if carrying groceries in).     Review of Systems   Constitutional: Positive for chills, fatigue and fever.   HENT: Negative for congestion.    Eyes: Negative for visual disturbance.   Respiratory: Positive for cough, chest tightness, shortness of breath and wheezing. Negative for choking and stridor.    Cardiovascular: Positive for chest pain (chronic, stable) and leg swelling. Negative for palpitations.        + claudication.   + intermittent Angina   Gastrointestinal: Negative for abdominal pain.   Genitourinary: Negative for hematuria.   Musculoskeletal: Positive for arthralgias, back pain, gait problem, myalgias, neck pain and neck stiffness.   Skin: Negative for rash and wound.   Neurological: Positive for headaches. Negative for syncope.   Hematological: Negative for adenopathy. Does not bruise/bleed easily.   Psychiatric/Behavioral: Negative for confusion.        REMINGTON:   REMINGTON-7 SCORE 1/7/2019 1/18/2019 3/8/2019   Total Score 0 0 0     PHQ9:  PHQ-9  SCORE 1/7/2019 1/18/2019 3/8/2019   PHQ-9 Total Score 0 0 0       I have personally reviewed the past medical history, past surgical history, medications, allergies, family and social history as listed below.     Allergies   Allergen Reactions     Famotidine Other (See Comments)     + Caused Headache and Rash -- tolerated Ranitidine and worked well.     Gabapentin      Other reaction(s): Mental Status Change     Hydrocortisone Other (See Comments)     Burning and stinging sensation with Hydrocortisone - doesn't help itching or rash -- tolerated Desoximetasone cream and worked well.      Atorvastatin Hives     Lisinopril Cough     No Clinical Screening - See Comments Hives     Chlorine water     Norvasc [Amlodipine] Other (See Comments)     -- can't remember, didn't tolerate.      Pregabalin      Other reaction(s): Mental Status Change     Valdecoxib Swelling     Insulin Aspart Rash       Current Outpatient Medications   Medication Sig Dispense Refill     albuterol (PROAIR HFA/PROVENTIL HFA/VENTOLIN HFA) 108 (90 BASE) MCG/ACT Inhaler Inhale 1-2 puffs into the lungs every 4 hours as needed for shortness of breath / dyspnea or wheezing 1 Inhaler 1     aspirin (GOODSENSE ASPIRIN) 325 MG tablet Take  by mouth.       blood glucose monitoring (ONETOUCH ULTRA) test strip Dispense test strips covered by the patient insurance. Test 10 times per day.       Blood Glucose Monitoring Suppl (GLUCOCOM BLOOD GLUCOSE MONITOR) WAQAS Dispense glucose meter, test strips and lancets covered by the patient insurance. Test 10 times per day.       clopidogrel (PLAVIX) 75 MG tablet Take 1 tablet (75 mg) by mouth daily 90 tablet 3     [employer] ALCOHOL SWABS PADS For home use.       desoximetasone (TOPICORT) 0.25 % external cream APPLY  CREAM EXTERNALLY TWICE DAILY 180 g 3     dextroamphetamine (DEXTROSTAT) 10 MG tablet Take 1 tablet (10 mg) by mouth 4 times daily 360 tablet 0     diclofenac (VOLTAREN) 75 MG EC tablet TAKE ONE TABLET BY MOUTH 4  TIMES DAILY 360 tablet 3     docusate sodium (COLACE) 100 MG capsule Take 1-2 capsules by mouth 2 times daily as needed for constipation prevention       Flaxseed, Linseed, (FLAXSEED OIL) 1000 MG CAPS Take  by mouth.       furosemide (LASIX) 40 MG tablet Take 2-4 tablets ( mg) by mouth daily Or as instructed for leg swelling 90 tablet 3     HUMALOG 100 UNIT/ML injection INJECT UNDER THE SKIN USING INSULIN PUMP. MAX DAILY DOSE  UNITS 180 mL 3     hydrALAZINE (APRESOLINE) 25 MG tablet Take 1-2 tablets (25-50 mg) by mouth 4 times daily - Take lowest effective dose for blood pressure management 360 tablet 11     HYDROcodone-acetaminophen (NORCO) 5-325 MG per tablet Take 1 tablet by mouth every 6 hours as needed for severe pain 60 tablet 0     Insulin Pen Needle (PEN NEEDLES) 29G X 12MM MISC For administering insulin at home.       ipratropium (ATROVENT HFA) 17 MCG/ACT Inhaler Inhale 2 puffs into the lungs 4 times daily       Ipratropium-Albuterol (COMBIVENT RESPIMAT)  MCG/ACT inhaler Inhale 1 puff into the lungs 4 times daily 1 Inhaler 0     irbesartan (AVAPRO) 150 MG tablet Take 1 tablet (150 mg) by mouth 2 times daily -- needs 2 smaller pills daily 180 tablet 3     IV Sets-Tubing (SOLUTION ADMINISTRATION SET) MISC As directed.       levothyroxine (SYNTHROID/LEVOTHROID) 125 MCG tablet TAKE ONE TABLET BY MOUTH ONCE DAILY IN THE MORNING 90 tablet 2     methylphenidate (RITALIN) 10 MG tablet Take 1 tablet (10 mg) by mouth 4 times daily 360 tablet 0     metoprolol succinate (TOPROL-XL) 50 MG 24 hr tablet Take 1 tablet (50 mg) by mouth 2 times daily 180 tablet 3     nitroGLYcerin (NITROSTAT) 0.4 MG sublingual tablet Place 1 tablet under the tongue every 5 minutes as needed for chest pain       Omega-3 Fatty Acids (FISH OIL PO) Take  by mouth.       PSYLLIUM PO Take 1 tsp. by mouth 3 times daily - for constipation prevention       ranitidine (ZANTAC) 150 MG tablet Take 1 tablet (150 mg) by mouth 2 times  daily 180 tablet 3     ranolazine (RANEXA) 500 MG 12 hr tablet Take 2 tablets (1,000 mg) by mouth 2 times daily - cancel other Rx - give 3 month supply 360 tablet 3     rosuvastatin (CRESTOR) 10 MG tablet TAKE ONE TABLET BY MOUTH TWICE DAILY 180 tablet 3     Saline GEL Spray 1 spray in nostril every hour as needed For Nasal Dryness       thin (NO BRAND SPECIFIED) lancets Test 10 times per day.          Patient Active Problem List    Diagnosis Date Noted     Degeneration of cervical intervertebral disc 01/31/2018     Priority: Medium     Hypothyroidism due to acquired atrophy of thyroid 01/31/2018     Priority: Medium     Mixed hyperlipidemia 01/31/2018     Priority: Medium     Primary narcolepsy without cataplexy 01/31/2018     Priority: Medium     Overview:   Total dose recommended by pulmonology he is dextro- amphetamine 40 mg plus   methylphenidate 40 mg in divided doses per day.  Total of 80 mg of short   acting amphetamines daily.       Osteoarthritis of spine 01/31/2018     Priority: Medium     Peptic ulcer disease 01/31/2018     Priority: Medium     Neuroforaminal stenosis of lumbar spine 01/03/2018     Priority: Medium     Radicular low back pain 01/03/2018     Priority: Medium     Acute bilateral low back pain with bilateral sciatica 10/27/2017     Priority: Medium     Chronic low back pain 08/08/2017     Priority: Medium     Intermittent constipation 08/08/2017     Priority: Medium     Skin rash 08/01/2017     Priority: Medium     Controlled type 2 diabetes mellitus with stage 3 chronic kidney disease, with long-term current use of insulin (H) 03/30/2017     Priority: Medium     Erectile dysfunction 09/26/2016     Priority: Medium     Trigger point with back pain 07/14/2016     Priority: Medium     Trigger point with neck pain 07/14/2016     Priority: Medium     Insulin pump in place 03/10/2016     Priority: Medium     Myofacial muscle pain 12/17/2015     Priority: Medium     Pain medication agreement -  8/9/2018 12/17/2015     Priority: Medium     Trigger point of extremity 12/17/2015     Priority: Medium     Greater trochanteric bursitis of left hip 06/24/2015     Priority: Medium     Blister of toe of right foot 12/09/2014     Priority: Medium     Cervical stenosis of spinal canal 06/17/2014     Priority: Medium     Degenerative disc disease, cervical 06/17/2014     Priority: Medium     Facet arthritis of cervical region (H) 06/17/2014     Priority: Medium     Cervical radicular pain 06/05/2014     Priority: Medium     Left arm numbness 06/05/2014     Priority: Medium     Left atrial enlargement 01/23/2014     Priority: Medium     Chronic diastolic heart failure (H) 01/10/2014     Priority: Medium     Overview:   1/20/2014 ECHO FINAL IMPRESSION:   1. Normal left ventricular size and systolic function. Estimated ejection fraction 65%.   2. Mild increase in wall thickness of the ventricular septum with hypokinesis of the basilar segment of the ventricular septum and inferior wall.   3. Mild to moderate left atrial enlargement.   4. Trace tricuspid regurgitation.   5. Mild left ventricular diastolic dysfunction.        CKD (chronic kidney disease) stage 3, GFR 30-59 ml/min (H) 01/10/2014     Priority: Medium     Essential hypertension 01/10/2014     Priority: Medium     Myalgia 01/10/2014     Priority: Medium     Old inferior wall myocardial infarction 01/10/2014     Priority: Medium     Platelet inhibition due to Plavix 01/10/2014     Priority: Medium     S/P CABG x 2 01/10/2014     Priority: Medium     S/P coronary artery stent placement 01/10/2014     Priority: Medium     Painful diabetic neuropathy (H) 04/22/2013     Priority: Medium     Esophageal reflux 05/17/2012     Priority: Medium     Obesity 05/17/2012     Priority: Medium     Diastasis of muscle 10/06/2011     Priority: Medium     Coronary atherosclerosis 09/08/2011     Priority: Medium     Psychosexual dysfunction with inhibited sexual excitement  06/30/2011     Priority: Medium     Angina pectoris (H) 12/10/2010     Priority: Medium     Edema 10/25/2010     Priority: Medium     Chest pain 02/15/2007     Priority: Medium     Overview:   IMO Update 10/11       Atherosclerosis of native coronary artery of native heart with stable angina pectoris (H) 02/15/2007     Priority: Medium     Overview:   IMO Update 10/11       Other specified forms of chronic ischemic heart disease 02/15/2007     Priority: Medium     Peripheral vascular disease (H) 02/15/2007     Priority: Medium     Overview:   IMO Update 10/11       Postsurgical percutaneous transluminal coronary angioplasty status 02/15/2007     Priority: Medium     Overview:   IMO Update 10/11       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     Gastritis without bleeding     No Comments Provided     Heart disease     Multiple coronary stents     Narcolepsy without cataplexy     No Comments Provided     Other injury of unspecified body region, initial encounter (CODE)     11/2006,Right calf hematoma     Rheumatic fever without heart involvement     Rheumatic fever as a child without valvular heart disease     Past Surgical History:   Procedure Laterality Date     ANGIOPLASTY      12/00, 04/04/04,Repeat angioplasty with lt internal mammary to the lt anterior descending and lt radial artery from aorta to the diagonal.     ANGIOPLASTY      10/01,Angioplasty with 2 vessel CABG the following week from the lt internal mammary to the lt anterior descending and right radial free graft     ANGIOPLASTY      04/2003     ARTHROSCOPY SHOULDER ROTATOR CUFF REPAIR      02/06/2006,Left rotator cuff repair and bone spur removal by Dr. Giraldo in [place]      COLONOSCOPY      1999     COLONOSCOPY  01/08/2016 01/08/2016,-- Dr. De La Cruz -- polypectomy     OTHER SURGICAL HISTORY      09/03,579140,IR ANGIOGRAM FEMORAL/EXTREMITY ([place]),Unremarkable angiogram at [facility]     OTHER SURGICAL HISTORY      10/05/2004,,PTCA,Angioplasty     OTHER SURGICAL HISTORY      02/2005,,PTCA,Angioplasty with stent.     OTHER SURGICAL HISTORY      03/02/2005,,PTCA,Angioplasty with stent.     OTHER SURGICAL HISTORY      09/23/2005,,PTCA,Angioplasty without stent     OTHER SURGICAL HISTORY      2/26/2007,614051,IR ANGIOGRAM FEMORAL/EXTREMITY ([place]),Unremarkable coronary angiogram at [facility].     OTHER SURGICAL HISTORY      1967,SUR38,APPENDECTOMY OPEN     RELEASE CARPAL TUNNEL      11/15/01,Right carpal tunnel release by Dr. Mace     RELEASE CARPAL TUNNEL      01/2004,Left carpal tunnel release     Social History     Socioeconomic History     Marital status:      Spouse name: None     Number of children: None     Years of education: None     Highest education level: None   Occupational History     None   Social Needs     Financial resource strain: None     Food insecurity:     Worry: None     Inability: None     Transportation needs:     Medical: None     Non-medical: None   Tobacco Use     Smoking status: Never Smoker     Smokeless tobacco: Never Used   Substance and Sexual Activity     Alcohol use: No     Alcohol/week: 0.0 oz     Drug use: No     Sexual activity: Yes     Partners: Female   Lifestyle     Physical activity:     Days per week: None     Minutes per session: None     Stress: None   Relationships     Social connections:     Talks on phone: None     Gets together: None     Attends Synagogue service: None     Active member of club or organization: None     Attends meetings of clubs or organizations: None     Relationship status: None     Intimate partner violence:     Fear of current or ex partner: None     Emotionally abused: None     Physically abused: None     Forced  "sexual activity: None   Other Topics Concern     Parent/sibling w/ CABG, MI or angioplasty before 65F 55M? Not Asked   Social History Narrative    He is on Social Security Disability for coronary artery disease and cervical arthritis and disk disease.   Jakobd obed.  No tobacco.     Family History   Problem Relation Age of Onset     Heart Disease Mother         Heart Disease,Heart problems     Heart Disease Other         Heart Disease,Heart problems     Heart Disease Other         Heart Disease,Heart problems     Ankylosing Spondylitis Son         Ankylosing spondylitis,Ankylosing spondylitis     Other - See Comments Brother          with sudden death following shoulder surgery 2012 -- was off his Plavix for 10 days pre-operatively.     Ankylosing Spondylitis Daughter         Ankylosing spondylitis,-- Dx 2017       EXAM:   Vitals:    19 1145   BP: 138/80   BP Location: Right arm   Patient Position: Sitting   Cuff Size: Adult Regular   Pulse: 64   Resp: 20   Temp: 97.5  F (36.4  C)   TempSrc: Tympanic   Weight: 91.3 kg (201 lb 6 oz)       Current Pain Score: Severe Pain (7)     BP Readings from Last 3 Encounters:   19 138/80   19 126/76   19 162/80      Wt Readings from Last 3 Encounters:   19 91.3 kg (201 lb 6 oz)   19 90.9 kg (200 lb 8 oz)   19 89.5 kg (197 lb 6 oz)      Estimated body mass index is 31.07 kg/m  as calculated from the following:    Height as of 19: 1.715 m (5' 7.5\").    Weight as of this encounter: 91.3 kg (201 lb 6 oz).     Physical Exam   Constitutional: He is oriented to person, place, and time. He appears well-developed and well-nourished.   HENT:   Head: Normocephalic and atraumatic.   Eyes: No scleral icterus.   Cardiovascular: Normal rate and regular rhythm.   Pulmonary/Chest: Effort normal. He has no wheezes.   + dry cough noted   Abdominal: Soft. There is no tenderness.   + insulin pump noted   Musculoskeletal: He exhibits edema. "   Lymphadenopathy:     He has no cervical adenopathy.   Neurological: He is alert and oriented to person, place, and time.   Skin: Skin is warm and dry.   Psychiatric: He has a normal mood and affect.      Procedures   INVESTIGATIONS:  Results for orders placed or performed in visit on 03/08/19   Lipid Profile   Result Value Ref Range    Cholesterol 101 <200 mg/dL    Triglycerides 101 <150 mg/dL    HDL Cholesterol 36 23 - 92 mg/dL    LDL Cholesterol Calculated 45 <100 mg/dL    Non HDL Cholesterol 65 <130 mg/dL   Hemoglobin A1c   Result Value Ref Range    Hemoglobin A1C 7.7 (H) 4.0 - 6.0 %   CBC with platelets   Result Value Ref Range    WBC 5.8 4.0 - 11.0 10e9/L    RBC Count 4.67 4.4 - 5.9 10e12/L    Hemoglobin 15.0 13.3 - 17.7 g/dL    Hematocrit 46.3 40.0 - 53.0 %    MCV 99 78 - 100 fl    MCH 32.1 26.5 - 33.0 pg    MCHC 32.4 31.5 - 36.5 g/dL    RDW 13.0 10.0 - 15.0 %    Platelet Count 195 150 - 450 10e9/L   Comprehensive metabolic panel   Result Value Ref Range    Sodium 135 134 - 144 mmol/L    Potassium 4.1 3.5 - 5.1 mmol/L    Chloride 103 98 - 107 mmol/L    Carbon Dioxide 27 21 - 31 mmol/L    Anion Gap 5 3 - 14 mmol/L    Glucose 150 (H) 70 - 105 mg/dL    Urea Nitrogen 16 7 - 25 mg/dL    Creatinine 1.51 (H) 0.70 - 1.30 mg/dL    GFR Estimate 46 (L) >60 mL/min/[1.73_m2]    GFR Estimate If Black 56 (L) >60 mL/min/[1.73_m2]    Calcium 9.1 8.6 - 10.3 mg/dL    Bilirubin Total 0.6 0.3 - 1.0 mg/dL    Albumin 4.0 3.5 - 5.7 g/dL    Protein Total 6.7 6.4 - 8.9 g/dL    Alkaline Phosphatase 63 34 - 104 U/L    ALT 24 7 - 52 U/L    AST 22 13 - 39 U/L   *UA reflex to Microscopic   Result Value Ref Range    Color Urine Yellow     Appearance Urine Clear     Glucose Urine 250 (A) NEG^Negative mg/dL    Bilirubin Urine Negative NEG^Negative    Ketones Urine Negative NEG^Negative mg/dL    Specific Gravity Urine 1.025 1.000 - 1.030    Blood Urine Negative NEG^Negative    pH Urine 6.0 5.0 - 9.0 pH    Protein Albumin Urine 100 (A)  NEG^Negative mg/dL    Urobilinogen Urine 0.2 0.2 - 1.0 EU/dL    Nitrite Urine Negative NEG^Negative    Leukocyte Esterase Urine Negative NEG^Negative    Source Unspecified Urine    Urine Microscopic   Result Value Ref Range    WBC Urine 0 - 5 OTO5^0 - 5 /HPF    RBC Urine O - 2 OTO2^O - 2 /HPF       ASSESSMENT AND PLAN:  Problem List Items Addressed This Visit        Endocrine    Controlled type 2 diabetes mellitus with stage 3 chronic kidney disease, with long-term current use of insulin (H)    Hypothyroidism due to acquired atrophy of thyroid    Mixed hyperlipidemia       Circulatory    Coronary atherosclerosis - Primary    Relevant Medications    ranolazine (RANEXA) 500 MG 12 hr tablet    Other Relevant Orders    Urine Microscopic (Completed)    Essential hypertension        reviewed diet, exercise and weight control, recommended sodium restriction, cardiovascular risk and specific lipid/LDL goals reviewed, specific diabetic recommendations low cholesterol diet, weight control and daily exercise discussed, use of aspirin to prevent MI and TIA's discussed  -- Expected clinical course discussed    -- Medications and their side effects discussed    Patient Instructions     Medications refilled.     Labs today.     Blood pressures at home have been okay. Goal less than 140.     Return for Diabetes labs and clinic follow-up appointment every 3 to 4 months.  --- (Go for about 91 to 100 days)    Aspects of Diabetes we can improve:  Hemoglobin A1c Lab Results   Component Value Date    A1C 7.5 11/21/2018    A1C 8.0 08/09/2018    A1C 7.8 05/03/2018    Goal range is under 8. Best is 6.5 to 7   Blood Pressure 138/80 Goal to keep less than 140/90   Tobacco  reports that  has never smoked. he has never used smokeless tobacco. Goal to abstain from tobacco   Aspirin or Plavix Plavix Aspirin or Plavix reduces risk of heart disease and stroke   ACE/ARB Irbesartan These medications reduce risk of kidney disease   Cholesterol Crestor  Statins reduce risk of heart disease and stroke   Eye Exam -- Do Yearly -- Annual diabetic eye exam   Healthy weight Body mass index is 31.07 kg/m . Goal BMI under 30, best is under 25.      -- Trying to exercise daily (goal at least 20 min/day) with moderate aerobic activity   -- Eat healthy (resources from ADA at http://www.diabetes.org/)   -- Taking good care of my feet. Consider seeing the Podiatrist   -- Check blood sugars as directed, record in log book and bring to every appointment      Schedule lab only appointment --- A few days AFTER: 6/6/19   Schedule clinic appointment with Dr. Barnett -- Same day as labs, or 1-2 days later.     Insurance companies are now grading you and I on your blood sugar control -- Goal is to get your A1c down to 7.9% or lower and NO Smoking!    -- Medicare and most insurance companies, will only cover Hemoglobin A1c labs to be rechecked every 91+ days.      Hemoglobin A1C   Date Value Ref Range Status   11/21/2018 7.5 (H) 4.0 - 6.0 % Final   08/09/2018 8.0 (H) 4.0 - 6.0 % Final       Next follow-up appointment with Dr. Barnett should be scheduled:  -- Approximately a few days AFTER: 6/6/19      Jorge Barnett MD  Internal Medicine  [facility] and [facility]     Portions of this note were dictated using speech recognition software. The note has been proofread but errors in the text may have been overlooked. Please contact me if there are any concerns regarding the accuracy of the dictation.

## 2019-03-08 NOTE — LETTER
March 8, 2019    Moses Whittaker  [address] [facility] [address]      Dear Moses Whittaker,    The result of your recent tests are included below:    Diabetes is controlled but your hemoglobin A1c has gone up some.....      Cholesterol levels look good.      To help with weight loss and improve blood sugar control....    -- Try to avoid Carbohydrates as much as possible -- breads, pasta, baked goods, cakes, oatmeal, cold cereal, potatoes.   These are turned to sugar in one metabolic conversion, cause insulin secretion and increased fat deposition / weight gain.      -- Eat more lean meats, proteins, eggs, nuts, vegetables.        Results for orders placed or performed in visit on 03/08/19   Lipid Profile   Result Value Ref Range    Cholesterol 101 <200 mg/dL    Triglycerides 101 <150 mg/dL    HDL Cholesterol 36 23 - 92 mg/dL    LDL Cholesterol Calculated 45 <100 mg/dL    Non HDL Cholesterol 65 <130 mg/dL   Hemoglobin A1c   Result Value Ref Range    Hemoglobin A1C 7.7 (H) 4.0 - 6.0 %   CBC with platelets   Result Value Ref Range    WBC 5.8 4.0 - 11.0 10e9/L    RBC Count 4.67 4.4 - 5.9 10e12/L    Hemoglobin 15.0 13.3 - 17.7 g/dL    Hematocrit 46.3 40.0 - 53.0 %    MCV 99 78 - 100 fl    MCH 32.1 26.5 - 33.0 pg    MCHC 32.4 31.5 - 36.5 g/dL    RDW 13.0 10.0 - 15.0 %    Platelet Count 195 150 - 450 10e9/L   Comprehensive metabolic panel   Result Value Ref Range    Sodium 135 134 - 144 mmol/L    Potassium 4.1 3.5 - 5.1 mmol/L    Chloride 103 98 - 107 mmol/L    Carbon Dioxide 27 21 - 31 mmol/L    Anion Gap 5 3 - 14 mmol/L    Glucose 150 (H) 70 - 105 mg/dL    Urea Nitrogen 16 7 - 25 mg/dL    Creatinine 1.51 (H) 0.70 - 1.30 mg/dL    GFR Estimate 46 (L) >60 mL/min/[1.73_m2]    GFR Estimate If Black 56 (L) >60 mL/min/[1.73_m2]    Calcium 9.1 8.6 - 10.3 mg/dL    Bilirubin Total 0.6 0.3 - 1.0 mg/dL    Albumin 4.0 3.5 - 5.7 g/dL    Protein Total 6.7 6.4 - 8.9 g/dL    Alkaline Phosphatase 63 34 - 104 U/L    ALT  24 7 - 52 U/L    AST 22 13 - 39 U/L   *UA reflex to Microscopic   Result Value Ref Range    Color Urine Yellow     Appearance Urine Clear     Glucose Urine 250 (A) NEG^Negative mg/dL    Bilirubin Urine Negative NEG^Negative    Ketones Urine Negative NEG^Negative mg/dL    Specific Gravity Urine 1.025 1.000 - 1.030    Blood Urine Negative NEG^Negative    pH Urine 6.0 5.0 - 9.0 pH    Protein Albumin Urine 100 (A) NEG^Negative mg/dL    Urobilinogen Urine 0.2 0.2 - 1.0 EU/dL    Nitrite Urine Negative NEG^Negative    Leukocyte Esterase Urine Negative NEG^Negative    Source Unspecified Urine    Urine Microscopic   Result Value Ref Range    WBC Urine 0 - 5 OTO5^0 - 5 /HPF    RBC Urine O - 2 OTO2^O - 2 /HPF       If you have any further questions or problems, please contact my office at [phone] and schedule an appointment.    Clinic : [phone]  Appointment line: [phone]     Thank you,    Jorge Barnett MD    Internal Medicine  [facility]     Reviewed and electronically signed by provider.

## 2019-03-08 NOTE — PATIENT INSTRUCTIONS
Medications refilled.     Labs today.     Blood pressures at home have been okay. Goal less than 140.     Return for Diabetes labs and clinic follow-up appointment every 3 to 4 months.  --- (Go for about 91 to 100 days)    Aspects of Diabetes we can improve:  Hemoglobin A1c Lab Results   Component Value Date    A1C 7.5 11/21/2018    A1C 8.0 08/09/2018    A1C 7.8 05/03/2018    Goal range is under 8. Best is 6.5 to 7   Blood Pressure 138/80 Goal to keep less than 140/90   Tobacco  reports that  has never smoked. he has never used smokeless tobacco. Goal to abstain from tobacco   Aspirin or Plavix Plavix Aspirin or Plavix reduces risk of heart disease and stroke   ACE/ARB Irbesartan These medications reduce risk of kidney disease   Cholesterol Crestor Statins reduce risk of heart disease and stroke   Eye Exam -- Do Yearly -- Annual diabetic eye exam   Healthy weight Body mass index is 31.07 kg/m . Goal BMI under 30, best is under 25.      -- Trying to exercise daily (goal at least 20 min/day) with moderate aerobic activity   -- Eat healthy (resources from ADA at http://www.diabetes.org/)   -- Taking good care of my feet. Consider seeing the Podiatrist   -- Check blood sugars as directed, record in log book and bring to every appointment      Schedule lab only appointment --- A few days AFTER: 6/6/19   Schedule clinic appointment with Dr. Barnett -- Same day as labs, or 1-2 days later.     Insurance companies are now grading you and I on your blood sugar control -- Goal is to get your A1c down to 7.9% or lower and NO Smoking!    -- Medicare and most insurance companies, will only cover Hemoglobin A1c labs to be rechecked every 91+ days.      Hemoglobin A1C   Date Value Ref Range Status   11/21/2018 7.5 (H) 4.0 - 6.0 % Final   08/09/2018 8.0 (H) 4.0 - 6.0 % Final       Next follow-up appointment with Dr. Barnett should be scheduled:  -- Approximately a few days AFTER: 6/6/19

## 2019-03-08 NOTE — NURSING NOTE
"Patient presents to the clinic for medication discussion Ranexa.    Previous A1C is at goal of <8  Lab Results   Component Value Date    A1C 7.5 11/21/2018    A1C 8.0 08/09/2018    A1C 7.8 05/03/2018     Urine microalbumin:creatine: n/a  Foot exam unknown-declines today  Eye exam 02/2019    Tobacco User no  Patient is on a daily aspirin  Patient is not on a Statin.  Blood pressure today of:     BP Readings from Last 1 Encounters:   01/18/19 126/76      is at the goal of <139/89 for diabetics.  Chief Complaint   Patient presents with     Recheck Medication       Initial /80 (BP Location: Right arm, Patient Position: Sitting, Cuff Size: Adult Regular)   Pulse 64   Temp 97.5  F (36.4  C) (Tympanic)   Resp 20   Wt 91.3 kg (201 lb 6 oz)   BMI 31.07 kg/m   Estimated body mass index is 31.07 kg/m  as calculated from the following:    Height as of 1/7/19: 1.715 m (5' 7.5\").    Weight as of this encounter: 91.3 kg (201 lb 6 oz).  Medication Reconciliation: complete    Barbara Connor LPN            "

## 2019-03-08 NOTE — LETTER
March 11, 2019    Moses Whittaker  [address] [facility] [address]      Dear Moses Whittaker,    The result of your recent tests are included below:    See previous letter for additional details.    Urine protein levels have gone up significantly compared to 3 months ago.    --It is extremely important to have good control of your diabetes and blood pressure.    Results for orders placed or performed in visit on 03/08/19   Lipid Profile   Result Value Ref Range    Cholesterol 101 <200 mg/dL    Triglycerides 101 <150 mg/dL    HDL Cholesterol 36 23 - 92 mg/dL    LDL Cholesterol Calculated 45 <100 mg/dL    Non HDL Cholesterol 65 <130 mg/dL   Thyrotropin GH   Result Value Ref Range    Thyrotropin 0.62 0.34 - 5.60 IU/mL   Hemoglobin A1c   Result Value Ref Range    Hemoglobin A1C 7.7 (H) 4.0 - 6.0 %   CBC with platelets   Result Value Ref Range    WBC 5.8 4.0 - 11.0 10e9/L    RBC Count 4.67 4.4 - 5.9 10e12/L    Hemoglobin 15.0 13.3 - 17.7 g/dL    Hematocrit 46.3 40.0 - 53.0 %    MCV 99 78 - 100 fl    MCH 32.1 26.5 - 33.0 pg    MCHC 32.4 31.5 - 36.5 g/dL    RDW 13.0 10.0 - 15.0 %    Platelet Count 195 150 - 450 10e9/L   Comprehensive metabolic panel   Result Value Ref Range    Sodium 135 134 - 144 mmol/L    Potassium 4.1 3.5 - 5.1 mmol/L    Chloride 103 98 - 107 mmol/L    Carbon Dioxide 27 21 - 31 mmol/L    Anion Gap 5 3 - 14 mmol/L    Glucose 150 (H) 70 - 105 mg/dL    Urea Nitrogen 16 7 - 25 mg/dL    Creatinine 1.51 (H) 0.70 - 1.30 mg/dL    GFR Estimate 46 (L) >60 mL/min/[1.73_m2]    GFR Estimate If Black 56 (L) >60 mL/min/[1.73_m2]    Calcium 9.1 8.6 - 10.3 mg/dL    Bilirubin Total 0.6 0.3 - 1.0 mg/dL    Albumin 4.0 3.5 - 5.7 g/dL    Protein Total 6.7 6.4 - 8.9 g/dL    Alkaline Phosphatase 63 34 - 104 U/L    ALT 24 7 - 52 U/L    AST 22 13 - 39 U/L   *UA reflex to Microscopic   Result Value Ref Range    Color Urine Yellow     Appearance Urine Clear     Glucose Urine 250 (A) NEG^Negative mg/dL     Bilirubin Urine Negative NEG^Negative    Ketones Urine Negative NEG^Negative mg/dL    Specific Gravity Urine 1.025 1.000 - 1.030    Blood Urine Negative NEG^Negative    pH Urine 6.0 5.0 - 9.0 pH    Protein Albumin Urine 100 (A) NEG^Negative mg/dL    Urobilinogen Urine 0.2 0.2 - 1.0 EU/dL    Nitrite Urine Negative NEG^Negative    Leukocyte Esterase Urine Negative NEG^Negative    Source Unspecified Urine    Albumin Random Urine Quantitative with Creat Ratio   Result Value Ref Range    Creatinine Urine 122 mg/dL    Albumin Urine mg/L 1,280 mg/L    Albumin Urine mg/g Cr 1,049.18 (H) 0 - 17 mg/g Cr   Urine Microscopic   Result Value Ref Range    WBC Urine 0 - 5 OTO5^0 - 5 /HPF    RBC Urine O - 2 OTO2^O - 2 /HPF       If you have any further questions or problems, please contact my office at [phone] and schedule an appointment.    Clinic : [phone]  Appointment line: [phone]     Thank you,    Jorge Barnett MD    Internal Medicine  [facility]     Reviewed and electronically signed by provider.

## 2019-03-09 ASSESSMENT — ANXIETY QUESTIONNAIRES: GAD7 TOTAL SCORE: 0

## 2019-03-27 NOTE — PROGRESS NOTES
"Nursing Notes:   Barbara Connor LPN  3/28/2019  3:11 PM  Signed  Patient presents to the clinic for medication management.  Contract updated on 8-9-18.  Last administration of Dextrostat today around 08/0900 and Methylphenidate yesterday around 1700.      Previous A1C is at goal of <8  Lab Results   Component Value Date    A1C 7.7 03/08/2019    A1C 7.5 11/21/2018    A1C 8.0 08/09/2018    A1C 7.8 05/03/2018     Urine microalbumin:creatine: n/a  Foot exam unknown-declines today  Eye exam 02/2019    Tobacco User no  Patient is on a daily aspirin  Patient is on a Statin.  Blood pressure today of:     BP Readings from Last 1 Encounters:   03/08/19 138/80      is at the goal of <139/89 for diabetics.    Chief Complaint   Patient presents with     Recheck Medication       Initial /84 (BP Location: Right arm, Patient Position: Sitting, Cuff Size: Adult Regular)   Pulse 68   Temp 98.5  F (36.9  C) (Tympanic)   Resp 16   Wt 90.8 kg (200 lb 4 oz)   BMI 30.90 kg/m    Estimated body mass index is 30.9 kg/m  as calculated from the following:    Height as of 1/7/19: 1.715 m (5' 7.5\").    Weight as of this encounter: 90.8 kg (200 lb 4 oz).  Medication Reconciliation: complete    Barbara Connor LPN        Nursing note reviewed with patient.  Accuracy and completeness verified.   Mr. Whittaker is a 69 year old male who:  Patient presents with:  Recheck Medication      ICD-10-CM    1. Primary narcolepsy without cataplexy G47.419 dextroamphetamine (DEXTROSTAT) 10 MG tablet     methylphenidate (RITALIN) 10 MG tablet   2. Pain medication agreement - 8/9/2018 Z02.89 dextroamphetamine (DEXTROSTAT) 10 MG tablet     methylphenidate (RITALIN) 10 MG tablet   3. History of colon polyps Z86.010 GASTROENTEROLOGY ADULT REF PROCEDURE ONLY Other     HPI  Patient comes in for follow-up of narcolepsy, medication management.  Current medication regimen has been working.  He saw sleep medicine in the past and current regimen has been " effective. Needing refills today.  Controlled substance agreement is up to date.   Proper medication use and misuse reviewed.  Patient has been using medications appropriately.  Prescriptions printed.    He has history of colon polyps, currently on aspirin and Plavix.  Advised that he would need to stop aspirin and Plavix prior to colonoscopy.  He expresses understanding.  He is hoping that he may only have to hold them for 3 days.  He understands the risks of proceeding with colonoscopy and states that his last colonoscopy 3 years ago he had a very large polyp removed and he would like to proceed with this.    Advised surgical clinic would contact him regarding duration to hold his anti-platelet medications.    Functional Capacity: about 4 METS.   Review of Systems   Constitutional: Negative for chills, fatigue and fever.   HENT: Negative for congestion.    Eyes: Negative for visual disturbance.   Respiratory: Negative for cough, choking, chest tightness, shortness of breath, wheezing and stridor.    Cardiovascular: Positive for chest pain (chronic, stable) and leg swelling. Negative for palpitations.        + claudication.   + intermittent Angina   Gastrointestinal: Negative for abdominal pain.   Genitourinary: Negative for hematuria.   Musculoskeletal: Positive for arthralgias, back pain, gait problem, myalgias, neck pain and neck stiffness.   Skin: Negative for rash and wound.   Neurological: Positive for headaches. Negative for syncope.   Hematological: Negative for adenopathy. Does not bruise/bleed easily.   Psychiatric/Behavioral: Negative for confusion.        REMINGTON:   REMINGTON-7 SCORE 1/18/2019 3/8/2019 3/28/2019   Total Score 0 0 0     PHQ9:  PHQ-9 SCORE 1/18/2019 3/8/2019 3/28/2019   PHQ-9 Total Score 0 0 0       I have personally reviewed the past medical history, past surgical history, medications, allergies, family and social history as listed below.     Allergies   Allergen Reactions     Famotidine Other  (See Comments)     + Caused Headache and Rash -- tolerated Ranitidine and worked well.     Gabapentin      Other reaction(s): Mental Status Change     Hydrocortisone Other (See Comments)     Burning and stinging sensation with Hydrocortisone - doesn't help itching or rash -- tolerated Desoximetasone cream and worked well.      Atorvastatin Hives     Lisinopril Cough     No Clinical Screening - See Comments Hives     Chlorine water     Norvasc [Amlodipine] Other (See Comments)     -- can't remember, didn't tolerate.      Pregabalin      Other reaction(s): Mental Status Change     Valdecoxib Swelling     Insulin Aspart Rash       Current Outpatient Medications   Medication Sig Dispense Refill     albuterol (PROAIR HFA/PROVENTIL HFA/VENTOLIN HFA) 108 (90 BASE) MCG/ACT Inhaler Inhale 1-2 puffs into the lungs every 4 hours as needed for shortness of breath / dyspnea or wheezing 1 Inhaler 1     aspirin (GOODSENSE ASPIRIN) 325 MG tablet Take  by mouth.       blood glucose monitoring (ONETOUCH ULTRA) test strip Dispense test strips covered by the patient insurance. Test 10 times per day.       Blood Glucose Monitoring Suppl (GLUCInternet [employer]wn[employer] BLOOD GLUCOSE MONITOR) WAQAS Dispense glucose meter, test strips and lancets covered by the patient insurance. Test 10 times per day.       clopidogrel (PLAVIX) 75 MG tablet Take 1 tablet (75 mg) by mouth daily 90 tablet 3     [employer] ALCOHOL SWABS PADS For home use.       desoximetasone (TOPICORT) 0.25 % external cream APPLY  CREAM EXTERNALLY TWICE DAILY 180 g 3     dextroamphetamine (DEXTROSTAT) 10 MG tablet Take 1 tablet (10 mg) by mouth 4 times daily 360 tablet 0     diclofenac (VOLTAREN) 75 MG EC tablet TAKE ONE TABLET BY MOUTH 4 TIMES DAILY 360 tablet 3     docusate sodium (COLACE) 100 MG capsule Take 1-2 capsules by mouth 2 times daily as needed for constipation prevention       Flaxseed, Linseed, (FLAXSEED OIL) 1000 MG CAPS Take  by mouth.       furosemide (LASIX) 40 MG tablet Take 2-4  tablets ( mg) by mouth daily Or as instructed for leg swelling 90 tablet 3     HUMALOG 100 UNIT/ML injection INJECT UNDER THE SKIN USING INSULIN PUMP. MAX DAILY DOSE  UNITS 180 mL 3     hydrALAZINE (APRESOLINE) 25 MG tablet Take 1-2 tablets (25-50 mg) by mouth 4 times daily - Take lowest effective dose for blood pressure management 360 tablet 11     HYDROcodone-acetaminophen (NORCO) 5-325 MG per tablet Take 1 tablet by mouth every 6 hours as needed for severe pain 60 tablet 0     Insulin Pen Needle (PEN NEEDLES) 29G X 12MM MISC For administering insulin at home.       ipratropium (ATROVENT HFA) 17 MCG/ACT Inhaler Inhale 2 puffs into the lungs 4 times daily       Ipratropium-Albuterol (COMBIVENT RESPIMAT)  MCG/ACT inhaler Inhale 1 puff into the lungs 4 times daily 1 Inhaler 0     irbesartan (AVAPRO) 150 MG tablet Take 1 tablet (150 mg) by mouth 2 times daily -- needs 2 smaller pills daily 180 tablet 3     IV Sets-Tubing (SOLUTION ADMINISTRATION SET) MISC As directed.       levothyroxine (SYNTHROID/LEVOTHROID) 125 MCG tablet TAKE ONE TABLET BY MOUTH ONCE DAILY IN THE MORNING 90 tablet 2     methylphenidate (RITALIN) 10 MG tablet Take 1 tablet (10 mg) by mouth 4 times daily 360 tablet 0     metoprolol succinate (TOPROL-XL) 50 MG 24 hr tablet Take 1 tablet (50 mg) by mouth 2 times daily 180 tablet 3     nitroGLYcerin (NITROSTAT) 0.4 MG sublingual tablet Place 1 tablet under the tongue every 5 minutes as needed for chest pain       Omega-3 Fatty Acids (FISH OIL PO) Take  by mouth.       PSYLLIUM PO Take 1 tsp. by mouth 3 times daily - for constipation prevention       ranitidine (ZANTAC) 150 MG tablet Take 1 tablet (150 mg) by mouth 2 times daily 180 tablet 3     ranolazine (RANEXA) 500 MG 12 hr tablet Take 2 tablets (1,000 mg) by mouth 2 times daily - cancel other Rx - give 3 month supply 360 tablet 3     rosuvastatin (CRESTOR) 10 MG tablet TAKE ONE TABLET BY MOUTH TWICE DAILY 180 tablet 3     Saline  GEL Spray 1 spray in nostril every hour as needed For Nasal Dryness       thin (NO BRAND SPECIFIED) lancets Test 10 times per day.          Patient Active Problem List    Diagnosis Date Noted     Degeneration of cervical intervertebral disc 01/31/2018     Priority: Medium     Hypothyroidism due to acquired atrophy of thyroid 01/31/2018     Priority: Medium     Mixed hyperlipidemia 01/31/2018     Priority: Medium     Primary narcolepsy without cataplexy 01/31/2018     Priority: Medium     Overview:   Total dose recommended by pulmonology he is dextro- amphetamine 40 mg plus   methylphenidate 40 mg in divided doses per day.  Total of 80 mg of short   acting amphetamines daily.       Osteoarthritis of spine 01/31/2018     Priority: Medium     Peptic ulcer disease 01/31/2018     Priority: Medium     Neuroforaminal stenosis of lumbar spine 01/03/2018     Priority: Medium     Radicular low back pain 01/03/2018     Priority: Medium     Acute bilateral low back pain with bilateral sciatica 10/27/2017     Priority: Medium     Chronic low back pain 08/08/2017     Priority: Medium     Intermittent constipation 08/08/2017     Priority: Medium     Skin rash 08/01/2017     Priority: Medium     Controlled type 2 diabetes mellitus with stage 3 chronic kidney disease, with long-term current use of insulin (H) 03/30/2017     Priority: Medium     Erectile dysfunction 09/26/2016     Priority: Medium     Trigger point with back pain 07/14/2016     Priority: Medium     Trigger point with neck pain 07/14/2016     Priority: Medium     Insulin pump in place 03/10/2016     Priority: Medium     Myofacial muscle pain 12/17/2015     Priority: Medium     Pain medication agreement - 8/9/2018 12/17/2015     Priority: Medium     Trigger point of extremity 12/17/2015     Priority: Medium     Greater trochanteric bursitis of left hip 06/24/2015     Priority: Medium     Blister of toe of right foot 12/09/2014     Priority: Medium     Cervical stenosis  of spinal canal 06/17/2014     Priority: Medium     Degenerative disc disease, cervical 06/17/2014     Priority: Medium     Facet arthritis of cervical region (H) 06/17/2014     Priority: Medium     Cervical radicular pain 06/05/2014     Priority: Medium     Left arm numbness 06/05/2014     Priority: Medium     Left atrial enlargement 01/23/2014     Priority: Medium     Chronic diastolic heart failure (H) 01/10/2014     Priority: Medium     Overview:   1/20/2014 ECHO FINAL IMPRESSION:   1. Normal left ventricular size and systolic function. Estimated ejection fraction 65%.   2. Mild increase in wall thickness of the ventricular septum with hypokinesis of the basilar segment of the ventricular septum and inferior wall.   3. Mild to moderate left atrial enlargement.   4. Trace tricuspid regurgitation.   5. Mild left ventricular diastolic dysfunction.        CKD (chronic kidney disease) stage 3, GFR 30-59 ml/min (H) 01/10/2014     Priority: Medium     Essential hypertension 01/10/2014     Priority: Medium     Myalgia 01/10/2014     Priority: Medium     Old inferior wall myocardial infarction 01/10/2014     Priority: Medium     Platelet inhibition due to Plavix 01/10/2014     Priority: Medium     S/P CABG x 2 01/10/2014     Priority: Medium     S/P coronary artery stent placement 01/10/2014     Priority: Medium     Painful diabetic neuropathy (H) 04/22/2013     Priority: Medium     Esophageal reflux 05/17/2012     Priority: Medium     Obesity 05/17/2012     Priority: Medium     Diastasis of muscle 10/06/2011     Priority: Medium     Coronary atherosclerosis 09/08/2011     Priority: Medium     Psychosexual dysfunction with inhibited sexual excitement 06/30/2011     Priority: Medium     Angina pectoris (H) 12/10/2010     Priority: Medium     Edema 10/25/2010     Priority: Medium     Chest pain 02/15/2007     Priority: Medium     Overview:   IMO Update 10/11       Atherosclerosis of native coronary artery of native heart  with stable angina pectoris (H) 02/15/2007     Priority: Medium     Overview:   IMO Update 10/11       Other specified forms of chronic ischemic heart disease 02/15/2007     Priority: Medium     Peripheral vascular disease (H) 02/15/2007     Priority: Medium     Overview:   IMO Update 10/11       Postsurgical percutaneous transluminal coronary angioplasty status 02/15/2007     Priority: Medium     Overview:   IMO Update 10/11       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     Gastritis without bleeding     No Comments Provided     Heart disease     Multiple coronary stents     Narcolepsy without cataplexy     No Comments Provided     Other injury of unspecified body region, initial encounter (CODE)     11/2006,Right calf hematoma     Rheumatic fever without heart involvement     Rheumatic fever as a child without valvular heart disease     Past Surgical History:   Procedure Laterality Date     ANGIOPLASTY      12/00, 04/04/04,Repeat angioplasty with lt internal mammary to the lt anterior descending and lt radial artery from aorta to the diagonal.     ANGIOPLASTY      10/01,Angioplasty with 2 vessel CABG the following week from the lt internal mammary to the lt anterior descending and right radial free graft     ANGIOPLASTY      04/2003     ARTHROSCOPY SHOULDER ROTATOR CUFF REPAIR      02/06/2006,Left rotator cuff repair and bone spur removal by Dr. Giraldo in [place]     COLONOSCOPY      1999     COLONOSCOPY  01/08/2016 01/08/2016,-- Dr. De La Cruz -- polypectomy     OTHER SURGICAL HISTORY      09/03,[id number],IR ANGIOGRAM FEMORAL/EXTREMITY ([place]),Unremarkable angiogram at [facility]     OTHER SURGICAL HISTORY      10/05/2004,,PTCA,Angioplasty     OTHER  SURGICAL HISTORY      02/2005,,PTCA,Angioplasty with stent.     OTHER SURGICAL HISTORY      03/02/2005,,PTCA,Angioplasty with stent.     OTHER SURGICAL HISTORY      09/23/2005,,PTCA,Angioplasty without stent     OTHER SURGICAL HISTORY      2/26/2007,[id number],IR ANGIOGRAM FEMORAL/EXTREMITY ([place]),Unremarkable coronary angiogram [facility].     OTHER SURGICAL HISTORY      1967,SUR38,APPENDECTOMY OPEN     RELEASE CARPAL TUNNEL      11/15/01,Right carpal tunnel release by Dr. Mace     RELEASE CARPAL TUNNEL      01/2004,Left carpal tunnel release     Social History     Socioeconomic History     Marital status:      Spouse name: None     Number of children: None     Years of education: None     Highest education level: None   Occupational History     None   Social Needs     Financial resource strain: None     Food insecurity:     Worry: None     Inability: None     Transportation needs:     Medical: None     Non-medical: None   Tobacco Use     Smoking status: Never Smoker     Smokeless tobacco: Never Used   Substance and Sexual Activity     Alcohol use: No     Alcohol/week: 0.0 oz     Drug use: No     Sexual activity: Yes     Partners: Female   Lifestyle     Physical activity:     Days per week: None     Minutes per session: None     Stress: None   Relationships     Social connections:     Talks on phone: None     Gets together: None     Attends Worship service: None     Active member of club or organization: None     Attends meetings of clubs or organizations: None     Relationship status: None     Intimate partner violence:     Fear of current or ex partner: None     Emotionally abused: None     Physically abused: None     Forced sexual activity: None   Other Topics Concern     Parent/sibling w/ CABG, MI or angioplasty before 65F 55M? Not Asked   Social History Narrative    He is on Social Security Disability for coronary artery disease and cervical arthritis and disk disease.   Dax clay.  No  "tobacco.     Family History   Problem Relation Age of Onset     Heart Disease Mother         Heart Disease,Heart problems     Heart Disease Other         Heart Disease,Heart problems     Heart Disease Other         Heart Disease,Heart problems     Ankylosing Spondylitis Son         Ankylosing spondylitis,Ankylosing spondylitis     Other - See Comments Brother          with sudden death following shoulder surgery 2012 -- was off his Plavix for 10 days pre-operatively.     Ankylosing Spondylitis Daughter         Ankylosing spondylitis,-- Dx 2017       EXAM:   Vitals:    19 1501   BP: 138/84   BP Location: Right arm   Patient Position: Sitting   Cuff Size: Adult Regular   Pulse: 68   Resp: 16   Temp: 98.5  F (36.9  C)   TempSrc: Tympanic   Weight: 90.8 kg (200 lb 4 oz)       Current Pain Score: Moderate Pain (4)     BP Readings from Last 3 Encounters:   19 138/84   19 138/80   19 126/76      Wt Readings from Last 3 Encounters:   19 90.8 kg (200 lb 4 oz)   19 91.3 kg (201 lb 6 oz)   19 90.9 kg (200 lb 8 oz)      Estimated body mass index is 30.9 kg/m  as calculated from the following:    Height as of 19: 1.715 m (5' 7.5\").    Weight as of this encounter: 90.8 kg (200 lb 4 oz).     Physical Exam   Constitutional: He is oriented to person, place, and time. He appears well-developed and well-nourished.   HENT:   Head: Normocephalic and atraumatic.   Eyes: No scleral icterus.   Cardiovascular: Normal rate and regular rhythm.   Pulmonary/Chest: Effort normal. He has no wheezes.   Abdominal: Soft. There is no tenderness.   + insulin pump noted   Musculoskeletal: He exhibits edema.   Lymphadenopathy:     He has no cervical adenopathy.   Neurological: He is alert and oriented to person, place, and time.   Skin: Skin is warm and dry.   Psychiatric: He has a normal mood and affect.        Procedures   INVESTIGATIONS:  Results for orders placed or performed in visit on " 03/08/19   Lipid Profile   Result Value Ref Range    Cholesterol 101 <200 mg/dL    Triglycerides 101 <150 mg/dL    HDL Cholesterol 36 23 - 92 mg/dL    LDL Cholesterol Calculated 45 <100 mg/dL    Non HDL Cholesterol 65 <130 mg/dL   Thyrotropin GH   Result Value Ref Range    Thyrotropin 0.62 0.34 - 5.60 IU/mL   Hemoglobin A1c   Result Value Ref Range    Hemoglobin A1C 7.7 (H) 4.0 - 6.0 %   CBC with platelets   Result Value Ref Range    WBC 5.8 4.0 - 11.0 10e9/L    RBC Count 4.67 4.4 - 5.9 10e12/L    Hemoglobin 15.0 13.3 - 17.7 g/dL    Hematocrit 46.3 40.0 - 53.0 %    MCV 99 78 - 100 fl    MCH 32.1 26.5 - 33.0 pg    MCHC 32.4 31.5 - 36.5 g/dL    RDW 13.0 10.0 - 15.0 %    Platelet Count 195 150 - 450 10e9/L   Comprehensive metabolic panel   Result Value Ref Range    Sodium 135 134 - 144 mmol/L    Potassium 4.1 3.5 - 5.1 mmol/L    Chloride 103 98 - 107 mmol/L    Carbon Dioxide 27 21 - 31 mmol/L    Anion Gap 5 3 - 14 mmol/L    Glucose 150 (H) 70 - 105 mg/dL    Urea Nitrogen 16 7 - 25 mg/dL    Creatinine 1.51 (H) 0.70 - 1.30 mg/dL    GFR Estimate 46 (L) >60 mL/min/[1.73_m2]    GFR Estimate If Black 56 (L) >60 mL/min/[1.73_m2]    Calcium 9.1 8.6 - 10.3 mg/dL    Bilirubin Total 0.6 0.3 - 1.0 mg/dL    Albumin 4.0 3.5 - 5.7 g/dL    Protein Total 6.7 6.4 - 8.9 g/dL    Alkaline Phosphatase 63 34 - 104 U/L    ALT 24 7 - 52 U/L    AST 22 13 - 39 U/L   *UA reflex to Microscopic   Result Value Ref Range    Color Urine Yellow     Appearance Urine Clear     Glucose Urine 250 (A) NEG^Negative mg/dL    Bilirubin Urine Negative NEG^Negative    Ketones Urine Negative NEG^Negative mg/dL    Specific Gravity Urine 1.025 1.000 - 1.030    Blood Urine Negative NEG^Negative    pH Urine 6.0 5.0 - 9.0 pH    Protein Albumin Urine 100 (A) NEG^Negative mg/dL    Urobilinogen Urine 0.2 0.2 - 1.0 EU/dL    Nitrite Urine Negative NEG^Negative    Leukocyte Esterase Urine Negative NEG^Negative    Source Unspecified Urine    Albumin Random Urine Quantitative  with Creat Ratio   Result Value Ref Range    Creatinine Urine 122 mg/dL    Albumin Urine mg/L 1,280 mg/L    Albumin Urine mg/g Cr 1,049.18 (H) 0 - 17 mg/g Cr   Urine Microscopic   Result Value Ref Range    WBC Urine 0 - 5 OTO5^0 - 5 /HPF    RBC Urine O - 2 OTO2^O - 2 /HPF       ASSESSMENT AND PLAN:  Problem List Items Addressed This Visit        Other    Primary narcolepsy without cataplexy - Primary    Relevant Medications    dextroamphetamine (DEXTROSTAT) 10 MG tablet    methylphenidate (RITALIN) 10 MG tablet    Pain medication agreement - 8/9/2018    Relevant Medications    dextroamphetamine (DEXTROSTAT) 10 MG tablet    methylphenidate (RITALIN) 10 MG tablet      Other Visit Diagnoses     History of colon polyps        Relevant Orders    GASTROENTEROLOGY ADULT REF PROCEDURE ONLY Other        recommended sodium restriction  -- Expected clinical course discussed    -- Medications and their side effects discussed    Patient Instructions   1. Primary narcolepsy without cataplexy  2. Pain medication agreement - 8/9/2018  - dextroamphetamine (DEXTROSTAT) 10 MG tablet; Take 1 tablet (10 mg) by mouth 4 times daily  Dispense: 360 tablet; Refill: 0  - methylphenidate (RITALIN) 10 MG tablet; Take 1 tablet (10 mg) by mouth 4 times daily  Dispense: 360 tablet; Refill: 0    3. History of colon polyps  - GASTROENTEROLOGY ADULT REF PROCEDURE ONLY - colonoscopy ordered  - they will call with date/time of appointment.      -- will need to hold aspirin and Plavix for at least a few days prior to colonoscopy.  Exact duration is up to the surgical team.    Return as needed for follow-up with Dr. Barnett.    Clinic : [phone]  Appointment line: [phone]      Jorge Barnett MD  Internal Medicine  [facility]     Portions of this note were dictated using speech recognition software. The note has been proofread but errors in the text may have been overlooked. Please contact me if there are any  concerns regarding the accuracy of the dictation.

## 2019-03-27 NOTE — H&P (VIEW-ONLY)
"Nursing Notes:   Barbara Connor LPN  3/28/2019  3:11 PM  Signed  Patient presents to the clinic for medication management.  Contract updated on 8-9-18.  Last administration of Dextrostat today around 08/0900 and Methylphenidate yesterday around 1700.      Previous A1C is at goal of <8  Lab Results   Component Value Date    A1C 7.7 03/08/2019    A1C 7.5 11/21/2018    A1C 8.0 08/09/2018    A1C 7.8 05/03/2018     Urine microalbumin:creatine: n/a  Foot exam unknown-declines today  Eye exam 02/2019    Tobacco User no  Patient is on a daily aspirin  Patient is on a Statin.  Blood pressure today of:     BP Readings from Last 1 Encounters:   03/08/19 138/80      is at the goal of <139/89 for diabetics.    Chief Complaint   Patient presents with     Recheck Medication       Initial /84 (BP Location: Right arm, Patient Position: Sitting, Cuff Size: Adult Regular)   Pulse 68   Temp 98.5  F (36.9  C) (Tympanic)   Resp 16   Wt 90.8 kg (200 lb 4 oz)   BMI 30.90 kg/m    Estimated body mass index is 30.9 kg/m  as calculated from the following:    Height as of 1/7/19: 1.715 m (5' 7.5\").    Weight as of this encounter: 90.8 kg (200 lb 4 oz).  Medication Reconciliation: complete    Barbara Connor LPN        Nursing note reviewed with patient.  Accuracy and completeness verified.   Mr. Whittaker is a 69 year old male who:  Patient presents with:  Recheck Medication      ICD-10-CM    1. Primary narcolepsy without cataplexy G47.419 dextroamphetamine (DEXTROSTAT) 10 MG tablet     methylphenidate (RITALIN) 10 MG tablet   2. Pain medication agreement - 8/9/2018 Z02.89 dextroamphetamine (DEXTROSTAT) 10 MG tablet     methylphenidate (RITALIN) 10 MG tablet   3. History of colon polyps Z86.010 GASTROENTEROLOGY ADULT REF PROCEDURE ONLY Other     HPI  Patient comes in for follow-up of narcolepsy, medication management.  Current medication regimen has been working.  He saw sleep medicine in the past and current regimen has been " effective. Needing refills today.  Controlled substance agreement is up to date.   Proper medication use and misuse reviewed.  Patient has been using medications appropriately.  Prescriptions printed.    He has history of colon polyps, currently on aspirin and Plavix.  Advised that he would need to stop aspirin and Plavix prior to colonoscopy.  He expresses understanding.  He is hoping that he may only have to hold them for 3 days.  He understands the risks of proceeding with colonoscopy and states that his last colonoscopy 3 years ago he had a very large polyp removed and he would like to proceed with this.    Advised surgical clinic would contact him regarding duration to hold his anti-platelet medications.    Functional Capacity: about 4 METS.   Review of Systems   Constitutional: Negative for chills, fatigue and fever.   HENT: Negative for congestion.    Eyes: Negative for visual disturbance.   Respiratory: Negative for cough, choking, chest tightness, shortness of breath, wheezing and stridor.    Cardiovascular: Positive for chest pain (chronic, stable) and leg swelling. Negative for palpitations.        + claudication.   + intermittent Angina   Gastrointestinal: Negative for abdominal pain.   Genitourinary: Negative for hematuria.   Musculoskeletal: Positive for arthralgias, back pain, gait problem, myalgias, neck pain and neck stiffness.   Skin: Negative for rash and wound.   Neurological: Positive for headaches. Negative for syncope.   Hematological: Negative for adenopathy. Does not bruise/bleed easily.   Psychiatric/Behavioral: Negative for confusion.        REMINGTON:   REMINGTON-7 SCORE 1/18/2019 3/8/2019 3/28/2019   Total Score 0 0 0     PHQ9:  PHQ-9 SCORE 1/18/2019 3/8/2019 3/28/2019   PHQ-9 Total Score 0 0 0       I have personally reviewed the past medical history, past surgical history, medications, allergies, family and social history as listed below.     Allergies   Allergen Reactions     Famotidine Other  (See Comments)     + Caused Headache and Rash -- tolerated Ranitidine and worked well.     Gabapentin      Other reaction(s): Mental Status Change     Hydrocortisone Other (See Comments)     Burning and stinging sensation with Hydrocortisone - doesn't help itching or rash -- tolerated Desoximetasone cream and worked well.      Atorvastatin Hives     Lisinopril Cough     No Clinical Screening - See Comments Hives     Chlorine water     Norvasc [Amlodipine] Other (See Comments)     -- can't remember, didn't tolerate.      Pregabalin      Other reaction(s): Mental Status Change     Valdecoxib Swelling     Insulin Aspart Rash       Current Outpatient Medications   Medication Sig Dispense Refill     albuterol (PROAIR HFA/PROVENTIL HFA/VENTOLIN HFA) 108 (90 BASE) MCG/ACT Inhaler Inhale 1-2 puffs into the lungs every 4 hours as needed for shortness of breath / dyspnea or wheezing 1 Inhaler 1     aspirin (GOODSENSE ASPIRIN) 325 MG tablet Take  by mouth.       blood glucose monitoring (ONETOUCH ULTRA) test strip Dispense test strips covered by the patient insurance. Test 10 times per day.       Blood Glucose Monitoring Suppl (GLUC[employer]M BLOOD GLUCOSE MONITOR) WAQAS Dispense glucose meter, test strips and lancets covered by the patient insurance. Test 10 times per day.       clopidogrel (PLAVIX) 75 MG tablet Take 1 tablet (75 mg) by mouth daily 90 tablet 3     [employer] ALCOHOL SWABS PADS For home use.       desoximetasone (TOPICORT) 0.25 % external cream APPLY  CREAM EXTERNALLY TWICE DAILY 180 g 3     dextroamphetamine (DEXTROSTAT) 10 MG tablet Take 1 tablet (10 mg) by mouth 4 times daily 360 tablet 0     diclofenac (VOLTAREN) 75 MG EC tablet TAKE ONE TABLET BY MOUTH 4 TIMES DAILY 360 tablet 3     docusate sodium (COLACE) 100 MG capsule Take 1-2 capsules by mouth 2 times daily as needed for constipation prevention       Flaxseed, Linseed, (FLAXSEED OIL) 1000 MG CAPS Take  by mouth.       furosemide (LASIX) 40 MG tablet Take 2-4  tablets ( mg) by mouth daily Or as instructed for leg swelling 90 tablet 3     HUMALOG 100 UNIT/ML injection INJECT UNDER THE SKIN USING INSULIN PUMP. MAX DAILY DOSE  UNITS 180 mL 3     hydrALAZINE (APRESOLINE) 25 MG tablet Take 1-2 tablets (25-50 mg) by mouth 4 times daily - Take lowest effective dose for blood pressure management 360 tablet 11     HYDROcodone-acetaminophen (NORCO) 5-325 MG per tablet Take 1 tablet by mouth every 6 hours as needed for severe pain 60 tablet 0     Insulin Pen Needle (PEN NEEDLES) 29G X 12MM MISC For administering insulin at home.       ipratropium (ATROVENT HFA) 17 MCG/ACT Inhaler Inhale 2 puffs into the lungs 4 times daily       Ipratropium-Albuterol (COMBIVENT RESPIMAT)  MCG/ACT inhaler Inhale 1 puff into the lungs 4 times daily 1 Inhaler 0     irbesartan (AVAPRO) 150 MG tablet Take 1 tablet (150 mg) by mouth 2 times daily -- needs 2 smaller pills daily 180 tablet 3     IV Sets-Tubing (SOLUTION ADMINISTRATION SET) MISC As directed.       levothyroxine (SYNTHROID/LEVOTHROID) 125 MCG tablet TAKE ONE TABLET BY MOUTH ONCE DAILY IN THE MORNING 90 tablet 2     methylphenidate (RITALIN) 10 MG tablet Take 1 tablet (10 mg) by mouth 4 times daily 360 tablet 0     metoprolol succinate (TOPROL-XL) 50 MG 24 hr tablet Take 1 tablet (50 mg) by mouth 2 times daily 180 tablet 3     nitroGLYcerin (NITROSTAT) 0.4 MG sublingual tablet Place 1 tablet under the tongue every 5 minutes as needed for chest pain       Omega-3 Fatty Acids (FISH OIL PO) Take  by mouth.       PSYLLIUM PO Take 1 tsp. by mouth 3 times daily - for constipation prevention       ranitidine (ZANTAC) 150 MG tablet Take 1 tablet (150 mg) by mouth 2 times daily 180 tablet 3     ranolazine (RANEXA) 500 MG 12 hr tablet Take 2 tablets (1,000 mg) by mouth 2 times daily - cancel other Rx - give 3 month supply 360 tablet 3     rosuvastatin (CRESTOR) 10 MG tablet TAKE ONE TABLET BY MOUTH TWICE DAILY 180 tablet 3     Saline  GEL Spray 1 spray in nostril every hour as needed For Nasal Dryness       thin (NO BRAND SPECIFIED) lancets Test 10 times per day.          Patient Active Problem List    Diagnosis Date Noted     Degeneration of cervical intervertebral disc 01/31/2018     Priority: Medium     Hypothyroidism due to acquired atrophy of thyroid 01/31/2018     Priority: Medium     Mixed hyperlipidemia 01/31/2018     Priority: Medium     Primary narcolepsy without cataplexy 01/31/2018     Priority: Medium     Overview:   Total dose recommended by pulmonology he is dextro- amphetamine 40 mg plus   methylphenidate 40 mg in divided doses per day.  Total of 80 mg of short   acting amphetamines daily.       Osteoarthritis of spine 01/31/2018     Priority: Medium     Peptic ulcer disease 01/31/2018     Priority: Medium     Neuroforaminal stenosis of lumbar spine 01/03/2018     Priority: Medium     Radicular low back pain 01/03/2018     Priority: Medium     Acute bilateral low back pain with bilateral sciatica 10/27/2017     Priority: Medium     Chronic low back pain 08/08/2017     Priority: Medium     Intermittent constipation 08/08/2017     Priority: Medium     Skin rash 08/01/2017     Priority: Medium     Controlled type 2 diabetes mellitus with stage 3 chronic kidney disease, with long-term current use of insulin (H) 03/30/2017     Priority: Medium     Erectile dysfunction 09/26/2016     Priority: Medium     Trigger point with back pain 07/14/2016     Priority: Medium     Trigger point with neck pain 07/14/2016     Priority: Medium     Insulin pump in place 03/10/2016     Priority: Medium     Myofacial muscle pain 12/17/2015     Priority: Medium     Pain medication agreement - 8/9/2018 12/17/2015     Priority: Medium     Trigger point of extremity 12/17/2015     Priority: Medium     Greater trochanteric bursitis of left hip 06/24/2015     Priority: Medium     Blister of toe of right foot 12/09/2014     Priority: Medium     Cervical stenosis  of spinal canal 06/17/2014     Priority: Medium     Degenerative disc disease, cervical 06/17/2014     Priority: Medium     Facet arthritis of cervical region (H) 06/17/2014     Priority: Medium     Cervical radicular pain 06/05/2014     Priority: Medium     Left arm numbness 06/05/2014     Priority: Medium     Left atrial enlargement 01/23/2014     Priority: Medium     Chronic diastolic heart failure (H) 01/10/2014     Priority: Medium     Overview:   1/20/2014 ECHO FINAL IMPRESSION:   1. Normal left ventricular size and systolic function. Estimated ejection fraction 65%.   2. Mild increase in wall thickness of the ventricular septum with hypokinesis of the basilar segment of the ventricular septum and inferior wall.   3. Mild to moderate left atrial enlargement.   4. Trace tricuspid regurgitation.   5. Mild left ventricular diastolic dysfunction.        CKD (chronic kidney disease) stage 3, GFR 30-59 ml/min (H) 01/10/2014     Priority: Medium     Essential hypertension 01/10/2014     Priority: Medium     Myalgia 01/10/2014     Priority: Medium     Old inferior wall myocardial infarction 01/10/2014     Priority: Medium     Platelet inhibition due to Plavix 01/10/2014     Priority: Medium     S/P CABG x 2 01/10/2014     Priority: Medium     S/P coronary artery stent placement 01/10/2014     Priority: Medium     Painful diabetic neuropathy (H) 04/22/2013     Priority: Medium     Esophageal reflux 05/17/2012     Priority: Medium     Obesity 05/17/2012     Priority: Medium     Diastasis of muscle 10/06/2011     Priority: Medium     Coronary atherosclerosis 09/08/2011     Priority: Medium     Psychosexual dysfunction with inhibited sexual excitement 06/30/2011     Priority: Medium     Angina pectoris (H) 12/10/2010     Priority: Medium     Edema 10/25/2010     Priority: Medium     Chest pain 02/15/2007     Priority: Medium     Overview:   IMO Update 10/11       Atherosclerosis of native coronary artery of native heart  with stable angina pectoris (H) 02/15/2007     Priority: Medium     Overview:   IMO Update 10/11       Other specified forms of chronic ischemic heart disease 02/15/2007     Priority: Medium     Peripheral vascular disease (H) 02/15/2007     Priority: Medium     Overview:   IMO Update 10/11       Postsurgical percutaneous transluminal coronary angioplasty status 02/15/2007     Priority: Medium     Overview:   IMO Update 10/11       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     Gastritis without bleeding     No Comments Provided     Heart disease     Multiple coronary stents     Narcolepsy without cataplexy     No Comments Provided     Other injury of unspecified body region, initial encounter (CODE)     11/2006,Right calf hematoma     Rheumatic fever without heart involvement     Rheumatic fever as a child without valvular heart disease     Past Surgical History:   Procedure Laterality Date     ANGIOPLASTY      12/00, 04/04/04,Repeat angioplasty with lt internal mammary to the lt anterior descending and lt radial artery from aorta to the diagonal.     ANGIOPLASTY      10/01,Angioplasty with 2 vessel CABG the following week from the lt internal mammary to the lt anterior descending and right radial free graft     ANGIOPLASTY      04/2003     ARTHROSCOPY SHOULDER ROTATOR CUFF REPAIR      02/06/2006,Left rotator cuff repair and bone spur removal by Dr. Giraldo in [place]     COLONOSCOPY      1999     COLONOSCOPY  01/08/2016 01/08/2016,-- Dr. De La Cruz -- polypectomy     OTHER SURGICAL HISTORY      09/03,050121,IR ANGIOGRAM FEMORAL/EXTREMITY ([place]),[facility]remark[facility] angiogram [facility]     OTHER SURGICAL HISTORY      10/05/2004,,PTCA,Angioplasty     OTHER  SURGICAL HISTORY      02/2005,,PTCA,Angioplasty with stent.     OTHER SURGICAL HISTORY      03/02/2005,,PTCA,Angioplasty with stent.     OTHER SURGICAL HISTORY      09/23/2005,,PTCA,Angioplasty without stent     OTHER SURGICAL HISTORY      2/26/2007,[id number],IR ANGIOGRAM FEMORAL/EXTREMITY ([place]),Unremarkable coronary angiogram at [facility].     OTHER SURGICAL HISTORY      1967,SUR38,APPENDECTOMY OPEN     RELEASE CARPAL TUNNEL      11/15/01,Right carpal tunnel release by Dr. Mace     RELEASE CARPAL TUNNEL      01/2004,Left carpal tunnel release     Social History     Socioeconomic History     Marital status:      Spouse name: None     Number of children: None     Years of education: None     Highest education level: None   Occupational History     None   Social Needs     Financial resource strain: None     Food insecurity:     Worry: None     Inability: None     Transportation needs:     Medical: None     Non-medical: None   Tobacco Use     Smoking status: Never Smoker     Smokeless tobacco: Never Used   Substance and Sexual Activity     Alcohol use: No     Alcohol/week: 0.0 oz     Drug use: No     Sexual activity: Yes     Partners: Female   Lifestyle     Physical activity:     Days per week: None     Minutes per session: None     Stress: None   Relationships     Social connections:     Talks on phone: None     Gets together: None     Attends Mandaeism service: None     Active member of club or organization: None     Attends meetings of clubs or organizations: None     Relationship status: None     Intimate partner violence:     Fear of current or ex partner: None     Emotionally abused: None     Physically abused: None     Forced sexual activity: None   Other Topics Concern     Parent/sibling w/ CABG, MI or angioplasty before 65F 55M? Not Asked   Social History Narrative    He is on Social Security Disability for coronary artery disease and cervical arthritis and disk disease.   Dax clay.  No  "tobacco.     Family History   Problem Relation Age of Onset     Heart Disease Mother         Heart Disease,Heart problems     Heart Disease Other         Heart Disease,Heart problems     Heart Disease Other         Heart Disease,Heart problems     Ankylosing Spondylitis Son         Ankylosing spondylitis,Ankylosing spondylitis     Other - See Comments Brother          with sudden death following shoulder surgery 2012 -- was off his Plavix for 10 days pre-operatively.     Ankylosing Spondylitis Daughter         Ankylosing spondylitis,-- Dx 2017       EXAM:   Vitals:    19 1501   BP: 138/84   BP Location: Right arm   Patient Position: Sitting   Cuff Size: Adult Regular   Pulse: 68   Resp: 16   Temp: 98.5  F (36.9  C)   TempSrc: Tympanic   Weight: 90.8 kg (200 lb 4 oz)       Current Pain Score: Moderate Pain (4)     BP Readings from Last 3 Encounters:   19 138/84   19 138/80   19 126/76      Wt Readings from Last 3 Encounters:   19 90.8 kg (200 lb 4 oz)   19 91.3 kg (201 lb 6 oz)   19 90.9 kg (200 lb 8 oz)      Estimated body mass index is 30.9 kg/m  as calculated from the following:    Height as of 19: 1.715 m (5' 7.5\").    Weight as of this encounter: 90.8 kg (200 lb 4 oz).     Physical Exam   Constitutional: He is oriented to person, place, and time. He appears well-developed and well-nourished.   HENT:   Head: Normocephalic and atraumatic.   Eyes: No scleral icterus.   Cardiovascular: Normal rate and regular rhythm.   Pulmonary/Chest: Effort normal. He has no wheezes.   Abdominal: Soft. There is no tenderness.   + insulin pump noted   Musculoskeletal: He exhibits edema.   Lymphadenopathy:     He has no cervical adenopathy.   Neurological: He is alert and oriented to person, place, and time.   Skin: Skin is warm and dry.   Psychiatric: He has a normal mood and affect.        Procedures   INVESTIGATIONS:  Results for orders placed or performed in visit on " 03/08/19   Lipid Profile   Result Value Ref Range    Cholesterol 101 <200 mg/dL    Triglycerides 101 <150 mg/dL    HDL Cholesterol 36 23 - 92 mg/dL    LDL Cholesterol Calculated 45 <100 mg/dL    Non HDL Cholesterol 65 <130 mg/dL   Thyrotropin GH   Result Value Ref Range    Thyrotropin 0.62 0.34 - 5.60 IU/mL   Hemoglobin A1c   Result Value Ref Range    Hemoglobin A1C 7.7 (H) 4.0 - 6.0 %   CBC with platelets   Result Value Ref Range    WBC 5.8 4.0 - 11.0 10e9/L    RBC Count 4.67 4.4 - 5.9 10e12/L    Hemoglobin 15.0 13.3 - 17.7 g/dL    Hematocrit 46.3 40.0 - 53.0 %    MCV 99 78 - 100 fl    MCH 32.1 26.5 - 33.0 pg    MCHC 32.4 31.5 - 36.5 g/dL    RDW 13.0 10.0 - 15.0 %    Platelet Count 195 150 - 450 10e9/L   Comprehensive metabolic panel   Result Value Ref Range    Sodium 135 134 - 144 mmol/L    Potassium 4.1 3.5 - 5.1 mmol/L    Chloride 103 98 - 107 mmol/L    Carbon Dioxide 27 21 - 31 mmol/L    Anion Gap 5 3 - 14 mmol/L    Glucose 150 (H) 70 - 105 mg/dL    Urea Nitrogen 16 7 - 25 mg/dL    Creatinine 1.51 (H) 0.70 - 1.30 mg/dL    GFR Estimate 46 (L) >60 mL/min/[1.73_m2]    GFR Estimate If Black 56 (L) >60 mL/min/[1.73_m2]    Calcium 9.1 8.6 - 10.3 mg/dL    Bilirubin Total 0.6 0.3 - 1.0 mg/dL    Albumin 4.0 3.5 - 5.7 g/dL    Protein Total 6.7 6.4 - 8.9 g/dL    Alkaline Phosphatase 63 34 - 104 U/L    ALT 24 7 - 52 U/L    AST 22 13 - 39 U/L   *UA reflex to Microscopic   Result Value Ref Range    Color Urine Yellow     Appearance Urine Clear     Glucose Urine 250 (A) NEG^Negative mg/dL    Bilirubin Urine Negative NEG^Negative    Ketones Urine Negative NEG^Negative mg/dL    Specific Gravity Urine 1.025 1.000 - 1.030    Blood Urine Negative NEG^Negative    pH Urine 6.0 5.0 - 9.0 pH    Protein Albumin Urine 100 (A) NEG^Negative mg/dL    Urobilinogen Urine 0.2 0.2 - 1.0 EU/dL    Nitrite Urine Negative NEG^Negative    Leukocyte Esterase Urine Negative NEG^Negative    Source Unspecified Urine    Albumin Random Urine Quantitative  with Creat Ratio   Result Value Ref Range    Creatinine Urine 122 mg/dL    Albumin Urine mg/L 1,280 mg/L    Albumin Urine mg/g Cr 1,049.18 (H) 0 - 17 mg/g Cr   Urine Microscopic   Result Value Ref Range    WBC Urine 0 - 5 OTO5^0 - 5 /HPF    RBC Urine O - 2 OTO2^O - 2 /HPF       ASSESSMENT AND PLAN:  Problem List Items Addressed This Visit        Other    Primary narcolepsy without cataplexy - Primary    Relevant Medications    dextroamphetamine (DEXTROSTAT) 10 MG tablet    methylphenidate (RITALIN) 10 MG tablet    Pain medication agreement - 8/9/2018    Relevant Medications    dextroamphetamine (DEXTROSTAT) 10 MG tablet    methylphenidate (RITALIN) 10 MG tablet      Other Visit Diagnoses     History of colon polyps        Relevant Orders    GASTROENTEROLOGY ADULT REF PROCEDURE ONLY Other        recommended sodium restriction  -- Expected clinical course discussed    -- Medications and their side effects discussed    Patient Instructions   1. Primary narcolepsy without cataplexy  2. Pain medication agreement - 8/9/2018  - dextroamphetamine (DEXTROSTAT) 10 MG tablet; Take 1 tablet (10 mg) by mouth 4 times daily  Dispense: 360 tablet; Refill: 0  - methylphenidate (RITALIN) 10 MG tablet; Take 1 tablet (10 mg) by mouth 4 times daily  Dispense: 360 tablet; Refill: 0    3. History of colon polyps  - GASTROENTEROLOGY ADULT REF PROCEDURE ONLY - colonoscopy ordered  - they will call with date/time of appointment.      -- will need to hold aspirin and Plavix for at least a few days prior to colonoscopy.  Exact duration is up to the surgical team.    Return as needed for follow-up with Dr. Barnett.    Clinic : [phone]  Appointment line: [phone]      Jorge Barnett MD  Internal Medicine  [facility]     Portions of this note were dictated using speech recognition software. The note has been proofread but errors in the text may have been overlooked. Please contact me if there are any  concerns regarding the accuracy of the dictation.

## 2019-03-28 ENCOUNTER — OFFICE VISIT (OUTPATIENT)
Dept: INTERNAL MEDICINE | Facility: OTHER | Age: 69
End: 2019-03-28
Attending: INTERNAL MEDICINE
Payer: COMMERCIAL

## 2019-03-28 VITALS
TEMPERATURE: 98.5 F | HEART RATE: 68 BPM | WEIGHT: 200.25 LBS | SYSTOLIC BLOOD PRESSURE: 138 MMHG | BODY MASS INDEX: 30.9 KG/M2 | RESPIRATION RATE: 16 BRPM | DIASTOLIC BLOOD PRESSURE: 84 MMHG

## 2019-03-28 DIAGNOSIS — Z02.89 PAIN MEDICATION AGREEMENT: ICD-10-CM

## 2019-03-28 DIAGNOSIS — Z86.0100 HISTORY OF COLON POLYPS: ICD-10-CM

## 2019-03-28 DIAGNOSIS — G47.419 PRIMARY NARCOLEPSY WITHOUT CATAPLEXY: Primary | ICD-10-CM

## 2019-03-28 PROCEDURE — G0463 HOSPITAL OUTPT CLINIC VISIT: HCPCS

## 2019-03-28 PROCEDURE — 99214 OFFICE O/P EST MOD 30 MIN: CPT | Performed by: INTERNAL MEDICINE

## 2019-03-28 RX ORDER — METHYLPHENIDATE HYDROCHLORIDE 10 MG/1
10 TABLET ORAL 4 TIMES DAILY
Qty: 360 TABLET | Refills: 0 | Status: SHIPPED | OUTPATIENT
Start: 2019-03-28 | End: 2019-06-26

## 2019-03-28 RX ORDER — DEXTROAMPHETAMINE SULFATE 10 MG/1
10 TABLET ORAL 4 TIMES DAILY
Qty: 360 TABLET | Refills: 0 | Status: SHIPPED | OUTPATIENT
Start: 2019-03-28 | End: 2019-06-25

## 2019-03-28 ASSESSMENT — ENCOUNTER SYMPTOMS
BACK PAIN: 1
WHEEZING: 0
HEMATURIA: 0
CHOKING: 0
MYALGIAS: 1
NECK PAIN: 1
HEADACHES: 1
FATIGUE: 0
WOUND: 0
BRUISES/BLEEDS EASILY: 0
FEVER: 0
ARTHRALGIAS: 1
PALPITATIONS: 0
CONFUSION: 0
CHILLS: 0
CHEST TIGHTNESS: 0
NECK STIFFNESS: 1
ABDOMINAL PAIN: 0
ADENOPATHY: 0
COUGH: 0
STRIDOR: 0
SHORTNESS OF BREATH: 0

## 2019-03-28 ASSESSMENT — PATIENT HEALTH QUESTIONNAIRE - PHQ9
5. POOR APPETITE OR OVEREATING: NOT AT ALL
SUM OF ALL RESPONSES TO PHQ QUESTIONS 1-9: 0

## 2019-03-28 ASSESSMENT — ANXIETY QUESTIONNAIRES
6. BECOMING EASILY ANNOYED OR IRRITABLE: NOT AT ALL
5. BEING SO RESTLESS THAT IT IS HARD TO SIT STILL: NOT AT ALL
1. FEELING NERVOUS, ANXIOUS, OR ON EDGE: NOT AT ALL
2. NOT BEING ABLE TO STOP OR CONTROL WORRYING: NOT AT ALL
7. FEELING AFRAID AS IF SOMETHING AWFUL MIGHT HAPPEN: NOT AT ALL
3. WORRYING TOO MUCH ABOUT DIFFERENT THINGS: NOT AT ALL
GAD7 TOTAL SCORE: 0
IF YOU CHECKED OFF ANY PROBLEMS ON THIS QUESTIONNAIRE, HOW DIFFICULT HAVE THESE PROBLEMS MADE IT FOR YOU TO DO YOUR WORK, TAKE CARE OF THINGS AT HOME, OR GET ALONG WITH OTHER PEOPLE: NOT DIFFICULT AT ALL

## 2019-03-28 ASSESSMENT — PAIN SCALES - GENERAL: PAINLEVEL: MODERATE PAIN (4)

## 2019-03-28 NOTE — PATIENT INSTRUCTIONS
1. Primary narcolepsy without cataplexy  2. Pain medication agreement - 8/9/2018  - dextroamphetamine (DEXTROSTAT) 10 MG tablet; Take 1 tablet (10 mg) by mouth 4 times daily  Dispense: 360 tablet; Refill: 0  - methylphenidate (RITALIN) 10 MG tablet; Take 1 tablet (10 mg) by mouth 4 times daily  Dispense: 360 tablet; Refill: 0    3. History of colon polyps  - GASTROENTEROLOGY ADULT REF PROCEDURE ONLY - colonoscopy ordered  - they will call with date/time of appointment.      -- will need to hold aspirin and Plavix for at least a few days prior to colonoscopy.  Exact duration is up to the surgical team.    Return as needed for follow-up with Dr. Barnett.    Clinic : [phone]  Appointment line: [phone]

## 2019-03-28 NOTE — LETTER
[facility]  [address]  [place] [address]  [phone]      2019      Moses Whittaker  [address] [facility] [address]          Dear Moses Whittaker,    Thank you for your interest in becoming a [employer] user.     Please access the [employer] web site at [email or web address].     Your access code is: [email or web address]  Expires: 2019  2:56 PM    If you allow your access code to , or if you have any questions please call the [employer] representative at your primary clinic during normal clinic hours.     Sincerely,  [facility]

## 2019-03-28 NOTE — NURSING NOTE
"Patient presents to the clinic for medication management.  Contract updated on 8-9-18.  Last administration of Dextrostat today around 08/0900 and Methylphenidate yesterday around 1700.      Previous A1C is at goal of <8  Lab Results   Component Value Date    A1C 7.7 03/08/2019    A1C 7.5 11/21/2018    A1C 8.0 08/09/2018    A1C 7.8 05/03/2018     Urine microalbumin:creatine: n/a  Foot exam unknown-declines today  Eye exam 02/2019    Tobacco User no  Patient is on a daily aspirin  Patient is on a Statin.  Blood pressure today of:     BP Readings from Last 1 Encounters:   03/08/19 138/80      is at the goal of <139/89 for diabetics.    Chief Complaint   Patient presents with     Recheck Medication       Initial /84 (BP Location: Right arm, Patient Position: Sitting, Cuff Size: Adult Regular)   Pulse 68   Temp 98.5  F (36.9  C) (Tympanic)   Resp 16   Wt 90.8 kg (200 lb 4 oz)   BMI 30.90 kg/m   Estimated body mass index is 30.9 kg/m  as calculated from the following:    Height as of 1/7/19: 1.715 m (5' 7.5\").    Weight as of this encounter: 90.8 kg (200 lb 4 oz).  Medication Reconciliation: complete    Barbara Connor LPN        "

## 2019-03-29 DIAGNOSIS — Z12.11 ENCOUNTER FOR SCREENING COLONOSCOPY: Primary | ICD-10-CM

## 2019-03-29 ASSESSMENT — ANXIETY QUESTIONNAIRES: GAD7 TOTAL SCORE: 0

## 2019-03-29 NOTE — TELEPHONE ENCOUNTER
Screening Questions for the Scheduling of Screening Colonoscopies   (If Colonoscopy is diagnostic, Provider should review the chart before scheduling.)  Are you younger than 50 or older than 80?  NO  Do you take aspirin or fish oil?  YES -- ASA (if yes, tell patient to stop 1 week prior to Colonoscopy)  Do you take warfarin (Coumadin), clopidogrel (Plavix), apixaban (Eliquis), dabigatram (Pradaxa), rivaroxaban (Xarelto) or any blood thinner? YES -- PLAVIX  Do you use oxygen at home?  NO  Do you have kidney disease? NO  Are you on dialysis? NO  Have you had a stroke or heart attack in the last year? NO  Have you had a stent in your heart or any blood vessel in the last year? NO  Have you had a transplant of any organ? NO  Have you had a colonoscopy or upper endoscopy (EGD) before?  YES         When?  [facility] -- 1/8/2016  Date of scheduled Colonoscopy. 4/18/2019  Provider [facility]  Pharmacy [facility]

## 2019-04-02 RX ORDER — POLYETHYLENE GLYCOL 3350, SODIUM CHLORIDE, SODIUM BICARBONATE, POTASSIUM CHLORIDE 420; 11.2; 5.72; 1.48 G/4L; G/4L; G/4L; G/4L
4000 POWDER, FOR SOLUTION ORAL ONCE
Qty: 4000 ML | Refills: 0 | Status: ON HOLD | OUTPATIENT
Start: 2019-04-02 | End: 2019-04-18

## 2019-04-02 RX ORDER — BISACODYL 5 MG/1
TABLET, DELAYED RELEASE ORAL
Qty: 2 TABLET | Refills: 0 | Status: ON HOLD | OUTPATIENT
Start: 2019-04-02 | End: 2019-04-18

## 2019-04-09 ENCOUNTER — OFFICE VISIT (OUTPATIENT)
Dept: INTERNAL MEDICINE | Facility: OTHER | Age: 69
End: 2019-04-09
Attending: NURSE PRACTITIONER
Payer: MEDICARE

## 2019-04-09 VITALS
SYSTOLIC BLOOD PRESSURE: 138 MMHG | RESPIRATION RATE: 12 BRPM | DIASTOLIC BLOOD PRESSURE: 72 MMHG | HEART RATE: 68 BPM | BODY MASS INDEX: 31.16 KG/M2 | TEMPERATURE: 96.5 F | WEIGHT: 201.9 LBS

## 2019-04-09 DIAGNOSIS — S90.221A SUBUNGUAL HEMATOMA OF TOE OF RIGHT FOOT, INITIAL ENCOUNTER: Primary | ICD-10-CM

## 2019-04-09 PROCEDURE — G0463 HOSPITAL OUTPT CLINIC VISIT: HCPCS

## 2019-04-09 PROCEDURE — 99213 OFFICE O/P EST LOW 20 MIN: CPT | Mod: 25 | Performed by: NURSE PRACTITIONER

## 2019-04-09 PROCEDURE — G0463 HOSPITAL OUTPT CLINIC VISIT: HCPCS | Mod: 25

## 2019-04-09 PROCEDURE — G0127 TRIM NAIL(S): HCPCS | Performed by: NURSE PRACTITIONER

## 2019-04-09 ASSESSMENT — PAIN SCALES - GENERAL: PAINLEVEL: NO PAIN (0)
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
## 2019-04-09 NOTE — NURSING NOTE
"  Previous A1C is at goal of <8  Lab Results  Component Value Date    A1C 7.7 03/08/2019    A1C 7.5 11/21/2018    A1C 8.0 08/09/2018    A1C 7.8 05/03/2018     Urine microalbumin:creatine: n/a  Foot exam unknown  Eye exam 02/2019    Tobacco User no  Patient is on a daily aspirin  Patient is on a Statin.    Chief Complaint   Patient presents with     Consult     Sore On Foot Initial Wound Care        Initial /72 (BP Location: Right arm, Patient Position: Chair, Cuff Size: Adult Large)   Pulse 68   Temp 96.5  F (35.8  C) (Tympanic)   Resp 12   Wt 91.6 kg (201 lb 14.4 oz)   BMI 31.16 kg/m   Estimated body mass index is 31.16 kg/m  as calculated from the following:    Height as of 1/7/19: 1.715 m (5' 7.5\").    Weight as of this encounter: 91.6 kg (201 lb 14.4 oz).  Medication Reconciliation: complete      Bee Rao LPN on 4/9/2019 at 10:19 AM                      "

## 2019-04-11 RX ORDER — UBIDECARENONE 100 MG
100 CAPSULE ORAL DAILY
Status: ON HOLD | COMMUNITY
End: 2023-01-01

## 2019-04-18 ENCOUNTER — ANESTHESIA (OUTPATIENT)
Dept: SURGERY | Facility: OTHER | Age: 69
End: 2019-04-18
Payer: MEDICARE

## 2019-04-18 ENCOUNTER — HOSPITAL ENCOUNTER (OUTPATIENT)
Facility: OTHER | Age: 69
Discharge: HOME | End: 2019-04-18
Attending: SURGERY | Admitting: SURGERY
Payer: MEDICARE

## 2019-04-18 ENCOUNTER — ANESTHESIA EVENT (OUTPATIENT)
Dept: SURGERY | Facility: OTHER | Age: 69
End: 2019-04-18
Payer: MEDICARE

## 2019-04-18 VITALS
HEART RATE: 54 BPM | BODY MASS INDEX: 31.02 KG/M2 | SYSTOLIC BLOOD PRESSURE: 150 MMHG | OXYGEN SATURATION: 94 % | DIASTOLIC BLOOD PRESSURE: 62 MMHG | RESPIRATION RATE: 16 BRPM | TEMPERATURE: 97.3 F | WEIGHT: 201 LBS

## 2019-04-18 PROCEDURE — 45385 COLONOSCOPY W/LESION REMOVAL: CPT | Performed by: SURGERY

## 2019-04-18 PROCEDURE — 45385 COLONOSCOPY W/LESION REMOVAL: CPT | Performed by: NURSE ANESTHETIST, CERTIFIED REGISTERED

## 2019-04-18 PROCEDURE — 40000010 ZZH STATISTIC ANES STAT CODE-CRNA PER MINUTE: Performed by: SURGERY

## 2019-04-18 PROCEDURE — 45385 COLONOSCOPY W/LESION REMOVAL: CPT | Mod: PT | Performed by: SURGERY

## 2019-04-18 PROCEDURE — 25000125 ZZHC RX 250: Performed by: NURSE ANESTHETIST, CERTIFIED REGISTERED

## 2019-04-18 PROCEDURE — 88305 TISSUE EXAM BY PATHOLOGIST: CPT

## 2019-04-18 PROCEDURE — 25000128 H RX IP 250 OP 636: Performed by: NURSE ANESTHETIST, CERTIFIED REGISTERED

## 2019-04-18 PROCEDURE — 25800030 ZZH RX IP 258 OP 636: Performed by: SURGERY

## 2019-04-18 RX ORDER — SODIUM CHLORIDE, SODIUM LACTATE, POTASSIUM CHLORIDE, CALCIUM CHLORIDE 600; 310; 30; 20 MG/100ML; MG/100ML; MG/100ML; MG/100ML
INJECTION, SOLUTION INTRAVENOUS CONTINUOUS
Status: DISCONTINUED | OUTPATIENT
Start: 2019-04-18 | End: 2019-04-18 | Stop reason: HOSPADM

## 2019-04-18 RX ORDER — PROPOFOL 10 MG/ML
INJECTION, EMULSION INTRAVENOUS PRN
Status: DISCONTINUED | OUTPATIENT
Start: 2019-04-18 | End: 2019-04-18

## 2019-04-18 RX ORDER — FLUMAZENIL 0.1 MG/ML
0.2 INJECTION, SOLUTION INTRAVENOUS
Status: DISCONTINUED | OUTPATIENT
Start: 2019-04-18 | End: 2019-04-18 | Stop reason: HOSPADM

## 2019-04-18 RX ORDER — PROPOFOL 10 MG/ML
INJECTION, EMULSION INTRAVENOUS CONTINUOUS PRN
Status: DISCONTINUED | OUTPATIENT
Start: 2019-04-18 | End: 2019-04-18

## 2019-04-18 RX ORDER — LIDOCAINE 40 MG/G
CREAM TOPICAL
Status: DISCONTINUED | OUTPATIENT
Start: 2019-04-18 | End: 2019-04-18 | Stop reason: HOSPADM

## 2019-04-18 RX ORDER — ONDANSETRON 2 MG/ML
4 INJECTION INTRAMUSCULAR; INTRAVENOUS
Status: DISCONTINUED | OUTPATIENT
Start: 2019-04-18 | End: 2019-04-18 | Stop reason: HOSPADM

## 2019-04-18 RX ORDER — NALOXONE HYDROCHLORIDE 0.4 MG/ML
.1-.4 INJECTION, SOLUTION INTRAMUSCULAR; INTRAVENOUS; SUBCUTANEOUS
Status: DISCONTINUED | OUTPATIENT
Start: 2019-04-18 | End: 2019-04-18 | Stop reason: HOSPADM

## 2019-04-18 RX ORDER — LIDOCAINE HYDROCHLORIDE 20 MG/ML
INJECTION, SOLUTION INFILTRATION; PERINEURAL PRN
Status: DISCONTINUED | OUTPATIENT
Start: 2019-04-18 | End: 2019-04-18

## 2019-04-18 RX ADMIN — PROPOFOL 60 MG: 10 INJECTION, EMULSION INTRAVENOUS at 08:22

## 2019-04-18 RX ADMIN — PROPOFOL 20 MG: 10 INJECTION, EMULSION INTRAVENOUS at 08:24

## 2019-04-18 RX ADMIN — PROPOFOL 120 MCG/KG/MIN: 10 INJECTION, EMULSION INTRAVENOUS at 08:22

## 2019-04-18 RX ADMIN — LIDOCAINE HYDROCHLORIDE 40 MG: 20 INJECTION, SOLUTION INFILTRATION; PERINEURAL at 08:22

## 2019-04-18 RX ADMIN — SODIUM CHLORIDE, POTASSIUM CHLORIDE, SODIUM LACTATE AND CALCIUM CHLORIDE: 600; 310; 30; 20 INJECTION, SOLUTION INTRAVENOUS at 08:22

## 2019-04-18 NOTE — ANESTHESIA PREPROCEDURE EVALUATION
Anesthesia Pre-Procedure Evaluation    Patient: Moses Whittaker   MRN: [id number] : 1950          Preoperative Diagnosis: screening    Procedure(s):  COLONOSCOPY    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     Gastritis without bleeding     No Comments Provided     Heart disease     Multiple coronary stents     Narcolepsy without cataplexy     No Comments Provided     Other injury of unspecified body region, initial encounter (CODE)     2006,Right calf hematoma     Rheumatic fever without heart involvement     Rheumatic fever as a child without valvular heart disease     Past Surgical History:   Procedure Laterality Date     ANGIOPLASTY      , 04,Repeat angioplasty with lt internal mammary to the lt anterior descending and lt radial artery from aorta to the diagonal.     ANGIOPLASTY      10/01,Angioplasty with 2 vessel CABG the following week from the lt internal mammary to the lt anterior descending and right radial free graft     ANGIOPLASTY      2003     ARTHROSCOPY SHOULDER ROTATOR CUFF REPAIR      2006,Left rotator cuff repair and bone spur removal by Dr. Giraldo in [place]     COLONOSCOPY           COLONOSCOPY  2016,-- Dr. De La Cruz -- polypectomy     OTHER SURGICAL HISTORY      ,[id number],IR ANGIOGRAM FEMORAL/EXTREMITY ([place]),Unremarkable angiogram at [facility]     OTHER SURGICAL HISTORY      10/05/2004,,PTCA,Angioplasty     OTHER SURGICAL HISTORY      2005,,PTCA,Angioplasty with stent.     OTHER SURGICAL HISTORY      2005,,PTCA,Angioplasty with stent.     OTHER SURGICAL HISTORY      2005,,PTCA,Angioplasty without stent     OTHER SURGICAL HISTORY       2/26/2007,[id number],IR ANGIOGRAM FEMORAL/EXTREMITY (IA),Unremarkable coronary angiogram at [facility].     OTHER SURGICAL HISTORY      1967,SUR38,APPENDECTOMY OPEN     RELEASE CARPAL TUNNEL      11/15/01,Right carpal tunnel release by Dr. Mace     RELEASE CARPAL TUNNEL      01/2004,Left carpal tunnel release       Anesthesia Evaluation     .             ROS/MED HX    ENT/Pulmonary: Comment: Narcolepsy       Neurologic:  - neg neurologic ROS     Cardiovascular:     (+) hypertension-Peripheral Vascular Disease-CAD, angina-with excertion, CABG-. : . CHF etiology: diastolic . FOLEY, . :. .       METS/Exercise Tolerance:     Hematologic:  - neg hematologic  ROS       Musculoskeletal:   (+) arthritis,  other musculoskeletal (lower back pain)- cervical stenosis      GI/Hepatic:     (+) GERD Asymptomatic on medication, bowel prep,       Renal/Genitourinary:     (+) chronic renal disease, type: CRI,       Endo:     (+) type II DM thyroid problem hypothyroidism, Obesity, .      Psychiatric:  - neg psychiatric ROS       Infectious Disease:  - neg infectious disease ROS       Malignancy:      - no malignancy   Other:    - neg other ROS                      Physical Exam  Normal systems: cardiovascular, pulmonary and dental    Airway   Mallampati: II  TM distance: >3 FB  Neck ROM: full    Dental     Cardiovascular   Rhythm and rate: regular and normal      Pulmonary    breath sounds clear to auscultation            Lab Results   Component Value Date    WBC 5.8 03/08/2019    HGB 15.0 03/08/2019    HCT 46.3 03/08/2019     03/08/2019    SED 7 12/18/2017     03/08/2019    POTASSIUM 4.1 03/08/2019    CHLORIDE 103 03/08/2019    CO2 27 03/08/2019    BUN 16 03/08/2019    CR 1.51 (H) 03/08/2019     (H) 03/08/2019    RYAN 9.1 03/08/2019    ALBUMIN 4.0 03/08/2019    PROTTOTAL 6.7 03/08/2019    ALT 24 03/08/2019    AST 22 03/08/2019    ALKPHOS 63 03/08/2019    BILITOTAL 0.6 03/08/2019       Preop Vitals  BP Readings from Last 3  "Encounters:   04/18/19 160/88   04/09/19 138/72   03/28/19 138/84    Pulse Readings from Last 3 Encounters:   04/18/19 72   04/09/19 68   03/28/19 68      Resp Readings from Last 3 Encounters:   04/18/19 16   04/09/19 12   03/28/19 16    SpO2 Readings from Last 3 Encounters:   04/18/19 96%   01/18/19 98%   03/30/17 97%      Temp Readings from Last 1 Encounters:   04/18/19 97.8  F (36.6  C) (Tympanic)    Ht Readings from Last 1 Encounters:   01/07/19 1.715 m (5' 7.5\")      Wt Readings from Last 1 Encounters:   04/18/19 91.2 kg (201 lb)    Estimated body mass index is 31.02 kg/m  as calculated from the following:    Height as of 1/7/19: 1.715 m (5' 7.5\").    Weight as of this encounter: 91.2 kg (201 lb).       Anesthesia Plan      History & Physical Review      ASA Status:  3 .    NPO Status:  > 8 hours    Plan for MAC Reason for MAC:  Chronic cardiopulmonary disease (G9)         Postoperative Care      Consents  Anesthetic plan, risks, benefits and alternatives discussed with:  Patient.  Use of blood products discussed: Yes.   Consented to blood products.  .                 DONNIE Melvin CRNA  "

## 2019-04-18 NOTE — OR NURSING
Patient has been discharged to home at 1030 via ambulatory accompanied by wife    Written discharge instructions were provided to patient.  Prescriptions were N/A. Patient denies any pain, nausea, or dizziness at this time.     Patient and adult caring for them verbalize understanding of discharge instructions including no driving until tomorrow and no longer taking narcotic pain medications - no operating mechanical equipment and no making any important decisions.They understand reason for discharge, and necessary follow-up appointments.      Sarah Law RN

## 2019-04-18 NOTE — ANESTHESIA POSTPROCEDURE EVALUATION
Patient: Moses Whittaker    Procedure(s):  COMBINED COLONOSCOPY, REMOVE TUMOR/POLYP/LESION BY SNARE    Diagnosis:screening  Diagnosis Additional Information: No value filed.    Anesthesia Type:  MAC    Note:  Anesthesia Post Evaluation    Patient location during evaluation: Phase 2  Patient participation: Able to fully participate in evaluation  Level of consciousness: awake and alert  Pain management: adequate  Airway patency: patent  Cardiovascular status: acceptable  Respiratory status: acceptable  Hydration status: acceptable  PONV: none             Last vitals:  Vitals:    04/18/19 0759 04/18/19 0915 04/18/19 0930   BP: 160/88 116/68 141/68   Pulse: 72 65 60   Resp: 16     Temp: 97.8  F (36.6  C) 97.3  F (36.3  C)    SpO2: 96% 94% 95%         Electronically Signed By: DONNIE BURNETT CRNA  April 18, 2019  10:12 AM

## 2019-04-18 NOTE — ANESTHESIA CARE TRANSFER NOTE
Patient: Moses Whittaker    Procedure(s):  COMBINED COLONOSCOPY, REMOVE TUMOR/POLYP/LESION BY SNARE    Diagnosis: screening  Diagnosis Additional Information: No value filed.    Anesthesia Type:   MAC     Note:  Airway :Room Air  Patient transferred to:Phase II  Handoff Report: Identifed the Patient, Identified the Reponsible Provider, Reviewed the pertinent medical history, Discussed the surgical course, Reviewed Intra-OP anesthesia mangement and issues during anesthesia, Set expectations for post-procedure period and Allowed opportunity for questions and acknowledgement of understanding      Vitals: (Last set prior to Anesthesia Care Transfer)    CRNA VITALS  4/18/2019 0840 - 4/18/2019 0911      4/18/2019             Resp Rate (set):  10                Electronically Signed By: DONNIE BURNETT CRNA  April 18, 2019  9:11 AM

## 2019-04-18 NOTE — OP NOTE
PROCEDURE NOTE    SURGEON:Shukri Akbar    PRE-OP DIAGNOSIS:  Screening Colonoscopy      POST-OP DIAGNOSIS: Rectal polyp    PROCEDURE:  Colonoscopy with cold snare    SPECIMEN:  Rectal polyp    ANESTHESIA:  Monitor Anesthesia Care CRNA Independent: Abril Fox APRN CRNA   Coverage requested due to ASA III    ESTIMATED BLOOD LOSS: none    COMPLICATIONS:  None    INDICATION FOR THE PROCEDURE: The patient is a 69 year old male. The patient presents with hx of polyps. I explained to the patient the risks, benefits and alternatives to screening colonoscopy for evaluating for cancer or polyps. We specifically discussed the risks of bleeding, infection, perforation, potential inability to reach the cecum and the risks of sedation. The patient's questions were answered and the patient wished to proceed. Informed consent paperwork was completed.    PROCEDURE: The patient was taken to the endoscopy suite. Appropriate monitors were attached. The patient was placed in the left lateral decubitus position.Timeout was performed confirming the patient's identity and procedure to be performed.  After appropriate sedation was confirmed, digital rectal exam was performed.  There was normal tone and no gross abnormality was noted.  The lubricated colonoscope was introduced into the anus the colon was insufflated with air. The prep quality was adequate. Under direct visualization the scope was advanced to the cecum. The mucosa of the colon was inspected while withdrawing the scope. A 2 mm rectal polyp was noted and removed with cold snare. The scope was retroflexed in the rectum and the anorectal junction was inspected. No abnormalities were noted. The scope was returned to aneutral position and the colon was decompressed. The scope was removed. The patient tolerated the procedure with no immediately apparent complication. The patient was taken to recovery in stable condition.    FOLLOW UP: RECOMMEND high fiber diet, will call  with pathology results.     Shukri Akbar

## 2019-04-18 NOTE — INTERVAL H&P NOTE
I saw and examined Moses Whittaker.  I have reviewed the history and physical and find no changes to the patient's medical status or condition with the exceptions noted below.     Shukri Akbar   8:13 AM 4/18/2019

## 2019-04-19 DIAGNOSIS — E03.4 HYPOTHYROIDISM DUE TO ACQUIRED ATROPHY OF THYROID: ICD-10-CM

## 2019-04-22 RX ORDER — LEVOTHYROXINE SODIUM 125 UG/1
TABLET ORAL
Qty: 90 TABLET | Refills: 3 | Status: SHIPPED | OUTPATIENT
Start: 2019-04-22 | End: 2020-06-18

## 2019-04-22 NOTE — TELEPHONE ENCOUNTER
"  Requested Prescriptions   Pending Prescriptions Disp Refills     levothyroxine (SYNTHROID/LEVOTHROID) 125 MCG tablet [Pharmacy Med Name: LEVOTHYROXIN 125MCG TAB] 90 tablet 2     Sig: TAKE 1 TABLET BY MOUTH ONCE DAILY IN THE MORNING       Thyroid Protocol Passed - 4/22/2019  8:52 AM        Passed - Patient is 12 years or older        Passed - Recent (12 mo) or future (30 days) visit within the authorizing provider's specialty     Patient had office visit in the last 12 months or has a visit in the next 30 days with authorizing provider or within the authorizing provider's specialty.  See \"Patient Info\" tab in inbasket, or \"Choose Columns\" in Meds & Orders section of the refill encounter.              Passed - Medication is active on med list        Passed - Normal TSH on file in past 12 months     No lab results found.           Pt had labs and OV 3/2019. Prescription approved per [facility] Refill Protocol.  Virginie Manning RN on 4/22/2019 at 12:53 PM   "

## 2019-05-09 ENCOUNTER — TELEPHONE (OUTPATIENT)
Dept: INTERNAL MEDICINE | Facility: OTHER | Age: 69
End: 2019-05-09

## 2019-05-09 NOTE — TELEPHONE ENCOUNTER
Spoke with patient who confirmed her last name and birth date. He stated that he had seen a cardiologist in [place] who advised him to see a NP at the [facility] in [place]. Patient stated that he prescribed him metoprolol 50mg take 2 tablets once daily. Patient was unaware that he sent this in, because per [facility] it voided his years worth prescription from Jorge Barnett MD and the new one was only for 45 days. He would like to have Jorge Barnett MD send in a new one the same as what he had from Jorge Barnett MD prior.  He is aware that Jorge Barnett MD is out until Tuesday, please advise  Shelby Lima LPN 5/9/2019 4:11 PM

## 2019-05-14 NOTE — TELEPHONE ENCOUNTER
Spouse informed to tell patient that this writer fixed stuff at [facility] and to call back if there are any other concerns.      Barbara Connor LPN 5/14/2019 9:59 AM

## 2019-05-30 ENCOUNTER — TELEPHONE (OUTPATIENT)
Dept: EDUCATION SERVICES | Facility: OTHER | Age: 69
End: 2019-05-30

## 2019-05-30 NOTE — TELEPHONE ENCOUNTER
"Patient left message, pump \"is starting to act up and stops working.  I can get it started again, but thinking one of these times it will not restart.\"  Patient states this has happened four times over the past few days.  He is wondering what to do.    Radha Trammell RN, BSN, CDE  5/30/2019 8:48 AM     Patient will need to review pump malfunction protocol so if pump is unable to restart, he will be able to manage diabetes with insulin injections until pump can be replaced.      Unfortunately, his pump company ([employer]) is out of the insulin pump business and patient will need to transition to a new pump.     Message left with patient to please schedule visit with [facility] to go over options for a new insulin pump.    Radha Trammell RN, BSN, CDE  5/30/2019 8:51 AM    "

## 2019-05-31 DIAGNOSIS — Z79.4 CONTROLLED TYPE 2 DIABETES MELLITUS WITH STAGE 3 CHRONIC KIDNEY DISEASE, WITH LONG-TERM CURRENT USE OF INSULIN (H): ICD-10-CM

## 2019-05-31 DIAGNOSIS — E11.22 CONTROLLED TYPE 2 DIABETES MELLITUS WITH STAGE 3 CHRONIC KIDNEY DISEASE, WITH LONG-TERM CURRENT USE OF INSULIN (H): ICD-10-CM

## 2019-05-31 DIAGNOSIS — N18.30 CONTROLLED TYPE 2 DIABETES MELLITUS WITH STAGE 3 CHRONIC KIDNEY DISEASE, WITH LONG-TERM CURRENT USE OF INSULIN (H): ICD-10-CM

## 2019-06-04 NOTE — TELEPHONE ENCOUNTER
Routing refill request to provider for review/approval because:  Labs out of range:  BP:     LOV: 4/9/19  Margie Calderón RN on 6/4/2019 at 10:18 AM

## 2019-06-06 NOTE — TELEPHONE ENCOUNTER
Patient phoned, he will schedule visit with [facility] soon for pump consult.    Radha Trammell RN, BSN, CDE  6/6/2019 5:25 PM

## 2019-06-10 ENCOUNTER — ALLIED HEALTH/NURSE VISIT (OUTPATIENT)
Dept: PHARMACY | Facility: CLINIC | Age: 69
End: 2019-06-10
Payer: COMMERCIAL

## 2019-06-10 DIAGNOSIS — I25.118 ATHEROSCLEROSIS OF NATIVE CORONARY ARTERY OF NATIVE HEART WITH STABLE ANGINA PECTORIS (H): ICD-10-CM

## 2019-06-10 DIAGNOSIS — E78.2 MIXED HYPERLIPIDEMIA: ICD-10-CM

## 2019-06-10 DIAGNOSIS — I50.32 CHRONIC DIASTOLIC HEART FAILURE (H): ICD-10-CM

## 2019-06-10 DIAGNOSIS — Z79.4 CONTROLLED TYPE 2 DIABETES MELLITUS WITH STAGE 3 CHRONIC KIDNEY DISEASE, WITH LONG-TERM CURRENT USE OF INSULIN (H): Primary | ICD-10-CM

## 2019-06-10 DIAGNOSIS — K21.9 GASTROESOPHAGEAL REFLUX DISEASE, ESOPHAGITIS PRESENCE NOT SPECIFIED: ICD-10-CM

## 2019-06-10 DIAGNOSIS — E11.22 CONTROLLED TYPE 2 DIABETES MELLITUS WITH STAGE 3 CHRONIC KIDNEY DISEASE, WITH LONG-TERM CURRENT USE OF INSULIN (H): Primary | ICD-10-CM

## 2019-06-10 DIAGNOSIS — M54.5 CHRONIC LOW BACK PAIN, UNSPECIFIED BACK PAIN LATERALITY, WITH SCIATICA PRESENCE UNSPECIFIED: ICD-10-CM

## 2019-06-10 DIAGNOSIS — E03.4 HYPOTHYROIDISM DUE TO ACQUIRED ATROPHY OF THYROID: ICD-10-CM

## 2019-06-10 DIAGNOSIS — I10 ESSENTIAL HYPERTENSION: ICD-10-CM

## 2019-06-10 DIAGNOSIS — I73.9 PERIPHERAL VASCULAR DISEASE (H): ICD-10-CM

## 2019-06-10 DIAGNOSIS — G89.29 CHRONIC LOW BACK PAIN, UNSPECIFIED BACK PAIN LATERALITY, WITH SCIATICA PRESENCE UNSPECIFIED: ICD-10-CM

## 2019-06-10 DIAGNOSIS — G47.419 PRIMARY NARCOLEPSY WITHOUT CATAPLEXY: ICD-10-CM

## 2019-06-10 DIAGNOSIS — N18.30 CONTROLLED TYPE 2 DIABETES MELLITUS WITH STAGE 3 CHRONIC KIDNEY DISEASE, WITH LONG-TERM CURRENT USE OF INSULIN (H): Primary | ICD-10-CM

## 2019-06-10 PROCEDURE — 99207 ZZC NO CHARGE LOS: CPT | Performed by: PHARMACIST

## 2019-06-10 NOTE — LETTER
"[facility]  [address] [place] [id number]  [phone]      June 13, 2019    Moses Whittaker                                                                                                                     [address] [place] [id number]      Dear Moses,    It was nice to speak with you on 6/10/19.  I hope I was able to give you some useful information during our Medication Therapy Management (MTM) visit. The purpose of this visit with a clinical pharmacist was to review the medicines you are currently taking. We want to make sure that you know which medicines to take and what they are for. We also want to make sure all your medicines are working, safe, and as easy to take as possible.    Based upon our conversation continue to take your medications as prescribed. As discussed I do believe that you would benefit from taking your furosemide on a regular schedule to help you body remove some additional fluid and encourage you to try this in addition to monitoring your weight.      In addition to this cover letter you will find a \"Medication Action Plan\" which is a Medicare required document for these types of visits and a Ranexa Connect application form.  You may already be signed up for the Ranexa program from 2019 if you have been receiving medication this year.      I asked around [place] to see if there was a  at [facility] who helps with these applications, but they were not aware of anyone.  You should ask your primary care doctor or cardiologist if they have anyone involved with helping you with this type of application (if needed). Because a company is making generic ranolazine the program sounds like it may be going away based upon the letter you told me about.  This could be a positive in term of lowering the cost but we don't know at this point.     Also during our call you mentioned that you may have to go onto a different insurance " plan in the future.  If you need help with insurance options you could consider calling the Senior Linkage Line at [phone] to see when they have an option to discuss insurance plans for the upcoming year.      Feel free to call if you have any questions or concerns. By working together with you and your doctor, I hope to help you feel confident managing your medicines and improving your quality of life.       Will hinton,         Jr Jaramillo, [facility]  Medication Therapy Management Pharmacist  Pager: [phone]

## 2019-06-10 NOTE — PROGRESS NOTES
"SUBJECTIVE/OBJECTIVE:                           Moses Wihttaker is a 69 year old male called for an initial visit for Medication Therapy Management.  He was referred to me from his [facility] insurance plan.    Chief Complaint: Comprehensive medication review.    Allergies/ADRs: Reviewed in Epic  Tobacco: No tobacco use  Alcohol: none  Caffeine: no caffeine - once in a great while he will have a soda  Activity: \"for my age bracket I get around pretty good yet\"   PMH: Reviewed in Epic    Medication Adherence/Access:  Patient takes medications directly from bottles.  Patient takes medications 2-4 time(s) per day.   Per patient, misses medication 0 times per week.   Medication barriers: affording medications - specifically   The patient fills medications at [place]: NO, fills medications at [facility] in [place].    Diabetes:  Pt currently taking Humalog via insulin pump.  States that he is able to afford his insulin because he is on a pump. Pt is not experiencing side effects.  SMBG: three times daily.   Ranges (patient reported): 100-300s mg/dL  Patient is not experiencing hypoglycemia  Recent symptoms of high blood sugar? none  Eye exam: up to date  Foot exam: due  ACEi/ARB: Yes: irbesartan.   Urine Albumin:   Lab Results   Component Value Date    UMALCR 1,049.18 (H) 03/08/2019    Aspirin: Taking 325mg daily and denies side effects    Narcolepsy: Currently taking Dextrostat 10 mg - four tablets daily (states that this works best for his condition) and methylphenidate 10 mg - two tablets twice daily. Pt finds this to be fairly effective though some days with later drives he is concerned that the benefits will wear off (will hold a half tablet for later sometimes).  He also notes that he does not use these doses everyday. Pt reports no known side effects.     CAD/Hypertension/CHF/PVD: Current medications include clopidogrel 75 mg daily, aspirin 325 mg daily (dose intentional per patient/cardiology), hydralazine 25 mg " four times daily (unclear exact dose that patient is taking - maybe 50 mg twice daily?), irbesartan 150 mg twice daily, metoprolol XL 50 mg twice daily, ranolazine 1,000 mg twice daily and nitroglycerin SL 0.4 mg PRN (rare). He's prescribed furosemide  mg daily, but he does not take because he does not think it helps with his swelling (not clear if it helped with BP). Concerned about the Ranexa Connect program going away - states that he received a letter that states the program will end 12/31/19.  He has a history of significant angina. He has not been able to tolerate amlodipine (unknown) and Imdur gave him headaches. Ranexa is the only thing that seems to work and patient is concerned that the  program he gets assistance from is going away. He was not aware a generic ranolazine had been approved earlier this year (unclear what is being covered by insurance plan. Pt wears compression socks if swelling is bad and if he is not leaving home (does not like to wear with shorts. does self-monitor BP. Home BP monitoring in range of 130-140's systolic over 60-70's diastolic.  Patient reports no current medication side effects.    Arthritis/Back Pain: Currently taking diclofenac 75 mg four times daily and rarely he will take a Norco 5-325 mg PRN. Does manage constipation with PRN docusate. Pt finds this to be fairly effective though does have ongoing issues with pain. Pt reports no known side effects.     Hyperlipidemia: Current therapy includes rosuvastatin 10 mg twice daily (taking 20 mg once daily caused myalgias and he tolerates this better), flaxseed daily, and CoQ10 100 mg daily. His insurance does not like that this dose of statin exceeds his quantity limit.  Pt reports myalgias since on medication.    Hypothyroidism: Patient is taking levothyroxine 125 mcg daily. Patient is having the following symptoms: none.     GERD: Current medications include: Zantac (ranitidine) 150 mg two times daily. Pt  c/o no current symptoms.  Patient feels that current regimen is effective.    Today's Vitals: There were no vitals taken for this visit. - telephone encounter, no vitals    Lab Results   Component Value Date    A1C 7.7 03/08/2019    A1C 7.5 11/21/2018    A1C 8.0 08/09/2018    A1C 7.8 05/03/2018     Recent Labs   Lab Test 03/08/19  1217 11/21/18  1130   CHOL 101 92   HDL 36 34   LDL 45 41   TRIG 101 86       ASSESSMENT:                             Current medications were reviewed today. Medicare Part D topics discussed:Importance of taking medications as prescribed    Medication Adherence: good, pt would benefit from re-applying for Ranexa [employer] program if he has not already done so    Diabetes: Stable. Patient is meeting A1c goal of < 8%.    Narcolepsy: Stable per patient.     CAD/Hypertension/CHF/PVD: Needs Improvement. Pt is not meeting BP goal of <140/90 mmHg. Pt would likely benefit from regular use of his furosemide as prescribed to better control both swelling and potentially lower BP.     Arthritis/Back Pain: Stable per patient.     Hyperlipidemia: Stable. Pt is on high intensity statin which is indicated based on 2013 ACC/AHA guidelines for lipid management.  Pt is adherent to current dosing frequency.    Hypothyroidism: Stable.     GERD: Stable.     PLAN:                            1. Encouraged patient to restart furosemide 40 mg daily to help with edema/blood pressure.  2. Ranexa Connect application information sent to patient. Pt should contact PCP or cardiology office for assistance with paperwork (requires a physician prescription).     I spent 60 minutes with this patient today (an extra 15 minutes was spent creating the Medication Action Plan). A copy of the visit note was provided to the patient's primary care provider.    Will follow up in 4 weeks via phone for follow-up questions.    The patient was mailed a summary of these recommendations as an after visit summary.     Jr Jaramillo, PharmD,  BCACP  Medication Therapy Management Pharmacist  Pager: [phone]

## 2019-06-19 ENCOUNTER — ALLIED HEALTH/NURSE VISIT (OUTPATIENT)
Dept: EDUCATION SERVICES | Facility: OTHER | Age: 69
End: 2019-06-19
Attending: INTERNAL MEDICINE
Payer: MEDICARE

## 2019-06-19 DIAGNOSIS — N18.30 CONTROLLED TYPE 2 DIABETES MELLITUS WITH STAGE 3 CHRONIC KIDNEY DISEASE, WITH LONG-TERM CURRENT USE OF INSULIN (H): Primary | ICD-10-CM

## 2019-06-19 DIAGNOSIS — Z79.4 CONTROLLED TYPE 2 DIABETES MELLITUS WITH STAGE 3 CHRONIC KIDNEY DISEASE, WITH LONG-TERM CURRENT USE OF INSULIN (H): Primary | ICD-10-CM

## 2019-06-19 DIAGNOSIS — E11.22 CONTROLLED TYPE 2 DIABETES MELLITUS WITH STAGE 3 CHRONIC KIDNEY DISEASE, WITH LONG-TERM CURRENT USE OF INSULIN (H): Primary | ICD-10-CM

## 2019-06-19 PROCEDURE — G0108 DIAB MANAGE TRN  PER INDIV: HCPCS

## 2019-06-19 NOTE — PROGRESS NOTES
Diabetes Education Consult for CGM and Insulin Pump    SUBJECTIVE:  Moses Whittaker is an 69 year old male who presents for educationregarding Continuous Glucose Monitoring (CGM) and insulin pump therapy for Type 2 diabetes mellitus.   Age at diagnosis 37.    Current treatment includes SMBG and insulin pump.         No results found for: GLUCOSE  Cholesterol   Date/Time Value Ref Range Status   03/08/2019 12:17  <200 mg/dL Final     HDL Cholesterol   Date/Time Value Ref Range Status   03/08/2019 12:17 PM 37 23 - 92 mg/dL Final     LDL Cholesterol Calculated   Date/Time Value Ref Range Status   03/08/2019 12:17 PM 46 <100 mg/dL Final     Comment:     Desirable:       <100 mg/dl     No components found for: MICROALBCRU, [id number], MICROALBRAND    Social History     Tobacco Use     Smoking status: Never Smoker     Smokeless tobacco: Never Used   Substance Use Topics     Alcohol use: No     Alcohol/week: 0.0 oz       Current Outpatient Rx   Medication Sig Dispense Refill     aspirin (GOODSENSE ASPIRIN) 325 MG tablet Take 325 mg by mouth daily Take  by mouth.       blood glucose monitoring (ONETOUCH ULTRA) test strip Dispense test strips covered by the patient insurance. Test 10 times per day.       Blood Glucose Monitoring Suppl (GLUCOCOM BLOOD GLUCOSE MONITOR) WAQAS Dispense glucose meter, test strips and lancets covered by the patient insurance. Test 10 times per day.       clopidogrel (PLAVIX) 75 MG tablet Take 1 tablet (75 mg) by mouth daily 90 tablet 3     co-enzyme Q-10 100 MG CAPS capsule Take 100 mg by mouth daily        [employer] ALCOHOL SWABS PADS For home use.       desoximetasone (TOPICORT) 0.25 % external cream APPLY  CREAM EXTERNALLY TWICE DAILY 180 g 3     dextroamphetamine (DEXTROSTAT) 10 MG tablet Take 1 tablet (10 mg) by mouth 4 times daily (Patient taking differently: Take 40 mg by mouth daily ) 360 tablet 0     diclofenac (VOLTAREN) 75 MG EC tablet TAKE ONE TABLET BY MOUTH 4 TIMES DAILY 360  tablet 3     docusate sodium (COLACE) 100 MG capsule Take 1-2 capsules by mouth 2 times daily as needed for constipation prevention       Flaxseed, Linseed, (FLAXSEED OIL) 1000 MG CAPS Take 1 capsule by mouth daily        furosemide (LASIX) 40 MG tablet Take 2-4 tablets ( mg) by mouth daily Or as instructed for leg swelling 90 tablet 3     HUMALOG 100 UNIT/ML injection INJECT UNDER THE SKIN USING INSULIN PUMP. MAX DAILY DOSE  UNITS 180 mL 3     hydrALAZINE (APRESOLINE) 25 MG tablet Take 1-2 tablets (25-50 mg) by mouth 4 times daily - Take lowest effective dose for blood pressure management 360 tablet 11     HYDROcodone-acetaminophen (NORCO) 5-325 MG per tablet Take 1 tablet by mouth every 6 hours as needed for severe pain 60 tablet 0     Insulin Pen Needle (PEN NEEDLES) 29G X 12MM MISC For administering insulin at home.       irbesartan (AVAPRO) 150 MG tablet Take 1 tablet (150 mg) by mouth 2 times daily -- needs 2 smaller pills daily 180 tablet 3     IV Sets-Tubing (SOLUTION ADMINISTRATION SET) MISC As directed.       levothyroxine (SYNTHROID/LEVOTHROID) 125 MCG tablet TAKE 1 TABLET BY MOUTH ONCE DAILY IN THE MORNING 90 tablet 3     methylphenidate (RITALIN) 10 MG tablet Take 1 tablet (10 mg) by mouth 4 times daily (Patient taking differently: Take 10 mg by mouth 2 times daily Takes 2 tablets twice daily) 360 tablet 0     metoprolol succinate (TOPROL-XL) 50 MG 24 hr tablet Take 1 tablet (50 mg) by mouth 2 times daily 180 tablet 3     nitroGLYcerin (NITROSTAT) 0.4 MG sublingual tablet Place 1 tablet under the tongue every 5 minutes as needed for chest pain       ranitidine (ZANTAC) 150 MG tablet Take 1 tablet (150 mg) by mouth 2 times daily 180 tablet 3     ranolazine (RANEXA) 500 MG 12 hr tablet Take 2 tablets (1,000 mg) by mouth 2 times daily - cancel other Rx - give 3 month supply 360 tablet 3     rosuvastatin (CRESTOR) 10 MG tablet TAKE ONE TABLET BY MOUTH TWICE DAILY 180 tablet 3     Saline GEL  "Spray 1 spray in nostril every hour as needed For Nasal Dryness       thin (NO BRAND SPECIFIED) lancets Test 10 times per day.          Allergies: Famotidine; Gabapentin; Hydrocortisone; Atorvastatin; Celebrex [celecoxib]; Lisinopril; No clinical screening - see comments; Norvasc [amlodipine]; Pregabalin; Valdecoxib; and Insulin aspart     OBJECTIVE:  There were no vitals taken for this visit.      Patient visits for education using CGM and an insulin pump for tighter control of T2DM.  Discussed how the pump and CGM works as well as benefits and contraindications regarding CGM and pump therapy.      Patient has history of Dexcom G5 CGM, but the sensor gave him an allergic reaction, rash.  He is interested in trying the Dexcom G6 as the sensor has improved and \"looks like it is not all metal like the G5 is.\"    Patient currently using [employer] Spirit Combo insulin pump, but pump is beginning to give him problems, needing to restart the pump sometimes.  It is out of warranty and pump company has dissolved.      Patient most interested in Omnipod and Dexcom G6 CGM.  Paperwork given with take home booklets for further review.  After patient decides if he would like to move forward with the tubeless pump, patient will call representative, with contact information given, to start the insurance benefit investigation process.      PLAN:    Patient will follow up if any further questions or concerns.    Time spent with patient was 60 minutes.     Radha Trammell RN, BSN, CDE  6/19/2019 3:54 PM         "

## 2019-06-20 ENCOUNTER — TELEPHONE (OUTPATIENT)
Dept: INTERNAL MEDICINE | Facility: OTHER | Age: 69
End: 2019-06-20

## 2019-06-20 NOTE — TELEPHONE ENCOUNTER
Patient placed with Jorge Barnett MD 6-26-19, at 3 pm for medication refills.      Barbara Connor LPN 6/20/2019 1:15 PM

## 2019-06-20 NOTE — TELEPHONE ENCOUNTER
JUAN- Pt called and scheduled the first available appt which was 06/26/19.  Pt would like to be worked into the schedule sooner than this, and he stated he prefers an afternoon appt.  He would like a phone call to discuss this. He will be home today after 1PM.     Meche Armstrong on 6/20/2019 at 8:16 AM

## 2019-06-26 ENCOUNTER — OFFICE VISIT (OUTPATIENT)
Dept: INTERNAL MEDICINE | Facility: OTHER | Age: 69
End: 2019-06-26
Attending: INTERNAL MEDICINE
Payer: MEDICARE

## 2019-06-26 VITALS
TEMPERATURE: 98.6 F | RESPIRATION RATE: 16 BRPM | DIASTOLIC BLOOD PRESSURE: 72 MMHG | BODY MASS INDEX: 31.02 KG/M2 | HEART RATE: 72 BPM | WEIGHT: 201 LBS | SYSTOLIC BLOOD PRESSURE: 130 MMHG

## 2019-06-26 DIAGNOSIS — I10 BENIGN ESSENTIAL HYPERTENSION: ICD-10-CM

## 2019-06-26 DIAGNOSIS — G47.419 PRIMARY NARCOLEPSY WITHOUT CATAPLEXY: ICD-10-CM

## 2019-06-26 DIAGNOSIS — E78.2 MIXED HYPERLIPIDEMIA: ICD-10-CM

## 2019-06-26 DIAGNOSIS — E03.4 HYPOTHYROIDISM DUE TO ACQUIRED ATROPHY OF THYROID: ICD-10-CM

## 2019-06-26 DIAGNOSIS — Z02.89 PAIN MEDICATION AGREEMENT: ICD-10-CM

## 2019-06-26 LAB
ALBUMIN SERPL-MCNC: 4 G/DL (ref 3.5–5.7)
ALBUMIN UR-MCNC: 100 MG/DL
ALP SERPL-CCNC: 55 U/L (ref 34–104)
ALT SERPL W P-5'-P-CCNC: 26 U/L (ref 7–52)
ANION GAP SERPL CALCULATED.3IONS-SCNC: 7 MMOL/L (ref 3–14)
APPEARANCE UR: CLEAR
AST SERPL W P-5'-P-CCNC: 21 U/L (ref 13–39)
BACTERIA #/AREA URNS HPF: ABNORMAL /HPF
BILIRUB SERPL-MCNC: 0.7 MG/DL (ref 0.3–1)
BILIRUB UR QL STRIP: NEGATIVE
BUN SERPL-MCNC: 29 MG/DL (ref 7–25)
CALCIUM SERPL-MCNC: 9.4 MG/DL (ref 8.6–10.3)
CHLORIDE SERPL-SCNC: 105 MMOL/L (ref 98–107)
CHOLEST SERPL-MCNC: 91 MG/DL
CO2 SERPL-SCNC: 24 MMOL/L (ref 21–31)
COLOR UR AUTO: YELLOW
CREAT SERPL-MCNC: 1.65 MG/DL (ref 0.7–1.3)
ERYTHROCYTE [DISTWIDTH] IN BLOOD BY AUTOMATED COUNT: 12.5 % (ref 10–15)
GFR SERPL CREATININE-BSD FRML MDRD: 42 ML/MIN/{1.73_M2}
GLUCOSE SERPL-MCNC: 166 MG/DL (ref 70–105)
GLUCOSE UR STRIP-MCNC: 250 MG/DL
HBA1C MFR BLD: 8.1 % (ref 4–6)
HCT VFR BLD AUTO: 42.9 % (ref 40–53)
HDLC SERPL-MCNC: 36 MG/DL (ref 23–92)
HGB BLD-MCNC: 14.7 G/DL (ref 13.3–17.7)
HGB UR QL STRIP: NEGATIVE
KETONES UR STRIP-MCNC: NEGATIVE MG/DL
LDLC SERPL CALC-MCNC: 20 MG/DL
LEUKOCYTE ESTERASE UR QL STRIP: NEGATIVE
MCH RBC QN AUTO: 33.5 PG (ref 26.5–33)
MCHC RBC AUTO-ENTMCNC: 34.3 G/DL (ref 31.5–36.5)
MCV RBC AUTO: 98 FL (ref 78–100)
NITRATE UR QL: NEGATIVE
NON-SQ EPI CELLS #/AREA URNS LPF: ABNORMAL /LPF
NONHDLC SERPL-MCNC: 55 MG/DL
PH UR STRIP: 5 PH (ref 5–9)
PLATELET # BLD AUTO: 197 10E9/L (ref 150–450)
POTASSIUM SERPL-SCNC: 3.9 MMOL/L (ref 3.5–5.1)
PROT SERPL-MCNC: 6.4 G/DL (ref 6.4–8.9)
RBC # BLD AUTO: 4.39 10E12/L (ref 4.4–5.9)
RBC #/AREA URNS AUTO: ABNORMAL /HPF
SODIUM SERPL-SCNC: 136 MMOL/L (ref 134–144)
SOURCE: ABNORMAL
SP GR UR STRIP: >1.03 (ref 1–1.03)
TRIGL SERPL-MCNC: 173 MG/DL
TSH SERPL DL<=0.05 MIU/L-ACNC: 0.33 IU/ML (ref 0.34–5.6)
UROBILINOGEN UR STRIP-ACNC: 0.2 EU/DL (ref 0.2–1)
WBC # BLD AUTO: 6.6 10E9/L (ref 4–11)
WBC #/AREA URNS AUTO: ABNORMAL /HPF

## 2019-06-26 PROCEDURE — 99214 OFFICE O/P EST MOD 30 MIN: CPT | Performed by: INTERNAL MEDICINE

## 2019-06-26 PROCEDURE — 83036 HEMOGLOBIN GLYCOSYLATED A1C: CPT | Mod: ZL | Performed by: INTERNAL MEDICINE

## 2019-06-26 PROCEDURE — 85027 COMPLETE CBC AUTOMATED: CPT | Mod: ZL | Performed by: INTERNAL MEDICINE

## 2019-06-26 PROCEDURE — 36415 COLL VENOUS BLD VENIPUNCTURE: CPT | Mod: ZL | Performed by: INTERNAL MEDICINE

## 2019-06-26 PROCEDURE — G0463 HOSPITAL OUTPT CLINIC VISIT: HCPCS

## 2019-06-26 PROCEDURE — 80053 COMPREHEN METABOLIC PANEL: CPT | Mod: ZL | Performed by: INTERNAL MEDICINE

## 2019-06-26 PROCEDURE — 84443 ASSAY THYROID STIM HORMONE: CPT | Mod: ZL | Performed by: INTERNAL MEDICINE

## 2019-06-26 PROCEDURE — 80061 LIPID PANEL: CPT | Mod: ZL | Performed by: INTERNAL MEDICINE

## 2019-06-26 PROCEDURE — 81001 URINALYSIS AUTO W/SCOPE: CPT | Mod: ZL | Performed by: INTERNAL MEDICINE

## 2019-06-26 PROCEDURE — 82043 UR ALBUMIN QUANTITATIVE: CPT | Mod: ZL | Performed by: INTERNAL MEDICINE

## 2019-06-26 RX ORDER — DEXTROAMPHETAMINE SULFATE 10 MG/1
10 TABLET ORAL 4 TIMES DAILY
Qty: 360 TABLET | Refills: 0 | Status: SHIPPED | OUTPATIENT
Start: 2019-06-26 | End: 2019-10-01

## 2019-06-26 RX ORDER — METHYLPHENIDATE HYDROCHLORIDE 10 MG/1
10 TABLET ORAL 2 TIMES DAILY
Qty: 360 TABLET | Refills: 0 | Status: SHIPPED | OUTPATIENT
Start: 2019-06-26 | End: 2019-10-01

## 2019-06-26 ASSESSMENT — ENCOUNTER SYMPTOMS
CHEST TIGHTNESS: 0
CHOKING: 0
FEVER: 0
ADENOPATHY: 0
HEMATURIA: 0
PALPITATIONS: 0
BRUISES/BLEEDS EASILY: 0
STRIDOR: 0
NECK PAIN: 1
CONFUSION: 0
NECK STIFFNESS: 1
FATIGUE: 0
MYALGIAS: 1
WHEEZING: 0
HEADACHES: 1
COUGH: 0
BACK PAIN: 1
SHORTNESS OF BREATH: 0
WOUND: 0
ABDOMINAL PAIN: 0
ARTHRALGIAS: 1
CHILLS: 0

## 2019-06-26 ASSESSMENT — ANXIETY QUESTIONNAIRES
7. FEELING AFRAID AS IF SOMETHING AWFUL MIGHT HAPPEN: NOT AT ALL
1. FEELING NERVOUS, ANXIOUS, OR ON EDGE: NOT AT ALL
3. WORRYING TOO MUCH ABOUT DIFFERENT THINGS: NOT AT ALL
IF YOU CHECKED OFF ANY PROBLEMS ON THIS QUESTIONNAIRE, HOW DIFFICULT HAVE THESE PROBLEMS MADE IT FOR YOU TO DO YOUR WORK, TAKE CARE OF THINGS AT HOME, OR GET ALONG WITH OTHER PEOPLE: NOT DIFFICULT AT ALL
6. BECOMING EASILY ANNOYED OR IRRITABLE: NOT AT ALL
2. NOT BEING ABLE TO STOP OR CONTROL WORRYING: NOT AT ALL
5. BEING SO RESTLESS THAT IT IS HARD TO SIT STILL: NOT AT ALL
GAD7 TOTAL SCORE: 0

## 2019-06-26 ASSESSMENT — PAIN SCALES - GENERAL: PAINLEVEL: NO PAIN (0)

## 2019-06-26 ASSESSMENT — PATIENT HEALTH QUESTIONNAIRE - PHQ9
SUM OF ALL RESPONSES TO PHQ QUESTIONS 1-9: 0
5. POOR APPETITE OR OVEREATING: NOT AT ALL

## 2019-06-26 NOTE — LETTER
June 26, 2019      Moses Whittaker  [address] [facility] [address] [facility] [address]        Dear ,    We are writing to inform you of your test results.    Thyroid levels are stable.    Cholesterol levels look good.    Diabetes is once again uncontrolled.      To help with weight loss and improve blood sugar control....    -- Try to avoid Carbohydrates as much as possible -- breads, pasta, baked goods, cakes, oatmeal, cold cereal, potatoes.   These are turned to sugar in one metabolic conversion, cause insulin secretion and increased fat deposition / weight gain.      -- Eat more lean meats, proteins, eggs, nuts, vegetables.       Resulted Orders   TSH Reflex GH   Result Value Ref Range    TSH Reflex 0.33 (L) 0.34 - 5.60 IU/mL   Lipid Profile   Result Value Ref Range    Cholesterol 91 <200 mg/dL    Triglycerides 173 (H) <150 mg/dL      Comment:      Borderline high:  150-199 mg/dl  High:             200-499 mg/dl  Very high:       >499 mg/dl      HDL Cholesterol 36 23 - 92 mg/dL    LDL Cholesterol Calculated 20 <100 mg/dL      Comment:      Desirable:       <100 mg/dl    Non HDL Cholesterol 55 <130 mg/dL   Hemoglobin A1c   Result Value Ref Range    Hemoglobin A1C 8.1 (H) 4.0 - 6.0 %   CBC with platelets   Result Value Ref Range    WBC 6.6 4.0 - 11.0 10e9/L    RBC Count 4.39 (L) 4.4 - 5.9 10e12/L    Hemoglobin 14.7 13.3 - 17.7 g/dL    Hematocrit 42.9 40.0 - 53.0 %    MCV 98 78 - 100 fl    MCH 33.5 (H) 26.5 - 33.0 pg    MCHC 34.3 31.5 - 36.5 g/dL    RDW 12.5 10.0 - 15.0 %    Platelet Count 197 150 - 450 10e9/L   Comprehensive metabolic panel   Result Value Ref Range    Sodium 136 134 - 144 mmol/L    Potassium 3.9 3.5 - 5.1 mmol/L    Chloride 105 98 - 107 mmol/L    Carbon Dioxide 24 21 - 31 mmol/L    Anion Gap 7 3 - 14 mmol/L    Glucose 166 (H) 70 - 105 mg/dL    Urea Nitrogen 29 (H) 7 - 25 mg/dL    Creatinine 1.65 (H) 0.70 - 1.30 mg/dL    GFR Estimate 42 (L) >60 mL/min/[1.73_m2]    GFR Estimate If Black 50 (L)  >60 mL/min/[1.73_m2]    Calcium 9.4 8.6 - 10.3 mg/dL    Bilirubin Total 0.7 0.3 - 1.0 mg/dL    Albumin 4.0 3.5 - 5.7 g/dL    Protein Total 6.4 6.4 - 8.9 g/dL    Alkaline Phosphatase 55 34 - 104 U/L    ALT 26 7 - 52 U/L    AST 21 13 - 39 U/L   *UA reflex to Microscopic   Result Value Ref Range    Color Urine Yellow     Appearance Urine Clear     Glucose Urine 250 (A) NEG^Negative mg/dL    Bilirubin Urine Negative NEG^Negative    Ketones Urine Negative NEG^Negative mg/dL    Specific Gravity Urine >1.030 (H) 1.000 - 1.030    Blood Urine Negative NEG^Negative    pH Urine 5.0 5.0 - 9.0 pH    Protein Albumin Urine 100 (A) NEG^Negative mg/dL    Urobilinogen Urine 0.2 0.2 - 1.0 EU/dL    Nitrite Urine Negative NEG^Negative    Leukocyte Esterase Urine Negative NEG^Negative    Source Midstream Urine    Urine Microscopic   Result Value Ref Range    WBC Urine 0 - 5 OTO5^0 - 5 /HPF    RBC Urine O - 2 OTO2^O - 2 /HPF    Squamous Epithelial /LPF Urine Few FEW^Few /LPF    Bacteria Urine Few (A) NEG^Negative /HPF       If you have any questions or concerns, please call the clinic at the number listed above.       Sincerely,        Jorge Barnett MD

## 2019-06-26 NOTE — NURSING NOTE
"Patient presents to the clinic for controlled medication refills.  Last administration of Norco was weeks ago, Dextrostat was today around 1030 Ritalin was yesterday around 1900.  Contract updated on 8-9-18.      Previous A1C is at goal of <8  Lab Results   Component Value Date    A1C 7.7 03/08/2019    A1C 7.5 11/21/2018    A1C 8.0 08/09/2018    A1C 7.8 05/03/2018     Urine microalbumin:creatine: n/a  Foot exam unknown-declines today  Eye exam 02/2019    Tobacco User no  Patient is on a daily aspirin  Patient is on a Statin.  Blood pressure today of:     BP Readings from Last 1 Encounters:   04/18/19 150/62      is not at the goal of <139/89 for diabetics.    Chief Complaint   Patient presents with     Recheck Medication       Initial /72 (BP Location: Right arm, Patient Position: Sitting, Cuff Size: Adult Regular)   Pulse 72   Temp 98.6  F (37  C) (Tympanic)   Resp 16   Wt 91.2 kg (201 lb)   BMI 31.02 kg/m   Estimated body mass index is 31.02 kg/m  as calculated from the following:    Height as of 1/7/19: 1.715 m (5' 7.5\").    Weight as of this encounter: 91.2 kg (201 lb).  Medication Reconciliation: complete    Barbara Connor LPN            "

## 2019-06-26 NOTE — PROGRESS NOTES
"Nursing Notes:   Barbara Connor LPN  6/26/2019  3:56 PM  Signed  Patient presents to the clinic for controlled medication refills.  Last administration of Norco was weeks ago, Dextrostat was today around 1030 Ritalin was yesterday around 1900.  Contract updated on 8-9-18.      Previous A1C is at goal of <8  Lab Results   Component Value Date    A1C 7.7 03/08/2019    A1C 7.5 11/21/2018    A1C 8.0 08/09/2018    A1C 7.8 05/03/2018     Urine microalbumin:creatine: n/a  Foot exam unknown-declines today  Eye exam 02/2019    Tobacco User no  Patient is on a daily aspirin  Patient is on a Statin.  Blood pressure today of:     BP Readings from Last 1 Encounters:   04/18/19 150/62      is not at the goal of <139/89 for diabetics.    Chief Complaint   Patient presents with     Recheck Medication       Initial /72 (BP Location: Right arm, Patient Position: Sitting, Cuff Size: Adult Regular)   Pulse 72   Temp 98.6  F (37  C) (Tympanic)   Resp 16   Wt 91.2 kg (201 lb)   BMI 31.02 kg/m    Estimated body mass index is 31.02 kg/m  as calculated from the following:    Height as of 1/7/19: 1.715 m (5' 7.5\").    Weight as of this encounter: 91.2 kg (201 lb).  Medication Reconciliation: complete    Barbara Connor LPN            Nursing note reviewed with patient.  Accuracy and completeness verified.   Mr. Whittaker is a 69 year old male who:  Patient presents with:  Recheck Medication      ICD-10-CM    1. Uncontrolled type 2 diabetes mellitus with stage 3 chronic kidney disease, with long-term current use of insulin (H) E11.22 Albumin Random Urine Quantitative with Creat Ratio    E11.65 Hemoglobin A1c    N18.3 *UA reflex to Microscopic    Z79.4 Hemoglobin A1c     *UA reflex to Microscopic     Albumin Random Urine Quantitative with Creat Ratio     Urine Microscopic   2. Primary narcolepsy without cataplexy G47.419 dextroamphetamine (DEXTROSTAT) 10 MG tablet     methylphenidate (RITALIN) 10 MG tablet   3. Pain medication " agreement - 8/9/2018 Z02.89 dextroamphetamine (DEXTROSTAT) 10 MG tablet     methylphenidate (RITALIN) 10 MG tablet   4. Hypothyroidism due to acquired atrophy of thyroid E03.4 TSH Reflex GH     TSH Reflex GH   5. Benign essential hypertension I10 Comprehensive metabolic panel     CBC with platelets     CBC with platelets     Comprehensive metabolic panel   6. Mixed hyperlipidemia E78.2 Lipid Profile     Lipid Profile     HPI  Insulin-dependent type 2 diabetes.  Has insulin pump.  Due for labs today.  Previously controlled, hemoglobin A1c now uncontrolled.  Thinks that he had a bad batch of insulin, he just switched to another batch of insulin and his numbers have been better.    Primary narcolepsy, no cataplexy.  Controlled substance agreement is up-to-date.  Uses dextroamphetamine and methylphenidate.  Gets 3-month supply.   website reviewed.  No abnormal findings noted.  Proper medication use and misuse reviewed.  Patient has been using medications appropriately.  Prescriptions refilled x3-month supply.    Hypothyroidism, taking oral replacement.  Check labs.  Has been doing well.  No side effects reported.    Hypertension, currently well controlled.  Tolerating medication regimen.  No side effects reported.  Continue current dosing.    Mixed hyperlipidemia, currently on Crestor 10 mg twice daily.  He has difficult time swallowing larger pills.  He also gets myalgias when taking 20 mg at 1 dose.  Seems to tolerate the lower dose twice daily much better.  Recheck labs.    Functional Capacity: > or about 4 METS.   Review of Systems   Constitutional: Negative for chills, fatigue and fever.   HENT: Negative for congestion.    Eyes: Negative for visual disturbance.   Respiratory: Negative for cough, choking, chest tightness, shortness of breath, wheezing and stridor.    Cardiovascular: Positive for chest pain (chronic, stable) and leg swelling. Negative for palpitations.        + claudication.   + intermittent  "Angina   Gastrointestinal: Negative for abdominal pain.   Genitourinary: Negative for hematuria.   Musculoskeletal: Positive for arthralgias, back pain, gait problem, myalgias, neck pain and neck stiffness.   Skin: Negative for rash and wound.   Neurological: Positive for headaches. Negative for syncope.   Hematological: Negative for adenopathy. Does not bruise/bleed easily.   Psychiatric/Behavioral: Negative for confusion.        REMINGTON:   REMINGTON-7 SCORE 3/8/2019 3/28/2019 6/26/2019   Total Score 0 0 0     PHQ9:  PHQ-9 SCORE 3/8/2019 3/28/2019 6/26/2019   PHQ-9 Total Score 0 0 0       I have personally reviewed the past medical history, past surgical history, medications, allergies, family and social history as listed below.     Allergies   Allergen Reactions     Famotidine Other (See Comments)     + Caused Headache and Rash -- tolerated Ranitidine and worked well.     Gabapentin      Other reaction(s): Mental Status Change \"felt like in outer space\"     Hydrocortisone Other (See Comments)     Burning and stinging sensation with Hydrocortisone - doesn't help itching or rash -- tolerated Desoximetasone cream and worked well.      Atorvastatin Hives     Celebrex [Celecoxib]      Swelling      Lisinopril Cough     No Clinical Screening - See Comments Hives     Chlorine water     Norvasc [Amlodipine] Other (See Comments)     -- can't remember, didn't tolerate.      Pregabalin      Lyrica - Other reaction(s): Mental Status Change     Valdecoxib Swelling     \"bextra\" NSAID     Insulin Aspart Rash       Current Outpatient Medications   Medication Sig Dispense Refill     aspirin (GOODSENSE ASPIRIN) 325 MG tablet Take 325 mg by mouth daily Take  by mouth.       blood glucose monitoring (ONETOUCH ULTRA) test strip Dispense test strips covered by the patient insurance. Test 10 times per day.       Blood Glucose Monitoring Suppl (GLUCOCOM BLOOD GLUCOSE MONITOR) WAQAS Dispense glucose meter, test strips and lancets covered by the " patient insurance. Test 10 times per day.       clopidogrel (PLAVIX) 75 MG tablet Take 1 tablet (75 mg) by mouth daily 90 tablet 3     co-enzyme Q-10 100 MG CAPS capsule Take 100 mg by mouth daily        [employer] ALCOHOL SWABS PADS For home use.       desoximetasone (TOPICORT) 0.25 % external cream APPLY  CREAM EXTERNALLY TWICE DAILY 180 g 3     dextroamphetamine (DEXTROSTAT) 10 MG tablet Take 1 tablet (10 mg) by mouth 4 times daily 360 tablet 0     diclofenac (VOLTAREN) 75 MG EC tablet TAKE ONE TABLET BY MOUTH 4 TIMES DAILY 360 tablet 3     docusate sodium (COLACE) 100 MG capsule Take 1-2 capsules by mouth 2 times daily as needed for constipation prevention       Flaxseed, Linseed, (FLAXSEED OIL) 1000 MG CAPS Take 1 capsule by mouth daily        furosemide (LASIX) 40 MG tablet Take 2-4 tablets ( mg) by mouth daily Or as instructed for leg swelling 90 tablet 3     HUMALOG 100 UNIT/ML injection INJECT UNDER THE SKIN USING INSULIN PUMP. MAX DAILY DOSE  UNITS 180 mL 3     hydrALAZINE (APRESOLINE) 25 MG tablet Take 1-2 tablets (25-50 mg) by mouth 4 times daily - Take lowest effective dose for blood pressure management 360 tablet 11     HYDROcodone-acetaminophen (NORCO) 5-325 MG per tablet Take 1 tablet by mouth every 6 hours as needed for severe pain 60 tablet 0     Insulin Pen Needle (PEN NEEDLES) 29G X 12MM MISC For administering insulin at home.       irbesartan (AVAPRO) 150 MG tablet Take 1 tablet (150 mg) by mouth 2 times daily -- needs 2 smaller pills daily 180 tablet 3     IV Sets-Tubing (SOLUTION ADMINISTRATION SET) MISC As directed.       levothyroxine (SYNTHROID/LEVOTHROID) 125 MCG tablet TAKE 1 TABLET BY MOUTH ONCE DAILY IN THE MORNING 90 tablet 3     methylphenidate (RITALIN) 10 MG tablet Take 1 tablet (10 mg) by mouth 2 times daily Takes 2 tablets twice daily 360 tablet 0     metoprolol succinate (TOPROL-XL) 50 MG 24 hr tablet Take 1 tablet (50 mg) by mouth 2 times daily 180 tablet 3      nitroGLYcerin (NITROSTAT) 0.4 MG sublingual tablet Place 1 tablet under the tongue every 5 minutes as needed for chest pain       ranitidine (ZANTAC) 150 MG tablet Take 1 tablet (150 mg) by mouth 2 times daily 180 tablet 3     ranolazine (RANEXA) 500 MG 12 hr tablet Take 2 tablets (1,000 mg) by mouth 2 times daily - cancel other Rx - give 3 month supply 360 tablet 3     rosuvastatin (CRESTOR) 10 MG tablet TAKE ONE TABLET BY MOUTH TWICE DAILY 180 tablet 3     Saline GEL Spray 1 spray in nostril every hour as needed For Nasal Dryness       thin (NO BRAND SPECIFIED) lancets Test 10 times per day.          Patient Active Problem List    Diagnosis Date Noted     Degeneration of cervical intervertebral disc 01/31/2018     Priority: Medium     Hypothyroidism due to acquired atrophy of thyroid 01/31/2018     Priority: Medium     Mixed hyperlipidemia 01/31/2018     Priority: Medium     Primary narcolepsy without cataplexy 01/31/2018     Priority: Medium     Overview:   Total dose recommended by pulmonology he is dextro- amphetamine 40 mg plus   methylphenidate 40 mg in divided doses per day.  Total of 80 mg of short   acting amphetamines daily.       Osteoarthritis of spine 01/31/2018     Priority: Medium     Peptic ulcer disease 01/31/2018     Priority: Medium     Neuroforaminal stenosis of lumbar spine 01/03/2018     Priority: Medium     Radicular low back pain 01/03/2018     Priority: Medium     Acute bilateral low back pain with bilateral sciatica 10/27/2017     Priority: Medium     Chronic low back pain 08/08/2017     Priority: Medium     Intermittent constipation 08/08/2017     Priority: Medium     Skin rash 08/01/2017     Priority: Medium     Uncontrolled type 2 diabetes mellitus with stage 3 chronic kidney disease, with long-term current use of insulin (H) 03/30/2017     Priority: Medium     Erectile dysfunction 09/26/2016     Priority: Medium     Trigger point with back pain 07/14/2016     Priority: Medium      Trigger point with neck pain 07/14/2016     Priority: Medium     Insulin pump in place 03/10/2016     Priority: Medium     Myofacial muscle pain 12/17/2015     Priority: Medium     Pain medication agreement - 8/9/2018 12/17/2015     Priority: Medium     Trigger point of extremity 12/17/2015     Priority: Medium     Greater trochanteric bursitis of left hip 06/24/2015     Priority: Medium     Blister of toe of right foot 12/09/2014     Priority: Medium     Cervical stenosis of spinal canal 06/17/2014     Priority: Medium     Degenerative disc disease, cervical 06/17/2014     Priority: Medium     Facet arthritis of cervical region 06/17/2014     Priority: Medium     Cervical radicular pain 06/05/2014     Priority: Medium     Left arm numbness 06/05/2014     Priority: Medium     Left atrial enlargement 01/23/2014     Priority: Medium     Chronic diastolic heart failure (H) 01/10/2014     Priority: Medium     Overview:   1/20/2014 ECHO FINAL IMPRESSION:   1. Normal left ventricular size and systolic function. Estimated ejection fraction 65%.   2. Mild increase in wall thickness of the ventricular septum with hypokinesis of the basilar segment of the ventricular septum and inferior wall.   3. Mild to moderate left atrial enlargement.   4. Trace tricuspid regurgitation.   5. Mild left ventricular diastolic dysfunction.        CKD (chronic kidney disease) stage 3, GFR 30-59 ml/min (H) 01/10/2014     Priority: Medium     Essential hypertension 01/10/2014     Priority: Medium     Myalgia 01/10/2014     Priority: Medium     Old inferior wall myocardial infarction 01/10/2014     Priority: Medium     Platelet inhibition due to Plavix 01/10/2014     Priority: Medium     S/P CABG x 2 01/10/2014     Priority: Medium     S/P coronary artery stent placement 01/10/2014     Priority: Medium     Painful diabetic neuropathy (H) 04/22/2013     Priority: Medium     Esophageal reflux 05/17/2012     Priority: Medium     Obesity 05/17/2012      Priority: Medium     Diastasis of muscle 10/06/2011     Priority: Medium     Coronary atherosclerosis 09/08/2011     Priority: Medium     Psychosexual dysfunction with inhibited sexual excitement 06/30/2011     Priority: Medium     Angina pectoris (H) 12/10/2010     Priority: Medium     Edema 10/25/2010     Priority: Medium     Chest pain 02/15/2007     Priority: Medium     Overview:   IMO Update 10/11       Atherosclerosis of native coronary artery of native heart with stable angina pectoris (H) 02/15/2007     Priority: Medium     Overview:   IMO Update 10/11       Other specified forms of chronic ischemic heart disease 02/15/2007     Priority: Medium     Peripheral vascular disease (H) 02/15/2007     Priority: Medium     Overview:   IMO Update 10/11       Postsurgical percutaneous transluminal coronary angioplasty status 02/15/2007     Priority: Medium     Overview:   IMO Update 10/11       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     Gastritis without bleeding     No Comments Provided     Heart disease     Multiple coronary stents     Narcolepsy without cataplexy     No Comments Provided     Other injury of unspecified body region, initial encounter (CODE)     11/2006,Right calf hematoma     Rheumatic fever without heart involvement     Rheumatic fever as a child without valvular heart disease     Past Surgical History:   Procedure Laterality Date     ANGIOPLASTY      12/00, 04/04/04,Repeat angioplasty with lt internal mammary to the lt anterior descending and lt radial artery from aorta to the diagonal.     ANGIOPLASTY      10/01,Angioplasty with 2 vessel CABG the following week from the lt internal mammary to the lt anterior descending  and right radial free graft     ANGIOPLASTY      04/2003     ARTHROSCOPY SHOULDER ROTATOR CUFF REPAIR      02/06/2006,Left rotator cuff repair and bone spur removal by Dr. Giraldo in [place]     COLONOSCOPY      1999     COLONOSCOPY  01/08/2016 01/08/2016,-- Dr. De La Cruz -- polypectomy     COLONOSCOPY  04/18/2019    hyperplastic, follow up 10 years, 4/18/29     OTHER SURGICAL HISTORY      09/03,[id number],IR ANGIOGRAM FEMORAL/EXTREMITY ([place]),Unremarkable angiogram at [facility]     OTHER SURGICAL HISTORY      10/05/2004,,PTCA,Angioplasty     OTHER SURGICAL HISTORY      02/2005,,PTCA,Angioplasty with stent.     OTHER SURGICAL HISTORY      03/02/2005,,PTCA,Angioplasty with stent.     OTHER SURGICAL HISTORY      09/23/2005,,PTCA,Angioplasty without stent     OTHER SURGICAL HISTORY      2/26/2007,[id number],IR ANGIOGRAM FEMORAL/EXTREMITY ([place]),Unremarkable coronary angiogram at [facility].     OTHER SURGICAL HISTORY      1967,SUR38,APPENDECTOMY OPEN     RELEASE CARPAL TUNNEL      11/15/01,Right carpal tunnel release by Dr. Mace     RELEASE CARPAL TUNNEL      01/2004,Left carpal tunnel release     Social History     Socioeconomic History     Marital status:      Spouse name: None     Number of children: None     Years of education: None     Highest education level: None   Occupational History     None   Social Needs     Financial resource strain: None     Food insecurity:     Worry: None     Inability: None     Transportation needs:     Medical: None     Non-medical: None   Tobacco Use     Smoking status: Never Smoker     Smokeless tobacco: Never Used   Substance and Sexual Activity     Alcohol use: No     Alcohol/week: 0.0 oz     Drug use: No     Sexual activity: Yes     Partners: Female   Lifestyle     Physical activity:     Days per week: None     Minutes per session: None     Stress: None   Relationships     Social connections:     Talks on phone: None     Gets together: None     Attends Confucianism service: None  "    Active member of club or organization: None     Attends meetings of clubs or organizations: None     Relationship status: None     Intimate partner violence:     Fear of current or ex partner: None     Emotionally abused: None     Physically abused: None     Forced sexual activity: None   Other Topics Concern     Parent/sibling w/ CABG, MI or angioplasty before 65F 55M? Not Asked   Social History Narrative    He is on Social Security Disability for coronary artery disease and cervical arthritis and disk disease.   Avid obed.  No tobacco.     Family History   Problem Relation Age of Onset     Heart Disease Mother         Heart Disease,Heart problems     Heart Disease Other         Heart Disease,Heart problems     Heart Disease Other         Heart Disease,Heart problems     Ankylosing Spondylitis Son         Ankylosing spondylitis,Ankylosing spondylitis     Other - See Comments Brother          with sudden death following shoulder surgery 2012 -- was off his Plavix for 10 days pre-operatively.     Ankylosing Spondylitis Daughter         Ankylosing spondylitis,-- Dx 2017       EXAM:   Vitals:    19 1540   BP: 130/72   BP Location: Right arm   Patient Position: Sitting   Cuff Size: Adult Regular   Pulse: 72   Resp: 16   Temp: 98.6  F (37  C)   TempSrc: Tympanic   Weight: 91.2 kg (201 lb)       Current Pain Score: No Pain (0)     BP Readings from Last 3 Encounters:   19 130/72   19 150/62   19 138/72      Wt Readings from Last 3 Encounters:   19 91.2 kg (201 lb)   19 91.2 kg (201 lb)   19 91.6 kg (201 lb 14.4 oz)      Estimated body mass index is 31.02 kg/m  as calculated from the following:    Height as of 19: 1.715 m (5' 7.5\").    Weight as of this encounter: 91.2 kg (201 lb).     Physical Exam   Constitutional: He is oriented to person, place, and time. He appears well-developed and well-nourished.   HENT:   Head: Normocephalic and atraumatic.   Eyes: No " scleral icterus.   Cardiovascular: Normal rate and regular rhythm.   Pulmonary/Chest: Effort normal. He has no wheezes.   Abdominal: Soft. There is no tenderness.   + insulin pump noted   Musculoskeletal: He exhibits edema.   Lymphadenopathy:     He has no cervical adenopathy.   Neurological: He is alert and oriented to person, place, and time.   Skin: Skin is warm and dry.   Psychiatric: He has a normal mood and affect.        Procedures   INVESTIGATIONS:  Results for orders placed or performed in visit on 06/26/19   TSH Reflex GH   Result Value Ref Range    TSH Reflex 0.33 (L) 0.34 - 5.60 IU/mL   Lipid Profile   Result Value Ref Range    Cholesterol 91 <200 mg/dL    Triglycerides 173 (H) <150 mg/dL    HDL Cholesterol 36 23 - 92 mg/dL    LDL Cholesterol Calculated 20 <100 mg/dL    Non HDL Cholesterol 55 <130 mg/dL   Hemoglobin A1c   Result Value Ref Range    Hemoglobin A1C 8.1 (H) 4.0 - 6.0 %   CBC with platelets   Result Value Ref Range    WBC 6.6 4.0 - 11.0 10e9/L    RBC Count 4.39 (L) 4.4 - 5.9 10e12/L    Hemoglobin 14.7 13.3 - 17.7 g/dL    Hematocrit 42.9 40.0 - 53.0 %    MCV 98 78 - 100 fl    MCH 33.5 (H) 26.5 - 33.0 pg    MCHC 34.3 31.5 - 36.5 g/dL    RDW 12.5 10.0 - 15.0 %    Platelet Count 197 150 - 450 10e9/L   Comprehensive metabolic panel   Result Value Ref Range    Sodium 136 134 - 144 mmol/L    Potassium 3.9 3.5 - 5.1 mmol/L    Chloride 105 98 - 107 mmol/L    Carbon Dioxide 24 21 - 31 mmol/L    Anion Gap 7 3 - 14 mmol/L    Glucose 166 (H) 70 - 105 mg/dL    Urea Nitrogen 29 (H) 7 - 25 mg/dL    Creatinine 1.65 (H) 0.70 - 1.30 mg/dL    GFR Estimate 42 (L) >60 mL/min/[1.73_m2]    GFR Estimate If Black 50 (L) >60 mL/min/[1.73_m2]    Calcium 9.4 8.6 - 10.3 mg/dL    Bilirubin Total 0.7 0.3 - 1.0 mg/dL    Albumin 4.0 3.5 - 5.7 g/dL    Protein Total 6.4 6.4 - 8.9 g/dL    Alkaline Phosphatase 55 34 - 104 U/L    ALT 26 7 - 52 U/L    AST 21 13 - 39 U/L   *UA reflex to Microscopic   Result Value Ref Range    Color  Urine Yellow     Appearance Urine Clear     Glucose Urine 250 (A) NEG^Negative mg/dL    Bilirubin Urine Negative NEG^Negative    Ketones Urine Negative NEG^Negative mg/dL    Specific Gravity Urine >1.030 (H) 1.000 - 1.030    Blood Urine Negative NEG^Negative    pH Urine 5.0 5.0 - 9.0 pH    Protein Albumin Urine 100 (A) NEG^Negative mg/dL    Urobilinogen Urine 0.2 0.2 - 1.0 EU/dL    Nitrite Urine Negative NEG^Negative    Leukocyte Esterase Urine Negative NEG^Negative    Source Midstream Urine    Urine Microscopic   Result Value Ref Range    WBC Urine 0 - 5 OTO5^0 - 5 /HPF    RBC Urine O - 2 OTO2^O - 2 /HPF    Squamous Epithelial /LPF Urine Few FEW^Few /LPF    Bacteria Urine Few (A) NEG^Negative /HPF       ASSESSMENT AND PLAN:  Problem List Items Addressed This Visit        Endocrine    Uncontrolled type 2 diabetes mellitus with stage 3 chronic kidney disease, with long-term current use of insulin (H) - Primary    Relevant Orders    Albumin Random Urine Quantitative with Creat Ratio    Hemoglobin A1c    *UA reflex to Microscopic    Urine Microscopic (Completed)    Hypothyroidism due to acquired atrophy of thyroid    Relevant Orders    TSH Reflex GH (Completed)    Mixed hyperlipidemia    Relevant Orders    Lipid Profile       Other    Primary narcolepsy without cataplexy    Relevant Medications    dextroamphetamine (DEXTROSTAT) 10 MG tablet    methylphenidate (RITALIN) 10 MG tablet    Pain medication agreement - 8/9/2018    Relevant Medications    dextroamphetamine (DEXTROSTAT) 10 MG tablet    methylphenidate (RITALIN) 10 MG tablet      Other Visit Diagnoses     Benign essential hypertension        Relevant Orders    Comprehensive metabolic panel    CBC with platelets        reviewed diet, exercise and weight control, recommended sodium restriction, cardiovascular risk and specific lipid/LDL goals reviewed, specific diabetic recommendations low cholesterol diet, weight control and daily exercise discussed, use of  aspirin to prevent MI and TIA's discussed  -- Expected clinical course discussed    -- Medications and their side effects discussed    Patient Instructions     Medications refilled.   Labs today.     Return for Diabetes labs and clinic follow-up appointment every 3 to 4 months.  --- (Go for about 91 to 100 days)    Aspects of Diabetes we can improve:  Hemoglobin A1c Lab Results   Component Value Date    A1C 7.7 03/08/2019    A1C 7.5 11/21/2018    A1C 8.0 08/09/2018    A1C 7.8 05/03/2018    Goal range is under 8. Best is 6.5 to 7   Blood Pressure 130/72 Goal to keep less than 140/90   Tobacco  reports that he has never smoked. He has never used smokeless tobacco. Goal to abstain from tobacco   Eye Exam -- Do Yearly -- Annual diabetic eye exam   Healthy weight Body mass index is 31.02 kg/m . Goal BMI under 30, best is under 25.      -- Trying to exercise daily (goal at least 20 min/day) with moderate aerobic activity   -- Eat healthy (resources from ADA at http://www.diabetes.org/)   -- Taking good care of my feet. Consider seeing the Podiatrist   -- Check blood sugars as directed, record in log book and bring to every appointment      Schedule lab only appointment --- A few days AFTER: 9/24/19   Schedule clinic appointment with Dr. Barnett -- Same day as labs, or 1-2 days later.     Insurance companies are now grading you and I on your blood sugar control -- Goal is to get your A1c down to 7.9% or lower and NO Smoking!    -- Medicare and most insurance companies, will only cover Hemoglobin A1c labs to be rechecked every 91+ days.      Hemoglobin A1C   Date Value Ref Range Status   03/08/2019 7.7 (H) 4.0 - 6.0 % Final   11/21/2018 7.5 (H) 4.0 - 6.0 % Final       Next follow-up appointment with Dr. Barnett should be scheduled:  -- Approximately a few days AFTER: 9/24/19       Jorge Barnett MD  Internal Medicine  [facility]     Portions of this note were dictated using speech recognition software.  The note has been proofread but errors in the text may have been overlooked. Please contact me if there are any concerns regarding the accuracy of the dictation.

## 2019-06-26 NOTE — PATIENT INSTRUCTIONS
Medications refilled.   Labs today.     Return for Diabetes labs and clinic follow-up appointment every 3 to 4 months.  --- (Go for about 91 to 100 days)    Aspects of Diabetes we can improve:  Hemoglobin A1c Lab Results   Component Value Date    A1C 7.7 03/08/2019    A1C 7.5 11/21/2018    A1C 8.0 08/09/2018    A1C 7.8 05/03/2018    Goal range is under 8. Best is 6.5 to 7   Blood Pressure 130/72 Goal to keep less than 140/90   Tobacco  reports that he has never smoked. He has never used smokeless tobacco. Goal to abstain from tobacco   Eye Exam -- Do Yearly -- Annual diabetic eye exam   Healthy weight Body mass index is 31.02 kg/m . Goal BMI under 30, best is under 25.      -- Trying to exercise daily (goal at least 20 min/day) with moderate aerobic activity   -- Eat healthy (resources from ADA at http://www.diabetes.org/)   -- Taking good care of my feet. Consider seeing the Podiatrist   -- Check blood sugars as directed, record in log book and bring to every appointment      Schedule lab only appointment --- A few days AFTER: 9/24/19   Schedule clinic appointment with Dr. Barnett -- Same day as labs, or 1-2 days later.     Insurance companies are now grading you and I on your blood sugar control -- Goal is to get your A1c down to 7.9% or lower and NO Smoking!    -- Medicare and most insurance companies, will only cover Hemoglobin A1c labs to be rechecked every 91+ days.      Hemoglobin A1C   Date Value Ref Range Status   03/08/2019 7.7 (H) 4.0 - 6.0 % Final   11/21/2018 7.5 (H) 4.0 - 6.0 % Final       Next follow-up appointment with Dr. Barnett should be scheduled:  -- Approximately a few days AFTER: 9/24/19

## 2019-06-26 NOTE — LETTER
July 1, 2019      Moses Whittaker  [address] [address]        Dear ,    We are writing to inform you of your test results.    Urine protein levels are still high.    TSH/thyroid levels are okay for now.  We will recheck in 3 months.    Hemoglobin A1c/diabetes lab is high.    Resulted Orders   TSH Reflex GH   Result Value Ref Range    TSH Reflex 0.33 (L) 0.34 - 5.60 IU/mL   Lipid Profile   Result Value Ref Range    Cholesterol 91 <200 mg/dL    Triglycerides 173 (H) <150 mg/dL      Comment:      Borderline high:  150-199 mg/dl  High:             200-499 mg/dl  Very high:       >499 mg/dl      HDL Cholesterol 36 23 - 92 mg/dL    LDL Cholesterol Calculated 20 <100 mg/dL      Comment:      Desirable:       <100 mg/dl    Non HDL Cholesterol 55 <130 mg/dL   Hemoglobin A1c   Result Value Ref Range    Hemoglobin A1C 8.1 (H) 4.0 - 6.0 %   CBC with platelets   Result Value Ref Range    WBC 6.6 4.0 - 11.0 10e9/L    RBC Count 4.39 (L) 4.4 - 5.9 10e12/L    Hemoglobin 14.7 13.3 - 17.7 g/dL    Hematocrit 42.9 40.0 - 53.0 %    MCV 98 78 - 100 fl    MCH 33.5 (H) 26.5 - 33.0 pg    MCHC 34.3 31.5 - 36.5 g/dL    RDW 12.5 10.0 - 15.0 %    Platelet Count 197 150 - 450 10e9/L   Comprehensive metabolic panel   Result Value Ref Range    Sodium 136 134 - 144 mmol/L    Potassium 3.9 3.5 - 5.1 mmol/L    Chloride 105 98 - 107 mmol/L    Carbon Dioxide 24 21 - 31 mmol/L    Anion Gap 7 3 - 14 mmol/L    Glucose 166 (H) 70 - 105 mg/dL    Urea Nitrogen 29 (H) 7 - 25 mg/dL    Creatinine 1.65 (H) 0.70 - 1.30 mg/dL    GFR Estimate 42 (L) >60 mL/min/[1.73_m2]    GFR Estimate If Black 50 (L) >60 mL/min/[1.73_m2]    Calcium 9.4 8.6 - 10.3 mg/dL    Bilirubin Total 0.7 0.3 - 1.0 mg/dL    Albumin 4.0 3.5 - 5.7 g/dL    Protein Total 6.4 6.4 - 8.9 g/dL    Alkaline Phosphatase 55 34 - 104 U/L    ALT 26 7 - 52 U/L    AST 21 13 - 39 U/L   *UA reflex to Microscopic   Result Value Ref Range    Color Urine Yellow     Appearance  Urine Clear     Glucose Urine 250 (A) NEG^Negative mg/dL    Bilirubin Urine Negative NEG^Negative    Ketones Urine Negative NEG^Negative mg/dL    Specific Gravity Urine >1.030 (H) 1.000 - 1.030    Blood Urine Negative NEG^Negative    pH Urine 5.0 5.0 - 9.0 pH    Protein Albumin Urine 100 (A) NEG^Negative mg/dL    Urobilinogen Urine 0.2 0.2 - 1.0 EU/dL    Nitrite Urine Negative NEG^Negative    Leukocyte Esterase Urine Negative NEG^Negative    Source Midstream Urine    Albumin Random Urine Quantitative with Creat Ratio   Result Value Ref Range    Creatinine Urine 173 mg/dL    Albumin Urine mg/L 1,820 mg/L    Albumin Urine mg/g Cr 1,052.02 (H) 0 - 17 mg/g Cr   Urine Microscopic   Result Value Ref Range    WBC Urine 0 - 5 OTO5^0 - 5 /HPF    RBC Urine O - 2 OTO2^O - 2 /HPF    Squamous Epithelial /LPF Urine Few FEW^Few /LPF    Bacteria Urine Few (A) NEG^Negative /HPF       If you have any questions or concerns, please call the clinic at the number listed above.       Sincerely,        Jorge Barnett MD

## 2019-06-27 ENCOUNTER — TELEPHONE (OUTPATIENT)
Dept: INTERNAL MEDICINE | Facility: OTHER | Age: 69
End: 2019-06-27

## 2019-06-27 LAB
CREAT UR-MCNC: 173 MG/DL
MICROALBUMIN UR-MCNC: 1820 MG/L
MICROALBUMIN/CREAT UR: 1052.02 MG/G CR (ref 0–17)

## 2019-06-27 ASSESSMENT — ANXIETY QUESTIONNAIRES: GAD7 TOTAL SCORE: 0

## 2019-06-27 NOTE — TELEPHONE ENCOUNTER
Called and spoke with patient. Full name and  verified. Notified patient that letter with results was sent. Patient wanted to know some specifics, so writer read the requested. No further questions or concerns at this time.     Lindsey Lundy LPN on 2019 at 3:40 PM

## 2019-07-04 ENCOUNTER — HOSPITAL ENCOUNTER (EMERGENCY)
Facility: OTHER | Age: 69
Discharge: HOME | End: 2019-07-04
Attending: FAMILY MEDICINE | Admitting: FAMILY MEDICINE
Payer: MEDICARE

## 2019-07-04 VITALS
HEIGHT: 70 IN | HEART RATE: 80 BPM | RESPIRATION RATE: 16 BRPM | WEIGHT: 201 LBS | BODY MASS INDEX: 28.77 KG/M2 | DIASTOLIC BLOOD PRESSURE: 71 MMHG | TEMPERATURE: 99.2 F | SYSTOLIC BLOOD PRESSURE: 165 MMHG | OXYGEN SATURATION: 97 %

## 2019-07-04 DIAGNOSIS — L97.901 VENOUS STASIS ULCER LIMITED TO BREAKDOWN OF SKIN WITHOUT VARICOSE VEINS, UNSPECIFIED SITE (H): ICD-10-CM

## 2019-07-04 DIAGNOSIS — I87.2 VENOUS STASIS ULCER LIMITED TO BREAKDOWN OF SKIN WITHOUT VARICOSE VEINS, UNSPECIFIED SITE (H): ICD-10-CM

## 2019-07-04 LAB
ANION GAP SERPL CALCULATED.3IONS-SCNC: 6 MMOL/L (ref 3–14)
BASOPHILS # BLD AUTO: 0 10E9/L (ref 0–0.2)
BASOPHILS NFR BLD AUTO: 0.4 %
BUN SERPL-MCNC: 22 MG/DL (ref 7–25)
CALCIUM SERPL-MCNC: 9.1 MG/DL (ref 8.6–10.3)
CHLORIDE SERPL-SCNC: 105 MMOL/L (ref 98–107)
CO2 SERPL-SCNC: 25 MMOL/L (ref 21–31)
CREAT SERPL-MCNC: 1.38 MG/DL (ref 0.7–1.3)
CRP SERPL-MCNC: 0.3 MG/L
DIFFERENTIAL METHOD BLD: ABNORMAL
EOSINOPHIL # BLD AUTO: 0.1 10E9/L (ref 0–0.7)
EOSINOPHIL NFR BLD AUTO: 1.8 %
ERYTHROCYTE [DISTWIDTH] IN BLOOD BY AUTOMATED COUNT: 12.7 % (ref 10–15)
GFR SERPL CREATININE-BSD FRML MDRD: 51 ML/MIN/{1.73_M2}
GLUCOSE SERPL-MCNC: 191 MG/DL (ref 70–105)
HCT VFR BLD AUTO: 41.8 % (ref 40–53)
HGB BLD-MCNC: 14.1 G/DL (ref 13.3–17.7)
IMM GRANULOCYTES # BLD: 0 10E9/L (ref 0–0.4)
IMM GRANULOCYTES NFR BLD: 0.4 %
LYMPHOCYTES # BLD AUTO: 1.7 10E9/L (ref 0.8–5.3)
LYMPHOCYTES NFR BLD AUTO: 21.8 %
MCH RBC QN AUTO: 33.5 PG (ref 26.5–33)
MCHC RBC AUTO-ENTMCNC: 33.7 G/DL (ref 31.5–36.5)
MCV RBC AUTO: 99 FL (ref 78–100)
MONOCYTES # BLD AUTO: 0.5 10E9/L (ref 0–1.3)
MONOCYTES NFR BLD AUTO: 6.4 %
NEUTROPHILS # BLD AUTO: 5.3 10E9/L (ref 1.6–8.3)
NEUTROPHILS NFR BLD AUTO: 69.2 %
PLATELET # BLD AUTO: 221 10E9/L (ref 150–450)
POTASSIUM SERPL-SCNC: 4.5 MMOL/L (ref 3.5–5.1)
RBC # BLD AUTO: 4.21 10E12/L (ref 4.4–5.9)
SODIUM SERPL-SCNC: 136 MMOL/L (ref 134–144)
WBC # BLD AUTO: 7.7 10E9/L (ref 4–11)

## 2019-07-04 PROCEDURE — 99283 EMERGENCY DEPT VISIT LOW MDM: CPT | Performed by: FAMILY MEDICINE

## 2019-07-04 PROCEDURE — 80048 BASIC METABOLIC PNL TOTAL CA: CPT | Performed by: FAMILY MEDICINE

## 2019-07-04 PROCEDURE — 25000132 ZZH RX MED GY IP 250 OP 250 PS 637: Mod: GY | Performed by: FAMILY MEDICINE

## 2019-07-04 PROCEDURE — 85025 COMPLETE CBC W/AUTO DIFF WBC: CPT | Performed by: FAMILY MEDICINE

## 2019-07-04 PROCEDURE — 87077 CULTURE AEROBIC IDENTIFY: CPT | Performed by: FAMILY MEDICINE

## 2019-07-04 PROCEDURE — 99283 EMERGENCY DEPT VISIT LOW MDM: CPT | Mod: Z6 | Performed by: FAMILY MEDICINE

## 2019-07-04 PROCEDURE — 86140 C-REACTIVE PROTEIN: CPT | Performed by: FAMILY MEDICINE

## 2019-07-04 PROCEDURE — 87070 CULTURE OTHR SPECIMN AEROBIC: CPT | Performed by: FAMILY MEDICINE

## 2019-07-04 PROCEDURE — 36415 COLL VENOUS BLD VENIPUNCTURE: CPT | Performed by: FAMILY MEDICINE

## 2019-07-04 RX ORDER — CEPHALEXIN 500 MG/1
500 CAPSULE ORAL 3 TIMES DAILY
Qty: 30 CAPSULE | Refills: 0 | Status: SHIPPED | OUTPATIENT
Start: 2019-07-04 | End: 2019-07-31

## 2019-07-04 RX ADMIN — CEPHALEXIN 500 MG: 250 CAPSULE ORAL at 23:19

## 2019-07-04 ASSESSMENT — MIFFLIN-ST. JEOR: SCORE: 1687.98

## 2019-07-04 NOTE — ED AVS SNAPSHOT
[facility]  [address]  [place] [address]  Phone:  [phone]  Fax:  [phone]                                    oMses Whittaker   MRN: [id number]    Department:  [facility]   Date of Visit:  7/4/2019           After Visit Summary Signature Page    I have received my discharge instructions, and my questions have been answered. I have discussed any challenges I see with this plan with the nurse or doctor.    ..........................................................................................................................................  Patient/Patient Representative Signature      ..........................................................................................................................................  Patient Representative Print Name and Relationship to Patient    ..................................................               ................................................  Date                                   Time    ..........................................................................................................................................  Reviewed by Signature/Title    ...................................................              ..............................................  Date                                               Time          22EPIC Rev 08/18

## 2019-07-05 ASSESSMENT — ENCOUNTER SYMPTOMS
CONSTITUTIONAL NEGATIVE: 1
RESPIRATORY NEGATIVE: 1
WOUND: 1
GASTROINTESTINAL NEGATIVE: 1
NEUROLOGICAL NEGATIVE: 1

## 2019-07-05 NOTE — ED PROVIDER NOTES
"  History     Chief Complaint   Patient presents with     Wound Check     HPI  Moses Whittaker is a 69 year old male who presents to ER for evaluation of left lower leg infection . Patient states started as one ulcer and in the last couple of days he has developed several others. He states he does have a history of cellulitis of the lower extremity . Has history of diabetes as well as venous stasis . His blood sugars have been slightly higher than normal at 140s . He denies any fever , chills or systemic symptoms. The wounds have been draining through the dressing he applies. He has been prescribed lasix but has not been taking lately . He also has compressive stockings which he also has not been taking      Allergies:  Allergies   Allergen Reactions     Famotidine Other (See Comments)     + Caused Headache and Rash -- tolerated Ranitidine and worked well.     Gabapentin      Other reaction(s): Mental Status Change \"felt like in outer space\"     Hydrocortisone Other (See Comments)     Burning and stinging sensation with Hydrocortisone - doesn't help itching or rash -- tolerated Desoximetasone cream and worked well.      Atorvastatin Hives     Celebrex [Celecoxib]      Swelling      Lisinopril Cough     No Clinical Screening - See Comments Hives     Chlorine water     Norvasc [Amlodipine] Other (See Comments)     -- can't remember, didn't tolerate.      Pregabalin      Lyrica - Other reaction(s): Mental Status Change     Valdecoxib Swelling     \"bextra\" NSAID     Insulin Aspart Rash       Problem List:    Patient Active Problem List    Diagnosis Date Noted     Degeneration of cervical intervertebral disc 01/31/2018     Priority: Medium     Hypothyroidism due to acquired atrophy of thyroid 01/31/2018     Priority: Medium     Mixed hyperlipidemia 01/31/2018     Priority: Medium     Primary narcolepsy without cataplexy 01/31/2018     Priority: Medium     Overview:   Total dose recommended by pulmonology he is dextro- " amphetamine 40 mg plus   methylphenidate 40 mg in divided doses per day.  Total of 80 mg of short   acting amphetamines daily.       Osteoarthritis of spine 01/31/2018     Priority: Medium     Peptic ulcer disease 01/31/2018     Priority: Medium     Neuroforaminal stenosis of lumbar spine 01/03/2018     Priority: Medium     Radicular low back pain 01/03/2018     Priority: Medium     Acute bilateral low back pain with bilateral sciatica 10/27/2017     Priority: Medium     Chronic low back pain 08/08/2017     Priority: Medium     Intermittent constipation 08/08/2017     Priority: Medium     Skin rash 08/01/2017     Priority: Medium     Uncontrolled type 2 diabetes mellitus with stage 3 chronic kidney disease, with long-term current use of insulin (H) 03/30/2017     Priority: Medium     Erectile dysfunction 09/26/2016     Priority: Medium     Trigger point with back pain 07/14/2016     Priority: Medium     Trigger point with neck pain 07/14/2016     Priority: Medium     Insulin pump in place 03/10/2016     Priority: Medium     Myofacial muscle pain 12/17/2015     Priority: Medium     Pain medication agreement - 8/9/2018 12/17/2015     Priority: Medium     Trigger point of extremity 12/17/2015     Priority: Medium     Greater trochanteric bursitis of left hip 06/24/2015     Priority: Medium     Blister of toe of right foot 12/09/2014     Priority: Medium     Cervical stenosis of spinal canal 06/17/2014     Priority: Medium     Degenerative disc disease, cervical 06/17/2014     Priority: Medium     Facet arthritis of cervical region 06/17/2014     Priority: Medium     Cervical radicular pain 06/05/2014     Priority: Medium     Left arm numbness 06/05/2014     Priority: Medium     Left atrial enlargement 01/23/2014     Priority: Medium     Chronic diastolic heart failure (H) 01/10/2014     Priority: Medium     Overview:   1/20/2014 ECHO FINAL IMPRESSION:   1. Normal left ventricular size and systolic function. Estimated  ejection fraction 65%.   2. Mild increase in wall thickness of the ventricular septum with hypokinesis of the basilar segment of the ventricular septum and inferior wall.   3. Mild to moderate left atrial enlargement.   4. Trace tricuspid regurgitation.   5. Mild left ventricular diastolic dysfunction.        CKD (chronic kidney disease) stage 3, GFR 30-59 ml/min (H) 01/10/2014     Priority: Medium     Essential hypertension 01/10/2014     Priority: Medium     Myalgia 01/10/2014     Priority: Medium     Old inferior wall myocardial infarction 01/10/2014     Priority: Medium     Platelet inhibition due to Plavix 01/10/2014     Priority: Medium     S/P CABG x 2 01/10/2014     Priority: Medium     S/P coronary artery stent placement 01/10/2014     Priority: Medium     Painful diabetic neuropathy (H) 04/22/2013     Priority: Medium     Esophageal reflux 05/17/2012     Priority: Medium     Obesity 05/17/2012     Priority: Medium     Diastasis of muscle 10/06/2011     Priority: Medium     Coronary atherosclerosis 09/08/2011     Priority: Medium     Psychosexual dysfunction with inhibited sexual excitement 06/30/2011     Priority: Medium     Angina pectoris (H) 12/10/2010     Priority: Medium     Edema 10/25/2010     Priority: Medium     Chest pain 02/15/2007     Priority: Medium     Overview:   IMO Update 10/11       Atherosclerosis of native coronary artery of native heart with stable angina pectoris (H) 02/15/2007     Priority: Medium     Overview:   IMO Update 10/11       Other specified forms of chronic ischemic heart disease 02/15/2007     Priority: Medium     Peripheral vascular disease (H) 02/15/2007     Priority: Medium     Overview:   IMO Update 10/11       Postsurgical percutaneous transluminal coronary angioplasty status 02/15/2007     Priority: Medium     Overview:   IMO Update 10/11          Past Medical History:    Past Medical History:   Diagnosis Date     Atherosclerotic heart disease of native coronary  artery without angina pectoris      Carpal tunnel syndrome      Chronic maxillary sinusitis      Edema      Gastritis without bleeding      Heart disease      Narcolepsy without cataplexy      Other injury of unspecified body region, initial encounter (CODE)      Rheumatic fever without heart involvement        Past Surgical History:    Past Surgical History:   Procedure Laterality Date     ANGIOPLASTY      12/00, 04/04/04,Repeat angioplasty with lt internal mammary to the lt anterior descending and lt radial artery from aorta to the diagonal.     ANGIOPLASTY      10/01,Angioplasty with 2 vessel CABG the following week from the lt internal mammary to the lt anterior descending and right radial free graft     ANGIOPLASTY      04/2003     ARTHROSCOPY SHOULDER ROTATOR CUFF REPAIR      02/06/2006,Left rotator cuff repair and bone spur removal by Dr. Giraldo in [place]     COLONOSCOPY      1999     COLONOSCOPY  01/08/2016 01/08/2016,-- Dr. De La Cruz -- polypectomy     COLONOSCOPY  04/18/2019    hyperplastic, follow up 10 years, 4/18/29     OTHER SURGICAL HISTORY      09/03,829433,IR ANGIOGRAM FEMORAL/EXTREMITY ([place]),Unremarkable angiogram at [facility]     OTHER SURGICAL HISTORY      10/05/2004,,PTCA,Angioplasty     OTHER SURGICAL HISTORY      02/2005,,PTCA,Angioplasty with stent.     OTHER SURGICAL HISTORY      03/02/2005,,PTCA,Angioplasty with stent.     OTHER SURGICAL HISTORY      09/23/2005,,PTCA,Angioplasty without stent     OTHER SURGICAL HISTORY      2/26/2007,[id number],IR ANGIOGRAM FEMORAL/EXTREMITY ([place]),Unremarkable coronary angiogram at [facility].     OTHER SURGICAL HISTORY      1967,SUR38,APPENDECTOMY OPEN     RELEASE CARPAL TUNNEL      11/15/01,Right carpal tunnel release by Dr. Mace     RELEASE CARPAL TUNNEL      01/2004,Left carpal tunnel release       Family History:    Family History   Problem Relation Age of Onset     Heart Disease Mother         Heart Disease,Heart problems     Heart Disease Other          Heart Disease,Heart problems     Heart Disease Other         Heart Disease,Heart problems     Ankylosing Spondylitis Son         Ankylosing spondylitis,Ankylosing spondylitis     Other - See Comments Brother          with sudden death following shoulder surgery 2012 -- was off his Plavix for 10 days pre-operatively.     Ankylosing Spondylitis Daughter         Ankylosing spondylitis,-- Dx 2017       Social History:  Marital Status:   [2]  Social History     Tobacco Use     Smoking status: Never Smoker     Smokeless tobacco: Never Used   Substance Use Topics     Alcohol use: No     Alcohol/week: 0.0 oz     Drug use: No        Medications:      aspirin (GOODSENSE ASPIRIN) 325 MG tablet   clopidogrel (PLAVIX) 75 MG tablet   dextroamphetamine (DEXTROSTAT) 10 MG tablet   diclofenac (VOLTAREN) 75 MG EC tablet   HUMALOG 100 UNIT/ML injection   hydrALAZINE (APRESOLINE) 25 MG tablet   irbesartan (AVAPRO) 150 MG tablet   levothyroxine (SYNTHROID/LEVOTHROID) 125 MCG tablet   methylphenidate (RITALIN) 10 MG tablet   metoprolol succinate (TOPROL-XL) 50 MG 24 hr tablet   ranitidine (ZANTAC) 150 MG tablet   ranolazine (RANEXA) 500 MG 12 hr tablet   rosuvastatin (CRESTOR) 10 MG tablet   blood glucose monitoring (ONETOUCH ULTRA) test strip   Blood Glucose Monitoring Suppl (GLUCOCOM BLOOD GLUCOSE MONITOR) WAQAS   co-enzyme Q-10 100 MG CAPS capsule   [employer] ALCOHOL SWABS PADS   desoximetasone (TOPICORT) 0.25 % external cream   docusate sodium (COLACE) 100 MG capsule   Flaxseed, Linseed, (FLAXSEED OIL) 1000 MG CAPS   furosemide (LASIX) 40 MG tablet   HYDROcodone-acetaminophen (NORCO) 5-325 MG per tablet   Insulin Pen Needle (PEN NEEDLES) 29G X 12MM MISC   IV Sets-Tubing (SOLUTION ADMINISTRATION SET) MISC   nitroGLYcerin (NITROSTAT) 0.4 MG sublingual tablet   Saline GEL   thin (NO BRAND SPECIFIED) lancets         Review of Systems   Constitutional: Negative.    HENT: Negative.    Respiratory: Negative.   "  Cardiovascular: Positive for leg swelling.   Gastrointestinal: Negative.    Skin: Positive for wound.   Neurological: Negative.        Physical Exam   BP: 165/71  Pulse: 80  Temp: 99.2  F (37.3  C)  Resp: 16  Height: 177.8 cm (5' 10\")  Weight: 91.2 kg (201 lb)  SpO2: 97 %      Physical Exam   Constitutional: He is oriented to person, place, and time. He appears well-developed and well-nourished. No distress.   HENT:   Head: Normocephalic and atraumatic.   Cardiovascular: Normal rate, regular rhythm and normal heart sounds.   Pulmonary/Chest: Effort normal and breath sounds normal. No stridor. No respiratory distress. He has no wheezes. He has no rales. He exhibits no tenderness.   Abdominal: Soft. He exhibits no distension.   Neurological: He is alert and oriented to person, place, and time.   Skin: He is not diaphoretic.   Left lower extremity with venous stasis changes Multiple shallow ulcers of lower extremities , few crusted purulent drainage . Wound culture done .    Nursing note and vitals reviewed.      ED Course        Procedures          Patient presented to ER with concern of left lower extremity cellulitis. Patient triaged to exam room. Vital signs reviewed History and exam complete. Exam consistent with venous stasis ulcers with possible secondary infection . Wound culture done. Labs done. Patient with lasix prescription which he has not been taking. Wounds dressed in non adherent dressing Ace wrap for compression . Recommend lasix for the next 3 days and follow up with Edda next week for wound recheck .   Results for orders placed or performed during the hospital encounter of 07/04/19   Basic metabolic panel   Result Value Ref Range    Sodium 136 134 - 144 mmol/L    Potassium 4.5 3.5 - 5.1 mmol/L    Chloride 105 98 - 107 mmol/L    Carbon Dioxide 25 21 - 31 mmol/L    Anion Gap 6 3 - 14 mmol/L    Glucose 191 (H) 70 - 105 mg/dL    Urea Nitrogen 22 7 - 25 mg/dL    Creatinine 1.38 (H) 0.70 - 1.30 mg/dL    " GFR Estimate 51 (L) >60 mL/min/[1.73_m2]    GFR Estimate If Black 62 >60 mL/min/[1.73_m2]    Calcium 9.1 8.6 - 10.3 mg/dL   CBC with platelets differential   Result Value Ref Range    WBC 7.7 4.0 - 11.0 10e9/L    RBC Count 4.21 (L) 4.4 - 5.9 10e12/L    Hemoglobin 14.1 13.3 - 17.7 g/dL    Hematocrit 41.8 40.0 - 53.0 %    MCV 99 78 - 100 fl    MCH 33.5 (H) 26.5 - 33.0 pg    MCHC 33.7 31.5 - 36.5 g/dL    RDW 12.7 10.0 - 15.0 %    Platelet Count 221 150 - 450 10e9/L    Diff Method Automated Method     % Neutrophils 69.2 %    % Lymphocytes 21.8 %    % Monocytes 6.4 %    % Eosinophils 1.8 %    % Basophils 0.4 %    % Immature Granulocytes 0.4 %    Absolute Neutrophil 5.3 1.6 - 8.3 10e9/L    Absolute Lymphocytes 1.7 0.8 - 5.3 10e9/L    Absolute Monocytes 0.5 0.0 - 1.3 10e9/L    Absolute Eosinophils 0.1 0.0 - 0.7 10e9/L    Absolute Basophils 0.0 0.0 - 0.2 10e9/L    Abs Immature Granulocytes 0.0 0 - 0.4 10e9/L   CRP inflammation   Result Value Ref Range    CRP Inflammation 0.3 <0.5 mg/L             No results found for this or any previous visit (from the past 24 hour(s)).    Medications - No data to display    Assessments & Plan (with Medical Decision Making)     I have reviewed the nursing notes.    I have reviewed the findings, diagnosis, plan and need for follow up with the patient.         Medication List      There are no discharge medications for this visit.         Final diagnoses:   Venous stasis ulcer limited to breakdown of skin without varicose veins, unspecified site (H)       7/4/2019   [facility] Kelli Lau MD  07/05/19 0675

## 2019-07-05 NOTE — DISCHARGE INSTRUCTIONS
Take lasix for the next 3 days to decrease leg swelling. Keep legs elevated as much as possible. Change dressing when they soak through . Keep compressive dressings on

## 2019-07-05 NOTE — ED TRIAGE NOTES
Pt arrives to ED with reddened areas on his left lower leg and areas that have opened and serous drainage coming out. Leg was wrapped. Pt has h/o cellulites.    Rhianna Bailey RN on 7/4/2019 at 9:47 PM

## 2019-07-05 NOTE — PROGRESS NOTES
Left lower leg posteriorly with opened and blistered areas.  Cleansed with Saline wound wash, adaptic applied, wrapped with kerlix and ace wrap to compress lower leg.  Patient with no complaints of procedure.

## 2019-07-06 ENCOUNTER — TELEPHONE (OUTPATIENT)
Dept: EMERGENCY MEDICINE | Facility: OTHER | Age: 69
End: 2019-07-06

## 2019-07-06 LAB
BACTERIA SPEC CULT: ABNORMAL
SPECIMEN SOURCE: ABNORMAL

## 2019-07-06 NOTE — TELEPHONE ENCOUNTER
[facility] Emergency Department Lab result notification:    [place] ED lab result protocol used  General Culture    Reason for call  Notify of lab results, assess symptoms,  review ED providers recommendations/discharge instructions (if necessary) and advise per ED lab result f/u protocol    Lab Result   Final Wound culture (left leg) report on 7/6/19  Emergency Dept discharge antibiotic prescribed:  Sulfamethoxazole-Trimethoprim (Bactrim DS, Septra DS) 800-160 mg PO tablet,  1 tablet by mouth 3 times daily for 10 days  #1. Bacteria, Moderate growth Staphylococcus aureus, which is [SUSCEPTIBLE] to antibiotic   Incision and Drainage performed in [place] ED [Yes / No]: No  Patient to be notified of result, symptoms assessed and advised per [place] ED lab result protocol.    Information table from ED Provider visit on 7/4/19  Symptoms reported at ED visit (Chief complaint, HPI) Moses Whittaker is a 69 year old male who presents to ER for evaluation of left lower leg infection . Patient states started as one ulcer and in the last couple of days he has developed several others. He states he does have a history of cellulitis of the lower extremity . Has history of diabetes as well as venous stasis . His blood sugars have been slightly higher than normal at 140s . He denies any fever , chills or systemic symptoms. The wounds have been draining through the dressing he applies. He has been prescribed lasix but has not been taking lately . He also has compressive stockings which he also has not been taking     ED providers Impression and Plan (applicable information) Patient presented to ER with concern of left lower extremity cellulitis. Patient triaged to exam room. Vital signs reviewed History and exam complete. Exam consistent with venous stasis ulcers with possible secondary infection . Wound culture done. Labs done. Patient with lasix prescription which he has not been taking. Wounds dressed in  "non adherent dressing Ace wrap for compression . Recommend lasix for the next 3 days and follow up with Edda next week for wound recheck .    Miscellaneous information N/A     RN Assessment (Patient s current Symptoms), include time called.  [Insert Left message here if message left]  Symptoms \"better than a long shot\".  Glucose levels remains elevated, today \"149\" after waking up from nap.  Dressing remains intact.  Culture results reviewed, questions answered.    RN Recommendations/Instructions per [place] ED lab result protocol  To continue with plan of care.      Please Contact your PCP clinic or return to the Emergency department if your:    Symptoms return.    Symptoms do not resolve after completing antibiotic.    Symptoms worsen or other concerning symptom's.    PCP follow-up Questions asked: YES       Ivania Hackett RN    [facility] RN  Lung Nodule and ED Lab Results F/U RN  Epic pool (ED late result f/u RN) : P [id number]   # [phone]    Copy of Lab result   Order   Wound Culture Aerobic Bacterial [[id number]] (Order [id number])   Exam Information     Exam Date Exam Time Accession # Results    7/4/19 10:15 PM [id number]    Component Results     Specimen Information: Leg Lower, Left        Component Collected Lab   Specimen Description 07/04/2019 10:15 PM [facility]   Lower Left Leg    Culture Micro Abnormal  07/04/2019 10:15 PM [facility]   Moderate growth   Staphylococcus aureus    Susceptibility     Staphylococcus aureus     Antibiotic Interpretation Sensitivity Method Status   Azithromycin Sensitive <=2 ug/mL AMBREEN Final   CEFEPIME Sensitive <=8 ug/mL AMBREEN Final   CLINDAMYCIN Sensitive <=0.5 ug/mL AMBREEN Final   ERYTHROMYCIN Sensitive <=0.5 ug/mL AMBREEN Final   LINEZOLID Sensitive <=2 ug/mL AMBREEN Final   OXACILLIN Sensitive <=0.25 ug/mL AMBREEN Final   PENICILLIN Sensitive <=0.03 ug/mL AMBREEN Final   RIFAMPIN Sensitive <=1 ug/mL AMBREEN Final   TETRACYCLINE Sensitive <=4 ug/mL AMBREEN Final   Trimethoprim/Sulfa " Sensitive <=2/38 ug/mL AMBREEN Final   VANCOMYCIN Sensitive <=2 ug/mL AMBREEN Final

## 2019-07-09 ENCOUNTER — OFFICE VISIT (OUTPATIENT)
Dept: INTERNAL MEDICINE | Facility: OTHER | Age: 69
End: 2019-07-09
Attending: NURSE PRACTITIONER
Payer: MEDICARE

## 2019-07-09 VITALS
WEIGHT: 203.4 LBS | HEART RATE: 69 BPM | OXYGEN SATURATION: 96 % | HEIGHT: 69 IN | TEMPERATURE: 98.2 F | SYSTOLIC BLOOD PRESSURE: 140 MMHG | RESPIRATION RATE: 16 BRPM | DIASTOLIC BLOOD PRESSURE: 80 MMHG | BODY MASS INDEX: 30.13 KG/M2

## 2019-07-09 DIAGNOSIS — I83.022 VENOUS STASIS ULCER OF LEFT CALF LIMITED TO BREAKDOWN OF SKIN WITH VARICOSE VEINS (H): Primary | ICD-10-CM

## 2019-07-09 DIAGNOSIS — E11.22 CONTROLLED TYPE 2 DIABETES MELLITUS WITH STAGE 3 CHRONIC KIDNEY DISEASE, WITH LONG-TERM CURRENT USE OF INSULIN (H): ICD-10-CM

## 2019-07-09 DIAGNOSIS — Z79.4 CONTROLLED TYPE 2 DIABETES MELLITUS WITH STAGE 3 CHRONIC KIDNEY DISEASE, WITH LONG-TERM CURRENT USE OF INSULIN (H): ICD-10-CM

## 2019-07-09 DIAGNOSIS — L97.221 VENOUS STASIS ULCER OF LEFT CALF LIMITED TO BREAKDOWN OF SKIN WITH VARICOSE VEINS (H): Primary | ICD-10-CM

## 2019-07-09 DIAGNOSIS — N18.30 CONTROLLED TYPE 2 DIABETES MELLITUS WITH STAGE 3 CHRONIC KIDNEY DISEASE, WITH LONG-TERM CURRENT USE OF INSULIN (H): ICD-10-CM

## 2019-07-09 DIAGNOSIS — R60.9 EDEMA, UNSPECIFIED TYPE: ICD-10-CM

## 2019-07-09 PROCEDURE — 29580 STRAPPING UNNA BOOT: CPT | Mod: LT | Performed by: NURSE PRACTITIONER

## 2019-07-09 PROCEDURE — 99213 OFFICE O/P EST LOW 20 MIN: CPT | Mod: 25 | Performed by: NURSE PRACTITIONER

## 2019-07-09 PROCEDURE — G0463 HOSPITAL OUTPT CLINIC VISIT: HCPCS

## 2019-07-09 PROCEDURE — G0463 HOSPITAL OUTPT CLINIC VISIT: HCPCS | Mod: 25

## 2019-07-09 ASSESSMENT — PAIN SCALES - GENERAL: PAINLEVEL: SEVERE PAIN (7)

## 2019-07-09 ASSESSMENT — MIFFLIN-ST. JEOR: SCORE: 1687.61

## 2019-07-09 NOTE — PROGRESS NOTES
Subjective:  He is here today for wound evaluation and follow-up emergency department visit.  He was seen in the emergency department on July 4 for venous stasis ulcer.  Wound was cultured which grew pansensitive staph.  He was started on Keflex 500 mg 3 times daily for 10 days.  He states there really was no redness or warmth around the wound and no purulent drainage.  The wound was tender but it is still as tender as it had been.  He did not have fever or chills.  He does take Lasix to help with his leg swelling and does wear compression stockings however he has not purchased a new pair of Compression stockings in over a year.  He has been doing a lot of lawnmowing for his own property and other places and has not been elevating his legs above his heart as he supposed to be.  The cath has not been tight and there is no pain in the calf.  No previous history of DVT.  No unilateral edema.  He states he is ulcers developed as blisters and then ruptured.  He also has type 2 diabetes which is fairly well controlled with last A1c the end of June at 8.1%.  He denies cough, shortness of breath, hemoptysis, PND and orthopnea.  No changes with his weight.  Has history of diastolic heart failure.  Patient Active Problem List   Diagnosis     Acute bilateral low back pain with bilateral sciatica     Angina pectoris (H)     Edema     Blister of toe of right foot     Coronary atherosclerosis     Cervical radicular pain     Cervical stenosis of spinal canal     Chronic diastolic heart failure (H)     Chronic low back pain     CKD (chronic kidney disease) stage 3, GFR 30-59 ml/min (H)     Uncontrolled type 2 diabetes mellitus with stage 3 chronic kidney disease, with long-term current use of insulin (H)     Degenerative disc disease, cervical     Painful diabetic neuropathy (H)     Diastasis of muscle     Degeneration of cervical intervertebral disc     Psychosexual dysfunction with inhibited sexual excitement     Erectile  dysfunction     Facet arthritis of cervical region     Esophageal reflux     Greater trochanteric bursitis of left hip     Essential hypertension     Hypothyroidism due to acquired atrophy of thyroid     Insulin pump in place     Intermittent constipation     Left arm numbness     Left atrial enlargement     Mixed hyperlipidemia     Myalgia     Myofacial muscle pain     Primary narcolepsy without cataplexy     Neuroforaminal stenosis of lumbar spine     Obesity     Old inferior wall myocardial infarction     Osteoarthritis of spine     Pain medication agreement - 8/9/2018     Peptic ulcer disease     Platelet inhibition due to Plavix     Radicular low back pain     S/P CABG x 2     S/P coronary artery stent placement     Skin rash     Trigger point of extremity     Trigger point with back pain     Trigger point with neck pain     Chest pain     Atherosclerosis of native coronary artery of native heart with stable angina pectoris (H)     Other specified forms of chronic ischemic heart disease     Peripheral vascular disease (H)     Postsurgical percutaneous transluminal coronary angioplasty status     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     Gastritis without bleeding     No Comments Provided     Heart disease     Multiple coronary stents     Narcolepsy without cataplexy     No Comments Provided     Other injury of unspecified body region, initial encounter (CODE)     11/2006,Right calf hematoma     Rheumatic fever without heart involvement     Rheumatic fever as a child without valvular heart disease     Past Surgical History:   Procedure Laterality Date     ANGIOPLASTY      12/00, 04/04/04,Repeat angioplasty with lt internal mammary to the  lt anterior descending and lt radial artery from aorta to the diagonal.     ANGIOPLASTY      10/01,Angioplasty with 2 vessel CABG the following week from the lt internal mammary to the lt anterior descending and right radial free graft     ANGIOPLASTY      04/2003     ARTHROSCOPY SHOULDER ROTATOR CUFF REPAIR      02/06/2006,Left rotator cuff repair and bone spur removal by Dr. Giraldo in [place]     COLONOSCOPY      1999     COLONOSCOPY  01/08/2016 01/08/2016,-- Dr. De La Cruz -- polypectomy     COLONOSCOPY  04/18/2019    hyperplastic, follow up 10 years, 4/18/29     OTHER SURGICAL HISTORY      09/03,378433,IR ANGIOGRAM FEMORAL/EXTREMITY ([place]),Unremarkable angiogram at [facility]     OTHER SURGICAL HISTORY      10/05/2004,,PTCA,Angioplasty     OTHER SURGICAL HISTORY      02/2005,,PTCA,Angioplasty with stent.     OTHER SURGICAL HISTORY      03/02/2005,,PTCA,Angioplasty with stent.     OTHER SURGICAL HISTORY      09/23/2005,,PTCA,Angioplasty without stent     OTHER SURGICAL HISTORY      2/26/2007,[id number],IR ANGIOGRAM FEMORAL/EXTREMITY ([place]),Unremarkable coronary angiogram at [facility].     OTHER SURGICAL HISTORY      1967,SUR38,APPENDECTOMY OPEN     RELEASE CARPAL TUNNEL      11/15/01,Right carpal tunnel release by Dr. Mace     RELEASE CARPAL TUNNEL      01/2004,Left carpal tunnel release     Social History     Socioeconomic History     Marital status:      Spouse name: Not on file     Number of children: Not on file     Years of education: Not on file     Highest education level: Not on file   Occupational History     Not on file   Social Needs     Financial resource strain: Not on file     Food insecurity:     Worry: Not on file     Inability: Not on file     Transportation needs:     Medical: Not on file     Non-medical: Not on file   Tobacco Use     Smoking status: Never Smoker     Smokeless tobacco: Never Used   Substance and Sexual Activity     Alcohol use: No     Alcohol/week: 0.0 oz     Drug use: No      Sexual activity: Yes     Partners: Female   Lifestyle     Physical activity:     Days per week: Not on file     Minutes per session: Not on file     Stress: Not on file   Relationships     Social connections:     Talks on phone: Not on file     Gets together: Not on file     Attends [employer] service: Not on file     Active member of club or organization: Not on file     Attends meetings of clubs or organizations: Not on file     Relationship status: Not on file     Intimate partner violence:     Fear of current or ex partner: Not on file     Emotionally abused: Not on file     Physically abused: Not on file     Forced sexual activity: Not on file   Other Topics Concern     Parent/sibling w/ CABG, MI or angioplasty before 65F 55M? Not Asked   Social History Narrative    He is on Social Security Disability for coronary artery disease and cervical arthritis and disk disease.   Dax clay.  No tobacco.     Family History   Problem Relation Age of Onset     Heart Disease Mother         Heart Disease,Heart problems     Heart Disease Other         Heart Disease,Heart problems     Heart Disease Other         Heart Disease,Heart problems     Ankylosing Spondylitis Son         Ankylosing spondylitis,Ankylosing spondylitis     Other - See Comments Brother          with sudden death following shoulder surgery 2012 -- was off his Plavix for 10 days pre-operatively.     Ankylosing Spondylitis Daughter         Ankylosing spondylitis,-- Dx 2017     Current Outpatient Medications   Medication Sig Dispense Refill     aspirin (GOODSENSE ASPIRIN) 325 MG tablet Take 325 mg by mouth daily Take  by mouth.       blood glucose monitoring (ONETOUCH ULTRA) test strip Dispense test strips covered by the patient insurance. Test 10 times per day.       Blood Glucose Monitoring Suppl (GLUCOCOM BLOOD GLUCOSE MONITOR) WAQAS Dispense glucose meter, test strips and lancets covered by the patient insurance. Test 10 times per day.        cephALEXin (KEFLEX) 500 MG capsule Take 1 capsule (500 mg) by mouth 3 times daily 30 capsule 0     clopidogrel (PLAVIX) 75 MG tablet Take 1 tablet (75 mg) by mouth daily 90 tablet 3     co-enzyme Q-10 100 MG CAPS capsule Take 100 mg by mouth daily        [employer] ALCOHOL SWABS PADS For home use.       desoximetasone (TOPICORT) 0.25 % external cream APPLY  CREAM EXTERNALLY TWICE DAILY 180 g 3     dextroamphetamine (DEXTROSTAT) 10 MG tablet Take 1 tablet (10 mg) by mouth 4 times daily 360 tablet 0     diclofenac (VOLTAREN) 75 MG EC tablet TAKE ONE TABLET BY MOUTH 4 TIMES DAILY 360 tablet 3     docusate sodium (COLACE) 100 MG capsule Take 1-2 capsules by mouth 2 times daily as needed for constipation prevention       Flaxseed, Linseed, (FLAXSEED OIL) 1000 MG CAPS Take 1 capsule by mouth daily        furosemide (LASIX) 40 MG tablet Take 2-4 tablets ( mg) by mouth daily Or as instructed for leg swelling 90 tablet 3     HUMALOG 100 UNIT/ML injection INJECT UNDER THE SKIN USING INSULIN PUMP. MAX DAILY DOSE  UNITS 180 mL 3     hydrALAZINE (APRESOLINE) 25 MG tablet Take 1-2 tablets (25-50 mg) by mouth 4 times daily - Take lowest effective dose for blood pressure management 360 tablet 11     HYDROcodone-acetaminophen (NORCO) 5-325 MG per tablet Take 1 tablet by mouth every 6 hours as needed for severe pain 60 tablet 0     Insulin Pen Needle (PEN NEEDLES) 29G X 12MM MISC For administering insulin at home.       irbesartan (AVAPRO) 150 MG tablet Take 1 tablet (150 mg) by mouth 2 times daily -- needs 2 smaller pills daily 180 tablet 3     IV Sets-Tubing (SOLUTION ADMINISTRATION SET) MISC As directed.       levothyroxine (SYNTHROID/LEVOTHROID) 125 MCG tablet TAKE 1 TABLET BY MOUTH ONCE DAILY IN THE MORNING 90 tablet 3     methylphenidate (RITALIN) 10 MG tablet Take 1 tablet (10 mg) by mouth 2 times daily Takes 2 tablets twice daily 360 tablet 0     metoprolol succinate (TOPROL-XL) 50 MG 24 hr tablet Take 1 tablet (50 mg)  "by mouth 2 times daily 180 tablet 3     nitroGLYcerin (NITROSTAT) 0.4 MG sublingual tablet Place 1 tablet under the tongue every 5 minutes as needed for chest pain       ranitidine (ZANTAC) 150 MG tablet Take 1 tablet (150 mg) by mouth 2 times daily 180 tablet 3     ranolazine (RANEXA) 500 MG 12 hr tablet Take 2 tablets (1,000 mg) by mouth 2 times daily - cancel other Rx - give 3 month supply 360 tablet 3     rosuvastatin (CRESTOR) 10 MG tablet TAKE ONE TABLET BY MOUTH TWICE DAILY 180 tablet 3     Saline GEL Spray 1 spray in nostril every hour as needed For Nasal Dryness       thin (NO BRAND SPECIFIED) lancets Test 10 times per day.       Famotidine; Gabapentin; Hydrocortisone; Atorvastatin; Celebrex [celecoxib]; Lisinopril; No clinical screening - see comments; Norvasc [amlodipine]; Pregabalin; Valdecoxib; and Insulin aspart      Review of Systems:  Review of Systems  See HPI    Objective:   /80 (BP Location: Right arm, Patient Position: Sitting, Cuff Size: Adult Regular)   Pulse 69   Temp 98.2  F (36.8  C) (Tympanic)   Resp 16   Ht 1.76 m (5' 9.29\")   Wt 92.3 kg (203 lb 6.4 oz)   SpO2 96%   BMI 29.79 kg/m    Physical Exam  Pleasant gentleman in no acute distress.  Affect normal.  Alert and oriented x4.  Skin color pink.  Mucous memories moist.  Lung fields clear to auscultation.  Cardiovascular regular rate and rhythm.  Bilateral extremities with 1-2+ edema.  DP PT intact bilaterally.  Capillary refill less than 3 seconds.  Left lower extremity with a negative Homans.  No pain with palpation to the deep calf area.  Negative clonus.  Wound is located on posterior calf or superficial and measure 1 x 3 to 5 cm, 1 x 5 cm and 1 x 5 cm with a moderate amount of serosanguineous nonodorous drainage.  No surrounding erythema warmth or maceration.  After discussion was completed with patient with different options for treatment he would like to proceed with Unna boot application.  Wounds are irrigated with " saline wash, Unna boot x1, Kerlix x1 and Coban x1 applied to the left lower extremity and well-tolerated by patient.    Assessment:    ICD-10-CM    1. Venous stasis ulcer of left calf limited to breakdown of skin with varicose veins (H) I83.022     L97.221    2. Edema, unspecified type R60.9        Plan:   Recommend to keep leg elevated above heart as much as possible throughout the day with no less than 1 hour 3 times daily.  Explained that if he does not tolerate the wrap and needs to be removed before clinic follow-up and he may just unwrap the bandage.  He should return back to using the treatment that was provided in the emergency department with emulsion gauze and Curlex if this were to happen.  Otherwise we will see him on short-term follow-up to make sure he is doing well with this dressing.  I have also recommended that he purchase new compression stockings that are 20-30 mmHg compression and he should receive a new pair every 6 months.  Finish out Keflex with no evidence of infection at this time.  If he develops fever, chills, purulent drainage through bandage or increasing pain he needs to be seen.  If he develops increasing shortness of breath, pleuritic chest pain, hemoptysis, PND, orthopnea, etc. he needs to be seen urgently.  We will plan to see him back in 3-4 days.  RHINA Potter   7/9/2019  11:49 AM

## 2019-07-09 NOTE — NURSING NOTE
Chief Complaint   Patient presents with     RECHECK     er visit 7-4-19/wound       Medication Reconciliation: complete  Chen Morris LPN  ..................7/9/2019   10:16 AM   Previous A1C is not at goal of <8  Lab Results   Component Value Date    A1C 8.1 06/26/2019    A1C 7.7 03/08/2019    A1C 7.5 11/21/2018    A1C 8.0 08/09/2018    A1C 7.8 05/03/2018     Urine microalbumin:creatine: unknown  Foot exam yearly  Eye exam 2-2019    Tobacco User no  Patient is on a daily aspirin  Patient is on a Statin.  Blood pressure today of:     BP Readings from Last 1 Encounters:   07/09/19 140/80      is not at the goal of <139/89 for diabetics.    Chen Morris LPN on 7/9/2019 at 10:21 AM

## 2019-07-12 ENCOUNTER — OFFICE VISIT (OUTPATIENT)
Dept: INTERNAL MEDICINE | Facility: OTHER | Age: 69
End: 2019-07-12
Attending: NURSE PRACTITIONER
Payer: MEDICARE

## 2019-07-12 VITALS
RESPIRATION RATE: 16 BRPM | BODY MASS INDEX: 29.32 KG/M2 | OXYGEN SATURATION: 94 % | TEMPERATURE: 99 F | HEART RATE: 69 BPM | WEIGHT: 200.2 LBS | DIASTOLIC BLOOD PRESSURE: 72 MMHG | SYSTOLIC BLOOD PRESSURE: 140 MMHG

## 2019-07-12 DIAGNOSIS — R60.9 EDEMA, UNSPECIFIED TYPE: ICD-10-CM

## 2019-07-12 DIAGNOSIS — L97.221 VENOUS STASIS ULCER OF LEFT CALF LIMITED TO BREAKDOWN OF SKIN WITH VARICOSE VEINS (H): Primary | ICD-10-CM

## 2019-07-12 DIAGNOSIS — I83.022 VENOUS STASIS ULCER OF LEFT CALF LIMITED TO BREAKDOWN OF SKIN WITH VARICOSE VEINS (H): Primary | ICD-10-CM

## 2019-07-12 PROCEDURE — 29580 STRAPPING UNNA BOOT: CPT | Performed by: NURSE PRACTITIONER

## 2019-07-12 PROCEDURE — G0463 HOSPITAL OUTPT CLINIC VISIT: HCPCS

## 2019-07-12 ASSESSMENT — PAIN SCALES - GENERAL: PAINLEVEL: MODERATE PAIN (5)

## 2019-07-12 NOTE — PROGRESS NOTES
Subjective:  He is here today for follow-up on venous stasis ulcer.  He has chronic edema of the lower extremities.  He was seen earlier in the week and Unna boot was applied.  He states he feels improvement in the leg discomfort and the swelling with Unna boot.  He is quite pleased.  There was no purulent drainage through bandage and denies fever and chills.  He is still on Keflex for cellulitis but that will finish in a couple of days.      Patient Active Problem List   Diagnosis     Acute bilateral low back pain with bilateral sciatica     Angina pectoris (H)     Edema     Blister of toe of right foot     Coronary atherosclerosis     Cervical radicular pain     Cervical stenosis of spinal canal     Chronic diastolic heart failure (H)     Chronic low back pain     CKD (chronic kidney disease) stage 3, GFR 30-59 ml/min (H)     Uncontrolled type 2 diabetes mellitus with stage 3 chronic kidney disease, with long-term current use of insulin (H)     Degenerative disc disease, cervical     Painful diabetic neuropathy (H)     Diastasis of muscle     Degeneration of cervical intervertebral disc     Psychosexual dysfunction with inhibited sexual excitement     Erectile dysfunction     Facet arthritis of cervical region     Esophageal reflux     Greater trochanteric bursitis of left hip     Essential hypertension     Hypothyroidism due to acquired atrophy of thyroid     Insulin pump in place     Intermittent constipation     Left arm numbness     Left atrial enlargement     Mixed hyperlipidemia     Myalgia     Myofacial muscle pain     Primary narcolepsy without cataplexy     Neuroforaminal stenosis of lumbar spine     Obesity     Old inferior wall myocardial infarction     Osteoarthritis of spine     Pain medication agreement - 8/9/2018     Peptic ulcer disease     Platelet inhibition due to Plavix     Radicular low back pain     S/P CABG x 2     S/P coronary artery stent placement     Skin rash     Trigger point of  extremity     Trigger point with back pain     Trigger point with neck pain     Chest pain     Atherosclerosis of native coronary artery of native heart with stable angina pectoris (H)     Other specified forms of chronic ischemic heart disease     Peripheral vascular disease (H)     Postsurgical percutaneous transluminal coronary angioplasty status     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     Gastritis without bleeding     No Comments Provided     Heart disease     Multiple coronary stents     Narcolepsy without cataplexy     No Comments Provided     Other injury of unspecified body region, initial encounter (CODE)     11/2006,Right calf hematoma     Rheumatic fever without heart involvement     Rheumatic fever as a child without valvular heart disease     Past Surgical History:   Procedure Laterality Date     ANGIOPLASTY      12/00, 04/04/04,Repeat angioplasty with lt internal mammary to the lt anterior descending and lt radial artery from aorta to the diagonal.     ANGIOPLASTY      10/01,Angioplasty with 2 vessel CABG the following week from the lt internal mammary to the lt anterior descending and right radial free graft     ANGIOPLASTY      04/2003     ARTHROSCOPY SHOULDER ROTATOR CUFF REPAIR      02/06/2006,Left rotator cuff repair and bone spur removal by Dr. Giraldo in [place]     COLONOSCOPY      1999     COLONOSCOPY  01/08/2016 01/08/2016,-- Dr. De La Cruz -- polypectomy     COLONOSCOPY  04/18/2019    hyperplastic, follow up 10 years, 4/18/29     OTHER SURGICAL HISTORY      09/03,[id number],IR ANGIOGRAM FEMORAL/EXTREMITY ([place]),Unremarkable angiogram at [facility]     OTHER SURGICAL HISTORY      10/05/2004,,PTCA,Angioplasty     OTHER  SURGICAL HISTORY      02/2005,,PTCA,Angioplasty with stent.     OTHER SURGICAL HISTORY      03/02/2005,,PTCA,Angioplasty with stent.     OTHER SURGICAL HISTORY      09/23/2005,,PTCA,Angioplasty without stent     OTHER SURGICAL HISTORY      2/26/2007,[id number],IR ANGIOGRAM FEMORAL/EXTREMITY ([place]),Unremarkable coronary angiogram at [facility].     OTHER SURGICAL HISTORY      1967,SUR38,APPENDECTOMY OPEN     RELEASE CARPAL TUNNEL      11/15/01,Right carpal tunnel release by Dr. Mace     RELEASE CARPAL TUNNEL      01/2004,Left carpal tunnel release     Social History     Socioeconomic History     Marital status:      Spouse name: Not on file     Number of children: Not on file     Years of education: Not on file     Highest education level: Not on file   Occupational History     Not on file   Social Needs     Financial resource strain: Not on file     Food insecurity:     Worry: Not on file     Inability: Not on file     Transportation needs:     Medical: Not on file     Non-medical: Not on file   Tobacco Use     Smoking status: Never Smoker     Smokeless tobacco: Never Used   Substance and Sexual Activity     Alcohol use: No     Alcohol/week: 0.0 oz     Drug use: No     Sexual activity: Yes     Partners: Female   Lifestyle     Physical activity:     Days per week: Not on file     Minutes per session: Not on file     Stress: Not on file   Relationships     Social connections:     Talks on phone: Not on file     Gets together: Not on file     Attends [employer] service: Not on file     Active member of club or organization: Not on file     Attends meetings of clubs or organizations: Not on file     Relationship status: Not on file     Intimate partner violence:     Fear of current or ex partner: Not on file     Emotionally abused: Not on file     Physically abused: Not on file     Forced sexual activity: Not on file   Other Topics Concern     Parent/sibling w/ CABG, MI or angioplasty before 65F 55M? Not Asked    Social History Narrative    He is on Social Security Disability for coronary artery disease and cervical arthritis and disk disease.   Jakobd obed.  No tobacco.     Family History   Problem Relation Age of Onset     Heart Disease Mother         Heart Disease,Heart problems     Heart Disease Other         Heart Disease,Heart problems     Heart Disease Other         Heart Disease,Heart problems     Ankylosing Spondylitis Son         Ankylosing spondylitis,Ankylosing spondylitis     Other - See Comments Brother          with sudden death following shoulder surgery 2012 -- was off his Plavix for 10 days pre-operatively.     Ankylosing Spondylitis Daughter         Ankylosing spondylitis,-- Dx      Current Outpatient Medications   Medication Sig Dispense Refill     aspirin (GOODSENSE ASPIRIN) 325 MG tablet Take 325 mg by mouth daily Take  by mouth.       blood glucose monitoring (ONETOUCH ULTRA) test strip Dispense test strips covered by the patient insurance. Test 10 times per day.       Blood Glucose Monitoring Suppl (fitkitM BLOOD GLUCOSE MONITOR) WAQAS Dispense glucose meter, test strips and lancets covered by the patient insurance. Test 10 times per day.       cephALEXin (KEFLEX) 500 MG capsule Take 1 capsule (500 mg) by mouth 3 times daily 30 capsule 0     clopidogrel (PLAVIX) 75 MG tablet Take 1 tablet (75 mg) by mouth daily 90 tablet 3     co-enzyme Q-10 100 MG CAPS capsule Take 100 mg by mouth daily        [employer] ALCOHOL SWABS PADS For home use.       desoximetasone (TOPICORT) 0.25 % external cream APPLY  CREAM EXTERNALLY TWICE DAILY 180 g 3     dextroamphetamine (DEXTROSTAT) 10 MG tablet Take 1 tablet (10 mg) by mouth 4 times daily 360 tablet 0     diclofenac (VOLTAREN) 75 MG EC tablet TAKE ONE TABLET BY MOUTH 4 TIMES DAILY 360 tablet 3     docusate sodium (COLACE) 100 MG capsule Take 1-2 capsules by mouth 2 times daily as needed for constipation prevention       Flaxseed, Linseed, (FLAXSEED OIL)  1000 MG CAPS Take 1 capsule by mouth daily        furosemide (LASIX) 40 MG tablet Take 2-4 tablets ( mg) by mouth daily Or as instructed for leg swelling 90 tablet 3     HUMALOG 100 UNIT/ML injection INJECT UNDER THE SKIN USING INSULIN PUMP. MAX DAILY DOSE  UNITS 180 mL 3     hydrALAZINE (APRESOLINE) 25 MG tablet Take 1-2 tablets (25-50 mg) by mouth 4 times daily - Take lowest effective dose for blood pressure management 360 tablet 11     HYDROcodone-acetaminophen (NORCO) 5-325 MG per tablet Take 1 tablet by mouth every 6 hours as needed for severe pain 60 tablet 0     Insulin Pen Needle (PEN NEEDLES) 29G X 12MM MISC For administering insulin at home.       irbesartan (AVAPRO) 150 MG tablet Take 1 tablet (150 mg) by mouth 2 times daily -- needs 2 smaller pills daily 180 tablet 3     IV Sets-Tubing (SOLUTION ADMINISTRATION SET) MISC As directed.       levothyroxine (SYNTHROID/LEVOTHROID) 125 MCG tablet TAKE 1 TABLET BY MOUTH ONCE DAILY IN THE MORNING 90 tablet 3     methylphenidate (RITALIN) 10 MG tablet Take 1 tablet (10 mg) by mouth 2 times daily Takes 2 tablets twice daily 360 tablet 0     metoprolol succinate (TOPROL-XL) 50 MG 24 hr tablet Take 1 tablet (50 mg) by mouth 2 times daily 180 tablet 3     nitroGLYcerin (NITROSTAT) 0.4 MG sublingual tablet Place 1 tablet under the tongue every 5 minutes as needed for chest pain       ranitidine (ZANTAC) 150 MG tablet Take 1 tablet (150 mg) by mouth 2 times daily 180 tablet 3     ranolazine (RANEXA) 500 MG 12 hr tablet Take 2 tablets (1,000 mg) by mouth 2 times daily - cancel other Rx - give 3 month supply 360 tablet 3     rosuvastatin (CRESTOR) 10 MG tablet TAKE ONE TABLET BY MOUTH TWICE DAILY 180 tablet 3     Saline GEL Spray 1 spray in nostril every hour as needed For Nasal Dryness       thin (NO BRAND SPECIFIED) lancets Test 10 times per day.       Famotidine; Gabapentin; Hydrocortisone; Atorvastatin; Celebrex [celecoxib]; Lisinopril; No clinical  screening - see comments; Norvasc [amlodipine]; Pregabalin; Valdecoxib; and Insulin aspart      Review of Systems:  Review of Systems  See HPI  Objective:   /72 (BP Location: Right arm, Patient Position: Sitting, Cuff Size: Adult Regular)   Pulse 69   Temp 99  F (37.2  C) (Tympanic)   Resp 16   Wt 90.8 kg (200 lb 3.2 oz)   SpO2 94%   BMI 29.32 kg/m    Physical Exam  Pleasant woman no acute distress.  Affect normal.  Alert and oriented x4.  Left lower extremity with 1+ edema.  Unna boot was removed and well-tolerated.  Venous ulcers all show improvement.  There only 3 venous ulcers remaining in each measure less than 1 x 4 cm.  Wound bed with granulation tissue and small to moderate amount of serosanguineous nonodorous drainage.  Wound irrigated with saline wash.  Unna boot x1, Kerlix x1 and Coban x1 applied to the left lower extremity and well-tolerated by patient.  Assessment:    ICD-10-CM    1. Venous stasis ulcer of left calf limited to breakdown of skin with varicose veins (H) I83.022 UNNA BOOT APPLICATION    L97.221    2. Edema, unspecified type R60.9        Plan:   Continue with same plan of care.  Elevate leg above heart as able.  We will see him back next Wednesday for follow-up venous ulcers, sooner with concerns.  Finish out antibiotic.    RHINA Potter   7/12/2019  2:10 PM

## 2019-07-12 NOTE — NURSING NOTE
Chief Complaint   Patient presents with     WOUND CARE       Medication Reconciliation: complete  Chen Morris LPN  ..................7/12/2019   1:36 PM   Previous A1C is not at goal of <8  Lab Results   Component Value Date    A1C 8.1 06/26/2019    A1C 7.7 03/08/2019    A1C 7.5 11/21/2018    A1C 8.0 08/09/2018    A1C 7.8 05/03/2018     Urine microalbumin:creatine: unknown  Foot exam yearly  Eye exam 2-2019    Tobacco User no  Patient is on a daily aspirin  Patient is on a Statin.  Blood pressure today of:     BP Readings from Last 1 Encounters:   07/12/19 140/72      is not at the goal of <139/89 for diabetics.    Chen Morris LPN on 7/12/2019 at 1:38 PM

## 2019-07-17 ENCOUNTER — OFFICE VISIT (OUTPATIENT)
Dept: INTERNAL MEDICINE | Facility: OTHER | Age: 69
End: 2019-07-17
Attending: NURSE PRACTITIONER
Payer: MEDICARE

## 2019-07-17 VITALS
RESPIRATION RATE: 16 BRPM | SYSTOLIC BLOOD PRESSURE: 144 MMHG | DIASTOLIC BLOOD PRESSURE: 74 MMHG | HEIGHT: 69 IN | TEMPERATURE: 98 F | HEART RATE: 63 BPM | OXYGEN SATURATION: 95 % | WEIGHT: 201.5 LBS | BODY MASS INDEX: 29.84 KG/M2

## 2019-07-17 DIAGNOSIS — R60.9 EDEMA, UNSPECIFIED TYPE: ICD-10-CM

## 2019-07-17 DIAGNOSIS — L97.221 VENOUS STASIS ULCER OF LEFT CALF LIMITED TO BREAKDOWN OF SKIN WITH VARICOSE VEINS (H): Primary | ICD-10-CM

## 2019-07-17 DIAGNOSIS — I83.022 VENOUS STASIS ULCER OF LEFT CALF LIMITED TO BREAKDOWN OF SKIN WITH VARICOSE VEINS (H): Primary | ICD-10-CM

## 2019-07-17 PROCEDURE — 29580 STRAPPING UNNA BOOT: CPT | Mod: LT | Performed by: NURSE PRACTITIONER

## 2019-07-17 PROCEDURE — G0463 HOSPITAL OUTPT CLINIC VISIT: HCPCS | Mod: 25

## 2019-07-17 ASSESSMENT — MIFFLIN-ST. JEOR: SCORE: 1674.38

## 2019-07-17 ASSESSMENT — PAIN SCALES - GENERAL: PAINLEVEL: NO PAIN (0)

## 2019-07-17 NOTE — NURSING NOTE
Chief Complaint   Patient presents with     WOUND CARE       Previous A1C is not at goal of <8  Lab Results   Component Value Date    A1C 8.1 06/26/2019    A1C 7.7 03/08/2019    A1C 7.5 11/21/2018    A1C 8.0 08/09/2018    A1C 7.8 05/03/2018     Urine microalbumin:creatine: Unknown   Foot exam Yearly  Eye exam 02/2019    Tobacco User No  Patient is on a daily aspirin  Patient is on a Statin.  Blood pressure today of:     BP Readings from Last 1 Encounters:   07/17/19 144/74      is not at the goal of <139/89 for diabetics.    Ina Gomez LPN on 7/17/2019 at 2:40 PM    Medication Reconciliation: complete

## 2019-07-17 NOTE — PROGRESS NOTES
Subjective:  He is here today for follow-up on venous stasis ulcer.  He has chronic edema of the lower extremities.  He denies fever and chills.  No leg pain.  He feels that ulcers are healing nicely.    Patient Active Problem List   Diagnosis     Acute bilateral low back pain with bilateral sciatica     Angina pectoris (H)     Edema     Blister of toe of right foot     Coronary atherosclerosis     Cervical radicular pain     Cervical stenosis of spinal canal     Chronic diastolic heart failure (H)     Chronic low back pain     CKD (chronic kidney disease) stage 3, GFR 30-59 ml/min (H)     Uncontrolled type 2 diabetes mellitus with stage 3 chronic kidney disease, with long-term current use of insulin (H)     Degenerative disc disease, cervical     Painful diabetic neuropathy (H)     Diastasis of muscle     Degeneration of cervical intervertebral disc     Psychosexual dysfunction with inhibited sexual excitement     Erectile dysfunction     Facet arthritis of cervical region     Esophageal reflux     Greater trochanteric bursitis of left hip     Essential hypertension     Hypothyroidism due to acquired atrophy of thyroid     Insulin pump in place     Intermittent constipation     Left arm numbness     Left atrial enlargement     Mixed hyperlipidemia     Myalgia     Myofacial muscle pain     Primary narcolepsy without cataplexy     Neuroforaminal stenosis of lumbar spine     Obesity     Old inferior wall myocardial infarction     Osteoarthritis of spine     Pain medication agreement - 8/9/2018     Peptic ulcer disease     Platelet inhibition due to Plavix     Radicular low back pain     S/P CABG x 2     S/P coronary artery stent placement     Skin rash     Trigger point of extremity     Trigger point with back pain     Trigger point with neck pain     Chest pain     Atherosclerosis of native coronary artery of native heart with stable angina pectoris (H)     Other specified forms of chronic ischemic heart disease      Peripheral vascular disease (H)     Postsurgical percutaneous transluminal coronary angioplasty status     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     Gastritis without bleeding     No Comments Provided     Heart disease     Multiple coronary stents     Narcolepsy without cataplexy     No Comments Provided     Other injury of unspecified body region, initial encounter (CODE)     11/2006,Right calf hematoma     Rheumatic fever without heart involvement     Rheumatic fever as a child without valvular heart disease     Past Surgical History:   Procedure Laterality Date     ANGIOPLASTY      12/00, 04/04/04,Repeat angioplasty with lt internal mammary to the lt anterior descending and lt radial artery from aorta to the diagonal.     ANGIOPLASTY      10/01,Angioplasty with 2 vessel CABG the following week from the lt internal mammary to the lt anterior descending and right radial free graft     ANGIOPLASTY      04/2003     ARTHROSCOPY SHOULDER ROTATOR CUFF REPAIR      02/06/2006,Left rotator cuff repair and bone spur removal by Dr. Giraldo in [place]     COLONOSCOPY      1999     COLONOSCOPY  01/08/2016 01/08/2016,-- Dr. De La Cruz -- polypectomy     COLONOSCOPY  04/18/2019    hyperplastic, follow up 10 years, 4/18/29     OTHER SURGICAL HISTORY      09/03,[id number],IR ANGIOGRAM FEMORAL/EXTREMITY ([place]),Unremarkable angiogram at [facility]     OTHER SURGICAL HISTORY      10/05/2004,,PTCA,Angioplasty     OTHER SURGICAL HISTORY      02/2005,,PTCA,Angioplasty with stent.     OTHER SURGICAL HISTORY      03/02/2005,,PTCA,Angioplasty with stent.     OTHER SURGICAL HISTORY      09/23/2005,,PTCA,Angioplasty without stent     OTHER SURGICAL HISTORY       2/26/2007,[id number],IR ANGIOGRAM FEMORAL/EXTREMITY ([place]),Unremarkable coronary angiogram at [facility].     OTHER SURGICAL HISTORY      1967,SUR38,APPENDECTOMY OPEN     RELEASE CARPAL TUNNEL      11/15/01,Right carpal tunnel release by Dr. Mace     RELEASE CARPAL TUNNEL      01/2004,Left carpal tunnel release     Social History     Socioeconomic History     Marital status:      Spouse name: Not on file     Number of children: Not on file     Years of education: Not on file     Highest education level: Not on file   Occupational History     Not on file   Social Needs     Financial resource strain: Not on file     Food insecurity:     Worry: Not on file     Inability: Not on file     Transportation needs:     Medical: Not on file     Non-medical: Not on file   Tobacco Use     Smoking status: Never Smoker     Smokeless tobacco: Never Used   Substance and Sexual Activity     Alcohol use: No     Alcohol/week: 0.0 oz     Drug use: No     Sexual activity: Yes     Partners: Female   Lifestyle     Physical activity:     Days per week: Not on file     Minutes per session: Not on file     Stress: Not on file   Relationships     Social connections:     Talks on phone: Not on file     Gets together: Not on file     Attends Rastafarian service: Not on file     Active member of club or organization: Not on file     Attends meetings of clubs or organizations: Not on file     Relationship status: Not on file     Intimate partner violence:     Fear of current or ex partner: Not on file     Emotionally abused: Not on file     Physically abused: Not on file     Forced sexual activity: Not on file   Other Topics Concern     Parent/sibling w/ CABG, MI or angioplasty before 65F 55M? Not Asked   Social History Narrative    He is on Social Security Disability for coronary artery disease and cervical arthritis and disk disease.   Dax clay.  No tobacco.     Family History   Problem Relation Age of Onset     Heart Disease Mother         Heart  Disease,Heart problems     Heart Disease Other         Heart Disease,Heart problems     Heart Disease Other         Heart Disease,Heart problems     Ankylosing Spondylitis Son         Ankylosing spondylitis,Ankylosing spondylitis     Other - See Comments Brother          with sudden death following shoulder surgery 2012 -- was off his Plavix for 10 days pre-operatively.     Ankylosing Spondylitis Daughter         Ankylosing spondylitis,-- Dx 2017     Current Outpatient Medications   Medication Sig Dispense Refill     aspirin (GOODSENSE ASPIRIN) 325 MG tablet Take 325 mg by mouth daily Take  by mouth.       blood glucose monitoring (ONETOUCH ULTRA) test strip Dispense test strips covered by the patient insurance. Test 10 times per day.       Blood Glucose Monitoring Suppl (GLUCOCOM BLOOD GLUCOSE MONITOR) WAQAS Dispense glucose meter, test strips and lancets covered by the patient insurance. Test 10 times per day.       cephALEXin (KEFLEX) 500 MG capsule Take 1 capsule (500 mg) by mouth 3 times daily 30 capsule 0     clopidogrel (PLAVIX) 75 MG tablet Take 1 tablet (75 mg) by mouth daily 90 tablet 3     co-enzyme Q-10 100 MG CAPS capsule Take 100 mg by mouth daily        [employer]S ALCOHOL SWABS PADS For home use.       desoximetasone (TOPICORT) 0.25 % external cream APPLY  CREAM EXTERNALLY TWICE DAILY 180 g 3     dextroamphetamine (DEXTROSTAT) 10 MG tablet Take 1 tablet (10 mg) by mouth 4 times daily 360 tablet 0     diclofenac (VOLTAREN) 75 MG EC tablet TAKE ONE TABLET BY MOUTH 4 TIMES DAILY 360 tablet 3     docusate sodium (COLACE) 100 MG capsule Take 1-2 capsules by mouth 2 times daily as needed for constipation prevention       Flaxseed, Linseed, (FLAXSEED OIL) 1000 MG CAPS Take 1 capsule by mouth daily        furosemide (LASIX) 40 MG tablet Take 2-4 tablets ( mg) by mouth daily Or as instructed for leg swelling 90 tablet 3     HUMALOG 100 UNIT/ML injection INJECT UNDER THE SKIN USING INSULIN PUMP. MAX  DAILY DOSE  UNITS 180 mL 3     hydrALAZINE (APRESOLINE) 25 MG tablet Take 1-2 tablets (25-50 mg) by mouth 4 times daily - Take lowest effective dose for blood pressure management 360 tablet 11     HYDROcodone-acetaminophen (NORCO) 5-325 MG per tablet Take 1 tablet by mouth every 6 hours as needed for severe pain 60 tablet 0     Insulin Pen Needle (PEN NEEDLES) 29G X 12MM MISC For administering insulin at home.       irbesartan (AVAPRO) 150 MG tablet Take 1 tablet (150 mg) by mouth 2 times daily -- needs 2 smaller pills daily 180 tablet 3     IV Sets-Tubing (SOLUTION ADMINISTRATION SET) MISC As directed.       levothyroxine (SYNTHROID/LEVOTHROID) 125 MCG tablet TAKE 1 TABLET BY MOUTH ONCE DAILY IN THE MORNING 90 tablet 3     methylphenidate (RITALIN) 10 MG tablet Take 1 tablet (10 mg) by mouth 2 times daily Takes 2 tablets twice daily 360 tablet 0     metoprolol succinate (TOPROL-XL) 50 MG 24 hr tablet Take 1 tablet (50 mg) by mouth 2 times daily 180 tablet 3     nitroGLYcerin (NITROSTAT) 0.4 MG sublingual tablet Place 1 tablet under the tongue every 5 minutes as needed for chest pain       ranitidine (ZANTAC) 150 MG tablet Take 1 tablet (150 mg) by mouth 2 times daily 180 tablet 3     ranolazine (RANEXA) 500 MG 12 hr tablet Take 2 tablets (1,000 mg) by mouth 2 times daily - cancel other Rx - give 3 month supply 360 tablet 3     rosuvastatin (CRESTOR) 10 MG tablet TAKE ONE TABLET BY MOUTH TWICE DAILY 180 tablet 3     Saline GEL Spray 1 spray in nostril every hour as needed For Nasal Dryness       thin (NO BRAND SPECIFIED) lancets Test 10 times per day.       Famotidine; Gabapentin; Hydrocortisone; Atorvastatin; Celebrex [celecoxib]; Lisinopril; No clinical screening - see comments; Norvasc [amlodipine]; Pregabalin; Valdecoxib; and Insulin aspart      Review of Systems:  Review of Systems  See HPI  Objective:   /74 (BP Location: Right arm, Patient Position: Sitting, Cuff Size: Adult Regular)   Pulse 63    "Temp 98  F (36.7  C) (Tympanic)   Resp 16   Ht 1.753 m (5' 9\")   Wt 91.4 kg (201 lb 8 oz)   SpO2 95%   BMI 29.76 kg/m    Physical Exam  Pleasant woman no acute distress.  Affect normal.  Alert and oriented x4.  Left lower extremity with 1+ edema.  Unna boot was removed and well-tolerated.  Venous ulcers all show improvement.  There only 2 venous ulcers remaining in each measure less than 1.5 x 3.5 cm.  Wound bed with granulation tissue and small to moderate amount of serosanguineous nonodorous drainage.  Wound irrigated with saline wash.  Unna boot x1, Kerlix x1 and Coban x1 applied to the left lower extremity and well-tolerated by patient.  Assessment:    ICD-10-CM    1. Venous stasis ulcer of left calf limited to breakdown of skin with varicose veins (H) I83.022 UNNA BOOT APPLICATION    L97.221    2. Edema, unspecified type R60.9 UNNA BOOT APPLICATION       Plan:   Continue with same plan of care.  Elevate leg above heart as able.  We will see him back next Wednesday for follow-up venous ulcers, sooner with concerns.      RHINA Potter   7/17/2019  3:14 PM    "

## 2019-07-18 DIAGNOSIS — E78.2 MIXED HYPERLIPIDEMIA: ICD-10-CM

## 2019-07-23 RX ORDER — ROSUVASTATIN CALCIUM 10 MG/1
TABLET, COATED ORAL
Qty: 180 TABLET | Refills: 2 | Status: SHIPPED | OUTPATIENT
Start: 2019-07-23 | End: 2020-04-23

## 2019-07-23 NOTE — TELEPHONE ENCOUNTER
"Requested Prescriptions   Pending Prescriptions Disp Refills     rosuvastatin (CRESTOR) 10 MG tablet [Pharmacy Med Name: ROSUVASTATIN 10MG TAB] 180 tablet 3     Sig: TAKE 1 TABLET BY MOUTH TWICE DAILY       Statins Protocol Passed - 7/18/2019  5:13 PM        Passed - LDL on file in past 12 months     Recent Labs   Lab Test 06/26/19  1606   LDL 20             Passed - No abnormal creatine kinase in past 12 months     No lab results found.             Passed - Recent (12 mo) or future (30 days) visit within the authorizing provider's specialty     Patient had office visit in the last 12 months or has a visit in the next 30 days with authorizing provider or within the authorizing provider's specialty.  See \"Patient Info\" tab in inbasket, or \"Choose Columns\" in Meds & Orders section of the refill encounter.              Passed - Medication is active on med list        Passed - Patient is age 18 or older        LOV 6/26/19    Prescription approved per [facility] [place] Refill Protocol.    "

## 2019-07-24 ENCOUNTER — OFFICE VISIT (OUTPATIENT)
Dept: INTERNAL MEDICINE | Facility: OTHER | Age: 69
End: 2019-07-24
Attending: NURSE PRACTITIONER
Payer: COMMERCIAL

## 2019-07-24 VITALS
RESPIRATION RATE: 16 BRPM | HEART RATE: 70 BPM | WEIGHT: 200.4 LBS | BODY MASS INDEX: 29.59 KG/M2 | DIASTOLIC BLOOD PRESSURE: 80 MMHG | TEMPERATURE: 99 F | SYSTOLIC BLOOD PRESSURE: 140 MMHG | OXYGEN SATURATION: 96 %

## 2019-07-24 DIAGNOSIS — I83.022 VENOUS STASIS ULCER OF LEFT CALF LIMITED TO BREAKDOWN OF SKIN WITH VARICOSE VEINS (H): Primary | ICD-10-CM

## 2019-07-24 DIAGNOSIS — L97.221 VENOUS STASIS ULCER OF LEFT CALF LIMITED TO BREAKDOWN OF SKIN WITH VARICOSE VEINS (H): Primary | ICD-10-CM

## 2019-07-24 DIAGNOSIS — R60.9 EDEMA, UNSPECIFIED TYPE: ICD-10-CM

## 2019-07-24 PROCEDURE — 99213 OFFICE O/P EST LOW 20 MIN: CPT | Performed by: NURSE PRACTITIONER

## 2019-07-24 PROCEDURE — G0463 HOSPITAL OUTPT CLINIC VISIT: HCPCS | Mod: 25

## 2019-07-24 PROCEDURE — G0463 HOSPITAL OUTPT CLINIC VISIT: HCPCS

## 2019-07-24 ASSESSMENT — PAIN SCALES - GENERAL
PAINLEVEL: SEVERE PAIN (6)
PAINLEVEL: MODERATE PAIN (4)

## 2019-07-24 NOTE — NURSING NOTE
Chief Complaint   Patient presents with     WOUND CARE       Medication Reconciliation: complete  Chen Morris LPN  ..................7/24/2019   3:44 PM   Previous A1C is not at goal of <8  Lab Results   Component Value Date    A1C 8.1 06/26/2019    A1C 7.7 03/08/2019    A1C 7.5 11/21/2018    A1C 8.0 08/09/2018    A1C 7.8 05/03/2018     Urine microalbumin:creatine: unknown  Foot exam yearly  Eye exam 2-2019    Tobacco User no  Patient is on a daily aspirin  Patient is on a Statin.  Blood pressure today of:     BP Readings from Last 1 Encounters:   07/24/19 140/80      is not at the goal of <139/89 for diabetics.    Chen Morris LPN on 7/24/2019 at 3:50 PM

## 2019-07-24 NOTE — PROGRESS NOTES
Subjective:  He is here today for follow-up on venous stasis ulcer.  He has chronic edema of the lower extremities.  He denies fever and chills.  No leg pain.  He feels that ulcers are healing nicely.  He is planning to buy new compression stockings once this is healed.    Patient Active Problem List   Diagnosis     Acute bilateral low back pain with bilateral sciatica     Angina pectoris (H)     Edema     Blister of toe of right foot     Coronary atherosclerosis     Cervical radicular pain     Cervical stenosis of spinal canal     Chronic diastolic heart failure (H)     Chronic low back pain     CKD (chronic kidney disease) stage 3, GFR 30-59 ml/min (H)     Uncontrolled type 2 diabetes mellitus with stage 3 chronic kidney disease, with long-term current use of insulin (H)     Degenerative disc disease, cervical     Painful diabetic neuropathy (H)     Diastasis of muscle     Degeneration of cervical intervertebral disc     Psychosexual dysfunction with inhibited sexual excitement     Erectile dysfunction     Facet arthritis of cervical region     Esophageal reflux     Greater trochanteric bursitis of left hip     Essential hypertension     Hypothyroidism due to acquired atrophy of thyroid     Insulin pump in place     Intermittent constipation     Left arm numbness     Left atrial enlargement     Mixed hyperlipidemia     Myalgia     Myofacial muscle pain     Primary narcolepsy without cataplexy     Neuroforaminal stenosis of lumbar spine     Obesity     Old inferior wall myocardial infarction     Osteoarthritis of spine     Pain medication agreement - 8/9/2018     Peptic ulcer disease     Platelet inhibition due to Plavix     Radicular low back pain     S/P CABG x 2     S/P coronary artery stent placement     Skin rash     Trigger point of extremity     Trigger point with back pain     Trigger point with neck pain     Chest pain     Atherosclerosis of native coronary artery of native heart with stable angina  pectoris (H)     Other specified forms of chronic ischemic heart disease     Peripheral vascular disease (H)     Postsurgical percutaneous transluminal coronary angioplasty status     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     Gastritis without bleeding     No Comments Provided     Heart disease     Multiple coronary stents     Narcolepsy without cataplexy     No Comments Provided     Other injury of unspecified body region, initial encounter (CODE)     11/2006,Right calf hematoma     Rheumatic fever without heart involvement     Rheumatic fever as a child without valvular heart disease     Past Surgical History:   Procedure Laterality Date     ANGIOPLASTY      12/00, 04/04/04,Repeat angioplasty with lt internal mammary to the lt anterior descending and lt radial artery from aorta to the diagonal.     ANGIOPLASTY      10/01,Angioplasty with 2 vessel CABG the following week from the lt internal mammary to the lt anterior descending and right radial free graft     ANGIOPLASTY      04/2003     ARTHROSCOPY SHOULDER ROTATOR CUFF REPAIR      02/06/2006,Left rotator cuff repair and bone spur removal by Dr. Giraldo in [place]     COLONOSCOPY      1999     COLONOSCOPY  01/08/2016 01/08/2016,-- Dr. De La Cruz -- polypectomy     COLONOSCOPY  04/18/2019    hyperplastic, follow up 10 years, 4/18/29     OTHER SURGICAL HISTORY      09/03,[id number],IR ANGIOGRAM FEMORAL/EXTREMITY ([place]),Unremarkable angiogram at [facility]     OTHER SURGICAL HISTORY      10/05/2004,,PTCA,Angioplasty     OTHER SURGICAL HISTORY      02/2005,,PTCA,Angioplasty with stent.     OTHER SURGICAL HISTORY      03/02/2005,,PTCA,Angioplasty with stent.     OTHER SURGICAL HISTORY       09/23/2005,,PTCA,Angioplasty without stent     OTHER SURGICAL HISTORY      2/26/2007,[id number],IR ANGIOGRAM FEMORAL/EXTREMITY ([place]),Unremarkable coronary angiogram at [facility].     OTHER SURGICAL HISTORY      1967,SUR38,APPENDECTOMY OPEN     RELEASE CARPAL TUNNEL      11/15/01,Right carpal tunnel release by Dr. Mace     RELEASE CARPAL TUNNEL      01/2004,Left carpal tunnel release     Social History     Socioeconomic History     Marital status:      Spouse name: Not on file     Number of children: Not on file     Years of education: Not on file     Highest education level: Not on file   Occupational History     Not on file   Social Needs     Financial resource strain: Not on file     Food insecurity:     Worry: Not on file     Inability: Not on file     Transportation needs:     Medical: Not on file     Non-medical: Not on file   Tobacco Use     Smoking status: Never Smoker     Smokeless tobacco: Never Used   Substance and Sexual Activity     Alcohol use: No     Alcohol/week: 0.0 oz     Drug use: No     Sexual activity: Yes     Partners: Female   Lifestyle     Physical activity:     Days per week: Not on file     Minutes per session: Not on file     Stress: Not on file   Relationships     Social connections:     Talks on phone: Not on file     Gets together: Not on file     Attends Cheondo[employer] service: Not on file     Active member of club or organization: Not on file     Attends meetings of clubs or organizations: Not on file     Relationship status: Not on file     Intimate partner violence:     Fear of current or ex partner: Not on file     Emotionally abused: Not on file     Physically abused: Not on file     Forced sexual activity: Not on file   Other Topics Concern     Parent/sibling w/ CABG, MI or angioplasty before 65F 55M? Not Asked   Social History Narrative    He is on Social Security Disability for coronary artery disease and cervical arthritis and disk disease.   Dax clay.  No tobacco.     Family  History   Problem Relation Age of Onset     Heart Disease Mother         Heart Disease,Heart problems     Heart Disease Other         Heart Disease,Heart problems     Heart Disease Other         Heart Disease,Heart problems     Ankylosing Spondylitis Son         Ankylosing spondylitis,Ankylosing spondylitis     Other - See Comments Brother          with sudden death following shoulder surgery 2012 -- was off his Plavix for 10 days pre-operatively.     Ankylosing Spondylitis Daughter         Ankylosing spondylitis,-- Dx      Current Outpatient Medications   Medication Sig Dispense Refill     aspirin (GOODSENSE ASPIRIN) 325 MG tablet Take 325 mg by mouth daily Take  by mouth.       blood glucose monitoring (ONETOUCH ULTRA) test strip Dispense test strips covered by the patient insurance. Test 10 times per day.       Blood Glucose Monitoring Suppl (GLUCOCOM BLOOD GLUCOSE MONITOR) WAQAS Dispense glucose meter, test strips and lancets covered by the patient insurance. Test 10 times per day.       cephALEXin (KEFLEX) 500 MG capsule Take 1 capsule (500 mg) by mouth 3 times daily 30 capsule 0     clopidogrel (PLAVIX) 75 MG tablet Take 1 tablet (75 mg) by mouth daily 90 tablet 3     co-enzyme Q-10 100 MG CAPS capsule Take 100 mg by mouth daily        [employer] ALCOHOL SWABS PADS For home use.       desoximetasone (TOPICORT) 0.25 % external cream APPLY  CREAM EXTERNALLY TWICE DAILY 180 g 3     dextroamphetamine (DEXTROSTAT) 10 MG tablet Take 1 tablet (10 mg) by mouth 4 times daily 360 tablet 0     diclofenac (VOLTAREN) 75 MG EC tablet TAKE ONE TABLET BY MOUTH 4 TIMES DAILY 360 tablet 3     docusate sodium (COLACE) 100 MG capsule Take 1-2 capsules by mouth 2 times daily as needed for constipation prevention       Flaxseed, Linseed, (FLAXSEED OIL) 1000 MG CAPS Take 1 capsule by mouth daily        furosemide (LASIX) 40 MG tablet Take 2-4 tablets ( mg) by mouth daily Or as instructed for leg swelling 90 tablet 3      HUMALOG 100 UNIT/ML injection INJECT UNDER THE SKIN USING INSULIN PUMP. MAX DAILY DOSE  UNITS 180 mL 3     hydrALAZINE (APRESOLINE) 25 MG tablet Take 1-2 tablets (25-50 mg) by mouth 4 times daily - Take lowest effective dose for blood pressure management 360 tablet 11     HYDROcodone-acetaminophen (NORCO) 5-325 MG per tablet Take 1 tablet by mouth every 6 hours as needed for severe pain 60 tablet 0     Insulin Pen Needle (PEN NEEDLES) 29G X 12MM MISC For administering insulin at home.       irbesartan (AVAPRO) 150 MG tablet Take 1 tablet (150 mg) by mouth 2 times daily -- needs 2 smaller pills daily 180 tablet 3     IV Sets-Tubing (SOLUTION ADMINISTRATION SET) MISC As directed.       levothyroxine (SYNTHROID/LEVOTHROID) 125 MCG tablet TAKE 1 TABLET BY MOUTH ONCE DAILY IN THE MORNING 90 tablet 3     methylphenidate (RITALIN) 10 MG tablet Take 1 tablet (10 mg) by mouth 2 times daily Takes 2 tablets twice daily 360 tablet 0     metoprolol succinate (TOPROL-XL) 50 MG 24 hr tablet Take 1 tablet (50 mg) by mouth 2 times daily 180 tablet 3     nitroGLYcerin (NITROSTAT) 0.4 MG sublingual tablet Place 1 tablet under the tongue every 5 minutes as needed for chest pain       ranitidine (ZANTAC) 150 MG tablet Take 1 tablet (150 mg) by mouth 2 times daily 180 tablet 3     ranolazine (RANEXA) 500 MG 12 hr tablet Take 2 tablets (1,000 mg) by mouth 2 times daily - cancel other Rx - give 3 month supply 360 tablet 3     rosuvastatin (CRESTOR) 10 MG tablet TAKE 1 TABLET BY MOUTH TWICE DAILY 180 tablet 2     Saline GEL Spray 1 spray in nostril every hour as needed For Nasal Dryness       thin (NO BRAND SPECIFIED) lancets Test 10 times per day.       Famotidine; Gabapentin; Hydrocortisone; Atorvastatin; Celebrex [celecoxib]; Lisinopril; No clinical screening - see comments; Norvasc [amlodipine]; Pregabalin; Valdecoxib; and Insulin aspart      Review of Systems:  Review of Systems  See HPI  Objective:   /80 (BP Location:  Right arm, Patient Position: Sitting, Cuff Size: Adult Regular)   Pulse 70   Temp 99  F (37.2  C) (Tympanic)   Resp 16   Wt 90.9 kg (200 lb 6.4 oz)   SpO2 96%   BMI 29.59 kg/m    Physical Exam  Pleasant woman no acute distress.  Affect normal.  Alert and oriented x4.  Left lower extremity with 1+ edema.  Unna boot was removed and well-tolerated.  Venous ulcers are closed.  Leg is cleansed with saline wash.  Lotion is applied.  Two 4 inch Ace bandages applied from the base of the toes to below the knee with a 50-75% compression and 50% overlap and well tolerated by patient.  Patient is instructed on proper Ace bandage application.    Assessment:    ICD-10-CM    1. Venous stasis ulcer of left calf limited to breakdown of skin with varicose veins (H) I83.022     L97.221    2. Edema, unspecified type R60.9        Plan:   Patient has bilateral lower extremity edema with recurrent venous stasis ulcers.  These ulcers have now closed.  He needs to continue with compression therapy to control edema and prevent return of venous ulcers.  In the meantime he will apply the Ace bandages to the left lower extremity with a 50-75% compression and 50% overlap until he purchases new compression stockings.  I have recommended that he arches 20-30 mmHg compression stockings and these are to be worn on both legs.  Elevate legs above heart 1 hour 3 times daily and low-salt diet.  Follow-up as needed.    RHINA Potter   7/24/2019  4:01 PM

## 2019-07-25 ENCOUNTER — TELEPHONE (OUTPATIENT)
Dept: INTERNAL MEDICINE | Facility: OTHER | Age: 69
End: 2019-07-25

## 2019-07-25 NOTE — TELEPHONE ENCOUNTER
Patient called in regards to his appointment scheduled for tomorrow. He is unsure if he needs to keep this. Please call him back in regards to this. Thank You!

## 2019-07-25 NOTE — TELEPHONE ENCOUNTER
Called and spoke with patient. Verified patients full name and . Patient was wondering if he should keep his appt on . I stated that reason for appt clearly stated Follow-up per Edda. Patient notified that Carolin SULLIVAN is back in clinic tomorrow.     Please advice. Thank you!     Lindsey Lundy LPN on 2019 at 3:59 PM

## 2019-07-26 NOTE — TELEPHONE ENCOUNTER
His wound is all healed, he does not need a follow-up appointment.  He may cancel that appointment.

## 2019-07-29 ENCOUNTER — TELEPHONE (OUTPATIENT)
Dept: INTERNAL MEDICINE | Facility: OTHER | Age: 69
End: 2019-07-29

## 2019-07-29 NOTE — TELEPHONE ENCOUNTER
Spoke with patient and transferred to scheduling.  Patient made an appointment for 8-6-19 but would like to be seen tomorrow as he is concerned that blisters will pop. Chen Morris LPN .............7/29/2019  2:01 PM

## 2019-07-29 NOTE — TELEPHONE ENCOUNTER
Patient called to get an appointment today. No availability today in [facility] schedule. Patient is having flare Up with Cellulitis, blisters appearing that look like about to rupture below calf. Patient would like to be seen as soon as possible. Please call patient and advise.     Asia Galvan on 7/29/2019 at 1:57 PM

## 2019-07-29 NOTE — TELEPHONE ENCOUNTER
I can see him on Wednesday at 9:20 however if he believes he has a cellulitis then should see someone else sooner.

## 2019-07-29 NOTE — TELEPHONE ENCOUNTER
Transferred patients appointment. Patient is unsure he can make it that early but will try. Chen Morris LPN .............7/29/2019  3:37 PM

## 2019-07-31 ENCOUNTER — OFFICE VISIT (OUTPATIENT)
Dept: INTERNAL MEDICINE | Facility: OTHER | Age: 69
End: 2019-07-31
Attending: INTERNAL MEDICINE
Payer: COMMERCIAL

## 2019-07-31 VITALS
OXYGEN SATURATION: 97 % | SYSTOLIC BLOOD PRESSURE: 140 MMHG | TEMPERATURE: 97.2 F | BODY MASS INDEX: 30.33 KG/M2 | RESPIRATION RATE: 16 BRPM | DIASTOLIC BLOOD PRESSURE: 80 MMHG | WEIGHT: 205.4 LBS | HEART RATE: 67 BPM

## 2019-07-31 DIAGNOSIS — L97.221 VENOUS STASIS ULCER OF LEFT CALF LIMITED TO BREAKDOWN OF SKIN WITH VARICOSE VEINS (H): Primary | ICD-10-CM

## 2019-07-31 DIAGNOSIS — I83.022 VENOUS STASIS ULCER OF LEFT CALF LIMITED TO BREAKDOWN OF SKIN WITH VARICOSE VEINS (H): Primary | ICD-10-CM

## 2019-07-31 DIAGNOSIS — R60.9 EDEMA, UNSPECIFIED TYPE: ICD-10-CM

## 2019-07-31 PROCEDURE — G0463 HOSPITAL OUTPT CLINIC VISIT: HCPCS

## 2019-07-31 PROCEDURE — G0463 HOSPITAL OUTPT CLINIC VISIT: HCPCS | Mod: 25

## 2019-07-31 PROCEDURE — 99213 OFFICE O/P EST LOW 20 MIN: CPT | Performed by: NURSE PRACTITIONER

## 2019-07-31 ASSESSMENT — PAIN SCALES - GENERAL: PAINLEVEL: SEVERE PAIN (7)

## 2019-07-31 NOTE — PROGRESS NOTES
Subjective:  He is here today for recurrent venous stasis ulcer.  He was seen in wound clinic 1 week ago and was discharged after Unna boot healed venous ulcer.  He was placed into 4 inch Ace bandages.  He was supposed to change his daily but did not change them until Friday afternoon.  Once he removed them he noticed that he had a couple of new blisters.  He has been applying a gauze wrap and they seem to be improving.  He still has not picked up his 20-30 mm compression stockings which were recommended at the time of his discharge.  He denies fever, chills and purulent drainage.    Patient Active Problem List   Diagnosis     Acute bilateral low back pain with bilateral sciatica     Angina pectoris (H)     Edema     Blister of toe of right foot     Coronary atherosclerosis     Cervical radicular pain     Cervical stenosis of spinal canal     Chronic diastolic heart failure (H)     Chronic low back pain     CKD (chronic kidney disease) stage 3, GFR 30-59 ml/min (H)     Uncontrolled type 2 diabetes mellitus with stage 3 chronic kidney disease, with long-term current use of insulin (H)     Degenerative disc disease, cervical     Painful diabetic neuropathy (H)     Diastasis of muscle     Degeneration of cervical intervertebral disc     Psychosexual dysfunction with inhibited sexual excitement     Erectile dysfunction     Facet arthritis of cervical region     Esophageal reflux     Greater trochanteric bursitis of left hip     Essential hypertension     Hypothyroidism due to acquired atrophy of thyroid     Insulin pump in place     Intermittent constipation     Left arm numbness     Left atrial enlargement     Mixed hyperlipidemia     Myalgia     Myofacial muscle pain     Primary narcolepsy without cataplexy     Neuroforaminal stenosis of lumbar spine     Obesity     Old inferior wall myocardial infarction     Osteoarthritis of spine     Pain medication agreement - 8/9/2018     Peptic ulcer disease     Platelet  inhibition due to Plavix     Radicular low back pain     S/P CABG x 2     S/P coronary artery stent placement     Skin rash     Trigger point of extremity     Trigger point with back pain     Trigger point with neck pain     Chest pain     Atherosclerosis of native coronary artery of native heart with stable angina pectoris (H)     Other specified forms of chronic ischemic heart disease     Peripheral vascular disease (H)     Postsurgical percutaneous transluminal coronary angioplasty status     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     Gastritis without bleeding     No Comments Provided     Heart disease     Multiple coronary stents     Narcolepsy without cataplexy     No Comments Provided     Other injury of unspecified body region, initial encounter (CODE)     11/2006,Right calf hematoma     Rheumatic fever without heart involvement     Rheumatic fever as a child without valvular heart disease     Past Surgical History:   Procedure Laterality Date     ANGIOPLASTY      12/00, 04/04/04,Repeat angioplasty with lt internal mammary to the lt anterior descending and lt radial artery from aorta to the diagonal.     ANGIOPLASTY      10/01,Angioplasty with 2 vessel CABG the following week from the lt internal mammary to the lt anterior descending and right radial free graft     ANGIOPLASTY      04/2003     ARTHROSCOPY SHOULDER ROTATOR CUFF REPAIR      02/06/2006,Left rotator cuff repair and bone spur removal by Dr. Giraldo in [place]     COLONOSCOPY      1999     COLONOSCOPY  01/08/2016 01/08/2016,-- Dr. De La Cruz -- polypectomy     COLONOSCOPY  04/18/2019    hyperplastic, follow up 10 years, 4/18/29     OTHER SURGICAL HISTORY      09/03,[id number],IR  ANGIOGRAM FEMORAL/EXTREMITY ([place]),Unremarkable angiogram at [facility]     OTHER SURGICAL HISTORY      10/05/2004,,PTCA,Angioplasty     OTHER SURGICAL HISTORY      02/2005,,PTCA,Angioplasty with stent.     OTHER SURGICAL HISTORY      03/02/2005,,PTCA,Angioplasty with stent.     OTHER SURGICAL HISTORY      09/23/2005,,PTCA,Angioplasty without stent     OTHER SURGICAL HISTORY      2/26/2007,[id number],IR ANGIOGRAM FEMORAL/EXTREMITY ([place]),Unremarkable coronary angiogram at [facility].     OTHER SURGICAL HISTORY      1967,SUR38,APPENDECTOMY OPEN     RELEASE CARPAL TUNNEL      11/15/01,Right carpal tunnel release by Dr. Mace     RELEASE CARPAL TUNNEL      01/2004,Left carpal tunnel release     Social History     Socioeconomic History     Marital status:      Spouse name: Not on file     Number of children: Not on file     Years of education: Not on file     Highest education level: Not on file   Occupational History     Not on file   Social Needs     Financial resource strain: Not on file     Food insecurity:     Worry: Not on file     Inability: Not on file     Transportation needs:     Medical: Not on file     Non-medical: Not on file   Tobacco Use     Smoking status: Never Smoker     Smokeless tobacco: Never Used   Substance and Sexual Activity     Alcohol use: No     Alcohol/week: 0.0 oz     Drug use: No     Sexual activity: Yes     Partners: Female   Lifestyle     Physical activity:     Days per week: Not on file     Minutes per session: Not on file     Stress: Not on file   Relationships     Social connections:     Talks on phone: Not on file     Gets together: Not on file     Attends Druze service: Not on file     Active member of club or organization: Not on file     Attends meetings of clubs or organizations: Not on file     Relationship status: Not on file     Intimate partner violence:     Fear of current or ex partner: Not on file     Emotionally abused: Not on file     Physically abused: Not on  file     Forced sexual activity: Not on file   Other Topics Concern     Parent/sibling w/ CABG, MI or angioplasty before 65F 55M? Not Asked   Social History Narrative    He is on Social Security Disability for coronary artery disease and cervical arthritis and disk disease.   Dax clay.  No tobacco.     Family History   Problem Relation Age of Onset     Heart Disease Mother         Heart Disease,Heart problems     Heart Disease Other         Heart Disease,Heart problems     Heart Disease Other         Heart Disease,Heart problems     Ankylosing Spondylitis Son         Ankylosing spondylitis,Ankylosing spondylitis     Other - See Comments Brother          with sudden death following shoulder surgery 2012 -- was off his Plavix for 10 days pre-operatively.     Ankylosing Spondylitis Daughter         Ankylosing spondylitis,-- Dx      Current Outpatient Medications   Medication Sig Dispense Refill     aspirin (GOODSENSE ASPIRIN) 325 MG tablet Take 325 mg by mouth daily Take  by mouth.       blood glucose monitoring (ONETOUCH ULTRA) test strip Dispense test strips covered by the patient insurance. Test 10 times per day.       Blood Glucose Monitoring Suppl (GLUCOCOM BLOOD GLUCOSE MONITOR) WAQAS Dispense glucose meter, test strips and lancets covered by the patient insurance. Test 10 times per day.       clopidogrel (PLAVIX) 75 MG tablet Take 1 tablet (75 mg) by mouth daily 90 tablet 3     co-enzyme Q-10 100 MG CAPS capsule Take 100 mg by mouth daily        [employer] ALCOHOL SWABS PADS For home use.       desoximetasone (TOPICORT) 0.25 % external cream APPLY  CREAM EXTERNALLY TWICE DAILY 180 g 3     dextroamphetamine (DEXTROSTAT) 10 MG tablet Take 1 tablet (10 mg) by mouth 4 times daily 360 tablet 0     diclofenac (VOLTAREN) 75 MG EC tablet TAKE ONE TABLET BY MOUTH 4 TIMES DAILY 360 tablet 3     docusate sodium (COLACE) 100 MG capsule Take 1-2 capsules by mouth 2 times daily as needed for constipation prevention        Flaxseed, Linseed, (FLAXSEED OIL) 1000 MG CAPS Take 1 capsule by mouth daily        furosemide (LASIX) 40 MG tablet Take 2-4 tablets ( mg) by mouth daily Or as instructed for leg swelling 90 tablet 3     HUMALOG 100 UNIT/ML injection INJECT UNDER THE SKIN USING INSULIN PUMP. MAX DAILY DOSE  UNITS 180 mL 3     hydrALAZINE (APRESOLINE) 25 MG tablet Take 1-2 tablets (25-50 mg) by mouth 4 times daily - Take lowest effective dose for blood pressure management 360 tablet 11     HYDROcodone-acetaminophen (NORCO) 5-325 MG per tablet Take 1 tablet by mouth every 6 hours as needed for severe pain 60 tablet 0     Insulin Pen Needle (PEN NEEDLES) 29G X 12MM MISC For administering insulin at home.       irbesartan (AVAPRO) 150 MG tablet Take 1 tablet (150 mg) by mouth 2 times daily -- needs 2 smaller pills daily 180 tablet 3     IV Sets-Tubing (SOLUTION ADMINISTRATION SET) MISC As directed.       levothyroxine (SYNTHROID/LEVOTHROID) 125 MCG tablet TAKE 1 TABLET BY MOUTH ONCE DAILY IN THE MORNING 90 tablet 3     methylphenidate (RITALIN) 10 MG tablet Take 1 tablet (10 mg) by mouth 2 times daily Takes 2 tablets twice daily 360 tablet 0     metoprolol succinate (TOPROL-XL) 50 MG 24 hr tablet Take 1 tablet (50 mg) by mouth 2 times daily 180 tablet 3     nitroGLYcerin (NITROSTAT) 0.4 MG sublingual tablet Place 1 tablet under the tongue every 5 minutes as needed for chest pain       ranitidine (ZANTAC) 150 MG tablet Take 1 tablet (150 mg) by mouth 2 times daily 180 tablet 3     ranolazine (RANEXA) 500 MG 12 hr tablet Take 2 tablets (1,000 mg) by mouth 2 times daily - cancel other Rx - give 3 month supply 360 tablet 3     rosuvastatin (CRESTOR) 10 MG tablet TAKE 1 TABLET BY MOUTH TWICE DAILY 180 tablet 2     Saline GEL Spray 1 spray in nostril every hour as needed For Nasal Dryness       thin (NO BRAND SPECIFIED) lancets Test 10 times per day.       Famotidine; Gabapentin; Hydrocortisone; Atorvastatin; Celebrex  [celecoxib]; Lisinopril; No clinical screening - see comments; Norvasc [amlodipine]; Pregabalin; Valdecoxib; and Insulin aspart      Review of Systems:  Review of Systems  See HPI  Objective:   BP (!) 140/80 (BP Location: Right arm, Patient Position: Sitting, Cuff Size: Adult Regular)   Pulse 67   Temp 97.2  F (36.2  C) (Tympanic)   Resp 16   Wt 93.2 kg (205 lb 6.4 oz)   SpO2 97%   BMI 30.33 kg/m    Physical Exam  Pleasant gentleman no acute distress.  Affect normal.  Alert and oriented x4.  Left lower extremity with 1+ edema and hemosiderin changes on his skin.  He has 2 venous ulcers over the posterior and lateral calf.  The largest measures 2 x 3.5 cm and the other 0.2 x 0.5 cm.  These are both superficial with a small amount of serosanguineous nonodorous drainage.  Left foot DP PT intact.  Capillary refill less than 3 seconds.  Wounds are cleansed with saline wash, Telfa applied and secured with Kerlix.  Two 4 inch Ace bandages are applied to the left lower extremity with a 50-75% compression and 50% overlap.  Patient once again instructed on proper wound care and edema management with Ace bandages.    Assessment:    ICD-10-CM    1. Venous stasis ulcer of left calf limited to breakdown of skin with varicose veins (H) I83.022     L97.221    2. Edema, unspecified type R60.9        Plan:   He opted to not have a Unna boot this week.  He will do dressing change daily by cleansing wound with soapy water rinsing clear then patting dry then applying the Telfa and Kerlix.  He will apply the two 4 inch Ace bandages and these need to be changed daily as they will loosen.  Recommend that he purchase 20-30 mmHg compression stockings.  I offered to get these ordered for him however he declined.  Recommend L. Labadie leg above heart 1 hour 3 times daily and more as needed.  Follow-up in wound clinic in 1 week, sooner with concerns.    RHINA Potter   7/31/2019  9:58 AM

## 2019-07-31 NOTE — NURSING NOTE
Chief Complaint   Patient presents with     WOUND CARE       Medication Reconciliation: complete  Chen [place] LPN  ..................7/31/2019   9:32 AM   Previous A1C is not at goal of <8  Lab Results   Component Value Date    A1C 8.1 06/26/2019    A1C 7.7 03/08/2019    A1C 7.5 11/21/2018    A1C 8.0 08/09/2018    A1C 7.8 05/03/2018     Urine microalbumin:creatine: unknown  Foot exam 7-9-19  Eye exam 2-2019    Tobacco User no  Patient is on a daily aspirin  Patient is on a Statin.  Blood pressure today of:     BP Readings from Last 1 Encounters:   07/31/19 (!) 140/80      is not at the goal of <139/89 for diabetics.    Chen [place] LPN on 7/31/2019 at 9:35 AM

## 2019-08-07 ENCOUNTER — OFFICE VISIT (OUTPATIENT)
Dept: INTERNAL MEDICINE | Facility: OTHER | Age: 69
End: 2019-08-07
Attending: NURSE PRACTITIONER
Payer: COMMERCIAL

## 2019-08-07 ENCOUNTER — TELEPHONE (OUTPATIENT)
Dept: INTERNAL MEDICINE | Facility: OTHER | Age: 69
End: 2019-08-07

## 2019-08-07 VITALS
RESPIRATION RATE: 16 BRPM | WEIGHT: 203.4 LBS | OXYGEN SATURATION: 97 % | BODY MASS INDEX: 30.04 KG/M2 | TEMPERATURE: 97.5 F | SYSTOLIC BLOOD PRESSURE: 132 MMHG | HEART RATE: 77 BPM | DIASTOLIC BLOOD PRESSURE: 80 MMHG

## 2019-08-07 DIAGNOSIS — L97.221 VENOUS STASIS ULCER OF LEFT CALF LIMITED TO BREAKDOWN OF SKIN WITH VARICOSE VEINS (H): Primary | ICD-10-CM

## 2019-08-07 DIAGNOSIS — R21 RASH AND NONSPECIFIC SKIN ERUPTION: Primary | ICD-10-CM

## 2019-08-07 DIAGNOSIS — R60.9 EDEMA, UNSPECIFIED TYPE: ICD-10-CM

## 2019-08-07 DIAGNOSIS — I83.022 VENOUS STASIS ULCER OF LEFT CALF LIMITED TO BREAKDOWN OF SKIN WITH VARICOSE VEINS (H): Primary | ICD-10-CM

## 2019-08-07 PROCEDURE — G0463 HOSPITAL OUTPT CLINIC VISIT: HCPCS

## 2019-08-07 PROCEDURE — 99213 OFFICE O/P EST LOW 20 MIN: CPT | Performed by: NURSE PRACTITIONER

## 2019-08-07 PROCEDURE — G0463 HOSPITAL OUTPT CLINIC VISIT: HCPCS | Mod: 25

## 2019-08-07 ASSESSMENT — PAIN SCALES - GENERAL: PAINLEVEL: MODERATE PAIN (4)

## 2019-08-07 NOTE — PROGRESS NOTES
Subjective:  He is here today for follow-up on venous ulcer and edema.  He has been doing the dressing change daily and also wearing the Ace bandages.  He is been trying to elevate his legs above his heart.  He is not sure if wound is getting better.  He is concerned that it has not changed.  He did purchase some compression stockings through a company in [place] and his daughter is down there visiting at this time and is planning to bring them back to him.  He hopefully will have them here within the next several days.  He denies fever, chills and purulent drainage.    Patient Active Problem List   Diagnosis     Acute bilateral low back pain with bilateral sciatica     Angina pectoris (H)     Edema     Blister of toe of right foot     Coronary atherosclerosis     Cervical radicular pain     Cervical stenosis of spinal canal     Chronic diastolic heart failure (H)     Chronic low back pain     CKD (chronic kidney disease) stage 3, GFR 30-59 ml/min (H)     Uncontrolled type 2 diabetes mellitus with stage 3 chronic kidney disease, with long-term current use of insulin (H)     Degenerative disc disease, cervical     Painful diabetic neuropathy (H)     Diastasis of muscle     Degeneration of cervical intervertebral disc     Psychosexual dysfunction with inhibited sexual excitement     Erectile dysfunction     Facet arthritis of cervical region     Esophageal reflux     Greater trochanteric bursitis of left hip     Essential hypertension     Hypothyroidism due to acquired atrophy of thyroid     Insulin pump in place     Intermittent constipation     Left arm numbness     Left atrial enlargement     Mixed hyperlipidemia     Myalgia     Myofacial muscle pain     Primary narcolepsy without cataplexy     Neuroforaminal stenosis of lumbar spine     Obesity     Old inferior wall myocardial infarction     Osteoarthritis of spine     Pain medication agreement - 8/9/2018     Peptic ulcer disease     Platelet inhibition due to  Plavix     Radicular low back pain     S/P CABG x 2     S/P coronary artery stent placement     Skin rash     Trigger point of extremity     Trigger point with back pain     Trigger point with neck pain     Chest pain     Atherosclerosis of native coronary artery of native heart with stable angina pectoris (H)     Other specified forms of chronic ischemic heart disease     Peripheral vascular disease (H)     Postsurgical percutaneous transluminal coronary angioplasty status     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     Gastritis without bleeding     No Comments Provided     Heart disease     Multiple coronary stents     Narcolepsy without cataplexy     No Comments Provided     Other injury of unspecified body region, initial encounter (CODE)     11/2006,Right calf hematoma     Rheumatic fever without heart involvement     Rheumatic fever as a child without valvular heart disease     Past Surgical History:   Procedure Laterality Date     ANGIOPLASTY      12/00, 04/04/04,Repeat angioplasty with lt internal mammary to the lt anterior descending and lt radial artery from aorta to the diagonal.     ANGIOPLASTY      10/01,Angioplasty with 2 vessel CABG the following week from the lt internal mammary to the lt anterior descending and right radial free graft     ANGIOPLASTY      04/2003     ARTHROSCOPY SHOULDER ROTATOR CUFF REPAIR      02/06/2006,Left rotator cuff repair and bone spur removal by Dr. Giraldo in [place]     COLONOSCOPY      1999     COLONOSCOPY  01/08/2016 01/08/2016,-- Dr. De La Cruz -- polypectomy     COLONOSCOPY  04/18/2019    hyperplastic, follow up 10 years, 4/18/29     OTHER SURGICAL HISTORY      09/03,[id number],IR ANGIOGRAM  FEMORAL/EXTREMITY ([place]),Unremarkable angiogram at [facility]     OTHER SURGICAL HISTORY      10/05/2004,,PTCA,Angioplasty     OTHER SURGICAL HISTORY      02/2005,,PTCA,Angioplasty with stent.     OTHER SURGICAL HISTORY      03/02/2005,,PTCA,Angioplasty with stent.     OTHER SURGICAL HISTORY      09/23/2005,,PTCA,Angioplasty without stent     OTHER SURGICAL HISTORY      2/26/2007,[id number],IR ANGIOGRAM FEMORAL/EXTREMITY ([place]),Unremarkable coronary angiogram at [facility].     OTHER SURGICAL HISTORY      1967,SUR38,APPENDECTOMY OPEN     RELEASE CARPAL TUNNEL      11/15/01,Right carpal tunnel release by Dr. Mace     RELEASE CARPAL TUNNEL      01/2004,Left carpal tunnel release     Social History     Socioeconomic History     Marital status:      Spouse name: Not on file     Number of children: Not on file     Years of education: Not on file     Highest education level: Not on file   Occupational History     Not on file   Social Needs     Financial resource strain: Not on file     Food insecurity:     Worry: Not on file     Inability: Not on file     Transportation needs:     Medical: Not on file     Non-medical: Not on file   Tobacco Use     Smoking status: Never Smoker     Smokeless tobacco: Never Used   Substance and Sexual Activity     Alcohol use: No     Alcohol/week: 0.0 oz     Drug use: No     Sexual activity: Yes     Partners: Female   Lifestyle     Physical activity:     Days per week: Not on file     Minutes per session: Not on file     Stress: Not on file   Relationships     Social connections:     Talks on phone: Not on file     Gets together: Not on file     Attends [employer] service: Not on file     Active member of club or organization: Not on file     Attends meetings of clubs or organizations: Not on file     Relationship status: Not on file     Intimate partner violence:     Fear of current or ex partner: Not on file     Emotionally abused: Not on file     Physically abused: Not on file      Forced sexual activity: Not on file   Other Topics Concern     Parent/sibling w/ CABG, MI or angioplasty before 65F 55M? Not Asked   Social History Narrative    He is on Social Security Disability for coronary artery disease and cervical arthritis and disk disease.   Dax clay.  No tobacco.     Family History   Problem Relation Age of Onset     Heart Disease Mother         Heart Disease,Heart problems     Heart Disease Other         Heart Disease,Heart problems     Heart Disease Other         Heart Disease,Heart problems     Ankylosing Spondylitis Son         Ankylosing spondylitis,Ankylosing spondylitis     Other - See Comments Brother          with sudden death following shoulder surgery 2012 -- was off his Plavix for 10 days pre-operatively.     Ankylosing Spondylitis Daughter         Ankylosing spondylitis,-- Dx      Current Outpatient Medications   Medication Sig Dispense Refill     aspirin (GOODSENSE ASPIRIN) 325 MG tablet Take 325 mg by mouth daily Take  by mouth.       blood glucose monitoring (ONETOUCH ULTRA) test strip Dispense test strips covered by the patient insurance. Test 10 times per day.       Blood Glucose Monitoring Suppl (GLUCOCOM BLOOD GLUCOSE MONITOR) WAQAS Dispense glucose meter, test strips and lancets covered by the patient insurance. Test 10 times per day.       clopidogrel (PLAVIX) 75 MG tablet Take 1 tablet (75 mg) by mouth daily 90 tablet 3     co-enzyme Q-10 100 MG CAPS capsule Take 100 mg by mouth daily        CVS ALCOHOL SWABS PADS For home use.       desoximetasone (TOPICORT) 0.25 % external cream APPLY  CREAM EXTERNALLY TWICE DAILY 180 g 3     dextroamphetamine (DEXTROSTAT) 10 MG tablet Take 1 tablet (10 mg) by mouth 4 times daily 360 tablet 0     diclofenac (VOLTAREN) 75 MG EC tablet TAKE ONE TABLET BY MOUTH 4 TIMES DAILY 360 tablet 3     docusate sodium (COLACE) 100 MG capsule Take 1-2 capsules by mouth 2 times daily as needed for constipation prevention        Flaxseed, Linseed, (FLAXSEED OIL) 1000 MG CAPS Take 1 capsule by mouth daily        furosemide (LASIX) 40 MG tablet Take 2-4 tablets ( mg) by mouth daily Or as instructed for leg swelling 90 tablet 3     HUMALOG 100 UNIT/ML injection INJECT UNDER THE SKIN USING INSULIN PUMP. MAX DAILY DOSE  UNITS 180 mL 3     hydrALAZINE (APRESOLINE) 25 MG tablet Take 1-2 tablets (25-50 mg) by mouth 4 times daily - Take lowest effective dose for blood pressure management 360 tablet 11     HYDROcodone-acetaminophen (NORCO) 5-325 MG per tablet Take 1 tablet by mouth every 6 hours as needed for severe pain 60 tablet 0     Insulin Pen Needle (PEN NEEDLES) 29G X 12MM MISC For administering insulin at home.       irbesartan (AVAPRO) 150 MG tablet Take 1 tablet (150 mg) by mouth 2 times daily -- needs 2 smaller pills daily 180 tablet 3     IV Sets-Tubing (SOLUTION ADMINISTRATION SET) MISC As directed.       levothyroxine (SYNTHROID/LEVOTHROID) 125 MCG tablet TAKE 1 TABLET BY MOUTH ONCE DAILY IN THE MORNING 90 tablet 3     methylphenidate (RITALIN) 10 MG tablet Take 1 tablet (10 mg) by mouth 2 times daily Takes 2 tablets twice daily 360 tablet 0     metoprolol succinate (TOPROL-XL) 50 MG 24 hr tablet Take 1 tablet (50 mg) by mouth 2 times daily 180 tablet 3     nitroGLYcerin (NITROSTAT) 0.4 MG sublingual tablet Place 1 tablet under the tongue every 5 minutes as needed for chest pain       ranitidine (ZANTAC) 150 MG tablet Take 1 tablet (150 mg) by mouth 2 times daily 180 tablet 3     ranolazine (RANEXA) 500 MG 12 hr tablet Take 2 tablets (1,000 mg) by mouth 2 times daily - cancel other Rx - give 3 month supply 360 tablet 3     rosuvastatin (CRESTOR) 10 MG tablet TAKE 1 TABLET BY MOUTH TWICE DAILY 180 tablet 2     Saline GEL Spray 1 spray in nostril every hour as needed For Nasal Dryness       thin (NO BRAND SPECIFIED) lancets Test 10 times per day.       Famotidine; Gabapentin; Hydrocortisone; Atorvastatin; Celebrex [celecoxib];  Lisinopril; No clinical screening - see comments; Norvasc [amlodipine]; Pregabalin; Valdecoxib; and Insulin aspart      Review of Systems:  Review of Systems  See HPI  Objective:   /80 (BP Location: Right arm, Patient Position: Sitting, Cuff Size: Adult Regular)   Pulse 77   Temp 97.5  F (36.4  C) (Tympanic)   Resp 16   Wt 92.3 kg (203 lb 6.4 oz)   SpO2 97%   BMI 30.04 kg/m    Physical Exam  Pleasant gentleman no acute distress.  Affect normal.  Alert and oriented x4.  Left lower extremity with 1+ edema and hemosiderin changes on his skin.  He has a venous ulcer on the left lateral calf remaining that measures one by one.4 cm with granulation tissue and superficial.  Scant serosanguineous drainage.  Left foot DP PT intact.  Capillary refill less than 3 seconds.  Wounds are cleansed with saline wash, Telfa applied and secured with Kerlix.  Two 4 inch Ace bandages are applied to the left lower extremity with a 50-75% compression and 50% overlap.  Patient once again instructed on proper wound care and edema management with Ace bandages.    Assessment:    ICD-10-CM    1. Venous stasis ulcer of left calf limited to breakdown of skin with varicose veins (H) I83.022     L97.221    2. Edema, unspecified type R60.9        Plan:   Wounds are healing nicely.  Recommend he continue with daily dressing change and Ace bandage for edema management.  Once these ulcers have healed then we will have him switch over to a 20-30 mmHg compression stocking.  Hopefully he will have this by next week when he comes to the clinic.  Follow up sooner if needed.    RHINA Potter   8/7/2019  11:51 AM

## 2019-08-07 NOTE — TELEPHONE ENCOUNTER
"Patient needs a referral to Dermatology for his \"skin rash\" and these spots bleed at times.  Referral pending.      Barbara Connor LPN 8/7/2019 4:23 PM      "

## 2019-08-14 ENCOUNTER — OFFICE VISIT (OUTPATIENT)
Dept: INTERNAL MEDICINE | Facility: OTHER | Age: 69
End: 2019-08-14
Attending: NURSE PRACTITIONER
Payer: COMMERCIAL

## 2019-08-14 VITALS
HEART RATE: 70 BPM | TEMPERATURE: 97.8 F | DIASTOLIC BLOOD PRESSURE: 70 MMHG | SYSTOLIC BLOOD PRESSURE: 138 MMHG | BODY MASS INDEX: 30.07 KG/M2 | WEIGHT: 203.6 LBS | OXYGEN SATURATION: 98 % | RESPIRATION RATE: 16 BRPM

## 2019-08-14 DIAGNOSIS — I83.022 VENOUS STASIS ULCER OF LEFT CALF LIMITED TO BREAKDOWN OF SKIN WITH VARICOSE VEINS (H): Primary | ICD-10-CM

## 2019-08-14 DIAGNOSIS — R60.9 EDEMA, UNSPECIFIED TYPE: ICD-10-CM

## 2019-08-14 DIAGNOSIS — L97.221 VENOUS STASIS ULCER OF LEFT CALF LIMITED TO BREAKDOWN OF SKIN WITH VARICOSE VEINS (H): Primary | ICD-10-CM

## 2019-08-14 PROCEDURE — G0463 HOSPITAL OUTPT CLINIC VISIT: HCPCS | Mod: 25

## 2019-08-14 PROCEDURE — 99213 OFFICE O/P EST LOW 20 MIN: CPT | Performed by: NURSE PRACTITIONER

## 2019-08-14 PROCEDURE — G0463 HOSPITAL OUTPT CLINIC VISIT: HCPCS

## 2019-08-14 ASSESSMENT — PAIN SCALES - GENERAL: PAINLEVEL: MODERATE PAIN (4)

## 2019-08-14 NOTE — NURSING NOTE
Chief Complaint   Patient presents with     WOUND CARE       Medication Reconciliation: complete  Chen Morris LPN  ..................8/14/2019   10:39 AM   Previous A1C is not at goal of <8  Lab Results   Component Value Date    A1C 8.1 06/26/2019    A1C 7.7 03/08/2019    A1C 7.5 11/21/2018    A1C 8.0 08/09/2018    A1C 7.8 05/03/2018     Urine microalbumin:creatine: unknown  Foot exam 7-9-19  Eye exam 2-2019    Tobacco User no  Patient is on a daily aspirin  Patient is on a Statin.  Blood pressure today of:     BP Readings from Last 1 Encounters:   08/14/19 138/70      is at the goal of <139/89 for diabetics.    Chen Morris LPN on 8/14/2019 at 10:42 AM

## 2019-08-14 NOTE — PROGRESS NOTES
Subjective:  He is here today for follow-up on venous ulcer and edema.  He has been doing daily dressing change.  3 days ago he started wearing 20 mmHg compression stockings.  On that day he developed a new blister which he ruptured.  The previous venous ulcer had closed during the week.  He is wondering if he needs an antibiotic because of the new blister.  There has been no redness or warmth around the wound and no purulent drainage.  He does have a new pair compression stockings that are 30 mmHg compression but he has not worn those as of yet.  He denies fever and chills.    Patient Active Problem List   Diagnosis     Acute bilateral low back pain with bilateral sciatica     Angina pectoris (H)     Edema     Blister of toe of right foot     Coronary atherosclerosis     Cervical radicular pain     Cervical stenosis of spinal canal     Chronic diastolic heart failure (H)     Chronic low back pain     CKD (chronic kidney disease) stage 3, GFR 30-59 ml/min (H)     Uncontrolled type 2 diabetes mellitus with stage 3 chronic kidney disease, with long-term current use of insulin (H)     Degenerative disc disease, cervical     Painful diabetic neuropathy (H)     Diastasis of muscle     Degeneration of cervical intervertebral disc     Psychosexual dysfunction with inhibited sexual excitement     Erectile dysfunction     Facet arthritis of cervical region     Esophageal reflux     Greater trochanteric bursitis of left hip     Essential hypertension     Hypothyroidism due to acquired atrophy of thyroid     Insulin pump in place     Intermittent constipation     Left arm numbness     Left atrial enlargement     Mixed hyperlipidemia     Myalgia     Myofacial muscle pain     Primary narcolepsy without cataplexy     Neuroforaminal stenosis of lumbar spine     Obesity     Old inferior wall myocardial infarction     Osteoarthritis of spine     Pain medication agreement - 8/9/2018     Peptic ulcer disease     Platelet inhibition  due to Plavix     Radicular low back pain     S/P CABG x 2     S/P coronary artery stent placement     Skin rash     Trigger point of extremity     Trigger point with back pain     Trigger point with neck pain     Chest pain     Atherosclerosis of native coronary artery of native heart with stable angina pectoris (H)     Other specified forms of chronic ischemic heart disease     Peripheral vascular disease (H)     Postsurgical percutaneous transluminal coronary angioplasty status     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     Gastritis without bleeding     No Comments Provided     Heart disease     Multiple coronary stents     Narcolepsy without cataplexy     No Comments Provided     Other injury of unspecified body region, initial encounter (CODE)     11/2006,Right calf hematoma     Rheumatic fever without heart involvement     Rheumatic fever as a child without valvular heart disease     Past Surgical History:   Procedure Laterality Date     ANGIOPLASTY      12/00, 04/04/04,Repeat angioplasty with lt internal mammary to the lt anterior descending and lt radial artery from aorta to the diagonal.     ANGIOPLASTY      10/01,Angioplasty with 2 vessel CABG the following week from the lt internal mammary to the lt anterior descending and right radial free graft     ANGIOPLASTY      04/2003     ARTHROSCOPY SHOULDER ROTATOR CUFF REPAIR      02/06/2006,Left rotator cuff repair and bone spur removal by Dr. Giraldo in [place]     COLONOSCOPY      1999     COLONOSCOPY  01/08/2016 01/08/2016,-- Dr. De La Cruz -- polypectomy     COLONOSCOPY  04/18/2019    hyperplastic, follow up 10 years, 4/18/29     OTHER SURGICAL HISTORY      09/03,[id number],IR ANGIOGRAM  FEMORAL/EXTREMITY ([place]),Unremarkable angiogram at [facility]     OTHER SURGICAL HISTORY      10/05/2004,,PTCA,Angioplasty     OTHER SURGICAL HISTORY      02/2005,,PTCA,Angioplasty with stent.     OTHER SURGICAL HISTORY      03/02/2005,,PTCA,Angioplasty with stent.     OTHER SURGICAL HISTORY      09/23/2005,,PTCA,Angioplasty without stent     OTHER SURGICAL HISTORY      2/26/2007,[id number],IR ANGIOGRAM FEMORAL/EXTREMITY ([place]),Unremarkable coronary angiogram at [facility].     OTHER SURGICAL HISTORY      1967,SUR38,APPENDECTOMY OPEN     RELEASE CARPAL TUNNEL      11/15/01,Right carpal tunnel release by Dr. Mace     RELEASE CARPAL TUNNEL      01/2004,Left carpal tunnel release     Social History     Socioeconomic History     Marital status:      Spouse name: Not on file     Number of children: Not on file     Years of education: Not on file     Highest education level: Not on file   Occupational History     Not on file   Social Needs     Financial resource strain: Not on file     Food insecurity:     Worry: Not on file     Inability: Not on file     Transportation needs:     Medical: Not on file     Non-medical: Not on file   Tobacco Use     Smoking status: Never Smoker     Smokeless tobacco: Never Used   Substance and Sexual Activity     Alcohol use: No     Alcohol/week: 0.0 oz     Drug use: No     Sexual activity: Yes     Partners: Female   Lifestyle     Physical activity:     Days per week: Not on file     Minutes per session: Not on file     Stress: Not on file   Relationships     Social connections:     Talks on phone: Not on file     Gets together: Not on file     Attends Yazidism service: Not on file     Active member of club or organization: Not on file     Attends meetings of clubs or organizations: Not on file     Relationship status: Not on file     Intimate partner violence:     Fear of current or ex partner: Not on file     Emotionally abused: Not on file     Physically abused: Not on file      Forced sexual activity: Not on file   Other Topics Concern     Parent/sibling w/ CABG, MI or angioplasty before 65F 55M? Not Asked   Social History Narrative    He is on Social Security Disability for coronary artery disease and cervical arthritis and disk disease.   Dax clay.  No tobacco.     Family History   Problem Relation Age of Onset     Heart Disease Mother         Heart Disease,Heart problems     Heart Disease Other         Heart Disease,Heart problems     Heart Disease Other         Heart Disease,Heart problems     Ankylosing Spondylitis Son         Ankylosing spondylitis,Ankylosing spondylitis     Other - See Comments Brother          with sudden death following shoulder surgery 2012 -- was off his Plavix for 10 days pre-operatively.     Ankylosing Spondylitis Daughter         Ankylosing spondylitis,-- Dx      Current Outpatient Medications   Medication Sig Dispense Refill     aspirin (GOODSENSE ASPIRIN) 325 MG tablet Take 325 mg by mouth daily Take  by mouth.       blood glucose monitoring (ONETOUCH ULTRA) test strip Dispense test strips covered by the patient insurance. Test 10 times per day.       Blood Glucose Monitoring Suppl (GLUCOCOM BLOOD GLUCOSE MONITOR) WAQAS Dispense glucose meter, test strips and lancets covered by the patient insurance. Test 10 times per day.       clopidogrel (PLAVIX) 75 MG tablet Take 1 tablet (75 mg) by mouth daily 90 tablet 3     co-enzyme Q-10 100 MG CAPS capsule Take 100 mg by mouth daily        [employer] ALCOHOL SWABS PADS For home use.       desoximetasone (TOPICORT) 0.25 % external cream APPLY  CREAM EXTERNALLY TWICE DAILY 180 g 3     dextroamphetamine (DEXTROSTAT) 10 MG tablet Take 1 tablet (10 mg) by mouth 4 times daily 360 tablet 0     diclofenac (VOLTAREN) 75 MG EC tablet TAKE ONE TABLET BY MOUTH 4 TIMES DAILY 360 tablet 3     docusate sodium (COLACE) 100 MG capsule Take 1-2 capsules by mouth 2 times daily as needed for constipation prevention        Flaxseed, Linseed, (FLAXSEED OIL) 1000 MG CAPS Take 1 capsule by mouth daily        furosemide (LASIX) 40 MG tablet Take 2-4 tablets ( mg) by mouth daily Or as instructed for leg swelling 90 tablet 3     HUMALOG 100 UNIT/ML injection INJECT UNDER THE SKIN USING INSULIN PUMP. MAX DAILY DOSE  UNITS 180 mL 3     hydrALAZINE (APRESOLINE) 25 MG tablet Take 1-2 tablets (25-50 mg) by mouth 4 times daily - Take lowest effective dose for blood pressure management 360 tablet 11     HYDROcodone-acetaminophen (NORCO) 5-325 MG per tablet Take 1 tablet by mouth every 6 hours as needed for severe pain 60 tablet 0     Insulin Pen Needle (PEN NEEDLES) 29G X 12MM MISC For administering insulin at home.       irbesartan (AVAPRO) 150 MG tablet Take 1 tablet (150 mg) by mouth 2 times daily -- needs 2 smaller pills daily 180 tablet 3     IV Sets-Tubing (SOLUTION ADMINISTRATION SET) MISC As directed.       levothyroxine (SYNTHROID/LEVOTHROID) 125 MCG tablet TAKE 1 TABLET BY MOUTH ONCE DAILY IN THE MORNING 90 tablet 3     methylphenidate (RITALIN) 10 MG tablet Take 1 tablet (10 mg) by mouth 2 times daily Takes 2 tablets twice daily 360 tablet 0     metoprolol succinate (TOPROL-XL) 50 MG 24 hr tablet Take 1 tablet (50 mg) by mouth 2 times daily 180 tablet 3     nitroGLYcerin (NITROSTAT) 0.4 MG sublingual tablet Place 1 tablet under the tongue every 5 minutes as needed for chest pain       ranitidine (ZANTAC) 150 MG tablet Take 1 tablet (150 mg) by mouth 2 times daily 180 tablet 3     ranolazine (RANEXA) 500 MG 12 hr tablet Take 2 tablets (1,000 mg) by mouth 2 times daily - cancel other Rx - give 3 month supply 360 tablet 3     rosuvastatin (CRESTOR) 10 MG tablet TAKE 1 TABLET BY MOUTH TWICE DAILY 180 tablet 2     Saline GEL Spray 1 spray in nostril every hour as needed For Nasal Dryness       thin (NO BRAND SPECIFIED) lancets Test 10 times per day.       Famotidine; Gabapentin; Hydrocortisone; Atorvastatin; Celebrex [celecoxib];  Lisinopril; No clinical screening - see comments; Norvasc [amlodipine]; Pregabalin; Valdecoxib; and Insulin aspart      Review of Systems:  Review of Systems  See HPI  Objective:   /70 (BP Location: Right arm, Patient Position: Sitting, Cuff Size: Adult Regular)   Pulse 70   Temp 97.8  F (36.6  C) (Tympanic)   Resp 16   Wt 92.4 kg (203 lb 9.6 oz)   SpO2 98%   BMI 30.07 kg/m    Physical Exam  Pleasant gentleman no acute distress.  Affect normal.  Alert and oriented x4.  Left lower extremity with 1-2+ pitting edema and hemosiderin changes on his skin.  He has a new venous ulcer on the left posterior calf remaining that measures 3 x 2.5 cm and is very superficial with pink wound bed.  No surrounding erythema, warmth or maceration.  No purulent or odorous drainage.  Previous venous ulcer from last week has closed.  Scant serosanguineous drainage from new venous ulcer.  Left foot DP PT intact.  Capillary refill less than 3 seconds.  Wounds are cleansed with saline wash, Telfa applied and secured with Kerlix.  Patient more the 30 mmHg compression stocking which he brought in today on the left lower leg.  Assessment:    ICD-10-CM    1. Venous stasis ulcer of left calf limited to breakdown of skin with varicose veins (H) I83.022     L97.221    2. Edema, unspecified type R60.9        Plan:   Continue with daily dressing change.  He has been healing up quite quickly however explained to patient that if he is not wearing adequate compression or that he will develop more swelling and blisters on his leg.  He had edema today with a 20 mmHg compression stocking.  I have asked him not to wear the stocking but instead wear the 30 mmHg compression stocking, elevate legs above heart at least 1 hour 3 times daily and elevate legs at night.  He states he is planning to wear his compression stocking at night as well.  Follow-up in wound clinic in 10 days, sooner with concerns.  Explained to patient that he does not need  antibiotics as there is no infection associated and that over prescribing of antibiotic can lead to drug resistance and a severe infection such as C. difficile.  He expressed understanding.

## 2019-08-28 ENCOUNTER — TELEPHONE (OUTPATIENT)
Dept: INTERNAL MEDICINE | Facility: OTHER | Age: 69
End: 2019-08-28

## 2019-08-28 NOTE — TELEPHONE ENCOUNTER
After proper verification, patient was informed there were no open appointments for provider but he could choose to go to [facility] or I could route him to our  for the next available provider, patient stated he was not doing that he wants the providers nurse to call him for a work in.   Ade Bryan LPN on 8/28/2019 at 1:48 PM

## 2019-08-28 NOTE — TELEPHONE ENCOUNTER
Patient would like a work in this week for cellulitis.     Elvira Beckham on 8/28/2019 at 1:21 PM

## 2019-08-28 NOTE — PROGRESS NOTES
"Nursing Notes:   Barbara Connor LPN  8/29/2019  4:33 PM  Signed  Patient presents to the clinic for follow up with left leg wound.    Previous A1C is not at goal of <8  Lab Results   Component Value Date    A1C 8.1 06/26/2019    A1C 7.7 03/08/2019    A1C 7.5 11/21/2018    A1C 8.0 08/09/2018    A1C 7.8 05/03/2018     Urine microalbumin:creatine: n/a  Foot exam unknown-declines today  Eye exam 02/2019    Tobacco User no  Patient is on a daily aspirin  Patient is on a Statin.  Blood pressure today of:     BP Readings from Last 1 Encounters:   08/14/19 138/70      is at the goal of <139/89 for diabetics.    Chief Complaint   Patient presents with     Clinic Care Coordination - Follow-up       Initial BP (!) 150/86 (BP Location: Right arm, Patient Position: Sitting, Cuff Size: Adult Regular)   Pulse 72   Temp 98.2  F (36.8  C) (Tympanic)   Resp 20   Wt 91.2 kg (201 lb)   BMI 29.68 kg/m    Estimated body mass index is 29.68 kg/m  as calculated from the following:    Height as of 7/17/19: 1.753 m (5' 9\").    Weight as of this encounter: 91.2 kg (201 lb).  Medication Reconciliation: complete    Barbara Connor LPN    Nursing note reviewed with patient.  Accuracy and completeness verified.   Mr. Whittaker is a 69 year old male who:  Patient presents with:  Clinic Care Coordination - Follow-up      ICD-10-CM    1. Venous stasis ulcer of left calf limited to breakdown of skin with varicose veins (H) I83.022     L97.221    2. Bilateral lower extremity edema R60.0    3. Uncontrolled type 2 diabetes mellitus with stage 3 chronic kidney disease, with long-term current use of insulin (H) E11.22     E11.65     N18.3     Z79.4      HPI  Has been dealing with venous ulceration for the past 10 weeks. Had unna boots for 3 weeks, and now dressings. Weeping through his dressings, recommend that he get unna boots replaced again. Appointment made for tomorrow with Dr. Akbar.     Has bilateral leg swelling.  Has had this for quite some " time.  He reports that he does throw his legs up on occasion.  Not really watching his salt intake that much at times.  He is currently on Lasix 2 to 4 tablets daily,  mg.  He adjusts the diuretic dose as needed.    Uncontrolled type 2 diabetes.  Uses insulin, insulin pump.  Encouraged to continue to reduce his carbohydrate intake.  States that his blood sugars for the last 6 weeks have been running quite well.    Patient utilizes a continuous subcutaneous insulin infusion (CSII) pump.   Patient uses pump advice for frequent adjustments of Humalog insulin.  Patient is and has been testing BG 4 times daily before breakfast, lunch, dinner, and bedtime over the past 60 days.      Stage III chronic kidney disease, advised to avoid NSAID use.  He does use Voltaren gel 4 times a day for his arthritis pain.    Functional Capacity: about 4 METS.   Review of Systems   Constitutional: Negative for chills, fatigue and fever.   HENT: Negative for congestion.    Eyes: Negative for visual disturbance.   Respiratory: Negative for cough, choking, chest tightness, shortness of breath, wheezing and stridor.    Cardiovascular: Positive for chest pain (chronic, stable) and leg swelling. Negative for palpitations.        + claudication.   + intermittent Angina   Gastrointestinal: Negative for abdominal pain.   Genitourinary: Negative for hematuria.   Musculoskeletal: Positive for arthralgias, back pain, gait problem, myalgias, neck pain and neck stiffness.   Skin: Positive for wound (  + wounds on posterior left calf). Negative for rash.   Neurological: Positive for headaches. Negative for syncope.   Hematological: Negative for adenopathy. Does not bruise/bleed easily.   Psychiatric/Behavioral: Negative for confusion.        REMINGTON:   REMINGTON-7 SCORE 3/28/2019 6/26/2019 8/29/2019   Total Score 0 0 0     PHQ9:  PHQ-9 SCORE 3/28/2019 6/26/2019 8/29/2019   PHQ-9 Total Score 0 0 0       I have personally reviewed the past medical history,  "past surgical history, medications, allergies, family and social history as listed below.     Allergies   Allergen Reactions     Famotidine Other (See Comments)     + Caused Headache and Rash -- tolerated Ranitidine and worked well.     Gabapentin      Other reaction(s): Mental Status Change \"felt like in outer space\"     Hydrocortisone Other (See Comments)     Burning and stinging sensation with Hydrocortisone - doesn't help itching or rash -- tolerated Desoximetasone cream and worked well.      Atorvastatin Hives     Celebrex [Celecoxib]      Swelling      Lisinopril Cough     No Clinical Screening - See Comments Hives     Chlorine water     Norvasc [Amlodipine] Other (See Comments)     -- can't remember, didn't tolerate.      Pregabalin      Lyrica - Other reaction(s): Mental Status Change     Valdecoxib Swelling     \"bextra\" NSAID     Insulin Aspart Rash       Current Outpatient Medications   Medication Sig Dispense Refill     aspirin (GOODSENSE ASPIRIN) 325 MG tablet Take 325 mg by mouth daily Take  by mouth.       blood glucose monitoring (ONETOUCH ULTRA) test strip Dispense test strips covered by the patient insurance. Test 10 times per day.       Blood Glucose Monitoring Suppl (GLUCCarnegie SpeechM BLOOD GLUCOSE MONITOR) WAQAS Dispense glucose meter, test strips and lancets covered by the patient insurance. Test 10 times per day.       clopidogrel (PLAVIX) 75 MG tablet Take 1 tablet (75 mg) by mouth daily 90 tablet 3     co-enzyme Q-10 100 MG CAPS capsule Take 100 mg by mouth daily        [employer] ALCOHOL SWABS PADS For home use.       desoximetasone (TOPICORT) 0.25 % external cream APPLY  CREAM EXTERNALLY TWICE DAILY 180 g 3     dextroamphetamine (DEXTROSTAT) 10 MG tablet Take 1 tablet (10 mg) by mouth 4 times daily 360 tablet 0     diclofenac (VOLTAREN) 75 MG EC tablet TAKE ONE TABLET BY MOUTH 4 TIMES DAILY 360 tablet 3     docusate sodium (COLACE) 100 MG capsule Take 1-2 capsules by mouth 2 times daily as needed for " constipation prevention       Flaxseed, Linseed, (FLAXSEED OIL) 1000 MG CAPS Take 1 capsule by mouth daily        furosemide (LASIX) 40 MG tablet Take 2-4 tablets ( mg) by mouth daily Or as instructed for leg swelling 90 tablet 3     HUMALOG 100 UNIT/ML injection INJECT UNDER THE SKIN USING INSULIN PUMP. MAX DAILY DOSE  UNITS 180 mL 3     hydrALAZINE (APRESOLINE) 25 MG tablet Take 1-2 tablets (25-50 mg) by mouth 4 times daily - Take lowest effective dose for blood pressure management 360 tablet 11     HYDROcodone-acetaminophen (NORCO) 5-325 MG per tablet Take 1 tablet by mouth every 6 hours as needed for severe pain 60 tablet 0     Insulin Pen Needle (PEN NEEDLES) 29G X 12MM MISC For administering insulin at home.       irbesartan (AVAPRO) 150 MG tablet Take 1 tablet (150 mg) by mouth 2 times daily -- needs 2 smaller pills daily 180 tablet 3     IV Sets-Tubing (SOLUTION ADMINISTRATION SET) MISC As directed.       levothyroxine (SYNTHROID/LEVOTHROID) 125 MCG tablet TAKE 1 TABLET BY MOUTH ONCE DAILY IN THE MORNING 90 tablet 3     methylphenidate (RITALIN) 10 MG tablet Take 1 tablet (10 mg) by mouth 2 times daily Takes 2 tablets twice daily 360 tablet 0     metoprolol succinate (TOPROL-XL) 50 MG 24 hr tablet Take 1 tablet (50 mg) by mouth 2 times daily 180 tablet 3     nitroGLYcerin (NITROSTAT) 0.4 MG sublingual tablet Place 1 tablet under the tongue every 5 minutes as needed for chest pain       ranitidine (ZANTAC) 150 MG tablet Take 1 tablet (150 mg) by mouth 2 times daily 180 tablet 3     ranolazine (RANEXA) 500 MG 12 hr tablet Take 2 tablets (1,000 mg) by mouth 2 times daily - cancel other Rx - give 3 month supply 360 tablet 3     rosuvastatin (CRESTOR) 10 MG tablet TAKE 1 TABLET BY MOUTH TWICE DAILY 180 tablet 2     Saline GEL Spray 1 spray in nostril every hour as needed For Nasal Dryness       thin (NO BRAND SPECIFIED) lancets Test 10 times per day.          Patient Active Problem List    Diagnosis  Date Noted     Venous stasis ulcer of left calf limited to breakdown of skin with varicose veins (H) 08/29/2019     Priority: Medium     Bilateral lower extremity edema 08/29/2019     Priority: Medium     Degeneration of cervical intervertebral disc 01/31/2018     Priority: Medium     Hypothyroidism due to acquired atrophy of thyroid 01/31/2018     Priority: Medium     Mixed hyperlipidemia 01/31/2018     Priority: Medium     Primary narcolepsy without cataplexy 01/31/2018     Priority: Medium     Overview:   Total dose recommended by pulmonology he is dextro- amphetamine 40 mg plus   methylphenidate 40 mg in divided doses per day.  Total of 80 mg of short   acting amphetamines daily.       Osteoarthritis of spine 01/31/2018     Priority: Medium     Peptic ulcer disease 01/31/2018     Priority: Medium     Neuroforaminal stenosis of lumbar spine 01/03/2018     Priority: Medium     Radicular low back pain 01/03/2018     Priority: Medium     Acute bilateral low back pain with bilateral sciatica 10/27/2017     Priority: Medium     Chronic low back pain 08/08/2017     Priority: Medium     Intermittent constipation 08/08/2017     Priority: Medium     Skin rash 08/01/2017     Priority: Medium     Uncontrolled type 2 diabetes mellitus with stage 3 chronic kidney disease, with long-term current use of insulin (H) 03/30/2017     Priority: Medium     Erectile dysfunction 09/26/2016     Priority: Medium     Trigger point with back pain 07/14/2016     Priority: Medium     Trigger point with neck pain 07/14/2016     Priority: Medium     Insulin pump in place 03/10/2016     Priority: Medium     Myofacial muscle pain 12/17/2015     Priority: Medium     Pain medication agreement - 8/9/2018 12/17/2015     Priority: Medium     Trigger point of extremity 12/17/2015     Priority: Medium     Greater trochanteric bursitis of left hip 06/24/2015     Priority: Medium     Blister of toe of right foot 12/09/2014     Priority: Medium      Cervical stenosis of spinal canal 06/17/2014     Priority: Medium     Degenerative disc disease, cervical 06/17/2014     Priority: Medium     Facet arthritis of cervical region 06/17/2014     Priority: Medium     Cervical radicular pain 06/05/2014     Priority: Medium     Left arm numbness 06/05/2014     Priority: Medium     Left atrial enlargement 01/23/2014     Priority: Medium     Chronic diastolic heart failure (H) 01/10/2014     Priority: Medium     Overview:   1/20/2014 ECHO FINAL IMPRESSION:   1. Normal left ventricular size and systolic function. Estimated ejection fraction 65%.   2. Mild increase in wall thickness of the ventricular septum with hypokinesis of the basilar segment of the ventricular septum and inferior wall.   3. Mild to moderate left atrial enlargement.   4. Trace tricuspid regurgitation.   5. Mild left ventricular diastolic dysfunction.        CKD (chronic kidney disease) stage 3, GFR 30-59 ml/min (H) 01/10/2014     Priority: Medium     Essential hypertension 01/10/2014     Priority: Medium     Myalgia 01/10/2014     Priority: Medium     Old inferior wall myocardial infarction 01/10/2014     Priority: Medium     Platelet inhibition due to Plavix 01/10/2014     Priority: Medium     S/P CABG x 2 01/10/2014     Priority: Medium     S/P coronary artery stent placement 01/10/2014     Priority: Medium     Painful diabetic neuropathy (H) 04/22/2013     Priority: Medium     Esophageal reflux 05/17/2012     Priority: Medium     Obesity 05/17/2012     Priority: Medium     Diastasis of muscle 10/06/2011     Priority: Medium     Coronary atherosclerosis 09/08/2011     Priority: Medium     Psychosexual dysfunction with inhibited sexual excitement 06/30/2011     Priority: Medium     Angina pectoris (H) 12/10/2010     Priority: Medium     Edema 10/25/2010     Priority: Medium     Chest pain 02/15/2007     Priority: Medium     Overview:   IMO Update 10/11       Atherosclerosis of native coronary artery of  native heart with stable angina pectoris (H) 02/15/2007     Priority: Medium     Overview:   IMO Update 10/11       Other specified forms of chronic ischemic heart disease 02/15/2007     Priority: Medium     Peripheral vascular disease (H) 02/15/2007     Priority: Medium     Overview:   IMO Update 10/11       Postsurgical percutaneous transluminal coronary angioplasty status 02/15/2007     Priority: Medium     Overview:   IMO Update 10/11       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     Gastritis without bleeding     No Comments Provided     Heart disease     Multiple coronary stents     Narcolepsy without cataplexy     No Comments Provided     Other injury of unspecified body region, initial encounter (CODE)     11/2006,Right calf hematoma     Rheumatic fever without heart involvement     Rheumatic fever as a child without valvular heart disease     Past Surgical History:   Procedure Laterality Date     ANGIOPLASTY      12/00, 04/04/04,Repeat angioplasty with lt internal mammary to the lt anterior descending and lt radial artery from aorta to the diagonal.     ANGIOPLASTY      10/01,Angioplasty with 2 vessel CABG the following week from the lt internal mammary to the lt anterior descending and right radial free graft     ANGIOPLASTY      04/2003     ARTHROSCOPY SHOULDER ROTATOR CUFF REPAIR      02/06/2006,Left rotator cuff repair and bone spur removal by Dr. Giraldo in [place]     COLONOSCOPY      1999     COLONOSCOPY  01/08/2016 01/08/2016,-- Dr. De La Cruz -- polypectomy     COLONOSCOPY  04/18/2019    hyperplastic, follow up 10 years, 4/18/29     OTHER SURGICAL HISTORY      09/03,466707,IR ANGIOGRAM FEMORAL/EXTREMITY ([place]),Unremarkable  angiogram at [facility]     OTHER SURGICAL HISTORY      10/05/2004,,PTCA,Angioplasty     OTHER SURGICAL HISTORY      02/2005,,PTCA,Angioplasty with stent.     OTHER SURGICAL HISTORY      03/02/2005,,PTCA,Angioplasty with stent.     OTHER SURGICAL HISTORY      09/23/2005,,PTCA,Angioplasty without stent     OTHER SURGICAL HISTORY      2/26/2007,[id number],IR ANGIOGRAM FEMORAL/EXTREMITY ([place]),Unremarkable coronary angiogram at [facility].     OTHER SURGICAL HISTORY      1967,SUR38,APPENDECTOMY OPEN     RELEASE CARPAL TUNNEL      11/15/01,Right carpal tunnel release by Dr. Mace     RELEASE CARPAL TUNNEL      01/2004,Left carpal tunnel release     Social History     Socioeconomic History     Marital status:      Spouse name: None     Number of children: None     Years of education: None     Highest education level: None   Occupational History     None   Social Needs     Financial resource strain: None     Food insecurity:     Worry: None     Inability: None     Transportation needs:     Medical: None     Non-medical: None   Tobacco Use     Smoking status: Never Smoker     Smokeless tobacco: Never Used   Substance and Sexual Activity     Alcohol use: No     Alcohol/week: 0.0 oz     Drug use: No     Sexual activity: Yes     Partners: Female   Lifestyle     Physical activity:     Days per week: None     Minutes per session: None     Stress: None   Relationships     Social connections:     Talks on phone: None     Gets together: None     Attends [employer] service: None     Active member of club or organization: None     Attends meetings of clubs or organizations: None     Relationship status: None     Intimate partner violence:     Fear of current or ex partner: None     Emotionally abused: None     Physically abused: None     Forced sexual activity: None   Other Topics Concern     Parent/sibling w/ CABG, MI or angioplasty before 65F 55M? Not Asked   Social History Narrative    He is on Social Security Disability  "for coronary artery disease and cervical arthritis and disk disease.   Dax clay.  No tobacco.     Family History   Problem Relation Age of Onset     Heart Disease Mother         Heart Disease,Heart problems     Heart Disease Other         Heart Disease,Heart problems     Heart Disease Other         Heart Disease,Heart problems     Ankylosing Spondylitis Son         Ankylosing spondylitis,Ankylosing spondylitis     Other - See Comments Brother          with sudden death following shoulder surgery 2012 -- was off his Plavix for 10 days pre-operatively.     Ankylosing Spondylitis Daughter         Ankylosing spondylitis,-- Dx 2017       EXAM:   Vitals:    19 1614   BP: (!) 150/86   BP Location: Right arm   Patient Position: Sitting   Cuff Size: Adult Regular   Pulse: 72   Resp: 20   Temp: 98.2  F (36.8  C)   TempSrc: Tympanic   Weight: 91.2 kg (201 lb)       Current Pain Score: Moderate Pain (4)     BP Readings from Last 3 Encounters:   19 (!) 150/86   19 138/70   19 132/80      Wt Readings from Last 3 Encounters:   19 91.2 kg (201 lb)   19 92.4 kg (203 lb 9.6 oz)   19 92.3 kg (203 lb 6.4 oz)      Estimated body mass index is 29.68 kg/m  as calculated from the following:    Height as of 19: 1.753 m (5' 9\").    Weight as of this encounter: 91.2 kg (201 lb).     Physical Exam   Constitutional: He is oriented to person, place, and time. He appears well-developed and well-nourished.   HENT:   Head: Normocephalic and atraumatic.   Eyes: No scleral icterus.   Cardiovascular: Normal rate and regular rhythm.   Pulmonary/Chest: Effort normal. He has no wheezes.   Abdominal: Soft. There is no tenderness.   + insulin pump noted   Musculoskeletal: He exhibits edema.   Lymphadenopathy:     He has no cervical adenopathy.   Neurological: He is alert and oriented to person, place, and time.   Skin: Skin is warm and dry.   + 4 venous ulcerations on posterior left calf. "   Psychiatric: He has a normal mood and affect.                  Procedures   INVESTIGATIONS:  Results for orders placed or performed during the hospital encounter of 07/04/19   Basic metabolic panel   Result Value Ref Range    Sodium 136 134 - 144 mmol/L    Potassium 4.5 3.5 - 5.1 mmol/L    Chloride 105 98 - 107 mmol/L    Carbon Dioxide 25 21 - 31 mmol/L    Anion Gap 6 3 - 14 mmol/L    Glucose 191 (H) 70 - 105 mg/dL    Urea Nitrogen 22 7 - 25 mg/dL    Creatinine 1.38 (H) 0.70 - 1.30 mg/dL    GFR Estimate 51 (L) >60 mL/min/[1.73_m2]    GFR Estimate If Black 62 >60 mL/min/[1.73_m2]    Calcium 9.1 8.6 - 10.3 mg/dL   CBC with platelets differential   Result Value Ref Range    WBC 7.7 4.0 - 11.0 10e9/L    RBC Count 4.21 (L) 4.4 - 5.9 10e12/L    Hemoglobin 14.1 13.3 - 17.7 g/dL    Hematocrit 41.8 40.0 - 53.0 %    MCV 99 78 - 100 fl    MCH 33.5 (H) 26.5 - 33.0 pg    MCHC 33.7 31.5 - 36.5 g/dL    RDW 12.7 10.0 - 15.0 %    Platelet Count 221 150 - 450 10e9/L    Diff Method Automated Method     % Neutrophils 69.2 %    % Lymphocytes 21.8 %    % Monocytes 6.4 %    % Eosinophils 1.8 %    % Basophils 0.4 %    % Immature Granulocytes 0.4 %    Absolute Neutrophil 5.3 1.6 - 8.3 10e9/L    Absolute Lymphocytes 1.7 0.8 - 5.3 10e9/L    Absolute Monocytes 0.5 0.0 - 1.3 10e9/L    Absolute Eosinophils 0.1 0.0 - 0.7 10e9/L    Absolute Basophils 0.0 0.0 - 0.2 10e9/L    Abs Immature Granulocytes 0.0 0 - 0.4 10e9/L   CRP inflammation   Result Value Ref Range    CRP Inflammation 0.3 <0.5 mg/L   Wound Culture Aerobic Bacterial   Result Value Ref Range    Specimen Description Lower Left Leg     Culture Micro Moderate growth  Staphylococcus aureus   (A)        Susceptibility    Staphylococcus aureus - AMBREEN     CLINDAMYCIN <=0.5 Sensitive ug/mL     ERYTHROMYCIN <=0.5 Sensitive ug/mL     OXACILLIN <=0.25 Sensitive ug/mL     PENICILLIN <=0.03 Sensitive ug/mL     RIFAMPIN <=1 Sensitive ug/mL     TETRACYCLINE <=4 Sensitive ug/mL     Trimethoprim/Sulfa  <=2/38 Sensitive ug/mL     VANCOMYCIN <=2 Sensitive ug/mL     Azithromycin <=2 Sensitive ug/mL     LINEZOLID <=2 Sensitive ug/mL     CEFEPIME <=8 Sensitive ug/mL       ASSESSMENT AND PLAN:  Problem List Items Addressed This Visit        Endocrine    Uncontrolled type 2 diabetes mellitus with stage 3 chronic kidney disease, with long-term current use of insulin (H)       Circulatory    Venous stasis ulcer of left calf limited to breakdown of skin with varicose veins (H) - Primary       Musculoskeletal and Integumentary    Bilateral lower extremity edema        reviewed diet, exercise and weight control, recommended sodium restriction, cardiovascular risk and specific lipid/LDL goals reviewed, specific diabetic recommendations low cholesterol diet, weight control and daily exercise discussed, use of aspirin to prevent MI and TIA's discussed  -- Expected clinical course discussed    -- Medications and their side effects discussed    25 minutes spent in face-to-face interaction with patient (separate from separately billed procedures) with greater than 50% spent in counseling and care coordination of listed medical problems.      Patient Instructions   Need to get UNNA boot placement on your recurrent venous ulcers.     Dressings re-applied in clinic today.     Return as needed for follow-up with Dr. Barnett.    Clinic : [phone]  Appointment line: [phone] or [phone]     Jorge Barnett MD  Internal Medicine  [facility]     Portions of this note were dictated using speech recognition software. The note has been proofread but errors in the text may have been overlooked. Please contact me if there are any concerns regarding the accuracy of the dictation.

## 2019-08-28 NOTE — TELEPHONE ENCOUNTER
Spoke with patient.  He is able to come in tomorrow at 4:00pm and has been added to the schedule.  Kayleigh Rubio on 8/28/2019 at 2:27 PM

## 2019-08-28 NOTE — TELEPHONE ENCOUNTER
Noted.  The only spot I have left is 4 PM tomorrow otherwise everything is gone.  If symptoms worsen overnight needs to go to the emergency room.  Jorge Barnett MD

## 2019-08-29 ENCOUNTER — OFFICE VISIT (OUTPATIENT)
Dept: INTERNAL MEDICINE | Facility: OTHER | Age: 69
End: 2019-08-29
Attending: INTERNAL MEDICINE
Payer: COMMERCIAL

## 2019-08-29 VITALS
DIASTOLIC BLOOD PRESSURE: 86 MMHG | TEMPERATURE: 98.2 F | BODY MASS INDEX: 29.68 KG/M2 | WEIGHT: 201 LBS | HEART RATE: 72 BPM | RESPIRATION RATE: 20 BRPM | SYSTOLIC BLOOD PRESSURE: 150 MMHG

## 2019-08-29 DIAGNOSIS — I83.022 VENOUS STASIS ULCER OF LEFT CALF LIMITED TO BREAKDOWN OF SKIN WITH VARICOSE VEINS (H): Primary | ICD-10-CM

## 2019-08-29 DIAGNOSIS — L97.221 VENOUS STASIS ULCER OF LEFT CALF LIMITED TO BREAKDOWN OF SKIN WITH VARICOSE VEINS (H): Primary | ICD-10-CM

## 2019-08-29 DIAGNOSIS — R60.0 BILATERAL LOWER EXTREMITY EDEMA: ICD-10-CM

## 2019-08-29 PROCEDURE — 99214 OFFICE O/P EST MOD 30 MIN: CPT | Performed by: INTERNAL MEDICINE

## 2019-08-29 PROCEDURE — G0463 HOSPITAL OUTPT CLINIC VISIT: HCPCS

## 2019-08-29 ASSESSMENT — ENCOUNTER SYMPTOMS
CHOKING: 0
BACK PAIN: 1
NECK STIFFNESS: 1
STRIDOR: 0
PALPITATIONS: 0
ADENOPATHY: 0
CHILLS: 0
FATIGUE: 0
CONFUSION: 0
BRUISES/BLEEDS EASILY: 0
NECK PAIN: 1
WHEEZING: 0
CHEST TIGHTNESS: 0
ABDOMINAL PAIN: 0
SHORTNESS OF BREATH: 0
MYALGIAS: 1
COUGH: 0
HEMATURIA: 0
FEVER: 0
ARTHRALGIAS: 1
WOUND: 1
HEADACHES: 1

## 2019-08-29 ASSESSMENT — ANXIETY QUESTIONNAIRES
IF YOU CHECKED OFF ANY PROBLEMS ON THIS QUESTIONNAIRE, HOW DIFFICULT HAVE THESE PROBLEMS MADE IT FOR YOU TO DO YOUR WORK, TAKE CARE OF THINGS AT HOME, OR GET ALONG WITH OTHER PEOPLE: NOT DIFFICULT AT ALL
3. WORRYING TOO MUCH ABOUT DIFFERENT THINGS: NOT AT ALL
2. NOT BEING ABLE TO STOP OR CONTROL WORRYING: NOT AT ALL
1. FEELING NERVOUS, ANXIOUS, OR ON EDGE: NOT AT ALL
6. BECOMING EASILY ANNOYED OR IRRITABLE: NOT AT ALL
5. BEING SO RESTLESS THAT IT IS HARD TO SIT STILL: NOT AT ALL
GAD7 TOTAL SCORE: 0
7. FEELING AFRAID AS IF SOMETHING AWFUL MIGHT HAPPEN: NOT AT ALL

## 2019-08-29 ASSESSMENT — PAIN SCALES - GENERAL: PAINLEVEL: MODERATE PAIN (4)

## 2019-08-29 ASSESSMENT — PATIENT HEALTH QUESTIONNAIRE - PHQ9
SUM OF ALL RESPONSES TO PHQ QUESTIONS 1-9: 0
5. POOR APPETITE OR OVEREATING: NOT AT ALL

## 2019-08-29 NOTE — NURSING NOTE
"Patient presents to the clinic for follow up with left leg wound.    Previous A1C is not at goal of <8  Lab Results   Component Value Date    A1C 8.1 06/26/2019    A1C 7.7 03/08/2019    A1C 7.5 11/21/2018    A1C 8.0 08/09/2018    A1C 7.8 05/03/2018     Urine microalbumin:creatine: n/a  Foot exam unknown-declines today  Eye exam 02/2019    Tobacco User no  Patient is on a daily aspirin  Patient is on a Statin.  Blood pressure today of:     BP Readings from Last 1 Encounters:   08/14/19 138/70      is at the goal of <139/89 for diabetics.    Chief Complaint   Patient presents with     Clinic Care Coordination - Follow-up       Initial BP (!) 150/86 (BP Location: Right arm, Patient Position: Sitting, Cuff Size: Adult Regular)   Pulse 72   Temp 98.2  F (36.8  C) (Tympanic)   Resp 20   Wt 91.2 kg (201 lb)   BMI 29.68 kg/m   Estimated body mass index is 29.68 kg/m  as calculated from the following:    Height as of 7/17/19: 1.753 m (5' 9\").    Weight as of this encounter: 91.2 kg (201 lb).  Medication Reconciliation: complete    Barbara Connor LPN    "

## 2019-08-29 NOTE — PATIENT INSTRUCTIONS
Need to get UNNA boot placement on your recurrent venous ulcers.     Dressings re-applied in clinic today.     Return as needed for follow-up with Dr. Barnett.    Clinic : [phone]  Appointment line: [phone] or [phone]

## 2019-08-30 ENCOUNTER — OFFICE VISIT (OUTPATIENT)
Dept: SURGERY | Facility: OTHER | Age: 69
End: 2019-08-30
Attending: SURGERY
Payer: COMMERCIAL

## 2019-08-30 VITALS
WEIGHT: 202.2 LBS | SYSTOLIC BLOOD PRESSURE: 134 MMHG | DIASTOLIC BLOOD PRESSURE: 84 MMHG | BODY MASS INDEX: 28.95 KG/M2 | HEIGHT: 70 IN | RESPIRATION RATE: 18 BRPM | TEMPERATURE: 97.4 F

## 2019-08-30 DIAGNOSIS — I87.2 VENOUS STASIS DERMATITIS OF LEFT LOWER EXTREMITY: Primary | ICD-10-CM

## 2019-08-30 PROCEDURE — 99203 OFFICE O/P NEW LOW 30 MIN: CPT | Performed by: SURGERY

## 2019-08-30 PROCEDURE — G0463 HOSPITAL OUTPT CLINIC VISIT: HCPCS

## 2019-08-30 ASSESSMENT — MIFFLIN-ST. JEOR: SCORE: 1693.42

## 2019-08-30 ASSESSMENT — ANXIETY QUESTIONNAIRES: GAD7 TOTAL SCORE: 0

## 2019-08-30 ASSESSMENT — PAIN SCALES - GENERAL: PAINLEVEL: MODERATE PAIN (4)

## 2019-08-30 NOTE — NURSING NOTE
"Chief Complaint   Patient presents with     Consult     lower left leg cellulitis       Initial /84 (BP Location: Right arm, Patient Position: Sitting, Cuff Size: Adult Large)   Temp 97.4  F (36.3  C) (Tympanic)   Resp 18   Ht 1.778 m (5' 10\")   Wt 91.7 kg (202 lb 3.2 oz)   BMI 29.01 kg/m   Estimated body mass index is 29.01 kg/m  as calculated from the following:    Height as of this encounter: 1.778 m (5' 10\").    Weight as of this encounter: 91.7 kg (202 lb 3.2 oz).  Medication Reconciliation: complete    Keily Sandy LPN  "

## 2019-09-03 NOTE — PROGRESS NOTES
GENERAL SURGERY CONSULTATION NOTE    Moses Whittaker   [address] [facility] [address]  69 year old  male  Admission Date/Time: No admission date for patient encounter.  Primary Care Provider:  Jorge Barnett was asked to see this patient by Dr. Barnett for evaluation of venous statis ulcer.     HPI: Moses Whittaker is a 69 year old male with a left calf recurrent ulcer. Pt had been doing Unna boot X 3 weeks with good improvement, but immediate recurrence when the boots were taken off. Pt has DM that has had better control previously. He has not hx of DVT. He has never had wound issues on the right. No specific injury to the left calf. No hx of DVT. No claudication. His original wound was at the time of deer hunting years ago.     REVIEW OF SYSTEMS:    GENERAL: No fevers or chills. Denies fatigue, recent weight loss.  HEENT: No sinus drainage. No changes with vision or hearing. No difficulty swallowing.   LYMPHATICS:  No swollen nodes in axilla, neck or groin.  CARDIOVASCULAR: Stable angina.  PULMONARY: No shortness of breath or cough. No increase in sputum production.  GI: Denies melena, bright red blood in stools. No hematemesis. No constipation or diarrhea.  : No dysuria or hematuria.  SKIN: No recent rashes or ulcers.   HEMATOLOGY:  No history of easy bruising or bleeding.  ENDOCRINE:  ++ diabetes. No thyroid problems.  NEUROLOGY:  No history of seizures or headaches. No motor or sensory changes.        Patient Active Problem List   Diagnosis     Acute bilateral low back pain with bilateral sciatica     Angina pectoris (H)     Edema     Blister of toe of right foot     Coronary atherosclerosis     Cervical radicular pain     Cervical stenosis of spinal canal     Chronic diastolic heart failure (H)     Chronic low back pain     CKD (chronic kidney disease) stage 3, GFR 30-59 ml/min (H)     Uncontrolled type 2 diabetes mellitus with stage 3 chronic kidney disease, with long-term current  use of insulin (H)     Degenerative disc disease, cervical     Painful diabetic neuropathy (H)     Diastasis of muscle     Degeneration of cervical intervertebral disc     Psychosexual dysfunction with inhibited sexual excitement     Erectile dysfunction     Facet arthritis of cervical region     Esophageal reflux     Greater trochanteric bursitis of left hip     Essential hypertension     Hypothyroidism due to acquired atrophy of thyroid     Insulin pump in place     Intermittent constipation     Left arm numbness     Left atrial enlargement     Mixed hyperlipidemia     Myalgia     Myofacial muscle pain     Primary narcolepsy without cataplexy     Neuroforaminal stenosis of lumbar spine     Obesity     Old inferior wall myocardial infarction     Osteoarthritis of spine     Pain medication agreement - 8/9/2018     Peptic ulcer disease     Platelet inhibition due to Plavix     Radicular low back pain     S/P CABG x 2     S/P coronary artery stent placement     Skin rash     Trigger point of extremity     Trigger point with back pain     Trigger point with neck pain     Chest pain     Atherosclerosis of native coronary artery of native heart with stable angina pectoris (H)     Other specified forms of chronic ischemic heart disease     Peripheral vascular disease (H)     Postsurgical percutaneous transluminal coronary angioplasty status     Venous stasis ulcer of left calf limited to breakdown of skin with varicose veins (H)     Bilateral lower extremity edema       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     Gastritis without bleeding     No Comments Provided     Heart disease     Multiple coronary stents     Narcolepsy without cataplexy     No  Comments Provided     Other injury of unspecified body region, initial encounter (CODE)     11/2006,Right calf hematoma     Rheumatic fever without heart involvement     Rheumatic fever as a child without valvular heart disease       Past Surgical History:   Procedure Laterality Date     ANGIOPLASTY      12/00, 04/04/04,Repeat angioplasty with lt internal mammary to the lt anterior descending and lt radial artery from aorta to the diagonal.     ANGIOPLASTY      10/01,Angioplasty with 2 vessel CABG the following week from the lt internal mammary to the lt anterior descending and right radial free graft     ANGIOPLASTY      04/2003     ARTHROSCOPY SHOULDER ROTATOR CUFF REPAIR      02/06/2006,Left rotator cuff repair and bone spur removal by Dr. Giraldo in [place]     COLONOSCOPY      1999     COLONOSCOPY  01/08/2016 01/08/2016,-- Dr. De La Cruz -- polypectomy     COLONOSCOPY  04/18/2019    hyperplastic, follow up 10 years, 4/18/29     OTHER SURGICAL HISTORY      09/03,[id number],IR ANGIOGRAM FEMORAL/EXTREMITY ([place]),Unremarkable angiogram at [facility]     OTHER SURGICAL HISTORY      10/05/2004,,PTCA,Angioplasty     OTHER SURGICAL HISTORY      02/2005,,PTCA,Angioplasty with stent.     OTHER SURGICAL HISTORY      03/02/2005,,PTCA,Angioplasty with stent.     OTHER SURGICAL HISTORY      09/23/2005,,PTCA,Angioplasty without stent     OTHER SURGICAL HISTORY      2/26/2007,[id number],IR ANGIOGRAM FEMORAL/EXTREMITY (IA),Unremarkable coronary angiogram at [facility].     OTHER SURGICAL HISTORY      1967,SUR38,APPENDECTOMY OPEN     RELEASE CARPAL TUNNEL      11/15/01,Right carpal tunnel release by Dr. Mace     RELEASE CARPAL TUNNEL      01/2004,Left carpal tunnel release       Family History   Problem Relation Age of Onset     Heart Disease Mother         Heart Disease,Heart problems     Heart Disease Other         Heart Disease,Heart problems     Heart Disease Other         Heart Disease,Heart problems     Ankylosing Spondylitis  Son         Ankylosing spondylitis,Ankylosing spondylitis     Other - See Comments Brother          with sudden death following shoulder surgery 2012 -- was off his Plavix for 10 days pre-operatively.     Ankylosing Spondylitis Daughter         Ankylosing spondylitis,-- Dx 2017       Social History     Social History Narrative    He is on Social Security Disability for coronary artery disease and cervical arthritis and disk disease.   Dax clay.  No tobacco.           Current Outpatient Medications on File Prior to Visit:  aspirin (GOODSENSE ASPIRIN) 325 MG tablet Take 325 mg by mouth daily Take  by mouth.   blood glucose monitoring (ONETOUCH ULTRA) test strip Dispense test strips covered by the patient insurance. Test 10 times per day.   Blood Glucose Monitoring Suppl (GLUCOCOM BLOOD GLUCOSE MONITOR) WAQAS Dispense glucose meter, test strips and lancets covered by the patient insurance. Test 10 times per day.   clopidogrel (PLAVIX) 75 MG tablet Take 1 tablet (75 mg) by mouth daily   co-enzyme Q-10 100 MG CAPS capsule Take 100 mg by mouth daily    [employer] ALCOHOL SWABS PADS For home use.   desoximetasone (TOPICORT) 0.25 % external cream APPLY  CREAM EXTERNALLY TWICE DAILY   dextroamphetamine (DEXTROSTAT) 10 MG tablet Take 1 tablet (10 mg) by mouth 4 times daily   diclofenac (VOLTAREN) 75 MG EC tablet TAKE ONE TABLET BY MOUTH 4 TIMES DAILY   docusate sodium (COLACE) 100 MG capsule Take 1-2 capsules by mouth 2 times daily as needed for constipation prevention   Flaxseed, Linseed, (FLAXSEED OIL) 1000 MG CAPS Take 1 capsule by mouth daily    furosemide (LASIX) 40 MG tablet Take 2-4 tablets ( mg) by mouth daily Or as instructed for leg swelling   HUMALOG 100 UNIT/ML injection INJECT UNDER THE SKIN USING INSULIN PUMP. MAX DAILY DOSE  UNITS   hydrALAZINE (APRESOLINE) 25 MG tablet Take 1-2 tablets (25-50 mg) by mouth 4 times daily - Take lowest effective dose for blood pressure management  "  HYDROcodone-acetaminophen (NORCO) 5-325 MG per tablet Take 1 tablet by mouth every 6 hours as needed for severe pain   Insulin Pen Needle (PEN NEEDLES) 29G X 12MM MISC For administering insulin at home.   irbesartan (AVAPRO) 150 MG tablet Take 1 tablet (150 mg) by mouth 2 times daily -- needs 2 smaller pills daily   IV Sets-Tubing (SOLUTION ADMINISTRATION SET) MISC As directed.   levothyroxine (SYNTHROID/LEVOTHROID) 125 MCG tablet TAKE 1 TABLET BY MOUTH ONCE DAILY IN THE MORNING   methylphenidate (RITALIN) 10 MG tablet Take 1 tablet (10 mg) by mouth 2 times daily Takes 2 tablets twice daily   metoprolol succinate (TOPROL-XL) 50 MG 24 hr tablet Take 1 tablet (50 mg) by mouth 2 times daily   nitroGLYcerin (NITROSTAT) 0.4 MG sublingual tablet Place 1 tablet under the tongue every 5 minutes as needed for chest pain   ranitidine (ZANTAC) 150 MG tablet Take 1 tablet (150 mg) by mouth 2 times daily   ranolazine (RANEXA) 500 MG 12 hr tablet Take 2 tablets (1,000 mg) by mouth 2 times daily - cancel other Rx - give 3 month supply   rosuvastatin (CRESTOR) 10 MG tablet TAKE 1 TABLET BY MOUTH TWICE DAILY   Saline GEL Spray 1 spray in nostril every hour as needed For Nasal Dryness   thin (NO BRAND SPECIFIED) lancets Test 10 times per day.     No current facility-administered medications on file prior to visit.       ALLERGIES/SENSITIVITIES:   Allergies   Allergen Reactions     Famotidine Other (See Comments)     + Caused Headache and Rash -- tolerated Ranitidine and worked well.     Gabapentin      Other reaction(s): Mental Status Change \"felt like in outer space\"     Hydrocortisone Other (See Comments)     Burning and stinging sensation with Hydrocortisone - doesn't help itching or rash -- tolerated Desoximetasone cream and worked well.      Atorvastatin Hives     Celebrex [Celecoxib]      Swelling      Lisinopril Cough     No Clinical Screening - See Comments Hives     Chlorine water     Norvasc [Amlodipine] Other (See " "Comments)     -- can't remember, didn't tolerate.      Pregabalin      Lyrica - Other reaction(s): Mental Status Change     Valdecoxib Swelling     \"bextra\" NSAID     Insulin Aspart Rash       PHYSICAL EXAM:     /84 (BP Location: Right arm, Patient Position: Sitting, Cuff Size: Adult Large)   Temp 97.4  F (36.3  C) (Tympanic)   Resp 18   Ht 1.778 m (5' 10\")   Wt 91.7 kg (202 lb 3.2 oz)   BMI 29.01 kg/m      General Appearance:   Appears well   Heart & CV:  RRR no murmur.  Intact distal pulses, good cap refill.  LUNGS:  CTA B/L, no wheezing or crackles.  Abd:  Flat, soft, non tender,   Ext: 10 cm X 10 cm area of 3-- 2 cm round ulcers.       ADDITIONAL COMMENTS:      CONSULTATION ASSESSMENT AND PLAN:    69 year old male with venous stasis ulcer of the LLE.      - Will try dual layer compressive wrap Mepilex AG applied to wounds prior to wrapping. \  - Follow-up in 1 week    Shukri Akbar MD on 9/3/2019 at 9:27 AM      "

## 2019-09-04 ENCOUNTER — OFFICE VISIT (OUTPATIENT)
Dept: SURGERY | Facility: OTHER | Age: 69
End: 2019-09-04
Attending: SURGERY
Payer: MEDICARE

## 2019-09-04 VITALS
WEIGHT: 202 LBS | HEART RATE: 74 BPM | BODY MASS INDEX: 28.92 KG/M2 | SYSTOLIC BLOOD PRESSURE: 144 MMHG | DIASTOLIC BLOOD PRESSURE: 86 MMHG | HEIGHT: 70 IN | RESPIRATION RATE: 18 BRPM

## 2019-09-04 DIAGNOSIS — R60.0 VENOUS STASIS ULCER OF LEFT LOWER LEG WITH EDEMA OF LEFT LOWER LEG (H): Primary | ICD-10-CM

## 2019-09-04 DIAGNOSIS — I83.029 VENOUS STASIS ULCER OF LEFT LOWER LEG WITH EDEMA OF LEFT LOWER LEG (H): Primary | ICD-10-CM

## 2019-09-04 DIAGNOSIS — L97.929 VENOUS STASIS ULCER OF LEFT LOWER LEG WITH EDEMA OF LEFT LOWER LEG (H): Primary | ICD-10-CM

## 2019-09-04 DIAGNOSIS — I83.892 VENOUS STASIS ULCER OF LEFT LOWER LEG WITH EDEMA OF LEFT LOWER LEG (H): Primary | ICD-10-CM

## 2019-09-04 PROCEDURE — 29581 APPL MULTLAYER CMPRN SYS LEG: CPT | Mod: LT | Performed by: SURGERY

## 2019-09-04 PROCEDURE — G0463 HOSPITAL OUTPT CLINIC VISIT: HCPCS

## 2019-09-04 ASSESSMENT — PAIN SCALES - GENERAL: PAINLEVEL: NO PAIN (0)

## 2019-09-04 ASSESSMENT — MIFFLIN-ST. JEOR: SCORE: 1692.52

## 2019-09-04 NOTE — NURSING NOTE
"Chief Complaint   Patient presents with     Clinic Care Coordination - Follow-up     unna boot       Initial BP (!) 144/86 (BP Location: Right arm, Patient Position: Sitting, Cuff Size: Adult Large)   Pulse 74   Resp 18   Ht 1.778 m (5' 10\")   Wt 91.6 kg (202 lb)   BMI 28.98 kg/m   Estimated body mass index is 28.98 kg/m  as calculated from the following:    Height as of this encounter: 1.778 m (5' 10\").    Weight as of this encounter: 91.6 kg (202 lb).  Medication Reconciliation: complete    Keily Sandy LPN  "

## 2019-09-04 NOTE — PROGRESS NOTES
"Subjective:  This is a 69 year old male patient with venous stasis ulcer on the left lower extremity here for unna boot change.    Objective:     Vital signs:      BP: (!) 144/86 Pulse: 74   Resp: 18       Height: 177.8 cm (5' 10\") Weight: 91.6 kg (202 lb)  Estimated body mass index is 28.98 kg/m  as calculated from the following:    Height as of this encounter: 1.778 m (5' 10\").    Weight as of this encounter: 91.6 kg (202 lb).      The Unnaboot was removed and the leg cleansed with Hibiclens. The ulcers on the left leg are stable.  Aquacell AG applied. There are three 1.5cm round ulcers.  The dual layer boot was replaced.    Assessment:   Venous stasis ulcer      Plan:  Follow up in one week for Unna boot change.    Shukri Akbar MD        "

## 2019-09-10 ENCOUNTER — OFFICE VISIT (OUTPATIENT)
Dept: SURGERY | Facility: OTHER | Age: 69
End: 2019-09-10
Attending: SURGERY
Payer: MEDICARE

## 2019-09-10 VITALS
BODY MASS INDEX: 28.92 KG/M2 | DIASTOLIC BLOOD PRESSURE: 88 MMHG | HEIGHT: 70 IN | WEIGHT: 202 LBS | SYSTOLIC BLOOD PRESSURE: 144 MMHG | RESPIRATION RATE: 18 BRPM | HEART RATE: 74 BPM

## 2019-09-10 DIAGNOSIS — I83.022 VENOUS STASIS ULCER OF LEFT CALF LIMITED TO BREAKDOWN OF SKIN WITH VARICOSE VEINS (H): Primary | ICD-10-CM

## 2019-09-10 DIAGNOSIS — L97.221 VENOUS STASIS ULCER OF LEFT CALF LIMITED TO BREAKDOWN OF SKIN WITH VARICOSE VEINS (H): Primary | ICD-10-CM

## 2019-09-10 PROCEDURE — 29581 APPL MULTLAYER CMPRN SYS LEG: CPT | Mod: LT | Performed by: SURGERY

## 2019-09-10 PROCEDURE — G0463 HOSPITAL OUTPT CLINIC VISIT: HCPCS

## 2019-09-10 ASSESSMENT — PAIN SCALES - GENERAL: PAINLEVEL: NO PAIN (0)

## 2019-09-10 ASSESSMENT — MIFFLIN-ST. JEOR: SCORE: 1692.52

## 2019-09-10 NOTE — PROGRESS NOTES
"Subjective:  This is a 69 year old male patient with venous stasis ulcer on the left lower extremity here for unna boot change.    Objective:     Vital signs:      BP: (!) 144/88 Pulse: 74   Resp: 18       Height: 177.8 cm (5' 10\") Weight: 91.6 kg (202 lb)  Estimated body mass index is 28.98 kg/m  as calculated from the following:    Height as of this encounter: 1.778 m (5' 10\").    Weight as of this encounter: 91.6 kg (202 lb).      The Unnaboot was removed. The ulcers on the left leg are improved.  Tripple abx ointment and mepilex dressing applied. There are two  1.2 cm round ulcers.  The dual layer boot was replaced.    Assessment:   Venous stasis ulcers to the LLE      Plan:  Follow up in one week for Unna boot change.    Shukri Akbar MD    [id number]        "

## 2019-09-10 NOTE — NURSING NOTE
"Chief Complaint   Patient presents with     Clinic Care Coordination - Follow-up     compression wrap       Initial BP (!) 144/88 (BP Location: Right arm, Patient Position: Sitting, Cuff Size: Adult Large)   Pulse 74   Resp 18   Ht 1.778 m (5' 10\")   Wt 91.6 kg (202 lb)   BMI 28.98 kg/m   Estimated body mass index is 28.98 kg/m  as calculated from the following:    Height as of this encounter: 1.778 m (5' 10\").    Weight as of this encounter: 91.6 kg (202 lb).  Medication Reconciliation: complete    Keily Sandy LPN  "

## 2019-09-17 ENCOUNTER — OFFICE VISIT (OUTPATIENT)
Dept: SURGERY | Facility: OTHER | Age: 69
End: 2019-09-17
Attending: SURGERY
Payer: MEDICARE

## 2019-09-17 VITALS
HEART RATE: 80 BPM | RESPIRATION RATE: 18 BRPM | TEMPERATURE: 98.1 F | BODY MASS INDEX: 28.7 KG/M2 | SYSTOLIC BLOOD PRESSURE: 138 MMHG | WEIGHT: 200 LBS | DIASTOLIC BLOOD PRESSURE: 58 MMHG

## 2019-09-17 DIAGNOSIS — I87.2 VENOUS STASIS DERMATITIS OF LEFT LOWER EXTREMITY: Primary | ICD-10-CM

## 2019-09-17 PROCEDURE — G0463 HOSPITAL OUTPT CLINIC VISIT: HCPCS

## 2019-09-17 PROCEDURE — 29581 APPL MULTLAYER CMPRN SYS LEG: CPT | Performed by: SURGERY

## 2019-09-17 ASSESSMENT — PAIN SCALES - GENERAL: PAINLEVEL: NO PAIN (0)

## 2019-09-17 NOTE — PROGRESS NOTES
"Subjective:  This is a 69 year old male patient with venous stasis ulcer on the left lower extremity here for unna boot change.    Objective:     Vital signs:  Temp: 98.1  F (36.7  C) Temp src: Tympanic BP: 138/58 Pulse: 80   Resp: 18         Weight: 90.7 kg (200 lb)  Estimated body mass index is 28.7 kg/m  as calculated from the following:    Height as of 9/10/19: 1.778 m (5' 10\").    Weight as of this encounter: 90.7 kg (200 lb).      The Unnaboot was removed. The ulcers on the left leg are improved.  Tripple abx ointment and mepilex dressing applied. There are two  1.2 cm round ulcers are very superficial and nearly completely healed.   The dual layer boot was replaced.    Assessment:   Venous stasis ulcers to the LLE      Plan:  Follow up in one week for Unna boot change.  Likely discontinue unna boot next week.     Shukri Akbar MD    [id number]        "

## 2019-09-17 NOTE — NURSING NOTE
"Chief Complaint   Patient presents with     Clinic Care Coordination - Follow-up     unna boot       Initial /58 (BP Location: Right arm, Patient Position: Sitting, Cuff Size: Adult Large)   Pulse 80   Temp 98.1  F (36.7  C) (Tympanic)   Resp 18   Wt 90.7 kg (200 lb)   BMI 28.70 kg/m   Estimated body mass index is 28.7 kg/m  as calculated from the following:    Height as of 9/10/19: 1.778 m (5' 10\").    Weight as of this encounter: 90.7 kg (200 lb).  Medication Reconciliation: complete    Keily Sandy LPN  "

## 2019-09-24 ENCOUNTER — OFFICE VISIT (OUTPATIENT)
Dept: SURGERY | Facility: OTHER | Age: 69
End: 2019-09-24
Attending: SURGERY
Payer: MEDICARE

## 2019-09-24 VITALS
DIASTOLIC BLOOD PRESSURE: 80 MMHG | WEIGHT: 201.6 LBS | RESPIRATION RATE: 16 BRPM | BODY MASS INDEX: 28.86 KG/M2 | SYSTOLIC BLOOD PRESSURE: 150 MMHG | HEIGHT: 70 IN | TEMPERATURE: 97.1 F | HEART RATE: 72 BPM

## 2019-09-24 DIAGNOSIS — I87.2 VENOUS STASIS DERMATITIS OF LEFT LOWER EXTREMITY: ICD-10-CM

## 2019-09-24 PROCEDURE — G0463 HOSPITAL OUTPT CLINIC VISIT: HCPCS

## 2019-09-24 PROCEDURE — 99212 OFFICE O/P EST SF 10 MIN: CPT | Performed by: SURGERY

## 2019-09-24 RX ORDER — VITAMIN E 268 MG
1 CAPSULE ORAL DAILY
COMMUNITY
Start: 2007-02-15

## 2019-09-24 ASSESSMENT — MIFFLIN-ST. JEOR: SCORE: 1690.7

## 2019-09-24 ASSESSMENT — PAIN SCALES - GENERAL: PAINLEVEL: NO PAIN (0)

## 2019-09-24 NOTE — NURSING NOTE
"Chief Complaint   Patient presents with     RECHECK     Unna Boot Left leg         Initial BP (!) 150/80   Pulse 72   Temp 97.1  F (36.2  C) (Temporal)   Resp 16   Ht 1.778 m (5' 10\")   Wt 91.4 kg (201 lb 9.6 oz)   BMI 28.93 kg/m   Estimated body mass index is 28.93 kg/m  as calculated from the following:    Height as of this encounter: 1.778 m (5' 10\").    Weight as of this encounter: 91.4 kg (201 lb 9.6 oz).    Medication Reconciliation: complete      Norma J. Gosselin, LPN  "

## 2019-09-24 NOTE — PROGRESS NOTES
"Subjective:  This is a 69 year old male patient with venous stasis ulcer on the left lower extremity here for unna boot follow up.    Objective:     Vital signs:  Temp: 97.1  F (36.2  C) Temp src: Temporal BP: (!) 150/80 Pulse: 72   Resp: 16       Height: 177.8 cm (5' 10\") Weight: 91.4 kg (201 lb 9.6 oz)  Estimated body mass index is 28.93 kg/m  as calculated from the following:    Height as of this encounter: 1.778 m (5' 10\").    Weight as of this encounter: 91.4 kg (201 lb 9.6 oz).      The Unnaboot was removed. The ulcers on the left leg are completely healed.       Assessment:   Venous stasis ulcers to the LLE- Healed      Plan:  - Follow up PRN  - If these recur while I am absent he can see Dr Liriano.     Shukri Akbar MD on 9/24/2019 at 2:02 PM           "

## 2019-09-25 ENCOUNTER — TELEPHONE (OUTPATIENT)
Dept: INTERNAL MEDICINE | Facility: OTHER | Age: 69
End: 2019-09-25

## 2019-09-25 NOTE — TELEPHONE ENCOUNTER
Patient has standing orders, patient placed in lab only appointment for 9-30-19.    Barbara Connor LPN 9/25/2019 11:08 AM

## 2019-09-25 NOTE — TELEPHONE ENCOUNTER
Patient called stating he needs to see DJS for lab work and to visit regarding a form he needs filled out in order to keep is drivers license. Patient states he needs it completed within 20 days and only wanted to see his primary doctor and his next available on 10/11 is going to be to late. Patient would like to see if he can get worked in sooner.    Please call patient back at [phone]    Aniyah Martin on 9/25/2019 at 10:32 AM

## 2019-09-30 ENCOUNTER — TELEPHONE (OUTPATIENT)
Dept: INTERNAL MEDICINE | Facility: OTHER | Age: 69
End: 2019-09-30

## 2019-09-30 DIAGNOSIS — E78.2 MIXED HYPERLIPIDEMIA: ICD-10-CM

## 2019-09-30 DIAGNOSIS — I10 BENIGN ESSENTIAL HYPERTENSION: ICD-10-CM

## 2019-09-30 LAB
ALBUMIN SERPL-MCNC: 4 G/DL (ref 3.5–5.7)
ALBUMIN UR-MCNC: 100 MG/DL
ALP SERPL-CCNC: 63 U/L (ref 34–104)
ALT SERPL W P-5'-P-CCNC: 33 U/L (ref 7–52)
ANION GAP SERPL CALCULATED.3IONS-SCNC: 4 MMOL/L (ref 3–14)
APPEARANCE UR: CLEAR
AST SERPL W P-5'-P-CCNC: 23 U/L (ref 13–39)
BILIRUB SERPL-MCNC: 0.5 MG/DL (ref 0.3–1)
BILIRUB UR QL STRIP: NEGATIVE
BUN SERPL-MCNC: 26 MG/DL (ref 7–25)
CALCIUM SERPL-MCNC: 9 MG/DL (ref 8.6–10.3)
CHLORIDE SERPL-SCNC: 103 MMOL/L (ref 98–107)
CHOLEST SERPL-MCNC: 92 MG/DL
CO2 SERPL-SCNC: 27 MMOL/L (ref 21–31)
COLOR UR AUTO: YELLOW
CREAT SERPL-MCNC: 1.63 MG/DL (ref 0.7–1.3)
ERYTHROCYTE [DISTWIDTH] IN BLOOD BY AUTOMATED COUNT: 12.1 % (ref 10–15)
GFR SERPL CREATININE-BSD FRML MDRD: 42 ML/MIN/{1.73_M2}
GLUCOSE SERPL-MCNC: 246 MG/DL (ref 70–105)
GLUCOSE UR STRIP-MCNC: >1000 MG/DL
GRAN CASTS #/AREA URNS LPF: ABNORMAL /LPF
HBA1C MFR BLD: 7.9 % (ref 4–6)
HCT VFR BLD AUTO: 42.5 % (ref 40–53)
HDLC SERPL-MCNC: 33 MG/DL (ref 23–92)
HGB BLD-MCNC: 14.1 G/DL (ref 13.3–17.7)
HGB UR QL STRIP: NEGATIVE
HYALINE CASTS #/AREA URNS LPF: ABNORMAL /LPF
KETONES UR STRIP-MCNC: NEGATIVE MG/DL
LDLC SERPL CALC-MCNC: 33 MG/DL
LEUKOCYTE ESTERASE UR QL STRIP: NEGATIVE
MCH RBC QN AUTO: 33.1 PG (ref 26.5–33)
MCHC RBC AUTO-ENTMCNC: 33.2 G/DL (ref 31.5–36.5)
MCV RBC AUTO: 100 FL (ref 78–100)
NITRATE UR QL: NEGATIVE
NON-SQ EPI CELLS #/AREA URNS LPF: ABNORMAL /LPF
NONHDLC SERPL-MCNC: 59 MG/DL
PH UR STRIP: 5 PH (ref 5–9)
PLATELET # BLD AUTO: 208 10E9/L (ref 150–450)
POTASSIUM SERPL-SCNC: 4.4 MMOL/L (ref 3.5–5.1)
PROT SERPL-MCNC: 6.6 G/DL (ref 6.4–8.9)
RBC # BLD AUTO: 4.26 10E12/L (ref 4.4–5.9)
RBC #/AREA URNS AUTO: ABNORMAL /HPF
SODIUM SERPL-SCNC: 134 MMOL/L (ref 134–144)
SOURCE: ABNORMAL
SP GR UR STRIP: 1.02 (ref 1–1.03)
TRIGL SERPL-MCNC: 130 MG/DL
UROBILINOGEN UR STRIP-ACNC: 1 EU/DL (ref 0.2–1)
WBC # BLD AUTO: 6.4 10E9/L (ref 4–11)
WBC #/AREA URNS AUTO: ABNORMAL /HPF

## 2019-09-30 PROCEDURE — 80061 LIPID PANEL: CPT | Mod: ZL | Performed by: INTERNAL MEDICINE

## 2019-09-30 PROCEDURE — 81001 URINALYSIS AUTO W/SCOPE: CPT | Mod: ZL | Performed by: INTERNAL MEDICINE

## 2019-09-30 PROCEDURE — 82043 UR ALBUMIN QUANTITATIVE: CPT | Mod: ZL | Performed by: INTERNAL MEDICINE

## 2019-09-30 PROCEDURE — 85027 COMPLETE CBC AUTOMATED: CPT | Mod: ZL | Performed by: INTERNAL MEDICINE

## 2019-09-30 PROCEDURE — 36415 COLL VENOUS BLD VENIPUNCTURE: CPT | Mod: ZL | Performed by: INTERNAL MEDICINE

## 2019-09-30 PROCEDURE — 80053 COMPREHEN METABOLIC PANEL: CPT | Mod: ZL | Performed by: INTERNAL MEDICINE

## 2019-09-30 PROCEDURE — 83036 HEMOGLOBIN GLYCOSYLATED A1C: CPT | Mod: ZL | Performed by: INTERNAL MEDICINE

## 2019-09-30 NOTE — TELEPHONE ENCOUNTER
Patient scheduled appointment for narc med renewal on 10/21/2019. States this is too far out and wants to be worked in for a sooner appointment.  Katarina Rubio on 9/30/2019 at 3:24 PM

## 2019-10-01 ENCOUNTER — OFFICE VISIT (OUTPATIENT)
Dept: FAMILY MEDICINE | Facility: OTHER | Age: 69
End: 2019-10-01
Attending: PHYSICIAN ASSISTANT
Payer: COMMERCIAL

## 2019-10-01 VITALS
DIASTOLIC BLOOD PRESSURE: 82 MMHG | TEMPERATURE: 98.1 F | BODY MASS INDEX: 28.5 KG/M2 | WEIGHT: 198.6 LBS | HEART RATE: 80 BPM | RESPIRATION RATE: 20 BRPM | SYSTOLIC BLOOD PRESSURE: 160 MMHG

## 2019-10-01 DIAGNOSIS — Z02.89 PAIN MEDICATION AGREEMENT: ICD-10-CM

## 2019-10-01 DIAGNOSIS — G47.419 PRIMARY NARCOLEPSY WITHOUT CATAPLEXY: ICD-10-CM

## 2019-10-01 LAB
CREAT UR-MCNC: 112 MG/DL
MICROALBUMIN UR-MCNC: 704 MG/L
MICROALBUMIN/CREAT UR: 628.57 MG/G CR (ref 0–17)

## 2019-10-01 PROCEDURE — 99213 OFFICE O/P EST LOW 20 MIN: CPT | Performed by: PHYSICIAN ASSISTANT

## 2019-10-01 PROCEDURE — G0463 HOSPITAL OUTPT CLINIC VISIT: HCPCS

## 2019-10-01 RX ORDER — METHYLPHENIDATE HYDROCHLORIDE 10 MG/1
20 TABLET ORAL 2 TIMES DAILY
Qty: 360 TABLET | Refills: 0 | Status: SHIPPED | OUTPATIENT
Start: 2019-10-01 | End: 2019-10-21

## 2019-10-01 RX ORDER — DEXTROAMPHETAMINE SULFATE 10 MG/1
10 TABLET ORAL 4 TIMES DAILY
Qty: 360 TABLET | Refills: 0 | Status: SHIPPED | OUTPATIENT
Start: 2019-10-01 | End: 2019-10-21

## 2019-10-01 ASSESSMENT — PATIENT HEALTH QUESTIONNAIRE - PHQ9: SUM OF ALL RESPONSES TO PHQ QUESTIONS 1-9: 0

## 2019-10-01 NOTE — TELEPHONE ENCOUNTER
After proper verification, patient was informed that provider had no openings. Patient said that he had an appointment this afternoon with another provider but was unaware that she could refill his medications. Patient is going to keep his appointment with the other provider.  Ade Bryan LPN on 10/1/2019 at 11:51 AM

## 2019-10-01 NOTE — NURSING NOTE
Chief Complaint   Patient presents with     Medication Therapy Management         Medication Reconciliation: complete    Nkiki Estrella, LPN

## 2019-10-01 NOTE — PROGRESS NOTES
Nursing Notes:   Nikki Estrella LPN  10/1/2019  3:53 PM  Signed  Chief Complaint   Patient presents with     Medication Therapy Management         Medication Reconciliation: complete    Nikki Estrella LPN      HPI:    Moses Whittaker is a 69 year old male who presents for medication refill.  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  Patient regularly sees Dr. Barnett.  Uses dextraoamphetamine and methylphenidate.  Gets a 3-month supply.  No concerns with the medication.  Patient has been stable for many years.  No acute concerns at this time.  Needs a refill.    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     ANGIOPLASTY      10/01,Angioplasty with 2 vessel CABG the following week from the lt internal mammary to the lt anterior descending and right radial free graft     ANGIOPLASTY      04/2003     ARTHROSCOPY SHOULDER ROTATOR CUFF REPAIR      02/06/2006,Left rotator cuff repair and bone spur removal by Dr. Giraldo in [place]     COLONOSCOPY      1999     COLONOSCOPY  01/08/2016 01/08/2016,-- Dr. De La Cruz -- polypectomy      COLONOSCOPY  2019    hyperplastic, follow up 10 years, 29     OTHER SURGICAL HISTORY      ,[id number],IR ANGIOGRAM FEMORAL/EXTREMITY ([place]),Unremarkable angiogram at [facility]     OTHER SURGICAL HISTORY      10/05/2004,,PTCA,Angioplasty     OTHER SURGICAL HISTORY      2005,,PTCA,Angioplasty with stent.     OTHER SURGICAL HISTORY      2005,,PTCA,Angioplasty with stent.     OTHER SURGICAL HISTORY      2005,,PTCA,Angioplasty without stent     OTHER SURGICAL HISTORY      2007,[id number],IR ANGIOGRAM FEMORAL/EXTREMITY ([place]),Unremarkable coronary angiogram at [facility].     OTHER SURGICAL HISTORY      1967,SUR38,APPENDECTOMY OPEN     RELEASE CARPAL TUNNEL      11/15/01,Right carpal tunnel release by Dr. Mace     RELEASE CARPAL TUNNEL      2004,Left carpal tunnel release       Family History   Problem Relation Age of Onset     Heart Disease Mother         Heart Disease,Heart problems     Heart Disease Other         Heart Disease,Heart problems     Heart Disease Other         Heart Disease,Heart problems     Ankylosing Spondylitis Son         Ankylosing spondylitis,Ankylosing spondylitis     Other - See Comments Brother          with sudden death following shoulder surgery 2012 -- was off his Plavix for 10 days pre-operatively.     Ankylosing Spondylitis Daughter         Ankylosing spondylitis,-- Dx        Social History     Tobacco Use     Smoking status: Never Smoker     Smokeless tobacco: Never Used   Substance Use Topics     Alcohol use: No     Alcohol/week: 0.0 standard drinks       Current Outpatient Medications   Medication Sig Dispense Refill     aspirin (GOODSENSE ASPIRIN) 325 MG tablet Take 325 mg by mouth daily Take  by mouth.       blood glucose monitoring (ONETOUCH ULTRA) test strip Dispense test strips covered by the patient insurance. Test 10 times per day.       Blood Glucose Monitoring Suppl ([employer] BLOOD GLUCOSE MONITOR) WAQAS Dispense glucose meter,  test strips and lancets covered by the patient insurance. Test 10 times per day.       clopidogrel (PLAVIX) 75 MG tablet Take 1 tablet (75 mg) by mouth daily 90 tablet 3     co-enzyme Q-10 100 MG CAPS capsule Take 100 mg by mouth daily        [employer] ALCOHOL SWABS PADS For home use.       desoximetasone (TOPICORT) 0.25 % external cream APPLY  CREAM EXTERNALLY TWICE DAILY 180 g 3     dextroamphetamine (DEXTROSTAT) 10 MG tablet Take 1 tablet (10 mg) by mouth 4 times daily 360 tablet 0     diclofenac (VOLTAREN) 75 MG EC tablet TAKE ONE TABLET BY MOUTH 4 TIMES DAILY 360 tablet 3     docusate sodium (COLACE) 100 MG capsule Take 1-2 capsules by mouth 2 times daily as needed for constipation prevention       Flaxseed, Linseed, (FLAXSEED OIL) 1000 MG CAPS Take 1 capsule by mouth daily        furosemide (LASIX) 40 MG tablet Take 2-4 tablets ( mg) by mouth daily Or as instructed for leg swelling 90 tablet 3     HUMALOG 100 UNIT/ML injection INJECT UNDER THE SKIN USING INSULIN PUMP. MAX DAILY DOSE  UNITS 180 mL 3     hydrALAZINE (APRESOLINE) 25 MG tablet Take 1-2 tablets (25-50 mg) by mouth 4 times daily - Take lowest effective dose for blood pressure management 360 tablet 11     HYDROcodone-acetaminophen (NORCO) 5-325 MG per tablet Take 1 tablet by mouth every 6 hours as needed for severe pain 60 tablet 0     Insulin Pen Needle (PEN NEEDLES) 29G X 12MM MISC For administering insulin at home.       irbesartan (AVAPRO) 150 MG tablet Take 1 tablet (150 mg) by mouth 2 times daily -- needs 2 smaller pills daily 180 tablet 3     IV Sets-Tubing (SOLUTION ADMINISTRATION SET) MISC As directed.       levothyroxine (SYNTHROID/LEVOTHROID) 125 MCG tablet TAKE 1 TABLET BY MOUTH ONCE DAILY IN THE MORNING 90 tablet 3     methylphenidate (RITALIN) 10 MG tablet Take 2 tablets (20 mg) by mouth 2 times daily 360 tablet 0     metoprolol succinate (TOPROL-XL) 50 MG 24 hr tablet Take 1 tablet (50 mg) by mouth 2 times daily 180 tablet 3      "nitroGLYcerin (NITROSTAT) 0.4 MG sublingual tablet Place 1 tablet under the tongue every 5 minutes as needed for chest pain       ranitidine (ZANTAC) 150 MG tablet Take 1 tablet (150 mg) by mouth 2 times daily 180 tablet 3     ranolazine (RANEXA) 500 MG 12 hr tablet Take 2 tablets (1,000 mg) by mouth 2 times daily - cancel other Rx - give 3 month supply 360 tablet 3     rosuvastatin (CRESTOR) 10 MG tablet TAKE 1 TABLET BY MOUTH TWICE DAILY 180 tablet 2     Saline GEL Spray 1 spray in nostril every hour as needed For Nasal Dryness       thin (NO BRAND SPECIFIED) lancets Test 10 times per day.       vitamin E (TOCOPHEROL) 400 units (360 mg) capsule Take 1 capsule by mouth daily         Allergies   Allergen Reactions     Famotidine Other (See Comments)     + Caused Headache and Rash -- tolerated Ranitidine and worked well.     Gabapentin      Other reaction(s): Mental Status Change \"felt like in outer space\"     Hydrocortisone Other (See Comments)     Burning and stinging sensation with Hydrocortisone - doesn't help itching or rash -- tolerated Desoximetasone cream and worked well.      Atorvastatin Hives     Celebrex [Celecoxib]      Swelling      Lisinopril Cough     No Clinical Screening - See Comments Hives     Chlorine water     Norvasc [Amlodipine] Other (See Comments)     -- can't remember, didn't tolerate.      Pregabalin      Lyrica - Other reaction(s): Mental Status Change     Valdecoxib Swelling     \"bextra\" NSAID     Insulin Aspart Rash       REVIEW OFSYSTEMS:  Refer to HPI.    EXAM:   Vitals:    BP (!) 160/82 (BP Location: Right arm, Patient Position: Sitting, Cuff Size: Adult Regular)   Pulse 80   Temp 98.1  F (36.7  C)   Resp 20   Wt 90.1 kg (198 lb 9.6 oz)   BMI 28.50 kg/m    General Appearance: Pleasant, alert, appropriate appearance for age. No acute distress  Head Exam: Normal. Normocephalic, atraumatic.  Musculoskeletal Exam: Back is straight, full ROM of upper and lower extremities.  Skin: no " rash or abnormalities  Neurologic Exam: Nonfocal, normal gross motor, tone coordination and no tremor.  Psychiatric Exam: Alert and oriented - appropriate affect.    PHQ Depression Screen  PHQ-9 SCORE 6/26/2019 8/29/2019 10/1/2019   PHQ-9 Total Score 0 0 0       ASSESSMENT AND PLAN:      ICD-10-CM    1. Primary narcolepsy without cataplexy G47.419 methylphenidate (RITALIN) 10 MG tablet     dextroamphetamine (DEXTROSTAT) 10 MG tablet   2. Pain medication agreement - 8/9/2018 Z02.89 methylphenidate (RITALIN) 10 MG tablet     dextroamphetamine (DEXTROSTAT) 10 MG tablet         Refilled methylphenidate quantity 360 with 0 refills and dextroamphetamine quantity 360 with 0 refills.  Return for recheck with Dr. Barnett in 3 months.  No acute concerns are appreciated at this time.    Patient was given a one time refill of the two medications to use prior to having a medication management appointment with the primary care provider.   Patient was given the side effect profile and the safety concerns with using the controlled medication prescription.   Patient should not share the controlled medication with other people.   UTD on pain med contract.   Needs urine tox screen at the next appointment.     website was reviewed and printed.       Ela Briceno PA-C PA-C..................10/1/2019 3:50 PM

## 2019-10-16 NOTE — PROGRESS NOTES
"Nursing Notes:   Barbara Connor LPN  10/21/2019 10:18 AM  Signed  Patient presents to the clinic for medication management.  Last administration of Ritalin was last night around 1830, Dextrostat was yesterday around 1000.  Contract updated today.      Previous A1C is at goal of <8  Lab Results   Component Value Date    A1C 7.9 09/30/2019    A1C 8.1 06/26/2019    A1C 7.7 03/08/2019    A1C 7.5 11/21/2018    A1C 8.0 08/09/2018     Urine microalbumin:creatine: n/a  Foot exam unknown-declines today  Eye exam 02/2019    Tobacco User no  Patient is on a daily aspirin  Patient is on a Statin.  Blood pressure today of:     BP Readings from Last 1 Encounters:   10/01/19 (!) 160/82      is at the goal of <139/89 for diabetics.      Chief Complaint   Patient presents with     Recheck Medication       Initial /70 (BP Location: Right arm, Patient Position: Sitting, Cuff Size: Adult Regular)   Pulse 84   Temp 97.9  F (36.6  C) (Tympanic)   Resp 20   Wt 90.3 kg (199 lb)   BMI 28.55 kg/m    Estimated body mass index is 28.55 kg/m  as calculated from the following:    Height as of 9/24/19: 1.778 m (5' 10\").    Weight as of this encounter: 90.3 kg (199 lb).  Medication Reconciliation: complete    Barbara Connor LPN      Nursing note reviewed with patient.  Accuracy and completeness verified.   Mr. Whittaker is a 69 year old male who:  Patient presents with:  Recheck Medication      ICD-10-CM    1. Primary narcolepsy without cataplexy G47.419 dextroamphetamine (DEXTROSTAT) 10 MG tablet     methylphenidate (RITALIN) 10 MG tablet   2. Pain medication agreement - 8/9/2018 Z02.89 dextroamphetamine (DEXTROSTAT) 10 MG tablet     methylphenidate (RITALIN) 10 MG tablet   3. Chronic diastolic heart failure (H) I50.32 irbesartan (AVAPRO) 150 MG tablet     furosemide (LASIX) 40 MG tablet   4. CKD (chronic kidney disease) stage 3, GFR 30-59 ml/min (H) N18.3 hydrALAZINE (APRESOLINE) 25 MG tablet     metoprolol succinate ER (TOPROL-XL) " 50 MG 24 hr tablet   5. Gastroesophageal reflux disease, esophagitis presence not specified K21.9 ranitidine (ZANTAC) 150 MG tablet   6. Atherosclerosis of native coronary artery of native heart with stable angina pectoris (H) I25.118 clopidogrel (PLAVIX) 75 MG tablet   7. Controlled drug dependence (H) F19.20 Pain Drug Scr UR W Rptd Meds     HPI  Patient comes in for his chronic narcolepsy medication management appointment.  Has been doing well with his Dextrostat and methylphenidate.  Uses a combination of the 2 medications throughout the day.  This is been working for him for quite a while.  Just recently had to switch pharmacies because the old pharmacy was not getting him his medications.    Chronic diastolic heart failure. Continues with leg swelling issues. Wears compression stocking stockings, watching sodium intake, still getting legs up at times.     Has had some venous ulcer issues, now with weeping lesions on his left leg again as a ulceration on his right second toe that he is worried may have infection.  He has all of his wounds dressed up with dressings.  He does not want to remove them today, at this time.  He wants to see 1 of the wound care surgical team members to have this evaluated and treated.  There is an opening in the surgery clinic this afternoon.  He originally had an appointment on Thursday will have this changed his letter today.    Stage III chronic kidney disease, has been stable.  Advised to avoid NSAID use.    Heartburn or reflux, persistence.  Needs refills of his antacids.    Coronary artery disease, has chronic stable angina.  Parking pass permit completed today.    Controlled drug dependence, see above.  Using Dextrostat and methylphenidate for his narcolepsy.  Needs urine drug screen.  We will collect this with his next labs.    Functional Capacity: about 4 METS. + has chronic angina.   Review of Systems   Constitutional: Negative for chills, fatigue and fever.   HENT: Negative  "for congestion.    Eyes: Negative for visual disturbance.   Respiratory: Negative for cough, choking, chest tightness, shortness of breath, wheezing and stridor.    Cardiovascular: Positive for chest pain (chronic, stable) and leg swelling. Negative for palpitations.        + claudication.   + intermittent Angina   Gastrointestinal: Negative for abdominal pain.   Genitourinary: Negative for hematuria.   Musculoskeletal: Positive for arthralgias, back pain, gait problem, myalgias, neck pain and neck stiffness.   Skin: Positive for wound (  + biliateral legs / and right foot - 2nd toe). Negative for rash.   Neurological: Positive for headaches. Negative for syncope.   Hematological: Negative for adenopathy. Does not bruise/bleed easily.   Psychiatric/Behavioral: Negative for confusion.        Problem List/PMH: reviewed in EMR, and made relevant updates today.  Medications: reviewed in EMR, and made relevant updates today.  Allergies: reviewed in EMR, and made relevant updates today.  Allergies   Allergen Reactions     Famotidine Other (See Comments)     + Caused Headache and Rash -- tolerated Ranitidine and worked well.     Gabapentin      Other reaction(s): Mental Status Change \"felt like in outer space\"     Hydrocortisone Other (See Comments)     Burning and stinging sensation with Hydrocortisone - doesn't help itching or rash -- tolerated Desoximetasone cream and worked well.      Atorvastatin Hives     Celebrex [Celecoxib]      Swelling      Lisinopril Cough     No Clinical Screening - See Comments Hives     Chlorine water     Norvasc [Amlodipine] Other (See Comments)     -- can't remember, didn't tolerate.      Pregabalin      Lyrica - Other reaction(s): Mental Status Change     Valdecoxib Swelling     \"bextra\" NSAID     Insulin Aspart Rash        Current Outpatient Medications   Medication Sig Dispense Refill     aspirin (GOODSENSE ASPIRIN) 325 MG tablet Take 325 mg by mouth daily Take  by mouth.       blood " glucose monitoring (ONETOUCH ULTRA) test strip Dispense test strips covered by the patient insurance. Test 10 times per day.       Blood Glucose Monitoring Suppl (GLUCOCOM BLOOD GLUCOSE MONITOR) WAQAS Dispense glucose meter, test strips and lancets covered by the patient insurance. Test 10 times per day.       clopidogrel (PLAVIX) 75 MG tablet Take 1 tablet (75 mg) by mouth daily 90 tablet 3     co-enzyme Q-10 100 MG CAPS capsule Take 100 mg by mouth daily        [employer] ALCOHOL SWABS PADS For home use.       desoximetasone (TOPICORT) 0.25 % external cream APPLY  CREAM EXTERNALLY TWICE DAILY 180 g 3     dextroamphetamine (DEXTROSTAT) 10 MG tablet Take 1 tablet (10 mg) by mouth 4 times daily 360 tablet 0     diclofenac (VOLTAREN) 75 MG EC tablet TAKE ONE TABLET BY MOUTH 4 TIMES DAILY 360 tablet 3     docusate sodium (COLACE) 100 MG capsule Take 1-2 capsules by mouth 2 times daily as needed for constipation prevention       Flaxseed, Linseed, (FLAXSEED OIL) 1000 MG CAPS Take 1 capsule by mouth daily        furosemide (LASIX) 40 MG tablet Take 2-4 tablets ( mg) by mouth daily Or as instructed for leg swelling 90 tablet 3     HUMALOG 100 UNIT/ML injection INJECT UNDER THE SKIN USING INSULIN PUMP. MAX DAILY DOSE  UNITS 180 mL 3     hydrALAZINE (APRESOLINE) 25 MG tablet Take 1-2 tablets (25-50 mg) by mouth 4 times daily - Take lowest effective dose for blood pressure management 360 tablet 11     HYDROcodone-acetaminophen (NORCO) 5-325 MG per tablet Take 1 tablet by mouth every 6 hours as needed for severe pain 60 tablet 0     Insulin Pen Needle (PEN NEEDLES) 29G X 12MM MISC For administering insulin at home.       irbesartan (AVAPRO) 150 MG tablet Take 1 tablet (150 mg) by mouth 2 times daily -- needs 2 smaller pills daily 180 tablet 3     IV Sets-Tubing (SOLUTION ADMINISTRATION SET) MISC As directed.       levothyroxine (SYNTHROID/LEVOTHROID) 125 MCG tablet TAKE 1 TABLET BY MOUTH ONCE DAILY IN THE MORNING 90  tablet 3     methylphenidate (RITALIN) 10 MG tablet Take 2 tablets (20 mg) by mouth 2 times daily 360 tablet 0     metoprolol succinate ER (TOPROL-XL) 50 MG 24 hr tablet Take 1 tablet (50 mg) by mouth 2 times daily 180 tablet 3     nitroGLYcerin (NITROSTAT) 0.4 MG sublingual tablet Place 1 tablet under the tongue every 5 minutes as needed for chest pain       ranitidine (ZANTAC) 150 MG tablet Take 1 tablet (150 mg) by mouth 2 times daily 180 tablet 3     ranolazine (RANEXA) 500 MG 12 hr tablet Take 2 tablets (1,000 mg) by mouth 2 times daily - cancel other Rx - give 3 month supply 360 tablet 3     rosuvastatin (CRESTOR) 10 MG tablet TAKE 1 TABLET BY MOUTH TWICE DAILY 180 tablet 2     Saline GEL Spray 1 spray in nostril every hour as needed For Nasal Dryness       thin (NO BRAND SPECIFIED) lancets Test 10 times per day.       vitamin E (TOCOPHEROL) 400 units (360 mg) capsule Take 1 capsule by mouth daily          I reviewed family and social history and made relevant updates today.  Social History     Tobacco Use     Smoking status: Never Smoker     Smokeless tobacco: Never Used   Substance Use Topics     Alcohol use: No     Alcohol/week: 0.0 standard drinks     Drug use: No      Family History   Problem Relation Age of Onset     Heart Disease Mother         Heart Disease,Heart problems     Heart Disease Other         Heart Disease,Heart problems     Heart Disease Other         Heart Disease,Heart problems     Ankylosing Spondylitis Son         Ankylosing spondylitis,Ankylosing spondylitis     Other - See Comments Brother          with sudden death following shoulder surgery 2012 -- was off his Plavix for 10 days pre-operatively.     Ankylosing Spondylitis Daughter         Ankylosing spondylitis,-- Dx 2017       EXAM:   Vitals:    10/21/19 0952   BP: 136/70   BP Location: Right arm   Patient Position: Sitting   Cuff Size: Adult Regular   Pulse: 84   Resp: 20   Temp: 97.9  F (36.6  C)   TempSrc: Tympanic  "  Weight: 90.3 kg (199 lb)       Current Pain Score: Extreme Pain (8)     BP Readings from Last 3 Encounters:   10/21/19 136/70   10/01/19 (!) 160/82   09/24/19 (!) 150/80      Wt Readings from Last 3 Encounters:   10/21/19 90.3 kg (199 lb)   10/01/19 90.1 kg (198 lb 9.6 oz)   09/24/19 91.4 kg (201 lb 9.6 oz)      Estimated body mass index is 28.55 kg/m  as calculated from the following:    Height as of 9/24/19: 1.778 m (5' 10\").    Weight as of this encounter: 90.3 kg (199 lb).     Physical Exam  Constitutional:       Appearance: He is well-developed.   HENT:      Head: Normocephalic and atraumatic.   Eyes:      General: No scleral icterus.  Cardiovascular:      Rate and Rhythm: Normal rate and regular rhythm.   Pulmonary:      Effort: Pulmonary effort is normal.      Breath sounds: No wheezing.   Abdominal:      Palpations: Abdomen is soft.      Tenderness: There is no tenderness.      Comments: + insulin pump noted   Lymphadenopathy:      Cervical: No cervical adenopathy.   Skin:     General: Skin is warm and dry.      Findings: Erythema present.      Comments: + venous ulcerations on left leg with bandages.   Right 2nd toe with bandages.   -- going to see surgery for wound evaluation today.    Neurological:      Mental Status: He is alert and oriented to person, place, and time.        Procedures   INVESTIGATIONS:  -- see below.   ASSESSMENT AND PLAN:  Problem List Items Addressed This Visit        Digestive    Esophageal reflux    Relevant Medications    ranitidine (ZANTAC) 150 MG tablet       Circulatory    Coronary atherosclerosis    Relevant Medications    clopidogrel (PLAVIX) 75 MG tablet    Chronic diastolic heart failure (H)    Relevant Medications    irbesartan (AVAPRO) 150 MG tablet    furosemide (LASIX) 40 MG tablet       Urinary    CKD (chronic kidney disease) stage 3, GFR 30-59 ml/min (H)    Relevant Medications    hydrALAZINE (APRESOLINE) 25 MG tablet    metoprolol succinate ER (TOPROL-XL) 50 MG 24 " hr tablet       Other    Primary narcolepsy without cataplexy - Primary    Relevant Medications    dextroamphetamine (DEXTROSTAT) 10 MG tablet    methylphenidate (RITALIN) 10 MG tablet    Pain medication agreement - 8/9/2018    Relevant Medications    dextroamphetamine (DEXTROSTAT) 10 MG tablet    methylphenidate (RITALIN) 10 MG tablet      Other Visit Diagnoses     Controlled drug dependence (H)        Relevant Orders    Pain Drug Scr UR W Rptd Meds        reviewed diet, exercise and weight control, recommended sodium restriction, cardiovascular risk and specific lipid/LDL goals reviewed, specific diabetic recommendations low cholesterol diet, weight control and daily exercise discussed, use of aspirin to prevent MI and TIA's discussed  -- Expected clinical course discussed    -- Medications and their side effects discussed    Patient Instructions     Blood pressure is well controlled.   Labs are stable.   -- need to get your legs / ulcers evaluated and cared for today in surgery clinic.     Controlled substance agreement updated today.     Return in approximately 3 months from today, or sooner as needed for follow-up with Dr. Barnett.    - Med Refills - Controlled RX    - Bring your remaining pills / pill bottles with to: Each of your Med Management/refill  appointments for pill counts.   - Urine drug screens for controlled medications will be completed every 3 to 12 months.   - Random pill counts and urine drug testing may occur.   - No illicit drug use or pill sharing will be tolerated.     Clinic : [phone]  Appointment line: [phone]       Results for orders placed or performed in visit on 09/30/19   Lipid Profile   Result Value Ref Range    Cholesterol 92 <200 mg/dL    Triglycerides 130 <150 mg/dL    HDL Cholesterol 33 23 - 92 mg/dL    LDL Cholesterol Calculated 33 <100 mg/dL    Non HDL Cholesterol 59 <130 mg/dL   Hemoglobin A1c   Result Value Ref Range    Hemoglobin A1C 7.9 (H) 4.0 - 6.0 %    CBC with platelets   Result Value Ref Range    WBC 6.4 4.0 - 11.0 10e9/L    RBC Count 4.26 (L) 4.4 - 5.9 10e12/L    Hemoglobin 14.1 13.3 - 17.7 g/dL    Hematocrit 42.5 40.0 - 53.0 %     78 - 100 fl    MCH 33.1 (H) 26.5 - 33.0 pg    MCHC 33.2 31.5 - 36.5 g/dL    RDW 12.1 10.0 - 15.0 %    Platelet Count 208 150 - 450 10e9/L   Comprehensive metabolic panel   Result Value Ref Range    Sodium 134 134 - 144 mmol/L    Potassium 4.4 3.5 - 5.1 mmol/L    Chloride 103 98 - 107 mmol/L    Carbon Dioxide 27 21 - 31 mmol/L    Anion Gap 4 3 - 14 mmol/L    Glucose 246 (H) 70 - 105 mg/dL    Urea Nitrogen 26 (H) 7 - 25 mg/dL    Creatinine 1.63 (H) 0.70 - 1.30 mg/dL    GFR Estimate 42 (L) >60 mL/min/[1.73_m2]    GFR Estimate If Black 51 (L) >60 mL/min/[1.73_m2]    Calcium 9.0 8.6 - 10.3 mg/dL    Bilirubin Total 0.5 0.3 - 1.0 mg/dL    Albumin 4.0 3.5 - 5.7 g/dL    Protein Total 6.6 6.4 - 8.9 g/dL    Alkaline Phosphatase 63 34 - 104 U/L    ALT 33 7 - 52 U/L    AST 23 13 - 39 U/L   *UA reflex to Microscopic   Result Value Ref Range    Color Urine Yellow     Appearance Urine Clear     Glucose Urine >1000 (A) NEG^Negative mg/dL    Bilirubin Urine Negative NEG^Negative    Ketones Urine Negative NEG^Negative mg/dL    Specific Gravity Urine 1.025 1.000 - 1.030    Blood Urine Negative NEG^Negative    pH Urine 5.0 5.0 - 9.0 pH    Protein Albumin Urine 100 (A) NEG^Negative mg/dL    Urobilinogen Urine 1.0 0.2 - 1.0 EU/dL    Nitrite Urine Negative NEG^Negative    Leukocyte Esterase Urine Negative NEG^Negative    Source Midstream Urine    Albumin Random Urine Quantitative with Creat Ratio   Result Value Ref Range    Creatinine Urine 112 mg/dL    Albumin Urine mg/L 704 mg/L    Albumin Urine mg/g Cr 628.57 (H) 0 - 17 mg/g Cr   Urine Microscopic   Result Value Ref Range    WBC Urine 0 - 5 OTO5^0 - 5 /HPF    RBC Urine O - 2 OTO2^O - 2 /HPF    Hyaline Casts O - 2 OTO2^O - 2 /LPF    Granular Casts 0-2 (A) NEG^Negative /LPF    Squamous Epithelial  /LPF Urine Few FEW^Few /LPF        Jorge Barnett MD  Internal Medicine  [facility]     Portions of this note were dictated using speech recognition software. The note has been proofread but errors in the text may have been overlooked. Please contact me if there are any concerns regarding the accuracy of the dictation.

## 2019-10-18 DIAGNOSIS — M50.30 DEGENERATIVE DISC DISEASE, CERVICAL: ICD-10-CM

## 2019-10-18 DIAGNOSIS — Z02.89 PAIN MEDICATION AGREEMENT: ICD-10-CM

## 2019-10-18 DIAGNOSIS — M54.10 RADICULAR LOW BACK PAIN: ICD-10-CM

## 2019-10-18 RX ORDER — HYDROCODONE BITARTRATE AND ACETAMINOPHEN 5; 325 MG/1; MG/1
1 TABLET ORAL EVERY 6 HOURS PRN
Qty: 60 TABLET | Refills: 0 | Status: CANCELLED | OUTPATIENT
Start: 2019-10-18

## 2019-10-21 ENCOUNTER — OFFICE VISIT (OUTPATIENT)
Dept: INTERNAL MEDICINE | Facility: OTHER | Age: 69
End: 2019-10-21
Attending: INTERNAL MEDICINE
Payer: MEDICARE

## 2019-10-21 ENCOUNTER — OFFICE VISIT (OUTPATIENT)
Dept: SURGERY | Facility: OTHER | Age: 69
End: 2019-10-21
Attending: SURGERY
Payer: MEDICARE

## 2019-10-21 VITALS
HEART RATE: 84 BPM | DIASTOLIC BLOOD PRESSURE: 70 MMHG | WEIGHT: 199 LBS | TEMPERATURE: 97.9 F | RESPIRATION RATE: 20 BRPM | SYSTOLIC BLOOD PRESSURE: 136 MMHG | BODY MASS INDEX: 28.55 KG/M2

## 2019-10-21 VITALS
RESPIRATION RATE: 18 BRPM | HEART RATE: 71 BPM | BODY MASS INDEX: 28.41 KG/M2 | SYSTOLIC BLOOD PRESSURE: 136 MMHG | TEMPERATURE: 97.2 F | WEIGHT: 198 LBS | DIASTOLIC BLOOD PRESSURE: 58 MMHG

## 2019-10-21 DIAGNOSIS — G47.419 PRIMARY NARCOLEPSY WITHOUT CATAPLEXY: Primary | ICD-10-CM

## 2019-10-21 DIAGNOSIS — L97.221 VENOUS STASIS ULCER OF LEFT CALF LIMITED TO BREAKDOWN OF SKIN WITHOUT VARICOSE VEINS (H): Primary | ICD-10-CM

## 2019-10-21 DIAGNOSIS — F19.20 CONTROLLED DRUG DEPENDENCE (H): ICD-10-CM

## 2019-10-21 DIAGNOSIS — N18.30 CKD (CHRONIC KIDNEY DISEASE) STAGE 3, GFR 30-59 ML/MIN (H): ICD-10-CM

## 2019-10-21 DIAGNOSIS — L97.513 SKIN ULCER OF TOE OF RIGHT FOOT WITH NECROSIS OF MUSCLE (H): ICD-10-CM

## 2019-10-21 DIAGNOSIS — K21.9 GASTROESOPHAGEAL REFLUX DISEASE, ESOPHAGITIS PRESENCE NOT SPECIFIED: ICD-10-CM

## 2019-10-21 DIAGNOSIS — I50.32 CHRONIC DIASTOLIC HEART FAILURE (H): ICD-10-CM

## 2019-10-21 DIAGNOSIS — Z02.89 PAIN MEDICATION AGREEMENT: ICD-10-CM

## 2019-10-21 DIAGNOSIS — I25.118 ATHEROSCLEROSIS OF NATIVE CORONARY ARTERY OF NATIVE HEART WITH STABLE ANGINA PECTORIS (H): ICD-10-CM

## 2019-10-21 DIAGNOSIS — I87.2 VENOUS STASIS ULCER OF LEFT CALF LIMITED TO BREAKDOWN OF SKIN WITHOUT VARICOSE VEINS (H): Primary | ICD-10-CM

## 2019-10-21 PROCEDURE — 99213 OFFICE O/P EST LOW 20 MIN: CPT | Mod: 25 | Performed by: SURGERY

## 2019-10-21 PROCEDURE — G0463 HOSPITAL OUTPT CLINIC VISIT: HCPCS | Mod: 25,27

## 2019-10-21 PROCEDURE — 29580 STRAPPING UNNA BOOT: CPT | Performed by: SURGERY

## 2019-10-21 PROCEDURE — G0463 HOSPITAL OUTPT CLINIC VISIT: HCPCS | Mod: 25

## 2019-10-21 PROCEDURE — G0463 HOSPITAL OUTPT CLINIC VISIT: HCPCS

## 2019-10-21 PROCEDURE — 99214 OFFICE O/P EST MOD 30 MIN: CPT | Performed by: INTERNAL MEDICINE

## 2019-10-21 RX ORDER — CIPROFLOXACIN 500 MG/1
500 TABLET, FILM COATED ORAL 2 TIMES DAILY
Qty: 28 TABLET | Refills: 0 | Status: SHIPPED | OUTPATIENT
Start: 2019-10-21 | End: 2019-11-07

## 2019-10-21 RX ORDER — METHYLPHENIDATE HYDROCHLORIDE 10 MG/1
20 TABLET ORAL 2 TIMES DAILY
Qty: 360 TABLET | Refills: 0 | Status: SHIPPED | OUTPATIENT
Start: 2019-10-21 | End: 2019-12-19

## 2019-10-21 RX ORDER — IRBESARTAN 150 MG/1
150 TABLET ORAL 2 TIMES DAILY
Qty: 180 TABLET | Refills: 3 | Status: SHIPPED | OUTPATIENT
Start: 2019-10-21 | End: 2020-09-30

## 2019-10-21 RX ORDER — CLINDAMYCIN HCL 300 MG
300 CAPSULE ORAL 3 TIMES DAILY
Qty: 42 CAPSULE | Refills: 0 | Status: SHIPPED | OUTPATIENT
Start: 2019-10-21 | End: 2019-12-19

## 2019-10-21 RX ORDER — DEXTROAMPHETAMINE SULFATE 10 MG/1
10 TABLET ORAL 4 TIMES DAILY
Qty: 360 TABLET | Refills: 0 | Status: SHIPPED | OUTPATIENT
Start: 2019-10-21 | End: 2019-12-19

## 2019-10-21 RX ORDER — CLOPIDOGREL BISULFATE 75 MG/1
75 TABLET ORAL DAILY
Qty: 90 TABLET | Refills: 3 | Status: SHIPPED | OUTPATIENT
Start: 2019-10-21 | End: 2020-09-30

## 2019-10-21 RX ORDER — FUROSEMIDE 40 MG
80-160 TABLET ORAL DAILY
Qty: 90 TABLET | Refills: 3 | Status: ON HOLD | OUTPATIENT
Start: 2019-10-21 | End: 2020-03-15

## 2019-10-21 RX ORDER — METOPROLOL SUCCINATE 50 MG/1
50 TABLET, EXTENDED RELEASE ORAL 2 TIMES DAILY
Qty: 180 TABLET | Refills: 3 | Status: SHIPPED | OUTPATIENT
Start: 2019-10-21 | End: 2020-09-30

## 2019-10-21 RX ORDER — HYDRALAZINE HYDROCHLORIDE 25 MG/1
25-50 TABLET, FILM COATED ORAL 4 TIMES DAILY
Qty: 360 TABLET | Refills: 11 | Status: SHIPPED | OUTPATIENT
Start: 2019-10-21 | End: 2020-09-30

## 2019-10-21 ASSESSMENT — ENCOUNTER SYMPTOMS
ARTHRALGIAS: 1
FATIGUE: 0
STRIDOR: 0
BRUISES/BLEEDS EASILY: 0
CHILLS: 0
PALPITATIONS: 0
HEADACHES: 1
CHOKING: 0
SHORTNESS OF BREATH: 0
HEMATURIA: 0
NECK PAIN: 1
WOUND: 1
NECK STIFFNESS: 1
ABDOMINAL PAIN: 0
WHEEZING: 0
ADENOPATHY: 0
FEVER: 0
MYALGIAS: 1
COUGH: 0
BACK PAIN: 1
CONFUSION: 0
CHEST TIGHTNESS: 0

## 2019-10-21 ASSESSMENT — ANXIETY QUESTIONNAIRES
IF YOU CHECKED OFF ANY PROBLEMS ON THIS QUESTIONNAIRE, HOW DIFFICULT HAVE THESE PROBLEMS MADE IT FOR YOU TO DO YOUR WORK, TAKE CARE OF THINGS AT HOME, OR GET ALONG WITH OTHER PEOPLE: NOT DIFFICULT AT ALL
2. NOT BEING ABLE TO STOP OR CONTROL WORRYING: NOT AT ALL
7. FEELING AFRAID AS IF SOMETHING AWFUL MIGHT HAPPEN: NOT AT ALL
3. WORRYING TOO MUCH ABOUT DIFFERENT THINGS: NOT AT ALL
GAD7 TOTAL SCORE: 0
1. FEELING NERVOUS, ANXIOUS, OR ON EDGE: NOT AT ALL
5. BEING SO RESTLESS THAT IT IS HARD TO SIT STILL: NOT AT ALL
6. BECOMING EASILY ANNOYED OR IRRITABLE: NOT AT ALL

## 2019-10-21 ASSESSMENT — PATIENT HEALTH QUESTIONNAIRE - PHQ9
5. POOR APPETITE OR OVEREATING: NOT AT ALL
SUM OF ALL RESPONSES TO PHQ QUESTIONS 1-9: 0

## 2019-10-21 ASSESSMENT — PAIN SCALES - GENERAL
PAINLEVEL: EXTREME PAIN (8)
PAINLEVEL: MODERATE PAIN (4)

## 2019-10-21 NOTE — PATIENT INSTRUCTIONS
Blood pressure is well controlled.   Labs are stable.   -- need to get your legs / ulcers evaluated and cared for today in surgery clinic.     Controlled substance agreement updated today.     Return in approximately 3 months from today, or sooner as needed for follow-up with Dr. Barnett.    - Med Refills - Controlled RX    - Bring your remaining pills / pill bottles with to: Each of your Med Management/refill  appointments for pill counts.   - Urine drug screens for controlled medications will be completed every 3 to 12 months.   - Random pill counts and urine drug testing may occur.   - No illicit drug use or pill sharing will be tolerated.     Clinic : [phone]  Appointment line: [phone]       Results for orders placed or performed in visit on 09/30/19   Lipid Profile   Result Value Ref Range    Cholesterol 92 <200 mg/dL    Triglycerides 130 <150 mg/dL    HDL Cholesterol 33 23 - 92 mg/dL    LDL Cholesterol Calculated 33 <100 mg/dL    Non HDL Cholesterol 59 <130 mg/dL   Hemoglobin A1c   Result Value Ref Range    Hemoglobin A1C 7.9 (H) 4.0 - 6.0 %   CBC with platelets   Result Value Ref Range    WBC 6.4 4.0 - 11.0 10e9/L    RBC Count 4.26 (L) 4.4 - 5.9 10e12/L    Hemoglobin 14.1 13.3 - 17.7 g/dL    Hematocrit 42.5 40.0 - 53.0 %     78 - 100 fl    MCH 33.1 (H) 26.5 - 33.0 pg    MCHC 33.2 31.5 - 36.5 g/dL    RDW 12.1 10.0 - 15.0 %    Platelet Count 208 150 - 450 10e9/L   Comprehensive metabolic panel   Result Value Ref Range    Sodium 134 134 - 144 mmol/L    Potassium 4.4 3.5 - 5.1 mmol/L    Chloride 103 98 - 107 mmol/L    Carbon Dioxide 27 21 - 31 mmol/L    Anion Gap 4 3 - 14 mmol/L    Glucose 246 (H) 70 - 105 mg/dL    Urea Nitrogen 26 (H) 7 - 25 mg/dL    Creatinine 1.63 (H) 0.70 - 1.30 mg/dL    GFR Estimate 42 (L) >60 mL/min/[1.73_m2]    GFR Estimate If Black 51 (L) >60 mL/min/[1.73_m2]    Calcium 9.0 8.6 - 10.3 mg/dL    Bilirubin Total 0.5 0.3 - 1.0 mg/dL    Albumin 4.0 3.5 - 5.7 g/dL     Protein Total 6.6 6.4 - 8.9 g/dL    Alkaline Phosphatase 63 34 - 104 U/L    ALT 33 7 - 52 U/L    AST 23 13 - 39 U/L   *UA reflex to Microscopic   Result Value Ref Range    Color Urine Yellow     Appearance Urine Clear     Glucose Urine >1000 (A) NEG^Negative mg/dL    Bilirubin Urine Negative NEG^Negative    Ketones Urine Negative NEG^Negative mg/dL    Specific Gravity Urine 1.025 1.000 - 1.030    Blood Urine Negative NEG^Negative    pH Urine 5.0 5.0 - 9.0 pH    Protein Albumin Urine 100 (A) NEG^Negative mg/dL    Urobilinogen Urine 1.0 0.2 - 1.0 EU/dL    Nitrite Urine Negative NEG^Negative    Leukocyte Esterase Urine Negative NEG^Negative    Source Midstream Urine    Albumin Random Urine Quantitative with Creat Ratio   Result Value Ref Range    Creatinine Urine 112 mg/dL    Albumin Urine mg/L 704 mg/L    Albumin Urine mg/g Cr 628.57 (H) 0 - 17 mg/g Cr   Urine Microscopic   Result Value Ref Range    WBC Urine 0 - 5 OTO5^0 - 5 /HPF    RBC Urine O - 2 OTO2^O - 2 /HPF    Hyaline Casts O - 2 OTO2^O - 2 /LPF    Granular Casts 0-2 (A) NEG^Negative /LPF    Squamous Epithelial /LPF Urine Few FEW^Few /LPF

## 2019-10-21 NOTE — PROGRESS NOTES
GENERAL SURGERY CONSULTATION NOTE    Moses Whittaker   [address] [facility] [id number]  69 year old  male    Primary Care Provider:  Jorge Barnett      HPI: Moses Whittaker presents to clinic with lateral lower extremity swelling and weeping ulcers on the left posterior calf.  Patient has a history of venous stasis ulcer on the left calf.  He was previously treated with Unna boot with success.  He says he has trouble keeping his feet up during the day because he is narcoleptic and when he falls asleep his legs are  down.  Patient does wear compression stockings during the day and tries to keep his feet up at night.  He takes 40 mg daily of furosemide.  He takes his medication faithfully.  Patient denies other fluid overload symptoms such as shortness of breath and cough.  Patient is a diabetic who still has reasonable sensation in his feet.  In addition to the ulcers on his left posterior calf he has an ulcer on his right second toe, medial.  He says his ulcer has been developing for the past few weeks.  He has been trying to keep it protected but it is not been getting better.  Patient denies fevers chills.  Denies chest pain or shortness of breath.  He does say his toe on his calf to hurt.  The toe hurts worse in the calf.  He is still ambulatory.  He is never had a toe amputation or any lower extremity amputation in the past.  No apparent complications from diabetes.  He has managed with an insulin pump.  Last A1c is 7.9.    REVIEW OF SYSTEMS:    GENERAL: No fevers or chills. Denies fatigue, recent weight loss.  HEENT: No sinus drainage. No changes with vision or hearing. No difficulty swallowing.   LYMPHATICS:  Noswollen nodes in axilla, neck or groin.  CARDIOVASCULAR: Denies chest pain, palpitations and dyspnea on exertion.  PULMONARY: No shortness of breath or cough. No increase in sputum production.  GI: Denies melena,bright red blood in stools. No hematemesis. No constipation or  diarrhea.  : No dysuria or hematuria.  SKIN: Ulcers on medial right second toe and left posterior calf  HEMATOLOGY:  No history of easy bruising or bleeding.  ENDOCRINE:  No history of diabetes or thyroid problems.  NEUROLOGY:  No history of seizures or headaches. No motor or sensory changes.        Patient Active Problem List   Diagnosis     Acute bilateral low back pain with bilateral sciatica     Angina pectoris (H)     Edema     Blister of toe of right foot     Coronary atherosclerosis     Cervical radicular pain     Cervical stenosis of spinal canal     Chronic diastolic heart failure (H)     Chronic low back pain     CKD (chronic kidney disease) stage 3, GFR 30-59 ml/min (H)     Uncontrolled type 2 diabetes mellitus with stage 3 chronic kidney disease, with long-term current use of insulin (H)     Degenerative disc disease, cervical     Painful diabetic neuropathy (H)     Diastasis of muscle     Degeneration of cervical intervertebral disc     Psychosexual dysfunction with inhibited sexual excitement     Erectile dysfunction     Facet arthritis of cervical region     Esophageal reflux     Greater trochanteric bursitis of left hip     Essential hypertension     Hypothyroidism due to acquired atrophy of thyroid     Insulin pump in place     Intermittent constipation     Left arm numbness     Left atrial enlargement     Mixed hyperlipidemia     Myalgia     Myofacial muscle pain     Primary narcolepsy without cataplexy     Neuroforaminal stenosis of lumbar spine     Obesity     Old inferior wall myocardial infarction     Osteoarthritis of spine     Pain medication agreement - 8/9/2018     Peptic ulcer disease     Platelet inhibition due to Plavix     Radicular low back pain     S/P CABG x 2     S/P coronary artery stent placement     Skin rash     Trigger point of extremity     Trigger point with back pain     Trigger point with neck pain     Chest pain     Atherosclerosis of native coronary artery of native  heart with stable angina pectoris (H)     Other specified forms of chronic ischemic heart disease     Peripheral vascular disease (H)     Postsurgical percutaneous transluminal coronary angioplasty status     Venous stasis ulcer of left calf limited to breakdown of skin with varicose veins (H)     Bilateral lower extremity edema     Venous stasis ulcer of left lower leg with edema of left lower leg (H)     Venous stasis dermatitis of left lower extremity       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     Gastritis without bleeding     No Comments Provided     Heart disease     Multiple coronary stents     Narcolepsy without cataplexy     No Comments Provided     Other injury of unspecified body region, initial encounter (CODE)     11/2006,Right calf hematoma     Rheumatic fever without heart involvement     Rheumatic fever as a child without valvular heart disease       Past Surgical History:   Procedure Laterality Date     ANGIOPLASTY      12/00, 04/04/04,Repeat angioplasty with lt internal mammary to the lt anterior descending and lt radial artery from aorta to the diagonal.     ANGIOPLASTY      10/01,Angioplasty with 2 vessel CABG the following week from the lt internal mammary to the lt anterior descending and right radial free graft     ANGIOPLASTY      04/2003     ARTHROSCOPY SHOULDER ROTATOR CUFF REPAIR      02/06/2006,Left rotator cuff repair and bone spur removal by Dr. Giraldo in [place]     COLONOSCOPY      1999     COLONOSCOPY  01/08/2016 01/08/2016,-- Dr. De La Cruz -- polypectomy     COLONOSCOPY  04/18/2019    hyperplastic, follow up 10 years, 4/18/29     OTHER SURGICAL HISTORY      09/03,781602,IR ANGIOGRAM FEMORAL/EXTREMITY  ([place]),Unremarkable angiogram at [facility]     OTHER SURGICAL HISTORY      10/05/2004,,PTCA,Angioplasty     OTHER SURGICAL HISTORY      2005,,PTCA,Angioplasty with stent.     OTHER SURGICAL HISTORY      2005,,PTCA,Angioplasty with stent.     OTHER SURGICAL HISTORY      2005,,PTCA,Angioplasty without stent     OTHER SURGICAL HISTORY      2007,[id number],IR ANGIOGRAM FEMORAL/EXTREMITY ([place]),Unremarkable coronary angiogram at [facility].     OTHER SURGICAL HISTORY      1967,SUR38,APPENDECTOMY OPEN     RELEASE CARPAL TUNNEL      11/15/01,Right carpal tunnel release by Dr. Mace     RELEASE CARPAL TUNNEL      2004,Left carpal tunnel release       Family History   Problem Relation Age of Onset     Heart Disease Mother         Heart Disease,Heart problems     Heart Disease Other         Heart Disease,Heart problems     Heart Disease Other         Heart Disease,Heart problems     Ankylosing Spondylitis Son         Ankylosing spondylitis,Ankylosing spondylitis     Other - See Comments Brother          with sudden death following shoulder surgery 2012 -- was off his Plavix for 10 days pre-operatively.     Ankylosing Spondylitis Daughter         Ankylosing spondylitis,-- Dx 2017       Social History     Patient does not qualify to have social determinant information on file (likely too young).   Social History Narrative    He is on Social Security Disability for coronary artery disease and cervical arthritis and disk disease.   Dax clay.  No tobacco.       Social History     Socioeconomic History     Marital status:      Spouse name: Not on file     Number of children: Not on file     Years of education: Not on file     Highest education level: Not on file   Occupational History     Not on file   Social Needs     Financial resource strain: Not on file     Food insecurity:     Worry: Not on file     Inability: Not on file     Transportation needs:     Medical: Not on file      Non-medical: Not on file   Tobacco Use     Smoking status: Never Smoker     Smokeless tobacco: Never Used   Substance and Sexual Activity     Alcohol use: No     Alcohol/week: 0.0 standard drinks     Drug use: No     Sexual activity: Yes     Partners: Female   Lifestyle     Physical activity:     Days per week: Not on file     Minutes per session: Not on file     Stress: Not on file   Relationships     Social connections:     Talks on phone: Not on file     Gets together: Not on file     Attends Taoist service: Not on file     Active member of club or organization: Not on file     Attends meetings of clubs or organizations: Not on file     Relationship status: Not on file     Intimate partner violence:     Fear of current or ex partner: Not on file     Emotionally abused: Not on file     Physically abused: Not on file     Forced sexual activity: Not on file   Other Topics Concern     Parent/sibling w/ CABG, MI or angioplasty before 65F 55M? Not Asked   Social History Narrative    He is on Social Security Disability for coronary artery disease and cervical arthritis and disk disease.   Dax clay.  No tobacco.       aspirin (GOODSENSE ASPIRIN) 325 MG tablet, Take 325 mg by mouth daily Take  by mouth.  blood glucose monitoring (ONETOUCH ULTRA) test strip, Dispense test strips covered by the patient insurance. Test 10 times per day.  Blood Glucose Monitoring Suppl (GLUCOCOM BLOOD GLUCOSE MONITOR) WAQAS, Dispense glucose meter, test strips and lancets covered by the patient insurance. Test 10 times per day.  clopidogrel (PLAVIX) 75 MG tablet, Take 1 tablet (75 mg) by mouth daily  co-enzyme Q-10 100 MG CAPS capsule, Take 100 mg by mouth daily   [employer] ALCOHOL SWABS PADS, For home use.  desoximetasone (TOPICORT) 0.25 % external cream, APPLY  CREAM EXTERNALLY TWICE DAILY  dextroamphetamine (DEXTROSTAT) 10 MG tablet, Take 1 tablet (10 mg) by mouth 4 times daily  diclofenac (VOLTAREN) 75 MG EC tablet, TAKE ONE TABLET BY  MOUTH 4 TIMES DAILY  docusate sodium (COLACE) 100 MG capsule, Take 1-2 capsules by mouth 2 times daily as needed for constipation prevention  Flaxseed, Linseed, (FLAXSEED OIL) 1000 MG CAPS, Take 1 capsule by mouth daily   furosemide (LASIX) 40 MG tablet, Take 2-4 tablets ( mg) by mouth daily Or as instructed for leg swelling  HUMALOG 100 UNIT/ML injection, INJECT UNDER THE SKIN USING INSULIN PUMP. MAX DAILY DOSE  UNITS  hydrALAZINE (APRESOLINE) 25 MG tablet, Take 1-2 tablets (25-50 mg) by mouth 4 times daily - Take lowest effective dose for blood pressure management  HYDROcodone-acetaminophen (NORCO) 5-325 MG per tablet, Take 1 tablet by mouth every 6 hours as needed for severe pain  Insulin Pen Needle (PEN NEEDLES) 29G X 12MM MISC, For administering insulin at home.  irbesartan (AVAPRO) 150 MG tablet, Take 1 tablet (150 mg) by mouth 2 times daily -- needs 2 smaller pills daily  IV Sets-Tubing (SOLUTION ADMINISTRATION SET) MISC, As directed.  levothyroxine (SYNTHROID/LEVOTHROID) 125 MCG tablet, TAKE 1 TABLET BY MOUTH ONCE DAILY IN THE MORNING  methylphenidate (RITALIN) 10 MG tablet, Take 2 tablets (20 mg) by mouth 2 times daily  metoprolol succinate ER (TOPROL-XL) 50 MG 24 hr tablet, Take 1 tablet (50 mg) by mouth 2 times daily  nitroGLYcerin (NITROSTAT) 0.4 MG sublingual tablet, Place 1 tablet under the tongue every 5 minutes as needed for chest pain  ranitidine (ZANTAC) 150 MG tablet, Take 1 tablet (150 mg) by mouth 2 times daily  ranolazine (RANEXA) 500 MG 12 hr tablet, Take 2 tablets (1,000 mg) by mouth 2 times daily - cancel other Rx - give 3 month supply  rosuvastatin (CRESTOR) 10 MG tablet, TAKE 1 TABLET BY MOUTH TWICE DAILY  Saline GEL, Spray 1 spray in nostril every hour as needed For Nasal Dryness  thin (NO BRAND SPECIFIED) lancets, Test 10 times per day.  vitamin E (TOCOPHEROL) 400 units (360 mg) capsule, Take 1 capsule by mouth daily    No current facility-administered medications on file  "prior to visit.         ALLERGIES/SENSITIVITIES:   Allergies   Allergen Reactions     Famotidine Other (See Comments)     + Caused Headache and Rash -- tolerated Ranitidine and worked well.     Gabapentin      Other reaction(s): Mental Status Change \"felt like in outer space\"     Hydrocortisone Other (See Comments)     Burning and stinging sensation with Hydrocortisone - doesn't help itching or rash -- tolerated Desoximetasone cream and worked well.      Atorvastatin Hives     Celebrex [Celecoxib]      Swelling      Lisinopril Cough     No Clinical Screening - See Comments Hives     Chlorine water     Norvasc [Amlodipine] Other (See Comments)     -- can't remember, didn't tolerate.      Pregabalin      Lyrica - Other reaction(s): Mental Status Change     Valdecoxib Swelling     \"bextra\" NSAID     Insulin Aspart Rash       PHYSICAL EXAM:     /58 (BP Location: Right arm, Patient Position: Sitting, Cuff Size: Adult Large)   Pulse 71   Temp 97.2  F (36.2  C) (Temporal)   Resp 18   Wt 89.8 kg (198 lb)   BMI 28.41 kg/m      General Appearance:   Sitting up in the chair, no apparent distress  HEENT: Pupils are equal and reactive, no scleral icterus,   Heart & CV:  RRR no murmur.  Intact distal pulses, good cap refill.  LUNGS: No increased work of breathing.Lugns are CTA B/L, no wheezing or crackles.  Abd: Obese abdomen, soft, nontender, nondistended  Ext: Two 1 cm ulcers on the left posterior calf with 2 more 2 mm ulcers in that area.  +4 pitting edema up to the calf present on the left calf.  Brawny edema present bilaterally.  There is a roughly 1 cm in diameter ulcer on the right second medial toe.  It appears as though there is tendon exposed within the wound.  There is swelling and mild erythema of the skin around the ulcer.  The toenails have both fallen off the great toe bilaterally  Neuro: Patient is alert and oriented, normal speech mentation        CONSULTATION ASSESSMENT AND PLAN:    69 year old male " with right medial toe ulcer and left posterior calf ulcer.  It may be a component of infection in the toe ulcer but the calf ulcers look clean.  Patient is not septic.    Unna boot is placed in the clinic today on the left leg for venous stasis ulcer  Protective dressing is placed on the right second toe.  Patient was advised to place cultures were within the wound and protected with bunion pads to keep pressure off the ulcer.  clinda / cipro prescription sent to the patient's pharmacy    Ke Liriano MD on 10/21/2019 at 4:23 PM

## 2019-10-22 ASSESSMENT — ANXIETY QUESTIONNAIRES: GAD7 TOTAL SCORE: 0

## 2019-10-24 ENCOUNTER — ALLIED HEALTH/NURSE VISIT (OUTPATIENT)
Dept: EDUCATION SERVICES | Facility: OTHER | Age: 69
End: 2019-10-24
Attending: INTERNAL MEDICINE
Payer: MEDICARE

## 2019-10-24 PROCEDURE — G0108 DIAB MANAGE TRN  PER INDIV: HCPCS

## 2019-10-24 NOTE — PROGRESS NOTES
Diabetes Education Consult for CGM and Insulin Pump    SUBJECTIVE:  Moses Whittaker is an 69 year old male who presents for education regarding Continuous Glucose Monitoring (CGM) and insulin pump therapy for Type 2 diabetes mellitus.   Age at diagnosis 37.    Current treatment includes SMBG and insulin pump.         No results found for: GLUCOSE  Cholesterol   Date/Time Value Ref Range Status   09/30/2019 03:16 PM 92 <200 mg/dL Final     HDL Cholesterol   Date/Time Value Ref Range Status   09/30/2019 03:16 PM 33 23 - 92 mg/dL Final     LDL Cholesterol Calculated   Date/Time Value Ref Range Status   09/30/2019 03:16 PM 33 <100 mg/dL Final     Comment:     Desirable:       <100 mg/dl     No components found for: MICROALBCRU, [id number], MICROALBRAND    Social History     Tobacco Use     Smoking status: Never Smoker     Smokeless tobacco: Never Used   Substance Use Topics     Alcohol use: No     Alcohol/week: 0.0 standard drinks       Current Outpatient Rx   Medication Sig Dispense Refill     aspirin (GOODSENSE ASPIRIN) 325 MG tablet Take 325 mg by mouth daily Take  by mouth.       blood glucose monitoring (ONETOUCH ULTRA) test strip Dispense test strips covered by the patient insurance. Test 10 times per day.       Blood Glucose Monitoring Suppl (GLUCOCOM BLOOD GLUCOSE MONITOR) WAQAS Dispense glucose meter, test strips and lancets covered by the patient insurance. Test 10 times per day.       ciprofloxacin (CIPRO) 500 MG tablet Take 1 tablet (500 mg) by mouth 2 times daily for 14 days 28 tablet 0     clindamycin (CLEOCIN) 300 MG capsule Take 1 capsule (300 mg) by mouth 3 times daily for 14 days 42 capsule 0     clopidogrel (PLAVIX) 75 MG tablet Take 1 tablet (75 mg) by mouth daily 90 tablet 3     co-enzyme Q-10 100 MG CAPS capsule Take 100 mg by mouth daily        CVS ALCOHOL SWABS PADS For home use.       desoximetasone (TOPICORT) 0.25 % external cream APPLY  CREAM EXTERNALLY TWICE DAILY 180 g 3      dextroamphetamine (DEXTROSTAT) 10 MG tablet Take 1 tablet (10 mg) by mouth 4 times daily 360 tablet 0     diclofenac (VOLTAREN) 75 MG EC tablet TAKE ONE TABLET BY MOUTH 4 TIMES DAILY 360 tablet 3     docusate sodium (COLACE) 100 MG capsule Take 1-2 capsules by mouth 2 times daily as needed for constipation prevention       Flaxseed, Linseed, (FLAXSEED OIL) 1000 MG CAPS Take 1 capsule by mouth daily        furosemide (LASIX) 40 MG tablet Take 2-4 tablets ( mg) by mouth daily Or as instructed for leg swelling 90 tablet 3     HUMALOG 100 UNIT/ML injection INJECT UNDER THE SKIN USING INSULIN PUMP. MAX DAILY DOSE  UNITS 180 mL 3     hydrALAZINE (APRESOLINE) 25 MG tablet Take 1-2 tablets (25-50 mg) by mouth 4 times daily - Take lowest effective dose for blood pressure management 360 tablet 11     HYDROcodone-acetaminophen (NORCO) 5-325 MG per tablet Take 1 tablet by mouth every 6 hours as needed for severe pain 60 tablet 0     Insulin Pen Needle (PEN NEEDLES) 29G X 12MM MISC For administering insulin at home.       irbesartan (AVAPRO) 150 MG tablet Take 1 tablet (150 mg) by mouth 2 times daily -- needs 2 smaller pills daily 180 tablet 3     IV Sets-Tubing (SOLUTION ADMINISTRATION SET) MISC As directed.       levothyroxine (SYNTHROID/LEVOTHROID) 125 MCG tablet TAKE 1 TABLET BY MOUTH ONCE DAILY IN THE MORNING 90 tablet 3     methylphenidate (RITALIN) 10 MG tablet Take 2 tablets (20 mg) by mouth 2 times daily 360 tablet 0     metoprolol succinate ER (TOPROL-XL) 50 MG 24 hr tablet Take 1 tablet (50 mg) by mouth 2 times daily 180 tablet 3     nitroGLYcerin (NITROSTAT) 0.4 MG sublingual tablet Place 1 tablet under the tongue every 5 minutes as needed for chest pain       ranitidine (ZANTAC) 150 MG tablet Take 1 tablet (150 mg) by mouth 2 times daily 180 tablet 3     ranolazine (RANEXA) 500 MG 12 hr tablet Take 2 tablets (1,000 mg) by mouth 2 times daily - cancel other Rx - give 3 month supply 360 tablet 3      rosuvastatin (CRESTOR) 10 MG tablet TAKE 1 TABLET BY MOUTH TWICE DAILY 180 tablet 2     Saline GEL Spray 1 spray in nostril every hour as needed For Nasal Dryness       thin (NO BRAND SPECIFIED) lancets Test 10 times per day.       vitamin E (TOCOPHEROL) 400 units (360 mg) capsule Take 1 capsule by mouth daily          Allergies: Famotidine; Gabapentin; Hydrocortisone; Atorvastatin; Celebrex [celecoxib]; Lisinopril; No clinical screening - see comments; Norvasc [amlodipine]; Pregabalin; Valdecoxib; and Insulin aspart     OBJECTIVE:  Patient visits for education using CGM and an insulin pump for tighter control of DM2.  Discussed how the pump and CGM works as well as benefits and contraindications regarding CGM and pump therapy.  Patient states cost is a concern and Omnipod may not be best for him at this time.    Patient most interested in Tandem TSLIM X2 insulin pump which utilizes Dexcom G6 CGM for [employer].  He would like to retry the Dexcom sensor.  Dexcom contacted regarding metal alloy differences between G5 and G6.  Patient to be contacted if he can wear Dexcom prior to purchase to assess for any allergic reaction, rash.      Paperwork given with take home booklets for further review.  After patient decides if he would like to move forward with the Tandem pump, he will call representative, with contact information given, to start the insurance benefit investigation process.      PLAN:    Patient will follow up if any further questions or concerns.    Time spent with patient was 30 minutes.     Radha Trammell RN, BSN, CDE  10/24/2019 4:44 PM

## 2019-10-28 ENCOUNTER — OFFICE VISIT (OUTPATIENT)
Dept: SURGERY | Facility: OTHER | Age: 69
End: 2019-10-28
Attending: SURGERY
Payer: MEDICARE

## 2019-10-28 ENCOUNTER — TELEPHONE (OUTPATIENT)
Dept: SURGERY | Facility: OTHER | Age: 69
End: 2019-10-28

## 2019-10-28 VITALS
DIASTOLIC BLOOD PRESSURE: 68 MMHG | BODY MASS INDEX: 28.55 KG/M2 | RESPIRATION RATE: 22 BRPM | WEIGHT: 199 LBS | TEMPERATURE: 97.5 F | SYSTOLIC BLOOD PRESSURE: 134 MMHG

## 2019-10-28 DIAGNOSIS — Z51.89 ENCOUNTER FOR WOUND RE-CHECK: Primary | ICD-10-CM

## 2019-10-28 DIAGNOSIS — L97.221 VENOUS STASIS ULCER OF LEFT CALF LIMITED TO BREAKDOWN OF SKIN WITHOUT VARICOSE VEINS (H): ICD-10-CM

## 2019-10-28 DIAGNOSIS — I87.2 VENOUS STASIS ULCER OF LEFT CALF LIMITED TO BREAKDOWN OF SKIN WITHOUT VARICOSE VEINS (H): ICD-10-CM

## 2019-10-28 PROCEDURE — G0463 HOSPITAL OUTPT CLINIC VISIT: HCPCS

## 2019-10-28 PROCEDURE — 99212 OFFICE O/P EST SF 10 MIN: CPT | Mod: 25 | Performed by: SURGERY

## 2019-10-28 PROCEDURE — 29580 STRAPPING UNNA BOOT: CPT | Mod: LT | Performed by: SURGERY

## 2019-10-28 PROCEDURE — G0463 HOSPITAL OUTPT CLINIC VISIT: HCPCS | Mod: 25

## 2019-10-28 ASSESSMENT — PAIN SCALES - GENERAL: PAINLEVEL: MODERATE PAIN (5)

## 2019-10-28 NOTE — TELEPHONE ENCOUNTER
Patient is wanting to schedule a follow up visit from 10.28.19 for 11.07.19. MATTHEW has a full schedule that day, but patient would prefer to see him if possible. He is scheduled with SPO on 11.06.19 as a back up plan if he can not get in with MATTHEW. Please advise if we can over book his schedule. Thanks!

## 2019-10-29 NOTE — PROGRESS NOTES
Patient returns to clinic today for recheck of lower extremity wounds.  Patient has venous stasis ulcers on the left leg and a wound on the right second toe.  Patient has been taking the antibiotics as prescribed.  Patient has been doing daily dressing changes to the right second toe.  Patient denies fevers chills.  He denies fatigue or lethargy.  Patient says that he feels like his right foot is less swollen and painful.  However, he thinks that the wound on his foot is getting worse.  He has been wearing compression stockings and doing his best to keep his feet elevated.      /68 (BP Location: Right arm, Patient Position: Sitting, Cuff Size: Adult Large)   Temp 97.5  F (36.4  C) (Temporal)   Resp 22   Wt 90.3 kg (199 lb)   BMI 28.55 kg/m      Unna boot is taken off the left leg and there are only 2 open spots remaining on his left posterior lower leg.  Both measure roughly 6 mm in diameter and there is no surrounding erythema or induration.  No purulent discharge or foul odor.  The Unna boot was replaced in clinic today after placing Promogran Shelbi over the remaining open wounds.  Right second toe wound is roughly the same size.  There does appear to be some granulation tissue forming at the base of this wound.  However, the skin on the volar aspect of the toe appears to be sloughing off.  The skin last week did look questionable today in clinic it was removed to reveal healthy appearing, noninfected subcutaneous tissue.  Dressing was replaced on his foot with Promogran Shelbi and toe separator.  Of note, erythema has completely gone away on the right foot.      Moses Whittaker is a 69-year-old male with bilateral lower extremity wounds.  The venous stasis ulcers are significantly decreased in size and will likely be completely healed in 1-2 more weeks of aggressive compression therapy.  The right toe wound looks worse today in some ways as I believe more skin has sloughed off.  However, the erythema  has improved and there is some evidence of granulation tissue formation at the base of the ulcer.  Patient should continue to take his oral anti-biotics as prescribed and continue daily dressing changes of the right foot with close follow-up in surgery clinic in roughly 1 week.      Ke Liriano MD

## 2019-11-05 ENCOUNTER — TELEPHONE (OUTPATIENT)
Dept: INTERNAL MEDICINE | Facility: OTHER | Age: 69
End: 2019-11-05

## 2019-11-05 NOTE — TELEPHONE ENCOUNTER
Patient needs a new PA on Irbesartan.  Patient can not swallow the 300 mg and needs to take 2 150 mg tablets at a time.    [employer] [phone], ID#[id number]    Barbara Connor LPN 11/5/2019 1:32 PM

## 2019-11-05 NOTE — TELEPHONE ENCOUNTER
States that he is calling regarding a prior auth and would like to discuss a company that will be calling about [employer]. States he would like a call from Barbara

## 2019-11-07 ENCOUNTER — OFFICE VISIT (OUTPATIENT)
Dept: SURGERY | Facility: OTHER | Age: 69
End: 2019-11-07
Attending: SURGERY
Payer: COMMERCIAL

## 2019-11-07 VITALS
TEMPERATURE: 97.9 F | HEART RATE: 68 BPM | WEIGHT: 198 LBS | BODY MASS INDEX: 28.41 KG/M2 | RESPIRATION RATE: 18 BRPM | DIASTOLIC BLOOD PRESSURE: 54 MMHG | SYSTOLIC BLOOD PRESSURE: 132 MMHG

## 2019-11-07 DIAGNOSIS — Z51.89 ENCOUNTER FOR WOUND RE-CHECK: Primary | ICD-10-CM

## 2019-11-07 PROCEDURE — G0463 HOSPITAL OUTPT CLINIC VISIT: HCPCS

## 2019-11-07 PROCEDURE — 99213 OFFICE O/P EST LOW 20 MIN: CPT | Performed by: SURGERY

## 2019-11-07 ASSESSMENT — PAIN SCALES - GENERAL: PAINLEVEL: MODERATE PAIN (4)

## 2019-11-07 NOTE — NURSING NOTE
"Chief Complaint   Patient presents with     Clinic Care Coordination - Follow-up     woundcare       Initial /54 (BP Location: Right arm, Patient Position: Sitting, Cuff Size: Adult Large)   Pulse 68   Temp 97.9  F (36.6  C) (Tympanic)   Resp 18   Wt 89.8 kg (198 lb)   BMI 28.41 kg/m   Estimated body mass index is 28.41 kg/m  as calculated from the following:    Height as of 9/24/19: 1.778 m (5' 10\").    Weight as of this encounter: 89.8 kg (198 lb).  Medication Reconciliation: complete    Keily Sandy LPN  "

## 2019-11-07 NOTE — TELEPHONE ENCOUNTER
Prior Authorization completed and waiting for [employer] to provide details about them, they are at lunch at this time.      Barbara Connor LPN 11/7/2019 12:17 PM

## 2019-11-07 NOTE — PROGRESS NOTES
Patient returns to clinic today for recheck of lower extremity wounds.  Patient has venous stasis ulcers on the left leg and a wound on the right second toe.  Patient has been taking the antibiotics as prescribed, he is not quite done with his prescription yet.  Patient has been doing daily dressing changes to the right second toe.  Patient denies fevers chills.  He denies fatigue or lethargy.  Patient says that he feels like his right foot is less swollen and painful. He thinks th wound is healing. He has been wearing compression stockings and doing his best to keep his feet elevated.      /54 (BP Location: Right arm, Patient Position: Sitting, Cuff Size: Adult Large)   Pulse 68   Temp 97.9  F (36.6  C) (Tympanic)   Resp 18   Wt 89.8 kg (198 lb)   BMI 28.41 kg/m      Unna boot is taken off the left leg and there is one open spot left that measures ~0.1x0.1cm.  There is no surrounding erythema or induration.  No purulent discharge or foul odor.    Right second toe wound measures 0.5 x 0.3 cm. The base is clean with granulation tissue. No surrounding erythema or induration.       Moses Whittaker is a 69-year-old male with bilateral lower extremity wounds.  The venous stasis ulcers are healing normally.  The right toe wound looks significantly better. Patient should continue to take his oral anti-biotics as prescribed and continue daily dressing changes of the right foot with close follow-up in surgery clinic in roughly 1 week.      Ke Liriano MD

## 2019-11-11 ENCOUNTER — ALLIED HEALTH/NURSE VISIT (OUTPATIENT)
Dept: EDUCATION SERVICES | Facility: OTHER | Age: 69
End: 2019-11-11
Attending: INTERNAL MEDICINE
Payer: COMMERCIAL

## 2019-11-11 PROCEDURE — 99211 OFF/OP EST MAY X REQ PHY/QHP: CPT | Performed by: REGISTERED NURSE

## 2019-11-11 NOTE — TELEPHONE ENCOUNTER
Talked to Kim at [facility] and they know nothing about a PA for this . She thought maybe a new one needed to be done for November? Says 90 day supply was filled on 10/23/19. Whitney Guevara LPN ....................11/11/2019  9:24 AM

## 2019-11-12 ENCOUNTER — TELEPHONE (OUTPATIENT)
Dept: INTERNAL MEDICINE | Facility: OTHER | Age: 69
End: 2019-11-12

## 2019-11-12 NOTE — TELEPHONE ENCOUNTER
Patient notified the PA was completed and faxed today.  Patient informed that Jorge Barnett MD and Radha Trammell RN are working on the documentation for his insulin pump and dexcom.        Barbara Connor LPN 11/12/2019 12:10 PM

## 2019-11-12 NOTE — PATIENT INSTRUCTIONS
Diabetes Goals and Reminders    Your A1c test should be done every 3 months.  Your goal is less than 8%.   Your last result is:  Lab Results   Component Value Date    A1C 7.9 09/30/2019       Your LDL cholesterol test should be done at least once a year.    Your last result is:  LDL Cholesterol Calculated   Date Value Ref Range Status   09/30/2019 33 <100 mg/dL Final     Comment:     Desirable:       <100 mg/dl       Have blood pressure and weight checked every three months.  Your blood pressure goal is 140/90 or less.  Your last blood pressure reading was:    BP Readings from Last 1 Encounters:   11/07/19 132/54       Test your blood sugar 6 - 10 times per day.  Your home blood glucose target ranges are:  Before meals:    2 hours after a meal:  Less than 200  Bedtime:  110-140    Avoid all tobacco.  Follow your healthy diet and exercise plan.  See the eye doctor every year.  See the dentist every six months.  Have kidney function tested yearly.    Take all medicine as prescribed.  Please let me know if you are having symptoms you don t expect or if you wish to stop any medicine.    Current Pump settings   Pump Type:  Accu-chek Spirit  Insulin Type: Novolog  BASAL RATES and times:  12   AM (midnight): 2.90 units/hour    Total Basal 69.90 units per 24 hours  Insulin to Carbohydrate Ratio (ICR):   1 unit per 5 grams of carbohydrate at 12 AM.  Insulin Sensitivity:   6 mg/dl at 12 AM.  Target Blood Glucose:   80 - 120 at 12 AM  Active Insulin: 4 hours      Follow Up Plan  Your future lab plan is:  HgA1c in December 2019.    If you need your cholesterol checked at your next appointment, you should fast 8 to 10 hours before your appointment.  Do not eat or drink anything besides water.  Drink plenty of water and take all your usual medicine.    SUMMARY FOR TODAY'S VISIT    1.  Recommended pump adjustments:    A.  No new settings recommended at this time.    2.  Your back up, spare pump, has been programmed to help  deliver correct insulin doses to decrease risk of high and low BG.    3.  Be sure to follow step-by-step instructions given to use the MANUAL DELIVERY option on your AccuChek Combo meter to give insulin doses for carbohydrate and correction.    4.  Follow up, as needed, for continued insulin pump management.    5.  Training date has been scheduled for your new Tandem TSlim X2 insulin pump:  Tuesday, December 3, 2019, 2:00 pm.  You should be hearing from Tandem and [employer] (distributor for supplies) within the next few days.    6.  Training date has also been scheduled for your new Dexcom G6 CGM (Continuous Glucose Monitor):  Tuesday, November 26, 2019, 1:30 pm.  You should be hearing from [employer]com within the next few days.    Radha Trammell RN, BSN, CDE, CPT  11/11/2019 4:59 PM

## 2019-11-12 NOTE — TELEPHONE ENCOUNTER
Patient notified, all forms have been sent and received by [employer] and Dexcom.      He states back up pump work working well.    Radha Trammell RN, BSN,CDE  11/12/2019 5:11 PM

## 2019-11-12 NOTE — TELEPHONE ENCOUNTER
Patient informed that Jorge Barnett MD and Radha Trammell RN are working on the documentation for his insulin pump and dexcom.      Barbara Connor LPN 11/12/2019 12:10 PM

## 2019-11-12 NOTE — TELEPHONE ENCOUNTER
Patient called requesting a call back from S nurse regarding his paperwork for his insulin pump and DEXCOM.  Please call to discuss and advise.  Kayleigh Rubio on 11/12/2019 at 11:30 AM

## 2019-11-12 NOTE — PROGRESS NOTES
"Diabetes Education Progress Note  Insulin Pump Malfunction and Back up pump Training    Visit included: Sensitivity/Correction Factors, Basal Rate, Target Blood Glucose, Hyper/Hypoglycemia, Basic Feature/Programming, Bolus types: Meal and Correction and Insulin to Carb Ratio  SUBJECTIVE: Moses Whittaker is a 69 year old male who has Type II diabetes  and is here for his Back Up Insulin Pump training visit.  Patient's current AccuChek Spirit Combo insulin pump spring fell out and will no longer give him the correct insulin doses.  Patient has had type 2 diabetes for 32 years and has utilized insulin pump therapy for \"about 8 years.\"    OBJECTIVE:  Testing frequency: multiple times daily  BG average 188 with range 54 - 501 mg/dL.  BG target range (mg/dl): pre-meal: , 2 hr pp: less than 180 mg/dl at bedtime: 100-140 mg/dl.    Pump settings   See Patient Instructions    ASSESSMENT: Understands basic pump functions.  Understands protocol of trouble shooting when BGs are high. Understands the importance of site change every 3 days.   Can state amount of insulin needed to fill reservoir for 3 days, using daily totals and average amount used.    Unable to upload Insulin pump.  Patient states he is filling cartridges, changing infusion sites and filling cannula, as recommended.  Patient is using Bolus Advice feature for meals and most snacks.    BG result standard deviation 115 mg/dL (BG range 54 - 501).  Patient notes, \"Sometimes this old pump gives me too much insulin and I go low, then it won't deliver any insulin and I cannot get the blood sugar down.  I check my blood up to 10 times daily and am very frustrated with this pump.  I need a new one as soon as possible.\"    Back up pump:  Patient has AccuChek Spirit spare pump, for back up purpose.  Pump programmed with patients settings.  Basal: 2.90 units/hour, total basal in 24 hours, 69.9 units.      Patient will continue to use his AccuChek Combo BG meter, which " contains his Bolus factors: ICR 1:5 grams and ISF 1:6 mg/dL, Target BG 80 - 120 mg/dL with insulin action time 4:00 hours.    Patient understands he will have to choose MANUAL DELIVERY on his AccuChek Combo meter and follow steps to take his boluses manually.  Patient shares he has used MANUAL DELIVERY in the past.  Step-by-step instructions given to help him follow the needed steps to give correct dose.    Patient filled cartridge and primed tubing without difficulty.  He started his back up pump and pump delivering basal 2.90 units/hour.  He is happy to have a working pump again.      He has filled out paperwork for pump upgrade changing from AccuChek to Tandem TSlim X2 insulin pump.  Paperwork has been received by [employer] and [employer] (distributor of supplies).  Patient has made [facility] Pump Training visit for 12/3/2019, 2:00 pm.      He will be utilizing Tandem pump Basal IQ technology and will use Dexcom G6 CGM.  He will also be visiting [facility] for Dexcom G6 CGM training on 11/26/2019, 1:30pm.      Educational Handouts Given:  MANUAL BOLUS instructions    PLAN:  Insulin Pump Recommendations:  1. No new changes recommended at this time.  Begin to use back-up insulin pump for basal and bolus advice.    2. Follow up, as needed, for insulin pump management and for upcoming training visits, as scheduled.      Time spent with patient was 90 minutes.    Radha Trammell RN, BSN, CDE, CPT  11/11/2019 5:37 PM

## 2019-11-18 ENCOUNTER — OFFICE VISIT (OUTPATIENT)
Dept: SURGERY | Facility: OTHER | Age: 69
End: 2019-11-18
Attending: SURGERY
Payer: MEDICARE

## 2019-11-18 VITALS
TEMPERATURE: 98.2 F | SYSTOLIC BLOOD PRESSURE: 130 MMHG | RESPIRATION RATE: 20 BRPM | HEART RATE: 70 BPM | BODY MASS INDEX: 28.5 KG/M2 | DIASTOLIC BLOOD PRESSURE: 70 MMHG | WEIGHT: 198.6 LBS

## 2019-11-18 DIAGNOSIS — L98.9 SKIN LESION: Primary | ICD-10-CM

## 2019-11-18 DIAGNOSIS — Z51.89 ENCOUNTER FOR WOUND RE-CHECK: ICD-10-CM

## 2019-11-18 PROCEDURE — 11402 EXC TR-EXT B9+MARG 1.1-2 CM: CPT | Performed by: SURGERY

## 2019-11-18 PROCEDURE — G0463 HOSPITAL OUTPT CLINIC VISIT: HCPCS

## 2019-11-18 PROCEDURE — G0463 HOSPITAL OUTPT CLINIC VISIT: HCPCS | Mod: 25

## 2019-11-18 PROCEDURE — 88305 TISSUE EXAM BY PATHOLOGIST: CPT

## 2019-11-18 PROCEDURE — 11403 EXC TR-EXT B9+MARG 2.1-3CM: CPT | Performed by: SURGERY

## 2019-11-18 PROCEDURE — 12032 INTMD RPR S/A/T/EXT 2.6-7.5: CPT | Performed by: SURGERY

## 2019-11-18 PROCEDURE — 99213 OFFICE O/P EST LOW 20 MIN: CPT | Mod: 25 | Performed by: SURGERY

## 2019-11-18 ASSESSMENT — PAIN SCALES - GENERAL: PAINLEVEL: NO PAIN (0)

## 2019-11-18 NOTE — PROCEDURES
Procedure Note     Pre/Post Operative Diagnosis:   Right upper arm skin lesion     Procedure:    Excision of Right upper arm skin lesion      Surgeon: MATTHEW Liriano MD     Local Anesthesia: 1% lidocaine with0.25%Marcaine with epinephrine    Indication for the procedure:    This is a 69 year old male patient with Right upper arm skin lesion. No history of skin cancer. The lesion has been getting slowly larger. At times it ulcerates and bleeds.    After explaining the risks to include bleeding, infection, recurrence or need for re-excision, and scarring the patient wished to proceed.    Procedure:   The area was prepped and draped in usual sterile fashion with ChloraPrep. After adequate local anesthesia, an elliptical skin incision was made to encompass the lesion, 2.9cm x 1.5 cm with margins. The subcutaneous tissues were reapproximated with 3-0 vicryl in inverted interrupted fashion. The skin was closed with 4-0 monocryl suture in a running fashion.  Dermabond was applied.     Plan:  The patient will be called with pathology results.  Patient will followup if there any problems with the wound including redness or drainage.      MATTHEW Liriano MD

## 2019-11-18 NOTE — NURSING NOTE
"Chief Complaint   Patient presents with     Derm Problem     lesion on arm       Initial /70 (BP Location: Left arm, Patient Position: Sitting, Cuff Size: Adult Large)   Pulse 70   Temp 98.2  F (36.8  C) (Tympanic)   Resp 20   Wt 90.1 kg (198 lb 9.6 oz)   BMI 28.50 kg/m   Estimated body mass index is 28.5 kg/m  as calculated from the following:    Height as of 9/24/19: 1.778 m (5' 10\").    Weight as of this encounter: 90.1 kg (198 lb 9.6 oz).  Medication Reconciliation: complete    Keily Sandy LPN  "

## 2019-11-18 NOTE — PATIENT INSTRUCTIONS
Your incision was closed with stitches that will dissolve.   A glue was used to help keep the incision closed.    It is ok to get the incision wet in the shower on the day after your procedure.     Don't soak in a tub, pool or lake for 2 weeks.  If you have concerns, please call.

## 2019-11-18 NOTE — NURSING NOTE
"Chief Complaint   Patient presents with     Derm Problem     lesion on arm       Initial /70 (BP Location: Left arm, Patient Position: Sitting, Cuff Size: Adult Large)   Pulse 70   Temp 98.2  F (36.8  C) (Tympanic)   Resp 20   Wt 90.1 kg (198 lb 9.6 oz)   BMI 28.50 kg/m   Estimated body mass index is 28.5 kg/m  as calculated from the following:    Height as of 9/24/19: 1.778 m (5' 10\").    Weight as of this encounter: 90.1 kg (198 lb 9.6 oz).  Medication Reconciliation: complete    Keily Sandy LPN     TIMEOUT  [facility] Protocol    A. Pre-procedure verification complete     1-relevant information / documentation available, reviewed and properly matched to the patient; 2-consent accurate and complete, 3-equipment and supplies available    B. Site marking complete     Site marked if not in continuous attendance with patient    C. TIME OUT completed     Time Out was conducted just prior to starting procedure to verify the eight required elements: 1-patient identity, 2-consent accurate and complete, 3-position, 4-correct side/site marked (if applicable), 5-procedure, 6-relevant images / results properly labeled and displayed (if applicable), 7-antibiotics / irrigation fluids (if applicable), 8-safety precautions.  "

## 2019-11-21 ENCOUNTER — OFFICE VISIT (OUTPATIENT)
Dept: UROLOGY | Facility: OTHER | Age: 69
End: 2019-11-21
Attending: UROLOGY
Payer: COMMERCIAL

## 2019-11-21 ENCOUNTER — TELEPHONE (OUTPATIENT)
Dept: SURGERY | Facility: OTHER | Age: 69
End: 2019-11-21

## 2019-11-21 VITALS
SYSTOLIC BLOOD PRESSURE: 142 MMHG | DIASTOLIC BLOOD PRESSURE: 82 MMHG | WEIGHT: 198.2 LBS | RESPIRATION RATE: 20 BRPM | HEART RATE: 64 BPM | BODY MASS INDEX: 28.44 KG/M2

## 2019-11-21 DIAGNOSIS — N52.9 ED (ERECTILE DYSFUNCTION) OF ORGANIC ORIGIN: Primary | ICD-10-CM

## 2019-11-21 PROCEDURE — G0463 HOSPITAL OUTPT CLINIC VISIT: HCPCS

## 2019-11-21 PROCEDURE — 99213 OFFICE O/P EST LOW 20 MIN: CPT | Performed by: UROLOGY

## 2019-11-21 ASSESSMENT — PAIN SCALES - GENERAL: PAINLEVEL: EXTREME PAIN (8)

## 2019-11-21 NOTE — NURSING NOTE
Pt presents to clinic for yearly Trimix renewal    Review of Systems:    Weight loss:    Yes     Recent fever/chills:  No   Night sweats:   Yes  Current skin rash:  No   Recent hair loss:  Yes  Heat intolerance:  No   Cold intolerance:  Yes  Chest pain:   No   Palpitations:   No  Shortness of breath:  No   Wheezing:   No  Constipation:    No   Diarrhea:   No   Nausea:   No   Vomiting:   No   Kidney/side pain:  No   Back pain:   Yes  Frequent headaches:  No   Dizziness:     No  Leg swelling:   Yes   Calf pain:    Yes

## 2019-11-21 NOTE — PROGRESS NOTES
Type of Visit  EST    Chief Complaint  ED    HPI  Mr. Morelos is a 69 y.o. male with history of ED using intracavernosal injections.  Using Trimix VI, 0.48mL per dose with good results.  Starting using Trimix 4 years ago.  He reports no erections over 4 hours.  He has no new concerns or questions today.  He is due for a refill.  He does keep it in the fridge.  He has been titrating the dose over the years.      Review of Systems  I personally reviewed the ROS with the patient.    Nursing Notes:   Kim Crisostomo LPN  11/21/2019  2:29 PM  Signed  Pt presents to clinic for yearly Trimix renewal    Review of Systems:    Weight loss:    Yes     Recent fever/chills:  No   Night sweats:   Yes  Current skin rash:  No   Recent hair loss:  Yes  Heat intolerance:  No   Cold intolerance:  Yes  Chest pain:   No   Palpitations:   No  Shortness of breath:  No   Wheezing:   No  Constipation:    No   Diarrhea:   No   Nausea:   No   Vomiting:   No   Kidney/side pain:  No   Back pain:   Yes  Frequent headaches:  No   Dizziness:     No  Leg swelling:   Yes   Calf pain:    Yes        Physical Exam  BP (!) 142/82 (BP Location: Left arm, Patient Position: Sitting, Cuff Size: Adult Regular)   Pulse 64   Resp 20   Wt 89.9 kg (198 lb 3.2 oz)   BMI 28.44 kg/m    Constitutional: No acute distress.  Alert and cooperative   Head: NCAT  Eyes: Conjunctivae normal  Cardiovascular: Regular rate.  Pulmonary/Chest: Respirations are even and non-labored bilaterally, no audible wheezing  Abdominal: Soft. No distension, tenderness, masses or guarding.   Neurological: A + O x 3.  Cranial Nerves II-XII grossly intact.  Extremities: JAGDISH x 4, Warm. No clubbing.  No cyanosis.    Skin: Pink, warm and dry.  No visible rashes noted.  Psychiatric:  Normal mood and affect  Back:  No left CVA tenderness.  No right CVA tenderness.  Genitourinary:  Nonpalpable bladder  :  Prostate 30-40g, no nodules, symmetric    Labs  Results for ROLAND MORELOS (MRN  [id number]) as of 11/21/2019 14:30   9/30/2019 15:16   Sodium 134   Potassium 4.4   Chloride 103   Carbon Dioxide 27   Urea Nitrogen 26 (H)   Creatinine 1.63 (H)   GFR Estimate 42 (L)   GFR Estimate If Black 51 (L)   Calcium 9.0   Anion Gap 4   Albumin 4.0   Protein Total 6.6   Bilirubin Total 0.5   Alkaline Phosphatase 63   ALT 33   AST 23   Hemoglobin A1C 7.9 (H)     Assessment  Mr. Whittaker is a 69 y.o. male who follows up with ED doing well with Trimix.  Given he has titrated the dose to 0.48 mL I recommend the patient transition to a higher concentration formulation such as Super Trimix    Plan  Discontinue Trimix VI, 0.48mL  Start Super Trimix 0.25mL per dose  Follow-up in 1 year

## 2019-11-21 NOTE — TELEPHONE ENCOUNTER
Patient stopped by the  looking for the paper he gave JJG when he saw him regarding compression stockings.   Please call patient back. Thank YOu  Charmaine Cordero on 11/21/2019 at 2:45 PM

## 2019-11-22 ENCOUNTER — MEDICAL CORRESPONDENCE (OUTPATIENT)
Dept: HEALTH INFORMATION MANAGEMENT | Facility: OTHER | Age: 69
End: 2019-11-22

## 2019-11-26 NOTE — TELEPHONE ENCOUNTER
Patient will get paperwork from [employer] for compression socks.  Will drop off at clinic for Dr. Liriano to fill out and fax back to [employer].  Keily Sandy LPN..........11/26/2019  8:54 AM

## 2019-11-27 ENCOUNTER — TELEPHONE (OUTPATIENT)
Dept: EDUCATION SERVICES | Facility: OTHER | Age: 69
End: 2019-11-27

## 2019-11-27 NOTE — TELEPHONE ENCOUNTER
Patient states [facility] needs a prior authorization (PA) for Dexcom G6 CGM.  Please call [facility].    Phoned [facility], after long hold time, representative states [facility] or PCP can submit the PA.       After receiving information to print PA, HCPC/CPT codes are required for PA through [facility].     [facility] did not have the codes for CGM.  Representative advises to call [email or web address] to get the codes or perhaps have [email or web address] submit for PA.      [facility], spoke with Amriah, who states [place] had the wrong fax number for [facility] and this has now been corrected.  They submitted the PA request yesterday and [facility] has received this request (confirmation # [id number]).  Amirah states approval may take up to one week, but she will call [facility] on Friday, 11/29, to check on status of PA for patient's CGM device.    [email or web address] rep, Amirah, states as soon as approval received, CGM device order will be released and supplies will be sent to patient.    Phoned patient to relay messages above.  Encouraged patient to visit next week for his insulin pump training and when CGM supplies received, he can schedule the additional training.    Radha Trammell RN, BSN, CDE  11/27/2019 3:40 PM

## 2019-12-03 ENCOUNTER — ALLIED HEALTH/NURSE VISIT (OUTPATIENT)
Dept: EDUCATION SERVICES | Facility: OTHER | Age: 69
End: 2019-12-03
Attending: INTERNAL MEDICINE
Payer: COMMERCIAL

## 2019-12-03 PROCEDURE — 99211 OFF/OP EST MAY X REQ PHY/QHP: CPT | Performed by: REGISTERED NURSE

## 2019-12-05 ENCOUNTER — TELEPHONE (OUTPATIENT)
Dept: INTERNAL MEDICINE | Facility: OTHER | Age: 69
End: 2019-12-05

## 2019-12-05 NOTE — PATIENT INSTRUCTIONS
Diabetes Goals and Reminders    Your A1c test should be done every 3 months.  Your goal is between 7 - 8%.   Your last result is:  Lab Results   Component Value Date    A1C 7.9 09/30/2019       Your LDL cholesterol test should be done at least once a year.    Your last result is:  LDL Cholesterol Calculated   Date Value Ref Range Status   09/30/2019 33 <100 mg/dL Final     Comment:     Desirable:       <100 mg/dl       Have blood pressure and weight checked every three months.  Your blood pressure goal is 140/90 or less.  Your last blood pressure reading was:    BP Readings from Last 1 Encounters:   11/21/19 (!) 142/82       Test your blood sugar 4+ times per day.  Your home blood glucose target ranges are:  Before meals:    2 hours after a meal:  Less than 180  Bedtime:  100-140    Avoid all tobacco.  Follow your healthy diet and exercise plan.  See the eye doctor every year.  See the dentist every six months.  Have kidney function tested yearly.    Take all medicine as prescribed.  Please let me know if you are having symptoms you don t expect or if you wish to stop any medicine.    Current Pump settings   Pump Type:  Tandem TSlim X2  Insulin Type: Humalog  BASAL RATES and times:  12   AM (midnight): 2.90 units/hour   Total Basal in 24 hours:  69.6 units   Insulin to Carbohydrate Ratio (ICR):   1 unit per 5 grams of carbohydrate at 12:00 am.  Insulin Sensitivity:   6 mg/dl at 12:00 am.  Target Blood Glucose:   120 mg/dL at 12:00 am  Active Insulin: 4 hours    Follow Up Plan  Your future lab plan is:  HgA1c in 12/2019.    If you need your cholesterol checked at your next appointment, you should fast 8 to 10 hours before your appointment.  Do not eat or drink anything besides water.  Drink plenty of water and take all your usual medicine.    SUMMARY FOR TODAY'S VISIT    1.  Training received on your new Tandem TSlim X2 insulin pump.    2.  Current settings from your [employer] Spirit pump were entered into your  new Tandem pump.    3.  Follow up tomorrow for BG assessment and in 2 - 3 weeks for insulin pump review.        Radha Trammell RN, BSN, CDE, CPT  12/3/2019 9:07 AM

## 2019-12-05 NOTE — PROGRESS NOTES
Diabetes Self-Management Education & Support      Diabetes Self-Management Education & Support - Insulin Pump Start    SUBJECTIVE/OBJECTIVE     Cultural Influences/Ethnic Background:  American    Patient brings his Tandem TSlim X2 insulin pump in for training.  He has not received his Dexcom G6 CGM, but states he was told by [employer] that [employer] has all information required and will be sending system soon.      Patient seen today for Insulin Pump Start:    Insulin Pump Information  Insulin Pump Type: Tandem t:slim X2  Infusion Set: Tandem  Tandem Infusion Set: Auto Soft XC    Insulin Pump Start  Insulin Pump Type: Tandem t:slim X2  Infusion Set: Tandem  Tandem Infusion Set: Auto Soft XC  Blood sugar at beginning of pump start appointment (mg/dL): 137 mg/dL  Blood sugar at end of pump start appointment (mg/dL): 130 mg/dL  Last bolus insulin injection (units): 41  Last bolus injection given at (date): 12/03/19  Last bolus injection given at (time): 0630  Pump Problem Solving: When to order supplies, How to order supplies, When to call the pump-company help line, High blood sugars and DKA prevention, When to call a healthcare provider  Pump Start education materials provided: Menu Map, Infusion Set Options, Pumping Insulin: Managing unexplained blood glucose over 240, Emergency back-up plan for pump failure, Tips to remember after starting an insulin pump, Other(Tandem quick reference guides.)  Patient was able to start insulin pump today without difficulty?: Yes      Taking Medications  Diabetes Medication(s)     Insulin       HUMALOG 100 UNIT/ML injection    INJECT UNDER THE SKIN USING INSULIN PUMP. MAX DAILY DOSE  UNITS            Healthy Coping     Patient Activation Measure Survey Score:  No flowsheet data found.      ASSESSMENT  Education review prior to insulin pump start:  Diabetes Education review given for treatment of hypoglycemia and hyperglycemia and importance of ketone testing if BG greater  than 250 mg/dL, (two readings in a row).  Reviewed CHO counting and exercise.     Reviewed pump therapy concepts:  Basal/bolus therapy, insulin action time, insulin on board, insulin to carb ratio, correction factor, target BG and meal bolus/correction bolus.       Guided patient as he entered program settings into his pump for bolus advice and basal delivery.  Patient practiced using BOLUS advice feature on the pump without difficulty, entering BG into pump.     Patient followed steps, filled cartridge, tubing and cannula with insulin and started Tandem TSlim X2 insulin pump.      Encouraged patient to follow up tomorrow and schedule follow up pump visit in the next two weeks.  Encouraged patient to call if any questions or concerns.      Patient will follow up for Basal IQ Technology start after Dexcom G6 CGM received.    INTERVENTION:   Diabetes knowledge and skills assessment:     Patient is knowledgeable in diabetes management concepts related to: Monitoring, Taking Medication and Problem Solving    Patient needs further education on the following diabetes management concepts: Healthy Eating, Being Active, Reducing Risks and Healthy Coping    Based on learning assessment above, most appropriate setting for further diabetes education would be: Group class or Individual setting.    Education provided today on:  AADE Self-Care Behaviors:  Monitoring: frequency of monitoring  Taking Medication: proper site selection and rotation for infusion sets.  Insulin Pump training    Opportunities for ongoing education and support in diabetes-self management were discussed.    Pt verbalized understanding of concepts discussed and recommendations provided today.       Education Materials Provided:  Menu Map  Infusion Set Options  Bolus Quick Reference Guide  Basal Quick Reference Guide  Tips to remember after starting an insulin pump  Tandem Help Sheets:  Basal IQ Technology, Bolus, View Status, Personal Profile, Load a  Cartridge      PLAN  See Patient Instructions for co-developed, patient-stated behavior change goals.  AVS printed and provided to patient today. See Follow-Up section for recommended follow-up.    Radha Trammell RN, BSN, CDE  12/3/2019 9:05 AM   Time Spent: 180 minutes  Encounter Type: Individual    Any diabetes medication dose changes were made via the CDE Protocol and Collaborative Practice Agreement with the patient's primary care provider. A copy of this encounter was shared with the provider.

## 2019-12-06 NOTE — TELEPHONE ENCOUNTER
Confirmed last A1C check and patient was reminded he can have it checked again 91 days after 9-30-19.      Barbara Connor LPN 12/6/2019 8:27 AM

## 2019-12-09 ENCOUNTER — OFFICE VISIT (OUTPATIENT)
Dept: SURGERY | Facility: OTHER | Age: 69
End: 2019-12-09
Attending: SURGERY
Payer: COMMERCIAL

## 2019-12-09 VITALS
HEART RATE: 72 BPM | WEIGHT: 210 LBS | SYSTOLIC BLOOD PRESSURE: 142 MMHG | TEMPERATURE: 98.3 F | BODY MASS INDEX: 30.13 KG/M2 | RESPIRATION RATE: 16 BRPM | DIASTOLIC BLOOD PRESSURE: 72 MMHG

## 2019-12-09 DIAGNOSIS — Z51.89 ENCOUNTER FOR WOUND RE-CHECK: Primary | ICD-10-CM

## 2019-12-09 PROCEDURE — 99213 OFFICE O/P EST LOW 20 MIN: CPT | Performed by: SURGERY

## 2019-12-09 PROCEDURE — G0463 HOSPITAL OUTPT CLINIC VISIT: HCPCS

## 2019-12-09 ASSESSMENT — PAIN SCALES - GENERAL: PAINLEVEL: MODERATE PAIN (5)

## 2019-12-09 NOTE — PROGRESS NOTES
Patient returns to clinic today for recheck of lower extremity wound.  Patient has a wound on the right second toe.  He is changing the dressing every day now.  Patient denies fevers chills.  He denies fatigue or lethargy.  He thinks the wound is healing. He has been wearing compression stockings and doing his best to keep his feet elevated.      BP (!) 142/72 (BP Location: Right arm, Patient Position: Sitting, Cuff Size: Adult Large)   Pulse 72   Temp 98.3  F (36.8  C) (Tympanic)   Resp 16   Wt 95.3 kg (210 lb)   BMI 30.13 kg/m      Right calf wound is healed   Right second toe wound measures 0.1 x 0.1 cm. No surrounding erythema or induration. There is a new, basically stage I, pressure sore, 0.4 x 0.4cm on the medial great toe on the right. No surrounding erythema.       Moses Whittaker is a 69-year-old male with right toe wounds. The second right toe wound continues to look better. No signs of infection. New area of concern on the right great toe    Continue daily dressing changes   Continue compression socks  Follow up in clinic in 2 weeks       Ke Liriano MD

## 2019-12-09 NOTE — NURSING NOTE
"Chief Complaint   Patient presents with     Clinic Care Coordination - Follow-up     follow up toe ulcer       Initial BP (!) 142/72 (BP Location: Right arm, Patient Position: Sitting, Cuff Size: Adult Large)   Pulse 72   Temp 98.3  F (36.8  C) (Tympanic)   Resp 16   Wt 95.3 kg (210 lb)   BMI 30.13 kg/m   Estimated body mass index is 30.13 kg/m  as calculated from the following:    Height as of 9/24/19: 1.778 m (5' 10\").    Weight as of this encounter: 95.3 kg (210 lb).  Medication Reconciliation: complete    Keily Sandy LPN  "

## 2019-12-12 ENCOUNTER — TELEPHONE (OUTPATIENT)
Dept: INTERNAL MEDICINE | Facility: OTHER | Age: 69
End: 2019-12-12

## 2019-12-12 NOTE — TELEPHONE ENCOUNTER
DJS - Patient would like a sooner appt for his pre op on 12/31/19.  Says it needs to be sooner? Patient vague on why it needs to be sooner. Wants to discuss stopping meds before cat surgery also.  Please call to advise.   Ade Barber on 12/12/2019 at 4:24 PM

## 2019-12-13 NOTE — TELEPHONE ENCOUNTER
Spoke with patient in regards to getting in sooner than his 12-31-19. Looking at your schedule there isnt any available earlier appointments. He is having a pre op for his cateract surgery on 1-7-20. His main concern for wanting to get in earlier was so that he could discuss going off his plavix prior to his surgery. He was told there would be low bleeding risk for the surgery but wasn't told when to go off the plavix prior to the surgery. Patient is wondering when to go off the plavix and if he should have any concerns about going off? Looks like he takes aspirin 325mg daily to. Please advise.  Ade Bhardwaj LPN.........................12/13/2019  8:43 AM

## 2019-12-13 NOTE — TELEPHONE ENCOUNTER
Spoke with patient and confirmed appointment on 12-31-19 for pre op. Medications will be discussed at that time.  Ade Bhardwaj LPN.........................12/13/2019  1:50 PM

## 2019-12-16 ENCOUNTER — ALLIED HEALTH/NURSE VISIT (OUTPATIENT)
Dept: EDUCATION SERVICES | Facility: OTHER | Age: 69
End: 2019-12-16
Attending: INTERNAL MEDICINE
Payer: COMMERCIAL

## 2019-12-16 PROCEDURE — 99211 OFF/OP EST MAY X REQ PHY/QHP: CPT | Performed by: REGISTERED NURSE

## 2019-12-16 NOTE — PROGRESS NOTES
Diabetes Self-Management Education & Support      Diabetes Self-Management Education & Support - Personal CGM Start    SUBJECTIVE/OBJECTIVE  Diabetes education in the past 24mo: Yes  Diabetes type: Type 2  Disease course: Stable  Cultural Influences/Ethnic Background:  American    Patient seen today for Personal CGM Start:    Patient completed homework (online training and/or Getting started with CGM guide)? : Yes  Sensor started:: Started today in clinic  CGM Initial Settings: Dexcom  Dexcom Initial Settings: Low Glucose Alarm: On (fixed at 55 mg/dL and can't be changed), Fall Alert, Out of Range Alert, Low Snooze Alert, High Glucose Alert, Low Glucose Alert, Rise Alert  CGM Start Plan: Change sensor, as directed.    Healthy Eating  Cultural/Christianity diet restrictions?: No  Meal planning: Carbohydrate counting, Heart healthy, Low salt, Smaller portions  Meals include: Breakfast, Dinner, Snacks  Beverages: Water  Has patient met with a dietitian in the past?: No    Being Active  Barrier to exercise: Physical limitation    Monitoring  Blood Glucose Meter: Accu-check, CGM  Home Glucose (Sugar) Monitoring: Other  Blood glucose trend: Fluctuating dramtically  Low Glucose Range (mg/dL): <70  High Glucose Range (mg/dL): >200  Overall Range (mg/dL): 140-180    Taking Medications  Diabetes Medication(s)     Insulin       HUMALOG 100 UNIT/ML injection    INJECT UNDER THE SKIN USING INSULIN PUMP. MAX DAILY DOSE  UNITS          Problem Solving  Hypoglycemia Frequency: Daily  Hypoglycemia Treatment: Juice, Candy, Other food  Patient carries a carbohydrate source: Yes  Medical alert: No  Severe weather/disaster plan for diabetes management?: No  DKA prevention plan?: No  Sick day plan for diabetes management?: (P) Yes    Hypoglycemia symptoms  Confusion: No  Dizziness or Light-Headedness: No  Headaches: Yes  Hunger: Yes  Mood changes: Yes  Nervousness/Anxiety: Yes  Sleepiness: Yes  Speech difficulty: No  Sweats:  "Yes  Tremors: No    Hypoglycemia Complications  Blackouts: No  Hospitalization: No  Nocturnal hypoglycemia: No  Required assistance: Yes  Required glucagon injection: Yes  Seizures: No    Reducing Risks  Has dilated eye exam at least once a year?: Yes  Sees dentist every 6 months?: Yes  Sees podiatrist (foot doctor)?: Yes    Healthy Coping  Informal Support system:: Children, [place] based, Family, Spouse, Other  Difficulty affording diabetes management supplies?: Yes  Patient Activation Measure Survey Score:  No flowsheet data found.      ASSESSMENT  Moses was instructed in features of DEXCOM G6 CGMS.  He was instructed in programming a Sensor Glucose (SG) reading into his pump.  Patient will wear the sensor for 10 days and if he would like, can download at home using Dexcom Clarity software found at www.[email or web address].com.       Patient demonstrated understanding by inserting his own sensor.  Sensor inserted without difficulty,  receiving data.  Helped patient program his insulin pump and started Basal IQ Technology.           Pump review:  Changing infusion set every 1.8 days.   Number of BG readings per day: 4 - 5 times daily  Average 14d B mg/dL  Premeal average 115 mg/dL, postmeal average 213 and bedtime average  mg/dL.    Average daily carbohydrates: 103 grams  Average total daily insulin: 116.9 +/- 35.3 units  Basal: 49%  Bolus: 51%    See scanned pump upload report for details.    Assessment/Plan:  Patient has not begun to use the bolus calculator in his pump, \"I just take the insulin units I know I need for blood sugar and carbohydrate.  Most times it works, but sometimes I go low.\"    1.  Begin to enter sensor glucose before every meal and at bedime.  Take recommended correction insulin for high BG, as needed.    2.  Begin to enter CHO grams using bolus advice feature to help decrease risk of overtreatment and hypoglycemia.    3.  No new settings recommended at this time.  Follow up in 3 weeks " for continued insulin pump management to assess ratios and factors.      Education provided today on:  AADE Self-Care Behaviors:  Monitoring: purpose and Dexcom G6 CGM training  Problem Solving: high blood glucose - causes, signs/symptoms, treatment and prevention, low blood glucose - causes, signs/symptoms, treatment and prevention and carrying a carbohydrate source at all times  Reducing Risks: major complications of diabetes, prevention, early diagnostic measures and treatment of complications and A1C - goals, relating to blood glucose levels, how often to check    Opportunities for ongoing education and support in diabetes-self management were discussed.    Pt verbalized understanding of concepts discussed and recommendations provided today.       Education Materials Provided:  Dexcom G6 CGM information, Basal IQ Quick Guide    CGM-specific education:   Dexcom sensor: insertion technique, sensor site location and rotation, insulin administration in relation to sensor placement, Dexcom : setting alerts/alarms, Use of trends and graphs for pattern management and problem solving, Dosing insulin based on sensor glucose results, Dexcom Mobile jonah and Dexcom Clarity software      PLAN  See Patient Instructions for co-developed, patient-stated behavior change goals.  AVS printed and provided to patient today. See Follow-Up section for recommended follow-up.    Radha Trammell RN, BSN, CDE  12/16/2019 3:55 PM   Time Spent: 90 minutes  Encounter Type: Individual    Any diabetes medication dose changes were made via the CDE Protocol and Collaborative Practice Agreement with the patient's primary care provider. A copy of this encounter was shared with the provider.

## 2019-12-17 NOTE — PATIENT INSTRUCTIONS
Diabetes Goals and Reminders    Your A1c test should be done every 3 months.  Your goal is between 7 - 8%.   Your last result is:  Lab Results   Component Value Date    A1C 7.9 09/30/2019       Your LDL cholesterol test should be done at least once a year.    Your last result is:  LDL Cholesterol Calculated   Date Value Ref Range Status   09/30/2019 33 <100 mg/dL Final     Comment:     Desirable:       <100 mg/dl       Have blood pressure and weight checked every three months.  Your blood pressure goal is 140/90 or less.  Your last blood pressure reading was:    BP Readings from Last 1 Encounters:   12/09/19 (!) 142/72       Test your blood sugar 4+ times per day.  Your home blood glucose target ranges are:  Before meals:    2 hours after a meal:  Less than 180  Bedtime:  100-140    Avoid all tobacco.  Follow your healthy diet and exercise plan.  See the eye doctor every year.  See the dentist every six months.  Have kidney function tested yearly.    Take all medicine as prescribed.  Please let me know if you are having symptoms you don t expect or if you wish to stop any medicine.    Current Pump settings   Pump Type:  Tandem TSlim X2  Insulin Type: Humalog  BASAL RATES and times:  12   AM (midnight): 2.90 units/hour   Total Basal in 24 hours:  69.6 units   Insulin to Carbohydrate Ratio (ICR):   1 unit per 5 grams of carbohydrate at 12:00 am.  Insulin Sensitivity:   6 mg/dl at 12:00 am.  Target Blood Glucose:   120 mg/dL at 12:00 am  Active Insulin: 4 hours    Follow Up Plan  Your future lab plan is:  HgA1c in 12/2019.    If you need your cholesterol checked at your next appointment, you should fast 8 to 10 hours before your appointment.  Do not eat or drink anything besides water.  Drink plenty of water and take all your usual medicine.    SUMMARY FOR TODAY'S VISIT    1.  Training received on your new Dexcom G6 CGM.      2.  Begin to enter sensor glucose before every meal and at bedime.  Take recommended  correction insulin for high BG, as needed.     3.  Begin to enter carbohydrate grams using bolus advice feature to help decrease risk of overtreatment and low blood glucose.     3.  No new settings recommended at this time.  Follow up in 3 weeks for continued insulin pump management to assess ratios and factors.    Radha Trammell RN, BSN, CDE  12/16/2019 3:58 PM

## 2019-12-19 ENCOUNTER — OFFICE VISIT (OUTPATIENT)
Dept: INTERNAL MEDICINE | Facility: OTHER | Age: 69
End: 2019-12-19
Attending: INTERNAL MEDICINE
Payer: COMMERCIAL

## 2019-12-19 VITALS
HEIGHT: 69 IN | DIASTOLIC BLOOD PRESSURE: 82 MMHG | SYSTOLIC BLOOD PRESSURE: 144 MMHG | RESPIRATION RATE: 16 BRPM | WEIGHT: 199 LBS | TEMPERATURE: 97.2 F | BODY MASS INDEX: 29.47 KG/M2 | HEART RATE: 64 BPM

## 2019-12-19 DIAGNOSIS — Z02.89 PAIN MEDICATION AGREEMENT: ICD-10-CM

## 2019-12-19 DIAGNOSIS — M54.10 RADICULAR LOW BACK PAIN: ICD-10-CM

## 2019-12-19 DIAGNOSIS — Z95.1 S/P CABG X 2: ICD-10-CM

## 2019-12-19 DIAGNOSIS — G47.419 PRIMARY NARCOLEPSY WITHOUT CATAPLEXY: ICD-10-CM

## 2019-12-19 DIAGNOSIS — M50.30 DEGENERATIVE DISC DISEASE, CERVICAL: ICD-10-CM

## 2019-12-19 PROCEDURE — G0463 HOSPITAL OUTPT CLINIC VISIT: HCPCS

## 2019-12-19 PROCEDURE — 99214 OFFICE O/P EST MOD 30 MIN: CPT | Performed by: INTERNAL MEDICINE

## 2019-12-19 RX ORDER — METHYLPHENIDATE HYDROCHLORIDE 10 MG/1
20 TABLET ORAL 2 TIMES DAILY
Qty: 360 TABLET | Refills: 0 | Status: SHIPPED | OUTPATIENT
Start: 2019-12-19 | End: 2020-02-18

## 2019-12-19 RX ORDER — HYDROCODONE BITARTRATE AND ACETAMINOPHEN 5; 325 MG/1; MG/1
1 TABLET ORAL EVERY 6 HOURS PRN
Qty: 60 TABLET | Refills: 0 | Status: SHIPPED | OUTPATIENT
Start: 2019-12-19 | End: 2020-04-23

## 2019-12-19 RX ORDER — DEXTROAMPHETAMINE SULFATE 10 MG/1
10 TABLET ORAL 4 TIMES DAILY
Qty: 360 TABLET | Refills: 0 | Status: SHIPPED | OUTPATIENT
Start: 2019-12-19 | End: 2020-02-18

## 2019-12-19 RX ORDER — DICLOFENAC SODIUM 75 MG/1
75 TABLET, DELAYED RELEASE ORAL 4 TIMES DAILY PRN
Qty: 360 TABLET | Refills: 3 | Status: ON HOLD | OUTPATIENT
Start: 2019-12-19 | End: 2020-03-15

## 2019-12-19 ASSESSMENT — MIFFLIN-ST. JEOR: SCORE: 1655.1

## 2019-12-19 ASSESSMENT — ANXIETY QUESTIONNAIRES
1. FEELING NERVOUS, ANXIOUS, OR ON EDGE: NOT AT ALL
GAD7 TOTAL SCORE: 0
IF YOU CHECKED OFF ANY PROBLEMS ON THIS QUESTIONNAIRE, HOW DIFFICULT HAVE THESE PROBLEMS MADE IT FOR YOU TO DO YOUR WORK, TAKE CARE OF THINGS AT HOME, OR GET ALONG WITH OTHER PEOPLE: NOT DIFFICULT AT ALL
7. FEELING AFRAID AS IF SOMETHING AWFUL MIGHT HAPPEN: NOT AT ALL
3. WORRYING TOO MUCH ABOUT DIFFERENT THINGS: NOT AT ALL
2. NOT BEING ABLE TO STOP OR CONTROL WORRYING: NOT AT ALL
5. BEING SO RESTLESS THAT IT IS HARD TO SIT STILL: NOT AT ALL
6. BECOMING EASILY ANNOYED OR IRRITABLE: NOT AT ALL

## 2019-12-19 ASSESSMENT — ENCOUNTER SYMPTOMS
COUGH: 0
ARTHRALGIAS: 1
WOUND: 0
ADENOPATHY: 0
CHEST TIGHTNESS: 0
FATIGUE: 0
CONFUSION: 0
CHOKING: 0
CHILLS: 0
FEVER: 0
NECK STIFFNESS: 1
ABDOMINAL PAIN: 0
MYALGIAS: 1
PALPITATIONS: 0
BRUISES/BLEEDS EASILY: 0
NECK PAIN: 1
SHORTNESS OF BREATH: 0
HEMATURIA: 0
STRIDOR: 0
WHEEZING: 0
BACK PAIN: 1
HEADACHES: 1

## 2019-12-19 ASSESSMENT — PATIENT HEALTH QUESTIONNAIRE - PHQ9
5. POOR APPETITE OR OVEREATING: NOT AT ALL
SUM OF ALL RESPONSES TO PHQ QUESTIONS 1-9: 0

## 2019-12-19 ASSESSMENT — PAIN SCALES - GENERAL: PAINLEVEL: MODERATE PAIN (5)

## 2019-12-19 NOTE — PATIENT INSTRUCTIONS
Medications refilled.     Return in approximately 3 months from today, or sooner as needed for follow-up with Dr. Barnett.    - Med Refills - Controlled RX    - Bring your remaining pills / pill bottles with to: Each of your Med Management/refill  appointments for pill counts.   - Urine drug screens for controlled medications will be completed every 3 to 12 months.   - Random pill counts and urine drug testing may occur.   - No illicit drug use or pill sharing will be tolerated.     Clinic : [phone]  Appointment line: [phone]

## 2019-12-19 NOTE — PROGRESS NOTES
"Nursing Notes:   Barbara Connor LPN  12/19/2019  2:57 PM  Signed  Patient presents to the clinic for medication management.  Last administration of Dextroamphetamine today around 1130, Ritalin was last night around 1900.  Contract signed on 10-21-19.  Patient does not take Norco at this time but doesn't want this removed from active medication list.      Previous A1C is at goal of <8  Lab Results   Component Value Date    A1C 7.9 09/30/2019    A1C 8.1 06/26/2019    A1C 7.7 03/08/2019    A1C 7.5 11/21/2018    A1C 8.0 08/09/2018     Urine microalbumin:creatine: n/a  Foot exam unknown-declines today  Eye exam 02/2019    Tobacco User no  Patient is on a daily aspirin  Patient is on a Statin.  Blood pressure today of:     BP Readings from Last 1 Encounters:   12/09/19 (!) 142/72      is not at the goal of <139/89 for diabetics.      Chief Complaint   Patient presents with     Recheck Medication       Initial BP (!) 144/82 (BP Location: Right arm, Patient Position: Sitting, Cuff Size: Adult Regular)   Pulse 64   Temp 97.2  F (36.2  C) (Tympanic)   Resp 16   Ht 1.74 m (5' 8.5\")   Wt 90.3 kg (199 lb)   BMI 29.82 kg/m    Estimated body mass index is 29.82 kg/m  as calculated from the following:    Height as of this encounter: 1.74 m (5' 8.5\").    Weight as of this encounter: 90.3 kg (199 lb).  Medication Reconciliation: complete    Barbara Connor LPN        Nursing note reviewed with patient.  Accuracy and completeness verified.   Mr. Whittaker is a 69 year old male who:  Patient presents with:  Recheck Medication      ICD-10-CM    1. Primary narcolepsy without cataplexy G47.419 dextroamphetamine (DEXTROSTAT) 10 MG tablet     methylphenidate (RITALIN) 10 MG tablet   2. Pain medication agreement - 8/9/2018 Z02.89 dextroamphetamine (DEXTROSTAT) 10 MG tablet     methylphenidate (RITALIN) 10 MG tablet     HYDROcodone-acetaminophen (NORCO) 5-325 MG tablet   3. 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 " Radicular low back pain M54.10 HYDROcodone-acetaminophen (NORCO) 5-325 MG tablet   5. Degenerative disc disease, cervical M50.30 HYDROcodone-acetaminophen (NORCO) 5-325 MG tablet     HPI  Patient presents for medication follow-up appointment.  Has narcolepsy without cataplexy.  Uses commendation of methylphenidate and dextroamphetamine.  Has been doing this for a number of years.  Needs updated prescriptions.  Gets his medications through local pharmacy.  Proper medication use and misuse reviewed.  Has been adjusting dose based on his activities.  Continues with 4 times daily dosing of dextro amphetamine and twice daily of his methylphenidate.  Prescription sent to pharmacy.    Has chronic exertional angina.  Follows with cardiology.  History of bypass surgery.  Continues on Ranexa, reports symptoms are stable.    Has chronic low back pain and cervical disc disease.  Uses Norco 5 mg intermittently.  60 tablets refilled today.    Functional Capacity: about 4 METS.   Review of Systems   Constitutional: Negative for chills, fatigue and fever.   HENT: Negative for congestion.    Eyes: Negative for visual disturbance.   Respiratory: Negative for cough, choking, chest tightness, shortness of breath, wheezing and stridor.    Cardiovascular: Positive for chest pain (chronic, stable) and leg swelling. Negative for palpitations.        + claudication.   + intermittent Angina   Gastrointestinal: Negative for abdominal pain.   Genitourinary: Negative for hematuria.   Musculoskeletal: Positive for arthralgias, back pain, gait problem, myalgias, neck pain and neck stiffness.   Skin: Negative for rash and wound (+ biliateral legs / and right foot - 2nd toe  , healing up  ).   Neurological: Positive for headaches. Negative for syncope.   Hematological: Negative for adenopathy. Does not bruise/bleed easily.   Psychiatric/Behavioral: Negative for confusion.        Problem List/PMH: reviewed in EMR, and made relevant updates  "today.  Medications: reviewed in EMR, and made relevant updates today.  Allergies: reviewed in EMR, and made relevant updates today.  I reviewed family and social history and made relevant updates today.  Social History     Tobacco Use     Smoking status: Never Smoker     Smokeless tobacco: Never Used   Substance Use Topics     Alcohol use: No     Alcohol/week: 0.0 standard drinks     Drug use: No      Family History   Problem Relation Age of Onset     Heart Disease Mother         Heart Disease,Heart problems     Heart Disease Other         Heart Disease,Heart problems     Heart Disease Other         Heart Disease,Heart problems     Ankylosing Spondylitis Son         Ankylosing spondylitis,Ankylosing spondylitis     Other - See Comments Brother          with sudden death following shoulder surgery 2012 -- was off his Plavix for 10 days pre-operatively.     Ankylosing Spondylitis Daughter         Ankylosing spondylitis,-- Dx 2017       EXAM:   Vitals:    19 1434   BP: (!) 144/82   BP Location: Right arm   Patient Position: Sitting   Cuff Size: Adult Regular   Pulse: 64   Resp: 16   Temp: 97.2  F (36.2  C)   TempSrc: Tympanic   Weight: 90.3 kg (199 lb)   Height: 1.74 m (5' 8.5\")       Current Pain Score: Moderate Pain (5)     BP Readings from Last 3 Encounters:   19 (!) 144/82   19 (!) 142/72   19 (!) 142/82      Wt Readings from Last 3 Encounters:   19 90.3 kg (199 lb)   19 95.3 kg (210 lb)   19 89.9 kg (198 lb 3.2 oz)      Estimated body mass index is 29.82 kg/m  as calculated from the following:    Height as of this encounter: 1.74 m (5' 8.5\").    Weight as of this encounter: 90.3 kg (199 lb).     Physical Exam  Constitutional:       General: He is not in acute distress.     Appearance: He is well-developed. He is not diaphoretic.   HENT:      Head: Normocephalic and atraumatic.   Eyes:      General: No scleral icterus.     Conjunctiva/sclera: Conjunctivae normal. "   Neck:      Musculoskeletal: Neck supple.   Cardiovascular:      Rate and Rhythm: Normal rate and regular rhythm.   Pulmonary:      Effort: Pulmonary effort is normal.      Breath sounds: Normal breath sounds.   Abdominal:      Palpations: Abdomen is soft.      Tenderness: There is no abdominal tenderness.      Comments: + insulin pump noted    Musculoskeletal:         General: No deformity.   Lymphadenopathy:      Cervical: No cervical adenopathy.   Skin:     General: Skin is warm and dry.      Findings: No rash.   Neurological:      General: No focal deficit present.      Mental Status: He is alert.   Psychiatric:         Mood and Affect: Mood normal.         Behavior: Behavior normal.          Procedures   INVESTIGATIONS:  No results found for any visits on 12/19/19.    ASSESSMENT AND PLAN:  Problem List Items Addressed This Visit        Nervous and Auditory    Radicular low back pain    Relevant Medications    HYDROcodone-acetaminophen (NORCO) 5-325 MG tablet       Musculoskeletal and Integumentary    Degenerative disc disease, cervical    Relevant Medications    diclofenac (VOLTAREN) 75 MG EC tablet    HYDROcodone-acetaminophen (NORCO) 5-325 MG tablet       Other    Primary narcolepsy without cataplexy    Relevant Medications    dextroamphetamine (DEXTROSTAT) 10 MG tablet    methylphenidate (RITALIN) 10 MG tablet    Pain medication agreement - 10-21-19    Relevant Medications    dextroamphetamine (DEXTROSTAT) 10 MG tablet    methylphenidate (RITALIN) 10 MG tablet    HYDROcodone-acetaminophen (NORCO) 5-325 MG tablet    S/P CABG x 2    Relevant Medications    diclofenac (VOLTAREN) 75 MG EC tablet        reviewed diet, exercise and weight control, recommended sodium restriction  -- Expected clinical course discussed    -- Medications and their side effects discussed    Patient Instructions   Medications refilled.     Return in approximately 3 months from today, or sooner as needed for follow-up with   Ericka.    - Med Refills - Controlled RX    - Bring your remaining pills / pill bottles with to: Each of your Med Management/refill  appointments for pill counts.   - Urine drug screens for controlled medications will be completed every 3 to 12 months.   - Random pill counts and urine drug testing may occur.   - No illicit drug use or pill sharing will be tolerated.     Clinic : [phone]  Appointment line: [phone]      Jorge Barnett MD  Internal Medicine  [facility]     Portions of this note were dictated using speech recognition software. The note has been proofread but errors in the text may have been overlooked. Please contact me if there are any concerns regarding the accuracy of the dictation.

## 2019-12-19 NOTE — NURSING NOTE
"Patient presents to the clinic for medication management.  Last administration of Dextroamphetamine today around 1130, Ritalin was last night around 1900.  Contract signed on 10-21-19.  Patient does not take Norco at this time but doesn't want this removed from active medication list.      Previous A1C is at goal of <8  Lab Results   Component Value Date    A1C 7.9 09/30/2019    A1C 8.1 06/26/2019    A1C 7.7 03/08/2019    A1C 7.5 11/21/2018    A1C 8.0 08/09/2018     Urine microalbumin:creatine: n/a  Foot exam unknown-declines today  Eye exam 02/2019    Tobacco User no  Patient is on a daily aspirin  Patient is on a Statin.  Blood pressure today of:     BP Readings from Last 1 Encounters:   12/09/19 (!) 142/72      is not at the goal of <139/89 for diabetics.      Chief Complaint   Patient presents with     Recheck Medication       Initial BP (!) 144/82 (BP Location: Right arm, Patient Position: Sitting, Cuff Size: Adult Regular)   Pulse 64   Temp 97.2  F (36.2  C) (Tympanic)   Resp 16   Ht 1.74 m (5' 8.5\")   Wt 90.3 kg (199 lb)   BMI 29.82 kg/m   Estimated body mass index is 29.82 kg/m  as calculated from the following:    Height as of this encounter: 1.74 m (5' 8.5\").    Weight as of this encounter: 90.3 kg (199 lb).  Medication Reconciliation: complete    Barbara Connor LPN        "

## 2019-12-20 ASSESSMENT — ANXIETY QUESTIONNAIRES: GAD7 TOTAL SCORE: 0

## 2019-12-23 ENCOUNTER — OFFICE VISIT (OUTPATIENT)
Dept: SURGERY | Facility: OTHER | Age: 69
End: 2019-12-23
Attending: SURGERY
Payer: COMMERCIAL

## 2019-12-23 VITALS
SYSTOLIC BLOOD PRESSURE: 140 MMHG | TEMPERATURE: 98.9 F | DIASTOLIC BLOOD PRESSURE: 70 MMHG | BODY MASS INDEX: 29.82 KG/M2 | RESPIRATION RATE: 16 BRPM | WEIGHT: 199 LBS | HEART RATE: 76 BPM

## 2019-12-23 DIAGNOSIS — Z51.89 ENCOUNTER FOR WOUND RE-CHECK: Primary | ICD-10-CM

## 2019-12-23 PROCEDURE — G0463 HOSPITAL OUTPT CLINIC VISIT: HCPCS

## 2019-12-23 PROCEDURE — 99214 OFFICE O/P EST MOD 30 MIN: CPT | Performed by: SURGERY

## 2019-12-23 ASSESSMENT — PAIN SCALES - GENERAL: PAINLEVEL: NO PAIN (0)

## 2019-12-23 NOTE — PROGRESS NOTES
Patient returns to clinic today for recheck of lower extremity wound.  Patient has a wound on the right second toe.  He is changing the dressing every day now.  Patient denies fevers chills.  He denies fatigue or lethargy.  The wound is healing. He has been wearing compression stockings and doing his best to keep his feet elevated.      BP (!) 140/70 (BP Location: Right arm, Patient Position: Sitting, Cuff Size: Adult Large)   Pulse 76   Temp 98.9  F (37.2  C) (Tympanic)   Resp 16   Wt 90.3 kg (199 lb)   BMI 29.82 kg/m      Right calf wound is healed   The 2 wounds are healed      Moses Whittaker is a 69-year-old male with right toe wounds. The wounds have healed. no signs of infection.     Continue to protect the second toe from rubbing on the first toe.  This may include toe spacers, bandage between the toes or gauze  Continue compression socks  Follow-up should new wounds present      Ke Liriano MD

## 2019-12-23 NOTE — NURSING NOTE
"Chief Complaint   Patient presents with     Clinic Care Coordination - Follow-up     wound care       Initial BP (!) 142/72 (BP Location: Right arm, Patient Position: Sitting, Cuff Size: Adult Large)   Pulse 76   Temp 98.9  F (37.2  C) (Tympanic)   Resp 16   Wt 90.3 kg (199 lb)   BMI 29.82 kg/m   Estimated body mass index is 29.82 kg/m  as calculated from the following:    Height as of 12/19/19: 1.74 m (5' 8.5\").    Weight as of this encounter: 90.3 kg (199 lb).  Medication Reconciliation: complete    Keily Sandy LPN  "

## 2019-12-31 ENCOUNTER — OFFICE VISIT (OUTPATIENT)
Dept: INTERNAL MEDICINE | Facility: OTHER | Age: 69
End: 2019-12-31
Attending: INTERNAL MEDICINE
Payer: MEDICARE

## 2019-12-31 VITALS
SYSTOLIC BLOOD PRESSURE: 130 MMHG | TEMPERATURE: 98.8 F | OXYGEN SATURATION: 97 % | WEIGHT: 201.6 LBS | RESPIRATION RATE: 16 BRPM | DIASTOLIC BLOOD PRESSURE: 80 MMHG | BODY MASS INDEX: 29.86 KG/M2 | HEIGHT: 69 IN | HEART RATE: 76 BPM

## 2019-12-31 DIAGNOSIS — H25.9 SENILE CATARACT OF LEFT EYE, UNSPECIFIED AGE-RELATED CATARACT TYPE: ICD-10-CM

## 2019-12-31 DIAGNOSIS — E78.2 MIXED HYPERLIPIDEMIA: ICD-10-CM

## 2019-12-31 DIAGNOSIS — I10 BENIGN ESSENTIAL HYPERTENSION: ICD-10-CM

## 2019-12-31 DIAGNOSIS — Z01.818 PREOP GENERAL PHYSICAL EXAM: Primary | ICD-10-CM

## 2019-12-31 DIAGNOSIS — R19.5 LOOSE STOOLS: ICD-10-CM

## 2019-12-31 DIAGNOSIS — Z79.02 PLATELET INHIBITION DUE TO PLAVIX: ICD-10-CM

## 2019-12-31 DIAGNOSIS — E11.22 CONTROLLED TYPE 2 DIABETES MELLITUS WITH STAGE 3 CHRONIC KIDNEY DISEASE, WITH LONG-TERM CURRENT USE OF INSULIN (H): ICD-10-CM

## 2019-12-31 DIAGNOSIS — Z79.4 CONTROLLED TYPE 2 DIABETES MELLITUS WITH STAGE 3 CHRONIC KIDNEY DISEASE, WITH LONG-TERM CURRENT USE OF INSULIN (H): ICD-10-CM

## 2019-12-31 DIAGNOSIS — I25.118 ATHEROSCLEROSIS OF NATIVE CORONARY ARTERY OF NATIVE HEART WITH STABLE ANGINA PECTORIS (H): ICD-10-CM

## 2019-12-31 DIAGNOSIS — E03.4 HYPOTHYROIDISM DUE TO ACQUIRED ATROPHY OF THYROID: ICD-10-CM

## 2019-12-31 DIAGNOSIS — F19.20 CONTROLLED DRUG DEPENDENCE (H): ICD-10-CM

## 2019-12-31 DIAGNOSIS — I50.32 CHRONIC DIASTOLIC HEART FAILURE (H): ICD-10-CM

## 2019-12-31 DIAGNOSIS — N18.30 CONTROLLED TYPE 2 DIABETES MELLITUS WITH STAGE 3 CHRONIC KIDNEY DISEASE, WITH LONG-TERM CURRENT USE OF INSULIN (H): ICD-10-CM

## 2019-12-31 PROBLEM — R21 SKIN RASH: Status: RESOLVED | Noted: 2017-08-01 | Resolved: 2019-12-31

## 2019-12-31 PROBLEM — I83.029 VENOUS STASIS ULCER OF LEFT LOWER LEG WITH EDEMA OF LEFT LOWER LEG (H): Status: RESOLVED | Noted: 2019-09-04 | Resolved: 2019-12-31

## 2019-12-31 PROBLEM — R60.0 VENOUS STASIS ULCER OF LEFT LOWER LEG WITH EDEMA OF LEFT LOWER LEG (H): Status: RESOLVED | Noted: 2019-09-04 | Resolved: 2019-12-31

## 2019-12-31 PROBLEM — I83.892 VENOUS STASIS ULCER OF LEFT LOWER LEG WITH EDEMA OF LEFT LOWER LEG (H): Status: RESOLVED | Noted: 2019-09-04 | Resolved: 2019-12-31

## 2019-12-31 PROBLEM — L97.929 VENOUS STASIS ULCER OF LEFT LOWER LEG WITH EDEMA OF LEFT LOWER LEG (H): Status: RESOLVED | Noted: 2019-09-04 | Resolved: 2019-12-31

## 2019-12-31 LAB
ALBUMIN SERPL-MCNC: 4.1 G/DL (ref 3.5–5.7)
ALBUMIN UR-MCNC: 100 MG/DL
ALP SERPL-CCNC: 75 U/L (ref 34–104)
ALT SERPL W P-5'-P-CCNC: 27 U/L (ref 7–52)
ANION GAP SERPL CALCULATED.3IONS-SCNC: 6 MMOL/L (ref 3–14)
APPEARANCE UR: CLEAR
AST SERPL W P-5'-P-CCNC: 31 U/L (ref 13–39)
BACTERIA #/AREA URNS HPF: ABNORMAL /HPF
BILIRUB SERPL-MCNC: 0.4 MG/DL (ref 0.3–1)
BILIRUB UR QL STRIP: NEGATIVE
BUN SERPL-MCNC: 19 MG/DL (ref 7–25)
CALCIUM SERPL-MCNC: 9.4 MG/DL (ref 8.6–10.3)
CHLORIDE SERPL-SCNC: 102 MMOL/L (ref 98–107)
CHOLEST SERPL-MCNC: 137 MG/DL
CO2 SERPL-SCNC: 28 MMOL/L (ref 21–31)
COLOR UR AUTO: YELLOW
CREAT SERPL-MCNC: 1.42 MG/DL (ref 0.7–1.3)
ERYTHROCYTE [DISTWIDTH] IN BLOOD BY AUTOMATED COUNT: 12.8 % (ref 10–15)
GFR SERPL CREATININE-BSD FRML MDRD: 50 ML/MIN/{1.73_M2}
GLUCOSE SERPL-MCNC: 126 MG/DL (ref 70–105)
GLUCOSE UR STRIP-MCNC: 100 MG/DL
HBA1C MFR BLD: 7.8 % (ref 4–6)
HCT VFR BLD AUTO: 47.9 % (ref 40–53)
HDLC SERPL-MCNC: 36 MG/DL (ref 23–92)
HGB BLD-MCNC: 15.5 G/DL (ref 13.3–17.7)
HGB UR QL STRIP: NEGATIVE
KETONES UR STRIP-MCNC: NEGATIVE MG/DL
LDLC SERPL CALC-MCNC: 57 MG/DL
LEUKOCYTE ESTERASE UR QL STRIP: NEGATIVE
MCH RBC QN AUTO: 32.5 PG (ref 26.5–33)
MCHC RBC AUTO-ENTMCNC: 32.4 G/DL (ref 31.5–36.5)
MCV RBC AUTO: 100 FL (ref 78–100)
NITRATE UR QL: NEGATIVE
NONHDLC SERPL-MCNC: 101 MG/DL
PH UR STRIP: 6 PH (ref 5–9)
PLATELET # BLD AUTO: 240 10E9/L (ref 150–450)
POTASSIUM SERPL-SCNC: 4.1 MMOL/L (ref 3.5–5.1)
PROT SERPL-MCNC: 6.6 G/DL (ref 6.4–8.9)
RBC # BLD AUTO: 4.77 10E12/L (ref 4.4–5.9)
RBC #/AREA URNS AUTO: ABNORMAL /HPF
SODIUM SERPL-SCNC: 136 MMOL/L (ref 134–144)
SOURCE: ABNORMAL
SP GR UR STRIP: >1.03 (ref 1–1.03)
TRIGL SERPL-MCNC: 219 MG/DL
UROBILINOGEN UR STRIP-ACNC: 0.2 EU/DL (ref 0.2–1)
WBC # BLD AUTO: 6.4 10E9/L (ref 4–11)
WBC #/AREA URNS AUTO: ABNORMAL /HPF

## 2019-12-31 PROCEDURE — 85027 COMPLETE CBC AUTOMATED: CPT | Mod: ZL | Performed by: INTERNAL MEDICINE

## 2019-12-31 PROCEDURE — 80053 COMPREHEN METABOLIC PANEL: CPT | Mod: ZL | Performed by: INTERNAL MEDICINE

## 2019-12-31 PROCEDURE — 99214 OFFICE O/P EST MOD 30 MIN: CPT | Performed by: INTERNAL MEDICINE

## 2019-12-31 PROCEDURE — 82043 UR ALBUMIN QUANTITATIVE: CPT | Mod: ZL | Performed by: INTERNAL MEDICINE

## 2019-12-31 PROCEDURE — 80307 DRUG TEST PRSMV CHEM ANLYZR: CPT | Mod: ZL | Performed by: INTERNAL MEDICINE

## 2019-12-31 PROCEDURE — G0463 HOSPITAL OUTPT CLINIC VISIT: HCPCS

## 2019-12-31 PROCEDURE — 83036 HEMOGLOBIN GLYCOSYLATED A1C: CPT | Mod: ZL | Performed by: INTERNAL MEDICINE

## 2019-12-31 PROCEDURE — 81001 URINALYSIS AUTO W/SCOPE: CPT | Mod: ZL,XU | Performed by: INTERNAL MEDICINE

## 2019-12-31 PROCEDURE — 80061 LIPID PANEL: CPT | Mod: ZL | Performed by: INTERNAL MEDICINE

## 2019-12-31 PROCEDURE — 36415 COLL VENOUS BLD VENIPUNCTURE: CPT | Mod: ZL | Performed by: INTERNAL MEDICINE

## 2019-12-31 ASSESSMENT — ENCOUNTER SYMPTOMS
WHEEZING: 0
COUGH: 0
WOUND: 0
ABDOMINAL PAIN: 0
NECK PAIN: 1
NECK STIFFNESS: 1
CHEST TIGHTNESS: 0
ARTHRALGIAS: 1
CHILLS: 0
ADENOPATHY: 0
HEADACHES: 1
SHORTNESS OF BREATH: 0
MYALGIAS: 1
STRIDOR: 0
BRUISES/BLEEDS EASILY: 0
BACK PAIN: 1
FATIGUE: 0
CHOKING: 0
PALPITATIONS: 0
CONFUSION: 0
DIARRHEA: 1
FEVER: 0
HEMATURIA: 0
BLOOD IN STOOL: 1

## 2019-12-31 ASSESSMENT — MIFFLIN-ST. JEOR: SCORE: 1666.89

## 2019-12-31 ASSESSMENT — PAIN SCALES - GENERAL: PAINLEVEL: SEVERE PAIN (6)

## 2019-12-31 NOTE — LETTER
January 1, 2020      Moses Whittaker  [address] [address]        Dear ,    We are writing to inform you of your test results.    Labs are a little better!    Keep working to get rid of those spiking high blood sugars.... they are hurting your A1c average!      To help with weight loss and improve blood sugar control....    -- Try to avoid Carbohydrates as much as possible -- breads, pasta, baked goods, cakes, oatmeal, cold cereal, potatoes.   These are turned to sugar in one metabolic conversion, cause insulin secretion and increased fat deposition / weight gain.      -- Eat more lean meats, proteins, eggs, nuts, vegetables.       Resulted Orders   Lipid Profile   Result Value Ref Range    Cholesterol 137 <200 mg/dL    Triglycerides 219 (H) <150 mg/dL      Comment:      Borderline high:  150-199 mg/dl  High:             200-499 mg/dl  Very high:       >499 mg/dl      HDL Cholesterol 36 23 - 92 mg/dL    LDL Cholesterol Calculated 57 <100 mg/dL      Comment:      Desirable:       <100 mg/dl    Non HDL Cholesterol 101 <130 mg/dL   Hemoglobin A1c   Result Value Ref Range    Hemoglobin A1C 7.8 (H) 4.0 - 6.0 %   CBC with platelets   Result Value Ref Range    WBC 6.4 4.0 - 11.0 10e9/L    RBC Count 4.77 4.4 - 5.9 10e12/L    Hemoglobin 15.5 13.3 - 17.7 g/dL    Hematocrit 47.9 40.0 - 53.0 %     78 - 100 fl    MCH 32.5 26.5 - 33.0 pg    MCHC 32.4 31.5 - 36.5 g/dL    RDW 12.8 10.0 - 15.0 %    Platelet Count 240 150 - 450 10e9/L   Comprehensive metabolic panel   Result Value Ref Range    Sodium 136 134 - 144 mmol/L    Potassium 4.1 3.5 - 5.1 mmol/L    Chloride 102 98 - 107 mmol/L    Carbon Dioxide 28 21 - 31 mmol/L    Anion Gap 6 3 - 14 mmol/L    Glucose 126 (H) 70 - 105 mg/dL    Urea Nitrogen 19 7 - 25 mg/dL    Creatinine 1.42 (H) 0.70 - 1.30 mg/dL    GFR Estimate 50 (L) >60 mL/min/[1.73_m2]    GFR Estimate If Black 60 (L) >60 mL/min/[1.73_m2]    Calcium 9.4 8.6 - 10.3 mg/dL     Bilirubin Total 0.4 0.3 - 1.0 mg/dL    Albumin 4.1 3.5 - 5.7 g/dL    Protein Total 6.6 6.4 - 8.9 g/dL    Alkaline Phosphatase 75 34 - 104 U/L    ALT 27 7 - 52 U/L    AST 31 13 - 39 U/L   *UA reflex to Microscopic   Result Value Ref Range    Color Urine Yellow     Appearance Urine Clear     Glucose Urine 100 (A) NEG^Negative mg/dL    Bilirubin Urine Negative NEG^Negative    Ketones Urine Negative NEG^Negative mg/dL    Specific Gravity Urine >1.030 (H) 1.000 - 1.030    Blood Urine Negative NEG^Negative    pH Urine 6.0 5.0 - 9.0 pH    Protein Albumin Urine 100 (A) NEG^Negative mg/dL    Urobilinogen Urine 0.2 0.2 - 1.0 EU/dL    Nitrite Urine Negative NEG^Negative    Leukocyte Esterase Urine Negative NEG^Negative    Source Midstream Urine    Urine Microscopic   Result Value Ref Range    WBC Urine 0 - 5 OTO5^0 - 5 /HPF    RBC Urine O - 2 OTO2^O - 2 /HPF    Bacteria Urine Few (A) NEG^Negative /HPF       If you have any questions or concerns, please call the clinic at the number listed above.       Sincerely,        Jorge Barnett MD

## 2019-12-31 NOTE — PROGRESS NOTES
[facility] AND \[facility]\""  [address]  [place] [address]  [phone]  Dept: [phone]    PRE-OP EVALUATION:  Today's date: 2019    Moses Whittaker (: 1950) presents for pre-operative evaluation assessment as requested by Dr. Khalil.  He requires evaluation and anesthesia risk assessment prior to undergoing surgery/procedure for treatment of cataract of left eye .    Proposed Surgery/ Procedure: cataract of left eye   Date of Surgery/ Procedure: 20  Time of Surgery/ Procedure:   Hospital/Surgical Facility: [facility]    Primary Physician: Jorge Barnett  Type of Anesthesia Anticipated: to be determined    Patient has a Health Care Directive or Living Will:  YES     1. YES - DO YOU HAVE A HISTORY OF HEART ATTACK, STROKE, STENT, BYPASS OR SURGERY ON AN ARTERY IN THE HEAD, NECK, HEART OR LEG? Bypass, heart attack  2. YEs - Do you ever have any pain or discomfort in your chest?  3. Yes Do you have a history of  Heart Failure?  4. NO - Are you troubled by shortness of breath when: walking on the level, up a slight hill or at night?  5. NO - Do you currently have a cold, bronchitis or other respiratory infection?  6. NO - Do you have a cough, shortness of breath or wheezing?  7. NO - Do you sometimes get pains in the calves of your legs when you walk?  8. YES - Do you or anyone in your family have previous history of blood clots? Blood clot in LAD  9. YES - Do you or does anyone in your family have a serious bleeding problem such as prolonged bleeding following surgeries or cuts? On plavix and aspirin   10. NO - Have you ever had problems with anemia or been told to take iron pills?  11. NO - Have you had any abnormal blood loss such as black, tarry or bloody stools, or abnormal vaginal bleeding?  12. NO - Have you ever had a blood transfusion?  13. NO - Have you or any of your relatives ever had problems with anesthesia?  14. YES - Do you have sleep apnea,  excessive snoring or daytime drowsiness? Narcolepsy   15. NO - Do you have any prosthetic heart valves?  16. NO - Do you have prosthetic joints?  17. NO - Is there any chance that you may be pregnant?      HPI:       ICD-10-CM    1. Preop general physical exam Z01.818 Urine Microscopic   2. Senile cataract of left eye, unspecified age-related cataract type H25.9    3. Atherosclerosis of native coronary artery of native heart with stable angina pectoris (H) I25.118    4. Controlled type 2 diabetes mellitus with stage 3 chronic kidney disease, with long-term current use of insulin (H) E11.22     N18.3     Z79.4    5. Chronic diastolic heart failure (H) I50.32    6. Hypothyroidism due to acquired atrophy of thyroid E03.4    7. Mixed hyperlipidemia E78.2 Lipid Profile   8. Platelet inhibition due to Plavix Z79.02    9. Benign essential hypertension I10 CBC with platelets     Comprehensive metabolic panel   10. Loose stools R19.5 Clostridium difficile Toxin B PCR   11. Controlled drug dependence (H) F19.20 Pain Drug Scr UR W Rptd Meds   HPI related to upcoming procedure: Currently scheduled for left eye cataract surgery.  Has been having more vision issues and was recommended to proceed with surgery.    Coronary artery disease, he has stable exertional angina.  He continues on aspirin and Plavix.  He reports that he did hold the Plavix for about 10 days prior to shoulder surgery.  He takes Ranexa daily due to his exertional chest pain.  He is currently on dual antiplatelet therapy with aspirin and Plavix.  He should be on aspirin indefinitely without discontinuation.  --Reduce aspirin down to 81 mg daily.  Continue up until morning of surgery.  Then restart normal aspirin after surgery.  Hold Plavix for 3 to 5 days prior to surgery and restart after surgery.    Type 2 diabetes, currently using insulin pump.  Previously uncontrolled most recently was controlled.  Due for lab work today.    Mixed hyperlipidemia, currently  on Crestor 10 mg twice daily.  He needs the lower dose otherwise he gets myalgias and also has problems swallowing larger pills.  Check labs.    Mixed hypothyroidism, taking oral replacement.  Check labs.    Hypertension, currently controlled.  Continue current medications.    Controlled drug dependence, uses dextroamphetamine.  You check urine drug screen today.    Patient currently uses a Dexcom G6 CGM and tests BG twice daily for calibrations.   Patient injects or boluses insulin 3 or more times daily before breakfast, before lunch, before dinner, and bedtime.  Patient requires frequent adjustments to their insulin treatment regimen on the basis of therapeutic CGM testing results.     Has reported loose stools with some mucus the last 2 to 3 weeks.  Denies fevers.  Has had some abdominal cramps.  He would like to check a C. difficile stool.  Orders placed.    MEDICAL HISTORY:     Patient Active Problem List    Diagnosis Date Noted     Venous stasis dermatitis of left lower extremity 09/24/2019     Priority: Medium     Venous stasis ulcer of left calf limited to breakdown of skin with varicose veins (H) 08/29/2019     Priority: Medium     Bilateral lower extremity edema 08/29/2019     Priority: Medium     Degeneration of cervical intervertebral disc 01/31/2018     Priority: Medium     Hypothyroidism due to acquired atrophy of thyroid 01/31/2018     Priority: Medium     Mixed hyperlipidemia 01/31/2018     Priority: Medium     Primary narcolepsy without cataplexy 01/31/2018     Priority: Medium     Overview:   Total dose recommended by pulmonology he is dextro- amphetamine 40 mg plus   methylphenidate 40 mg in divided doses per day.  Total of 80 mg of short   acting amphetamines daily.       Osteoarthritis of spine 01/31/2018     Priority: Medium     Peptic ulcer disease 01/31/2018     Priority: Medium     Neuroforaminal stenosis of lumbar spine 01/03/2018     Priority: Medium     Radicular low back pain 01/03/2018      Priority: Medium     Acute bilateral low back pain with bilateral sciatica 10/27/2017     Priority: Medium     Chronic low back pain 08/08/2017     Priority: Medium     Intermittent constipation 08/08/2017     Priority: Medium     Controlled type 2 diabetes mellitus with stage 3 chronic kidney disease, with long-term current use of insulin (H) 03/30/2017     Priority: Medium     Erectile dysfunction 09/26/2016     Priority: Medium     Insulin pump in place 03/10/2016     Priority: Medium     Myofacial muscle pain 12/17/2015     Priority: Medium     Pain medication agreement - 10-21-19 12/17/2015     Priority: Medium     Greater trochanteric bursitis of left hip 06/24/2015     Priority: Medium     Cervical stenosis of spinal canal 06/17/2014     Priority: Medium     Degenerative disc disease, cervical 06/17/2014     Priority: Medium     Facet arthritis of cervical region 06/17/2014     Priority: Medium     Cervical radicular pain 06/05/2014     Priority: Medium     Left arm numbness 06/05/2014     Priority: Medium     Left atrial enlargement 01/23/2014     Priority: Medium     Chronic diastolic heart failure (H) 01/10/2014     Priority: Medium     Overview:   1/20/2014 ECHO FINAL IMPRESSION:   1. Normal left ventricular size and systolic function. Estimated ejection fraction 65%.   2. Mild increase in wall thickness of the ventricular septum with hypokinesis of the basilar segment of the ventricular septum and inferior wall.   3. Mild to moderate left atrial enlargement.   4. Trace tricuspid regurgitation.   5. Mild left ventricular diastolic dysfunction.        CKD (chronic kidney disease) stage 3, GFR 30-59 ml/min (H) 01/10/2014     Priority: Medium     Benign essential hypertension 01/10/2014     Priority: Medium     Myalgia 01/10/2014     Priority: Medium     Old inferior wall myocardial infarction 01/10/2014     Priority: Medium     Platelet inhibition due to Plavix 01/10/2014     Priority: Medium     S/P  CABG x 2 01/10/2014     Priority: Medium     S/P coronary artery stent placement 01/10/2014     Priority: Medium     Painful diabetic neuropathy (H) 04/22/2013     Priority: Medium     Esophageal reflux 05/17/2012     Priority: Medium     Obesity 05/17/2012     Priority: Medium     Diastasis of muscle 10/06/2011     Priority: Medium     Coronary atherosclerosis 09/08/2011     Priority: Medium     Psychosexual dysfunction with inhibited sexual excitement 06/30/2011     Priority: Medium     Angina pectoris (H) 12/10/2010     Priority: Medium     Atherosclerosis of native coronary artery of native heart with stable angina pectoris (H) 02/15/2007     Priority: Medium     Overview:   IMO Update 10/11       Other specified forms of chronic ischemic heart disease 02/15/2007     Priority: Medium     Peripheral vascular disease (H) 02/15/2007     Priority: Medium     Overview:   IMO Update 10/11        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     Gastritis without bleeding     No Comments Provided     Heart disease     Multiple coronary stents     Narcolepsy without cataplexy     No Comments Provided     Other injury of unspecified body region, initial encounter (CODE)     11/2006,Right calf hematoma     Postsurgical percutaneous transluminal coronary angioplasty status 2/15/2007    Overview:  IMO Update 10/11     Rheumatic fever without heart involvement     Rheumatic fever as a child without valvular heart disease     Venous stasis ulcer of left lower leg with edema of left lower leg (H) 9/4/2019     Past Surgical History:   Procedure Laterality Date     ANGIOPLASTY      12/00, 04/04/04,Repeat angioplasty with lt internal mammary to the lt anterior  descending and lt radial artery from aorta to the diagonal.     ANGIOPLASTY      10/01,Angioplasty with 2 vessel CABG the following week from the lt internal mammary to the lt anterior descending and right radial free graft     ANGIOPLASTY      04/2003     ARTHROSCOPY SHOULDER ROTATOR CUFF REPAIR      02/06/2006,Left rotator cuff repair and bone spur removal by Dr. Giraldo in [place]     COLONOSCOPY      1999     COLONOSCOPY  01/08/2016 01/08/2016,-- Dr. De La Cruz -- polypectomy     COLONOSCOPY  04/18/2019    hyperplastic, follow up 10 years, 4/18/29     OTHER SURGICAL HISTORY      09/03,[id number],IR ANGIOGRAM FEMORAL/EXTREMITY ([place]),Unremarkable angiogram at [facility]     OTHER SURGICAL HISTORY      10/05/2004,,PTCA,Angioplasty     OTHER SURGICAL HISTORY      02/2005,,PTCA,Angioplasty with stent.     OTHER SURGICAL HISTORY      03/02/2005,,PTCA,Angioplasty with stent.     OTHER SURGICAL HISTORY      09/23/2005,,PTCA,Angioplasty without stent     OTHER SURGICAL HISTORY      2/26/2007,[id number],IR ANGIOGRAM FEMORAL/EXTREMITY ([place]),Unremarkable coronary angiogram at [facility].     OTHER SURGICAL HISTORY      1967,SUR38,APPENDECTOMY OPEN     RELEASE CARPAL TUNNEL      11/15/01,Right carpal tunnel release by Dr. Mace     RELEASE CARPAL TUNNEL      01/2004,Left carpal tunnel release     Current Outpatient Medications   Medication Sig Dispense Refill     aspirin (GOODSENSE ASPIRIN) 325 MG tablet Take 325 mg by mouth daily Take  by mouth.       blood glucose monitoring (ONETOUCH ULTRA) test strip Dispense test strips covered by the patient insurance. Test 10 times per day.       Blood Glucose Monitoring Suppl (GLUCOCOM BLOOD GLUCOSE MONITOR) WAQAS Dispense glucose meter, test strips and lancets covered by the patient insurance. Test 10 times per day.       clopidogrel (PLAVIX) 75 MG tablet Take 1 tablet (75 mg) by mouth daily 90 tablet 3     co-enzyme Q-10 100 MG CAPS capsule Take 100 mg by mouth daily        [employer] ALCOHOL  SWABS PADS For home use.       desoximetasone (TOPICORT) 0.25 % external cream APPLY  CREAM EXTERNALLY TWICE DAILY 180 g 3     dextroamphetamine (DEXTROSTAT) 10 MG tablet Take 1 tablet (10 mg) by mouth 4 times daily 360 tablet 0     diclofenac (VOLTAREN) 75 MG EC tablet Take 1 tablet (75 mg) by mouth 4 times daily as needed for moderate pain 360 tablet 3     docusate sodium (COLACE) 100 MG capsule Take 1-2 capsules by mouth 2 times daily as needed for constipation prevention       Flaxseed, Linseed, (FLAXSEED OIL) 1000 MG CAPS Take 1 capsule by mouth daily        furosemide (LASIX) 40 MG tablet Take 2-4 tablets ( mg) by mouth daily Or as instructed for leg swelling 90 tablet 3     HUMALOG 100 UNIT/ML injection INJECT UNDER THE SKIN USING INSULIN PUMP. MAX DAILY DOSE  UNITS 180 mL 3     hydrALAZINE (APRESOLINE) 25 MG tablet Take 1-2 tablets (25-50 mg) by mouth 4 times daily - Take lowest effective dose for blood pressure management 360 tablet 11     HYDROcodone-acetaminophen (NORCO) 5-325 MG tablet Take 1 tablet by mouth every 6 hours as needed for severe pain 60 tablet 0     Insulin Pen Needle (PEN NEEDLES) 29G X 12MM MISC For administering insulin at home.       irbesartan (AVAPRO) 150 MG tablet Take 1 tablet (150 mg) by mouth 2 times daily -- needs 2 smaller pills daily 180 tablet 3     IV Sets-Tubing (SOLUTION ADMINISTRATION SET) MISC As directed.       levothyroxine (SYNTHROID/LEVOTHROID) 125 MCG tablet TAKE 1 TABLET BY MOUTH ONCE DAILY IN THE MORNING 90 tablet 3     methylphenidate (RITALIN) 10 MG tablet Take 2 tablets (20 mg) by mouth 2 times daily 360 tablet 0     metoprolol succinate ER (TOPROL-XL) 50 MG 24 hr tablet Take 1 tablet (50 mg) by mouth 2 times daily 180 tablet 3     nitroGLYcerin (NITROSTAT) 0.4 MG sublingual tablet Place 1 tablet under the tongue every 5 minutes as needed for chest pain       ranolazine (RANEXA) 500 MG 12 hr tablet Take 2 tablets (1,000 mg) by mouth 2 times daily -  "cancel other Rx - give 3 month supply 360 tablet 3     rosuvastatin (CRESTOR) 10 MG tablet TAKE 1 TABLET BY MOUTH TWICE DAILY 180 tablet 2     Saline GEL Spray 1 spray in nostril every hour as needed For Nasal Dryness       thin (NO BRAND SPECIFIED) lancets Test 10 times per day.       vitamin E (TOCOPHEROL) 400 units (360 mg) capsule Take 1 capsule by mouth daily       OTC products: no recent use of OTC ASA, NSAIDS or Steroids    Allergies   Allergen Reactions     Famotidine Other (See Comments)     + Caused Headache and Rash -- tolerated Ranitidine and worked well.     Gabapentin      Other reaction(s): Mental Status Change \"felt like in outer space\"     Hydrocortisone Other (See Comments)     Burning and stinging sensation with Hydrocortisone - doesn't help itching or rash -- tolerated Desoximetasone cream and worked well.      Atorvastatin Hives     Celebrex [Celecoxib]      Swelling      Lisinopril Cough     No Clinical Screening - See Comments Hives     Chlorine water     Norvasc [Amlodipine] Other (See Comments)     -- can't remember, didn't tolerate.      Pregabalin      Lyrica - Other reaction(s): Mental Status Change     Valdecoxib Swelling     \"bextra\" NSAID     Insulin Aspart Rash      Latex Allergy: NO    Social History     Tobacco Use     Smoking status: Never Smoker     Smokeless tobacco: Never Used   Substance Use Topics     Alcohol use: No     Alcohol/week: 0.0 standard drinks     History   Drug Use No       REVIEW OF SYSTEMS:   Review of Systems   Constitutional: Negative for chills, fatigue and fever.   HENT: Negative for congestion.    Eyes: Negative for visual disturbance.   Respiratory: Negative for cough, choking, chest tightness, shortness of breath, wheezing and stridor.    Cardiovascular: Positive for chest pain (chronic, stable) and leg swelling. Negative for palpitations.        + claudication leg pain, worse in left leg with exercise/ambulation  + intermittent Angina - if carrying " "groceries   Gastrointestinal: Positive for blood in stool and diarrhea. Negative for abdominal pain.        + past 2-3 weeks... passing some mucus, loose stools, and scant amount of red blood.    Genitourinary: Negative for hematuria.   Musculoskeletal: Positive for arthralgias, back pain, gait problem, myalgias, neck pain and neck stiffness.   Skin: Negative for rash and wound (+right foot-2nd toe  , healing up ulcer).   Neurological: Positive for headaches. Negative for syncope.   Hematological: Negative for adenopathy. Does not bruise/bleed easily.   Psychiatric/Behavioral: Negative for confusion.        EXAM:   /80 (BP Location: Right arm, Patient Position: Sitting, Cuff Size: Adult Regular)   Pulse 76   Temp 98.8  F (37.1  C) (Tympanic)   Resp 16   Ht 1.74 m (5' 8.5\")   Wt 91.4 kg (201 lb 9.6 oz)   SpO2 97%   BMI 30.21 kg/m    Physical Exam  Constitutional:       General: He is not in acute distress.     Appearance: He is well-developed. He is not diaphoretic.   HENT:      Head: Normocephalic and atraumatic.   Eyes:      General: No scleral icterus.     Conjunctiva/sclera: Conjunctivae normal.   Neck:      Musculoskeletal: Neck supple.   Cardiovascular:      Rate and Rhythm: Normal rate and regular rhythm.   Pulmonary:      Effort: Pulmonary effort is normal.      Breath sounds: Normal breath sounds.   Abdominal:      Palpations: Abdomen is soft.      Tenderness: There is no abdominal tenderness.      Comments: + insulin pump noted    Musculoskeletal:         General: No deformity.   Lymphadenopathy:      Cervical: No cervical adenopathy.   Skin:     General: Skin is warm and dry.      Findings: No rash.   Neurological:      General: No focal deficit present.      Mental Status: He is alert.   Psychiatric:         Mood and Affect: Mood normal.         Behavior: Behavior normal.          DIAGNOSTICS:   No labs or EKG required for low risk surgery (cataract, skin procedure, breast biopsy, " etc)    Recent Labs   Lab Test 09/30/19  1516 07/04/19  2220 06/26/19  1606   HGB 14.1 14.1 14.7    221 197    136 136   POTASSIUM 4.4 4.5 3.9   CR 1.63* 1.38* 1.65*   A1C 7.9*  --  8.1*     Results for orders placed or performed in visit on 12/31/19   Lipid Profile     Status: Abnormal   Result Value Ref Range    Cholesterol 137 <200 mg/dL    Triglycerides 219 (H) <150 mg/dL    HDL Cholesterol 36 23 - 92 mg/dL    LDL Cholesterol Calculated 57 <100 mg/dL    Non HDL Cholesterol 101 <130 mg/dL   Hemoglobin A1c     Status: Abnormal   Result Value Ref Range    Hemoglobin A1C 7.8 (H) 4.0 - 6.0 %   CBC with platelets     Status: None   Result Value Ref Range    WBC 6.4 4.0 - 11.0 10e9/L    RBC Count 4.77 4.4 - 5.9 10e12/L    Hemoglobin 15.5 13.3 - 17.7 g/dL    Hematocrit 47.9 40.0 - 53.0 %     78 - 100 fl    MCH 32.5 26.5 - 33.0 pg    MCHC 32.4 31.5 - 36.5 g/dL    RDW 12.8 10.0 - 15.0 %    Platelet Count 240 150 - 450 10e9/L   Comprehensive metabolic panel     Status: Abnormal   Result Value Ref Range    Sodium 136 134 - 144 mmol/L    Potassium 4.1 3.5 - 5.1 mmol/L    Chloride 102 98 - 107 mmol/L    Carbon Dioxide 28 21 - 31 mmol/L    Anion Gap 6 3 - 14 mmol/L    Glucose 126 (H) 70 - 105 mg/dL    Urea Nitrogen 19 7 - 25 mg/dL    Creatinine 1.42 (H) 0.70 - 1.30 mg/dL    GFR Estimate 50 (L) >60 mL/min/[1.73_m2]    GFR Estimate If Black 60 (L) >60 mL/min/[1.73_m2]    Calcium 9.4 8.6 - 10.3 mg/dL    Bilirubin Total 0.4 0.3 - 1.0 mg/dL    Albumin 4.1 3.5 - 5.7 g/dL    Protein Total 6.6 6.4 - 8.9 g/dL    Alkaline Phosphatase 75 34 - 104 U/L    ALT 27 7 - 52 U/L    AST 31 13 - 39 U/L   *UA reflex to Microscopic     Status: Abnormal   Result Value Ref Range    Color Urine Yellow     Appearance Urine Clear     Glucose Urine 100 (A) NEG^Negative mg/dL    Bilirubin Urine Negative NEG^Negative    Ketones Urine Negative NEG^Negative mg/dL    Specific Gravity Urine >1.030 (H) 1.000 - 1.030    Blood Urine Negative  NEG^Negative    pH Urine 6.0 5.0 - 9.0 pH    Protein Albumin Urine 100 (A) NEG^Negative mg/dL    Urobilinogen Urine 0.2 0.2 - 1.0 EU/dL    Nitrite Urine Negative NEG^Negative    Leukocyte Esterase Urine Negative NEG^Negative    Source Midstream Urine    Urine Microscopic     Status: Abnormal   Result Value Ref Range    WBC Urine 0 - 5 OTO5^0 - 5 /HPF    RBC Urine O - 2 OTO2^O - 2 /HPF    Bacteria Urine Few (A) NEG^Negative /HPF         IMPRESSION:   Reason for surgery/procedure: Left eye cataract surgery.  Diagnosis/reason for consult: Preoperative cardiovascular risk stratification    The proposed surgical procedure is considered LOW risk.    REVISED CARDIAC RISK INDEX  The patient has the following serious cardiovascular risks for perioperative complications such as (MI, PE, VFib and 3  AV Block):  Coronary Artery Disease (MI, positive stress test, angina, Qs on EKG)  Diabetes Mellitus (on Insulin)  INTERPRETATION: 2 risks: Class III (moderate risk - 6.6% complication rate)    The patient has the following additional risks for perioperative complications:  No identified additional risks      ICD-10-CM    1. Preop general physical exam Z01.818 Urine Microscopic   2. Senile cataract of left eye, unspecified age-related cataract type H25.9    3. Atherosclerosis of native coronary artery of native heart with stable angina pectoris (H) I25.118    4. Controlled type 2 diabetes mellitus with stage 3 chronic kidney disease, with long-term current use of insulin (H) E11.22     N18.3     Z79.4    5. Chronic diastolic heart failure (H) I50.32    6. Hypothyroidism due to acquired atrophy of thyroid E03.4    7. Mixed hyperlipidemia E78.2 Lipid Profile   8. Platelet inhibition due to Plavix Z79.02    9. Benign essential hypertension I10 CBC with platelets     Comprehensive metabolic panel   10. Loose stools R19.5 Clostridium difficile Toxin B PCR   11. Controlled drug dependence (H) F19.20 Pain Drug Scr UR W Rptd Meds        RECOMMENDATIONS:     --Patient is to take all scheduled medications on the day of surgery EXCEPT for modifications listed below.    Diabetes Medication Use  Uses insulin pump.     Anticoagulant or Antiplatelet Medication Use    Start Aspirin 81 daily -- from now until eye surgery. Don't skip it.   -- Then, restart Aspirin 325 mg after eye surgery.     -- Hold Plavix for 3 to 5 days prior to eye surgery... then restart after eye surgery.     Due to loose stools -- check stool sample for possible Clostridium difficile infection.         APPROVAL GIVEN to proceed with proposed procedure, without further diagnostic evaluation       Signed Electronically by: Jorge Barnett MD    Copy of this evaluation report is provided to requesting physician.    [facility] Preop Guidelines    Revised Cardiac Risk Index

## 2019-12-31 NOTE — NURSING NOTE
Chief Complaint   Patient presents with     Pre-Op Exam     Patient presents to the clinic today for a pre op on left cataract surgery  Medication Reconciliation: completed   Ade Bryan LPN  12/31/2019 1:42 PM

## 2019-12-31 NOTE — PATIENT INSTRUCTIONS
Before Your Surgery      Call your surgeon if there is any change in your health. This includes signs of a cold or flu (such as a sore throat, runny nose, cough, rash or fever).    Do not smoke, drink alcohol or take over the counter medicine (unless your surgeon or primary care doctor tells you to) for the 24 hours before and after surgery.    If you take prescribed drugs: Follow your doctor s orders about which medicines to take and which to stop until after surgery.    Eating and drinking prior to surgery: follow the instructions from your surgeon    Take a shower or bath the night before surgery. Use the soap your surgeon gave you to gently clean your skin. If you do not have soap from your surgeon, use your regular soap. Do not shave or scrub the surgery site.  Wear clean pajamas and have clean sheets on your bed.     Start Aspirin 81 daily -- from now until eye surgery. Don't skip it.   -- Then, restart Aspirin 325 mg after eye surgery.     -- Hold Plavix for 3 to 5 days prior to eye surgery... then restart after eye surgery.     Due to loose stools -- check stool sample for possible Clostridium difficile infection.     Labs today.

## 2020-01-02 ENCOUNTER — TELEPHONE (OUTPATIENT)
Dept: INTERNAL MEDICINE | Facility: OTHER | Age: 70
End: 2020-01-02

## 2020-01-02 LAB
CREAT UR-MCNC: 104 MG/DL
MICROALBUMIN UR-MCNC: 1420 MG/L
MICROALBUMIN/CREAT UR: 1365.38 MG/G CR (ref 0–17)

## 2020-01-02 NOTE — TELEPHONE ENCOUNTER
Patient called asking about lab results-information provided to patient from result letter sent out yesterday.      Barbara Connor LPN 1/2/2020 3:20 PM

## 2020-01-03 ENCOUNTER — TELEPHONE (OUTPATIENT)
Dept: INTERNAL MEDICINE | Facility: OTHER | Age: 70
End: 2020-01-03

## 2020-01-03 NOTE — TELEPHONE ENCOUNTER
Patient informed to do the best that he can with trying to obtain bowel specimen.  Patient reports that some of his stool is becoming slightly formed but still has 1-3 episodes per week-slight blood present with every bowel movement.      Barbara Connor LPN 1/3/2020 2:40 PM

## 2020-01-04 LAB — PAIN DRUG SCR UR W RPTD MEDS: NORMAL

## 2020-01-05 DIAGNOSIS — R19.5 LOOSE STOOLS: ICD-10-CM

## 2020-01-05 DIAGNOSIS — A04.72 COLITIS DUE TO CLOSTRIDIUM DIFFICILE: Primary | ICD-10-CM

## 2020-01-05 LAB
C DIFF TOX B STL QL: POSITIVE
SPECIMEN SOURCE: ABNORMAL

## 2020-01-05 PROCEDURE — 87493 C DIFF AMPLIFIED PROBE: CPT | Performed by: INTERNAL MEDICINE

## 2020-01-05 RX ORDER — VANCOMYCIN HYDROCHLORIDE 125 MG/1
125 CAPSULE ORAL 4 TIMES DAILY
Qty: 56 CAPSULE | Refills: 0 | Status: SHIPPED | OUTPATIENT
Start: 2020-01-05 | End: 2020-02-13

## 2020-01-05 NOTE — TELEPHONE ENCOUNTER
C-diff stool testing was completed 1/5/2020 --- results returned + for Clostridium difficile.     Vancomycin sent to pharmacy.   Advised to call if recurrence of bowel / stool changes after antibiotics are completed.     Jorge Barnett MD

## 2020-01-08 ENCOUNTER — TELEPHONE (OUTPATIENT)
Dept: INTERNAL MEDICINE | Facility: OTHER | Age: 70
End: 2020-01-08

## 2020-01-08 ENCOUNTER — ALLIED HEALTH/NURSE VISIT (OUTPATIENT)
Dept: EDUCATION SERVICES | Facility: OTHER | Age: 70
End: 2020-01-08
Payer: COMMERCIAL

## 2020-01-08 DIAGNOSIS — N18.30 CONTROLLED TYPE 2 DIABETES MELLITUS WITH STAGE 3 CHRONIC KIDNEY DISEASE, WITH LONG-TERM CURRENT USE OF INSULIN (H): Primary | ICD-10-CM

## 2020-01-08 DIAGNOSIS — E11.22 CONTROLLED TYPE 2 DIABETES MELLITUS WITH STAGE 3 CHRONIC KIDNEY DISEASE, WITH LONG-TERM CURRENT USE OF INSULIN (H): Primary | ICD-10-CM

## 2020-01-08 DIAGNOSIS — Z79.4 CONTROLLED TYPE 2 DIABETES MELLITUS WITH STAGE 3 CHRONIC KIDNEY DISEASE, WITH LONG-TERM CURRENT USE OF INSULIN (H): Primary | ICD-10-CM

## 2020-01-08 PROCEDURE — 99211 OFF/OP EST MAY X REQ PHY/QHP: CPT | Performed by: REGISTERED NURSE

## 2020-01-08 NOTE — TELEPHONE ENCOUNTER
1. Controlled type 2 diabetes mellitus with stage 3 chronic kidney disease, with long-term current use of insulin (H)  Order sent.     - insulin lispro (HUMALOG) 100 UNIT/ML vial; INJECT UNDER THE SKIN USING INSULIN PUMP. MAX DAILY DOSE  UNITS  DX: E11.22, N18.3, Z79.4  Dispense: 180 mL; Refill: 3    Jorge Barnett MD

## 2020-01-08 NOTE — PATIENT INSTRUCTIONS
Diabetes Goals and Reminders    Your A1c test should be done every 3 months.  Your goal is less than 8%.   Your last result is:  Lab Results   Component Value Date    A1C 7.8 12/31/2019       Your LDL cholesterol test should be done at least once a year.    Your last result is:  LDL Cholesterol Calculated   Date Value Ref Range Status   12/31/2019 57 <100 mg/dL Final     Comment:     Desirable:       <100 mg/dl       Have blood pressure and weight checked every three months.  Your blood pressure goal is 140/90 or less.  Your last blood pressure reading was:    BP Readings from Last 1 Encounters:   12/31/19 130/80       Use your CGM and enter sensor glucose readings at least 4 times daily.  Your home blood glucose target ranges are:  Before meals:    2 hours after a meal:  Less than 180  Bedtime:  100-140    Avoid all tobacco.  Follow your healthy diet and exercise plan.  See the eye doctor every year.  See the dentist every six months.  Have kidney function tested yearly.    Take all medicine as prescribed.  Please let me know if you are having symptoms you don t expect or if you wish to stop any medicine.    Current Pump settings   Pump Type:  Tandem TSlim X2  Insulin Type: Humalog  BASAL RATES and times:  12   AM (midnight): 2.90 units/hour   Total Basal in 24 hours:  69.6 units   Insulin to Carbohydrate Ratio (ICR):   1 unit per 5 grams of carbohydrate at 12:00 am.  Insulin Sensitivity:   6 mg/dl at 12:00 am.  Target Blood Glucose:   120 mg/dL at 12:00 am Adjusted to 100 mg/dL  Active Insulin: 4 hours    Follow Up Plan  Your future lab plan is:  HgA1c in March 2020.    If you need your cholesterol checked at your next appointment, you should fast 8 to 10 hours before your appointment.  Do not eat or drink anything besides water.  Drink plenty of water and take all your usual medicine.    SUMMARY FOR TODAY'S VISIT    1.  Recommended pump adjustments:    A.  Due to request for target blood sugar 100 mg/dL,  adjusted pump target from 120 to 100 mg/dL.    2.  To help decrease the high glucose after your late meal, try to count carb grams as accurately as possible and bolus before you eat to give the insulin a head start.    3.  Continue using your Tandem TSlim X2 pump and follow up, as needed.      Radha Trammell RN, BSN, CDE, CPT  1/8/2020 4:30 PM

## 2020-01-08 NOTE — TELEPHONE ENCOUNTER
Called [facility] pharmacy and verified name and date of birth of patient. Verified order with pharmacy.  No further questions or concerns at this time.  Lisha Peck LPN on 1/8/2020 at 1:57 PM

## 2020-01-20 ENCOUNTER — TELEPHONE (OUTPATIENT)
Dept: INTERNAL MEDICINE | Facility: OTHER | Age: 70
End: 2020-01-20

## 2020-01-20 NOTE — TELEPHONE ENCOUNTER
Reason for call: Patient wanting a work in appointment.    Patient is having the following symptoms:  C-Diff 1 week    The patient is requesting an appointment with  [facility]    Was an appointment offered for this call? Yes    If Yes, what is the date of the appointment?  02/06/2020     Preferred method for responding to this message: Telephone Call    Phone number patient can be reached at? Cell number on file:    Telephone Information:   Mobile [phone]       If we can't reach you directly, may we leave a detailed response at the number you provided? Yes    Can this message wait until your PCP/provider returns if unavailable today? Yes

## 2020-01-20 NOTE — TELEPHONE ENCOUNTER
Patient has C-diff.    He missed an appointment last week due to diarrhea.    He is supposed to have cataract surgery, but until the C-diff has resolved he was told he can't have surgery.    He would like to come in to discuss.    Can he be worked in?    Kelley Chung LPN  1/20/2020  3:42 PM

## 2020-01-21 NOTE — TELEPHONE ENCOUNTER
As long as he is still having diarrhea he is infectious.      Ideally, as long as he is doing okay, we should try to limit his exposure to anyone until after his antibiotics are completed, and the diarrhea has resolved.    Appointment in a couple weeks would be reasonable to follow-up on his acute medical issues.    Jorge Barnett MD

## 2020-01-21 NOTE — TELEPHONE ENCOUNTER
Called patient and verified name and date of birth. Informed him of message and no further questions. He has an appointment on 2/18/20.  Lisha Peck LPN on 1/21/2020 at 11:51 AM

## 2020-02-05 ENCOUNTER — TELEPHONE (OUTPATIENT)
Dept: INTERNAL MEDICINE | Facility: OTHER | Age: 70
End: 2020-02-05

## 2020-02-05 ENCOUNTER — TELEPHONE (OUTPATIENT)
Dept: SURGERY | Facility: OTHER | Age: 70
End: 2020-02-05

## 2020-02-05 DIAGNOSIS — I83.022 VARICOSE VEINS OF LEFT LOWER EXTREMITY WITH ULCER OF CALF LIMITED TO BREAKDOWN OF SKIN (H): Primary | ICD-10-CM

## 2020-02-05 DIAGNOSIS — L97.221 VARICOSE VEINS OF LEFT LOWER EXTREMITY WITH ULCER OF CALF LIMITED TO BREAKDOWN OF SKIN (H): Primary | ICD-10-CM

## 2020-02-05 DIAGNOSIS — L97.211 VARICOSE VEINS OF RIGHT LOWER EXTREMITY WITH ULCER OF CALF LIMITED TO BREAKDOWN OF SKIN (H): ICD-10-CM

## 2020-02-05 DIAGNOSIS — I83.012 VARICOSE VEINS OF RIGHT LOWER EXTREMITY WITH ULCER OF CALF LIMITED TO BREAKDOWN OF SKIN (H): ICD-10-CM

## 2020-02-05 NOTE — TELEPHONE ENCOUNTER
Phone line to pharmacy is busy.  Ysabel Quintanilla LPN........................2/5/2020  4:49 PM

## 2020-02-06 NOTE — TELEPHONE ENCOUNTER
Pharmacy number tried twice-both times it was busy.  Surgery staff is no longer in clinic-unsure if a fax was sent over.  No order in the computer.    Barbara Connor LPN 2/6/2020 3:39 PM

## 2020-02-07 NOTE — TELEPHONE ENCOUNTER
1. 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 Varicose veins of right lower extremity with ulcer of calf limited to breakdown of skin (H)    Updated rx printed.     - order for DME; Compression Stockings, 30/40 mmHg, knee high, open toe-apply to both legs daily in the am and removed in the evening.  DX:I83.022, L97.229, I83.012, L97.219  Dispense: 6 each; Refill: 11      Jorge Barnett MD

## 2020-02-07 NOTE — TELEPHONE ENCOUNTER
Patient needs new script sent for his compression stockings with diagnosis codes for both right and left legs.  Last office note states that right leg was healed but patient is stating that it has re-opened.  New script pended below to reflect this.  Sent to PCP as Ke Garner MD is out of office today.    Ysabel Quintanilla LPN........................2/7/2020  9:33 AM

## 2020-02-18 ENCOUNTER — TELEPHONE (OUTPATIENT)
Dept: INTERNAL MEDICINE | Facility: OTHER | Age: 70
End: 2020-02-18

## 2020-02-18 ENCOUNTER — OFFICE VISIT (OUTPATIENT)
Dept: INTERNAL MEDICINE | Facility: OTHER | Age: 70
End: 2020-02-18
Attending: INTERNAL MEDICINE
Payer: COMMERCIAL

## 2020-02-18 VITALS
SYSTOLIC BLOOD PRESSURE: 126 MMHG | RESPIRATION RATE: 16 BRPM | OXYGEN SATURATION: 98 % | DIASTOLIC BLOOD PRESSURE: 70 MMHG | HEART RATE: 76 BPM | BODY MASS INDEX: 29.84 KG/M2 | WEIGHT: 201.5 LBS | HEIGHT: 69 IN | TEMPERATURE: 98 F

## 2020-02-18 DIAGNOSIS — M54.10 RADICULAR LOW BACK PAIN: ICD-10-CM

## 2020-02-18 DIAGNOSIS — E11.22 CONTROLLED TYPE 2 DIABETES MELLITUS WITH STAGE 3 CHRONIC KIDNEY DISEASE, WITH LONG-TERM CURRENT USE OF INSULIN (H): ICD-10-CM

## 2020-02-18 DIAGNOSIS — Z79.4 CONTROLLED TYPE 2 DIABETES MELLITUS WITH STAGE 3 CHRONIC KIDNEY DISEASE, WITH LONG-TERM CURRENT USE OF INSULIN (H): ICD-10-CM

## 2020-02-18 DIAGNOSIS — I25.118 ATHEROSCLEROSIS OF NATIVE CORONARY ARTERY OF NATIVE HEART WITH STABLE ANGINA PECTORIS (H): ICD-10-CM

## 2020-02-18 DIAGNOSIS — N18.30 CONTROLLED TYPE 2 DIABETES MELLITUS WITH STAGE 3 CHRONIC KIDNEY DISEASE, WITH LONG-TERM CURRENT USE OF INSULIN (H): ICD-10-CM

## 2020-02-18 DIAGNOSIS — Z01.818 PREOP GENERAL PHYSICAL EXAM: Primary | ICD-10-CM

## 2020-02-18 DIAGNOSIS — G47.419 PRIMARY NARCOLEPSY WITHOUT CATAPLEXY: ICD-10-CM

## 2020-02-18 DIAGNOSIS — Z02.89 PAIN MEDICATION AGREEMENT: ICD-10-CM

## 2020-02-18 DIAGNOSIS — H25.012 CORTICAL AGE-RELATED CATARACT OF LEFT EYE: ICD-10-CM

## 2020-02-18 DIAGNOSIS — M50.30 DEGENERATIVE DISC DISEASE, CERVICAL: ICD-10-CM

## 2020-02-18 DIAGNOSIS — R21 SKIN RASH: ICD-10-CM

## 2020-02-18 PROCEDURE — 99214 OFFICE O/P EST MOD 30 MIN: CPT | Performed by: INTERNAL MEDICINE

## 2020-02-18 PROCEDURE — G0463 HOSPITAL OUTPT CLINIC VISIT: HCPCS | Mod: 25

## 2020-02-18 PROCEDURE — G0463 HOSPITAL OUTPT CLINIC VISIT: HCPCS

## 2020-02-18 RX ORDER — METHYLPHENIDATE HYDROCHLORIDE 10 MG/1
20 TABLET ORAL 2 TIMES DAILY
Qty: 360 TABLET | Refills: 0 | Status: SHIPPED | OUTPATIENT
Start: 2020-02-18 | End: 2020-04-23

## 2020-02-18 RX ORDER — HYDROCODONE BITARTRATE AND ACETAMINOPHEN 5; 325 MG/1; MG/1
1 TABLET ORAL EVERY 6 HOURS PRN
Qty: 60 TABLET | Refills: 0 | Status: CANCELLED | OUTPATIENT
Start: 2020-02-18

## 2020-02-18 RX ORDER — DEXTROAMPHETAMINE SULFATE 10 MG/1
10 TABLET ORAL 4 TIMES DAILY
Qty: 360 TABLET | Refills: 0 | Status: SHIPPED | OUTPATIENT
Start: 2020-02-18 | End: 2020-04-23

## 2020-02-18 RX ORDER — DESOXIMETASONE 2.5 MG/G
CREAM TOPICAL
Qty: 180 G | Refills: 3 | Status: SHIPPED | OUTPATIENT
Start: 2020-02-18 | End: 2020-03-15

## 2020-02-18 RX ORDER — RANOLAZINE 500 MG/1
1000 TABLET, EXTENDED RELEASE ORAL 2 TIMES DAILY
Qty: 360 TABLET | Refills: 3 | Status: SHIPPED | OUTPATIENT
Start: 2020-02-18 | End: 2020-12-30

## 2020-02-18 ASSESSMENT — ANXIETY QUESTIONNAIRES
7. FEELING AFRAID AS IF SOMETHING AWFUL MIGHT HAPPEN: NOT AT ALL
3. WORRYING TOO MUCH ABOUT DIFFERENT THINGS: NOT AT ALL
6. BECOMING EASILY ANNOYED OR IRRITABLE: NOT AT ALL
2. NOT BEING ABLE TO STOP OR CONTROL WORRYING: NOT AT ALL
GAD7 TOTAL SCORE: 0
1. FEELING NERVOUS, ANXIOUS, OR ON EDGE: NOT AT ALL
IF YOU CHECKED OFF ANY PROBLEMS ON THIS QUESTIONNAIRE, HOW DIFFICULT HAVE THESE PROBLEMS MADE IT FOR YOU TO DO YOUR WORK, TAKE CARE OF THINGS AT HOME, OR GET ALONG WITH OTHER PEOPLE: NOT DIFFICULT AT ALL
5. BEING SO RESTLESS THAT IT IS HARD TO SIT STILL: NOT AT ALL

## 2020-02-18 ASSESSMENT — ENCOUNTER SYMPTOMS
FEVER: 0
SHORTNESS OF BREATH: 0
MYALGIAS: 1
HEMATURIA: 0
NECK STIFFNESS: 1
HEADACHES: 1
CHOKING: 0
ABDOMINAL PAIN: 0
CHEST TIGHTNESS: 0
DIARRHEA: 1
ARTHRALGIAS: 1
NECK PAIN: 1
ADENOPATHY: 0
CHILLS: 0
BRUISES/BLEEDS EASILY: 1
BACK PAIN: 1
FATIGUE: 0
COUGH: 0
WOUND: 0
BLOOD IN STOOL: 1
PALPITATIONS: 0
WHEEZING: 0
STRIDOR: 0
CONFUSION: 0

## 2020-02-18 ASSESSMENT — MIFFLIN-ST. JEOR: SCORE: 1666.44

## 2020-02-18 ASSESSMENT — PAIN SCALES - GENERAL: PAINLEVEL: NO PAIN (0)

## 2020-02-18 ASSESSMENT — PATIENT HEALTH QUESTIONNAIRE - PHQ9
SUM OF ALL RESPONSES TO PHQ QUESTIONS 1-9: 0
5. POOR APPETITE OR OVEREATING: NOT AT ALL

## 2020-02-18 NOTE — NURSING NOTE
"Patient presents to the clinic for pre-op exam.      Previous A1C is at goal of <8  Lab Results   Component Value Date    A1C 7.8 12/31/2019    A1C 7.9 09/30/2019    A1C 8.1 06/26/2019    A1C 7.7 03/08/2019    A1C 7.5 11/21/2018     Urine microalbumin:creatine: n/a  Foot exam unknown-declines today  Eye exam     Tobacco User no  Patient is on a daily aspirin  Patient is on a Statin.  Blood pressure today of:     BP Readings from Last 1 Encounters:   12/31/19 130/80      is at the goal of <139/89 for diabetics.    Chief Complaint   Patient presents with     Pre-Op Exam       Initial /70 (BP Location: Right arm, Patient Position: Sitting, Cuff Size: Adult Regular)   Pulse 76   Temp 98  F (36.7  C) (Tympanic)   Resp 16   Ht 1.74 m (5' 8.5\")   Wt 91.4 kg (201 lb 8 oz)   SpO2 98%   BMI 30.19 kg/m   Estimated body mass index is 30.19 kg/m  as calculated from the following:    Height as of this encounter: 1.74 m (5' 8.5\").    Weight as of this encounter: 91.4 kg (201 lb 8 oz).  Medication Reconciliation: complete    Barbara Connor LPN        "

## 2020-02-18 NOTE — PATIENT INSTRUCTIONS
Before Your Surgery      Call your surgeon if there is any change in your health. This includes signs of a cold or flu (such as a sore throat, runny nose, cough, rash or fever).    Do not smoke, drink alcohol or take over the counter medicine (unless your surgeon or primary care doctor tells you to) for the 24 hours before and after surgery.    If you take prescribed drugs: Follow your doctor s orders about which medicines to take and which to stop until after surgery.    Eating and drinking prior to surgery: follow the instructions from your surgeon    Take a shower or bath the night before surgery. Use the soap your surgeon gave you to gently clean your skin. If you do not have soap from your surgeon, use your regular soap. Do not shave or scrub the surgery site.  Wear clean pajamas and have clean sheets on your bed.     Diabetes Medication Use  Insulin pump - will reduce insulin on night before surgery and stop pump on AM prior to surgery.   Anticoagulant or Antiplatelet Medication Use  ASPIRIN: Bleeding risk is low for this procedure and Patient has chronic stable Angina and Claudication leg pain --> aspirin 81 mg daily should be continued in the perioperative period  PLAVIX: No contraindication to stopping plavix.  Stop 7-10 days prior to surgery  ACE Inhibitor or Angiotensin Receptor Blocker (ARB) Use  Ace inhibitor or Angiotensin Receptor Blocker (ARB) and should HOLD this medication for the 24 hours prior to surgery.

## 2020-02-18 NOTE — TELEPHONE ENCOUNTER
[place] left a message on this writers phone asking about his Renexa prescription, reference number #[id number].      Left message to call back...................Barbara Connor LPN  2/18/2020   4:19 PM

## 2020-02-18 NOTE — PROGRESS NOTES
[facility] AND [facility]  [address]  [place] [address]  [phone]  Dept: [phone]    PRE-OP EVALUATION:  Today's date: 2020    Moses Whittaker (: 1950) presents for pre-operative evaluation assessment as requested by Dr. Khalil.  He requires evaluation and anesthesia risk assessment prior to undergoing surgery/procedure for treatment of Left Cataract Removal.    Proposed Surgery/ Procedure: Left Cataract Removal  Date of Surgery/ Procedure: 20  Time of Surgery/ Procedure: unknown  Hospital/Surgical Facility: [facility]  Fax number for surgical facility: n/a  Primary Physician: Jorge Barnett  Type of Anesthesia Anticipated: Local    Patient has a Health Care Directive or Living Will:  NO    1. YES- Do you have a history of heart attack, stroke, stent, bypass or surgery on an artery in the head, neck, heart or legs?  2. YES- Do you ever have any pain or discomfort in your chest?  3. NO - Do you have a history of  Heart Failure?  4. NO - Are you troubled by shortness of breath when: walking on the level, up a slight hill or at night?  5. NO - Do you currently have a cold, bronchitis or other respiratory infection?  6. NO - Do you have a cough, shortness of breath or wheezing?  7. YES- Do you sometimes get pains in the calves of your legs when you walk?  8. YES - Do you or anyone in your family have previous history of blood clots?  9. YES- Do you or does anyone in your family have a serious bleeding problem such as prolonged bleeding following surgeries or cuts?  10. NO - Have you ever had problems with anemia or been told to take iron pills?  11. NO - Have you had any abnormal blood loss such as black, tarry or bloody stools, or abnormal vaginal bleeding?  12. YES - Have you ever had a blood transfusion?  13. NO - Have you or any of your relatives ever had problems with anesthesia?  14. YES - Do you have sleep apnea, excessive snoring or daytime drowsiness?  15. NO  - Do you have any prosthetic heart valves?  16. NO - Do you have prosthetic joints?  17. NO - Is there any chance that you may be pregnant?      HPI:       ICD-10-CM    1. Preop general physical exam Z01.818    2. Cortical age-related cataract of left eye H25.012    3. Atherosclerosis of native coronary artery of native heart with stable angina pectoris (H) I25.118 ranolazine (RANEXA) 500 MG 12 hr tablet     aspirin (ASA) 81 MG tablet   4. Primary narcolepsy without cataplexy G47.419 methylphenidate (RITALIN) 10 MG tablet     dextroamphetamine (DEXTROSTAT) 10 MG tablet   5. Pain medication agreement - 8/9/2018 Z02.89 methylphenidate (RITALIN) 10 MG tablet     dextroamphetamine (DEXTROSTAT) 10 MG tablet   6. Radicular low back pain M54.10    7. Degenerative disc disease, cervical M50.30    8. Skin rash R21 desoximetasone (TOPICORT) 0.25 % external cream   9. Controlled type 2 diabetes mellitus with stage 3 chronic kidney disease, with long-term current use of insulin (H) E11.22     N18.3     Z79.4    HPI related to upcoming procedure: Patient is currently scheduled for left eye cataract surgery.  States that he is been getting some increased vision issues in the left eye and now has surgery scheduled.    Recently had C. difficile colitis, this was in early January.  Symptoms have now been treated and resolved with oral vancomycin.  No recurrent diarrhea issues.    Coronary artery disease with stable angina pectoris.  Advised that he should not be fully off antiplatelet therapy prior to surgery.  He also has peripheral vascular disease and claudication.    Okay to hold Plavix x7 days prior to surgery.  Recommend reducing aspirin from 325 mg daily down to 81 mg daily.  Continue perioperatively.    Narcolepsy, no cataplexy noted.  Continues on methylphenidate and dextroamphetamine.  Has been on stable dosing for many months.  Needs refills today.    Chronic low back pain, has degenerative disc disease of the neck as  well.  Still occasionally using some hydrocodone.  He has enough medications at home to get him by for now.  Denies need for refill today.    Skin rash, intermittent.  Needs refills of his topical steroid cream.    Type 2 diabetes, uses insulin pump.  He will reduce insulin dosing night before surgery and stop insulin pump morning of surgery.    Stage III chronic kidney disease, has been stable.  Advised avoidance of NSAID use.      MEDICAL HISTORY:     Patient Active Problem List    Diagnosis Date Noted     Colitis due to Clostridium difficile 01/05/2020     Priority: Medium     Venous stasis dermatitis of left lower extremity 09/24/2019     Priority: Medium     Venous stasis ulcer of left calf limited to breakdown of skin with varicose veins (H) 08/29/2019     Priority: Medium     Bilateral lower extremity edema 08/29/2019     Priority: Medium     Degeneration of cervical intervertebral disc 01/31/2018     Priority: Medium     Hypothyroidism due to acquired atrophy of thyroid 01/31/2018     Priority: Medium     Mixed hyperlipidemia 01/31/2018     Priority: Medium     Primary narcolepsy without cataplexy 01/31/2018     Priority: Medium     Overview:   Total dose recommended by pulmonology he is dextro- amphetamine 40 mg plus   methylphenidate 40 mg in divided doses per day.  Total of 80 mg of short   acting amphetamines daily.       Osteoarthritis of spine 01/31/2018     Priority: Medium     Peptic ulcer disease 01/31/2018     Priority: Medium     Neuroforaminal stenosis of lumbar spine 01/03/2018     Priority: Medium     Radicular low back pain 01/03/2018     Priority: Medium     Acute bilateral low back pain with bilateral sciatica 10/27/2017     Priority: Medium     Chronic low back pain 08/08/2017     Priority: Medium     Intermittent constipation 08/08/2017     Priority: Medium     Controlled type 2 diabetes mellitus with stage 3 chronic kidney disease, with long-term current use of insulin (H) 03/30/2017      Priority: Medium     Erectile dysfunction 09/26/2016     Priority: Medium     Insulin pump in place 03/10/2016     Priority: Medium     Myofacial muscle pain 12/17/2015     Priority: Medium     Pain medication agreement - 10-21-19 12/17/2015     Priority: Medium     Greater trochanteric bursitis of left hip 06/24/2015     Priority: Medium     Cervical stenosis of spinal canal 06/17/2014     Priority: Medium     Degenerative disc disease, cervical 06/17/2014     Priority: Medium     Facet arthritis of cervical region 06/17/2014     Priority: Medium     Cervical radicular pain 06/05/2014     Priority: Medium     Left arm numbness 06/05/2014     Priority: Medium     Left atrial enlargement 01/23/2014     Priority: Medium     Chronic diastolic heart failure (H) 01/10/2014     Priority: Medium     Overview:   1/20/2014 ECHO FINAL IMPRESSION:   1. Normal left ventricular size and systolic function. Estimated ejection fraction 65%.   2. Mild increase in wall thickness of the ventricular septum with hypokinesis of the basilar segment of the ventricular septum and inferior wall.   3. Mild to moderate left atrial enlargement.   4. Trace tricuspid regurgitation.   5. Mild left ventricular diastolic dysfunction.        CKD (chronic kidney disease) stage 3, GFR 30-59 ml/min (H) 01/10/2014     Priority: Medium     Benign essential hypertension 01/10/2014     Priority: Medium     Myalgia 01/10/2014     Priority: Medium     Old inferior wall myocardial infarction 01/10/2014     Priority: Medium     Platelet inhibition due to Plavix 01/10/2014     Priority: Medium     S/P CABG x 2 01/10/2014     Priority: Medium     S/P coronary artery stent placement 01/10/2014     Priority: Medium     Painful diabetic neuropathy (H) 04/22/2013     Priority: Medium     Esophageal reflux 05/17/2012     Priority: Medium     Obesity 05/17/2012     Priority: Medium     Diastasis of muscle 10/06/2011     Priority: Medium     Coronary atherosclerosis  09/08/2011     Priority: Medium     Psychosexual dysfunction with inhibited sexual excitement 06/30/2011     Priority: Medium     Angina pectoris (H) 12/10/2010     Priority: Medium     Atherosclerosis of native coronary artery of native heart with stable angina pectoris (H) 02/15/2007     Priority: Medium     Overview:   IMO Update 10/11       Other specified forms of chronic ischemic heart disease 02/15/2007     Priority: Medium     Peripheral vascular disease (H) 02/15/2007     Priority: Medium     Overview:   IMO Update 10/11        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     Gastritis without bleeding     No Comments Provided     Heart disease     Multiple coronary stents     Narcolepsy without cataplexy     No Comments Provided     Other injury of unspecified body region, initial encounter (CODE)     11/2006,Right calf hematoma     Postsurgical percutaneous transluminal coronary angioplasty status 2/15/2007    Overview:  IMO Update 10/11     Rheumatic fever without heart involvement     Rheumatic fever as a child without valvular heart disease     Venous stasis ulcer of left lower leg with edema of left lower leg (H) 9/4/2019     Past Surgical History:   Procedure Laterality Date     ANGIOPLASTY      12/00, 04/04/04,Repeat angioplasty with lt internal mammary to the lt anterior descending and lt radial artery from aorta to the diagonal.     ANGIOPLASTY      10/01,Angioplasty with 2 vessel CABG the following week from the lt internal mammary to the lt anterior descending and right radial free graft     ANGIOPLASTY      04/2003     ARTHROSCOPY SHOULDER ROTATOR CUFF REPAIR      02/06/2006,Left rotator cuff repair and bone spur removal by  Dr. Giraldo in [place]     COLONOSCOPY      1999     COLONOSCOPY  01/08/2016 01/08/2016,-- Dr. De La Cruz -- polypectomy     COLONOSCOPY  04/18/2019    hyperplastic, follow up 10 years, 4/18/29     OTHER SURGICAL HISTORY      09/03,137203,IR ANGIOGRAM FEMORAL/EXTREMITY ([place]),Unremarkable angiogram at [facility]     OTHER SURGICAL HISTORY      10/05/2004,,PTCA,Angioplasty     OTHER SURGICAL HISTORY      02/2005,,PTCA,Angioplasty with stent.     OTHER SURGICAL HISTORY      03/02/2005,,PTCA,Angioplasty with stent.     OTHER SURGICAL HISTORY      09/23/2005,,PTCA,Angioplasty without stent     OTHER SURGICAL HISTORY      2/26/2007,[id number],IR ANGIOGRAM FEMORAL/EXTREMITY ([place]),Unremarkable coronary angiogram at [facility].     OTHER SURGICAL HISTORY      1967,SUR38,APPENDECTOMY OPEN     RELEASE CARPAL TUNNEL      11/15/01,Right carpal tunnel release by Dr. Mace     RELEASE CARPAL TUNNEL      01/2004,Left carpal tunnel release     Current Outpatient Medications   Medication Sig Dispense Refill     aspirin (ASA) 81 MG tablet Take 1 tablet (81 mg) by mouth daily -- Dose Reduced 2/18/2020       blood glucose monitoring (ONETOUCH ULTRA) test strip Dispense test strips covered by the patient insurance. Test 10 times per day.       Blood Glucose Monitoring Suppl (GLUCOCOM BLOOD GLUCOSE MONITOR) WAQAS Dispense glucose meter, test strips and lancets covered by the patient insurance. Test 10 times per day.       clopidogrel (PLAVIX) 75 MG tablet Take 1 tablet (75 mg) by mouth daily 90 tablet 3     co-enzyme Q-10 100 MG CAPS capsule Take 100 mg by mouth daily        [employer] ALCOHOL SWABS PADS For home use.       desoximetasone (TOPICORT) 0.25 % external cream Apply sparingly, externally twice daily 180 g 3     dextroamphetamine (DEXTROSTAT) 10 MG tablet Take 1 tablet (10 mg) by mouth 4 times daily 360 tablet 0     diclofenac (VOLTAREN) 75 MG EC tablet Take 1 tablet (75 mg) by mouth 4 times daily as needed for moderate pain 360 tablet 3      docusate sodium (COLACE) 100 MG capsule Take 1-2 capsules by mouth 2 times daily as needed for constipation prevention       Flaxseed, Linseed, (FLAXSEED OIL) 1000 MG CAPS Take 1 capsule by mouth daily        furosemide (LASIX) 40 MG tablet Take 2-4 tablets ( mg) by mouth daily Or as instructed for leg swelling 90 tablet 3     hydrALAZINE (APRESOLINE) 25 MG tablet Take 1-2 tablets (25-50 mg) by mouth 4 times daily - Take lowest effective dose for blood pressure management 360 tablet 11     HYDROcodone-acetaminophen (NORCO) 5-325 MG tablet Take 1 tablet by mouth every 6 hours as needed for severe pain 60 tablet 0     insulin lispro (HUMALOG) 100 UNIT/ML vial INJECT UNDER THE SKIN USING INSULIN PUMP. MAX DAILY DOSE  UNITS  DX: E11.22, N18.3, Z79.4 180 mL 3     Insulin Pen Needle (PEN NEEDLES) 29G X 12MM MISC For administering insulin at home.       irbesartan (AVAPRO) 150 MG tablet Take 1 tablet (150 mg) by mouth 2 times daily -- needs 2 smaller pills daily 180 tablet 3     IV Sets-Tubing (SOLUTION ADMINISTRATION SET) MISC As directed.       levothyroxine (SYNTHROID/LEVOTHROID) 125 MCG tablet TAKE 1 TABLET BY MOUTH ONCE DAILY IN THE MORNING 90 tablet 3     methylphenidate (RITALIN) 10 MG tablet Take 2 tablets (20 mg) by mouth 2 times daily 360 tablet 0     metoprolol succinate ER (TOPROL-XL) 50 MG 24 hr tablet Take 1 tablet (50 mg) by mouth 2 times daily 180 tablet 3     nitroGLYcerin (NITROSTAT) 0.4 MG sublingual tablet Place 1 tablet under the tongue every 5 minutes as needed for chest pain       order for DME Compression Stockings, 30/40 mmHg, knee high, open toe-apply to both legs daily in the am and removed in the evening.  DX:I83.022, L97.229, I83.012, L97.219 6 each 11     ranolazine (RANEXA) 500 MG 12 hr tablet Take 2 tablets (1,000 mg) by mouth 2 times daily - cancel other Rx - give 3 month supply 360 tablet 3     rosuvastatin (CRESTOR) 10 MG tablet TAKE 1 TABLET BY MOUTH TWICE DAILY 180  "tablet 2     Saline GEL Spray 1 spray in nostril every hour as needed For Nasal Dryness       thin (NO BRAND SPECIFIED) lancets Test 10 times per day.       vitamin E (TOCOPHEROL) 400 units (360 mg) capsule Take 1 capsule by mouth daily       OTC products: no recent use of OTC ASA, NSAIDS or Steroids    Allergies   Allergen Reactions     Famotidine Other (See Comments)     + Caused Headache and Rash -- tolerated Ranitidine and worked well.     Gabapentin      Other reaction(s): Mental Status Change \"felt like in outer space\"     Hydrocortisone Other (See Comments)     Burning and stinging sensation with Hydrocortisone - doesn't help itching or rash -- tolerated Desoximetasone cream and worked well.      Atorvastatin Hives     Celebrex [Celecoxib]      Swelling      Lisinopril Cough     No Clinical Screening - See Comments Hives     Chlorine water     Norvasc [Amlodipine] Other (See Comments)     -- can't remember, didn't tolerate.      Pregabalin      Lyrica - Other reaction(s): Mental Status Change     Valdecoxib Swelling     \"bextra\" NSAID     Insulin Aspart Rash      Latex Allergy: NO    Social History     Tobacco Use     Smoking status: Never Smoker     Smokeless tobacco: Never Used   Substance Use Topics     Alcohol use: No     Alcohol/week: 0.0 standard drinks     History   Drug Use No       REVIEW OF SYSTEMS:   Review of Systems   Constitutional: Negative for chills, fatigue and fever.   HENT: Negative for congestion.    Eyes: Negative for visual disturbance.   Respiratory: Negative for cough, choking, chest tightness, shortness of breath, wheezing and stridor.    Cardiovascular: Positive for chest pain (chronic, stable) and leg swelling. Negative for palpitations.        + claudication leg pain, worse in left leg with exercise/ambulation  + intermittent Angina - if carrying groceries, reports stable   Gastrointestinal: Positive for blood in stool and diarrhea. Negative for abdominal pain.        + past 2-3 " "weeks... passing some mucus, loose stools, and scant amount of red blood.    Genitourinary: Negative for hematuria.   Musculoskeletal: Positive for arthralgias, back pain, gait problem, myalgias, neck pain and neck stiffness.   Skin: Negative for rash and wound (+right foot-2nd toe  , healing up ulcer).   Neurological: Positive for headaches. Negative for syncope.   Hematological: Negative for adenopathy. Bruises/bleeds easily.   Psychiatric/Behavioral: Negative for confusion.        EXAM:   /70 (BP Location: Right arm, Patient Position: Sitting, Cuff Size: Adult Regular)   Pulse 76   Temp 98  F (36.7  C) (Tympanic)   Resp 16   Ht 1.74 m (5' 8.5\")   Wt 91.4 kg (201 lb 8 oz)   SpO2 98%   BMI 30.19 kg/m    Physical Exam  Constitutional:       General: He is not in acute distress.     Appearance: He is well-developed. He is not diaphoretic.   HENT:      Head: Normocephalic and atraumatic.   Eyes:      General: No scleral icterus.     Conjunctiva/sclera: Conjunctivae normal.   Neck:      Musculoskeletal: Neck supple.   Cardiovascular:      Rate and Rhythm: Normal rate and regular rhythm.      Comments: Mildly reduced DP pedal pulses bilaterally  Pulmonary:      Effort: Pulmonary effort is normal.      Breath sounds: Normal breath sounds.   Abdominal:      Palpations: Abdomen is soft.      Tenderness: There is no abdominal tenderness.      Comments: + insulin pump noted    Musculoskeletal:         General: No deformity.   Lymphadenopathy:      Cervical: No cervical adenopathy.   Skin:     General: Skin is warm and dry.      Findings: No rash.   Neurological:      General: No focal deficit present.      Mental Status: He is alert and oriented to person, place, and time. Mental status is at baseline.   Psychiatric:         Mood and Affect: Mood normal.         Behavior: Behavior normal.        DIAGNOSTICS:   No labs or EKG required for low risk surgery (cataract, skin procedure, breast biopsy, etc)    Recent " Labs   Lab Test 12/31/19  1425 09/30/19  1516   HGB 15.5 14.1    208    134   POTASSIUM 4.1 4.4   CR 1.42* 1.63*   A1C 7.8* 7.9*        IMPRESSION:   Reason for surgery/procedure: Left cataract surgery  Diagnosis/reason for consult: Preoperative cardiopulmonary risk stratification    The proposed surgical procedure is considered LOW risk.    REVISED CARDIAC RISK INDEX  The patient has the following serious cardiovascular risks for perioperative complications such as (MI, PE, VFib and 3  AV Block):  Coronary Artery Disease (MI, positive stress test, angina, Qs on EKG)  Diabetes Mellitus (on Insulin)  INTERPRETATION: 2 risks: Class III (moderate risk - 6.6% complication rate)    The patient has the following additional risks for perioperative complications:      ICD-10-CM    1. Preop general physical exam Z01.818    2. Cortical age-related cataract of left eye H25.012    3. Atherosclerosis of native coronary artery of native heart with stable angina pectoris (H) I25.118 ranolazine (RANEXA) 500 MG 12 hr tablet     aspirin (ASA) 81 MG tablet   4. Primary narcolepsy without cataplexy G47.419 methylphenidate (RITALIN) 10 MG tablet     dextroamphetamine (DEXTROSTAT) 10 MG tablet   5. Pain medication agreement - 8/9/2018 Z02.89 methylphenidate (RITALIN) 10 MG tablet     dextroamphetamine (DEXTROSTAT) 10 MG tablet   6. Radicular low back pain M54.10    7. Degenerative disc disease, cervical M50.30    8. Skin rash R21 desoximetasone (TOPICORT) 0.25 % external cream   9. Controlled type 2 diabetes mellitus with stage 3 chronic kidney disease, with long-term current use of insulin (H) E11.22     N18.3     Z79.4        RECOMMENDATIONS:       Cardiovascular Risk  Performs 4 METs exercise without symptoms (Light housework (dusting, washing dishes)) .   Patient is already on a Beta Blocker. Continue Betablocker therapy after surgery, using Beta blocker order set as necessary for NPO status.      --Patient is to take  all scheduled medications on the day of surgery EXCEPT for modifications listed below.    Diabetes Medication Use  Insulin pump - will reduce insulin on night before surgery and stop pump on AM prior to surgery.     Anticoagulant or Antiplatelet Medication Use  ASPIRIN: Bleeding risk is low for this procedure and Patient has chronic stable Angina and Claudication leg pain --> aspirin 81 mg daily should be continued in the perioperative period  PLAVIX: No contraindication to stopping plavix.  Stop 7-10 days prior to surgery      ACE Inhibitor or Angiotensin Receptor Blocker (ARB) Use  Ace inhibitor or Angiotensin Receptor Blocker (ARB) and should HOLD this medication for the 24 hours prior to surgery.      APPROVAL GIVEN to proceed with proposed procedure, without further diagnostic evaluation       Signed Electronically by: Jorge Barnett MD    Copy of this evaluation report is provided to requesting physician.    [place] Preop Guidelines    Revised Cardiac Risk Index

## 2020-02-19 ASSESSMENT — ANXIETY QUESTIONNAIRES: GAD7 TOTAL SCORE: 0

## 2020-02-19 NOTE — TELEPHONE ENCOUNTER
He can't swallow the larger tablets.... needs the lower dose ones as prescribed.     Jorge Barnett MD

## 2020-02-19 NOTE — TELEPHONE ENCOUNTER
4 tablets daily is over the quantity limit for his insurance to cover. If ok with Dr Barnett, please send a new prescription for 1000 mg ER bid so he will be taking 2 tabs daily instead of 4.

## 2020-02-27 ENCOUNTER — ALLIED HEALTH/NURSE VISIT (OUTPATIENT)
Dept: PHARMACY | Facility: CLINIC | Age: 70
End: 2020-02-27
Payer: COMMERCIAL

## 2020-02-27 DIAGNOSIS — I10 ESSENTIAL HYPERTENSION: ICD-10-CM

## 2020-02-27 DIAGNOSIS — E03.4 HYPOTHYROIDISM DUE TO ACQUIRED ATROPHY OF THYROID: ICD-10-CM

## 2020-02-27 DIAGNOSIS — M54.50 CHRONIC LOW BACK PAIN, UNSPECIFIED BACK PAIN LATERALITY, UNSPECIFIED WHETHER SCIATICA PRESENT: ICD-10-CM

## 2020-02-27 DIAGNOSIS — I73.9 PERIPHERAL VASCULAR DISEASE (H): ICD-10-CM

## 2020-02-27 DIAGNOSIS — E11.22 CONTROLLED TYPE 2 DIABETES MELLITUS WITH STAGE 3 CHRONIC KIDNEY DISEASE, WITH LONG-TERM CURRENT USE OF INSULIN (H): Primary | ICD-10-CM

## 2020-02-27 DIAGNOSIS — Z79.4 CONTROLLED TYPE 2 DIABETES MELLITUS WITH STAGE 3 CHRONIC KIDNEY DISEASE, WITH LONG-TERM CURRENT USE OF INSULIN (H): Primary | ICD-10-CM

## 2020-02-27 DIAGNOSIS — I25.118 ATHEROSCLEROSIS OF NATIVE CORONARY ARTERY OF NATIVE HEART WITH STABLE ANGINA PECTORIS (H): ICD-10-CM

## 2020-02-27 DIAGNOSIS — I50.32 CHRONIC DIASTOLIC HEART FAILURE (H): ICD-10-CM

## 2020-02-27 DIAGNOSIS — G47.419 PRIMARY NARCOLEPSY WITHOUT CATAPLEXY: ICD-10-CM

## 2020-02-27 DIAGNOSIS — G89.29 CHRONIC LOW BACK PAIN, UNSPECIFIED BACK PAIN LATERALITY, UNSPECIFIED WHETHER SCIATICA PRESENT: ICD-10-CM

## 2020-02-27 DIAGNOSIS — M47.9 OSTEOARTHRITIS OF SPINE, UNSPECIFIED SPINAL OSTEOARTHRITIS COMPLICATION STATUS, UNSPECIFIED SPINAL REGION: ICD-10-CM

## 2020-02-27 DIAGNOSIS — E78.2 MIXED HYPERLIPIDEMIA: ICD-10-CM

## 2020-02-27 DIAGNOSIS — N18.30 CONTROLLED TYPE 2 DIABETES MELLITUS WITH STAGE 3 CHRONIC KIDNEY DISEASE, WITH LONG-TERM CURRENT USE OF INSULIN (H): Primary | ICD-10-CM

## 2020-02-27 PROCEDURE — 99207 ZZC NO CHARGE LOS: CPT | Performed by: PHARMACIST

## 2020-02-27 NOTE — PROGRESS NOTES
MTM ENCOUNTER  SUBJECTIVE/OBJECTIVE:                Moses Whittaker is a 70 year old male called for a follow-up visit.  He was referred to me from his [employer] insurance plan.     Chief Complaint: Follow up from MTM visit on 2/17/20.  Comprehensive medication review - concerns about ranolazine costs  Tobacco:  reports that he has never smoked. He has never used smokeless tobacco.  Alcohol: none    Medication Adherence/Access:  Medication barriers: affording medications.     CAD/Hypertension/CHF/PVD: Current medications include clopidogrel 75 mg daily, aspirin 81 mg daily (dose recently reduced), hydralazine 25-50 mg four times daily, irbesartan 150 mg twice daily, metoprolol XL 50 mg twice daily, ranolazine 1,000 mg twice daily (states he is paying $71 for a 90 day supply which is almost too much for patient - he is rationing this medication somewhat) and nitroglycerin SL 0.4 mg PRN (rare). He's prescribed furosemide  mg daily, but he does not take because he does not think it helps with his swelling (not clear if it helped with BP). Ranexa [employer] program is no more so he is concerned about going into the donut hole this year. He has not been able to tolerate amlodipine (unknown) and Imdur gave him headaches. Ranexa is the only thing that seems to work. Generic ranolazine is still very expensive. He did recently get new compression stockings which have helped with symptoms and swelling. Pt does self-monitor BP. Home BP monitoring in range of 130-140's systolic over 60-70's diastolic.  Patient reports no current medication side effects.    Diabetes:  Pt currently taking Humalog via insulin pump.  States that he is able to afford his insulin because he is on a pump. Pt is not experiencing side effects.  SMBG: multiple times a day via CGM- boluses several times a day.   Ranges (patient reported): 100-300s mg/dL  Average suggests his A1c is 6.8%  Patient is not experiencing hypoglycemia  Recent  symptoms of high blood sugar? none  Eye exam: up to date  Foot exam: due  ACEi/ARB: Yes: irbesartan.   Urine Albumin:   Lab Results   Component Value Date    UMALCR 1,049.18 (H) 03/08/2019    Aspirin: Taking 325mg daily and denies side effects    Narcolepsy: Currently taking Dextrostat 10 mg - four tablets daily (states that this works best for his condition) and methylphenidate 10 mg - two tablets twice daily. Pt finds this to be fairly effective though some days with later drives he is concerned that the benefits will wear off (will hold a half tablet for later sometimes).  He also notes that he does not use these doses everyday. Pt reports no known side effects.     Arthritis/Back Pain: Currently taking diclofenac 75 mg four times daily (2-4 times usually) and rarely he will take a Norco 5-325 mg PRN. Does manage constipation with PRN docusate. Pt finds this to be fairly effective though does have ongoing issues with pain. He is aware of the risks of taking diclofenac given his other issues. Pt reports no known side effects.     Hyperlipidemia: Current therapy includes rosuvastatin 10 mg twice daily (taking 20 mg once daily caused myalgias and he tolerates this better), flaxseed daily, and CoQ10 100 mg daily. His insurance does not like that this dose of statin exceeds his quantity limit.  Pt reports myalgias since on medication - though tolerating now.     Hypothyroidism: Patient is taking levothyroxine 125 mcg daily. Patient is having the following symptoms: none.     Today's Vitals: There were no vitals taken for this visit. - telephone encounter, no vitals    Lab Results   Component Value Date    A1C 7.8 12/31/2019    A1C 7.9 09/30/2019    A1C 8.1 06/26/2019    A1C 7.7 03/08/2019    A1C 7.5 11/21/2018     BP Readings from Last 3 Encounters:   02/18/20 126/70   12/31/19 130/80   12/23/19 (!) 140/70       ASSESSMENT:              Medicare Part D topics discussed:Medication cost    Medication Adherence: good, no  issues identified    CAD/Hypertension/CHF/PVD: Needs Improvement.     Diabetes: Stable. Patient is meeting A1c goal of < 8%.    Narcolepsy: Stable.     Arthritis/Back Pain: Stable per patient.     Hyperlipidemia: Stable. Pt is tolerating current dosing.     Hypothyroidism: Stable.     PLAN:                  1. Pt sent information regarding GoodRx program for future use if he enters [facility].     I spent 60 minutes with this patient today (an extra 15 minutes was spent creating the Medication Action Plan). A copy of the visit note was provided to the patient's primary care provider.     Will follow up in 6 months or sooner if needed.    The patient was sent via [employer] a summary of these recommendations.     Jr Jaramillo, DanielD, BCACP  Medication Therapy Management Pharmacist  Pager: [phone]

## 2020-02-27 NOTE — LETTER
"     [facility]     Date: 2020    Moses Whittaker  [address] [address]    Dear Mr. Whittaker,    Thank you for talking with me on 20 about your health and medications. Medicare s MTM (Medication Therapy Management) program helps you understand your medications and use them safely.      This letter includes an action plan (Medication Action Plan) and medication list (Personal Medication List). The action plan has steps you should take to help you get the best results from your medications. The medication list will help you keep track of your medications and how to use them the right way.       Have your action plan and medication list with you when you talk with your doctors, pharmacists, and other healthcare providers in your care team.     Ask your doctors, pharmacists, and other healthcare providers to update the action plan and medication list at every visit.     Take your medication list with you if you go to the hospital or emergency room.     Give a copy of the action plan and medication list to your family or caregivers.     If you want to talk about this letter or any of the papers with it, please call   [phone].   We look forward to working with you, your doctors, and other healthcare providers to help you stay healthy.     Sincerely,    Silvio Jaramillo [facility]      Enclosed: Medication Action Plan and Personal Medication List    MEDICATION ACTION PLAN FOR Moses Whittaker,  1950     This action plan will help you get the best results from your medications if you:   1. Read \"What we talked about.\"   2. Take the steps listed in the \"What I need to do\" boxes.   3. Fill in \"What I did and when I did it.\"   4. Fill in \"My follow-up plan\" and \"Questions I want to ask.\"     Have this action plan with you when you talk with your doctors, pharmacists, and other healthcare providers in your care team. Share this with your family or caregivers " too.  DATE PREPARED: 2020  What we talked about: What my medicines are for, how to know if my medicines are working, made sure my medicines are safe for me and reviewed how to take my medicines.                                                   What I need to do: Take my medicines every day.   What I did and when I did it:                                              What we talked about: You stated that you are having difficulty affording a full years worth of your RANOLAZINE medication.                                               What I need to do: I sent you some resources that may be options if or when you enter the coverage gap this year. These may still be too high of cost, but should be less than your insurance.  What I did and when I did it:                                               My follow-up plan:                 Questions I want to ask:              If you have any questions about your action plan, call Silvio Jaramillo [facility], Phone: [phone] , Monday-Friday 8-4:30pm.           MEDICATION LIST FOR Moses Whittaker,  1950     This medication list was made for you after we talked. We also used information from your doctor's chart.      Use blank rows to add new medications. Then fill in the dates you started using them.    Cross out medications when you no longer use them. Then write the date and why you stopped using them.    Ask your doctors, pharmacists, and other healthcare providers to update this list at every visit. Keep this list up-to-date with:       Prescription medications    Over the counter drugs     Herbals    Vitamins    Minerals      If you go to the hospital or emergency room, take this list with you. Share this with your family or caregivers too.     DATE PREPARED: 2020  Allergies or side effects: Famotidine; Gabapentin; Hydrocortisone; Atorvastatin; Celebrex [celecoxib]; Lisinopril; No clinical screening - see comments; Norvasc [amlodipine]; Pregabalin;  Valdecoxib; and Insulin aspart     Medication:  ASPIRIN 81 MG PO TABS      How I use it:  Take 1 tablet (81 mg) by mouth daily      Why I use it: Heart disease    Prescriber:  Jorge Barnett MD      Date I started using it:       Date I stopped using it:         Why I stopped using it:            Medication:  CLOPIDOGREL BISULFATE 75 MG PO TABS      How I use it:  Take 1 tablet (75 mg) by mouth daily      Why I use it: Heart disease    Prescriber:  Jorge Barnett MD      Date I started using it:       Date I stopped using it:         Why I stopped using it:            Medication:  COENZYME Q10 100 MG PO CAPS      How I use it:  Take 100 mg by mouth daily       Why I use it:  General health    Prescriber:  Patient Reported      Date I started using it:       Date I stopped using it:         Why I stopped using it:            Medication:  DESOXIMETASONE 0.25 % EX CREA      How I use it:  Apply sparingly, externally twice daily      Why I use it: Skin rash    Prescriber:  Jorge Barnett MD      Date I started using it:       Date I stopped using it:         Why I stopped using it:            Medication:  DEXTROAMPHETAMINE SULFATE 10 MG PO TABS      How I use it:  Take 1 tablet (10 mg) by mouth 4 times daily      Why I use it: Primary narcolepsy    Prescriber:  Jorge Barnett MD      Date I started using it:       Date I stopped using it:         Why I stopped using it:            Medication:  DICLOFENAC SODIUM 75 MG PO TBEC      How I use it:  Take 1 tablet (75 mg) by mouth 4 times daily as needed for moderate pain      Why I use it: Pain    Prescriber:  Jorge Barnett MD      Date I started using it:       Date I stopped using it:         Why I stopped using it:            Medication:  DOCUSATE SODIUM 100 MG PO CAPS      How I use it:  Take 1-2 capsules by mouth 2 times daily as needed       Why I use it:  Constipation    Prescriber:  Patient Reported      Date I started using it:       Date I stopped using it:          Why I stopped using it:            Medication:  FLAXSEED OIL 1000 MG PO CAPS      How I use it:  Take 1 capsule by mouth daily       Why I use it:  General health    Prescriber:  Patient Reported      Date I started using it:       Date I stopped using it:         Why I stopped using it:            Medication:  FUROSEMIDE 40 MG PO TABS      How I use it:  Take 2-4 tablets ( mg) by mouth daily Or as instructed for leg swelling      Why I use it: Heart failure    Prescriber:  Jorge Barnett MD      Date I started using it:       Date I stopped using it:         Why I stopped using it:            Medication:  HYDRALAZINE HCL 25 MG PO TABS      How I use it:  Take 1-2 tablets (25-50 mg) by mouth 4 times daily      Why I use it: Blood pressure    Prescriber:  Jorge Barntet MD      Date I started using it:       Date I stopped using it:         Why I stopped using it:            Medication:  HYDROCODONE-ACETAMINOPHEN 5-325 MG PO TABS      How I use it:  Take 1 tablet by mouth every 6 hours as needed for severe pain      Why I use it: Low back pain    Prescriber:  Jorge Barnett MD      Date I started using it:       Date I stopped using it:         Why I stopped using it:            Medication:  INSULIN LISPRO (HUMAN) VIAL 100 UNIT/ML SC SOLN      How I use it:  INJECT UNDER THE SKIN USING INSULIN PUMP. MAX DAILY DOSE  UNITS       Why I use it: Type 2 diabetes    Prescriber:  Jorge Barnett MD      Date I started using it:       Date I stopped using it:         Why I stopped using it:            Medication:  IRBESARTAN 150 MG PO TABS      How I use it:  Take 1 tablet (150 mg) by mouth 2 times daily -- needs 2 smaller pills daily      Why I use it: Heart disease    Prescriber:  Jorge Barnett MD      Date I started using it:       Date I stopped using it:         Why I stopped using it:            Medication:  LEVOTHYROXINE SODIUM 125 MCG PO TABS      How I use it:  TAKE 1 TABLET BY MOUTH ONCE DAILY IN THE  MORNING      Why I use it: Hypothyroidism     Prescriber:  Jorge Barnett MD      Date I started using it:       Date I stopped using it:         Why I stopped using it:            Medication:  METHYLPHENIDATE HCL 10 MG PO TABS      How I use it:  Take 2 tablets (20 mg) by mouth 2 times daily      Why I use it: Primary narcolepsy     Prescriber:  Jorge Barnett MD      Date I started using it:       Date I stopped using it:         Why I stopped using it:            Medication:  METOPROLOL SUCCINATE ER 50 MG PO TB24      How I use it:  Take 1 tablet (50 mg) by mouth 2 times daily      Why I use it: Heart disease    Prescriber:  Jorge Barnett MD      Date I started using it:       Date I stopped using it:         Why I stopped using it:            Medication:  NITROGLYCERIN 0.4 MG SL SUBL      How I use it:  Place 1 tablet under the tongue every 5 minutes as needed for chest pain      Why I use it:  Chest pain    Prescriber:  Patient Reported      Date I started using it:       Date I stopped using it:         Why I stopped using it:            Medication:  RANOLAZINE  MG PO TB12      How I use it:  Take 2 tablets (1,000 mg) by mouth 2 times daily      Why I use it: Chest pain    Prescriber:  Jorge Barnett MD      Date I started using it:       Date I stopped using it:         Why I stopped using it:            Medication:  ROSUVASTATIN CALCIUM 10 MG PO TABS      How I use it:  TAKE 1 TABLET BY MOUTH TWICE DAILY      Why I use it: Cholesterol; Heart    Prescriber:  Jorge Barnett MD      Date I started using it:       Date I stopped using it:         Why I stopped using it:            Medication:  SALINE NA GEL      How I use it:  Spray 1 spray in nostril every hour as needed For Nasal Dryness      Why I use it:  Nasal dryness    Prescriber:  Patient Reported      Date I started using it:       Date I stopped using it:         Why I stopped using it:            Medication:  VITAMIN E 400 UNITS PO CAPS       How I use it:  Take 1 capsule by mouth daily      Why I use it:  General health    Prescriber:  Patient Reported      Date I started using it:       Date I stopped using it:         Why I stopped using it:            Medication:         How I use it:         Why I use it:      Prescriber:         Date I started using it:       Date I stopped using it:         Why I stopped using it:            Medication:         How I use it:         Why I use it:      Prescriber:         Date I started using it:       Date I stopped using it:         Why I stopped using it:            Medication:         How I use it:         Why I use it:      Prescriber:         Date I started using it:       Date I stopped using it:         Why I stopped using it:              Other Information:     If you have any questions about your action plan, call [phone].    According to the Paperwork Reduction Act of 1995, no persons are required to respond to a collection of information unless it displays a valid OMB control number. The valid OMB number for this information collection is [id number]. The time required to complete this information collection is estimated to average 40 minutes per response, including the time to review instructions, searching existing data resources, gather the data needed, and complete and review the information collection. If you have any comments concerning the accuracy of the time estimate(s) or suggestions for improving this form, please write to: CMS, Attn: [employer] Reports Clearance Officer, [address] [place] [address], [place], [place] [address].

## 2020-03-05 ENCOUNTER — NURSE TRIAGE (OUTPATIENT)
Dept: INTERNAL MEDICINE | Facility: OTHER | Age: 70
End: 2020-03-05

## 2020-03-05 NOTE — TELEPHONE ENCOUNTER
S-(situation): patient calling and states on Sunday he had a reaction of some sort states itchy palms  And itchy all over. Mouth and lips swollen and noticed a difference  Of breathing was not seen but [facility]. States then had same symptoms again on Tuesday but then had hives all over felt light headed.  States checked  states has CGM and Insulin pump.  Patient states that the only difference is he has been on Eye drops for cataract surgery since 2/23      States has not had any symptoms since Tuesday but would like to schedule appointment with Dr. Lambert as soon as he can to follow up on this.    Informed patient Dr. lambert out of office until next Tuesday.  Patient would like message sent to Dr. Lambert for possible work in then.    Patient advised to get into ER or call 911 if has these symptoms again patient verbalized understanding.    Margie Calderón RN on 3/5/2020 at 4:39 PM

## 2020-03-10 NOTE — TELEPHONE ENCOUNTER
Unless there are cancellations... All my open appointments are gone for this week and next week.    Patient will need to see another provider.     Jorge Barnett MD

## 2020-03-10 NOTE — TELEPHONE ENCOUNTER
"Called and informed patient of Dr. Barnett response and patient states was upset.  Patient states he only wants to see Dr. Barnett.  Patient states \"if he isnt worried about then I'm not\"  Informed patient the we have Nicole Savage who works along side Dr. Barnett if he wanted to schedule with her, patient refused..   Informed patient he could call every day and see if there are any cancellations.   My message from patient reiterated about going to ED if develops symptoms again.    Margie Calderón RN on 3/10/2020 at 7:57 AM    "

## 2020-03-11 ENCOUNTER — HEALTH MAINTENANCE LETTER (OUTPATIENT)
Age: 70
End: 2020-03-11

## 2020-03-11 NOTE — TELEPHONE ENCOUNTER
Called and advised patient of Dr. Barnett response and patient verbalized understanding and will follow through with this.    Margie Calderón RN on 3/11/2020 at 10:37 AM

## 2020-03-11 NOTE — TELEPHONE ENCOUNTER
At minimum he should go and talk with his local pharmacist to see if the eyedrops are possibly causing a side effect or drug reaction.    Jorge Barnett MD

## 2020-03-14 ENCOUNTER — HOSPITAL ENCOUNTER (OUTPATIENT)
Facility: OTHER | Age: 70
Setting detail: OBSERVATION
Discharge: HOME | End: 2020-03-15
Attending: EMERGENCY MEDICINE | Admitting: FAMILY MEDICINE
Payer: MEDICARE

## 2020-03-14 DIAGNOSIS — A04.72 C. DIFFICILE COLITIS: ICD-10-CM

## 2020-03-14 DIAGNOSIS — Z79.891 ADMISSION FOR LONG-TERM OPIATE ANALGESIC USE: ICD-10-CM

## 2020-03-14 DIAGNOSIS — A49.8 CLOSTRIDIUM DIFFICILE INFECTION: Primary | ICD-10-CM

## 2020-03-14 DIAGNOSIS — I20.9 ANGINA PECTORIS (H): ICD-10-CM

## 2020-03-14 DIAGNOSIS — Z79.899 ENCOUNTER FOR LONG-TERM (CURRENT) USE OF OTHER MEDICATIONS: ICD-10-CM

## 2020-03-14 DIAGNOSIS — Z79.82 ENCOUNTER FOR LONG-TERM (CURRENT) USE OF ASPIRIN: ICD-10-CM

## 2020-03-14 DIAGNOSIS — I25.10 ATHEROSCLEROSIS OF NATIVE CORONARY ARTERY OF NATIVE HEART WITHOUT ANGINA PECTORIS: ICD-10-CM

## 2020-03-14 DIAGNOSIS — N17.9 ACUTE RENAL FAILURE, UNSPECIFIED ACUTE RENAL FAILURE TYPE (H): ICD-10-CM

## 2020-03-14 DIAGNOSIS — N17.9 ACUTE KIDNEY INJURY (H): ICD-10-CM

## 2020-03-14 LAB
ALBUMIN SERPL-MCNC: 3.5 G/DL (ref 3.5–5.7)
ALP SERPL-CCNC: 64 U/L (ref 34–104)
ALT SERPL W P-5'-P-CCNC: 25 U/L (ref 7–52)
ANION GAP SERPL CALCULATED.3IONS-SCNC: 5 MMOL/L (ref 3–14)
AST SERPL W P-5'-P-CCNC: 26 U/L (ref 13–39)
BASOPHILS # BLD AUTO: 0 10E9/L (ref 0–0.2)
BASOPHILS NFR BLD AUTO: 0.3 %
BILIRUB SERPL-MCNC: 0.5 MG/DL (ref 0.3–1)
BUN SERPL-MCNC: 31 MG/DL (ref 7–25)
C DIFF TOX B STL QL: POSITIVE
CALCIUM SERPL-MCNC: 9 MG/DL (ref 8.6–10.3)
CHLORIDE SERPL-SCNC: 103 MMOL/L (ref 98–107)
CO2 SERPL-SCNC: 25 MMOL/L (ref 21–31)
CREAT SERPL-MCNC: 2 MG/DL (ref 0.7–1.3)
DIFFERENTIAL METHOD BLD: NORMAL
EOSINOPHIL # BLD AUTO: 0.2 10E9/L (ref 0–0.7)
EOSINOPHIL NFR BLD AUTO: 2 %
ERYTHROCYTE [DISTWIDTH] IN BLOOD BY AUTOMATED COUNT: 12.7 % (ref 10–15)
GFR SERPL CREATININE-BSD FRML MDRD: 33 ML/MIN/{1.73_M2}
GLUCOSE SERPL-MCNC: 181 MG/DL (ref 70–105)
HCT VFR BLD AUTO: 46.8 % (ref 40–53)
HGB BLD-MCNC: 15.5 G/DL (ref 13.3–17.7)
IMM GRANULOCYTES # BLD: 0 10E9/L (ref 0–0.4)
IMM GRANULOCYTES NFR BLD: 0.2 %
LACTATE BLD-SCNC: 0.6 MMOL/L (ref 0.7–2)
LYMPHOCYTES # BLD AUTO: 1 10E9/L (ref 0.8–5.3)
LYMPHOCYTES NFR BLD AUTO: 11.4 %
MCH RBC QN AUTO: 32.8 PG (ref 26.5–33)
MCHC RBC AUTO-ENTMCNC: 33.1 G/DL (ref 31.5–36.5)
MCV RBC AUTO: 99 FL (ref 78–100)
MONOCYTES # BLD AUTO: 0.5 10E9/L (ref 0–1.3)
MONOCYTES NFR BLD AUTO: 5.1 %
NEUTROPHILS # BLD AUTO: 7.1 10E9/L (ref 1.6–8.3)
NEUTROPHILS NFR BLD AUTO: 81 %
PLATELET # BLD AUTO: 178 10E9/L (ref 150–450)
POTASSIUM SERPL-SCNC: 4.1 MMOL/L (ref 3.5–5.1)
PROT SERPL-MCNC: 6 G/DL (ref 6.4–8.9)
RBC # BLD AUTO: 4.72 10E12/L (ref 4.4–5.9)
SODIUM SERPL-SCNC: 133 MMOL/L (ref 134–144)
SPECIMEN SOURCE: ABNORMAL
WBC # BLD AUTO: 8.8 10E9/L (ref 4–11)

## 2020-03-14 PROCEDURE — 99285 EMERGENCY DEPT VISIT HI MDM: CPT | Mod: 25 | Performed by: EMERGENCY MEDICINE

## 2020-03-14 PROCEDURE — 85025 COMPLETE CBC W/AUTO DIFF WBC: CPT | Performed by: EMERGENCY MEDICINE

## 2020-03-14 PROCEDURE — 87177 OVA AND PARASITES SMEARS: CPT | Performed by: EMERGENCY MEDICINE

## 2020-03-14 PROCEDURE — 36415 COLL VENOUS BLD VENIPUNCTURE: CPT | Performed by: EMERGENCY MEDICINE

## 2020-03-14 PROCEDURE — 99284 EMERGENCY DEPT VISIT MOD MDM: CPT | Mod: Z6 | Performed by: EMERGENCY MEDICINE

## 2020-03-14 PROCEDURE — 25000132 ZZH RX MED GY IP 250 OP 250 PS 637: Mod: GY | Performed by: FAMILY MEDICINE

## 2020-03-14 PROCEDURE — 87046 STOOL CULTR AEROBIC BACT EA: CPT | Mod: 91 | Performed by: FAMILY MEDICINE

## 2020-03-14 PROCEDURE — 25000132 ZZH RX MED GY IP 250 OP 250 PS 637: Mod: GY | Performed by: EMERGENCY MEDICINE

## 2020-03-14 PROCEDURE — 87493 C DIFF AMPLIFIED PROBE: CPT | Performed by: EMERGENCY MEDICINE

## 2020-03-14 PROCEDURE — 87209 SMEAR COMPLEX STAIN: CPT | Performed by: EMERGENCY MEDICINE

## 2020-03-14 PROCEDURE — 25800030 ZZH RX IP 258 OP 636: Performed by: EMERGENCY MEDICINE

## 2020-03-14 PROCEDURE — 99219 ZZC INITIAL OBSERVATION CARE,LEVL II: CPT | Performed by: FAMILY MEDICINE

## 2020-03-14 PROCEDURE — 96360 HYDRATION IV INFUSION INIT: CPT | Performed by: EMERGENCY MEDICINE

## 2020-03-14 PROCEDURE — 83605 ASSAY OF LACTIC ACID: CPT | Performed by: EMERGENCY MEDICINE

## 2020-03-14 PROCEDURE — 80053 COMPREHEN METABOLIC PANEL: CPT | Performed by: EMERGENCY MEDICINE

## 2020-03-14 PROCEDURE — G0378 HOSPITAL OBSERVATION PER HR: HCPCS

## 2020-03-14 PROCEDURE — 87046 STOOL CULTR AEROBIC BACT EA: CPT | Performed by: FAMILY MEDICINE

## 2020-03-14 PROCEDURE — 87045 FECES CULTURE AEROBIC BACT: CPT | Performed by: FAMILY MEDICINE

## 2020-03-14 RX ORDER — HYDRALAZINE HYDROCHLORIDE 25 MG/1
25 TABLET, FILM COATED ORAL 4 TIMES DAILY
Status: DISCONTINUED | OUTPATIENT
Start: 2020-03-14 | End: 2020-03-14

## 2020-03-14 RX ORDER — PREDNISOLONE ACETATE 10 MG/ML
1 SUSPENSION/ DROPS OPHTHALMIC 3 TIMES DAILY
Status: ON HOLD | COMMUNITY
End: 2020-05-30

## 2020-03-14 RX ORDER — VANCOMYCIN HYDROCHLORIDE 50 MG/ML
250 KIT ORAL 4 TIMES DAILY
Status: DISCONTINUED | OUTPATIENT
Start: 2020-03-14 | End: 2020-03-15 | Stop reason: HOSPADM

## 2020-03-14 RX ORDER — CLOPIDOGREL BISULFATE 75 MG/1
75 TABLET ORAL DAILY
Status: DISCONTINUED | OUTPATIENT
Start: 2020-03-14 | End: 2020-03-15 | Stop reason: HOSPADM

## 2020-03-14 RX ORDER — ONDANSETRON 2 MG/ML
4 INJECTION INTRAMUSCULAR; INTRAVENOUS EVERY 6 HOURS PRN
Status: DISCONTINUED | OUTPATIENT
Start: 2020-03-14 | End: 2020-03-15 | Stop reason: HOSPADM

## 2020-03-14 RX ORDER — VANCOMYCIN HYDROCHLORIDE 50 MG/ML
250 KIT ORAL 4 TIMES DAILY
Status: DISCONTINUED | OUTPATIENT
Start: 2020-03-14 | End: 2020-03-14

## 2020-03-14 RX ORDER — IRBESARTAN 150 MG/1
150 TABLET ORAL 2 TIMES DAILY
Status: DISCONTINUED | OUTPATIENT
Start: 2020-03-14 | End: 2020-03-14 | Stop reason: CLARIF

## 2020-03-14 RX ORDER — PREDNISOLONE ACETATE 10 MG/ML
1 SUSPENSION/ DROPS OPHTHALMIC 3 TIMES DAILY
Status: DISCONTINUED | OUTPATIENT
Start: 2020-03-14 | End: 2020-03-15 | Stop reason: HOSPADM

## 2020-03-14 RX ORDER — ONDANSETRON 4 MG/1
4 TABLET, ORALLY DISINTEGRATING ORAL EVERY 6 HOURS PRN
Status: DISCONTINUED | OUTPATIENT
Start: 2020-03-14 | End: 2020-03-15 | Stop reason: HOSPADM

## 2020-03-14 RX ORDER — DEXTROAMPHETAMINE SULFATE 10 MG/1
10 TABLET ORAL 4 TIMES DAILY
Status: DISCONTINUED | OUTPATIENT
Start: 2020-03-14 | End: 2020-03-14

## 2020-03-14 RX ORDER — HYDROCODONE BITARTRATE AND ACETAMINOPHEN 5; 325 MG/1; MG/1
1 TABLET ORAL EVERY 6 HOURS PRN
Status: DISCONTINUED | OUTPATIENT
Start: 2020-03-14 | End: 2020-03-15 | Stop reason: HOSPADM

## 2020-03-14 RX ORDER — NALOXONE HYDROCHLORIDE 0.4 MG/ML
.1-.4 INJECTION, SOLUTION INTRAMUSCULAR; INTRAVENOUS; SUBCUTANEOUS
Status: DISCONTINUED | OUTPATIENT
Start: 2020-03-14 | End: 2020-03-15 | Stop reason: HOSPADM

## 2020-03-14 RX ORDER — LOSARTAN POTASSIUM 50 MG/1
50 TABLET ORAL 2 TIMES DAILY
Status: DISCONTINUED | OUTPATIENT
Start: 2020-03-14 | End: 2020-03-15 | Stop reason: HOSPADM

## 2020-03-14 RX ORDER — RANOLAZINE 500 MG/1
500 TABLET, EXTENDED RELEASE ORAL 2 TIMES DAILY
Status: DISCONTINUED | OUTPATIENT
Start: 2020-03-14 | End: 2020-03-15 | Stop reason: HOSPADM

## 2020-03-14 RX ORDER — CLOPIDOGREL BISULFATE 75 MG/1
75 TABLET ORAL DAILY
Status: DISCONTINUED | OUTPATIENT
Start: 2020-03-15 | End: 2020-03-14

## 2020-03-14 RX ORDER — DEXTROSE MONOHYDRATE 25 G/50ML
25-50 INJECTION, SOLUTION INTRAVENOUS
Status: DISCONTINUED | OUTPATIENT
Start: 2020-03-14 | End: 2020-03-15 | Stop reason: HOSPADM

## 2020-03-14 RX ORDER — VANCOMYCIN HYDROCHLORIDE 50 MG/ML
250 KIT ORAL ONCE
Status: COMPLETED | OUTPATIENT
Start: 2020-03-14 | End: 2020-03-14

## 2020-03-14 RX ORDER — KETOROLAC TROMETHAMINE 5 MG/ML
1 SOLUTION OPHTHALMIC 3 TIMES DAILY
Status: ON HOLD | COMMUNITY
End: 2020-05-30

## 2020-03-14 RX ORDER — KETOROLAC TROMETHAMINE 5 MG/ML
1 SOLUTION OPHTHALMIC 3 TIMES DAILY
Status: DISCONTINUED | OUTPATIENT
Start: 2020-03-14 | End: 2020-03-15 | Stop reason: HOSPADM

## 2020-03-14 RX ORDER — HYDRALAZINE HYDROCHLORIDE 25 MG/1
25 TABLET, FILM COATED ORAL ONCE
Status: COMPLETED | OUTPATIENT
Start: 2020-03-14 | End: 2020-03-14

## 2020-03-14 RX ORDER — SACCHAROMYCES BOULARDII 250 MG
250 CAPSULE ORAL 2 TIMES DAILY
Status: DISCONTINUED | OUTPATIENT
Start: 2020-03-14 | End: 2020-03-15 | Stop reason: HOSPADM

## 2020-03-14 RX ORDER — HYDRALAZINE HYDROCHLORIDE 25 MG/1
50 TABLET, FILM COATED ORAL 2 TIMES DAILY
Status: DISCONTINUED | OUTPATIENT
Start: 2020-03-15 | End: 2020-03-15 | Stop reason: HOSPADM

## 2020-03-14 RX ORDER — METOPROLOL SUCCINATE 50 MG/1
50 TABLET, EXTENDED RELEASE ORAL 2 TIMES DAILY
Status: DISCONTINUED | OUTPATIENT
Start: 2020-03-14 | End: 2020-03-15 | Stop reason: HOSPADM

## 2020-03-14 RX ORDER — SODIUM CHLORIDE 9 MG/ML
INJECTION, SOLUTION INTRAVENOUS CONTINUOUS
Status: DISCONTINUED | OUTPATIENT
Start: 2020-03-14 | End: 2020-03-15 | Stop reason: HOSPADM

## 2020-03-14 RX ORDER — NICOTINE POLACRILEX 4 MG
15-30 LOZENGE BUCCAL
Status: DISCONTINUED | OUTPATIENT
Start: 2020-03-14 | End: 2020-03-15 | Stop reason: HOSPADM

## 2020-03-14 RX ORDER — ACETAMINOPHEN 325 MG/1
650 TABLET ORAL EVERY 4 HOURS PRN
Status: DISCONTINUED | OUTPATIENT
Start: 2020-03-14 | End: 2020-03-15 | Stop reason: HOSPADM

## 2020-03-14 RX ORDER — ASPIRIN 81 MG/1
81 TABLET ORAL DAILY
Status: DISCONTINUED | OUTPATIENT
Start: 2020-03-15 | End: 2020-03-15 | Stop reason: HOSPADM

## 2020-03-14 RX ORDER — ACETAMINOPHEN 650 MG/1
650 SUPPOSITORY RECTAL EVERY 4 HOURS PRN
Status: DISCONTINUED | OUTPATIENT
Start: 2020-03-14 | End: 2020-03-15 | Stop reason: HOSPADM

## 2020-03-14 RX ORDER — METHYLPHENIDATE HYDROCHLORIDE 5 MG/1
10 TABLET ORAL 2 TIMES DAILY
Status: DISCONTINUED | OUTPATIENT
Start: 2020-03-14 | End: 2020-03-15 | Stop reason: HOSPADM

## 2020-03-14 RX ADMIN — HYDRALAZINE HYDROCHLORIDE 25 MG: 25 TABLET, FILM COATED ORAL at 21:36

## 2020-03-14 RX ADMIN — METOPROLOL SUCCINATE 50 MG: 50 TABLET, EXTENDED RELEASE ORAL at 21:35

## 2020-03-14 RX ADMIN — LOSARTAN POTASSIUM 50 MG: 50 TABLET, FILM COATED ORAL at 21:35

## 2020-03-14 RX ADMIN — METHYLPHENIDATE HYDROCHLORIDE 10 MG: 5 TABLET ORAL at 21:33

## 2020-03-14 RX ADMIN — VANCOMYCIN HYDROCHLORIDE 250 MG: KIT at 13:41

## 2020-03-14 RX ADMIN — VANCOMYCIN HYDROCHLORIDE 250 MG: KIT at 17:07

## 2020-03-14 RX ADMIN — HYDROCODONE BITARTRATE AND ACETAMINOPHEN 1 TABLET: 5; 325 TABLET ORAL at 23:24

## 2020-03-14 RX ADMIN — SODIUM CHLORIDE 1000 ML: 9 INJECTION, SOLUTION INTRAVENOUS at 09:49

## 2020-03-14 RX ADMIN — KETOROLAC TROMETHAMINE 1 DROP: 5 SOLUTION OPHTHALMIC at 21:56

## 2020-03-14 RX ADMIN — RANOLAZINE 500 MG: 500 TABLET, EXTENDED RELEASE ORAL at 21:55

## 2020-03-14 RX ADMIN — PREDNISOLONE ACETATE 1 DROP: 10 SUSPENSION/ DROPS OPHTHALMIC at 21:55

## 2020-03-14 RX ADMIN — SODIUM CHLORIDE: 9 INJECTION, SOLUTION INTRAVENOUS at 17:06

## 2020-03-14 RX ADMIN — HYDRALAZINE HYDROCHLORIDE 25 MG: 25 TABLET, FILM COATED ORAL at 18:36

## 2020-03-14 RX ADMIN — CLOPIDOGREL BISULFATE 75 MG: 75 TABLET, FILM COATED ORAL at 21:38

## 2020-03-14 RX ADMIN — VANCOMYCIN HYDROCHLORIDE 250 MG: KIT at 21:37

## 2020-03-14 RX ADMIN — Medication 250 MG: at 21:35

## 2020-03-14 RX ADMIN — SODIUM CHLORIDE: 9 INJECTION, SOLUTION INTRAVENOUS at 11:42

## 2020-03-14 ASSESSMENT — MIFFLIN-ST. JEOR: SCORE: 1669.37

## 2020-03-14 NOTE — PROGRESS NOTES
Patient blood sugar is 103 per patient own monitor. Patient doses self through personal insulin pump.  Patient has good appetite, no nausea.  Has had another loose bowel movement.  Continue to decline any need for pain medication.

## 2020-03-14 NOTE — ED TRIAGE NOTES
"Pt presents to the ED with concerns for having return of cdiff. Was diagnosed with cdiff early last month and received 2 weeks of oral vanco. Pt reports diarrhea and abdominal pain that started 4 days ago. Intermittent up until this morning when pain \"got way worse.\" States \"the mucous just runs out of me.\"  Afebrile.   "

## 2020-03-14 NOTE — PLAN OF CARE
Patient has not had any nausea, requesting dinner, tolerating fluids.  Receiving IVFs per MAR.  Patient denies any interventions needed for stomach cramping pain rating 2/10.  Patient has had one loose bowel movement.  Steady gait, alert and oriented.  Allowing patient to use bathroom independently.  VSS, afebrile.

## 2020-03-14 NOTE — ED PROVIDER NOTES
Emergency Department Provider Note    History     Chief Complaint   Patient presents with     Diarrhea     Abdominal Pain           Moses is a 70 year old male who presents with 4 days of progressive diarrhea, fevers, chills, and abdominal pain.  He was recently diagnosed with C. difficile and finished his vancomycin a couple of weeks ago.  He says he is noticing blood-streaked mucus in his stools as well.  Going multiple times a day.  Still able to urinate normally.  No chest pain or shortness of breath. Concern he is re-infected.    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     Gastritis without bleeding     No Comments Provided     Heart disease     Multiple coronary stents     Narcolepsy without cataplexy     No Comments Provided     Other injury of unspecified body region, initial encounter (CODE)     11/2006,Right calf hematoma     Postsurgical percutaneous transluminal coronary angioplasty status 2/15/2007    Overview:  IMO Update 10/11     Rheumatic fever without heart involvement     Rheumatic fever as a child without valvular heart disease     Venous stasis ulcer of left lower leg with edema of left lower leg (H) 9/4/2019     Past Surgical History:   Procedure Laterality Date     ANGIOPLASTY      12/00, 04/04/04,Repeat angioplasty with lt internal mammary to the lt anterior descending and lt radial artery from aorta to the diagonal.     ANGIOPLASTY      10/01,Angioplasty with 2 vessel CABG the following week from the lt internal mammary to the lt anterior descending and right radial free graft     ANGIOPLASTY      04/2003     ARTHROSCOPY SHOULDER ROTATOR CUFF REPAIR      02/06/2006,Left rotator cuff repair  and bone spur removal by Dr. Giraldo in [place]     COLONOSCOPY      1999     COLONOSCOPY  01/08/2016 01/08/2016,-- Dr. De La Cruz -- polypectomy     COLONOSCOPY  04/18/2019    hyperplastic, follow up 10 years, 4/18/29     OTHER SURGICAL HISTORY      09/03,[id number],IR ANGIOGRAM FEMORAL/EXTREMITY ([place]),Unremarkable angiogram at [facility]     OTHER SURGICAL HISTORY      10/05/2004,,PTCA,Angioplasty     OTHER SURGICAL HISTORY      02/2005,,PTCA,Angioplasty with stent.     OTHER SURGICAL HISTORY      03/02/2005,,PTCA,Angioplasty with stent.     OTHER SURGICAL HISTORY      09/23/2005,,PTCA,Angioplasty without stent     OTHER SURGICAL HISTORY      2/26/2007,[id number],IR ANGIOGRAM FEMORAL/EXTREMITY ([place]),Unremarkable coronary angiogram at [facility].     OTHER SURGICAL HISTORY      1967,SUR38,APPENDECTOMY OPEN     RELEASE CARPAL TUNNEL      11/15/01,Right carpal tunnel release by Dr. Mace     RELEASE CARPAL TUNNEL      01/2004,Left carpal tunnel release       Medications:  Current Facility-Administered Medications   Medication     sodium chloride 0.9% infusion     vancomycin (FIRVANQ) oral solution 250 mg     Current Outpatient Medications   Medication     aspirin (ASA) 81 MG tablet     blood glucose monitoring (ONETOUCH ULTRA) test strip     Blood Glucose Monitoring Suppl (GLUCOCOM BLOOD GLUCOSE MONITOR) WAQAS     clopidogrel (PLAVIX) 75 MG tablet     co-enzyme Q-10 100 MG CAPS capsule     [employer]S ALCOHOL SWABS PADS     desoximetasone (TOPICORT) 0.25 % external cream     dextroamphetamine (DEXTROSTAT) 10 MG tablet     diclofenac (VOLTAREN) 75 MG EC tablet     docusate sodium (COLACE) 100 MG capsule     Flaxseed, Linseed, (FLAXSEED OIL) 1000 MG CAPS     furosemide (LASIX) 40 MG tablet     hydrALAZINE (APRESOLINE) 25 MG tablet     HYDROcodone-acetaminophen (NORCO) 5-325 MG tablet     insulin lispro (HUMALOG) 100 UNIT/ML vial     Insulin Pen Needle (PEN NEEDLES) 29G X 12MM MISC     irbesartan (AVAPRO) 150 MG tablet     IV  "Sets-Tubing (SOLUTION ADMINISTRATION SET) MISC     levothyroxine (SYNTHROID/LEVOTHROID) 125 MCG tablet     methylphenidate (RITALIN) 10 MG tablet     metoprolol succinate ER (TOPROL-XL) 50 MG 24 hr tablet     nitroGLYcerin (NITROSTAT) 0.4 MG sublingual tablet     order for DME     ranolazine (RANEXA) 500 MG 12 hr tablet     rosuvastatin (CRESTOR) 10 MG tablet     Saline GEL     thin (NO BRAND SPECIFIED) lancets     vitamin E (TOCOPHEROL) 400 units (360 mg) capsule       Allergies:  Famotidine; Gabapentin; Hydrocortisone; Atorvastatin; Celebrex [celecoxib]; Lisinopril; No clinical screening - see comments; Norvasc [amlodipine]; Pregabalin; Valdecoxib; and Insulin aspart    Immunizations:  UTD by report.    I have reviewed the Medications, Allergies, Past Medical and Surgical History, and Social History in the [employer] and with family.    Review of Systems  Please see HPI for pertinent positives and negatives. All other systems reviewed and found to be negative.      Vitals / Physical Exam   BP: (!) 160/90  Heart Rate: 98  Temp: 97.7  F (36.5  C)  Resp: 16  Height: 177.8 cm (5' 10\")  SpO2: 98 %    General: Awake, alert, in no acute distress.  Head: Normocephalic, atraumatic.  Eyes: Conjugate gaze.  ENT: Moist membranes, external ear appears normal.   Chest/Respiratory: Equal chest rise.  Cardiovascular: Peripheral pulses present.  Abdominal: Soft, non-distended, non-tender.  Extremities: No obvious deformity.  Neurological: GCS 15, moving all extremities without gross deficit.  Skin: Warm, no rashes, lesions, or bruising.   Psychiatric: Appropriate affect.     ED Course      Procedures    Results for orders placed or performed during the hospital encounter of 03/14/20 (from the past 24 hour(s))   CBC with platelets differential   Result Value Ref Range    WBC 8.8 4.0 - 11.0 10e9/L    RBC Count 4.72 4.4 - 5.9 10e12/L    Hemoglobin 15.5 13.3 - 17.7 g/dL    Hematocrit 46.8 40.0 - 53.0 %    MCV 99 78 - 100 fl    MCH " 32.8 26.5 - 33.0 pg    MCHC 33.1 31.5 - 36.5 g/dL    RDW 12.7 10.0 - 15.0 %    Platelet Count 178 150 - 450 10e9/L    Diff Method Automated Method     % Neutrophils 81.0 %    % Lymphocytes 11.4 %    % Monocytes 5.1 %    % Eosinophils 2.0 %    % Basophils 0.3 %    % Immature Granulocytes 0.2 %    Absolute Neutrophil 7.1 1.6 - 8.3 10e9/L    Absolute Lymphocytes 1.0 0.8 - 5.3 10e9/L    Absolute Monocytes 0.5 0.0 - 1.3 10e9/L    Absolute Eosinophils 0.2 0.0 - 0.7 10e9/L    Absolute Basophils 0.0 0.0 - 0.2 10e9/L    Abs Immature Granulocytes 0.0 0 - 0.4 10e9/L   Lactic acid whole blood   Result Value Ref Range    Lactic Acid 0.6 (L) 0.7 - 2.0 mmol/L   Comprehensive metabolic panel   Result Value Ref Range    Sodium 133 (L) 134 - 144 mmol/L    Potassium 4.1 3.5 - 5.1 mmol/L    Chloride 103 98 - 107 mmol/L    Carbon Dioxide 25 21 - 31 mmol/L    Anion Gap 5 3 - 14 mmol/L    Glucose 181 (H) 70 - 105 mg/dL    Urea Nitrogen 31 (H) 7 - 25 mg/dL    Creatinine 2.00 (H) 0.70 - 1.30 mg/dL    GFR Estimate 33 (L) >60 mL/min/[1.73_m2]    GFR Estimate If Black 40 (L) >60 mL/min/[1.73_m2]    Calcium 9.0 8.6 - 10.3 mg/dL    Bilirubin Total 0.5 0.3 - 1.0 mg/dL    Albumin 3.5 3.5 - 5.7 g/dL    Protein Total 6.0 (L) 6.4 - 8.9 g/dL    Alkaline Phosphatase 64 34 - 104 U/L    ALT 25 7 - 52 U/L    AST 26 13 - 39 U/L   Clostridium difficile toxin B PCR    Specimen: Feces   Result Value Ref Range    Specimen Description Feces     C Diff Toxin B PCR Positive (A) NEG^Negative       Medications   sodium chloride 0.9% infusion ( Intravenous New Bag 3/14/20 1142)   vancomycin (FIRVANQ) oral solution 250 mg (has no administration in time range)   0.9% sodium chloride BOLUS (0 mLs Intravenous Stopped 3/14/20 1051)       Relevant labs, images, EKGs, Epic and outside hospital (if applicable) charts were reviewed. The findings, diagnosis, plan, and need for follow up were discussed with the patient/family. Nursing notes were reviewed.     Critical Care  Time: None.     Assessment / Plan / Medical Decision Making   70 year old male presenting with diarrhea for the past 4 days with concern for dehydration.  Nonsurgical abdomen.  His BMP is consistent with slight acute kidney injury.  He is able to tolerate p.o., and has not have any more diarrhea since emergency department.  He is not hypotensive or tachycardic concerning for shock.  His lactate is normal, and his CBC is reassuring.  He does not appear to be having acute blood loss.  He had no tenderness in his abdominal area.  He was given a liter of fluid as well as on maintenance fluid.  His C. difficile was positive.  Patient is having more abdominal pain and stools. With the MICHAEL and C Diff, will admit to observation. Given vanc here in ED. Signed out to hospitalist.     Final diagnoses:   C. difficile colitis   Acute kidney injury (H)       Shemar Sommers MD  3/14/2020   [facility]

## 2020-03-14 NOTE — PROGRESS NOTES
" NSG ADMISSION NOTE    Patient admitted to room 312 at approximately 1430 via wheel chair from emergency room. Patient was accompanied by transport tech.     Verbal SBAR report received from GLENDA Mccoy prior to patient arrival.     Patient ambulated to bed with stand-by assist. Patient alert and oriented X 3. The patient is not having any pain. 0-10 Pain Scale: 9. Admission vital signs: Blood pressure (!) 142/71, pulse 57, temperature 98.7  F (37.1  C), temperature source Tympanic, resp. rate 16, height 1.778 m (5' 10\"), SpO2 100 %. Patient was oriented to plan of care, call light, bed controls, tv, telephone, bathroom and visiting hours.     Risk Assessment    The following safety risks were identified during admission: fall. Yellow risk band applied: YES.     Skin Initial Assessment    This writer admitted this patient and completed a full skin assessment and Adria score in the Adult PCS flowsheet. Appropriate interventions initiated as needed.      Education    Patient has a [place] to Observation order: Yes  Observation education completed and documented: Yes      Carmen Albrecht RN    "

## 2020-03-14 NOTE — H&P
[facility]    History and Physical  Hospitalist       Date of Admission:  3/14/2020    Assessment & Plan   Moses Whittaker is a 70 year old male who presents with recurrence of C. difficile and associated MICHAEL    Principal Problem:    Clostridium difficile infection    Assessment: First positive treatment January 5, recurrence has recurred in roughly the 2-month interval.  He would appear to have a recurrence that requires pulsed tapered dose of vancomycin vs fidaxomicin    Plan: Started on vancomycin 4 times daily for 14 days, then taper to twice daily for 7 days, once daily for 7 days, then every other day dosing for 2 to 8 weeks    Active Problems:    Coronary atherosclerosis    Assessment: stable, bypass, multiple stents    Plan: Continue home cardiac medications including aspirin and clopidogrel.  He is on diclofenac, which should be avoided with CAD and MICHAEL.  This will be held.      Controlled type 2 diabetes mellitus with stage 3 chronic kidney disease, with long-term current use of insulin (H)    Assessment: Controlled with insulin pump and CGM    Plan: Patient may monitor diabetes and use his own pump.  Blood sugar checks as needed      MICHAEL (acute kidney injury) (H)    Assessment: Due to dehydration from diarrhea    Plan: IV fluid support and recheck creatinine in morning    DVT Prophylaxis: Low Risk/Ambulatory with no VTE prophylaxis indicated  Code Status: Full Code    Cuco Mathis    Primary Care Physician   Jorge Barnett    Chief Complaint   Diarrhea, abdominal pain    History is obtained from the patient and chart review.    History of Present Illness   Moses Whittaker is a 70 year old male who presents with 3 days of diarrhea and abdominal pain.  Pain worse today.  He had multiple bowel movements today with mucus and some blood.  Not clear if blood was from hemorrhoids or colitis.  He had C. difficile treated with vancomycin in January.  Symptoms completely resolved with  solid stools in February.  He has not been eating or drinking well for the past few days.    Past Medical History    I have reviewed this patient's medical history and updated it with pertinent information if needed.   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     Gastritis without bleeding     No Comments Provided     Heart disease     Multiple coronary stents     Narcolepsy without cataplexy     No Comments Provided     Other injury of unspecified body region, initial encounter (CODE)     11/2006,Right calf hematoma     Postsurgical percutaneous transluminal coronary angioplasty status 2/15/2007    Overview:  IMO Update 10/11     Rheumatic fever without heart involvement     Rheumatic fever as a child without valvular heart disease     Venous stasis ulcer of left lower leg with edema of left lower leg (H) 9/4/2019       Past Surgical History   I have reviewed this patient's surgical history and updated it with pertinent information if needed.  Past Surgical History:   Procedure Laterality Date     ANGIOPLASTY      12/00, 04/04/04,Repeat angioplasty with lt internal mammary to the lt anterior descending and lt radial artery from aorta to the diagonal.     ANGIOPLASTY      10/01,Angioplasty with 2 vessel CABG the following week from the lt internal mammary to the lt anterior descending and right radial free graft     ANGIOPLASTY      04/2003     ARTHROSCOPY SHOULDER ROTATOR CUFF REPAIR      02/06/2006,Left rotator cuff repair and bone spur removal by Dr. Giraldo in [place]     COLONOSCOPY      1999     COLONOSCOPY  01/08/2016 01/08/2016,-- Dr. De La Cruz -- polypectomy     COLONOSCOPY  04/18/2019    hyperplastic, follow up 10 years, 4/18/29      OTHER SURGICAL HISTORY      09/03,816116,IR ANGIOGRAM FEMORAL/EXTREMITY ([place]),Unremarkable angiogram at [facility]     OTHER SURGICAL HISTORY      10/05/2004,,PTCA,Angioplasty     OTHER SURGICAL HISTORY      02/2005,,PTCA,Angioplasty with stent.     OTHER SURGICAL HISTORY      03/02/2005,,PTCA,Angioplasty with stent.     OTHER SURGICAL HISTORY      09/23/2005,,PTCA,Angioplasty without stent     OTHER SURGICAL HISTORY      2/26/2007,993303,IR ANGIOGRAM FEMORAL/EXTREMITY ([place]),Unremarkable coronary angiogram at [facility].     OTHER SURGICAL HISTORY      1967,SUR38,APPENDECTOMY OPEN     RELEASE CARPAL TUNNEL      11/15/01,Right carpal tunnel release by Dr. Mace     RELEASE CARPAL TUNNEL      01/2004,Left carpal tunnel release       Prior to Admission Medications   Prior to Admission Medications   Prescriptions Last Dose Informant Patient Reported? Taking?   Blood Glucose Monitoring Suppl (GLUCOCOM BLOOD GLUCOSE MONITOR) WAQAS   Yes No   Sig: Dispense glucose meter, test strips and lancets covered by the patient insurance. Test 10 times per day.   [employer]S ALCOHOL SWABS PADS   Yes No   Sig: For home use.   Flaxseed, Linseed, (FLAXSEED OIL) 1000 MG CAPS   Yes No   Sig: Take 1 capsule by mouth daily    HYDROcodone-acetaminophen (NORCO) 5-325 MG tablet   No No   Sig: Take 1 tablet by mouth every 6 hours as needed for severe pain   IV Sets-Tubing (SOLUTION ADMINISTRATION SET) MISC   Yes No   Sig: As directed.   Insulin Pen Needle (PEN NEEDLES) 29G X 12MM MISC   Yes No   Sig: For administering insulin at home.   Saline GEL   Yes No   Sig: Spray 1 spray in nostril every hour as needed For Nasal Dryness   aspirin (ASA) 81 MG tablet   No No   Sig: Take 1 tablet (81 mg) by mouth daily -- Dose Reduced 2/18/2020   blood glucose monitoring (ONETOUCH ULTRA) test strip   Yes No   Sig: Dispense test strips covered by the patient insurance. Test 10 times per day.   clopidogrel (PLAVIX) 75 MG tablet   No No   Sig: Take 1 tablet (75 mg)  by mouth daily   co-enzyme Q-10 100 MG CAPS capsule   Yes No   Sig: Take 100 mg by mouth daily    desoximetasone (TOPICORT) 0.25 % external cream   No No   Sig: Apply sparingly, externally twice daily   dextroamphetamine (DEXTROSTAT) 10 MG tablet   No No   Sig: Take 1 tablet (10 mg) by mouth 4 times daily   diclofenac (VOLTAREN) 75 MG EC tablet   No No   Sig: Take 1 tablet (75 mg) by mouth 4 times daily as needed for moderate pain   docusate sodium (COLACE) 100 MG capsule   Yes No   Sig: Take 1-2 capsules by mouth 2 times daily as needed for constipation prevention   furosemide (LASIX) 40 MG tablet   No No   Sig: Take 2-4 tablets ( mg) by mouth daily Or as instructed for leg swelling   hydrALAZINE (APRESOLINE) 25 MG tablet   No No   Sig: Take 1-2 tablets (25-50 mg) by mouth 4 times daily - Take lowest effective dose for blood pressure management   insulin lispro (HUMALOG) 100 UNIT/ML vial   No No   Sig: INJECT UNDER THE SKIN USING INSULIN PUMP. MAX DAILY DOSE  UNITS  DX: E11.22, N18.3, Z79.4   irbesartan (AVAPRO) 150 MG tablet   No No   Sig: Take 1 tablet (150 mg) by mouth 2 times daily -- needs 2 smaller pills daily   levothyroxine (SYNTHROID/LEVOTHROID) 125 MCG tablet   No No   Sig: TAKE 1 TABLET BY MOUTH ONCE DAILY IN THE MORNING   methylphenidate (RITALIN) 10 MG tablet   No No   Sig: Take 2 tablets (20 mg) by mouth 2 times daily   metoprolol succinate ER (TOPROL-XL) 50 MG 24 hr tablet   No No   Sig: Take 1 tablet (50 mg) by mouth 2 times daily   nitroGLYcerin (NITROSTAT) 0.4 MG sublingual tablet   Yes No   Sig: Place 1 tablet under the tongue every 5 minutes as needed for chest pain   order for DME   No No   Sig: Compression Stockings, 30/40 mmHg, knee high, open toe-apply to both legs daily in the am and removed in the evening.  DX:I83.022, L97.229, I83.012, L97.219   ranolazine (RANEXA) 500 MG 12 hr tablet   No No   Sig: Take 2 tablets (1,000 mg) by mouth 2 times daily - cancel other Rx - give 3  "month supply   rosuvastatin (CRESTOR) 10 MG tablet   No No   Sig: TAKE 1 TABLET BY MOUTH TWICE DAILY   thin (NO BRAND SPECIFIED) lancets   Yes No   Sig: Test 10 times per day.   vitamin E (TOCOPHEROL) 400 units (360 mg) capsule   Yes No   Sig: Take 1 capsule by mouth daily      Facility-Administered Medications: None     Allergies   Allergies   Allergen Reactions     Famotidine Other (See Comments)     + Caused Headache and Rash -- tolerated Ranitidine and worked well.     Gabapentin      Other reaction(s): Mental Status Change \"felt like in outer space\"     Hydrocortisone Other (See Comments)     Burning and stinging sensation with Hydrocortisone - doesn't help itching or rash -- tolerated Desoximetasone cream and worked well.      Atorvastatin Hives     Celebrex [Celecoxib]      Swelling      Lisinopril Cough     No Clinical Screening - See Comments Hives     Chlorine water     Norvasc [Amlodipine] Other (See Comments)     -- can't remember, didn't tolerate.      Pregabalin      Lyrica - Other reaction(s): Mental Status Change     Valdecoxib Swelling     \"bextra\" NSAID     Insulin Aspart Rash       Social History   I have reviewed this patient's social history and updated it with pertinent information if needed. Moses CLARIBEL Whittaker  reports that he has never smoked. He has never used smokeless tobacco. He reports that he does not drink alcohol or use drugs.    Family History   I have reviewed this patient's family history and updated it with pertinent information if needed.   Family History   Problem Relation Age of Onset     Heart Disease Mother         Heart Disease,Heart problems     Heart Disease Other         Heart Disease,Heart problems     Heart Disease Other         Heart Disease,Heart problems     Ankylosing Spondylitis Son         Ankylosing spondylitis,Ankylosing spondylitis     Other - See Comments Brother          with sudden death following shoulder surgery 2012 -- was off his Plavix for 10 " days pre-operatively.     Ankylosing Spondylitis Daughter         Ankylosing spondylitis,-- Dx 2017       Review of Systems     REVIEW OF SYSTEMS:    General: Denies general constitutional problems  Eyes: Denies problems  Ears/Nose/Throat: Denies problems  Cardiovascular: Stable CHF  Respiratory: Denies problems  Musculoskeletal: Chronic low back pain, utilizes diclofenac and hydrocodone  Skin: Denies problems  Neurologic: Denies problems  Psychiatric: Denies problems      Physical Exam   Temp: 98.7  F (37.1  C) Temp src: Tympanic BP: (!) 158/78 Pulse: 57 Heart Rate: 56 Resp: 16 SpO2: 98 % O2 Device: None (Room air)    Vital Signs with Ranges  Temp:  [97.7  F (36.5  C)-98.7  F (37.1  C)] 98.7  F (37.1  C)  Pulse:  [57-68] 57  Heart Rate:  [56-98] 56  Resp:  [16] 16  BP: (137-160)/(61-90) 158/78  SpO2:  [96 %-100 %] 98 %  198 lbs 0 oz    General Appearance: Pleasant, alert, appropriate appearance for age. No acute distress  Eyes: EOMI, PERRL, no conjunctival injection  OroPharynx Exam: Normal pharynx.  Neck Exam: Supple, no masses or nodes.  Chest/Respiratory Exam: Normal chest wall and respirations. Clear to auscultation.  Cardiovascular Exam: Regular rate and rhythm. S1, S2, no murmur, click, gallop, or rubs.  Gastrointestinal Exam: Soft, diffusely tender to palpation, but no guarding or rebound  Extremities: Trace pitting bilateral lower extremity edema.  Wearing compression stockings.  Neuro Exam: Alert, oriented x 3. CN II-XI intact. Moves all extremities  Psychiatric: Normal affect and mentation    Data   Data reviewed today:  I personally reviewed no images or EKG's today.  Recent Labs   Lab 03/14/20  0935   WBC 8.8   HGB 15.5   MCV 99      *   POTASSIUM 4.1   CHLORIDE 103   CO2 25   BUN 31*   CR 2.00*   ANIONGAP 5   RYAN 9.0   *   ALBUMIN 3.5   PROTTOTAL 6.0*   BILITOTAL 0.5   ALKPHOS 64   ALT 25   AST 26       No results found for this or any previous visit (from the past 24 hour(s)).

## 2020-03-15 VITALS
RESPIRATION RATE: 18 BRPM | DIASTOLIC BLOOD PRESSURE: 75 MMHG | WEIGHT: 200.2 LBS | SYSTOLIC BLOOD PRESSURE: 137 MMHG | OXYGEN SATURATION: 98 % | TEMPERATURE: 98.3 F | BODY MASS INDEX: 28.66 KG/M2 | HEART RATE: 57 BPM | HEIGHT: 70 IN

## 2020-03-15 LAB
ANION GAP SERPL CALCULATED.3IONS-SCNC: 6 MMOL/L (ref 3–14)
BUN SERPL-MCNC: 24 MG/DL (ref 7–25)
CALCIUM SERPL-MCNC: 8.8 MG/DL (ref 8.6–10.3)
CHLORIDE SERPL-SCNC: 107 MMOL/L (ref 98–107)
CO2 SERPL-SCNC: 25 MMOL/L (ref 21–31)
CREAT SERPL-MCNC: 1.41 MG/DL (ref 0.7–1.3)
ERYTHROCYTE [DISTWIDTH] IN BLOOD BY AUTOMATED COUNT: 12.6 % (ref 10–15)
GFR SERPL CREATININE-BSD FRML MDRD: 50 ML/MIN/{1.73_M2}
GLUCOSE SERPL-MCNC: 166 MG/DL (ref 70–105)
HCT VFR BLD AUTO: 44.6 % (ref 40–53)
HGB BLD-MCNC: 15 G/DL (ref 13.3–17.7)
MCH RBC QN AUTO: 32.8 PG (ref 26.5–33)
MCHC RBC AUTO-ENTMCNC: 33.6 G/DL (ref 31.5–36.5)
MCV RBC AUTO: 97 FL (ref 78–100)
PLATELET # BLD AUTO: 158 10E9/L (ref 150–450)
POTASSIUM SERPL-SCNC: 4.1 MMOL/L (ref 3.5–5.1)
RBC # BLD AUTO: 4.58 10E12/L (ref 4.4–5.9)
SODIUM SERPL-SCNC: 138 MMOL/L (ref 134–144)
WBC # BLD AUTO: 4.8 10E9/L (ref 4–11)

## 2020-03-15 PROCEDURE — 85027 COMPLETE CBC AUTOMATED: CPT | Performed by: FAMILY MEDICINE

## 2020-03-15 PROCEDURE — 36415 COLL VENOUS BLD VENIPUNCTURE: CPT | Performed by: FAMILY MEDICINE

## 2020-03-15 PROCEDURE — 25000132 ZZH RX MED GY IP 250 OP 250 PS 637: Performed by: FAMILY MEDICINE

## 2020-03-15 PROCEDURE — 99217 ZZC OBSERVATION CARE DISCHARGE: CPT | Performed by: FAMILY MEDICINE

## 2020-03-15 PROCEDURE — 25800030 ZZH RX IP 258 OP 636: Performed by: FAMILY MEDICINE

## 2020-03-15 PROCEDURE — G0378 HOSPITAL OBSERVATION PER HR: HCPCS

## 2020-03-15 PROCEDURE — 80048 BASIC METABOLIC PNL TOTAL CA: CPT | Performed by: FAMILY MEDICINE

## 2020-03-15 RX ORDER — DESOXIMETASONE 2.5 MG/G
OINTMENT TOPICAL 2 TIMES DAILY PRN
COMMUNITY
End: 2021-03-08

## 2020-03-15 RX ORDER — FAMOTIDINE 20 MG/1
20 TABLET, FILM COATED ORAL 2 TIMES DAILY PRN
COMMUNITY
End: 2021-07-28

## 2020-03-15 RX ORDER — VANCOMYCIN HYDROCHLORIDE 125 MG/1
CAPSULE ORAL
Qty: 77 CAPSULE | Refills: 0 | Status: SHIPPED | OUTPATIENT
Start: 2020-03-15 | End: 2020-04-23

## 2020-03-15 RX ORDER — NITROGLYCERIN 0.4 MG/1
0.4 TABLET SUBLINGUAL EVERY 5 MIN PRN
Qty: 25 TABLET | Refills: 0 | Status: SHIPPED | OUTPATIENT
Start: 2020-03-15

## 2020-03-15 RX ORDER — SACCHAROMYCES BOULARDII 250 MG
250 CAPSULE ORAL 2 TIMES DAILY
COMMUNITY
Start: 2020-03-15 | End: 2021-04-21

## 2020-03-15 RX ADMIN — HYDRALAZINE HYDROCHLORIDE 50 MG: 25 TABLET, FILM COATED ORAL at 10:27

## 2020-03-15 RX ADMIN — LOSARTAN POTASSIUM 50 MG: 50 TABLET, FILM COATED ORAL at 10:27

## 2020-03-15 RX ADMIN — PREDNISOLONE ACETATE 1 DROP: 10 SUSPENSION/ DROPS OPHTHALMIC at 06:13

## 2020-03-15 RX ADMIN — SODIUM CHLORIDE: 9 INJECTION, SOLUTION INTRAVENOUS at 06:11

## 2020-03-15 RX ADMIN — KETOROLAC TROMETHAMINE 1 DROP: 5 SOLUTION OPHTHALMIC at 06:13

## 2020-03-15 RX ADMIN — VANCOMYCIN HYDROCHLORIDE 250 MG: KIT at 08:24

## 2020-03-15 RX ADMIN — ASPIRIN 81 MG: 81 TABLET, DELAYED RELEASE ORAL at 10:27

## 2020-03-15 RX ADMIN — CLOPIDOGREL BISULFATE 75 MG: 75 TABLET, FILM COATED ORAL at 10:27

## 2020-03-15 RX ADMIN — METOPROLOL SUCCINATE 50 MG: 50 TABLET, EXTENDED RELEASE ORAL at 10:27

## 2020-03-15 RX ADMIN — Medication 250 MG: at 10:25

## 2020-03-15 RX ADMIN — RANOLAZINE 500 MG: 500 TABLET, EXTENDED RELEASE ORAL at 10:28

## 2020-03-15 ASSESSMENT — MIFFLIN-ST. JEOR: SCORE: 1679.35

## 2020-03-15 NOTE — PLAN OF CARE
"Pt agitated/ angry with saff regarding medications, writer tried to explain the MD was still in the  process of writing orders and pharmacy need to clear medications before writer could administer, pt did have his home medications with that were sent home with wife and writer did explain the policy on home medications. Pt began yelling at writer, demanding medications, refusing to fill his insulin pump that was at 9% insulin life,MD Mathis came to talk with pt.   Pt calmed down after medications were given and now filling insulin pump. B/P elevated , scheduled HTN medications givenBP (!) 170/80 (BP Location: Left arm)   Pulse 57   Temp 97.6  F (36.4  C) (Tympanic)   Resp 18   Ht 1.778 m (5' 10\")   Wt 89.8 kg (198 lb)   SpO2 97%   BMI 28.41 kg/m    Lung sounds clear,denies sob. Bowels sounds active, x 3 loose blood streaked stools. Abdomen rounded and tender throughout, rates pain 2/10, declined pain interventions at this time, will continue to monitor. Ashley Guo RN on 3/14/2020 at 10:57 PM      "

## 2020-03-15 NOTE — PHARMACY - DISCHARGE MEDICATION RECONCILIATION AND EDUCATION
Pharmacy:  Discharge Counseling and Medication Reconciliation    Moses Whittaker  [address]  [place] [address] [phone] (home)   70 year old male  PCP: Jorge Barnett    Allergies: Famotidine; Gabapentin; Hydrocortisone; Atorvastatin; Celebrex [celecoxib]; Lisinopril; No clinical screening - see comments; Norvasc [amlodipine]; Pregabalin; Valdecoxib; and Insulin aspart    Discharge Counseling:    Pharmacist met with patient (and/or family) today to review the medication portion of the After Visit Summary (with an emphasis on NEW medications and STOPPED) and to address patient's questions/concerns.    Summary of Education: Reviewed oral vancomycin and probiotic    Materials Provided:  MedCounselor sheets printed from Clinical Pharmacology on: vancomycin capsule  Care Notes from [employer]    Discharge Medication Reconciliation:    It has been determined that the patient has an adequate supply of medications available or which can be obtained from the patient's preferred pharmacy, which he has confirmed as: [facility].  Thank you for the consult.    Keeley Sandhu [facility]........March 15, 2020 12:04 PM

## 2020-03-15 NOTE — PHARMACY-ADMISSION MEDICATION HISTORY
Pharmacy -- Admission Medication Reconciliation In Progress    Prior to admission (PTA) medications were reviewed and the patient's PTA medication list was updated.    Sources Consulted: patient (by phone), La[place]    The reliability of this Medication Reconciliation is: Reliability: Reliable    The following significant changes were made:  ADDED prednisolone, ketorolac eye drops  NOTED that patient is taking Ranexa BID since cdiff started      Patient was unable to complete med rec over the phone tonight. Will attempt again tomorrow.    Concepcion Pederson, [facility], 3/14/2020,  7:12 PM

## 2020-03-15 NOTE — PLAN OF CARE
"Pt afebrile, B/P decreased from earlier in shift BP (!) 146/74 (BP Location: Left arm)   Pulse 57   Temp 98.2  F (36.8  C) (Tympanic)   Resp 18   Ht 1.778 m (5' 10\")   Wt 90.8 kg (200 lb 3.2 oz)   SpO2 97%   BMI 28.73 kg/m    Pt  reports x 2 more loose stool this shift, 5 total. Pt reports tenderness in abdomen, declines interventions at this time, will continue to monitor. Ashley Guo RN on 3/15/2020 at 5:09 AM    "

## 2020-03-15 NOTE — PLAN OF CARE
"Pt B/P remains elevated BP (!) 170/76 (BP Location: Left arm)   Pulse 57   Temp 98.1  F (36.7  C) (Tympanic)   Resp 18   Ht 1.778 m (5' 10\")   Wt 89.8 kg (198 lb)   SpO2 99%   BMI 28.41 kg/m    Pt rates pain 5/10 in lower back, PRN norco given, will continue to monitor. Ashley Guo RN on 3/15/2020 at 12:18 AM    "

## 2020-03-15 NOTE — PROGRESS NOTES
SAFETY CHECKLIST  ID Bands and Risk clasps correct and in place (DNR, Fall risk, Allergy, Latex, Limb):  Yes  All Lines Reconciled and labeled correctly: Yes  Whiteboard updated:Yes  Environmental interventions (bed/chair alarm on, call light, side rails, restraints, sitter....): Yes    Carmen Albrecht RN on 3/15/2020 at 7:35 AM

## 2020-03-15 NOTE — PHARMACY
"Pharmacy: Patient Own Medication Identification    The requirements of Policy 13-03 from the Pharmacy Policy Manual have been met and the patient will be allowed to use their own supply of: Ranexa, prednisolone and ketorolac eye drops.    Concepcion Pederson RP has verified the name, dose, route, and directions for each medication. The integrity has been assessed and deemed safe.     The product(s) have been labeled as being inspected by a pharmacist and the medication has been placed in the patient-specific bin in the medication room.    Nursing will remove medication at the appropriately scheduled times and document the administration on the MAR with the designation of \"patient own\".    Nursing to return medication back to patient at time of discharge.     Concepcion Pederson RPH ....................  3/14/2020   7:16 PM    "

## 2020-03-15 NOTE — PLAN OF CARE
"Patient tolerating regular diet with no nausea/vomiting.  Patient alert, oriented with steady gait and able to get to bathroom without difficulty for frequent bowel movements.  Patient states he \"has pain under control\".  Patient has met goals for discharge and in agreement to be discharged to home.  VSS, afebrile.  Wife able to help patient at home as needed.   "

## 2020-03-15 NOTE — DISCHARGE SUMMARY
[facility]    Discharge Summary  Hospitalist    Date of Admission:  3/14/2020  Date of Discharge:  3/15/2020  Discharging Provider: Cuco Mathis  Date of Service (when I saw the patient): 03/15/20    Discharge Diagnoses   Principal Problem:    Clostridium difficile infection (3/14/2020)  Active Problems:    Coronary atherosclerosis (9/8/2011)    Controlled type 2 diabetes mellitus with stage 3 chronic kidney disease, with long-term current use of insulin (H) (3/30/2017)    MICHAEL (acute kidney injury) (H) (3/14/2020)      History of Present Illness   Moses Whittaker is a 70 year old male with CHF and insulin dependent diabetes who presents with 3 days of diarrhea and abdominal pain.  Pain worse today.  He had multiple bowel movements today with mucus and some blood.  Not clear if blood was from hemorrhoids or colitis.  He had C. difficile treated with vancomycin in January.  Symptoms completely resolved with solid stools in February.  He has not been eating or drinking well for the past few days. Has not been taking furosemide.     Hospital Course   Moses Whittaker was admitted on 3/14/2020.  The following problems were addressed during his hospitalization:    Feels much better today after IVF. Tolerating vancomycin. Will discharge on pulse dose taper and follow up with PCP within a week. Avoid NSAID and diuretics to prevent repeat of MICHAEL.    Principal Problem:    Clostridium difficile infection    Assessment: First positive treatment January 5, recurrence has recurred in roughly the 2-month interval.  He would appear to have a recurrence that requires pulsed tapered dose of vancomycin vs fidaxomicin    Plan: Started on vancomycin 4 times daily for 14 days, then taper to twice daily for 7 days, once daily for 7 days, then every other day dosing for 2 to 8 weeks     Active Problems:    Coronary atherosclerosis    Assessment: stable, bypass, multiple stents    Plan: Continue home cardiac  medications including aspirin and clopidogrel.  He is on diclofenac, which should be avoided with CAD and MICHAEL.  This will be held.       Controlled type 2 diabetes mellitus with stage 3 chronic kidney disease, with long-term current use of insulin (H)    Assessment: Controlled with insulin pump and CGM    Plan: Patient may monitor diabetes and use his own pump.  Blood sugar checks as needed       MICHAEL (acute kidney injury) (H)    Assessment: Cr up to 2.0, due to dehydration from diarrhea    Plan: IV fluid support and creatinine returned to baseline of 1.4    Cuco Mathis MD    Significant Results and Procedures   As above    Pending Results   These results will be followed up by PCP  Unresulted Labs Ordered in the Past 30 Days of this Admission     Date and Time Order Name Status Description    3/14/2020 1845 Stool culture SSCE In process     3/14/2020 0912 Ova and Parasite Exam Routine In process           Code Status   Full Code       Primary Care Physician   Jorge Barnett    Physical Exam   Temp: 98.3  F (36.8  C) Temp src: Tympanic BP: 137/75 Pulse: 57 Heart Rate: 55 Resp: 18 SpO2: 98 % O2 Device: None (Room air)    Vitals:    03/14/20 1440 03/15/20 0437   Weight: 89.8 kg (198 lb) 90.8 kg (200 lb 3.2 oz)     Vital Signs with Ranges  Temp:  [97.6  F (36.4  C)-98.7  F (37.1  C)] 98.3  F (36.8  C)  Pulse:  [57-64] 57  Heart Rate:  [54-72] 55  Resp:  [16-18] 18  BP: (137-172)/(61-80) 137/75  SpO2:  [96 %-100 %] 98 %  I/O last 3 completed shifts:  In: 988 [I.V.:988]  Out: -     General Appearance: Alert. No acute distress  Chest/Respiratory Exam: Clear to auscultation bilaterally  Cardiovascular Exam: Regular rate and rhythm. S1, S2, no murmur, gallop, or rubs.  Gastrointestinal Exam: Soft, mildly tender, but improved  Extremities: No lower extremity edema.  Psychiatric: Normal affect and mentation      Discharge Disposition   Discharged to home  Condition at discharge: Stable    Consultations This Hospital Stay    None    Time Spent on this Encounter   I, Cuco Mathis MD, personally saw the patient today and spent less than or equal to 30 minutes discharging this patient.    Discharge Orders      Reason for your hospital stay    C diff infection leading to dehydration     Follow-up and recommended labs and tests     Follow up with primary care provider, Jorge Barnett, within 7 days for hospital follow- up.  The following labs/tests are recommended: BMP to check kidney function.     Activity    Your activity upon discharge: activity as tolerated     Discharge Instructions    Consider taking probiotics, ideally 10 billion colony forming units at least daily to prevent repeat C diff.   Culturelle, [facility], [employer]'s Bounty, [employer], [place], Stone, Florastor  are common effective brands  We used Florastor here, but Culture is generally one of the most cost-effective options     Diet    Follow this diet upon discharge: regular diet     Discharge Medications   Current Discharge Medication List      START taking these medications    Details   saccharomyces boulardii (FLORASTOR) 250 MG capsule Take 1 capsule (250 mg) by mouth 2 times daily  Qty:      Associated Diagnoses: Clostridium difficile infection      vancomycin (VANCOCIN) 125 MG capsule Take 1 capsule (125 mg) by mouth 4 times daily for 14 days, THEN 1 capsule (125 mg) 2 times daily for 7 days, THEN 1 capsule (125 mg) daily for 7 days.  Qty: 77 capsule, Refills: 0    Associated Diagnoses: Clostridium difficile infection         CONTINUE these medications which have NOT CHANGED    Details   ketorolac (ACULAR) 0.5 % ophthalmic solution Place 1 drop Into the left eye 3 times daily Post-cataract surgery      prednisoLONE acetate (PRED FORTE) 1 % ophthalmic suspension Place 1 drop Into the left eye 3 times daily Post-cataract surgery      aspirin (ASA) 81 MG tablet Take 1 tablet (81 mg) by mouth daily -- Dose Reduced 2/18/2020    Associated Diagnoses:  Atherosclerosis of native coronary artery of native heart with stable angina pectoris (H)      blood glucose monitoring (ONETOUCH ULTRA) test strip Dispense test strips covered by the patient insurance. Test 10 times per day.      Blood Glucose Monitoring Suppl (GLUCOCOM BLOOD GLUCOSE MONITOR) WAQAS Dispense glucose meter, test strips and lancets covered by the patient insurance. Test 10 times per day.      clopidogrel (PLAVIX) 75 MG tablet Take 1 tablet (75 mg) by mouth daily  Qty: 90 tablet, Refills: 3    Associated Diagnoses: Atherosclerosis of native coronary artery of native heart with stable angina pectoris (H)      co-enzyme Q-10 100 MG CAPS capsule Take 100 mg by mouth daily       [employer] ALCOHOL SWABS PADS For home use.      desoximetasone (TOPICORT) 0.25 % external cream Apply sparingly, externally twice daily  Qty: 180 g, Refills: 3    Associated Diagnoses: Skin rash      dextroamphetamine (DEXTROSTAT) 10 MG tablet Take 1 tablet (10 mg) by mouth 4 times daily  Qty: 360 tablet, Refills: 0    Associated Diagnoses: Primary narcolepsy without cataplexy; Pain medication agreement      docusate sodium (COLACE) 100 MG capsule Take 1-2 capsules by mouth 2 times daily as needed for constipation prevention      Flaxseed, Linseed, (FLAXSEED OIL) 1000 MG CAPS Take 1 capsule by mouth daily       hydrALAZINE (APRESOLINE) 25 MG tablet Take 1-2 tablets (25-50 mg) by mouth 4 times daily - Take lowest effective dose for blood pressure management  Qty: 360 tablet, Refills: 11    Associated Diagnoses: CKD (chronic kidney disease) stage 3, GFR 30-59 ml/min (H)      HYDROcodone-acetaminophen (NORCO) 5-325 MG tablet Take 1 tablet by mouth every 6 hours as needed for severe pain  Qty: 60 tablet, Refills: 0    Associated Diagnoses: Radicular low back pain; Degenerative disc disease, cervical; Pain medication agreement      insulin lispro (HUMALOG) 100 UNIT/ML vial INJECT UNDER THE SKIN USING INSULIN PUMP. MAX DAILY DOSE  UNITS   DX: E11.22, N18.3, Z79.4  Qty: 180 mL, Refills: 3    Associated Diagnoses: Controlled type 2 diabetes mellitus with stage 3 chronic kidney disease, with long-term current use of insulin (H)      Insulin Pen Needle (PEN NEEDLES) 29G X 12MM MISC For administering insulin at home.      irbesartan (AVAPRO) 150 MG tablet Take 1 tablet (150 mg) by mouth 2 times daily -- needs 2 smaller pills daily  Qty: 180 tablet, Refills: 3    Associated Diagnoses: Chronic diastolic heart failure (H)      IV Sets-Tubing (SOLUTION ADMINISTRATION SET) MISC As directed.      levothyroxine (SYNTHROID/LEVOTHROID) 125 MCG tablet TAKE 1 TABLET BY MOUTH ONCE DAILY IN THE MORNING  Qty: 90 tablet, Refills: 3    Associated Diagnoses: Hypothyroidism due to acquired atrophy of thyroid      methylphenidate (RITALIN) 10 MG tablet Take 2 tablets (20 mg) by mouth 2 times daily  Qty: 360 tablet, Refills: 0    Associated Diagnoses: Primary narcolepsy without cataplexy; Pain medication agreement      metoprolol succinate ER (TOPROL-XL) 50 MG 24 hr tablet Take 1 tablet (50 mg) by mouth 2 times daily  Qty: 180 tablet, Refills: 3    Associated Diagnoses: CKD (chronic kidney disease) stage 3, GFR 30-59 ml/min (H)      nitroGLYcerin (NITROSTAT) 0.4 MG sublingual tablet Place 1 tablet under the tongue every 5 minutes as needed for chest pain      order for DME Compression Stockings, 30/40 mmHg, knee high, open toe-apply to both legs daily in the am and removed in the evening.  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 (H); Varicose veins of right lower extremity with ulcer of calf limited to breakdown of skin (H)      ranolazine (RANEXA) 500 MG 12 hr tablet Take 2 tablets (1,000 mg) by mouth 2 times daily - cancel other Rx - give 3 month supply  Qty: 360 tablet, Refills: 3    Associated Diagnoses: Atherosclerosis of native  "coronary artery of native heart with stable angina pectoris (H)      rosuvastatin (CRESTOR) 10 MG tablet TAKE 1 TABLET BY MOUTH TWICE DAILY  Qty: 180 tablet, Refills: 2    Associated Diagnoses: Mixed hyperlipidemia      Saline GEL Spray 1 spray in nostril every hour as needed For Nasal Dryness      thin (NO BRAND SPECIFIED) lancets Test 10 times per day.      vitamin E (TOCOPHEROL) 400 units (360 mg) capsule Take 1 capsule by mouth daily         STOP taking these medications       diclofenac (VOLTAREN) 75 MG EC tablet Comments:   Reason for Stopping:         furosemide (LASIX) 40 MG tablet Comments:   Reason for Stopping:             Allergies   Allergies   Allergen Reactions     Famotidine Other (See Comments)     + Caused Headache and Rash -- tolerated Ranitidine and worked well.     Gabapentin      Other reaction(s): Mental Status Change \"felt like in outer space\"     Hydrocortisone Other (See Comments)     Burning and stinging sensation with Hydrocortisone - doesn't help itching or rash -- tolerated Desoximetasone cream and worked well.      Atorvastatin Hives     Celebrex [Celecoxib]      Swelling      Lisinopril Cough     No Clinical Screening - See Comments Hives     Chlorine water     Norvasc [Amlodipine] Other (See Comments)     -- can't remember, didn't tolerate.      Pregabalin      Lyrica - Other reaction(s): Mental Status Change     Valdecoxib Swelling     \"bextra\" NSAID     Insulin Aspart Rash     Data   Most Recent 3 CBC's:  Recent Labs   Lab Test 03/15/20  0620 03/14/20  0935 12/31/19  1425   WBC 4.8 8.8 6.4   HGB 15.0 15.5 15.5   MCV 97 99 100    178 240      Most Recent 3 BMP's:  Recent Labs   Lab Test 03/15/20  0620 03/14/20  0935 12/31/19  1425    133* 136   POTASSIUM 4.1 4.1 4.1   CHLORIDE 107 103 102   CO2 25 25 28   BUN 24 31* 19   CR 1.41* 2.00* 1.42*   ANIONGAP 6 5 6   RYAN 8.8 9.0 9.4   * 181* 126*     Most Recent 2 LFT's:  Recent Labs   Lab Test 03/14/20  0935 " 12/31/19  1425   AST 26 31   ALT 25 27   ALKPHOS 64 75   BILITOTAL 0.5 0.4     Most Recent INR's and Anticoagulation Dosing History:  Anticoagulation Dose History     There is no flowsheet data to display.        Most Recent 3 Troponin's:No lab results found.  Most Recent Cholesterol Panel:  Recent Labs   Lab Test 12/31/19  1425   CHOL 137   LDL 57   HDL 36   TRIG 219*     Most Recent 6 Bacteria Isolates From Any Culture (See EPIC Reports for Culture Details):  Recent Labs   Lab Test 07/04/19  2215   CULT Moderate growth  Staphylococcus aureus  *     Most Recent TSH, T4 and A1c Labs:  Recent Labs   Lab Test 12/31/19  1425   A1C 7.8*     Results for orders placed or performed during the hospital encounter of 01/18/19   XR Chest 2 Views    Narrative    XR CHEST 2 VW    HISTORY: 68 years Male Febrile respiratory illness; Febrile  respiratory illness; Cough; Wheezing    COMPARISON: 3/12/2017    TECHNIQUE: 2 views of the chest were obtained.    FINDINGS: Two views of the chest were obtained. Heart size and  pulmonary vascularity are within normal limits, lungs are clear on  both views. No consolidating air space opacities are present.    Again seen are changes of median sternotomy and CABG.      Impression    IMPRESSION: Clear chest.    CT YOUNGER MD

## 2020-03-15 NOTE — PROGRESS NOTES
Jarquin letter signed and copy given. Patient gave the verbal to sign due to being on isolation.............Tracy Victor on 3/15/2020 at 8:28 AM

## 2020-03-15 NOTE — PHARMACY-ADMISSION MEDICATION HISTORY
Pharmacy -- Admission Medication Reconciliation    Prior to admission (PTA) medications were reviewed and the patient's PTA medication list was updated.    Sources Consulted: Kirsten Bajwa pharmacist, patient    The reliability of this Medication Reconciliation is: Reliability: Borderline reliable    The following significant changes were made:  Added dates/times of last doses  Removed docusate  Added famotidine    In addition, the patient's allergies were reviewed with the patient and left as follows:   Allergies: Famotidine; Gabapentin; Hydrocortisone; Atorvastatin; Celebrex [celecoxib]; Lisinopril; No clinical screening - see comments; Norvasc [amlodipine]; Pregabalin; Valdecoxib; and Insulin aspart    The pharmacist has reviewed with the patient that all personal medications should be removed from the building or locked in the belongings safe.  Patient shall only take medications ordered by the physician and administered by the nursing staff.       Medication barriers identified: yes, patient takes methylphenidate and other medications as he prefers, not how they are ordered   Medication adherence concerns: yes   Understanding of emergency medications: ntg was  2018    Keeley Sandhu formerly [facility], 3/15/2020,  12:00 PM

## 2020-03-15 NOTE — PROGRESS NOTES
[facility] DISCHARGE NOTE    Patient discharged to home at 11:30 AM via wheel chair. Accompanied by staff. Discharge instructions reviewed with patient, opportunity offered to ask questions. Prescriptions sent to patients preferred pharmacy. All belongings sent with patient.    Patient has his home medications.  ranexa and both eye drops.     Carmen Albrecht RN

## 2020-03-16 ENCOUNTER — TELEPHONE (OUTPATIENT)
Dept: INTERNAL MEDICINE | Facility: OTHER | Age: 70
End: 2020-03-16

## 2020-03-16 LAB
BACTERIA SPEC CULT: NORMAL
E COLI SXT1+2 STL IA: NORMAL
E COLI SXT1+2 STL IA: NORMAL
SPECIMEN SOURCE: NORMAL

## 2020-03-16 NOTE — TELEPHONE ENCOUNTER
Can do his hospital follow-up appointment on Wed 3/25 at 4 PM -- if he is available.    Yes, needs his Diabetes / medicare wellness visit.... keep as scheduled on 3/31.    Jorge Barnett MD

## 2020-03-16 NOTE — TELEPHONE ENCOUNTER
Voicemail left on this writers phone yesterday requesting a hospital follow up within 7-10 days.  Patient was discharged yesterday from Med/Surg, Primary DX: C-Diff.        Patient has a future office visit scheduled with Jorge Barnett MD on 3-31-20, for DM and Medicare Wellness.    Can patient be seen sooner to met the 7-10 day criteria?  Will he still need the Medicare Wellness visit this month?      Barbara Sloan LPN 3/16/2020 8:05 AM

## 2020-03-16 NOTE — TELEPHONE ENCOUNTER
Patient placed with Jorge Barnett MD at 4 pm, 3-25-20.      Barbara Sloan LPN 3/16/2020 10:08 AM

## 2020-03-17 LAB
O+P STL MICRO: NORMAL
SPECIMEN SOURCE: NORMAL

## 2020-04-13 DIAGNOSIS — Z79.4 CONTROLLED TYPE 2 DIABETES MELLITUS WITH STAGE 3 CHRONIC KIDNEY DISEASE, WITH LONG-TERM CURRENT USE OF INSULIN (H): Primary | ICD-10-CM

## 2020-04-13 DIAGNOSIS — E11.22 CONTROLLED TYPE 2 DIABETES MELLITUS WITH STAGE 3 CHRONIC KIDNEY DISEASE, WITH LONG-TERM CURRENT USE OF INSULIN (H): Primary | ICD-10-CM

## 2020-04-13 DIAGNOSIS — N18.30 CONTROLLED TYPE 2 DIABETES MELLITUS WITH STAGE 3 CHRONIC KIDNEY DISEASE, WITH LONG-TERM CURRENT USE OF INSULIN (H): Primary | ICD-10-CM

## 2020-04-21 DIAGNOSIS — Z01.818 PREOP GENERAL PHYSICAL EXAM: ICD-10-CM

## 2020-04-21 DIAGNOSIS — I10 BENIGN ESSENTIAL HYPERTENSION: ICD-10-CM

## 2020-04-21 DIAGNOSIS — E78.2 MIXED HYPERLIPIDEMIA: ICD-10-CM

## 2020-04-21 DIAGNOSIS — N18.30 CONTROLLED TYPE 2 DIABETES MELLITUS WITH STAGE 3 CHRONIC KIDNEY DISEASE, WITH LONG-TERM CURRENT USE OF INSULIN (H): ICD-10-CM

## 2020-04-21 DIAGNOSIS — Z79.4 CONTROLLED TYPE 2 DIABETES MELLITUS WITH STAGE 3 CHRONIC KIDNEY DISEASE, WITH LONG-TERM CURRENT USE OF INSULIN (H): ICD-10-CM

## 2020-04-21 DIAGNOSIS — E11.22 CONTROLLED TYPE 2 DIABETES MELLITUS WITH STAGE 3 CHRONIC KIDNEY DISEASE, WITH LONG-TERM CURRENT USE OF INSULIN (H): ICD-10-CM

## 2020-04-21 LAB
ALBUMIN SERPL-MCNC: 4 G/DL (ref 3.5–5.7)
ALBUMIN UR-MCNC: 300 MG/DL
ALP SERPL-CCNC: 65 U/L (ref 34–104)
ALT SERPL W P-5'-P-CCNC: 28 U/L (ref 7–52)
ANION GAP SERPL CALCULATED.3IONS-SCNC: 6 MMOL/L (ref 3–14)
APPEARANCE UR: CLEAR
AST SERPL W P-5'-P-CCNC: 23 U/L (ref 13–39)
BILIRUB SERPL-MCNC: 0.7 MG/DL (ref 0.3–1)
BILIRUB UR QL STRIP: NEGATIVE
BUN SERPL-MCNC: 18 MG/DL (ref 7–25)
CALCIUM SERPL-MCNC: 9.3 MG/DL (ref 8.6–10.3)
CHLORIDE SERPL-SCNC: 103 MMOL/L (ref 98–107)
CHOLEST SERPL-MCNC: 104 MG/DL
CO2 SERPL-SCNC: 29 MMOL/L (ref 21–31)
COLOR UR AUTO: ABNORMAL
CREAT SERPL-MCNC: 1.63 MG/DL (ref 0.7–1.3)
ERYTHROCYTE [DISTWIDTH] IN BLOOD BY AUTOMATED COUNT: 12.7 % (ref 10–15)
GFR SERPL CREATININE-BSD FRML MDRD: 42 ML/MIN/{1.73_M2}
GLUCOSE SERPL-MCNC: 130 MG/DL (ref 70–105)
GLUCOSE UR STRIP-MCNC: NEGATIVE MG/DL
HBA1C MFR BLD: 7.5 % (ref 4–6)
HCT VFR BLD AUTO: 50.7 % (ref 40–53)
HDLC SERPL-MCNC: 36 MG/DL (ref 23–92)
HGB BLD-MCNC: 16.8 G/DL (ref 13.3–17.7)
HGB UR QL STRIP: NEGATIVE
KETONES UR STRIP-MCNC: NEGATIVE MG/DL
LDLC SERPL CALC-MCNC: 41 MG/DL
LEUKOCYTE ESTERASE UR QL STRIP: NEGATIVE
MCH RBC QN AUTO: 32.4 PG (ref 26.5–33)
MCHC RBC AUTO-ENTMCNC: 33.1 G/DL (ref 31.5–36.5)
MCV RBC AUTO: 98 FL (ref 78–100)
NITRATE UR QL: NEGATIVE
NONHDLC SERPL-MCNC: 68 MG/DL
PH UR STRIP: 6 PH (ref 5–7)
PLATELET # BLD AUTO: 214 10E9/L (ref 150–450)
POTASSIUM SERPL-SCNC: 4 MMOL/L (ref 3.5–5.1)
PROT SERPL-MCNC: 6.7 G/DL (ref 6.4–8.9)
RBC # BLD AUTO: 5.19 10E12/L (ref 4.4–5.9)
SODIUM SERPL-SCNC: 138 MMOL/L (ref 134–144)
SOURCE: ABNORMAL
SP GR UR STRIP: 1.02 (ref 1–1.03)
TRIGL SERPL-MCNC: 133 MG/DL
UROBILINOGEN UR STRIP-MCNC: NORMAL MG/DL (ref 0–2)
WBC # BLD AUTO: 5.8 10E9/L (ref 4–11)

## 2020-04-21 PROCEDURE — 80053 COMPREHEN METABOLIC PANEL: CPT | Mod: ZL | Performed by: INTERNAL MEDICINE

## 2020-04-21 PROCEDURE — 85027 COMPLETE CBC AUTOMATED: CPT | Mod: ZL | Performed by: INTERNAL MEDICINE

## 2020-04-21 PROCEDURE — 81003 URINALYSIS AUTO W/O SCOPE: CPT | Mod: ZL | Performed by: INTERNAL MEDICINE

## 2020-04-21 PROCEDURE — 82043 UR ALBUMIN QUANTITATIVE: CPT | Mod: ZL | Performed by: INTERNAL MEDICINE

## 2020-04-21 PROCEDURE — 36415 COLL VENOUS BLD VENIPUNCTURE: CPT | Mod: ZL | Performed by: INTERNAL MEDICINE

## 2020-04-21 PROCEDURE — 83036 HEMOGLOBIN GLYCOSYLATED A1C: CPT | Mod: ZL | Performed by: INTERNAL MEDICINE

## 2020-04-21 PROCEDURE — 80061 LIPID PANEL: CPT | Mod: ZL | Performed by: INTERNAL MEDICINE

## 2020-04-22 LAB
CREAT UR-MCNC: 169 MG/DL
MICROALBUMIN UR-MCNC: 2880 MG/L
MICROALBUMIN/CREAT UR: 1704.14 MG/G CR (ref 0–17)

## 2020-04-23 ENCOUNTER — VIRTUAL VISIT (OUTPATIENT)
Dept: INTERNAL MEDICINE | Facility: OTHER | Age: 70
End: 2020-04-23
Attending: INTERNAL MEDICINE
Payer: COMMERCIAL

## 2020-04-23 VITALS — DIASTOLIC BLOOD PRESSURE: 71 MMHG | SYSTOLIC BLOOD PRESSURE: 127 MMHG | HEART RATE: 58 BPM | TEMPERATURE: 97.7 F

## 2020-04-23 DIAGNOSIS — I10 BENIGN ESSENTIAL HYPERTENSION: ICD-10-CM

## 2020-04-23 DIAGNOSIS — A04.72 COLITIS DUE TO CLOSTRIDIUM DIFFICILE: ICD-10-CM

## 2020-04-23 DIAGNOSIS — M54.10 RADICULAR LOW BACK PAIN: ICD-10-CM

## 2020-04-23 DIAGNOSIS — Z79.4 CONTROLLED TYPE 2 DIABETES MELLITUS WITH STAGE 3 CHRONIC KIDNEY DISEASE, WITH LONG-TERM CURRENT USE OF INSULIN (H): Primary | ICD-10-CM

## 2020-04-23 DIAGNOSIS — M50.30 DEGENERATIVE DISC DISEASE, CERVICAL: ICD-10-CM

## 2020-04-23 DIAGNOSIS — Z96.41 INSULIN PUMP IN PLACE: ICD-10-CM

## 2020-04-23 DIAGNOSIS — G47.419 PRIMARY NARCOLEPSY WITHOUT CATAPLEXY: ICD-10-CM

## 2020-04-23 DIAGNOSIS — Z02.89 PAIN MEDICATION AGREEMENT: ICD-10-CM

## 2020-04-23 DIAGNOSIS — E78.2 MIXED HYPERLIPIDEMIA: ICD-10-CM

## 2020-04-23 DIAGNOSIS — E11.22 CONTROLLED TYPE 2 DIABETES MELLITUS WITH STAGE 3 CHRONIC KIDNEY DISEASE, WITH LONG-TERM CURRENT USE OF INSULIN (H): Primary | ICD-10-CM

## 2020-04-23 DIAGNOSIS — I25.118 ATHEROSCLEROSIS OF NATIVE CORONARY ARTERY OF NATIVE HEART WITH STABLE ANGINA PECTORIS (H): ICD-10-CM

## 2020-04-23 DIAGNOSIS — N18.30 CONTROLLED TYPE 2 DIABETES MELLITUS WITH STAGE 3 CHRONIC KIDNEY DISEASE, WITH LONG-TERM CURRENT USE OF INSULIN (H): Primary | ICD-10-CM

## 2020-04-23 PROBLEM — A49.8 CLOSTRIDIUM DIFFICILE INFECTION: Status: RESOLVED | Noted: 2020-03-14 | Resolved: 2020-04-23

## 2020-04-23 PROBLEM — N17.9 AKI (ACUTE KIDNEY INJURY) (H): Status: RESOLVED | Noted: 2020-03-14 | Resolved: 2020-04-23

## 2020-04-23 PROCEDURE — 99214 OFFICE O/P EST MOD 30 MIN: CPT | Mod: GT | Performed by: INTERNAL MEDICINE

## 2020-04-23 RX ORDER — METHYLPHENIDATE HYDROCHLORIDE 10 MG/1
20 TABLET ORAL 2 TIMES DAILY
Qty: 360 TABLET | Refills: 0 | Status: SHIPPED | OUTPATIENT
Start: 2020-04-23 | End: 2020-07-01

## 2020-04-23 RX ORDER — HYDROCODONE BITARTRATE AND ACETAMINOPHEN 5; 325 MG/1; MG/1
1 TABLET ORAL EVERY 6 HOURS PRN
Qty: 60 TABLET | Refills: 0 | Status: SHIPPED | OUTPATIENT
Start: 2020-04-23 | End: 2020-06-09

## 2020-04-23 RX ORDER — DEXTROAMPHETAMINE SULFATE 10 MG/1
10 TABLET ORAL 4 TIMES DAILY
Qty: 360 TABLET | Refills: 0 | Status: SHIPPED | OUTPATIENT
Start: 2020-04-23 | End: 2020-07-01

## 2020-04-23 RX ORDER — ROSUVASTATIN CALCIUM 10 MG/1
10 TABLET, COATED ORAL 2 TIMES DAILY
Qty: 180 TABLET | Refills: 3 | Status: ON HOLD | OUTPATIENT
Start: 2020-04-23 | End: 2020-05-31

## 2020-04-23 ASSESSMENT — ENCOUNTER SYMPTOMS
DIARRHEA: 1
CHILLS: 0
HEMATURIA: 0
COUGH: 0
BRUISES/BLEEDS EASILY: 1
SHORTNESS OF BREATH: 0
WOUND: 0
ARTHRALGIAS: 1
FEVER: 0
CONFUSION: 0

## 2020-04-23 ASSESSMENT — PAIN SCALES - GENERAL: PAINLEVEL: SEVERE PAIN (6)

## 2020-04-23 NOTE — PROGRESS NOTES
"Video Visit: DM check, Norco refill, scalp rash and Hospital follow up.    Previous A1C is at goal of <8  Lab Results   Component Value Date    A1C 7.5 04/21/2020    A1C 7.8 12/31/2019    A1C 7.9 09/30/2019    A1C 8.1 06/26/2019    A1C 7.7 03/08/2019     Urine microalbumin:creatine: n/a  Foot exam unknown-denies any concerns today  Eye exam 12/2019    Tobacco User no  Patient is on a daily aspirin  Patient is on a Statin.  Blood pressure today of:     BP Readings from Last 1 Encounters:   03/15/20 137/75      is at the goal of <139/89 for diabetics.    Barbara Sloan LPN on 4/23/2020 at 3:29 PM    Moses Whittaker is a 70 year old male who is being evaluated via a billable video visit.      The patient has been notified of following:     \"This video visit will be conducted via a call between you and your physician/provider. We have found that certain health care needs can be provided without the need for an in-person physical exam.  This service lets us provide the care you need with a video conversation.  If a prescription is necessary we can send it directly to your pharmacy.  If lab work is needed we can place an order for that and you can then stop by our lab to have the test done at a later time.    Video visits are billed at different rates depending on your insurance coverage.  Please reach out to your insurance provider with any questions.    If during the course of the call the physician/provider feels a video visit is not appropriate, you will not be charged for this service.\"    Patient has given verbal consent for Video visit? Yes    How would you like to obtain your AVS? Mail a copy    Patient would like the video invitation sent by: Text to cell phone: [phone]    Will anyone else be joining your video visit? No    Subjective     Moses Whittaker is a 70 year old male who presents to clinic today for the following health issues:    HPI  Video Start Time: 3:40 PM    Reviewed and updated as needed " "this visit by Provider  Tobacco  Allergies  Meds  Problems  Med Hx  Surg Hx  Fam Hx         Review of Systems   Constitutional: Negative for chills and fever.   HENT: Negative for congestion.    Respiratory: Negative for cough and shortness of breath.    Cardiovascular: Positive for chest pain (stable angina) and peripheral edema.   Gastrointestinal: Positive for diarrhea (better with vancomycin).   Genitourinary: Negative for hematuria.   Musculoskeletal: Positive for arthralgias.   Skin: Negative for wound.   Neurological: Negative for syncope.   Hematological: Bruises/bleeds easily.   Psychiatric/Behavioral: Negative for confusion.          Objective    There were no vitals taken for this visit.  Estimated body mass index is 28.73 kg/m  as calculated from the following:    Height as of 3/14/20: 1.778 m (5' 10\").    Weight as of 3/15/20: 90.8 kg (200 lb 3.2 oz).  Physical Exam     GENERAL: healthy, alert and no distress  EYES: Eyes grossly normal to inspection, conjunctivae and sclerae normal  RESP: no audible wheeze, cough, or visible cyanosis.  No visible retractions or increased work of breathing.  Able to speak fully in complete sentences.  NEURO: Cranial nerves grossly intact, mentation intact and speech normal  PSYCH: mentation appears normal, affect normal/bright, judgement and insight intact, normal speech and appearance well-groomed      Diagnostic Test Results:  Labs reviewed in Epic        ICD-10-CM    1. Controlled type 2 diabetes mellitus with stage 3 chronic kidney disease, with long-term current use of insulin (H)  E11.22     N18.3     Z79.4    2. Radicular low back pain  M54.10 HYDROcodone-acetaminophen (NORCO) 5-325 MG tablet   3. Degenerative disc disease, cervical  M50.30 HYDROcodone-acetaminophen (NORCO) 5-325 MG tablet   4. Pain medication agreement  Z02.89 HYDROcodone-acetaminophen (NORCO) 5-325 MG tablet     dextroamphetamine (DEXTROSTAT) 10 MG tablet     methylphenidate (RITALIN) 10 " MG tablet   5. Insulin pump in place  Z96.41    6. Colitis due to Clostridium difficile  A04.72    7. Benign essential hypertension  I10    8. Atherosclerosis of native coronary artery of native heart with stable angina pectoris (H)  I25.118    9. Primary narcolepsy without cataplexy  G47.419 dextroamphetamine (DEXTROSTAT) 10 MG tablet     methylphenidate (RITALIN) 10 MG tablet   10. Mixed hyperlipidemia  E78.2 rosuvastatin (CRESTOR) 10 MG tablet     Patient has type 2 diabetes.  He has stage III chronic kidney disease.  Creatinine worsened recently when he had acute kidney injury with dehydration.  This is improved with his recent labs.  He uses an insulin pump.  Also uses continuous glucose monitor.  Blood sugars have been well controlled.  A1c has come down nicely.  No changes at this time.    C. difficile colitis, appears to be improving.  Continues on vancomycin.  Still has a couple days left.  He has had a second round of C. difficile colitis now.  Advised that this could occur again.  Needs to monitor for new or worsening symptoms.    Radicular low back pain, also has cervical disc disease and neck pain.  Rarely uses hydrocodone.  He has not had a prescription for many months now.  He uses them very sparingly only on days he has severe pain.      Last controlled substance agreement was completed and is up-to-date.  Continues with Dextrostat and methylphenidate for his narcolepsy without cataplexy.  Needs refills.    Hypertension, blood pressures have been well controlled at home.  Continue current dosing.  Denies lightheadedness dizziness, syncope or presyncope.    Mixed hyperlipidemia, continues with low-dose Crestor 10 mg twice daily.  He is not able to swallow large pills.  He gets myalgias as well.  Needs to continue with the small pill twice daily.    Exertional stable angina, ongoing.  Takes nitroglycerin.  Also on other medications for his chronic stable angina.  Continue current dosing for  now.    Video-Visit Details    Type of service:  Video Visit    Video End Time:3:55 PM    Billing based on moderate complexity MDM     Originating Location (pt. Location): Home    Distant Location (provider location):  [facility]     Mode of Communication:  Video Conference    Return for - Labs every 91+ days, DM Follow-up 1-2 days later, with Dr. Barnett.     Jorge Barnett MD

## 2020-04-23 NOTE — NURSING NOTE
Video Visit: DM check, Norco refill, scalp rash and Hospital follow up.    Previous A1C is at goal of <8  Lab Results   Component Value Date    A1C 7.5 04/21/2020    A1C 7.8 12/31/2019    A1C 7.9 09/30/2019    A1C 8.1 06/26/2019    A1C 7.7 03/08/2019     Urine microalbumin:creatine: n/a  Foot exam unknown-denies any concerns today  Eye exam 12/2019    Tobacco User no  Patient is on a daily aspirin  Patient is on a Statin.  Blood pressure today of:     BP Readings from Last 1 Encounters:   03/15/20 137/75      is at the goal of <139/89 for diabetics.    Barbara Sloan LPN on 4/23/2020 at 3:29 PM

## 2020-04-24 ENCOUNTER — TELEPHONE (OUTPATIENT)
Dept: INTERNAL MEDICINE | Facility: OTHER | Age: 70
End: 2020-04-24

## 2020-04-24 DIAGNOSIS — N18.30 CKD (CHRONIC KIDNEY DISEASE) STAGE 3, GFR 30-59 ML/MIN (H): Primary | ICD-10-CM

## 2020-04-24 DIAGNOSIS — E11.21 DIABETIC NEPHROPATHY WITH PROTEINURIA (H): ICD-10-CM

## 2020-04-24 NOTE — TELEPHONE ENCOUNTER
Micro albumin and creatinine are high. He also has a lot of protein in the urine.  What does this mean and should he be concerned?    Norma J. Gosselin, LPN .......  4/24/2020  1:11 PM

## 2020-04-27 NOTE — TELEPHONE ENCOUNTER
His kidneys are leaking more protein / creatinine into the urine, since his Clostridium difficile infection.     Need to keep blood pressure and blood sugar levels down... this can help reverse some of the kidney injury that is causing the protein leakage.     Jorge Barnett MD

## 2020-04-27 NOTE — TELEPHONE ENCOUNTER
Spoke to patient and informed him of the results below.  He wonders if there is anything he could take to get his protein level down as long as it is not too invasive.  He also wonders what could be potential problems if his protein level remains high, he wonders if this could bump him into CKD stage 4?  Patient also has questions about his 2 albumin results with his urine.  He states they are garcia high.  He wonders if this is due to his clostridium difficile as well.  Informed patient that Jorge Barnett MD was gone today, but he will be returning tomorrow.  Rita Weaver LPN 4/27/2020   3:57 PM

## 2020-04-28 PROBLEM — E11.21 DIABETIC NEPHROPATHY WITH PROTEINURIA (H): Status: ACTIVE | Noted: 2020-04-28

## 2020-04-28 NOTE — TELEPHONE ENCOUNTER
Spoke with patient please place the referral to nephrologist. He feels he has an infection in his left arm where he had the artery removed for his bypass 2002 He can not grab anything and he does have 8/10 pain scale . Please call. Queta Castillo LPN .......................4/28/2020  12:14 PM

## 2020-04-28 NOTE — TELEPHONE ENCOUNTER
Spoke with patient about scheduling him with a provider for possible left arm infection.  He refused to see a nurse practitioner nor the providers available in clinic tomorrow.  He stated that he would think about who he might want to see and will call back if he chooses to see a provider other than Dr. Barnett.  He did ask when Dr. Barnett was available next which is May 13th.  He did also express interest in seeing general surgery or orthopedics regarding his possible infection.  Kayleigh Rubio on 4/28/2020 at 3:25 PM

## 2020-04-28 NOTE — TELEPHONE ENCOUNTER
After the patient's full name and date of birth was verified, the patient was notified of the below information.  Patient will come in to walk in.  Isidra Barroso LPN on 4/28/2020 at 4:53 PM

## 2020-04-28 NOTE — TELEPHONE ENCOUNTER
Needs to be evaluated... see if there is an opening with someone for today... otherwise consider AUBREY Barnett MD

## 2020-04-28 NOTE — TELEPHONE ENCOUNTER
Sounded like he should get his arm evaluated today.     Okay to see any available provider today or tomorrow.. otherwise consider AUBREY Barnett MD

## 2020-04-28 NOTE — TELEPHONE ENCOUNTER
Those are all good questions.    He might find it best to talk with the kidney specialist.  I can send nephrology referral if he is interested.    Jorge Barnett MD

## 2020-05-21 ENCOUNTER — TELEPHONE (OUTPATIENT)
Dept: EDUCATION SERVICES | Facility: OTHER | Age: 70
End: 2020-05-21

## 2020-05-21 NOTE — TELEPHONE ENCOUNTER
"Patient received notice his [id number] transparent dressing and IV Prep Wipes will no longer be covered under Medicare Part B.  Patient states he has been receiving these for the past 9 years, but now they will no longer be covered.    Patient uses [id number] dressings box of 100 each month due to skin rash issues with insulin pump adhesive sites.    He has been successful with Dexcom G6 CGM using IV Prep Wipes.      Patient concerned as \"I really need these supplies or I won't be able to wear my pump and CGM!\"    Patient will now be filing an appeal and is waiting for [employer] to submit the form.  He is working with his [employer] insurance.    Patient shares he may need help to get these supplies covered.  Advised patient to call [facility] and Dr. Barnett if he needs any documentation to prove need of [id number] transparent dressing and IV Prep Wipes.    Radha Trammell RN, BSN, [facility]  5/21/2020 6:29 PM         "

## 2020-05-29 ENCOUNTER — HOSPITAL ENCOUNTER (INPATIENT)
Facility: OTHER | Age: 70
LOS: 2 days | Discharge: HOME | DRG: 872 | End: 2020-06-02
Attending: STUDENT IN AN ORGANIZED HEALTH CARE EDUCATION/TRAINING PROGRAM | Admitting: INTERNAL MEDICINE
Payer: MEDICARE

## 2020-05-29 ENCOUNTER — APPOINTMENT (OUTPATIENT)
Dept: GENERAL RADIOLOGY | Facility: OTHER | Age: 70
DRG: 872 | End: 2020-05-29
Attending: STUDENT IN AN ORGANIZED HEALTH CARE EDUCATION/TRAINING PROGRAM
Payer: MEDICARE

## 2020-05-29 DIAGNOSIS — I25.118 ATHEROSCLEROSIS OF NATIVE CORONARY ARTERY OF NATIVE HEART WITH STABLE ANGINA PECTORIS (H): ICD-10-CM

## 2020-05-29 DIAGNOSIS — N18.30 CKD (CHRONIC KIDNEY DISEASE) STAGE 3, GFR 30-59 ML/MIN (H): ICD-10-CM

## 2020-05-29 DIAGNOSIS — E11.9 TYPE 2 DIABETES MELLITUS WITHOUT COMPLICATION, UNSPECIFIED WHETHER LONG TERM INSULIN USE (H): ICD-10-CM

## 2020-05-29 DIAGNOSIS — R78.81 BACTEREMIA: Primary | ICD-10-CM

## 2020-05-29 DIAGNOSIS — R50.9 FEVER AND CHILLS: ICD-10-CM

## 2020-05-29 DIAGNOSIS — I12.9 BENIGN HYPERTENSIVE KIDNEY DISEASE WITH CHRONIC KIDNEY DISEASE STAGE I THROUGH STAGE IV, OR UNSPECIFIED(403.10): ICD-10-CM

## 2020-05-29 LAB
ALBUMIN SERPL-MCNC: 3.8 G/DL (ref 3.5–5.7)
ALBUMIN UR-MCNC: 100 MG/DL
ALP SERPL-CCNC: 63 U/L (ref 34–104)
ALT SERPL W P-5'-P-CCNC: 19 U/L (ref 7–52)
ANION GAP SERPL CALCULATED.3IONS-SCNC: 9 MMOL/L (ref 3–14)
APPEARANCE UR: CLEAR
AST SERPL W P-5'-P-CCNC: 18 U/L (ref 13–39)
BACTERIA #/AREA URNS HPF: ABNORMAL /HPF
BASOPHILS # BLD AUTO: 0 10E9/L (ref 0–0.2)
BASOPHILS NFR BLD AUTO: 0.3 %
BILIRUB SERPL-MCNC: 1 MG/DL (ref 0.3–1)
BILIRUB UR QL STRIP: NEGATIVE
BUN SERPL-MCNC: 22 MG/DL (ref 7–25)
CALCIUM SERPL-MCNC: 8.7 MG/DL (ref 8.6–10.3)
CHLORIDE SERPL-SCNC: 104 MMOL/L (ref 98–107)
CO2 SERPL-SCNC: 24 MMOL/L (ref 21–31)
COLOR UR AUTO: YELLOW
CREAT SERPL-MCNC: 1.55 MG/DL (ref 0.7–1.3)
CRP SERPL-MCNC: 0.4 MG/L
DIFFERENTIAL METHOD BLD: ABNORMAL
EOSINOPHIL # BLD AUTO: 0.1 10E9/L (ref 0–0.7)
EOSINOPHIL NFR BLD AUTO: 0.7 %
ERYTHROCYTE [DISTWIDTH] IN BLOOD BY AUTOMATED COUNT: 12.9 % (ref 10–15)
ERYTHROCYTE [SEDIMENTATION RATE] IN BLOOD BY WESTERGREN METHOD: 5 MM/H (ref 1–10)
GFR SERPL CREATININE-BSD FRML MDRD: 45 ML/MIN/{1.73_M2}
GLUCOSE SERPL-MCNC: 126 MG/DL (ref 70–105)
GLUCOSE UR STRIP-MCNC: 70 MG/DL
GRAN CASTS #/AREA URNS LPF: 5 /LPF
HCO3 BLDV-SCNC: 23 MMOL/L (ref 21–28)
HCT VFR BLD AUTO: 45.6 % (ref 40–53)
HGB BLD-MCNC: 15.3 G/DL (ref 13.3–17.7)
HGB UR QL STRIP: NEGATIVE
HYALINE CASTS #/AREA URNS LPF: 6 /LPF (ref 0–2)
IMM GRANULOCYTES # BLD: 0 10E9/L (ref 0–0.4)
IMM GRANULOCYTES NFR BLD: 0.5 %
KETONES UR STRIP-MCNC: NEGATIVE MG/DL
LACTATE BLD-SCNC: 1.4 MMOL/L (ref 0.7–2)
LEUKOCYTE ESTERASE UR QL STRIP: NEGATIVE
LYMPHOCYTES # BLD AUTO: 0.7 10E9/L (ref 0.8–5.3)
LYMPHOCYTES NFR BLD AUTO: 8 %
MCH RBC QN AUTO: 32.8 PG (ref 26.5–33)
MCHC RBC AUTO-ENTMCNC: 33.6 G/DL (ref 31.5–36.5)
MCV RBC AUTO: 98 FL (ref 78–100)
MONOCYTES # BLD AUTO: 0.1 10E9/L (ref 0–1.3)
MONOCYTES NFR BLD AUTO: 1.1 %
MUCOUS THREADS #/AREA URNS LPF: PRESENT /LPF
NEUTROPHILS # BLD AUTO: 7.8 10E9/L (ref 1.6–8.3)
NEUTROPHILS NFR BLD AUTO: 89.4 %
NITRATE UR QL: NEGATIVE
NT-PROBNP SERPL-MCNC: 88 PG/ML (ref 0–100)
O2/TOTAL GAS SETTING VFR VENT: ABNORMAL %
OXYHGB MFR BLDV: 93 %
PCO2 BLDV: 35 MM HG (ref 40–50)
PH BLDV: 7.43 PH (ref 7.32–7.43)
PH UR STRIP: 5.5 PH (ref 5–7)
PLATELET # BLD AUTO: 146 10E9/L (ref 150–450)
PO2 BLDV: 68 MM HG (ref 25–47)
POTASSIUM SERPL-SCNC: 3.7 MMOL/L (ref 3.5–5.1)
PROT SERPL-MCNC: 6.1 G/DL (ref 6.4–8.9)
RBC # BLD AUTO: 4.66 10E12/L (ref 4.4–5.9)
RBC #/AREA URNS AUTO: 1 /HPF (ref 0–2)
SARS-COV-2 RNA SPEC QL NAA+PROBE: NORMAL
SODIUM SERPL-SCNC: 137 MMOL/L (ref 134–144)
SOURCE: ABNORMAL
SP GR UR STRIP: 1.02 (ref 1–1.03)
SPECIMEN SOURCE: NORMAL
SPECIMEN SOURCE: NORMAL
STREP GROUP A PCR: NOT DETECTED
TROPONIN I SERPL-MCNC: 12.9 PG/ML
UROBILINOGEN UR STRIP-MCNC: NORMAL MG/DL (ref 0–2)
WBC # BLD AUTO: 8.7 10E9/L (ref 4–11)
WBC #/AREA URNS AUTO: 1 /HPF (ref 0–5)

## 2020-05-29 PROCEDURE — 87077 CULTURE AEROBIC IDENTIFY: CPT | Performed by: STUDENT IN AN ORGANIZED HEALTH CARE EDUCATION/TRAINING PROGRAM

## 2020-05-29 PROCEDURE — 36415 COLL VENOUS BLD VENIPUNCTURE: CPT | Performed by: STUDENT IN AN ORGANIZED HEALTH CARE EDUCATION/TRAINING PROGRAM

## 2020-05-29 PROCEDURE — 85025 COMPLETE CBC W/AUTO DIFF WBC: CPT | Performed by: STUDENT IN AN ORGANIZED HEALTH CARE EDUCATION/TRAINING PROGRAM

## 2020-05-29 PROCEDURE — 83880 ASSAY OF NATRIURETIC PEPTIDE: CPT | Performed by: STUDENT IN AN ORGANIZED HEALTH CARE EDUCATION/TRAINING PROGRAM

## 2020-05-29 PROCEDURE — 99285 EMERGENCY DEPT VISIT HI MDM: CPT | Mod: Z6 | Performed by: STUDENT IN AN ORGANIZED HEALTH CARE EDUCATION/TRAINING PROGRAM

## 2020-05-29 PROCEDURE — 87651 STREP A DNA AMP PROBE: CPT | Performed by: STUDENT IN AN ORGANIZED HEALTH CARE EDUCATION/TRAINING PROGRAM

## 2020-05-29 PROCEDURE — 93005 ELECTROCARDIOGRAM TRACING: CPT | Performed by: STUDENT IN AN ORGANIZED HEALTH CARE EDUCATION/TRAINING PROGRAM

## 2020-05-29 PROCEDURE — 87635 SARS-COV-2 COVID-19 AMP PRB: CPT | Performed by: STUDENT IN AN ORGANIZED HEALTH CARE EDUCATION/TRAINING PROGRAM

## 2020-05-29 PROCEDURE — 87040 BLOOD CULTURE FOR BACTERIA: CPT | Performed by: STUDENT IN AN ORGANIZED HEALTH CARE EDUCATION/TRAINING PROGRAM

## 2020-05-29 PROCEDURE — 85652 RBC SED RATE AUTOMATED: CPT | Performed by: STUDENT IN AN ORGANIZED HEALTH CARE EDUCATION/TRAINING PROGRAM

## 2020-05-29 PROCEDURE — 96365 THER/PROPH/DIAG IV INF INIT: CPT | Performed by: STUDENT IN AN ORGANIZED HEALTH CARE EDUCATION/TRAINING PROGRAM

## 2020-05-29 PROCEDURE — 86140 C-REACTIVE PROTEIN: CPT | Performed by: STUDENT IN AN ORGANIZED HEALTH CARE EDUCATION/TRAINING PROGRAM

## 2020-05-29 PROCEDURE — 25800030 ZZH RX IP 258 OP 636: Performed by: STUDENT IN AN ORGANIZED HEALTH CARE EDUCATION/TRAINING PROGRAM

## 2020-05-29 PROCEDURE — 84484 ASSAY OF TROPONIN QUANT: CPT | Performed by: STUDENT IN AN ORGANIZED HEALTH CARE EDUCATION/TRAINING PROGRAM

## 2020-05-29 PROCEDURE — 96366 THER/PROPH/DIAG IV INF ADDON: CPT | Performed by: STUDENT IN AN ORGANIZED HEALTH CARE EDUCATION/TRAINING PROGRAM

## 2020-05-29 PROCEDURE — 25000128 H RX IP 250 OP 636: Performed by: STUDENT IN AN ORGANIZED HEALTH CARE EDUCATION/TRAINING PROGRAM

## 2020-05-29 PROCEDURE — 83605 ASSAY OF LACTIC ACID: CPT | Performed by: STUDENT IN AN ORGANIZED HEALTH CARE EDUCATION/TRAINING PROGRAM

## 2020-05-29 PROCEDURE — 99285 EMERGENCY DEPT VISIT HI MDM: CPT | Mod: 25 | Performed by: STUDENT IN AN ORGANIZED HEALTH CARE EDUCATION/TRAINING PROGRAM

## 2020-05-29 PROCEDURE — 80053 COMPREHEN METABOLIC PANEL: CPT | Performed by: STUDENT IN AN ORGANIZED HEALTH CARE EDUCATION/TRAINING PROGRAM

## 2020-05-29 PROCEDURE — 81001 URINALYSIS AUTO W/SCOPE: CPT | Performed by: STUDENT IN AN ORGANIZED HEALTH CARE EDUCATION/TRAINING PROGRAM

## 2020-05-29 PROCEDURE — 93010 ELECTROCARDIOGRAM REPORT: CPT | Performed by: INTERNAL MEDICINE

## 2020-05-29 PROCEDURE — 82805 BLOOD GASES W/O2 SATURATION: CPT | Performed by: STUDENT IN AN ORGANIZED HEALTH CARE EDUCATION/TRAINING PROGRAM

## 2020-05-29 PROCEDURE — 74019 RADEX ABDOMEN 2 VIEWS: CPT

## 2020-05-29 RX ORDER — CEFTRIAXONE 2 G/50ML
2 INJECTION, SOLUTION INTRAVENOUS ONCE
Status: COMPLETED | OUTPATIENT
Start: 2020-05-29 | End: 2020-05-30

## 2020-05-29 RX ADMIN — SODIUM CHLORIDE, POTASSIUM CHLORIDE, SODIUM LACTATE AND CALCIUM CHLORIDE 1000 ML: 600; 310; 30; 20 INJECTION, SOLUTION INTRAVENOUS at 22:00

## 2020-05-29 RX ADMIN — CEFTRIAXONE 2 G: 2 INJECTION, SOLUTION INTRAVENOUS at 23:29

## 2020-05-29 ASSESSMENT — MIFFLIN-ST. JEOR: SCORE: 1682.98

## 2020-05-30 ENCOUNTER — APPOINTMENT (OUTPATIENT)
Dept: GENERAL RADIOLOGY | Facility: OTHER | Age: 70
End: 2020-05-30
Attending: STUDENT IN AN ORGANIZED HEALTH CARE EDUCATION/TRAINING PROGRAM
Payer: COMMERCIAL

## 2020-05-30 PROBLEM — R50.9 FEVER: Status: ACTIVE | Noted: 2020-05-30

## 2020-05-30 LAB
ALBUMIN SERPL-MCNC: 3.7 G/DL (ref 3.5–5.7)
ALP SERPL-CCNC: 65 U/L (ref 34–104)
ALT SERPL W P-5'-P-CCNC: 19 U/L (ref 7–52)
ANION GAP SERPL CALCULATED.3IONS-SCNC: 7 MMOL/L (ref 3–14)
AST SERPL W P-5'-P-CCNC: 20 U/L (ref 13–39)
BILIRUB DIRECT SERPL-MCNC: 0.2 MG/DL (ref 0–0.2)
BILIRUB SERPL-MCNC: 1 MG/DL (ref 0.3–1)
BUN SERPL-MCNC: 23 MG/DL (ref 7–25)
CALCIUM SERPL-MCNC: 8.8 MG/DL (ref 8.6–10.3)
CHLORIDE SERPL-SCNC: 101 MMOL/L (ref 98–107)
CO2 SERPL-SCNC: 27 MMOL/L (ref 21–31)
CREAT SERPL-MCNC: 1.68 MG/DL (ref 0.7–1.3)
ERYTHROCYTE [DISTWIDTH] IN BLOOD BY AUTOMATED COUNT: 13 % (ref 10–15)
GFR SERPL CREATININE-BSD FRML MDRD: 41 ML/MIN/{1.73_M2}
GLUCOSE SERPL-MCNC: 227 MG/DL (ref 70–105)
HCT VFR BLD AUTO: 48.4 % (ref 40–53)
HGB BLD-MCNC: 16 G/DL (ref 13.3–17.7)
LACTATE BLD-SCNC: 1.4 MMOL/L (ref 0.7–2)
MCH RBC QN AUTO: 32.9 PG (ref 26.5–33)
MCHC RBC AUTO-ENTMCNC: 33.1 G/DL (ref 31.5–36.5)
MCV RBC AUTO: 99 FL (ref 78–100)
PLATELET # BLD AUTO: 160 10E9/L (ref 150–450)
POTASSIUM SERPL-SCNC: 4.3 MMOL/L (ref 3.5–5.1)
PROCALCITONIN SERPL-MCNC: 3.77 NG/ML
PROT SERPL-MCNC: 6.1 G/DL (ref 6.4–8.9)
RBC # BLD AUTO: 4.87 10E12/L (ref 4.4–5.9)
SARS-COV-2 PCR COMMENT: NORMAL
SARS-COV-2 RNA SPEC QL NAA+PROBE: NEGATIVE
SODIUM SERPL-SCNC: 135 MMOL/L (ref 134–144)
SPECIMEN SOURCE: NORMAL
WBC # BLD AUTO: 8.9 10E9/L (ref 4–11)

## 2020-05-30 PROCEDURE — 25000128 H RX IP 250 OP 636: Performed by: INTERNAL MEDICINE

## 2020-05-30 PROCEDURE — 71046 X-RAY EXAM CHEST 2 VIEWS: CPT

## 2020-05-30 PROCEDURE — 25000132 ZZH RX MED GY IP 250 OP 250 PS 637: Performed by: FAMILY MEDICINE

## 2020-05-30 PROCEDURE — 25000132 ZZH RX MED GY IP 250 OP 250 PS 637: Performed by: STUDENT IN AN ORGANIZED HEALTH CARE EDUCATION/TRAINING PROGRAM

## 2020-05-30 PROCEDURE — 83605 ASSAY OF LACTIC ACID: CPT | Performed by: INTERNAL MEDICINE

## 2020-05-30 PROCEDURE — 84145 PROCALCITONIN (PCT): CPT | Performed by: INTERNAL MEDICINE

## 2020-05-30 PROCEDURE — 85027 COMPLETE CBC AUTOMATED: CPT | Performed by: INTERNAL MEDICINE

## 2020-05-30 PROCEDURE — 80048 BASIC METABOLIC PNL TOTAL CA: CPT | Performed by: INTERNAL MEDICINE

## 2020-05-30 PROCEDURE — 25000132 ZZH RX MED GY IP 250 OP 250 PS 637: Performed by: INTERNAL MEDICINE

## 2020-05-30 PROCEDURE — 25000128 H RX IP 250 OP 636: Performed by: FAMILY MEDICINE

## 2020-05-30 PROCEDURE — 99220 ZZC INITIAL OBSERVATION CARE,LEVL III: CPT | Performed by: INTERNAL MEDICINE

## 2020-05-30 PROCEDURE — 25000128 H RX IP 250 OP 636: Performed by: STUDENT IN AN ORGANIZED HEALTH CARE EDUCATION/TRAINING PROGRAM

## 2020-05-30 PROCEDURE — 25800030 ZZH RX IP 258 OP 636: Performed by: INTERNAL MEDICINE

## 2020-05-30 PROCEDURE — 87798 DETECT AGENT NOS DNA AMP: CPT | Performed by: INTERNAL MEDICINE

## 2020-05-30 PROCEDURE — 80076 HEPATIC FUNCTION PANEL: CPT | Performed by: INTERNAL MEDICINE

## 2020-05-30 PROCEDURE — G0378 HOSPITAL OBSERVATION PER HR: HCPCS

## 2020-05-30 PROCEDURE — 36415 COLL VENOUS BLD VENIPUNCTURE: CPT | Performed by: INTERNAL MEDICINE

## 2020-05-30 PROCEDURE — 86618 LYME DISEASE ANTIBODY: CPT | Performed by: INTERNAL MEDICINE

## 2020-05-30 RX ORDER — NICOTINE POLACRILEX 4 MG
15-30 LOZENGE BUCCAL
Status: DISCONTINUED | OUTPATIENT
Start: 2020-05-30 | End: 2020-06-02 | Stop reason: HOSPADM

## 2020-05-30 RX ORDER — VANCOMYCIN HYDROCHLORIDE 50 MG/ML
125 KIT ORAL 4 TIMES DAILY
Status: DISCONTINUED | OUTPATIENT
Start: 2020-05-30 | End: 2020-05-31

## 2020-05-30 RX ORDER — CLOPIDOGREL BISULFATE 75 MG/1
75 TABLET ORAL DAILY
Status: DISCONTINUED | OUTPATIENT
Start: 2020-05-30 | End: 2020-06-02 | Stop reason: HOSPADM

## 2020-05-30 RX ORDER — ASPIRIN 325 MG
81 TABLET ORAL DAILY
Status: DISCONTINUED | OUTPATIENT
Start: 2020-05-30 | End: 2020-05-30 | Stop reason: CLARIF

## 2020-05-30 RX ORDER — METOPROLOL SUCCINATE 50 MG/1
50 TABLET, EXTENDED RELEASE ORAL 2 TIMES DAILY
Status: DISCONTINUED | OUTPATIENT
Start: 2020-05-30 | End: 2020-06-02 | Stop reason: HOSPADM

## 2020-05-30 RX ORDER — DEXTROSE MONOHYDRATE 25 G/50ML
25-50 INJECTION, SOLUTION INTRAVENOUS
Status: DISCONTINUED | OUTPATIENT
Start: 2020-05-30 | End: 2020-06-02 | Stop reason: HOSPADM

## 2020-05-30 RX ORDER — HYDROCODONE BITARTRATE AND ACETAMINOPHEN 5; 325 MG/1; MG/1
1 TABLET ORAL EVERY 6 HOURS PRN
Status: DISCONTINUED | OUTPATIENT
Start: 2020-05-30 | End: 2020-06-02 | Stop reason: HOSPADM

## 2020-05-30 RX ORDER — HYDRALAZINE HYDROCHLORIDE 25 MG/1
25 TABLET, FILM COATED ORAL 4 TIMES DAILY
Status: DISCONTINUED | OUTPATIENT
Start: 2020-05-30 | End: 2020-05-30

## 2020-05-30 RX ORDER — LEVOTHYROXINE SODIUM 125 UG/1
125 TABLET ORAL
Status: DISCONTINUED | OUTPATIENT
Start: 2020-05-30 | End: 2020-06-02 | Stop reason: HOSPADM

## 2020-05-30 RX ORDER — SACCHAROMYCES BOULARDII 250 MG
250 CAPSULE ORAL 2 TIMES DAILY
Status: DISCONTINUED | OUTPATIENT
Start: 2020-05-30 | End: 2020-06-02 | Stop reason: HOSPADM

## 2020-05-30 RX ORDER — HYDRALAZINE HYDROCHLORIDE 25 MG/1
25-50 TABLET, FILM COATED ORAL 4 TIMES DAILY
Status: DISCONTINUED | OUTPATIENT
Start: 2020-05-30 | End: 2020-06-02 | Stop reason: HOSPADM

## 2020-05-30 RX ORDER — RANOLAZINE 500 MG/1
1000 TABLET, EXTENDED RELEASE ORAL 2 TIMES DAILY
Status: DISCONTINUED | OUTPATIENT
Start: 2020-05-30 | End: 2020-06-02 | Stop reason: HOSPADM

## 2020-05-30 RX ORDER — NALOXONE HYDROCHLORIDE 0.4 MG/ML
.1-.4 INJECTION, SOLUTION INTRAMUSCULAR; INTRAVENOUS; SUBCUTANEOUS
Status: DISCONTINUED | OUTPATIENT
Start: 2020-05-30 | End: 2020-06-02 | Stop reason: HOSPADM

## 2020-05-30 RX ORDER — FAMOTIDINE 20 MG/1
20 TABLET, FILM COATED ORAL 2 TIMES DAILY
Status: DISCONTINUED | OUTPATIENT
Start: 2020-05-30 | End: 2020-05-31

## 2020-05-30 RX ORDER — ONDANSETRON 4 MG/1
4 TABLET, ORALLY DISINTEGRATING ORAL EVERY 6 HOURS PRN
Status: DISCONTINUED | OUTPATIENT
Start: 2020-05-30 | End: 2020-06-02 | Stop reason: HOSPADM

## 2020-05-30 RX ORDER — HYDRALAZINE HYDROCHLORIDE 25 MG/1
50 TABLET, FILM COATED ORAL ONCE
Status: COMPLETED | OUTPATIENT
Start: 2020-05-30 | End: 2020-05-30

## 2020-05-30 RX ORDER — IRBESARTAN 150 MG/1
150 TABLET ORAL 2 TIMES DAILY
Status: DISCONTINUED | OUTPATIENT
Start: 2020-05-30 | End: 2020-05-30 | Stop reason: CLARIF

## 2020-05-30 RX ORDER — ACETAMINOPHEN 325 MG/1
650 TABLET ORAL EVERY 4 HOURS PRN
Status: DISCONTINUED | OUTPATIENT
Start: 2020-05-30 | End: 2020-06-02 | Stop reason: HOSPADM

## 2020-05-30 RX ORDER — ONDANSETRON 2 MG/ML
4 INJECTION INTRAMUSCULAR; INTRAVENOUS EVERY 6 HOURS PRN
Status: DISCONTINUED | OUTPATIENT
Start: 2020-05-30 | End: 2020-06-02 | Stop reason: HOSPADM

## 2020-05-30 RX ORDER — LOSARTAN POTASSIUM 50 MG/1
50 TABLET ORAL 2 TIMES DAILY
Status: DISCONTINUED | OUTPATIENT
Start: 2020-05-30 | End: 2020-06-02 | Stop reason: HOSPADM

## 2020-05-30 RX ORDER — ASPIRIN 81 MG/1
81 TABLET ORAL DAILY
Status: DISCONTINUED | OUTPATIENT
Start: 2020-05-30 | End: 2020-06-02 | Stop reason: HOSPADM

## 2020-05-30 RX ADMIN — ACETAMINOPHEN 650 MG: 325 TABLET ORAL at 22:56

## 2020-05-30 RX ADMIN — METRONIDAZOLE 500 MG: 500 INJECTION, SOLUTION INTRAVENOUS at 16:09

## 2020-05-30 RX ADMIN — VANCOMYCIN HYDROCHLORIDE 125 MG: KIT at 21:58

## 2020-05-30 RX ADMIN — ONDANSETRON 4 MG: 4 TABLET, ORALLY DISINTEGRATING ORAL at 18:59

## 2020-05-30 RX ADMIN — LOSARTAN POTASSIUM 50 MG: 50 TABLET, FILM COATED ORAL at 10:24

## 2020-05-30 RX ADMIN — HYDROCODONE BITARTRATE AND ACETAMINOPHEN 1 TABLET: 5; 325 TABLET ORAL at 18:59

## 2020-05-30 RX ADMIN — VANCOMYCIN HYDROCHLORIDE 125 MG: KIT at 17:31

## 2020-05-30 RX ADMIN — SODIUM CHLORIDE 250 ML: 9 INJECTION, SOLUTION INTRAVENOUS at 16:09

## 2020-05-30 RX ADMIN — CLOPIDOGREL BISULFATE 75 MG: 75 TABLET, FILM COATED ORAL at 10:24

## 2020-05-30 RX ADMIN — ASPIRIN 81 MG: 81 TABLET, DELAYED RELEASE ORAL at 10:24

## 2020-05-30 RX ADMIN — RANOLAZINE 1000 MG: 500 TABLET, FILM COATED, EXTENDED RELEASE ORAL at 10:24

## 2020-05-30 RX ADMIN — LEVOTHYROXINE SODIUM 125 MCG: 125 TABLET ORAL at 08:23

## 2020-05-30 RX ADMIN — METOPROLOL SUCCINATE 50 MG: 50 TABLET, EXTENDED RELEASE ORAL at 21:56

## 2020-05-30 RX ADMIN — LOSARTAN POTASSIUM 50 MG: 50 TABLET, FILM COATED ORAL at 21:57

## 2020-05-30 RX ADMIN — ACETAMINOPHEN 650 MG: 325 TABLET ORAL at 17:49

## 2020-05-30 RX ADMIN — ACETAMINOPHEN 650 MG: 325 TABLET ORAL at 08:32

## 2020-05-30 RX ADMIN — HYDRALAZINE HYDROCHLORIDE 50 MG: 25 TABLET, FILM COATED ORAL at 13:37

## 2020-05-30 RX ADMIN — FAMOTIDINE 20 MG: 20 TABLET ORAL at 10:24

## 2020-05-30 RX ADMIN — FAMOTIDINE 20 MG: 20 TABLET ORAL at 21:57

## 2020-05-30 RX ADMIN — RANOLAZINE 1000 MG: 500 TABLET, FILM COATED, EXTENDED RELEASE ORAL at 21:54

## 2020-05-30 RX ADMIN — VANCOMYCIN HYDROCHLORIDE 1500 MG: 500 INJECTION, POWDER, LYOPHILIZED, FOR SOLUTION INTRAVENOUS at 17:31

## 2020-05-30 RX ADMIN — HYDRALAZINE HYDROCHLORIDE 50 MG: 25 TABLET, FILM COATED ORAL at 21:56

## 2020-05-30 RX ADMIN — Medication 250 MG: at 21:56

## 2020-05-30 RX ADMIN — METOPROLOL SUCCINATE 50 MG: 50 TABLET, EXTENDED RELEASE ORAL at 10:24

## 2020-05-30 RX ADMIN — ONDANSETRON 4 MG: 2 INJECTION INTRAMUSCULAR; INTRAVENOUS at 08:32

## 2020-05-30 RX ADMIN — Medication 250 MG: at 10:24

## 2020-05-30 NOTE — ED TRIAGE NOTES
Patient brought in by EMS.  At 1815 he had sudden onset of chills, body aches and fever and sore throat.  Patient is diabetic, checked his blood sugar at home and it was 100. Patient has an insulin pump since 2019.  He ate some fruit snacks.  Patient is also complaining of a rash on his head for the past several months.   Patient has Hx of heart surgery in 2002 Ranjana Le RN on 5/29/2020 at 8:40 PM  .

## 2020-05-30 NOTE — ED NOTES
Wife called for un update.  Per patient,  it is OK to give information to give to wife.  All questions answered at this time.    Ranjana Le RN on 5/29/2020 at 9:34 PM

## 2020-05-30 NOTE — PROGRESS NOTES
Patient is turning off his insulin pump.  He only has 12 units left in it before it is empty.  Ranjana Le RN on 5/29/2020 at 10:00 PM

## 2020-05-30 NOTE — PHARMACY-ADMISSION MEDICATION HISTORY
Pharmacy -- Admission Medication Reconciliation IN PROGRESS  Prior to admission (PTA) medications were reviewed and the patient's PTA medication list was updated.    Sources Consulted: patient phone interview attempted, no answer.  Will try again later.        Miladys Mitchell RPH, 5/30/2020,  3:04 PM       Tried calling patient again, no answer.  Will attempt to contact patient tomorrow.  Miladys Mitchell RPH on 5/30/2020 at 5:17 PM

## 2020-05-30 NOTE — PROGRESS NOTES
Insulin pump continues to infuse at 2.90 basal rate. Patient has given himself 13 units of insulin today based on what he would do at home with insulin pump. Blood sugars have been monitored qid. See flow sheet.     Lindsey Martin RN on 5/30/2020 at 5:37 PM

## 2020-05-30 NOTE — ED PROVIDER NOTES
Moses Whittaker  : 1950 Age: 70 year old Sex: male MRN: [id number]    CC: fever/chills, weakness    HPI: Moses Whittaker is a 70 year old male with complex history including CABG x2, CKD, type 2 diabetes on insulin pump, HFpEF, recently treated c diff who is presenting for evaluation of, fever chills and weakness that started today.  Patient states that up until today he was at his usual state of health, no fevers, no chills, no shortness of breath, no chest pain, no headache, no abdominal pain.  Today he went to visit his son out of town and when he arrived there he all of a sudden started feeling feverish and started experiencing rigors with overall not feeling well and weakness.  The symptoms persisted upon his arrival home, he also started developing sore throat and overall he felt something is wrong therefore EMS called.  In addition to the above, patient states that he has had a cough for last several days the cough is nonproductive; patient believes that it started after he was mowing outside when there is a lot of dust and grass and flying around; he attributes the cough to seasonal allergies.     Patient has an insulin pump and continuous glucose monitor, at bedside review he states that his blood glucose levels have been in 100-200 range over the last several days.    ED Course and MDM:  Mr. Oglesby is a pleasant 70-year-old male with a complex past medical history including CABG x2, CKD, type 2 diabetes on insulin pump presenting for evaluation of 1 day of fevers chills and malaise.  On arrival patient febrile to 101.4F though well-appearing, hemodynamically stable, not tachycardic or hypotensive; physical exam reassuring as below.  Broad differential diagnosis considered in this patient with significant comorbidities including hypo/hyper glycemia, DKA, HHS, ACS, infection, etc. EKG nonischemic, NT proBNP, troponin within normal limits therefore very low concern for acute decompensated CHF or  "ACS.  BMP compatible with baseline CKD, no anion gap, lactate and VBG within normal limits all reassuring.  Given symptoms concerning for acute viral or bacterial process did obtain blood cultures x2 that are pending, chest x-ray without focal consolidation, rapid strep negative, COVID pending.  While HPI concerning for possibly early developing bacterial process, extensive work-up as above reassuring without any evidence of bacterial process or sepsis at this time.      I discussed the findings with patient and in light of the negative workup likely diagnosis of non-specific viral syndrome. We discussed plan for either watchful observation at home while blood cultures are pending with a dose of ceftriaxone given here empirically versus observation admission; either of which I believe is appropriate in this nontoxic appearing hemodynamically stable man with largely negative work-up.  Patient reports he is feeling somewhat better however still not well and is concerned that he will not be able to manage at home therefore elects for obs admission at this time.  I discussed this plan with Dr. Matta who accepted pt for observation admission.    Final Plan:  - s/p ceftriaxone in ED  - BC pending  - obs admission    Final Clinical Impression:  - fever, chills    Tisha Shankar MD      Physical Exam:  BP (!) 160/68   Temp 101.4  F (38.6  C) (Tympanic)   Resp 20   Ht 1.778 m (5' 10\")   Wt 91.2 kg (201 lb)   SpO2 98%   BMI 28.84 kg/m      Gen: NAD, well appearing  HEENT: Atraumatic, conjunctive are clear, pupils equal and reactive, posterior pharynx clear  CV: Regular rate and rhythm, no lower extremity edema, no JVD  Respiratory: Normal work of breathing, clear lungs bilaterally, no wheezing or crackles  Abdomen: Obese, no guarding, no tenderness to deep palpation  Neuro: Alert and oriented x3, no focal deficits, cranial nerves grossly intact  Skin: No rashes or bruising  Psych: Appropriate mood and " affect    ROS:  ROS performed and noted in HPI    Family history:  Family History   Problem Relation Age of Onset     Heart Disease Mother         Heart Disease,Heart problems     Heart Disease Other         Heart Disease,Heart problems     Heart Disease Other         Heart Disease,Heart problems     Ankylosing Spondylitis Son         Ankylosing spondylitis,Ankylosing spondylitis     Other - See Comments Brother          with sudden death following shoulder surgery 2012 -- was off his Plavix for 10 days pre-operatively.     Ankylosing Spondylitis Daughter         Ankylosing spondylitis,-- Dx 2017     Social History:   Social History     Socioeconomic History     Marital status:      Spouse name: Not on file     Number of children: Not on file     Years of education: Not on file     Highest education level: Not on file   Occupational History     Not on file   Social Needs     Financial resource strain: Not on file     Food insecurity     Worry: Not on file     Inability: Not on file     Transportation needs     Medical: Not on file     Non-medical: Not on file   Tobacco Use     Smoking status: Never Smoker     Smokeless tobacco: Never Used   Substance and Sexual Activity     Alcohol use: No     Alcohol/week: 0.0 standard drinks     Drug use: No     Sexual activity: Yes     Partners: Female   Lifestyle     Physical activity     Days per week: Not on file     Minutes per session: Not on file     Stress: Not on file   Relationships     Social connections     Talks on phone: Not on file     Gets together: Not on file     Attends Rastafarian service: Not on file     Active member of club or organization: Not on file     Attends meetings of clubs or organizations: Not on file     Relationship status: Not on file     Intimate partner violence     Fear of current or ex partner: Not on file     Emotionally abused: Not on file     Physically abused: Not on file     Forced sexual activity: Not on file   Other  Topics Concern     Parent/sibling w/ CABG, MI or angioplasty before 65F 55M? Not Asked   Social History Narrative    He is on Social Security Disability for coronary artery disease and cervical arthritis and disk disease.   Dax clay.  No tobacco.     Surgical History:  Past Surgical History:   Procedure Laterality Date     ANGIOPLASTY      12/00, 04/04/04,Repeat angioplasty with lt internal mammary to the lt anterior descending and lt radial artery from aorta to the diagonal.     ANGIOPLASTY      10/01,Angioplasty with 2 vessel CABG the following week from the lt internal mammary to the lt anterior descending and right radial free graft     ANGIOPLASTY      04/2003     ARTHROSCOPY SHOULDER ROTATOR CUFF REPAIR      02/06/2006,Left rotator cuff repair and bone spur removal by Dr. Giraldo in [place]     COLONOSCOPY      1999     COLONOSCOPY  01/08/2016 01/08/2016,-- Dr. De La Cruz -- polypectomy     COLONOSCOPY  04/18/2019    hyperplastic, follow up 10 years, 4/18/29     OTHER SURGICAL HISTORY      09/03,163860,IR ANGIOGRAM FEMORAL/EXTREMITY ([place]),Unremarkable angiogram at [facility]     OTHER SURGICAL HISTORY      10/05/2004,,PTCA,Angioplasty     OTHER SURGICAL HISTORY      02/2005,,PTCA,Angioplasty with stent.     OTHER SURGICAL HISTORY      03/02/2005,,PTCA,Angioplasty with stent.     OTHER SURGICAL HISTORY      09/23/2005,,PTCA,Angioplasty without stent     OTHER SURGICAL HISTORY      2/26/2007,[id number],IR ANGIOGRAM FEMORAL/EXTREMITY ([place]),Unremarkable coronary angiogram at [facility].     OTHER SURGICAL HISTORY      1967,SUR38,APPENDECTOMY OPEN     RELEASE CARPAL TUNNEL      11/15/01,Right carpal tunnel release by Dr. Mace     RELEASE CARPAL TUNNEL      01/2004,Left carpal tunnel release          Tisha Shankar MD  05/30/20 0412       Tisha Shankar MD  05/30/20 1856       Tisha Shankar MD  05/30/20 1856

## 2020-05-30 NOTE — PROGRESS NOTES
Dr. Warren notified of gram positive rods in bottle #2. Awaiting new orders for antibiotics.     Lindsey Martin RN on 5/30/2020 at 3:29 PM

## 2020-05-30 NOTE — H&P
[facility]    History and Physical  Hospitalist       Date of Admission:  5/29/2020    Assessment & Plan   Moses Whittaker is a 70 year old male who presents with fever.    Principal Problem:    Fever    Assessment: Differential includes recurrent C. difficile infection versus tickborne illness.  No evidence of urinary soft tissue or or pulmonary source.  No right upper quadrant pain or other clinical exam evidence of cholecystitis etc.  Given his multiple comorbidities he will benefit from ongoing close monitoring in the hospital.    Plan:   -If develops diarrhea check C. Difficile  -If continues to spike fevers and does not have diarrhea then will empirically start doxycycline for tickborne illness  -Follow-up blood cultures  -Trend pro calcitonin  -Strep throat negative  -COVID negative  - follow-up Lyme/Anaplasma    Active Problems:    Chronic diastolic heart failure (H)    Assessment: Chronic and stable without evidence of decompensation    Plan:   -Strict I's and O's and daily weights      CKD (chronic kidney disease) stage 3, GFR 30-59 ml/min (H)    Assessment: Chronic and stable    Plan:   -Continue home ARB  -Avoid NSAIDs and nephrotoxins  -Monitor daily      Controlled type 2 diabetes mellitus with stage 3 chronic kidney disease, with long-term current use of insulin (H)    Assessment: Well controlled last hemoglobin A1c of 7.5    Plan:   -Continue home insulin pump per patient parameters  -Fingersticks AC at bedtime      Benign essential hypertension    Assessment: Stable    Plan:   -Continue home regiment      Colitis due to Clostridium difficile    Assessment: He has now completed Vanco taper after first recurrence.  At high risk of redeveloping C. difficile and will avoid empiric antibiotics given his clinical stability, despite his elevated procalcitonin.    Plan:   -Repeat C. difficile if develops diarrhea as above    FEN: regular diet, normal saline at 0  mL/hr  PPX:scd    Code Status: Prior    [facility]    Primary Care Physician   Jorge Barnett    Chief Complaint   Fevers and chills    History is obtained from the patient and chart review.    History of Present Illness   Moses Whittaker is a 70 year old male who presents with fevers and chills.  Patient was discharged in mid March with first recurrence of C. difficile and he completed a full vancomycin taper.  His stools have returned to being formed but at times are mucousy, he has had no abdominal pain consistent with prior C. difficile infections.  He is otherwise been his normal state of health until yesterday when he developed acute onset of severe fatigue fevers and chills.  He also had a mild headache but cannot recall any tick bites.    He presented to the ER last night, had unremarkable appearing lab work but was noted to be febrile, blood cultures was obtained he received IV ceftriaxone x1 and was subsequently placed on observation for further management.    Past Medical History    I have reviewed this patient's medical history and updated it with pertinent information if needed.   Past Medical History:   Diagnosis Date     Atherosclerotic heart disease of native coronary artery without angina pectoris     Coronary artery disease, post angioplasty     Carpal tunnel syndrome     No Comments Provided     Chronic maxillary sinusitis     No Comments Provided     Clostridium difficile infection 3/14/2020     Edema     Edema secondary to Bextra     Gastritis without bleeding     No Comments Provided     Greater trochanteric bursitis of left hip 6/24/2015     Heart disease     Multiple coronary stents     Narcolepsy without cataplexy     No Comments Provided     Other injury of unspecified body region, initial encounter (CODE)     11/2006,Right calf hematoma     Postsurgical percutaneous transluminal coronary angioplasty status 2/15/2007    Overview:  IMO Update 10/11     Rheumatic fever without heart  involvement     Rheumatic fever as a child without valvular heart disease     Venous stasis ulcer of left lower leg with edema of left lower leg (H) 9/4/2019       Past Surgical History   I have reviewed this patient's surgical history and updated it with pertinent information if needed.  Past Surgical History:   Procedure Laterality Date     ANGIOPLASTY      12/00, 04/04/04,Repeat angioplasty with lt internal mammary to the lt anterior descending and lt radial artery from aorta to the diagonal.     ANGIOPLASTY      10/01,Angioplasty with 2 vessel CABG the following week from the lt internal mammary to the lt anterior descending and right radial free graft     ANGIOPLASTY      04/2003     ARTHROSCOPY SHOULDER ROTATOR CUFF REPAIR      02/06/2006,Left rotator cuff repair and bone spur removal by Dr. Giraldo in [place]     COLONOSCOPY      1999     COLONOSCOPY  01/08/2016 01/08/2016,-- Dr. De La Cruz -- polypectomy     COLONOSCOPY  04/18/2019    hyperplastic, follow up 10 years, 4/18/29     OTHER SURGICAL HISTORY      09/03,074161,IR ANGIOGRAM FEMORAL/EXTREMITY ([place]),Unremarkable angiogram at [facility]     OTHER SURGICAL HISTORY      10/05/2004,,PTCA,Angioplasty     OTHER SURGICAL HISTORY      02/2005,,PTCA,Angioplasty with stent.     OTHER SURGICAL HISTORY      03/02/2005,,PTCA,Angioplasty with stent.     OTHER SURGICAL HISTORY      09/23/2005,,PTCA,Angioplasty without stent     OTHER SURGICAL HISTORY      2/26/2007,511938,IR ANGIOGRAM FEMORAL/EXTREMITY ([place]),Unremarkable coronary angiogram at [facility].     OTHER SURGICAL HISTORY      1967,SUR38,APPENDECTOMY OPEN     RELEASE CARPAL TUNNEL      11/15/01,Right carpal tunnel release by Dr. Mace     RELEASE CARPAL TUNNEL      01/2004,Left carpal tunnel release       Prior to Admission Medications   Prior to Admission Medications   Prescriptions Last Dose Informant Patient Reported? Taking?   Blood Glucose Monitoring Suppl (GLUCOCOM BLOOD GLUCOSE MONITOR) WAQAS   Yes  No   Sig: Dispense glucose meter, test strips and lancets covered by the patient insurance. Test 10 times per day.   [employer] ALCOHOL SWABS PADS   Yes No   Sig: For home use.   Flaxseed, Linseed, (FLAXSEED OIL) 1000 MG CAPS 5/29/2020 at Unknown time  Yes Yes   Sig: Take 1 capsule by mouth daily    HYDROcodone-acetaminophen (NORCO) 5-325 MG tablet Past Month at Unknown time  No Yes   Sig: Take 1 tablet by mouth every 6 hours as needed for severe pain   IV Sets-Tubing (SOLUTION ADMINISTRATION SET) MISC   Yes No   Sig: As directed.   Insulin Pen Needle (PEN NEEDLES) 29G X 12MM MISC   Yes No   Sig: For administering insulin at home.   Saline GEL   Yes No   Sig: Spray 1 spray in nostril every hour as needed For Nasal Dryness   aspirin (ASA) 81 MG tablet 5/29/2020 at Unknown time  No Yes   Sig: Take 1 tablet (81 mg) by mouth daily -- Dose Reduced 2/18/2020   blood glucose monitoring (ONETOUCH ULTRA) test strip   Yes No   Sig: Dispense test strips covered by the patient insurance. Test 10 times per day.   clopidogrel (PLAVIX) 75 MG tablet 5/29/2020 at Unknown time  No Yes   Sig: Take 1 tablet (75 mg) by mouth daily   co-enzyme Q-10 100 MG CAPS capsule Unknown at Unknown time  Yes No   Sig: Take 100 mg by mouth daily    desoximetasone (TOPICORT) 0.25 % OINT ointment   Yes No   Sig: Apply topically 2 times daily as needed for inflammation or itching   dextroamphetamine (DEXTROSTAT) 10 MG tablet 5/29/2020 at Unknown time  No Yes   Sig: Take 1 tablet (10 mg) by mouth 4 times daily   famotidine (PEPCID) 20 MG tablet Past Week at Unknown time  Yes Yes   Sig: Take 20 mg by mouth 2 times daily   hydrALAZINE (APRESOLINE) 25 MG tablet 5/29/2020 at Unknown time  No Yes   Sig: Take 1-2 tablets (25-50 mg) by mouth 4 times daily - Take lowest effective dose for blood pressure management   insulin lispro (HUMALOG) 100 UNIT/ML vial 5/29/2020 at Unknown time  No Yes   Sig: INJECT UNDER THE SKIN USING INSULIN PUMP. MAX DAILY DOSE  UNITS   DX: E11.22, N18.3, Z79.4   irbesartan (AVAPRO) 150 MG tablet 5/29/2020 at Unknown time  No Yes   Sig: Take 1 tablet (150 mg) by mouth 2 times daily -- needs 2 smaller pills daily   ketorolac (ACULAR) 0.5 % ophthalmic solution   Yes No   Sig: Place 1 drop Into the left eye 3 times daily Post-cataract surgery   levothyroxine (SYNTHROID/LEVOTHROID) 125 MCG tablet 5/28/2020 at Unknown time  No Yes   Sig: TAKE 1 TABLET BY MOUTH ONCE DAILY IN THE MORNING   methylphenidate (RITALIN) 10 MG tablet 5/28/2020 at Unknown time  No Yes   Sig: Take 2 tablets (20 mg) by mouth 2 times daily   metoprolol succinate ER (TOPROL-XL) 50 MG 24 hr tablet 5/29/2020 at Unknown time  No Yes   Sig: Take 1 tablet (50 mg) by mouth 2 times daily   nitroGLYcerin (NITROSTAT) 0.4 MG sublingual tablet Unknown at Unknown time  No No   Sig: Place 1 tablet (0.4 mg) under the tongue every 5 minutes as needed for chest pain Call 911 after 1st dose.   order for DME   No No   Sig: Compression Stockings, 30/40 mmHg, knee high, open toe-apply to both legs daily in the am and removed in the evening.  DX:I83.022, L97.229, I83.012, L97.219   prednisoLONE acetate (PRED FORTE) 1 % ophthalmic suspension   Yes No   Sig: Place 1 drop Into the left eye 3 times daily Post-cataract surgery   ranolazine (RANEXA) 500 MG 12 hr tablet 5/28/2020 at Unknown time  No Yes   Sig: Take 2 tablets (1,000 mg) by mouth 2 times daily - cancel other Rx - give 3 month supply   rosuvastatin (CRESTOR) 10 MG tablet 5/29/2020 at Unknown time  No Yes   Sig: Take 1 tablet (10 mg) by mouth 2 times daily   saccharomyces boulardii (FLORASTOR) 250 MG capsule 5/28/2020 at Unknown time  No Yes   Sig: Take 1 capsule (250 mg) by mouth 2 times daily   thin (NO BRAND SPECIFIED) lancets   Yes No   Sig: Test 10 times per day.   vitamin E (TOCOPHEROL) 400 units (360 mg) capsule   Yes No   Sig: Take 1 capsule by mouth daily      Facility-Administered Medications: None     Allergies   Allergies   Allergen  "Reactions     Famotidine Other (See Comments)     + Caused Headache and Rash -- tolerated Ranitidine and worked well.     Gabapentin      Other reaction(s): Mental Status Change \"felt like in outer space\"     Hydrocortisone Other (See Comments)     Burning and stinging sensation with Hydrocortisone - doesn't help itching or rash -- tolerated Desoximetasone cream and worked well.      Atorvastatin Hives     Celebrex [Celecoxib]      Swelling      Lisinopril Cough     No Clinical Screening - See Comments Hives     Chlorine water     Norvasc [Amlodipine] Other (See Comments)     -- can't remember, didn't tolerate.      Pregabalin      Lyrica - Other reaction(s): Mental Status Change     Valdecoxib Swelling     \"bextra\" NSAID     Insulin Aspart Rash       Social History   I have reviewed this patient's social history and updated it with pertinent information if needed. Moses SANFORD Tammiemsmalou  reports that he has never smoked. He has never used smokeless tobacco. He reports that he does not drink alcohol or use drugs.    Family History   I have reviewed this patient's family history and updated it with pertinent information if needed.   Family History   Problem Relation Age of Onset     Heart Disease Mother         Heart Disease,Heart problems     Heart Disease Other         Heart Disease,Heart problems     Heart Disease Other         Heart Disease,Heart problems     Ankylosing Spondylitis Son         Ankylosing spondylitis,Ankylosing spondylitis     Other - See Comments Brother          with sudden death following shoulder surgery 2012 -- was off his Plavix for 10 days pre-operatively.     Ankylosing Spondylitis Daughter         Ankylosing spondylitis,-- Dx 2017       Review of Systems     REVIEW OF SYSTEMS:    Constitutional: Energy and appetite today, no other known recent recent sick contacts, has been sheltering in place and self quarantining for nearly 8 weeks.   Eyes: no changes in vision  Ears, nose, mouth, " throat, and face: no mouth sores, dysphagia, or odynophagia  Respiratory: no shortness of breath, cough, or wheezing. No aspiration symptoms.   Cardiovascular: no chest pain, palpitations, orthopnea, increased lower extremity edema, or syncope.   Gastrointestinal: no constipation, new diarrhea, and no nausea, vomiting or abdominal pain.  Genitourinary: no dysuria, hematuria, urgency or frequency.   Hematologic/lymphatic: no unintentional weight loss or night sweats.  Musculoskeletal: no pain to extremities or falls.  His joints are very stiff and sore today.  Endocrine: Blood sugars have been well controlled, he has his own insulin pump with a continuous glucose monitor.    Physical Exam   Temp: 100.6  F (38.1  C) Temp src: Tympanic BP: (!) 153/67 Pulse: 57 Heart Rate: 55 Resp: 16 SpO2: 98 % O2 Device: None (Room air)    Vital Signs with Ranges  Temp:  [98.8  F (37.1  C)-101.4  F (38.6  C)] 100.6  F (38.1  C)  Pulse:  [55-86] 57  Heart Rate:  [55-86] 55  Resp:  [11-21] 16  BP: (123-160)/(54-90) 153/67  SpO2:  [94 %-98 %] 98 %  201 lbs 0 oz    Exam:  GENERAL: Talkative, in no apparent distress.  Head: normocephalic and atraumatic  Eyes: anicteric and non-injected sclera  Nose: no rhinorrhea or epistaxis.   Throat: moist mucous membranes with no active oral lesions.  NECK: Supple, jugular venous distension not present.  CARDIOVASCULAR: regular rate and rhythm, no murmurs, rubs, or gallops. Normal S1/S2. No lower extremity edema.   RESPIRATORY: clear to auscultation bilaterally, no wheezes, no crackles.  No accessory muscle use or evidence of respiratory distress.   GI: soft, non-tender, non-distended, normoactive bowel sounds.  MUSCULOSKELETAL: warm and well perfused, 2+ dorsalis pedis pulses.    SKIN: no pallor, jaundice or rashes on his back chest arms or legs.  NEUROLOGY: AAOx3, follows commands, speech and language without focal deficits.     Data   Data reviewed today:  I personally reviewed no images or EKG's  today.  Recent Labs   Lab 05/30/20  0745 05/29/20  2145   WBC 8.9 8.7   HGB 16.0 15.3   MCV 99 98    146*    137   POTASSIUM 4.3 3.7   CHLORIDE 101 104   CO2 27 24   BUN 23 22   CR 1.68* 1.55*   ANIONGAP 7 9   RYAN 8.8 8.7   * 126*   ALBUMIN 3.7 3.8   PROTTOTAL 6.1* 6.1*   BILITOTAL 1.0 1.0   ALKPHOS 65 63   ALT 19 19   AST 20 18       Recent Results (from the past 24 hour(s))   XR Abdomen 2 Views    Narrative    PROCEDURE:  XR ABDOMEN 2 VW    HISTORY:  fever, cough.    TECHNIQUE:  AP and supine radiographs of the abdomen.    COMPARISON:  None.    FINDINGS:     The lung bases are clear. No subdiaphragmatic air is seen.    No dilated loops of small bowel or air-fluid levels are seen. Mildly  prominent stool is present throughout the colon without rectal  impaction. An electronic device projects over the left upper quadrant  proved      Impression    IMPRESSION:    No obstruction or free air.    EDITA GONSALES MD   XR Chest 2 Views    Narrative    PROCEDURE:  XR CHEST 2 VW    HISTORY: PNA, COVID r/o, .    COMPARISON:  1/18/2019.    FINDINGS:  The cardiomediastinal contours are stable.  The trachea is midline.   There is calcific aortic atherosclerosis.  No focal consolidation, effusion or pneumothorax.  Lungs are  hyperexpanded.  No suspicious osseous lesion or subdiaphragmatic free air.      Impression    IMPRESSION:      No new focal airspace disease.      EDITA GONSALES MD

## 2020-05-30 NOTE — PROGRESS NOTES
Chart reviewed.  Blood culture positive for gram positive rods.  Presuming c.diff given his history.  Will restart vancomycin to act enterically and IV flagyl given bacteremia and lack of systemic vancomycin absorption.  Discussed with Dr. Matta and will add IV vancomycin.  Repeat blood cultures in AM.

## 2020-05-30 NOTE — ED NOTES
Patient is refilling his insulin pump at this time.  This writer is at patient's bedside during the process.    Ranjana Le RN on 5/30/2020 at 12:29 AM

## 2020-05-30 NOTE — PROGRESS NOTES
Patient's basal rate for his insulin pump is 2.9.  Verified with House Supervisor.    Ranjana Le RN on 5/30/2020 at 12:48 AM

## 2020-05-30 NOTE — PROGRESS NOTES
Fever 100.6 this am, treated with tylenol. Afebrile now. No loose stools. No urge to have BM. Will let me know when he does. Voiding okay. Feels lousy per statement. Has an appetite. Ordered lunch.     Lindsey Martin RN on 5/30/2020 at 1:50 PM

## 2020-05-30 NOTE — PROGRESS NOTES
Admission Note    Data:  Moses Whittaker admitted to 318 from emergency room at 0135.      Action:  Dr. Matta has been notified of admission per ED. Pt oriented to unit, call light in reach.     Response:  Patient tolerated transfer well.  Will continue to monitor.  Florence Duncan RN.............................5/30/2020 1:44 AM

## 2020-05-30 NOTE — PHARMACY-VANCOMYCIN DOSING SERVICE
Pharmacy Consult- Vancomycin Assessment    Moses Whittaker is a 70 year old male admitted on 5/29/2020.    Vancomycin has been ordered per MD, for the indication of: Bacteremia (1 of 4 positive for gram + rods), H/o C Diff    Current Antibiotic Regimen Includes: metronidazole, po vanco, vancomycin per pharmacy    Patient Active Problem List   Diagnosis     Acute bilateral low back pain with bilateral sciatica     Angina pectoris (H)     Coronary atherosclerosis     Cervical radicular pain     Cervical stenosis of spinal canal     Chronic diastolic heart failure (H)     Chronic low back pain     CKD (chronic kidney disease) stage 3, GFR 30-59 ml/min (H)     Controlled type 2 diabetes mellitus with stage 3 chronic kidney disease, with long-term current use of insulin (H)     Degenerative disc disease, cervical     Painful diabetic neuropathy (H)     Diastasis of muscle     Degeneration of cervical intervertebral disc     Psychosexual dysfunction with inhibited sexual excitement     Erectile dysfunction     Facet arthritis of cervical region     Esophageal reflux     Benign essential hypertension     Hypothyroidism due to acquired atrophy of thyroid     Insulin pump in place     Intermittent constipation     Left arm numbness     Left atrial enlargement     Mixed hyperlipidemia     Myalgia     Myofacial muscle pain     Primary narcolepsy without cataplexy     Neuroforaminal stenosis of lumbar spine     Obesity     Old inferior wall myocardial infarction     Osteoarthritis of spine     Pain medication agreement - 10-21-19     Peptic ulcer disease     Platelet inhibition due to Plavix     Radicular low back pain     S/P CABG x 2     S/P coronary artery stent placement     Atherosclerosis of native coronary artery of native heart with stable angina pectoris (H)     Other specified forms of chronic ischemic heart disease     Peripheral vascular disease (H)     Venous stasis ulcer of left calf limited to breakdown of skin  "with varicose veins (H)     Bilateral lower extremity edema     Venous stasis dermatitis of left lower extremity     Colitis due to Clostridium difficile     Diabetic nephropathy with proteinuria (H)     Fever       Allergies (and reaction): Famotidine; Gabapentin; Hydrocortisone; Atorvastatin; Celebrex [celecoxib]; Lisinopril; No clinical screening - see comments; Norvasc [amlodipine]; Pregabalin; Valdecoxib; and Insulin aspart    Most recent flowsheet Weight: 91.2 kg (201 lb)  Most recent flowsheet Height: 177.8 cm (5' 10\")      Intake/Output Summary (Last 24 hours) at 2020 1610  Last data filed at 2020 0910  Gross per 24 hour   Intake 930 ml   Output 400 ml   Net 530 ml       Tmax = Temp (24hrs), Av.4  F (37.4  C), Min:97.8  F (36.6  C), Max:101.4  F (38.6  C)      Recent Labs   Lab Test 20  0745   WBC 8.9       Recent Labs   Lab Test 20  0745 20  2145 20  1512   BUN 23 22 18   CR 1.68* 1.55* 1.63*       estimated creatinine clearance is 47.1 mL/min (A) (based on SCr of 1.68 mg/dL (H)).    Cultures Pending: blood x 2    Culture Results: pending    Plan: Patient has a history of C Diff.  Oral vancomycin and metronidazole started.  Patient also has a positive blood culture one of four positive for gram positive rods.  Per Dr Matta, start IV Vancomycin.      Per discussion with Dr Warren, will draw a stool sample to rule out C Diff.      Regimen Start Date:   Recommended Dose: 1500 mg, which provides 16.5 mg/kg/dose  Interval:Q24H  Goal Trough: 10-15 mg/L    Thank You for the consult. Will continue to follow.    Miladys Mitchell RPH ....................  2020   4:10 PM  "

## 2020-05-31 ENCOUNTER — APPOINTMENT (OUTPATIENT)
Dept: GENERAL RADIOLOGY | Facility: OTHER | Age: 70
DRG: 872 | End: 2020-05-31
Attending: INTERNAL MEDICINE
Payer: MEDICARE

## 2020-05-31 PROBLEM — L03.90 CELLULITIS: Status: ACTIVE | Noted: 2020-05-31

## 2020-05-31 PROBLEM — R50.9 FEVER: Status: RESOLVED | Noted: 2020-05-30 | Resolved: 2020-05-31

## 2020-05-31 PROBLEM — R78.81 BACTEREMIA: Status: ACTIVE | Noted: 2020-05-31

## 2020-05-31 LAB
ALBUMIN SERPL-MCNC: 3.8 G/DL (ref 3.5–5.7)
ALP SERPL-CCNC: 78 U/L (ref 34–104)
ALT SERPL W P-5'-P-CCNC: 27 U/L (ref 7–52)
ANION GAP SERPL CALCULATED.3IONS-SCNC: 7 MMOL/L (ref 3–14)
AST SERPL W P-5'-P-CCNC: 26 U/L (ref 13–39)
BILIRUB DIRECT SERPL-MCNC: 0.1 MG/DL (ref 0–0.2)
BILIRUB SERPL-MCNC: 0.7 MG/DL (ref 0.3–1)
BUN SERPL-MCNC: 22 MG/DL (ref 7–25)
CALCIUM SERPL-MCNC: 8.9 MG/DL (ref 8.6–10.3)
CHLORIDE SERPL-SCNC: 99 MMOL/L (ref 98–107)
CO2 SERPL-SCNC: 27 MMOL/L (ref 21–31)
CREAT SERPL-MCNC: 1.87 MG/DL (ref 0.7–1.3)
ERYTHROCYTE [DISTWIDTH] IN BLOOD BY AUTOMATED COUNT: 13.2 % (ref 10–15)
GFR SERPL CREATININE-BSD FRML MDRD: 36 ML/MIN/{1.73_M2}
GLUCOSE SERPL-MCNC: 116 MG/DL (ref 70–105)
HCT VFR BLD AUTO: 50.8 % (ref 40–53)
HGB BLD-MCNC: 16.6 G/DL (ref 13.3–17.7)
MCH RBC QN AUTO: 32.8 PG (ref 26.5–33)
MCHC RBC AUTO-ENTMCNC: 32.7 G/DL (ref 31.5–36.5)
MCV RBC AUTO: 100 FL (ref 78–100)
PLATELET # BLD AUTO: 146 10E9/L (ref 150–450)
POTASSIUM SERPL-SCNC: 4.2 MMOL/L (ref 3.5–5.1)
PROCALCITONIN SERPL-MCNC: 3.76 NG/ML
PROT SERPL-MCNC: 6.6 G/DL (ref 6.4–8.9)
RBC # BLD AUTO: 5.06 10E12/L (ref 4.4–5.9)
SODIUM SERPL-SCNC: 133 MMOL/L (ref 134–144)
WBC # BLD AUTO: 5 10E9/L (ref 4–11)

## 2020-05-31 PROCEDURE — 99233 SBSQ HOSP IP/OBS HIGH 50: CPT | Performed by: INTERNAL MEDICINE

## 2020-05-31 PROCEDURE — G0378 HOSPITAL OBSERVATION PER HR: HCPCS

## 2020-05-31 PROCEDURE — 12000000 ZZH R&B MED SURG/OB

## 2020-05-31 PROCEDURE — 73630 X-RAY EXAM OF FOOT: CPT | Mod: RT

## 2020-05-31 PROCEDURE — 80076 HEPATIC FUNCTION PANEL: CPT | Performed by: INTERNAL MEDICINE

## 2020-05-31 PROCEDURE — 25000132 ZZH RX MED GY IP 250 OP 250 PS 637: Performed by: FAMILY MEDICINE

## 2020-05-31 PROCEDURE — 87086 URINE CULTURE/COLONY COUNT: CPT | Performed by: INTERNAL MEDICINE

## 2020-05-31 PROCEDURE — 25000132 ZZH RX MED GY IP 250 OP 250 PS 637: Performed by: INTERNAL MEDICINE

## 2020-05-31 PROCEDURE — 25800030 ZZH RX IP 258 OP 636: Performed by: INTERNAL MEDICINE

## 2020-05-31 PROCEDURE — 84145 PROCALCITONIN (PCT): CPT | Performed by: INTERNAL MEDICINE

## 2020-05-31 PROCEDURE — 36415 COLL VENOUS BLD VENIPUNCTURE: CPT | Performed by: INTERNAL MEDICINE

## 2020-05-31 PROCEDURE — 85027 COMPLETE CBC AUTOMATED: CPT | Performed by: INTERNAL MEDICINE

## 2020-05-31 PROCEDURE — 25000132 ZZH RX MED GY IP 250 OP 250 PS 637: Performed by: STUDENT IN AN ORGANIZED HEALTH CARE EDUCATION/TRAINING PROGRAM

## 2020-05-31 PROCEDURE — 80048 BASIC METABOLIC PNL TOTAL CA: CPT | Performed by: INTERNAL MEDICINE

## 2020-05-31 PROCEDURE — 25000128 H RX IP 250 OP 636: Performed by: INTERNAL MEDICINE

## 2020-05-31 PROCEDURE — 25000128 H RX IP 250 OP 636: Performed by: FAMILY MEDICINE

## 2020-05-31 PROCEDURE — 87040 BLOOD CULTURE FOR BACTERIA: CPT | Performed by: FAMILY MEDICINE

## 2020-05-31 RX ORDER — CEFTRIAXONE SODIUM 1 G/50ML
1 INJECTION, SOLUTION INTRAVENOUS EVERY 24 HOURS
Status: DISCONTINUED | OUTPATIENT
Start: 2020-05-31 | End: 2020-06-01

## 2020-05-31 RX ORDER — NEOMYCIN/BACITRACIN/POLYMYXINB 3.5-400-5K
OINTMENT (GRAM) TOPICAL 2 TIMES DAILY
Status: DISCONTINUED | OUTPATIENT
Start: 2020-05-31 | End: 2020-06-02 | Stop reason: HOSPADM

## 2020-05-31 RX ORDER — ROSUVASTATIN CALCIUM 10 MG/1
10 TABLET, COATED ORAL 2 TIMES DAILY
COMMUNITY
End: 2020-06-18

## 2020-05-31 RX ORDER — NEOMYCIN/BACITRACIN/POLYMYXINB 3.5-400-5K
OINTMENT (GRAM) TOPICAL 4 TIMES DAILY
Status: DISCONTINUED | OUTPATIENT
Start: 2020-05-31 | End: 2020-05-31

## 2020-05-31 RX ORDER — SODIUM CHLORIDE 9 MG/ML
INJECTION, SOLUTION INTRAVENOUS CONTINUOUS
Status: DISCONTINUED | OUTPATIENT
Start: 2020-05-31 | End: 2020-06-02 | Stop reason: HOSPADM

## 2020-05-31 RX ORDER — FAMOTIDINE 20 MG/1
20 TABLET, FILM COATED ORAL DAILY
Status: DISCONTINUED | OUTPATIENT
Start: 2020-06-01 | End: 2020-06-02 | Stop reason: HOSPADM

## 2020-05-31 RX ORDER — DICLOFENAC SODIUM 75 MG/1
75 TABLET, DELAYED RELEASE ORAL 4 TIMES DAILY
Status: ON HOLD | COMMUNITY
End: 2020-06-02

## 2020-05-31 RX ADMIN — LEVOTHYROXINE SODIUM 125 MCG: 125 TABLET ORAL at 06:17

## 2020-05-31 RX ADMIN — Medication 250 MG: at 21:04

## 2020-05-31 RX ADMIN — BACITRACIN ZINC, NEOMYCIN, POLYMYXIN B: 400; 3.5; 5 OINTMENT TOPICAL at 14:00

## 2020-05-31 RX ADMIN — METOPROLOL SUCCINATE 50 MG: 50 TABLET, EXTENDED RELEASE ORAL at 09:35

## 2020-05-31 RX ADMIN — ACETAMINOPHEN 650 MG: 325 TABLET ORAL at 21:03

## 2020-05-31 RX ADMIN — ASPIRIN 81 MG: 81 TABLET, DELAYED RELEASE ORAL at 09:35

## 2020-05-31 RX ADMIN — BACITRACIN ZINC, NEOMYCIN, POLYMYXIN B 1 G: 400; 3.5; 5 OINTMENT TOPICAL at 09:41

## 2020-05-31 RX ADMIN — LOSARTAN POTASSIUM 50 MG: 50 TABLET, FILM COATED ORAL at 09:36

## 2020-05-31 RX ADMIN — METRONIDAZOLE 500 MG: 500 INJECTION, SOLUTION INTRAVENOUS at 08:18

## 2020-05-31 RX ADMIN — RANOLAZINE 1000 MG: 500 TABLET, FILM COATED, EXTENDED RELEASE ORAL at 09:41

## 2020-05-31 RX ADMIN — ACETAMINOPHEN 650 MG: 325 TABLET ORAL at 06:16

## 2020-05-31 RX ADMIN — HYDRALAZINE HYDROCHLORIDE 50 MG: 25 TABLET, FILM COATED ORAL at 21:04

## 2020-05-31 RX ADMIN — HYDRALAZINE HYDROCHLORIDE 50 MG: 25 TABLET, FILM COATED ORAL at 12:20

## 2020-05-31 RX ADMIN — HYDRALAZINE HYDROCHLORIDE 50 MG: 25 TABLET, FILM COATED ORAL at 08:16

## 2020-05-31 RX ADMIN — RANOLAZINE 1000 MG: 500 TABLET, FILM COATED, EXTENDED RELEASE ORAL at 21:03

## 2020-05-31 RX ADMIN — CEFTRIAXONE SODIUM 1 G: 1 INJECTION, SOLUTION INTRAVENOUS at 10:40

## 2020-05-31 RX ADMIN — METOPROLOL SUCCINATE 50 MG: 50 TABLET, EXTENDED RELEASE ORAL at 21:04

## 2020-05-31 RX ADMIN — Medication 250 MG: at 09:35

## 2020-05-31 RX ADMIN — SODIUM CHLORIDE: 9 INJECTION, SOLUTION INTRAVENOUS at 11:00

## 2020-05-31 RX ADMIN — VANCOMYCIN HYDROCHLORIDE 1500 MG: 500 INJECTION, POWDER, LYOPHILIZED, FOR SOLUTION INTRAVENOUS at 16:47

## 2020-05-31 RX ADMIN — VANCOMYCIN HYDROCHLORIDE 125 MG: KIT at 08:17

## 2020-05-31 RX ADMIN — FAMOTIDINE 20 MG: 20 TABLET ORAL at 09:35

## 2020-05-31 RX ADMIN — BACITRACIN, NEOMYCIN, POLYMYXIN B: 400; 3.5; 5 OINTMENT TOPICAL at 21:02

## 2020-05-31 RX ADMIN — CLOPIDOGREL BISULFATE 75 MG: 75 TABLET, FILM COATED ORAL at 09:36

## 2020-05-31 RX ADMIN — LOSARTAN POTASSIUM 50 MG: 50 TABLET, FILM COATED ORAL at 21:04

## 2020-05-31 RX ADMIN — METRONIDAZOLE 500 MG: 500 INJECTION, SOLUTION INTRAVENOUS at 00:52

## 2020-05-31 ASSESSMENT — ACTIVITIES OF DAILY LIVING (ADL)
ADLS_ACUITY_SCORE: 13

## 2020-05-31 NOTE — PHARMACY-VANCOMYCIN DOSING SERVICE
Pharmacy Consult- Vancomycin Assessment    Moses Whittaker is a 70 year old male admitted on 5/29/2020.    Vancomycin has been ordered per MD, for the indication of: bacteremia/cellulitis    Current Antibiotic Regimen Includes: ceftriaxone, vancomycin per pharmacy    Patient Active Problem List   Diagnosis     Acute bilateral low back pain with bilateral sciatica     Angina pectoris (H)     Coronary atherosclerosis     Cervical radicular pain     Cervical stenosis of spinal canal     Chronic diastolic heart failure (H)     Chronic low back pain     CKD (chronic kidney disease) stage 3, GFR 30-59 ml/min (H)     Controlled type 2 diabetes mellitus with stage 3 chronic kidney disease, with long-term current use of insulin (H)     Degenerative disc disease, cervical     Painful diabetic neuropathy (H)     Diastasis of muscle     Degeneration of cervical intervertebral disc     Psychosexual dysfunction with inhibited sexual excitement     Erectile dysfunction     Facet arthritis of cervical region     Esophageal reflux     Benign essential hypertension     Hypothyroidism due to acquired atrophy of thyroid     Insulin pump in place     Intermittent constipation     Left arm numbness     Left atrial enlargement     Mixed hyperlipidemia     Myalgia     Myofacial muscle pain     Primary narcolepsy without cataplexy     Neuroforaminal stenosis of lumbar spine     Obesity     Old inferior wall myocardial infarction     Osteoarthritis of spine     Pain medication agreement - 10-21-19     Peptic ulcer disease     Platelet inhibition due to Plavix     Radicular low back pain     S/P CABG x 2     S/P coronary artery stent placement     Atherosclerosis of native coronary artery of native heart with stable angina pectoris (H)     Other specified forms of chronic ischemic heart disease     Peripheral vascular disease (H)     Venous stasis ulcer of left calf limited to breakdown of skin with varicose veins (H)     Bilateral lower  "extremity edema     Venous stasis dermatitis of left lower extremity     Colitis due to Clostridium difficile     Diabetic nephropathy with proteinuria (H)     Cellulitis     Bacteremia       Allergies (and reaction): Famotidine; Gabapentin; Hydrocortisone; Atorvastatin; Celebrex [celecoxib]; Lisinopril; No clinical screening - see comments; Norvasc [amlodipine]; Pregabalin; Valdecoxib; and Insulin aspart    Most recent flowsheet Weight: 91.2 kg (201 lb)  Most recent flowsheet Height: 177.8 cm (5' 10\")      Intake/Output Summary (Last 24 hours) at 2020 1141  Last data filed at 2020 1000  Gross per 24 hour   Intake 1380 ml   Output 1300 ml   Net 80 ml       Tmax = Temp (24hrs), Av.5  F (37.5  C), Min:97.8  F (36.6  C), Max:101.3  F (38.5  C)      Recent Labs   Lab Test 20  0625   WBC 5.0       Recent Labs   Lab Test 20  0625 20  0745 20  2145   BUN 22 23 22   CR 1.87* 1.68* 1.55*       estimated creatinine clearance is 42.3 mL/min (A) (based on SCr of 1.87 mg/dL (H)).    Cultures Pending: blood x 4, urine, group a strep throat swab    Culture Results: blood: 1 of 4 bottle positive GNR's    Plan: patient originally had a blood culture report gpc's.  This was changed to gnr's.  Patient has an infection of his toe, is presumed to be a superficial cellulitis.  However, obtaining an xray to rule out osteomyelitis. At this time, will continue Vancomycin until further laboratory data results.      Regimen Start Date:   Recommended Dose: 1500 mg, which provides 16.4 mg/kg/dose  Interval:Q24H  Goal Trough: 10-15 mg/L    Thank You for the consult. Will continue to follow.    Miladys Mitchell RPH ....................  2020   11:41 AM  "

## 2020-05-31 NOTE — ED NOTES
Called after hours [facility] and spoke with pharmavcist.. Questioned why patient would be on oral and IV vancomycin. Pharmacist stated IV vancomycin was for the positive blood cultures and the oral vancomycin was for the C-diff. Annemarie Sr RN .............. 5/30/2020  9:22 PM

## 2020-05-31 NOTE — PHARMACY-CONSULT NOTE
Pharmacy- Renal Dose Adjustment    Patient Active Problem List   Diagnosis     Acute bilateral low back pain with bilateral sciatica     Angina pectoris (H)     Coronary atherosclerosis     Cervical radicular pain     Cervical stenosis of spinal canal     Chronic diastolic heart failure (H)     Chronic low back pain     CKD (chronic kidney disease) stage 3, GFR 30-59 ml/min (H)     Controlled type 2 diabetes mellitus with stage 3 chronic kidney disease, with long-term current use of insulin (H)     Degenerative disc disease, cervical     Painful diabetic neuropathy (H)     Diastasis of muscle     Degeneration of cervical intervertebral disc     Psychosexual dysfunction with inhibited sexual excitement     Erectile dysfunction     Facet arthritis of cervical region     Esophageal reflux     Benign essential hypertension     Hypothyroidism due to acquired atrophy of thyroid     Insulin pump in place     Intermittent constipation     Left arm numbness     Left atrial enlargement     Mixed hyperlipidemia     Myalgia     Myofacial muscle pain     Primary narcolepsy without cataplexy     Neuroforaminal stenosis of lumbar spine     Obesity     Old inferior wall myocardial infarction     Osteoarthritis of spine     Pain medication agreement - 10-21-19     Peptic ulcer disease     Platelet inhibition due to Plavix     Radicular low back pain     S/P CABG x 2     S/P coronary artery stent placement     Atherosclerosis of native coronary artery of native heart with stable angina pectoris (H)     Other specified forms of chronic ischemic heart disease     Peripheral vascular disease (H)     Venous stasis ulcer of left calf limited to breakdown of skin with varicose veins (H)     Bilateral lower extremity edema     Venous stasis dermatitis of left lower extremity     Colitis due to Clostridium difficile     Diabetic nephropathy with proteinuria (H)     Cellulitis     Bacteremia        Relevant Labs:  Recent Labs   Lab Test  05/31/20  0625 05/30/20  0745   WBC 5.0 8.9   HGB 16.6 16.0   * 160        CrCl: 42.3 mL/miin      Intake/Output Summary (Last 24 hours) at 5/31/2020 1146  Last data filed at 5/31/2020 1000  Gross per 24 hour   Intake 1380 ml   Output 1300 ml   Net 80 ml          Per Renal Dose Adjustment Protocol, will adjust:  Famotidine 20 mg to once daily      Will continue to follow and make adjustments accordingly. Thank You.    Miladys Mitchell [facility] ....................  5/31/2020   11:46 AM

## 2020-05-31 NOTE — PROGRESS NOTES
Talked to on call pharmacy about parameters for hydralazine since the dose states 25-50 mg. Pharmacy stated there was none. Spoke with patient about this patient states he takes 2, 25 mg tabs. Read Sigrid MARTINEZ progress note that stated to continue patients home regimen for HTN. Pt states MD Barnett (PCP) gives him flexible dosing on his hydralazine based on if he takes is Renexa. Annemarie Sr RN .............. 5/30/2020  9:49 PM

## 2020-05-31 NOTE — PLAN OF CARE
Patient has rested quietly this afternoon. Vital signs stable. Patient continues to complain of not feeling well. Dressing changed and ointment applied to patients right second toe.

## 2020-05-31 NOTE — PHARMACY-ADMISSION MEDICATION HISTORY
Pharmacy -- Admission Medication Reconciliation    Prior to admission (PTA) medications were reviewed and the patient's PTA medication list was updated.    Sources Consulted: Patient phone interview, sure scripts, chart review, [place]     The reliability of this Medication Reconciliation is: Reliability: Reliable    The following significant changes were made:    Removed ketorolac and prednisolone eye drops--therapy complete    Added diclofenac qid    **patient takes crestor twice daily as he tolerates this better (20 mg daily total)  **patient has an insulin pump: uses humalog that runs at 2.9 units/hr and uses humalog sliding scale as needed  **patient states famotidine allergy however has been taking due to ranitidine back order issues.  Has been tolerating, just not as well as ranitidine    In addition, the patient's allergies were reviewed with the patient and updated as follows:   Allergies: Famotidine; Gabapentin; Hydrocortisone; Atorvastatin; Celebrex [celecoxib]; Lisinopril; No clinical screening - see comments; Norvasc [amlodipine]; Pregabalin; Valdecoxib; and Insulin aspart    The pharmacist has reviewed with the patient that all personal medications should be removed from the building or locked in the belongings safe.  Patient shall only take medications ordered by the physician and administered by the nursing staff.       Medication barriers identified: none   Medication adherence concerns: none   Understanding of emergency medications: yes, checks blood glucose regularly, understands pump/ssi well, has prn nitroglycerin that he keeps on hand    Miladys Mitchell RPH, 5/31/2020,  12:35 PM

## 2020-05-31 NOTE — PLAN OF CARE
Patient taking food slowly. Taking fluids no problem. Vital signs stable. Patient reports not sleeping well last night, sleeping now.

## 2020-05-31 NOTE — PROGRESS NOTES
Pt tolerating diet, utilizing PRN tylenol for fever and discomfort. Pt amb. independenntly to bathroom, steady on feet. Pt involved in plan of care. Pt has insulin pump and is giving himself sliding scale doses based on his blood sugar as per his home routine. Annemarie Sr RN .............. 5/31/2020  1:48 AM'

## 2020-05-31 NOTE — PROGRESS NOTES
[facility]    Hospitalist Progress Note      Assessment & Plan   Moses Whittaker is a 70 year old male who was admitted on 5/29/2020.     Principal Problem:  Bacteremia    Assessment: Likely secondary to second right toe infection which appears to be a superficial cellulitis.  Given he has had this wound in the same place before we will evaluate for deeper infection.  Initial blood cultures were read out as gram positive cocci then changed to gram-negative cocci and after further reviewing microbiology appear to be gram-negative rods.  No evidence of urinary or pulmonary source.  Prior C. difficile infection but no active diarrhea or GI symptoms and unlikely to have C. difficile bacteremia.     Plan:   -IV vancomycin day 2  -IV ceftriaxone day 1 (dose interruption on 5/30)  -X-ray of foot  -Cover with bacitracin dressing twice daily  -Follow-up initial blood cultures from 5/29  -Repeat blood cultures on 5/31 to ensure clearing  -Check C. difficile if develops diarrhea.    -Trend pro calcitonin  -Strep throat negative  -COVID negative  - follow-up Lyme/Anaplasma  -TTE in a.m.    Active Problems:    Chronic diastolic heart failure (H)    Assessment: Chronic and stable without evidence of decompensation    Plan:   -Strict I's and O's and daily weights       CKD (chronic kidney disease) stage 3, GFR 30-59 ml/min (H)    Assessment: Chronic and stable but slight increase in Cr. today    Plan:   -Continue home ARB  -Avoid NSAIDs and nephrotoxins  -Monitor daily  -start normal normal saline at 100ml/hr       Controlled type 2 diabetes mellitus with stage 3 chronic kidney disease, with long-term current use of insulin (H)    Assessment: Well controlled last hemoglobin A1c of 7.5    Plan:   -Continue home insulin pump per patient parameters  -Fingersticks AC at bedtime       Benign essential hypertension    Assessment: Stable    Plan:   -Continue home regiment       Colitis due to Clostridium  difficile    Assessment: He has now completed Vanco taper after first recurrence.  At high risk of redeveloping C. difficile and will avoid empiric antibiotics given his clinical stability, despite his elevated procalcitonin.    Plan:   -Monitor     FEN: regular diet, normal saline at 0 mL/hr  PPX:scd     Code Status: Prior    Derek Lick    Interval History   Overnight multiple fevers, continues to feel poorly overall, no headaches vision changes nuchal rigidity sore throat, no shortness of breath cough, no abdominal pain no nausea vomiting or diarrhea.  Overnight his second right toe became reddened and inflamed, he has had this toe become infected in the past and follow-up with general surgery.    -Data reviewed today: I reviewed all new labs and imaging results over the last 24 hours. I personally reviewed no images or EKG's today.    Physical Exam   Temp: 99  F (37.2  C) Temp src: Tympanic BP: 114/54 Pulse: 69 Heart Rate: 55 Resp: 16 SpO2: 96 % O2 Device: None (Room air)    Vitals:    05/29/20 2042   Weight: 91.2 kg (201 lb)     Vital Signs with Ranges  Temp:  [97.8  F (36.6  C)-101.3  F (38.5  C)] 99  F (37.2  C)  Pulse:  [60-69] 69  Heart Rate:  [55] 55  Resp:  [16-18] 16  BP: (114-130)/(54-60) 114/54  SpO2:  [93 %-99 %] 96 %  I/O last 3 completed shifts:  In: 1660 [P.O.:1660]  Out: 1300 [Urine:1300]    Exam:   GENERAL: Talkative, in no apparent distress.  CARDIOVASCULAR: regular rate and rhythm, no murmurs, rubs, or gallops. Normal S1/S2. No lower extremity edema.   RESPIRATORY: clear to auscultation bilaterally, no wheezes, no crackles.   GI: soft, non-tender, non-distended, normoactive bowel sounds.  MUSCULOSKELETAL: warm and well perfused, 2+ dorsalis pedis pulses bilaterally.    SKIN: Right second toe with painful erythema and a slight eschar over the DIP.  Erythema has spread slightly up the total with mild erythema on the dorsal aspect of the foot without fluctuance or drainage.    Medications        aspirin  81 mg Oral Daily     cefTRIAXone  1 g Intravenous Q24H     clopidogrel  75 mg Oral Daily     famotidine  20 mg Oral BID     hydrALAZINE  25-50 mg Oral 4x Daily     insulin lispro   Device See Admin Instructions     levothyroxine  125 mcg Oral QAM AC     losartan  50 mg Oral BID     metoprolol succinate ER  50 mg Oral BID     neomycin-bacitracin-polymyxin   Topical 4x Daily     ranolazine  1,000 mg Oral BID     saccharomyces boulardii  250 mg Oral BID     vancomycin (VANCOCIN) IV  1,500 mg Intravenous Q24H       Data   Recent Labs   Lab 05/31/20  0625 05/30/20  0745 05/29/20  2145   WBC 5.0 8.9 8.7   HGB 16.6 16.0 15.3    99 98   * 160 146*   * 135 137   POTASSIUM 4.2 4.3 3.7   CHLORIDE 99 101 104   CO2 27 27 24   BUN 22 23 22   CR 1.87* 1.68* 1.55*   ANIONGAP 7 7 9   RYAN 8.9 8.8 8.7   * 227* 126*   ALBUMIN 3.8 3.7 3.8   PROTTOTAL 6.6 6.1* 6.1*   BILITOTAL 0.7 1.0 1.0   ALKPHOS 78 65 63   ALT 27 19 19   AST 26 20 18       No results found for this or any previous visit (from the past 24 hour(s)).

## 2020-06-01 ENCOUNTER — APPOINTMENT (OUTPATIENT)
Dept: CARDIOLOGY | Facility: OTHER | Age: 70
DRG: 872 | End: 2020-06-01
Attending: INTERNAL MEDICINE
Payer: MEDICARE

## 2020-06-01 LAB
B BURGDOR IGG+IGM SER QL: 0.14 (ref 0–0.89)
INTERPRETATION ECG - MUSE: NORMAL

## 2020-06-01 PROCEDURE — 99233 SBSQ HOSP IP/OBS HIGH 50: CPT | Performed by: INTERNAL MEDICINE

## 2020-06-01 PROCEDURE — 25000132 ZZH RX MED GY IP 250 OP 250 PS 637: Performed by: INTERNAL MEDICINE

## 2020-06-01 PROCEDURE — 12000000 ZZH R&B MED SURG/OB

## 2020-06-01 PROCEDURE — 93306 TTE W/DOPPLER COMPLETE: CPT

## 2020-06-01 PROCEDURE — 25800030 ZZH RX IP 258 OP 636: Performed by: INTERNAL MEDICINE

## 2020-06-01 PROCEDURE — 93306 TTE W/DOPPLER COMPLETE: CPT | Mod: 26 | Performed by: INTERNAL MEDICINE

## 2020-06-01 PROCEDURE — 25000128 H RX IP 250 OP 636: Performed by: INTERNAL MEDICINE

## 2020-06-01 RX ORDER — CIPROFLOXACIN 2 MG/ML
400 INJECTION, SOLUTION INTRAVENOUS EVERY 12 HOURS
Status: DISCONTINUED | OUTPATIENT
Start: 2020-06-01 | End: 2020-06-02 | Stop reason: HOSPADM

## 2020-06-01 RX ADMIN — HYDROCODONE BITARTRATE AND ACETAMINOPHEN 1 TABLET: 5; 325 TABLET ORAL at 15:49

## 2020-06-01 RX ADMIN — HYDROCODONE BITARTRATE AND ACETAMINOPHEN 1 TABLET: 5; 325 TABLET ORAL at 05:53

## 2020-06-01 RX ADMIN — LEVOTHYROXINE SODIUM 125 MCG: 125 TABLET ORAL at 08:14

## 2020-06-01 RX ADMIN — METOPROLOL SUCCINATE 50 MG: 50 TABLET, EXTENDED RELEASE ORAL at 10:10

## 2020-06-01 RX ADMIN — CIPROFLOXACIN 400 MG: 2 INJECTION, SOLUTION INTRAVENOUS at 22:07

## 2020-06-01 RX ADMIN — HYDRALAZINE HYDROCHLORIDE 50 MG: 25 TABLET, FILM COATED ORAL at 08:14

## 2020-06-01 RX ADMIN — BACITRACIN, NEOMYCIN, POLYMYXIN B 1 G: 400; 3.5; 5 OINTMENT TOPICAL at 12:27

## 2020-06-01 RX ADMIN — FAMOTIDINE 20 MG: 20 TABLET ORAL at 10:10

## 2020-06-01 RX ADMIN — Medication 250 MG: at 10:10

## 2020-06-01 RX ADMIN — LOSARTAN POTASSIUM 50 MG: 50 TABLET, FILM COATED ORAL at 22:05

## 2020-06-01 RX ADMIN — CIPROFLOXACIN 400 MG: 2 INJECTION, SOLUTION INTRAVENOUS at 12:28

## 2020-06-01 RX ADMIN — LOSARTAN POTASSIUM 50 MG: 50 TABLET, FILM COATED ORAL at 10:10

## 2020-06-01 RX ADMIN — HYDRALAZINE HYDROCHLORIDE 25 MG: 25 TABLET, FILM COATED ORAL at 22:04

## 2020-06-01 RX ADMIN — RANOLAZINE 500 MG: 500 TABLET, FILM COATED, EXTENDED RELEASE ORAL at 10:11

## 2020-06-01 RX ADMIN — ASPIRIN 81 MG: 81 TABLET, DELAYED RELEASE ORAL at 10:10

## 2020-06-01 RX ADMIN — BACITRACIN, NEOMYCIN, POLYMYXIN B 1 G: 400; 3.5; 5 OINTMENT TOPICAL at 20:54

## 2020-06-01 RX ADMIN — SODIUM CHLORIDE: 9 INJECTION, SOLUTION INTRAVENOUS at 18:17

## 2020-06-01 RX ADMIN — METOPROLOL SUCCINATE 50 MG: 50 TABLET, EXTENDED RELEASE ORAL at 22:04

## 2020-06-01 RX ADMIN — Medication 250 MG: at 22:03

## 2020-06-01 RX ADMIN — RANOLAZINE 500 MG: 500 TABLET, FILM COATED, EXTENDED RELEASE ORAL at 22:02

## 2020-06-01 RX ADMIN — CLOPIDOGREL BISULFATE 75 MG: 75 TABLET, FILM COATED ORAL at 10:10

## 2020-06-01 ASSESSMENT — ACTIVITIES OF DAILY LIVING (ADL)
ADLS_ACUITY_SCORE: 13

## 2020-06-01 ASSESSMENT — MIFFLIN-ST. JEOR: SCORE: 1723.8

## 2020-06-01 NOTE — PROGRESS NOTES
Haylee MARTINEZ, and pharmacy notified that pt is requesting to take 1 Ranexa. Lindsey Jenkins RN on 6/1/2020 at 11:17 AM

## 2020-06-01 NOTE — PROGRESS NOTES
"Patient reported to aide that someone came into his room and ate his mashed potatoes and wild rice at supper. Later patient reported this same story to the nurse. Asked patient was this person looked like, Patient stated he was tall, and pt stated he asked the person what he was doing and he said, \"i'm eating your mashed potatoes and rice. Pt could give no further details and staff report no one was in patients room that was male besides the provider in the am. Will continue to monitor patient for confusion.Annemarie Sr RN .............. 5/31/2020  8:15 PM      Pt's glucose per fingerstick 200. Pt gave himself his bump dose of insulin per his pump per her home protocol. Pt currently eating a sandwich. Temp 101 tympanic, tylenol given PRN see MAR details. Annemarie Sr RN .............. 5/31/2020  9:28 PM      Pt had home humalog insulin in lockbox in room and asked for it this am so he could fill up his insulin pump. Given to patient.Pt alert this am. Annemarie Sr RN .............. 6/1/2020  6:25 AM  "

## 2020-06-01 NOTE — PROGRESS NOTES
[facility]    Hospitalist Progress Note      Assessment & Plan   Mosse Whittaker is a 70 year old male who was admitted on 5/29/2020.     Principal Problem:    Bacteremia    Assessment: serratia marcescens 1/4 bottles, sensitive to cipro. Secondary to cellulitis.    Plan: change antibiotics to cipro.        Cellulitis    Assessment: cause of sepsis    Plan: Cipro based on blood culture       Active Problems:    Chronic diastolic heart failure (H)    Assessment: chronic, stable, no evidence for volume overload.     Plan: continue toprol XL, losartan      CKD (chronic kidney disease) stage 3, GFR 30-59 ml/min (H)    Assessment: chronic, BP climbing but stable    Plan: monitor      Controlled type 2 diabetes mellitus with stage 3 chronic kidney disease, with long-term current use of insulin (H)    Assessment: chronic, variable BG. On insulin pump   Plan: continue insulin pump      Benign essential hypertension    DVT Prophylaxis: Pneumatic Compression Devices  Code Status: Prior    Jeramie Leach    Interval History   No complaints. No fevers, chills. No nausea, vomiting.     -Data reviewed today: I reviewed all new labs and imaging results over the last 24 hours. I personally reviewed no images or EKG's today.    Physical Exam   Temp: 98.7  F (37.1  C) Temp src: Tympanic BP: 125/61 Pulse: 64 Heart Rate: 57 Resp: 16 SpO2: 94 % O2 Device: None (Room air)    Vitals:    05/29/20 2042 06/01/20 0500   Weight: 91.2 kg (201 lb) 95.3 kg (210 lb)     Vital Signs with Ranges  Temp:  [98.4  F (36.9  C)-101  F (38.3  C)] 98.7  F (37.1  C)  Pulse:  [64] 64  Heart Rate:  [54-60] 57  Resp:  [16] 16  BP: (110-148)/(50-65) 125/61  SpO2:  [91 %-96 %] 94 %  I/O last 3 completed shifts:  In: 616 [I.V.:616]  Out: 2950 [Urine:2950]    GENERAL: Comfortable, no apparent distress.  CARDIOVASCULAR: regular rate and rhythm, no murmur. No lower extremity edema   RESPIRATORY: Clear to auscultation bilaterally, no wheezes  or crackles.  GI: non-tender, non-distended, normal bowel sounds.   SKIN: warm periphery, no rashes    Medications     sodium chloride 100 mL/hr at 20 0955       aspirin  81 mg Oral Daily     ciprofloxacin  400 mg Intravenous Q12H     clopidogrel  75 mg Oral Daily     famotidine  20 mg Oral Daily     hydrALAZINE  25-50 mg Oral 4x Daily     insulin lispro   Device See Admin Instructions     levothyroxine  125 mcg Oral QAM AC     losartan  50 mg Oral BID     metoprolol succinate ER  50 mg Oral BID     neomycin-bacitracin-polymyxin   Topical BID     ranolazine  1,000 mg Oral BID     saccharomyces boulardii  250 mg Oral BID       Data   Recent Labs   Lab 20  0625 20  0745 20  2145   WBC 5.0 8.9 8.7   HGB 16.6 16.0 15.3    99 98   * 160 146*   * 135 137   POTASSIUM 4.2 4.3 3.7   CHLORIDE 99 101 104   CO2 27 27 24   BUN 22 23 22   CR 1.87* 1.68* 1.55*   ANIONGAP 7 7 9   RYAN 8.9 8.8 8.7   * 227* 126*   ALBUMIN 3.8 3.7 3.8   PROTTOTAL 6.6 6.1* 6.1*   BILITOTAL 0.7 1.0 1.0   ALKPHOS 78 65 63   ALT 27 19 19   AST 26 20 18       Recent Results (from the past 24 hour(s))   Echocardiogram Complete    Narrative    [id number]  [id number]  [id number]  [id number]^SOFI^JAYY^JONATHAN        [facility]  [address] [place] [address]     Name: STEPHIE MORELOS  MRN: [id number]  : 1950  Study Date: 2020 08:22 AM  Age: 70 yrs  Gender: Male  Patient Location: [facility]  Reason For Study: Endocarditis  Ordering Physician: JAYY FARAH  Performed By: Cher Stout RDCS, RVT     BSA: 2.1 m2  Height: 70 in  Weight: 210 lb  HR: 54  BP: 145/62 mmHg  _____________________________________________________________________________  __        Procedure  Complete Portable Echo Adult.  _____________________________________________________________________________  __        Interpretation Summary  No significant valvular abnormalities were noted. No vegetation or  mass  identified, however this does not exclude endocarditis.  _____________________________________________________________________________  __        Left Ventricle  Global and regional left ventricular function is normal with an EF of 55-60%.  Left ventricular wall thickness is normal. Left ventricular size is normal.  Thickening of the anterobasal septum is present. Left ventricular diastolic  function is normal. No regional wall motion abnormalities are seen.     Right Ventricle  Right ventricular function, chamber size, wall motion, and thickness are  normal.     Atria  Both atria appear normal.     Mitral Valve  The mitral valve is normal. Mild mitral annular calcification is present.     Aortic Valve  Trileaflet aortic sclerosis without stenosis. The mean AoV pressure gradient  is 3.0 mmHg. The calculated aortic valve are is 3.8 cm^2.        Tricuspid Valve  The tricuspid valve is normal. Trace tricuspid insufficiency is present.  Pulmonary artery systolic pressure cannot be assessed.     Pulmonic Valve  The pulmonic valve is normal.     Vessels  The thoracic aorta is normal. The pulmonary artery and bifurcation cannot be  assessed. The inferior vena cava is normal.     Pericardium  No pericardial effusion is present.     Compared to Previous Study  Previous study not available for comparison.     _____________________________________________________________________________  __  MMode/2D Measurements & Calculations  IVSd: 1.7 cm  LVIDd: 4.2 cm  LVIDs: 3.0 cm  LVPWd: 1.1 cm  FS: 28.5 %     LV mass(C)d: 217.2 grams  LV mass(C)dI: 101.9 grams/m2  Ao root diam: 3.5 cm  asc Aorta Diam: 3.2 cm  LVOT diam: 2.5 cm  LVOT area: 4.9 cm2  LA Volume (BP): 53.1 ml  LA Volume Index (BP): 24.9 ml/m2  RWT: 0.52        Doppler Measurements & Calculations  MV E max lula: 78.1 cm/sec  MV A max lula: 57.2 cm/sec  MV E/A: 1.4     MV dec time: 0.22 sec  Ao V2 max: 112.0 cm/sec  Ao max P.0 mmHg  Ao V2 mean: 76.6 cm/sec  Ao mean  PG: 3.0 mmHg  Ao V2 VTI: 23.9 cm  ALBERTINA(I,D): 3.8 cm2  ALBERTINA(V,D): 3.6 cm2  LV V1 max P.7 mmHg  LV V1 max: 82.0 cm/sec  LV V1 VTI: 18.4 cm  SV(LVOT): 90.3 ml  SI(LVOT): 42.4 ml/m2  AV Derek Ratio (DI): 0.73  ALBERTINA Index (cm2/m2): 1.8  E/E' av.5  Lateral E/e': 5.7  Medial E/e': 9.3           _____________________________________________________________________________  __           Report approved by: Binh Omalley 2020 10:13 AM

## 2020-06-01 NOTE — PLAN OF CARE
Pt has elevated BPs in the 140s systolic. Afebrile. HR slightly pako in the 50s. A&O X4 with some complaints of generalized pain- declines interventions.  Lung sounds clear. Dressing change done to toe wound- scheduled Neosporin applied with gauze. Pt tolerated well. MD able to visualize wound. Pt using insulin pump- blood sugars 233 and 93. Independent in room. Received a shower this shift. Voiding without difficulty. IV infusing NS at 100 mL/hr and receiving IV antibiotics. Lindsey Jenkins RN on 6/1/2020 at 11:45 AM.

## 2020-06-01 NOTE — PROGRESS NOTES
SAFETY CHECKLIST  ID Bands and Risk clasps correct and in place (DNR, Fall risk, Allergy, Latex, Limb):  Yes  All Lines Reconciled and labeled correctly: Yes  Whiteboard updated:Yes  Environmental interventions (bed/chair alarm on, call light, side rails, restraints, sitter....): Yes    Nadine Ram RN 06/01/20 3:54 PM

## 2020-06-01 NOTE — PROGRESS NOTES
SAFETY CHECKLIST  ID Bands and Risk clasps correct and in place (DNR, Fall risk, Allergy, Latex, Limb):  Yes  All Lines Reconciled and labeled correctly: Yes  Whiteboard updated:Yes  Environmental interventions (bed/chair alarm on, call light, side rails, restraints, sitter....): Yes  Verify Tele #: not on tele.     Lindsey Jenkins RN on 6/1/2020 at 7:22 AM

## 2020-06-01 NOTE — PLAN OF CARE
Patient here due to bacteremia. Patient is A&O, independent in room. Patient C/O neck pain and headache; administered hydrocodone 5-325mg at 1549. HR 55-50, LS clear, BS active. Patient has a insulin pump that he maintains.

## 2020-06-02 VITALS
OXYGEN SATURATION: 96 % | WEIGHT: 202 LBS | HEIGHT: 70 IN | HEART RATE: 59 BPM | DIASTOLIC BLOOD PRESSURE: 70 MMHG | SYSTOLIC BLOOD PRESSURE: 147 MMHG | TEMPERATURE: 99 F | RESPIRATION RATE: 18 BRPM | BODY MASS INDEX: 28.92 KG/M2

## 2020-06-02 LAB
ANION GAP SERPL CALCULATED.3IONS-SCNC: 4 MMOL/L (ref 3–14)
BUN SERPL-MCNC: 21 MG/DL (ref 7–25)
CALCIUM SERPL-MCNC: 8.5 MG/DL (ref 8.6–10.3)
CHLORIDE SERPL-SCNC: 105 MMOL/L (ref 98–107)
CO2 SERPL-SCNC: 26 MMOL/L (ref 21–31)
CREAT SERPL-MCNC: 1.86 MG/DL (ref 0.7–1.3)
ERYTHROCYTE [DISTWIDTH] IN BLOOD BY AUTOMATED COUNT: 12.9 % (ref 10–15)
GFR SERPL CREATININE-BSD FRML MDRD: 36 ML/MIN/{1.73_M2}
GLUCOSE SERPL-MCNC: 183 MG/DL (ref 70–105)
HCT VFR BLD AUTO: 41 % (ref 40–53)
HGB BLD-MCNC: 13.5 G/DL (ref 13.3–17.7)
MCH RBC QN AUTO: 32.6 PG (ref 26.5–33)
MCHC RBC AUTO-ENTMCNC: 32.9 G/DL (ref 31.5–36.5)
MCV RBC AUTO: 99 FL (ref 78–100)
PLATELET # BLD AUTO: 150 10E9/L (ref 150–450)
POTASSIUM SERPL-SCNC: 4.1 MMOL/L (ref 3.5–5.1)
RBC # BLD AUTO: 4.14 10E12/L (ref 4.4–5.9)
SODIUM SERPL-SCNC: 135 MMOL/L (ref 134–144)
WBC # BLD AUTO: 4.8 10E9/L (ref 4–11)

## 2020-06-02 PROCEDURE — 25000128 H RX IP 250 OP 636: Performed by: INTERNAL MEDICINE

## 2020-06-02 PROCEDURE — 99239 HOSP IP/OBS DSCHRG MGMT >30: CPT | Performed by: INTERNAL MEDICINE

## 2020-06-02 PROCEDURE — 25800030 ZZH RX IP 258 OP 636: Performed by: INTERNAL MEDICINE

## 2020-06-02 PROCEDURE — 36415 COLL VENOUS BLD VENIPUNCTURE: CPT | Performed by: INTERNAL MEDICINE

## 2020-06-02 PROCEDURE — 85027 COMPLETE CBC AUTOMATED: CPT | Performed by: INTERNAL MEDICINE

## 2020-06-02 PROCEDURE — 80048 BASIC METABOLIC PNL TOTAL CA: CPT | Performed by: INTERNAL MEDICINE

## 2020-06-02 PROCEDURE — 25000132 ZZH RX MED GY IP 250 OP 250 PS 637: Performed by: INTERNAL MEDICINE

## 2020-06-02 RX ORDER — CIPROFLOXACIN 500 MG/1
500 TABLET, FILM COATED ORAL 2 TIMES DAILY
Qty: 14 TABLET | Refills: 0 | Status: SHIPPED | OUTPATIENT
Start: 2020-06-02 | End: 2020-06-29

## 2020-06-02 RX ADMIN — CIPROFLOXACIN 400 MG: 2 INJECTION, SOLUTION INTRAVENOUS at 09:03

## 2020-06-02 RX ADMIN — LOSARTAN POTASSIUM 50 MG: 50 TABLET, FILM COATED ORAL at 09:00

## 2020-06-02 RX ADMIN — HYDRALAZINE HYDROCHLORIDE 25 MG: 25 TABLET, FILM COATED ORAL at 08:07

## 2020-06-02 RX ADMIN — SODIUM CHLORIDE: 9 INJECTION, SOLUTION INTRAVENOUS at 06:47

## 2020-06-02 RX ADMIN — BACITRACIN, NEOMYCIN, POLYMYXIN B 1 G: 400; 3.5; 5 OINTMENT TOPICAL at 09:01

## 2020-06-02 RX ADMIN — ASPIRIN 81 MG: 81 TABLET, DELAYED RELEASE ORAL at 09:00

## 2020-06-02 RX ADMIN — FAMOTIDINE 20 MG: 20 TABLET ORAL at 09:00

## 2020-06-02 RX ADMIN — CLOPIDOGREL BISULFATE 75 MG: 75 TABLET, FILM COATED ORAL at 09:00

## 2020-06-02 RX ADMIN — RANOLAZINE 1000 MG: 500 TABLET, FILM COATED, EXTENDED RELEASE ORAL at 09:14

## 2020-06-02 RX ADMIN — LEVOTHYROXINE SODIUM 125 MCG: 125 TABLET ORAL at 08:07

## 2020-06-02 RX ADMIN — METOPROLOL SUCCINATE 50 MG: 50 TABLET, EXTENDED RELEASE ORAL at 09:00

## 2020-06-02 RX ADMIN — Medication 250 MG: at 09:00

## 2020-06-02 RX ADMIN — HYDRALAZINE HYDROCHLORIDE 25 MG: 25 TABLET, FILM COATED ORAL at 00:06

## 2020-06-02 ASSESSMENT — ACTIVITIES OF DAILY LIVING (ADL)
ADLS_ACUITY_SCORE: 13

## 2020-06-02 ASSESSMENT — MIFFLIN-ST. JEOR: SCORE: 1687.52

## 2020-06-02 NOTE — PHARMACY - DISCHARGE MEDICATION RECONCILIATION AND EDUCATION
Pharmacy:  Discharge Counseling and Medication Reconciliation    Moses Whittaker  [address]  [place] [id number] [phone] (home)   70 year old male  PCP: Jorge Barnett    Allergies: Famotidine; Gabapentin; Hydrocortisone; Atorvastatin; Celebrex [celecoxib]; Lisinopril; No clinical screening - see comments; Norvasc [amlodipine]; Pregabalin; Valdecoxib; and Insulin aspart    Discharge Counseling:    Pharmacist met with patient (and/or family) today to review the medication portion of the After Visit Summary (with an emphasis on NEW medications) and to address patient's questions/concerns.    Summary of Education: met with patient to discuss new antibiotic verbally and in print.  All questions answered.  Patient was told to STOP diclofenac at discharge.    Materials Provided:  MedCounselor sheets printed from Clinical Pharmacology on: cipro    Discharge Medication Reconciliation:    It has been determined that the patient has an adequate supply of medications available or which can be obtained from the patient's preferred pharmacy, which HE/SHE has confirmed as: [facility] [An updated medication list will be faxed to the patient's pharmacy.]    Thank you for the consult.    Miladys Mitchell RPH........June 2, 2020 11:23 AM

## 2020-06-02 NOTE — PLAN OF CARE
Patient here due to bacteremia. Patient is A&Ox4. Independent in room. Patient states he has chronic intermittent back and neck pain, but denies needing medication intervention at this time. VSS. LS clear, BS active. Wound to right 2nd toe, no drainage, pink wound bed noted, cleansed and new dressing placed. IV Ciprofloxacin administered prior to discharge.

## 2020-06-02 NOTE — PLAN OF CARE
Discharge Note      Data:  Moses Whittaker discharged to home at 1110 via wheel chair. Accompanied by spouse and staff.    Action:  Written discharge/follow-up instructions were provided to patient. Prescriptions sent to patients preferred pharmacy. All belongings sent with patient.    Response:  Patient verbalized understanding of discharge instructions, reason for discharge, and necessary follow-up appointments.

## 2020-06-02 NOTE — PROGRESS NOTES
SAFETY CHECKLIST  ID Bands and Risk clasps correct and in place (DNR, Fall risk, Allergy, Latex, Limb):  Yes  All Lines Reconciled and labeled correctly: Yes  Whiteboard updated:Yes  Environmental interventions (bed/chair alarm on, call light, side rails, restraints, sitter....): Yes    Nadine Ram RN 06/02/20 8:05 AM

## 2020-06-02 NOTE — PLAN OF CARE
"Patient is alert and oriented x 4. Reports generalized neck and back pain of a level 4/10. Declined interventions. New dressing applied to second toe on right foot. No drainage. Lung sounds clear throughout lobes. Heart rate 50-59.  Blood sugar was 251, 107, and 164. Incontinent of stool x 1. 2 BM's throughout shift. Stool is soft/brown. Ambulating in room independently. Voiding without difficulty. Vital signs:  Temp: 98.8  F (37.1  C) Temp src: Tympanic BP: (!) 151/66 Pulse: 60 Heart Rate: 50 Resp: 20 SpO2: 94 % O2 Device: None (Room air)   Height: 177.8 cm (5' 10\") Weight: 91.6 kg (202 lb)  Estimated body mass index is 28.98 kg/m  as calculated from the following:    Height as of this encounter: 1.778 m (5' 10\").    Weight as of this encounter: 91.6 kg (202 lb).      Aniyah Zamudio RN on 6/2/2020 at 6:36 AM    "

## 2020-06-02 NOTE — PROGRESS NOTES
SAFETY CHECKLIST  ID Bands and Risk clasps correct and in place (DNR, Fall risk, Allergy, Latex, Limb):  Yes  All Lines Reconciled and labeled correctly: Yes  Whiteboard updated:Yes  Environmental interventions (bed/chair alarm on, call light, side rails, restraints, sitter....): Yes    Aniyah Zamudio RN on 6/1/2020 at 7:17 PM

## 2020-06-02 NOTE — DISCHARGE SUMMARY
"[facility]    Discharge Summary  Hospitalist    Date of Admission:  5/29/2020  Date of Discharge:  6/2/2020  Discharging Provider: Jeramie Leach  Date of Service (when I saw the patient): 06/02/20    Discharge Diagnoses   Principal Problem:    Sepsis due to serratia.     Diabetic toe ulcer, present on admission   Active Problems:    Chronic diastolic heart failure (H) (1/10/2014)    CKD (chronic kidney disease) stage 3, GFR 30-59 ml/min (H) (1/10/2014)    Controlled type 2 diabetes mellitus with stage 3 chronic kidney disease, with long-term current use of insulin (H) (3/30/2017)    Benign essential hypertension (1/10/2014)    Cellulitis (5/31/2020)      History of Present Illness   Moses Whittaker is an 70 year old male who presented with fever. Per the H&P by Dr. Matta:  \"Moses Whittaker is a 70 year old male who presents with fevers and chills.  Patient was discharged in mid March with first recurrence of C. difficile and he completed a full vancomycin taper.  His stools have returned to being formed but at times are mucousy, he has had no abdominal pain consistent with prior C. difficile infections.  He is otherwise been his normal state of health until yesterday when he developed acute onset of severe fatigue fevers and chills.  He also had a mild headache but cannot recall any tick bites.     He presented to the ER last night, had unremarkable appearing lab work but was noted to be febrile, blood cultures was obtained he received IV ceftriaxone x1 and was subsequently placed on observation for further management.\"    Hospital Course   Moses Whittaker was admitted on 5/29/2020.  The following problems were addressed during his hospitalization:    Sepsis  Blood culture grew out Serratia.  I presume this is related to his toe ulcer.  He was initially on ceftriaxone, vancomycin and resolved but given ID and sensitivities, was switched to Cipro.  He has clinically improved throughout " his hospitalization.  He has no redness to his foot or toe at this time.  Subsequent blood cultures show no growth to date.  he will complete 7 additional days of Cipro and follow-up with Dr. Barnett.    Diabetic toe ulcer  Source of sepsis I suspect.  Management as above.    Type 2 diabetes  His blood glucose has been well controlled on his insulin pump.    Acute kidney injury  Patient has a mild increase in creatinine during his stay.  I recommended he stop his diclofenac until he sees Dr. Barnett in the clinic.    Jeramie Leach MD      Pending Results   These results will be followed up by Dr. Jorge Barnett   Unresulted Labs Ordered in the Past 30 Days of this Admission     Date and Time Order Name Status Description    5/31/2020 0934 Urine Culture Aerobic Bacterial In process     5/30/2020 2300 Blood culture Preliminary     5/30/2020 2300 Blood culture Preliminary     5/30/2020 0730 Ehrlichia Anaplasma Sp by PCR In process     5/29/2020 2127 Blood culture Preliminary     5/29/2020 2127 Blood culture Preliminary           Code Status   Full Code       Primary Care Physician   Jorge Barnett    Physical Exam   Temp: 99  F (37.2  C) Temp src: Tympanic BP: (!) 147/70 Pulse: 59 Heart Rate: 58 Resp: 18 SpO2: 96 % O2 Device: None (Room air)    Vitals:    05/29/20 2042 06/01/20 0500 06/02/20 0433   Weight: 91.2 kg (201 lb) 95.3 kg (210 lb) 91.6 kg (202 lb)     Vital Signs with Ranges  Temp:  [98.6  F (37  C)-99  F (37.2  C)] 99  F (37.2  C)  Pulse:  [59-64] 59  Heart Rate:  [50-59] 58  Resp:  [16-20] 18  BP: (122-153)/(54-74) 147/70  SpO2:  [94 %-96 %] 96 %  I/O last 3 completed shifts:  In: 1610 [I.V.:1610]  Out: 1650 [Urine:1650]    GENERAL: Comfortable, no apparent distress.  CARDIOVASCULAR: regular rate and rhythm, no murmur. No lower extremity edema   RESPIRATORY: Clear to auscultation bilaterally, no wheezes or crackles.  GI: non-tender, non-distended, normal bowel sounds.   SKIN: warm periphery, no rashes.  left second toe shows decreased erythema    Discharge Disposition   Discharged to home  Condition at discharge: Stable    Consultations This Hospital Stay   PHARMACY TO DOSE VANCO  PHARMACY TO DOSE VANCO    Time Spent on this Encounter   I, Jeramie Leach MD, personally saw the patient today and spent greater than 30 minutes discharging this patient.    Discharge Orders      Reason for your hospital stay    Sepsis from toe infection     Activity    Your activity upon discharge: activity as tolerated     Discharge Instructions    Hold diclofenac until you see Dr. Jorge Barnett     Wound care and dressings    Instructions to care for your wound at home: shower daily, dry toes and apply neosporin. Put a spacer inbetween 1st and second toe. Keep legs elevated when not walking or exercising.     Follow-up and recommended labs and tests    Follow up with Dr. Jorge Barnett on 6/9 at 10:40 AM.     Full Code     Diet    Follow this diet upon discharge: Orders Placed This Encounter      Regular Diet Adult       Discharge Medications   Current Discharge Medication List      START taking these medications    Details   ciprofloxacin (CIPRO) 500 MG tablet Take 1 tablet (500 mg) by mouth 2 times daily for 7 days  Qty: 14 tablet, Refills: 0    Associated Diagnoses: Bacteremia         CONTINUE these medications which have NOT CHANGED    Details   aspirin (ASA) 81 MG tablet Take 1 tablet (81 mg) by mouth daily -- Dose Reduced 2/18/2020    Associated Diagnoses: Atherosclerosis of native coronary artery of native heart with stable angina pectoris (H)      clopidogrel (PLAVIX) 75 MG tablet Take 1 tablet (75 mg) by mouth daily  Qty: 90 tablet, Refills: 3    Associated Diagnoses: Atherosclerosis of native coronary artery of native heart with stable angina pectoris (H)      desoximetasone (TOPICORT) 0.25 % OINT ointment Apply topically 2 times daily as needed for inflammation or itching      dextroamphetamine (DEXTROSTAT) 10 MG tablet  Take 1 tablet (10 mg) by mouth 4 times daily  Qty: 360 tablet, Refills: 0    Associated Diagnoses: Primary narcolepsy without cataplexy; Pain medication agreement      famotidine (PEPCID) 20 MG tablet Take 20 mg by mouth 2 times daily      Flaxseed, Linseed, (FLAXSEED OIL) 1000 MG CAPS Take 1 capsule by mouth daily       hydrALAZINE (APRESOLINE) 25 MG tablet Take 1-2 tablets (25-50 mg) by mouth 4 times daily - Take lowest effective dose for blood pressure management  Qty: 360 tablet, Refills: 11    Associated Diagnoses: CKD (chronic kidney disease) stage 3, GFR 30-59 ml/min (H)      HYDROcodone-acetaminophen (NORCO) 5-325 MG tablet Take 1 tablet by mouth every 6 hours as needed for severe pain  Qty: 60 tablet, Refills: 0    Associated Diagnoses: Radicular low back pain; Degenerative disc disease, cervical; Pain medication agreement      insulin lispro (HUMALOG) 100 UNIT/ML vial INJECT UNDER THE SKIN USING INSULIN PUMP. MAX DAILY DOSE  UNITS  DX: E11.22, N18.3, Z79.4  Qty: 180 mL, Refills: 3    Associated Diagnoses: Controlled type 2 diabetes mellitus with stage 3 chronic kidney disease, with long-term current use of insulin (H)      irbesartan (AVAPRO) 150 MG tablet Take 1 tablet (150 mg) by mouth 2 times daily -- needs 2 smaller pills daily  Qty: 180 tablet, Refills: 3    Associated Diagnoses: Chronic diastolic heart failure (H)      levothyroxine (SYNTHROID/LEVOTHROID) 125 MCG tablet TAKE 1 TABLET BY MOUTH ONCE DAILY IN THE MORNING  Qty: 90 tablet, Refills: 3    Associated Diagnoses: Hypothyroidism due to acquired atrophy of thyroid      methylphenidate (RITALIN) 10 MG tablet Take 2 tablets (20 mg) by mouth 2 times daily  Qty: 360 tablet, Refills: 0    Associated Diagnoses: Primary narcolepsy without cataplexy; Pain medication agreement      metoprolol succinate ER (TOPROL-XL) 50 MG 24 hr tablet Take 1 tablet (50 mg) by mouth 2 times daily  Qty: 180 tablet, Refills: 3    Associated Diagnoses: CKD (chronic  kidney disease) stage 3, GFR 30-59 ml/min (H)      ranolazine (RANEXA) 500 MG 12 hr tablet Take 2 tablets (1,000 mg) by mouth 2 times daily - cancel other Rx - give 3 month supply  Qty: 360 tablet, Refills: 3    Associated Diagnoses: Atherosclerosis of native coronary artery of native heart with stable angina pectoris (H)      rosuvastatin (CRESTOR) 10 MG tablet Take 10 mg by mouth 2 times daily       saccharomyces boulardii (FLORASTOR) 250 MG capsule Take 1 capsule (250 mg) by mouth 2 times daily  Qty:      Associated Diagnoses: Clostridium difficile infection      Saline GEL Spray 1 spray in nostril every hour as needed For Nasal Dryness      blood glucose monitoring (ONETOUCH ULTRA) test strip Dispense test strips covered by the patient insurance. Test 10 times per day.      Blood Glucose Monitoring Suppl (GLUCT1 VisionsM BLOOD GLUCOSE MONITOR) WAQAS Dispense glucose meter, test strips and lancets covered by the patient insurance. Test 10 times per day.      co-enzyme Q-10 100 MG CAPS capsule Take 100 mg by mouth daily       [employer] ALCOHOL SWABS PADS For home use.      Insulin Pen Needle (PEN NEEDLES) 29G X 12MM MISC For administering insulin at home.      IV Sets-Tubing (SOLUTION ADMINISTRATION SET) MISC As directed.      nitroGLYcerin (NITROSTAT) 0.4 MG sublingual tablet Place 1 tablet (0.4 mg) under the tongue every 5 minutes as needed for chest pain Call 911 after 1st dose.  Qty: 25 tablet, Refills: 0    Associated Diagnoses: Angina pectoris (H)      order for DME Compression Stockings, 30/40 mmHg, knee high, open toe-apply to both legs daily in the am and removed in the evening.  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     "  vitamin E (TOCOPHEROL) 400 units (360 mg) capsule Take 1 capsule by mouth daily         STOP taking these medications       diclofenac (VOLTAREN) 75 MG EC tablet Comments:   Reason for Stopping:             Allergies   Allergies   Allergen Reactions     Famotidine Other (See Comments)     + Caused Headache and Rash -- tolerated Ranitidine and worked well.     Gabapentin      Other reaction(s): Mental Status Change \"felt like in outer space\"     Hydrocortisone Other (See Comments)     Burning and stinging sensation with Hydrocortisone - doesn't help itching or rash -- tolerated Desoximetasone cream and worked well.      Atorvastatin Hives     Celebrex [Celecoxib]      Swelling      Lisinopril Cough     No Clinical Screening - See Comments Hives     Chlorine water     Norvasc [Amlodipine] Other (See Comments)     -- can't remember, didn't tolerate.      Pregabalin      Lyrica - Other reaction(s): Mental Status Change     Valdecoxib Swelling     \"bextra\" NSAID     Insulin Aspart Rash     Data   Most Recent 3 CBC's:  Recent Labs   Lab Test 06/02/20  0415 05/31/20  0625 05/30/20  0745   WBC 4.8 5.0 8.9   HGB 13.5 16.6 16.0   MCV 99 100 99    146* 160      Most Recent 3 BMP's:  Recent Labs   Lab Test 06/02/20  0415 05/31/20  0625 05/30/20  0745    133* 135   POTASSIUM 4.1 4.2 4.3   CHLORIDE 105 99 101   CO2 26 27 27   BUN 21 22 23   CR 1.86* 1.87* 1.68*   ANIONGAP 4 7 7   RYAN 8.5* 8.9 8.8   * 116* 227*     Most Recent 2 LFT's:  Recent Labs   Lab Test 05/31/20  0625 05/30/20  0745   AST 26 20   ALT 27 19   ALKPHOS 78 65   BILITOTAL 0.7 1.0     Most Recent INR's and Anticoagulation Dosing History:  Anticoagulation Dose History     There is no flowsheet data to display.        Most Recent 3 Troponin's:No lab results found.  Most Recent Cholesterol Panel:  Recent Labs   Lab Test 04/21/20  1512   CHOL 104   LDL 41   HDL 36   TRIG 133     Most Recent 6 Bacteria Isolates From Any Culture (See EPIC Reports " for Culture Details):  Recent Labs   Lab Test 05/31/20  0625 05/29/20  2145 03/14/20  1845 07/04/19  2215   CULT No growth after 2 days  No growth after 2 days No growth after 4 days  Serratia marcescens*  Identification and susceptibility to follow  1 OF 4 Bottles Positive No Salmonella, Shigella, E. coli O157, Aeromonas or Plesiomonas isolated Moderate growth  Staphylococcus aureus  *     Most Recent TSH, T4 and A1c Labs:  Recent Labs   Lab Test 04/21/20  1512   A1C 7.5*     Results for orders placed or performed during the hospital encounter of 05/29/20   XR Abdomen 2 Views    Narrative    PROCEDURE:  XR ABDOMEN 2 VW    HISTORY:  fever, cough.    TECHNIQUE:  AP and supine radiographs of the abdomen.    COMPARISON:  None.    FINDINGS:     The lung bases are clear. No subdiaphragmatic air is seen.    No dilated loops of small bowel or air-fluid levels are seen. Mildly  prominent stool is present throughout the colon without rectal  impaction. An electronic device projects over the left upper quadrant  proved      Impression    IMPRESSION:    No obstruction or free air.    EDITA GONSALES MD   XR Chest 2 Views    Narrative    PROCEDURE:  XR CHEST 2 VW    HISTORY: PNA, COVID r/o, .    COMPARISON:  1/18/2019.    FINDINGS:  The cardiomediastinal contours are stable.  The trachea is midline.   There is calcific aortic atherosclerosis.  No focal consolidation, effusion or pneumothorax.  Lungs are  hyperexpanded.  No suspicious osseous lesion or subdiaphragmatic free air.      Impression    IMPRESSION:      No new focal airspace disease.      EDITA GONSALES MD   XR Foot Port Right 3 Views    Narrative    PROCEDURE:  XR FOOT PORT RT 3 VW    HISTORY: right 2nd toe erythema and swelling. evaluate for  osteomyelitis.    COMPARISON:  None.    TECHNIQUE:  3 views right foot.    FINDINGS:  Advanced degenerative changes are present at the first MTP  joint. No retained foreign body is identified. No acute fracture  is  identified. No bony destructive change is seen.       Impression    IMPRESSION: No plain film evidence of osteomyelitis.      EDITA GONSALES MD   Echocardiogram Complete    Narrative    [id number]  [id number]  [id number]  [id number]^SOFI^JAYY^JONATHAN        [facility]  [address] [place] [address]     Name: STEPHIE MORELOS  MRN: [id number]  : 1950  Study Date: 2020 08:22 AM  Age: 70 yrs  Gender: Male  Patient Location: [facility]  Reason For Study: Endocarditis  Ordering Physician: JAYY FARAH  Performed By: Cher Stout RDCS, MICHELINE     BSA: 2.1 m2  Height: 70 in  Weight: 210 lb  HR: 54  BP: 145/62 mmHg  _____________________________________________________________________________  __        Procedure  Complete Portable Echo Adult.  _____________________________________________________________________________  __        Interpretation Summary  No significant valvular abnormalities were noted. No vegetation or mass  identified, however this does not exclude endocarditis.  _____________________________________________________________________________  __        Left Ventricle  Global and regional left ventricular function is normal with an EF of 55-60%.  Left ventricular wall thickness is normal. Left ventricular size is normal.  Thickening of the anterobasal septum is present. Left ventricular diastolic  function is normal. No regional wall motion abnormalities are seen.     Right Ventricle  Right ventricular function, chamber size, wall motion, and thickness are  normal.     Atria  Both atria appear normal.     Mitral Valve  The mitral valve is normal. Mild mitral annular calcification is present.     Aortic Valve  Trileaflet aortic sclerosis without stenosis. The mean AoV pressure gradient  is 3.0 mmHg. The calculated aortic valve are is 3.8 cm^2.        Tricuspid Valve  The tricuspid valve is normal. Trace tricuspid insufficiency is present.  Pulmonary artery systolic  pressure cannot be assessed.     Pulmonic Valve  The pulmonic valve is normal.     Vessels  The thoracic aorta is normal. The pulmonary artery and bifurcation cannot be  assessed. The inferior vena cava is normal.     Pericardium  No pericardial effusion is present.     Compared to Previous Study  Previous study not available for comparison.     _____________________________________________________________________________  __  MMode/2D Measurements & Calculations  IVSd: 1.7 cm  LVIDd: 4.2 cm  LVIDs: 3.0 cm  LVPWd: 1.1 cm  FS: 28.5 %     LV mass(C)d: 217.2 grams  LV mass(C)dI: 101.9 grams/m2  Ao root diam: 3.5 cm  asc Aorta Diam: 3.2 cm  LVOT diam: 2.5 cm  LVOT area: 4.9 cm2  LA Volume (BP): 53.1 ml  LA Volume Index (BP): 24.9 ml/m2  RWT: 0.52        Doppler Measurements & Calculations  MV E max derek: 78.1 cm/sec  MV A max derek: 57.2 cm/sec  MV E/A: 1.4     MV dec time: 0.22 sec  Ao V2 max: 112.0 cm/sec  Ao max P.0 mmHg  Ao V2 mean: 76.6 cm/sec  Ao mean PG: 3.0 mmHg  Ao V2 VTI: 23.9 cm  ALBERTINA(I,D): 3.8 cm2  ALBERTINA(V,D): 3.6 cm2  LV V1 max P.7 mmHg  LV V1 max: 82.0 cm/sec  LV V1 VTI: 18.4 cm  SV(LVOT): 90.3 ml  SI(LVOT): 42.4 ml/m2  AV Derek Ratio (DI): 0.73  ALBERTINA Index (cm2/m2): 1.8  E/E' av.5  Lateral E/e': 5.7  Medial E/e': 9.3           _____________________________________________________________________________  __           Report approved by: Binh Omalley 2020 10:13 AM

## 2020-06-03 ENCOUNTER — TELEPHONE (OUTPATIENT)
Dept: EDUCATION SERVICES | Facility: OTHER | Age: 70
End: 2020-06-03

## 2020-06-03 ENCOUNTER — PATIENT OUTREACH (OUTPATIENT)
Dept: CARE COORDINATION | Facility: CLINIC | Age: 70
End: 2020-06-03

## 2020-06-03 LAB
A PHAGOCYTOPH DNA BLD QL NAA+PROBE: NOT DETECTED
E CHAFFEENSIS DNA BLD QL NAA+PROBE: NOT DETECTED
E EWINGII DNA SPEC QL NAA+PROBE: NOT DETECTED
EHRLICHIA DNA SPEC QL NAA+PROBE: NOT DETECTED

## 2020-06-03 NOTE — PROGRESS NOTES
"Clinic Care Coordination Contact    Writer placed call out to patient on this date after recent hospitalization.     Patient feeling better but still not \"tip top\".  Patient was initially concerned about coming into clinic, explained safety measure in place at the clinic currently, to minimize any type of contamination.   Patient continued to talk about supplies need and will most likely need to be ordered at visit on 6/9/2020.   Patient wanted to keep office visit to review and discuss best practice healing methods and show Dr. Barnett the supplies he is using.   After reviewing patients chart, he has worked with S Jp in part re diabetic education.   Offered additional support through diabetic consult and education. Writer will reach out to S Jp on this date.     Transitional Care Management Phone Call    Summary of hospitalization:  [facility] discharge summary reviewed    DISCHARGE DIAGNOSIS: sepsis d/t serratia, diabetic toe ulcer.  DATE OF DISCHARGE: 06/02/2020    Diagnostic Tests/Treatments reviewed.  Follow up needed: Diabetic consultation and PCP f/u on 6/9/2020    Post Discharge Medication Reconciliation: discharge medications reconciled, continue medications without change.    Medications reviewed by: by myself    Problems taking medications regularly:  None  Problems adhering to non-medication therapy:  None    Other Healthcare Providers Involved in Patient s Care:         None     Update since discharge: improved.     Plan of care communicated with patient  Just a friendly reminder that you appointment is   Next 5 appointments (look out 90 days)    Jun 09, 2020  3:00 PM CDT  Office Visit with Jorge [facility]ett MD  [facility] [address]  [place] [id number] [phone]        We encourage you to keep this appointment.  Please remember to bring all of your pills in their bottles (including any vitamins or " over the counter pills) with you to your appointment.   The patient indicates understanding of these issues and agrees with the plan of care.   Yes    Was the patient contacted within the 2 business days or other approved timeframe?  Yes    Was the Medication reconciliation and management done since the patient was discharged? Yes    ALEX Arteaga  6/3/2020 11:42 AM

## 2020-06-03 NOTE — TELEPHONE ENCOUNTER
"Patient states, \"My blood sugars have been pretty good lately, I haven't had much of an appetite, so that is probably why they have been so good.\"    Reviewed insulin pump use with changing infusion sets and cartridges, as recommended, to help decrease risk of infusion site infection.  Patient, at times, will use the same cartridge twice.  Advised patient to change cartridge every three days to decrease any risk of infection.    SG report (mg/dL):  \"My average 115 - 133 and can go up to 260, but once I take my correction, it comes down nicely.  Right now I'm 124.\"      Patient will contact [facility] if BG begin rising out of control.    Radha Trammell RN, BSN, [facility]  6/3/2020 5:07 PM         "

## 2020-06-04 LAB
BACTERIA SPEC CULT: ABNORMAL
BACTERIA SPEC CULT: ABNORMAL
BACTERIA SPEC CULT: NORMAL
BACTERIA SPEC CULT: NORMAL
SPECIMEN SOURCE: ABNORMAL
SPECIMEN SOURCE: NORMAL
SPECIMEN SOURCE: NORMAL

## 2020-06-06 LAB
BACTERIA SPEC CULT: NORMAL
BACTERIA SPEC CULT: NORMAL
SPECIMEN SOURCE: NORMAL
SPECIMEN SOURCE: NORMAL

## 2020-06-09 ENCOUNTER — OFFICE VISIT (OUTPATIENT)
Dept: INTERNAL MEDICINE | Facility: OTHER | Age: 70
End: 2020-06-09
Attending: INTERNAL MEDICINE
Payer: MEDICARE

## 2020-06-09 VITALS
WEIGHT: 201.5 LBS | RESPIRATION RATE: 16 BRPM | TEMPERATURE: 98.3 F | SYSTOLIC BLOOD PRESSURE: 126 MMHG | HEART RATE: 64 BPM | BODY MASS INDEX: 28.91 KG/M2 | DIASTOLIC BLOOD PRESSURE: 68 MMHG

## 2020-06-09 DIAGNOSIS — Z09 HOSPITAL DISCHARGE FOLLOW-UP: Primary | ICD-10-CM

## 2020-06-09 DIAGNOSIS — M50.30 DEGENERATIVE DISC DISEASE, CERVICAL: ICD-10-CM

## 2020-06-09 DIAGNOSIS — N18.30 CONTROLLED TYPE 2 DIABETES MELLITUS WITH STAGE 3 CHRONIC KIDNEY DISEASE, WITH LONG-TERM CURRENT USE OF INSULIN (H): ICD-10-CM

## 2020-06-09 DIAGNOSIS — E11.22 CONTROLLED TYPE 2 DIABETES MELLITUS WITH STAGE 3 CHRONIC KIDNEY DISEASE, WITH LONG-TERM CURRENT USE OF INSULIN (H): ICD-10-CM

## 2020-06-09 DIAGNOSIS — Z02.89 PAIN MEDICATION AGREEMENT: ICD-10-CM

## 2020-06-09 DIAGNOSIS — A41.53: ICD-10-CM

## 2020-06-09 DIAGNOSIS — E11.621 DIABETIC ULCER OF TOE OF RIGHT FOOT ASSOCIATED WITH TYPE 2 DIABETES MELLITUS, UNSPECIFIED ULCER STAGE (H): ICD-10-CM

## 2020-06-09 DIAGNOSIS — L97.519 DIABETIC ULCER OF TOE OF RIGHT FOOT ASSOCIATED WITH TYPE 2 DIABETES MELLITUS, UNSPECIFIED ULCER STAGE (H): ICD-10-CM

## 2020-06-09 DIAGNOSIS — Z96.41 INSULIN PUMP IN PLACE: ICD-10-CM

## 2020-06-09 DIAGNOSIS — M54.10 RADICULAR LOW BACK PAIN: ICD-10-CM

## 2020-06-09 DIAGNOSIS — Z79.4 CONTROLLED TYPE 2 DIABETES MELLITUS WITH STAGE 3 CHRONIC KIDNEY DISEASE, WITH LONG-TERM CURRENT USE OF INSULIN (H): ICD-10-CM

## 2020-06-09 PROBLEM — R78.81 BACTEREMIA: Status: RESOLVED | Noted: 2020-05-31 | Resolved: 2020-06-09

## 2020-06-09 PROBLEM — L03.90 CELLULITIS: Status: RESOLVED | Noted: 2020-05-31 | Resolved: 2020-06-09

## 2020-06-09 PROCEDURE — 99495 TRANSJ CARE MGMT MOD F2F 14D: CPT | Performed by: INTERNAL MEDICINE

## 2020-06-09 PROCEDURE — G0463 HOSPITAL OUTPT CLINIC VISIT: HCPCS

## 2020-06-09 PROCEDURE — 99496 TRANSJ CARE MGMT HIGH F2F 7D: CPT | Performed by: INTERNAL MEDICINE

## 2020-06-09 RX ORDER — HYDROCODONE BITARTRATE AND ACETAMINOPHEN 5; 325 MG/1; MG/1
1 TABLET ORAL EVERY 6 HOURS PRN
Qty: 60 TABLET | Refills: 0 | Status: SHIPPED | OUTPATIENT
Start: 2020-06-09 | End: 2020-10-08

## 2020-06-09 ASSESSMENT — ENCOUNTER SYMPTOMS
BRUISES/BLEEDS EASILY: 1
NECK STIFFNESS: 1
MYALGIAS: 1
STRIDOR: 0
SHORTNESS OF BREATH: 0
CHOKING: 0
BLOOD IN STOOL: 1
FATIGUE: 0
HEADACHES: 1
ADENOPATHY: 0
COUGH: 0
PALPITATIONS: 0
CONFUSION: 0
BACK PAIN: 1
ABDOMINAL PAIN: 0
NECK PAIN: 1
WHEEZING: 0
CHEST TIGHTNESS: 0
CHILLS: 0
ARTHRALGIAS: 1
HEMATURIA: 0
WOUND: 1
FEVER: 0
DIARRHEA: 1

## 2020-06-09 ASSESSMENT — ANXIETY QUESTIONNAIRES
3. WORRYING TOO MUCH ABOUT DIFFERENT THINGS: NOT AT ALL
6. BECOMING EASILY ANNOYED OR IRRITABLE: NOT AT ALL
IF YOU CHECKED OFF ANY PROBLEMS ON THIS QUESTIONNAIRE, HOW DIFFICULT HAVE THESE PROBLEMS MADE IT FOR YOU TO DO YOUR WORK, TAKE CARE OF THINGS AT HOME, OR GET ALONG WITH OTHER PEOPLE: NOT DIFFICULT AT ALL
7. FEELING AFRAID AS IF SOMETHING AWFUL MIGHT HAPPEN: NOT AT ALL
5. BEING SO RESTLESS THAT IT IS HARD TO SIT STILL: NOT AT ALL
2. NOT BEING ABLE TO STOP OR CONTROL WORRYING: NOT AT ALL
1. FEELING NERVOUS, ANXIOUS, OR ON EDGE: NOT AT ALL
GAD7 TOTAL SCORE: 0

## 2020-06-09 ASSESSMENT — PATIENT HEALTH QUESTIONNAIRE - PHQ9
SUM OF ALL RESPONSES TO PHQ QUESTIONS 1-9: 0
5. POOR APPETITE OR OVEREATING: NOT AT ALL

## 2020-06-09 ASSESSMENT — PAIN SCALES - GENERAL: PAINLEVEL: SEVERE PAIN (6)

## 2020-06-09 NOTE — NURSING NOTE
"Patient presents to the clinic for hospital follow up.      Previous A1C is at goal of <8  Lab Results   Component Value Date    A1C 7.5 04/21/2020    A1C 7.8 12/31/2019    A1C 7.9 09/30/2019    A1C 8.1 06/26/2019    A1C 7.7 03/08/2019     Urine microalbumin:creatine: n/a  Foot exam hospital   Eye exam 12/2019    Tobacco User no  Patient is on a daily aspirin  Patient is on a Statin.  Blood pressure today of:     BP Readings from Last 1 Encounters:   06/02/20 (!) 147/70      is not at the goal of <139/89 for diabetics.    Chief Complaint   Patient presents with     Hospital F/U       Initial /68 (BP Location: Right arm, Patient Position: Sitting, Cuff Size: Adult Regular)   Pulse 64   Temp 98.3  F (36.8  C) (Tympanic)   Resp 16   Wt 91.4 kg (201 lb 8 oz)   BMI 28.91 kg/m   Estimated body mass index is 28.91 kg/m  as calculated from the following:    Height as of 5/29/20: 1.778 m (5' 10\").    Weight as of this encounter: 91.4 kg (201 lb 8 oz).  Medication Reconciliation: complete    Barbara Sloan LPN          "

## 2020-06-09 NOTE — PROGRESS NOTES
SUBJECTIVE:                                                      Patient presents for Hospital Followup Visit:    Hospital:  [facility]      Date of Admission: 5/29/2020  Date of Discharge: 6/2/2020  Transitional Care Management Phone Call: 6/3/2020  Reason(s) for Admission: Sepsis due to cellulitis with Serratia, right 2nd toe DM ulcer            Problems taking medications regularly:  None       Medication changes since discharge: None       Problems adhering to non-medication therapy:  None    Summary of hospitalization:  [facility] discharge summary reviewed    Patient was discharged on oral ciprofloxacin.  He was also treated with hydrocodone in the hospital due to neck pain.  States the hospital bed were very uncomfortable.    He has been doing wound care of his right second toe with wound care dressing applications as well as surgical tape to keep the dressing intact.  States that the wound is starting to heal but he is worried about it and he is now out of dressing material.  He would like to see 1 of the surgeons today for wound care evaluation.    Diagnostic Tests/Treatments reviewed.  Follow up needed: none  Other Healthcare Providers Involved in Patient s Care:         Specialist appointment - Surgery Clinic - DM Ulcer / Wound care  Update since discharge: stable. + ongoing DM ulcer on right 2nd toe    ROS:  Review of Systems   Constitutional: Negative for chills, fatigue and fever.   HENT: Negative for congestion.    Eyes: Negative for visual disturbance.   Respiratory: Negative for cough, choking, chest tightness, shortness of breath, wheezing and stridor.    Cardiovascular: Positive for chest pain (chronic, stable) and leg swelling. Negative for palpitations.        + claudication leg pain, worse in left leg with exercise/ambulation  + intermittent Angina - if carrying groceries, reports stable   Gastrointestinal: Positive for blood in stool and diarrhea. Negative for  abdominal pain.        + past 2-3 weeks... passing some mucus, loose stools, and scant amount of red blood.    Genitourinary: Negative for hematuria.   Musculoskeletal: Positive for arthralgias, back pain, gait problem, myalgias, neck pain and neck stiffness.   Skin: Positive for wound (+right foot-2nd toe  , DM toe ulcer). Negative for rash.   Neurological: Positive for headaches. Negative for syncope.   Hematological: Negative for adenopathy. Bruises/bleeds easily.   Psychiatric/Behavioral: Negative for confusion.      OBJECTIVE:                                                      Vitals:    06/09/20 1512   BP: 126/68   BP Location: Right arm   Patient Position: Sitting   Cuff Size: Adult Regular   Pulse: 64   Resp: 16   Temp: 98.3  F (36.8  C)   TempSrc: Tympanic   Weight: 91.4 kg (201 lb 8 oz)      Physical Exam  Constitutional:       Appearance: Normal appearance. He is obese.   HENT:      Head: Normocephalic and atraumatic.   Eyes:      General: No scleral icterus.     Conjunctiva/sclera: Conjunctivae normal.   Cardiovascular:      Rate and Rhythm: Normal rate and regular rhythm.   Pulmonary:      Effort: Pulmonary effort is normal.   Musculoskeletal:         General: No deformity.   Skin:     Comments: Toe ulcer right 2nd toe - bandaged   Neurological:      Mental Status: He is alert and oriented to person, place, and time.   Psychiatric:         Mood and Affect: Mood normal.         Behavior: Behavior normal.          ASSESSMENT/PLAN:                                                        ICD-10-CM    1. Hospital discharge follow-up  Z09    2. Serratia septicemia (H) - due to cellulitis of right 2nd toe ulcer  A41.53    3. Diabetic ulcer of toe of right foot associated with type 2 diabetes mellitus, unspecified ulcer stage (H)  E11.621     L97.519    4. Radicular low back pain  M54.10 HYDROcodone-acetaminophen (NORCO) 5-325 MG tablet   5. Degenerative disc disease, cervical  M50.30 HYDROcodone-acetaminophen  "(NORCO) 5-325 MG tablet   6. Pain medication agreement  Z02.89 HYDROcodone-acetaminophen (NORCO) 5-325 MG tablet   7. Controlled type 2 diabetes mellitus with stage 3 chronic kidney disease, with long-term current use of insulin (H)  E11.22     N18.3     Z79.4    8. Insulin pump in place  Z96.41      Serratia septicemia due to diabetic ulcer of toe and cellulitis.  Appears to be improving.  Reports that he now has a  thin layer of skin overlying the ulcer of his right second toe but he has had a wound care supplies and would like to see 1 of the surgeons today for wound care evaluation.    Serratia septicemia, currently afebrile.  Continue ciprofloxacin until gone.  Patient is wondering about repeat blood cultures.  At this point he seems to be improving.  Would continue to watch him clinically, unless there are changes in his toe ulcer.  He was able to get an appointment with 1 of the surgeons today to evaluate his wound.    Radicular low back pain with history of cervical disc disease and recent flareup of his cervical neck pain.  Has been using hydrocodone intermittently.  He rarely takes it but he is nearly out of the medication at home and would like to get a small prescription for refills.  Has had controlled substance agreement updated in the past.  His old prescription is nearly gone.    6/16/2020 -- Patient called requesting documentation of this information in his chart, from his last appointment, regarding insulin pump supplies (copied from telephone note):  \"Patient states his clear adhesive bandages and prep wipes are no longer paid for by his Medicare part B plan.  He states, \"for 9 years they have been covered, now [employer] will not send them out and I need them or I will have to go back to shots.\"  Patient shares his skin has an allergic response to the pump and CGM adhesives and using the clear adhesive bandages under the pump infusion site and CGM sensor site works well \"and I do not get the rash.\"  " "Patient states he needs this noted in his next visit with Dr. Barnett for [employer] to bill Medicare part B.       Request patient look up the exact name of the products he requires and follow up with [employer].  I will help Dr. Barnett document the specific request so [employer] can bill Medicare for the needed supplies.     Patient shares he will call [employer] with the specific information soon.     Radha Trammell RN, BSN, [facility]  6/15/2020 3:39 PM \"      Post Discharge Medication Reconciliation: discharge medications reconciled, continue medications without change.  Issues to address: Issues identified were:   Ongoing DM Toe ulceration.  Plan of care communicated with patient    There are no Patient Instructions on file for this visit.     Jorge Barnett MD     Type of Medical Decision Making Face-to-Face Visit within 7 Days Face-to-Face Visit within 14 days   Moderate Complexity [id number]   High Complexity [id number]     "

## 2020-06-10 ASSESSMENT — ANXIETY QUESTIONNAIRES: GAD7 TOTAL SCORE: 0

## 2020-06-13 ENCOUNTER — NURSE TRIAGE (OUTPATIENT)
Dept: NURSING | Facility: CLINIC | Age: 70
End: 2020-06-13

## 2020-06-13 NOTE — TELEPHONE ENCOUNTER
"\"I am looking through My Chart and I don't see my blood type.\" Advised I do not see it, offered to ask it be done at his next lab appt.  Patient was very upset and said \"this should be automatic in everyone's chart.\"  No triage was needed. Advised he speak with his PCP regarding this information.  Marybeth Dean RN [facility]      "

## 2020-06-15 ENCOUNTER — TELEPHONE (OUTPATIENT)
Dept: INTERNAL MEDICINE | Facility: OTHER | Age: 70
End: 2020-06-15

## 2020-06-15 DIAGNOSIS — M79.672 PAIN IN BOTH FEET: Primary | ICD-10-CM

## 2020-06-15 DIAGNOSIS — M79.671 PAIN IN BOTH FEET: Primary | ICD-10-CM

## 2020-06-15 NOTE — TELEPHONE ENCOUNTER
JUAN- Patient needs Dr. Barnett or Barbara to call him ASAP. Patient requests only to talk to Dr. Barnett or Barbara. Patient is having problems with the company that supplies his diabetic supplies. Patient states the supply company needs his chart notes from his last visit. Was unable to get a virtual appt for him right away the soonest would be July 1st. He states he needs his supplies now and cant wait. Ok to leave message [phone].

## 2020-06-16 ENCOUNTER — TRANSFERRED RECORDS (OUTPATIENT)
Dept: HEALTH INFORMATION MANAGEMENT | Facility: OTHER | Age: 70
End: 2020-06-16

## 2020-06-16 DIAGNOSIS — E78.2 MIXED HYPERLIPIDEMIA: ICD-10-CM

## 2020-06-16 DIAGNOSIS — E03.4 HYPOTHYROIDISM DUE TO ACQUIRED ATROPHY OF THYROID: ICD-10-CM

## 2020-06-16 DIAGNOSIS — I10 BENIGN ESSENTIAL HYPERTENSION: Primary | ICD-10-CM

## 2020-06-16 LAB — RETINOPATHY: POSITIVE

## 2020-06-16 NOTE — TELEPHONE ENCOUNTER
"Patient states his clear adhesive bandages and prep wipes are no longer paid for by his Medicare part B plan.  He states, \"for 9 years they have been covered, now [employer] will not send them out and I need them or I will have to go back to shots.\"  Patient shares his skin has an allergic response to the pump and CGM adhesives and using the clear adhesive bandages under the pump infusion site and CGM sensor site works well \"and I do not get the rash.\"  Patient states he needs this noted in his next visit with Dr. Barnett for [employer] to bill Medicare part B.      Request patient look up the exact name of the products he requires and follow up with [facility].  I will help Dr. Barnett document the specific request so [employer] can bill Medicare for the needed supplies.    Patient shares he will call [facility] with the specific information soon.    Radha Trammell RN, BSN, [facility]  6/15/2020 3:39 PM   "

## 2020-06-16 NOTE — TELEPHONE ENCOUNTER
Radha -- I copied your telephone information to his last office visit from 6/9/2020.   Hopefully that will be enough and we can get him the supplies he needs.     Jorge Barnett MD

## 2020-06-16 NOTE — TELEPHONE ENCOUNTER
Patient gave specific item information.  Will contact Karli and fax addendum to office visit 6/9/2020.      Radha Trammell RN, BSN, [facility]  6/16/2020 3:06 PM

## 2020-06-18 ENCOUNTER — OFFICE VISIT (OUTPATIENT)
Dept: ORTHOPEDICS | Facility: OTHER | Age: 70
End: 2020-06-18
Attending: SURGERY
Payer: MEDICARE

## 2020-06-18 ENCOUNTER — HOSPITAL ENCOUNTER (OUTPATIENT)
Dept: GENERAL RADIOLOGY | Facility: OTHER | Age: 70
End: 2020-06-18
Attending: PODIATRIST
Payer: MEDICARE

## 2020-06-18 VITALS — HEART RATE: 68 BPM | DIASTOLIC BLOOD PRESSURE: 70 MMHG | SYSTOLIC BLOOD PRESSURE: 124 MMHG

## 2020-06-18 DIAGNOSIS — M21.611 BUNION OF GREAT TOE OF RIGHT FOOT: ICD-10-CM

## 2020-06-18 DIAGNOSIS — M79.671 PAIN IN BOTH FEET: ICD-10-CM

## 2020-06-18 DIAGNOSIS — M79.672 PAIN IN BOTH FEET: ICD-10-CM

## 2020-06-18 DIAGNOSIS — M21.612 BUNION OF GREAT TOE OF LEFT FOOT: ICD-10-CM

## 2020-06-18 PROCEDURE — 99207 ZZC NO CHARGE LOS: CPT | Performed by: PODIATRIST

## 2020-06-18 PROCEDURE — G0463 HOSPITAL OUTPT CLINIC VISIT: HCPCS

## 2020-06-18 PROCEDURE — 73630 X-RAY EXAM OF FOOT: CPT | Mod: RT

## 2020-06-18 RX ORDER — ROSUVASTATIN CALCIUM 10 MG/1
TABLET, COATED ORAL
Qty: 180 TABLET | Refills: 3 | Status: SHIPPED | OUTPATIENT
Start: 2020-06-18 | End: 2021-03-10

## 2020-06-18 RX ORDER — LEVOTHYROXINE SODIUM 125 UG/1
TABLET ORAL
Qty: 90 TABLET | Refills: 3 | Status: SHIPPED | OUTPATIENT
Start: 2020-06-18 | End: 2021-06-07

## 2020-06-18 NOTE — PROGRESS NOTES
Patient is here for consult on his feet.   Janay Singer LPN .....................6/18/2020 2:24 PM

## 2020-06-18 NOTE — TELEPHONE ENCOUNTER
Routing refill request to provider for review/approval because:  Labs not current:  TSH  Medication reported by patient.     Last refill  Levothyroxine 125mcg tablet  4/22/2019  90# 3 Refills    Rosuvastatin 10mg tablet  Reported by patient as taking    LOV: 6/9/2020  Future: 7/28/2020  Lillian Dc RN on 6/18/2020 at 11:13 AM

## 2020-06-19 ENCOUNTER — ALLIED HEALTH/NURSE VISIT (OUTPATIENT)
Dept: FAMILY MEDICINE | Facility: OTHER | Age: 70
End: 2020-06-19
Attending: INTERNAL MEDICINE
Payer: COMMERCIAL

## 2020-06-19 ENCOUNTER — TELEPHONE (OUTPATIENT)
Dept: INTERNAL MEDICINE | Facility: OTHER | Age: 70
End: 2020-06-19

## 2020-06-19 VITALS — HEART RATE: 59 BPM | DIASTOLIC BLOOD PRESSURE: 81 MMHG | SYSTOLIC BLOOD PRESSURE: 162 MMHG

## 2020-06-19 DIAGNOSIS — I10 BENIGN ESSENTIAL HYPERTENSION: Primary | ICD-10-CM

## 2020-06-19 NOTE — TELEPHONE ENCOUNTER
Patient came in for BP checks with this writer-nurse only encounter.      Barbara Sloan LPN 6/19/2020 5:06 PM

## 2020-06-19 NOTE — PROGRESS NOTES
Visit Date:   06/18/2020      HISTORY OF PRESENT ILLNESS:  Leslie is a fairly pleasant 70-year-old gentleman who comes in today really kind of knowing what he wants; he wants a spacer between his first and second toes.  This does sound like he has a history with this second toe.  He was hospitalized relatively recently with sepsis.  He is not entirely sure if it was from the toe or not, but it does sound like he had an ulcer from the toe at the time and there was tendon exposed at one time.  He has this healed now and he would like to avoid surgery on this and comes in specifically asking for a toe spacer, which we did have a discussion about.  The patient is also frustrated with bilateral foot x-rays.  He states he did not really have issues with the left.  We did end up evaluating to some degree, but did not think the left foot x-rays were appreciated.  Regardless, this patient was not overly happy with what I had to offer and I am not going to charge an office visit for him, so no charge today.      HISTORY REVIEW:  I have reviewed this patient's past medical history, family history, social history as well as medications and allergies.  Any changes/additions were appropriately charted in the patient's electronic medical record.        PHYSICAL EXAMINATION:    CONSTITUTIONAL:  The patient is alert and oriented x 3, well appearing and in no apparent distress.  Affect is pleasant and answers questions appropriately.   VASCULAR:  Circulation is intact with palpable pedal pulses and adequate capillary fill time to all digits.  Hair growth is present and appropriate to mid foot and digits.   NEUROLOGIC:  Light touch sensation is intact to digits.  There is a negative Tinel sign.  There are no signs of apparent nerve entrapment of superficial peroneal or common peroneal nerves.   INTEGUMENT:  No abnormal dermatologic lesions are noted.  Skin has normal texture and turgor.     MUSCULOSKELETAL:  He does have bilateral hallux  rigidus quite evident on the right side with mild abduction which causes irritation and rubbing from the first and second toe which is where he had a previous ulcer.  There is evidence of this, which appears to be healed over.  He has mild discoloration here.  No overt warmth or significant redness at this time.  No overt signs of infection, but does have this area of previous ulcer which was evident.      ASSESSMENT:  Type 2 diabetes with multiple comorbidities and mild neuropathy.      PLAN:  Discussed condition and treatment with the patient today.  Again, this was a somewhat difficult conversation with the patient as he did kind of have an idea of what he wanted.  I did discuss potential surgical options I think which may be necessary given his history of an ulcer here at some point certainly if any ulcer or signs of infection were to creep in again.  In the meantime, as long as we are able to keep this from ulcerating with appropriate spacers and pads I think that is appropriate.  Unfortunately, I do not have a large selection, which is what this patient was looking for and I think slightly frustrated about.  I did give him a silicone sleeve and I will try to arrange to have a silicone large tube of them so he can have a number of them next time I am up and he can cut to appropriate length.  He seemed fairly happy with this solution.  If this were to worsen then we will look into surgical options.         DOMINGA PELAEZ DPM             D: 2020   T: 2020   MT: HOA      Name:     STEPHIE MORELOS   MRN:      [id number]        Account:      [id number]   :      1950           Visit Date:   2020      Document: [id number]

## 2020-06-19 NOTE — NURSING NOTE
Patient came to the clinic for BP check to confirm that accuracy.  BP checked several times on both arms and recorded.      Barbara Sloan LPN 6/19/2020 5:02 PM

## 2020-06-20 DIAGNOSIS — E11.22 CONTROLLED TYPE 2 DIABETES MELLITUS WITH STAGE 3 CHRONIC KIDNEY DISEASE, WITH LONG-TERM CURRENT USE OF INSULIN (H): ICD-10-CM

## 2020-06-20 DIAGNOSIS — Z79.4 CONTROLLED TYPE 2 DIABETES MELLITUS WITH STAGE 3 CHRONIC KIDNEY DISEASE, WITH LONG-TERM CURRENT USE OF INSULIN (H): ICD-10-CM

## 2020-06-20 DIAGNOSIS — N18.30 CONTROLLED TYPE 2 DIABETES MELLITUS WITH STAGE 3 CHRONIC KIDNEY DISEASE, WITH LONG-TERM CURRENT USE OF INSULIN (H): ICD-10-CM

## 2020-06-22 NOTE — TELEPHONE ENCOUNTER
Prescription approved per [facility] Refill Protocol.  Last refill  Insulin  Lispro   1/8/2020  180ml 11 Refills    Warnings Override History for insulin lispro (HUMALOG) 100 UNIT/ML vial [[id number]]     Overridden by Jeramie Leach MD on Jun 2, 2020 9:54 AM    Allergy/Contraindication    1. INSULIN ASPART [Level: Drug Class Match]       Overridden by Cuco Mathis MD on Mar 15, 2020 9:42 AM    Allergy/Contraindication    1. INSULIN ASPART [Level: Drug Class Match] [Reason: Will monitor drug levels/drug effects ]       Overridden by Jorge Barnett MD on Jan 8, 2020 12:26 PM    Allergy/Contraindication    1. INSULIN ASPART [Level: Drug Class Match] [Reason: Tolerated medication/side effects in past]             LOV:6/9/2020  Future 7/28/2020  Lillian Dc RN on 6/22/2020 at 3:40 PM

## 2020-06-22 NOTE — TELEPHONE ENCOUNTER
"Phoned [employer] for patient update.  Karli states CGM supplies are approved with notes and new 6 month prescription has been received.  Supplies approved by insurance for reorder when patient desires.    Discussed patient request for [id number] clear sterile dressings and IV Prep wipes.  [employer] states \"These are uncovered supplies and you should contact [facility]/Medicare for CMN for possible coverage.\"    Reached out to [facility].  Unable to speak to representative.  Will try again Thursday afternoon.    Radha Trammell RN, BSN, [facility]  6/22/2020 4:05 PM       "

## 2020-06-29 ENCOUNTER — VIRTUAL VISIT (OUTPATIENT)
Dept: PEDIATRICS | Facility: OTHER | Age: 70
End: 2020-06-29
Attending: INTERNAL MEDICINE
Payer: COMMERCIAL

## 2020-06-29 VITALS
BODY MASS INDEX: 28.63 KG/M2 | HEART RATE: 60 BPM | WEIGHT: 200 LBS | HEIGHT: 70 IN | SYSTOLIC BLOOD PRESSURE: 114 MMHG | DIASTOLIC BLOOD PRESSURE: 68 MMHG

## 2020-06-29 DIAGNOSIS — Z76.5 DRUG-SEEKING BEHAVIOR: Primary | ICD-10-CM

## 2020-06-29 DIAGNOSIS — G47.419 PRIMARY NARCOLEPSY WITHOUT CATAPLEXY: ICD-10-CM

## 2020-06-29 DIAGNOSIS — Z02.89 PAIN MEDICATION AGREEMENT: ICD-10-CM

## 2020-06-29 PROCEDURE — 99213 OFFICE O/P EST LOW 20 MIN: CPT | Mod: 95 | Performed by: INTERNAL MEDICINE

## 2020-06-29 RX ORDER — NIFEDIPINE 30 MG
30 TABLET, EXTENDED RELEASE ORAL DAILY
COMMUNITY
End: 2020-10-29

## 2020-06-29 RX ORDER — ERGOCALCIFEROL 1.25 MG/1
50000 CAPSULE, LIQUID FILLED ORAL WEEKLY
Status: ON HOLD | COMMUNITY
End: 2023-01-01

## 2020-06-29 RX ORDER — DEXTROAMPHETAMINE SULFATE 10 MG/1
10 TABLET ORAL 4 TIMES DAILY
Qty: 360 TABLET | Refills: 0 | Status: CANCELLED | OUTPATIENT
Start: 2020-06-29

## 2020-06-29 RX ORDER — METHYLPHENIDATE HYDROCHLORIDE 10 MG/1
20 TABLET ORAL 2 TIMES DAILY
Qty: 360 TABLET | Refills: 0 | Status: CANCELLED | OUTPATIENT
Start: 2020-06-29

## 2020-06-29 ASSESSMENT — PAIN SCALES - GENERAL: PAINLEVEL: MODERATE PAIN (4)

## 2020-06-29 ASSESSMENT — MIFFLIN-ST. JEOR: SCORE: 1678.44

## 2020-06-29 NOTE — PROGRESS NOTES
"Moses Whittaker is a 70 year old male who is being evaluated via a billable telephone visit.      The patient has been notified of following:     \"This telephone visit will be conducted via a call between you and your physician/provider. We have found that certain health care needs can be provided without the need for a physical exam.  This service lets us provide the care you need with a short phone conversation.  If a prescription is necessary we can send it directly to your pharmacy.  If lab work is needed we can place an order for that and you can then stop by our lab to have the test done at a later time.    Telephone visits are billed at different rates depending on your insurance coverage. During this emergency period, for some insurers they may be billed the same as an in-person visit.  Please reach out to your insurance provider with any questions.    If during the course of the call the physician/provider feels a telephone visit is not appropriate, you will not be charged for this service.\"    Patient has given verbal consent for Telephone visit?  Yes    What phone number would you like to be contacted at? [phone]    How would you like to obtain your AVS? MyChart    Subjective     Moses Whittaker is a 70 year old male who presents via phone visit today for the following health issues:    Diagnosed with narcolepsy years ago by Neurology.  Has been on stimulants for years  Requesting 90 day supply    Reviewed and updated as needed this visit by Provider         Review of Systems   Constitutional, HEENT, cardiovascular, pulmonary, gi and gu systems are negative, except as otherwise noted.       Objective   Reported vitals:  /68   Pulse 60   Ht 1.778 m (5' 10\")   Wt 90.7 kg (200 lb)   BMI 28.70 kg/m     healthy, alert and no distress  PSYCH: Alert and oriented times 3; coherent speech, normal   rate and volume, able to articulate logical thoughts, able   to abstract reason, no tangential " thoughts, no hallucinations   or delusions  His affect is anxious  RESP: No cough, no audible wheezing, able to talk in full sentences  Remainder of exam unable to be completed due to telephone visits    Diagnostic Test Results:  Labs reviewed in Epic  none         Assessment/Plan:    ICD-10-CM    1. Drug-seeking behavior  Z76.5    2. Primary narcolepsy without cataplexy  G47.419    3. Pain medication agreement  Z02.89      We had a lengthy discussion today with regards to the use of stimulants for the treatment of narcolepsy.  I have multiple concerns about this interaction today.  He is utilizing quite high doses of short acting stimulants with a speech pattern consistent with drug-seeking behavior.  I strongly encouraged him to receive all of his prescriptions from his primary care physician Dr. Barnett.  This is required under the schedule to contract, with which he has signed and agreed.  He demanded a 90-day prescription which was declined.  I did offer to give a 2-week prescription until he could get to see Dr. Barnett.  He was very upset about this.  I strongly encouraged him to not drive or operate heavy machinery without his medication.  He told me that he would continue to drive his car and that if he got into a car accident it would be my fault.  I reiterated not to drive or operate heavy machinery without his medication.  I would recommend reestablishing with neurology working on weaning down on his medication and/or considering a long-acting version instead such as Vyvanse.  No medications were prescribed today.      Phone call duration:  7 minutes    Signed, Maykel Loja MD, FAAP, FACP  Internal Medicine & Pediatrics

## 2020-06-29 NOTE — NURSING NOTE
"Chief Complaint   Patient presents with     Recheck Medication     Patient is following up on Methylphenidate and Dextroamphetamine. He is needing refills on those 2 medications.     Initial /68   Pulse 60   Ht 1.778 m (5' 10\")   Wt 90.7 kg (200 lb)   BMI 28.70 kg/m   Estimated body mass index is 28.7 kg/m  as calculated from the following:    Height as of this encounter: 1.778 m (5' 10\").    Weight as of this encounter: 90.7 kg (200 lb).  Medication Reconciliation: complete    Lindsey Lara MA  "

## 2020-06-30 ENCOUNTER — TELEPHONE (OUTPATIENT)
Dept: INTERNAL MEDICINE | Facility: OTHER | Age: 70
End: 2020-06-30

## 2020-06-30 DIAGNOSIS — E03.4 HYPOTHYROIDISM DUE TO ACQUIRED ATROPHY OF THYROID: ICD-10-CM

## 2020-06-30 DIAGNOSIS — G47.419 PRIMARY NARCOLEPSY WITHOUT CATAPLEXY: Primary | ICD-10-CM

## 2020-06-30 DIAGNOSIS — Z02.89 PAIN MEDICATION AGREEMENT: ICD-10-CM

## 2020-06-30 DIAGNOSIS — E78.2 MIXED HYPERLIPIDEMIA: ICD-10-CM

## 2020-06-30 DIAGNOSIS — I50.32 CHRONIC DIASTOLIC HEART FAILURE (H): ICD-10-CM

## 2020-06-30 DIAGNOSIS — E11.22 CONTROLLED TYPE 2 DIABETES MELLITUS WITH STAGE 3 CHRONIC KIDNEY DISEASE, WITH LONG-TERM CURRENT USE OF INSULIN (H): ICD-10-CM

## 2020-06-30 DIAGNOSIS — Z79.4 CONTROLLED TYPE 2 DIABETES MELLITUS WITH STAGE 3 CHRONIC KIDNEY DISEASE, WITH LONG-TERM CURRENT USE OF INSULIN (H): ICD-10-CM

## 2020-06-30 DIAGNOSIS — N18.30 CONTROLLED TYPE 2 DIABETES MELLITUS WITH STAGE 3 CHRONIC KIDNEY DISEASE, WITH LONG-TERM CURRENT USE OF INSULIN (H): ICD-10-CM

## 2020-06-30 RX ORDER — DEXTROAMPHETAMINE SULFATE 10 MG/1
10 TABLET ORAL 4 TIMES DAILY
Qty: 360 TABLET | Refills: 0 | Status: CANCELLED | OUTPATIENT
Start: 2020-06-30

## 2020-06-30 RX ORDER — METHYLPHENIDATE HYDROCHLORIDE 10 MG/1
20 TABLET ORAL 2 TIMES DAILY
Qty: 360 TABLET | Refills: 0 | Status: CANCELLED | OUTPATIENT
Start: 2020-06-30

## 2020-06-30 NOTE — TELEPHONE ENCOUNTER
States there are issues with his medications and is wanting a call back as soon as possible. Please call

## 2020-06-30 NOTE — TELEPHONE ENCOUNTER
We can do a video visit 7/1 -- after lunch (okay to use same day spot).   Please verify with patient.     Jorge Barnett MD

## 2020-06-30 NOTE — TELEPHONE ENCOUNTER
Patient needs Dextrostat and Ritalin refill.  Patient was offered a 30 day supply and a 14 day supply from Dr. Loja with a telephone visit yesterday.  Patient reports only having 4 tablets of each left at this time.      Barbara Sloan LPN 6/30/2020 2:40 PM

## 2020-07-01 ENCOUNTER — VIRTUAL VISIT (OUTPATIENT)
Dept: INTERNAL MEDICINE | Facility: OTHER | Age: 70
End: 2020-07-01
Attending: INTERNAL MEDICINE
Payer: COMMERCIAL

## 2020-07-01 VITALS
DIASTOLIC BLOOD PRESSURE: 70 MMHG | BODY MASS INDEX: 28.7 KG/M2 | WEIGHT: 200 LBS | SYSTOLIC BLOOD PRESSURE: 130 MMHG | HEART RATE: 55 BPM

## 2020-07-01 DIAGNOSIS — Z02.89 PAIN MEDICATION AGREEMENT: ICD-10-CM

## 2020-07-01 DIAGNOSIS — M54.10 RADICULAR LOW BACK PAIN: ICD-10-CM

## 2020-07-01 DIAGNOSIS — M50.30 DEGENERATIVE DISC DISEASE, CERVICAL: ICD-10-CM

## 2020-07-01 DIAGNOSIS — G47.419 PRIMARY NARCOLEPSY WITHOUT CATAPLEXY: Primary | ICD-10-CM

## 2020-07-01 DIAGNOSIS — Z79.891 LONG TERM CURRENT USE OF OPIATE ANALGESIC: ICD-10-CM

## 2020-07-01 PROCEDURE — 99213 OFFICE O/P EST LOW 20 MIN: CPT | Mod: 95 | Performed by: INTERNAL MEDICINE

## 2020-07-01 RX ORDER — DEXTROAMPHETAMINE SULFATE 10 MG/1
10 TABLET ORAL 4 TIMES DAILY
Qty: 360 TABLET | Refills: 0 | Status: SHIPPED | OUTPATIENT
Start: 2020-07-01 | End: 2020-09-30

## 2020-07-01 RX ORDER — METHYLPHENIDATE HYDROCHLORIDE 10 MG/1
20 TABLET ORAL 2 TIMES DAILY
Qty: 360 TABLET | Refills: 0 | Status: SHIPPED | OUTPATIENT
Start: 2020-07-01 | End: 2020-09-30

## 2020-07-01 ASSESSMENT — ENCOUNTER SYMPTOMS
CHILLS: 0
HEMATURIA: 0
CONFUSION: 0
SHORTNESS OF BREATH: 0
ARTHRALGIAS: 1
DIARRHEA: 0
COUGH: 0
FEVER: 0
WOUND: 0
BRUISES/BLEEDS EASILY: 1

## 2020-07-01 ASSESSMENT — PAIN SCALES - GENERAL: PAINLEVEL: MODERATE PAIN (4)
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
## 2020-07-01 NOTE — TELEPHONE ENCOUNTER
Spoke with [employer] rep, she is unable to process PA without HCC PCC codes.  These codes come from the pharmacy for the products requested.  Phoned [facility] for HCC PCC codes.    Unable to reach [facility] representative at this time.    Rahda Trammell RN, BSN, [facility]  7/1/2020 10:56 AM

## 2020-07-01 NOTE — NURSING NOTE
Video Visit:  Medication refills, patient declines a refill on Norco at this time.    Previous A1C is at goal of <8  Lab Results   Component Value Date    A1C 7.5 04/21/2020    A1C 7.8 12/31/2019    A1C 7.9 09/30/2019    A1C 8.1 06/26/2019    A1C 7.7 03/08/2019     Urine microalbumin:creatine: n/a  Foot exam unknown-declines  Eye exam 12/2019    Tobacco User no  Patient is on a daily aspirin  Patient is on a Statin.  Blood pressure today of:     BP Readings from Last 1 Encounters:   06/29/20 114/68      is at the goal of <139/89 for diabetics.    Barbara Sloan LPN on 7/1/2020 at 1:25 PM

## 2020-07-02 ENCOUNTER — MYC MEDICAL ADVICE (OUTPATIENT)
Dept: PEDIATRICS | Facility: OTHER | Age: 70
End: 2020-07-02

## 2020-07-07 NOTE — TELEPHONE ENCOUNTER
Patient Experience Coordinator informed of this uberlifehart message and will contact patient.      Barbara Sloan LPN 7/7/2020 9:40 AM

## 2020-07-22 NOTE — TELEPHONE ENCOUNTER
Patient happy he received his sterile dressing and IV Prep supplies for pump and cgm at the end of June.      Radha Trammell RN, BSN, [facility]  7/22/2020 11:16 AM

## 2020-07-24 ENCOUNTER — TELEPHONE (OUTPATIENT)
Dept: INTERNAL MEDICINE | Facility: OTHER | Age: 70
End: 2020-07-24

## 2020-07-24 DIAGNOSIS — E03.4 HYPOTHYROIDISM DUE TO ACQUIRED ATROPHY OF THYROID: ICD-10-CM

## 2020-07-24 DIAGNOSIS — I50.32 CHRONIC DIASTOLIC HEART FAILURE (H): ICD-10-CM

## 2020-07-24 DIAGNOSIS — E78.2 MIXED HYPERLIPIDEMIA: ICD-10-CM

## 2020-07-24 DIAGNOSIS — E11.22 CONTROLLED TYPE 2 DIABETES MELLITUS WITH STAGE 3 CHRONIC KIDNEY DISEASE, WITH LONG-TERM CURRENT USE OF INSULIN (H): ICD-10-CM

## 2020-07-24 DIAGNOSIS — Z79.4 CONTROLLED TYPE 2 DIABETES MELLITUS WITH STAGE 3 CHRONIC KIDNEY DISEASE, WITH LONG-TERM CURRENT USE OF INSULIN (H): ICD-10-CM

## 2020-07-24 DIAGNOSIS — Z79.891 LONG TERM CURRENT USE OF OPIATE ANALGESIC: ICD-10-CM

## 2020-07-24 DIAGNOSIS — N18.30 CONTROLLED TYPE 2 DIABETES MELLITUS WITH STAGE 3 CHRONIC KIDNEY DISEASE, WITH LONG-TERM CURRENT USE OF INSULIN (H): ICD-10-CM

## 2020-07-24 LAB
ALBUMIN SERPL-MCNC: 4.4 G/DL (ref 3.5–5.7)
ALP SERPL-CCNC: 62 U/L (ref 34–104)
ALT SERPL W P-5'-P-CCNC: 33 U/L (ref 7–52)
ANION GAP SERPL CALCULATED.3IONS-SCNC: 8 MMOL/L (ref 3–14)
AST SERPL W P-5'-P-CCNC: 26 U/L (ref 13–39)
BILIRUB SERPL-MCNC: 0.8 MG/DL (ref 0.3–1)
BUN SERPL-MCNC: 26 MG/DL (ref 7–25)
CALCIUM SERPL-MCNC: 9.7 MG/DL (ref 8.6–10.3)
CHLORIDE SERPL-SCNC: 103 MMOL/L (ref 98–107)
CHOLEST SERPL-MCNC: 138 MG/DL
CO2 SERPL-SCNC: 26 MMOL/L (ref 21–31)
CREAT SERPL-MCNC: 1.66 MG/DL (ref 0.7–1.3)
ERYTHROCYTE [DISTWIDTH] IN BLOOD BY AUTOMATED COUNT: 12.6 % (ref 10–15)
GFR SERPL CREATININE-BSD FRML MDRD: 41 ML/MIN/{1.73_M2}
GLUCOSE SERPL-MCNC: 111 MG/DL (ref 70–105)
HBA1C MFR BLD: 8.3 % (ref 4–6)
HCT VFR BLD AUTO: 43.2 % (ref 40–53)
HDLC SERPL-MCNC: 35 MG/DL (ref 23–92)
HGB BLD-MCNC: 14.7 G/DL (ref 13.3–17.7)
LDLC SERPL CALC-MCNC: 50 MG/DL
MCH RBC QN AUTO: 33 PG (ref 26.5–33)
MCHC RBC AUTO-ENTMCNC: 34 G/DL (ref 31.5–36.5)
MCV RBC AUTO: 97 FL (ref 78–100)
NONHDLC SERPL-MCNC: 103 MG/DL
PLATELET # BLD AUTO: 197 10E9/L (ref 150–450)
POTASSIUM SERPL-SCNC: 4.1 MMOL/L (ref 3.5–5.1)
PROT SERPL-MCNC: 6.8 G/DL (ref 6.4–8.9)
RBC # BLD AUTO: 4.46 10E12/L (ref 4.4–5.9)
SODIUM SERPL-SCNC: 137 MMOL/L (ref 134–144)
TRIGL SERPL-MCNC: 264 MG/DL
TSH SERPL DL<=0.05 MIU/L-ACNC: 1.07 IU/ML (ref 0.34–5.6)
WBC # BLD AUTO: 5.3 10E9/L (ref 4–11)

## 2020-07-24 PROCEDURE — 80053 COMPREHEN METABOLIC PANEL: CPT | Mod: ZL | Performed by: INTERNAL MEDICINE

## 2020-07-24 PROCEDURE — 85027 COMPLETE CBC AUTOMATED: CPT | Mod: ZL | Performed by: INTERNAL MEDICINE

## 2020-07-24 PROCEDURE — 36415 COLL VENOUS BLD VENIPUNCTURE: CPT | Mod: ZL | Performed by: INTERNAL MEDICINE

## 2020-07-24 PROCEDURE — 84443 ASSAY THYROID STIM HORMONE: CPT | Mod: ZL | Performed by: INTERNAL MEDICINE

## 2020-07-24 PROCEDURE — 80307 DRUG TEST PRSMV CHEM ANLYZR: CPT | Mod: ZL | Performed by: INTERNAL MEDICINE

## 2020-07-24 PROCEDURE — 80061 LIPID PANEL: CPT | Mod: ZL | Performed by: INTERNAL MEDICINE

## 2020-07-24 PROCEDURE — 83036 HEMOGLOBIN GLYCOSYLATED A1C: CPT | Mod: ZL | Performed by: INTERNAL MEDICINE

## 2020-07-24 NOTE — TELEPHONE ENCOUNTER
S-(situation): Low blood sugars    B-(background): Patient has been fasting for labs. Appointment is at 3pm Patient is concerned he would not make it.    A-(assessment): Patient reports he CGM is reading 99. Patient does not have symptoms related to low blood sugars at this time.    R-(recommendations): Spoke with Dr. Barnett about the patient's concerns. He relayed that the patient does not need to fast for this lab test. Relayed the information provided to Dr. Barnett. Patient reported understanding. Patient had no further questions at the end of the phone call. Alma Judge RN  ....................  7/24/2020   10:29 AM

## 2020-07-24 NOTE — LETTER
July 28, 2020      Moses Whittaker  [address] [address]        Dear ,    We are writing to inform you of your test results.    Diabetes is suddenly uncontrolled. A1c is high. Goal is 7.9% or lower. Keep working to get rid of the high glucose levels. (usually the after eating spikes)    Labs are otherwise stable. Continue current medications.   Plan to recheck labs again in 3 to 4 months.     Jorge Barnett MD      Resulted Orders   TSH Reflex GH   Result Value Ref Range    TSH Reflex 1.07 0.34 - 5.60 IU/mL   Lipid Profile   Result Value Ref Range    Cholesterol 138 <200 mg/dL    Triglycerides 264 (H) <150 mg/dL      Comment:      Borderline high:  150-199 mg/dl  High:             200-499 mg/dl  Very high:       >499 mg/dl      HDL Cholesterol 35 23 - 92 mg/dL    LDL Cholesterol Calculated 50 <100 mg/dL      Comment:      Desirable:       <100 mg/dl    Non HDL Cholesterol 103 <130 mg/dL   Comprehensive metabolic panel   Result Value Ref Range    Sodium 137 134 - 144 mmol/L    Potassium 4.1 3.5 - 5.1 mmol/L    Chloride 103 98 - 107 mmol/L    Carbon Dioxide 26 21 - 31 mmol/L    Anion Gap 8 3 - 14 mmol/L    Glucose 111 (H) 70 - 105 mg/dL    Urea Nitrogen 26 (H) 7 - 25 mg/dL    Creatinine 1.66 (H) 0.70 - 1.30 mg/dL    GFR Estimate 41 (L) >60 mL/min/[1.73_m2]    GFR Estimate If Black 50 (L) >60 mL/min/[1.73_m2]    Calcium 9.7 8.6 - 10.3 mg/dL    Bilirubin Total 0.8 0.3 - 1.0 mg/dL    Albumin 4.4 3.5 - 5.7 g/dL    Protein Total 6.8 6.4 - 8.9 g/dL    Alkaline Phosphatase 62 34 - 104 U/L    ALT 33 7 - 52 U/L    AST 26 13 - 39 U/L   CBC with platelets   Result Value Ref Range    WBC 5.3 4.0 - 11.0 10e9/L    RBC Count 4.46 4.4 - 5.9 10e12/L    Hemoglobin 14.7 13.3 - 17.7 g/dL    Hematocrit 43.2 40.0 - 53.0 %    MCV 97 78 - 100 fl    MCH 33.0 26.5 - 33.0 pg    MCHC 34.0 31.5 - 36.5 g/dL    RDW 12.6 10.0 - 15.0 %    Platelet Count 197 150 - 450 10e9/L   Hemoglobin A1c   Result Value Ref  Range    Hemoglobin A1C 8.3 (H) 4.0 - 6.0 %       If you have any questions or concerns, please call the clinic at the number listed above.       Sincerely,         LAB

## 2020-07-28 ENCOUNTER — OFFICE VISIT (OUTPATIENT)
Dept: INTERNAL MEDICINE | Facility: OTHER | Age: 70
End: 2020-07-28
Attending: INTERNAL MEDICINE
Payer: COMMERCIAL

## 2020-07-28 VITALS
BODY MASS INDEX: 30.46 KG/M2 | TEMPERATURE: 98.5 F | HEART RATE: 66 BPM | RESPIRATION RATE: 16 BRPM | SYSTOLIC BLOOD PRESSURE: 122 MMHG | WEIGHT: 201 LBS | DIASTOLIC BLOOD PRESSURE: 56 MMHG | HEIGHT: 68 IN | OXYGEN SATURATION: 96 %

## 2020-07-28 DIAGNOSIS — Z00.00 ENCOUNTER FOR MEDICARE ANNUAL WELLNESS EXAM: ICD-10-CM

## 2020-07-28 DIAGNOSIS — M54.41 CHRONIC BILATERAL LOW BACK PAIN WITH BILATERAL SCIATICA: ICD-10-CM

## 2020-07-28 DIAGNOSIS — H91.93 HEARING PROBLEM OF BOTH EARS: ICD-10-CM

## 2020-07-28 DIAGNOSIS — M54.42 CHRONIC BILATERAL LOW BACK PAIN WITH BILATERAL SCIATICA: ICD-10-CM

## 2020-07-28 DIAGNOSIS — G89.29 CHRONIC BILATERAL LOW BACK PAIN WITH BILATERAL SCIATICA: ICD-10-CM

## 2020-07-28 DIAGNOSIS — I50.32 CHRONIC HEART FAILURE WITH PRESERVED EJECTION FRACTION (H): ICD-10-CM

## 2020-07-28 DIAGNOSIS — I25.118 ATHEROSCLEROSIS OF NATIVE CORONARY ARTERY OF NATIVE HEART WITH STABLE ANGINA PECTORIS (H): ICD-10-CM

## 2020-07-28 DIAGNOSIS — I87.2 VENOUS STASIS DERMATITIS OF LEFT LOWER EXTREMITY: ICD-10-CM

## 2020-07-28 DIAGNOSIS — Z86.31 HISTORY OF DIABETIC ULCER OF FOOT: ICD-10-CM

## 2020-07-28 DIAGNOSIS — Z13.6 SCREENING FOR AAA (ABDOMINAL AORTIC ANEURYSM): ICD-10-CM

## 2020-07-28 PROBLEM — A04.72 COLITIS DUE TO CLOSTRIDIUM DIFFICILE: Status: RESOLVED | Noted: 2020-01-05 | Resolved: 2020-07-28

## 2020-07-28 PROCEDURE — G0438 PPPS, INITIAL VISIT: HCPCS | Performed by: INTERNAL MEDICINE

## 2020-07-28 PROCEDURE — G0463 HOSPITAL OUTPT CLINIC VISIT: HCPCS

## 2020-07-28 PROCEDURE — 99214 OFFICE O/P EST MOD 30 MIN: CPT | Mod: 25 | Performed by: INTERNAL MEDICINE

## 2020-07-28 ASSESSMENT — ENCOUNTER SYMPTOMS
FEVER: 0
WOUND: 1
HEMATURIA: 0
ADENOPATHY: 0
CHOKING: 0
HEADACHES: 1
MYALGIAS: 1
SHORTNESS OF BREATH: 0
FATIGUE: 0
STRIDOR: 0
CONFUSION: 0
PALPITATIONS: 0
CHILLS: 0
WHEEZING: 0
CHEST TIGHTNESS: 0
BACK PAIN: 1
NECK STIFFNESS: 1
BRUISES/BLEEDS EASILY: 1
BLOOD IN STOOL: 1
COUGH: 0
NECK PAIN: 1
DIARRHEA: 1
ARTHRALGIAS: 1
ABDOMINAL PAIN: 0

## 2020-07-28 ASSESSMENT — MIFFLIN-ST. JEOR: SCORE: 1655.2

## 2020-07-28 NOTE — PROGRESS NOTES
"SUBJECTIVE:   Moses Whittaker is a 70 year old male who presents for Preventive Visit.  Are you in the first 12 months of your Medicare Part B coverage?  No    Physical Health:    In general, how would you rate your overall physical health? fair    Outside of work, how many days during the week do you exercise? none    Outside of work, approximately how many minutes a day do you exercise?15-30 minutes    If you drink alcohol do you typically have >3 drinks per day or >7 drinks per week? Not Applicable    Do you usually eat at least 4 servings of fruit and vegetables a day, include whole grains & fiber and avoid regularly eating high fat or \"junk\" foods? Yes    Do you have any problems taking medications regularly?  No    Do you have any side effects from medications? possible reaction to Nifedipine?    Needs assistance for the following daily activities: no assistance needed    Which of the following safety concerns are present in your home?  lack of grab bars in the bathroom and lack of handrails on stairs     Hearing impairment: Yes, Difficulty following a conversation in a noisy restaurant or crowded room.    Feel that people are mumbling or not speaking clearly.    Difficult to understand a speaker at a public meeting or Bahai service.    Need to ask people to speak up or repeat themselves.    Find that men's voices are easier to understand than women's.    In the past 6 months, have you been bothered by leaking of urine? Only occasionally    Mental Health:    In general, how would you rate your overall mental or emotional health? good  PHQ-2 Score: 0    Do you feel safe in your environment? Yes    Have you ever done Advance Care Planning? (For example, a Health Directive, POLST, or a discussion with a medical provider or your loved ones about your wishes): Yes, advance care planning is on file.    Additional concerns to address?      Fall risk:  Fallen 2 or more times in the past year?: Yes  Any fall with " injury in the past year?: No  Timed Up and Go Test (>13.5 is fall risk; contact physician) : 3    Cognitive Screenin) Repeat 3 items (Leader, Season, Table)    2) Clock draw: NORMAL  3) 3 item recall: Recalls 2 objects   Results: NORMAL clock, 1-2 items recalled: COGNITIVE IMPAIRMENT LESS LIKELY    Mini-CogTM Copyright TIMI Rios. Licensed by the author for use in [facility]; reprinted with permission ([email or web address]). All rights reserved.      Do you have sleep apnea, excessive snoring or daytime drowsiness?: no    Reviewed and updated as needed this visit by clinical staff  Tobacco  Allergies  Meds  Problems  Med Hx  Surg Hx  Fam Hx  Soc Hx        Reviewed and updated as needed this visit by Provider  Tobacco  Allergies  Meds  Problems  Med Hx  Surg Hx  Fam Hx        Social History     Tobacco Use     Smoking status: Never Smoker     Smokeless tobacco: Never Used   Substance Use Topics     Alcohol use: No     Alcohol/week: 0.0 standard drinks                           Current providers sharing in care for this patient include:   Patient Care Team:  Jorge Barnett MD as PCP - General (Internal Medicine)  Jorge Barnett MD as Assigned PCP  Silvio Jaramillo [facility] as Pharmacist (Pharmacist Clinician- Clinical Pharmacy Specialist)  Radha Trammell RN as Diabetes Educator (Diabetes Education)    The following health maintenance items are reviewed in Epic and correct as of today:  Health Maintenance   Topic Date Due     HEPATITIS B IMMUNIZATION (2 of 3 - Hep B Twinrix risk 3-dose series) 2014     MEDICARE ANNUAL WELLNESS VISIT  2016     PNEUMOCOCCAL IMMUNIZATION 65+ LOW/MEDIUM RISK (1 of 2 - PCV13) 2016     AORTIC ANEURYSM SCREENING (SYSTEM ASSIGNED)  2016     ZOSTER IMMUNIZATION (1 of 2) 2021 (Originally 2001)     INFLUENZA VACCINE (1) 2020     A1C  2021     BMP  2021     MICROALBUMIN  2021     EYE EXAM  2021     FALL RISK  "ASSESSMENT  06/29/2021     LIPID  07/24/2021     URINE DRUG SCREEN  07/24/2021     DIABETIC FOOT EXAM  07/28/2021     DTAP/TDAP/TD IMMUNIZATION (3 - Td) 08/27/2022     ADVANCE CARE PLANNING  01/14/2024     COLORECTAL CANCER SCREENING  04/18/2029     PHQ-2  Completed     HEPATITIS C SCREENING  Addressed     IPV IMMUNIZATION  Aged Out     MENINGITIS IMMUNIZATION  Aged Out     Review of Systems   Constitutional: Negative for chills, fatigue and fever.   HENT: Positive for hearing loss. Negative for congestion.    Eyes: Negative for visual disturbance.   Respiratory: Negative for cough, choking, chest tightness, shortness of breath, wheezing and stridor.    Cardiovascular: Positive for chest pain (chronic, stable) and leg swelling. Negative for palpitations.        + claudication leg pain, worse in left leg with exercise/ambulation  + intermittent Angina - if carrying groceries, reports stable   Gastrointestinal: Positive for blood in stool and diarrhea. Negative for abdominal pain.        + past 2-3 weeks... passing some mucus, loose stools, and scant amount of red blood.    Genitourinary: Negative for hematuria.   Musculoskeletal: Positive for arthralgias, back pain, gait problem, myalgias, neck pain and neck stiffness.   Skin: Positive for wound (+right foot-2nd toe  , DM toe ulcer). Negative for rash.   Neurological: Positive for headaches. Negative for syncope.   Hematological: Negative for adenopathy. Bruises/bleeds easily.   Psychiatric/Behavioral: Negative for confusion.        OBJECTIVE:   /56 (BP Location: Right arm, Patient Position: Sitting, Cuff Size: Adult Large)   Pulse 66   Temp 98.5  F (36.9  C) (Tympanic)   Resp 16   Ht 1.734 m (5' 8.25\")   Wt 91.2 kg (201 lb)   SpO2 96%   BMI 30.34 kg/m   Estimated body mass index is 30.34 kg/m  as calculated from the following:    Height as of this encounter: 1.734 m (5' 8.25\").    Weight as of this encounter: 91.2 kg (201 lb).  Physical " Exam  Constitutional:       General: He is not in acute distress.     Appearance: Normal appearance. He is well-developed. He is obese. He is not diaphoretic.   HENT:      Head: Normocephalic and atraumatic.   Eyes:      General: No scleral icterus.     Conjunctiva/sclera: Conjunctivae normal.   Neck:      Musculoskeletal: Neck supple.   Cardiovascular:      Rate and Rhythm: Normal rate and regular rhythm.   Pulmonary:      Effort: Pulmonary effort is normal.      Breath sounds: Normal breath sounds.   Abdominal:      Palpations: Abdomen is soft.      Tenderness: There is no abdominal tenderness.   Musculoskeletal:         General: No deformity.   Lymphadenopathy:      Cervical: No cervical adenopathy.   Skin:     General: Skin is warm and dry.      Findings: Erythema (venous stasis changes bilateral lower legs -- left with some redness) present. No rash.      Comments: Toe ulcer right 2nd toe - bandaged.    Diabetic Foot Exam:  Findings:   LEFT: normal DP and PT pulses and reduced sensation to light touch, preulcerative callous on heel    RIGHT: normal DP and PT pulses and reduced sensation to light touch, healed ulcer on right 2nd toe.     Neurological:      Mental Status: He is alert and oriented to person, place, and time. Mental status is at baseline.   Psychiatric:         Mood and Affect: Mood normal.         Behavior: Behavior normal.          Diagnostic Test Results:  Labs reviewed in Epic    ASSESSMENT / PLAN:       ICD-10-CM    1. Uncontrolled type 2 diabetes mellitus with stage 3 chronic kidney disease, with long-term current use of insulin (H)  E11.22     E11.65     N18.3     Z79.4    2. Hearing problem of both ears  H91.93 AUDIOLOGY ADULT REFERRAL   3. Encounter for Medicare annual wellness exam  Z00.00    4. Chronic heart failure with preserved ejection fraction (H)  I50.32    5. Chronic bilateral low back pain with bilateral sciatica  M54.42     M54.41     G89.29    6. Atherosclerosis of native  coronary artery of native heart with stable angina pectoris (H)  I25.118    7. Venous stasis dermatitis of left lower extremity  I87.2    8. History of diabetic ulcer of foot - right 2nd toe  Z86.31    9. Screening for AAA (abdominal aortic aneurysm)  Z13.6  Aorta Medicare AAA Screening      Patient presents for annual Medicare wellness visit as well as follow-up of multiple issues.    Uncontrolled type 2 diabetes.  Blood sugars have been high.  New hemoglobin A1c is high.  States that for almost a month he was eating sugar sweetened yogurt.  He suffered from C. difficile colitis possibly 2 rounds of this.  Had his appendix removed years ago.  He plans on making dietary changes to fix the blood sugars rather than changing his diabetic medications.    Stage III chronic kidney disease, has been relatively stable.  Encouraged NSAID avoidance.    Hearing loss, hearing problems bilateral ears.  Had some loud noise exposure in the past.  He would like to get audiology evaluation.  Reports his hearing is worsening.  Referral sent.    Heart failure with preserved ejection fraction, still having issues with fluid retention at times.  He will throw his legs up to wear his compression stockings and after couple of days generally the swelling in his feet will improve.  Has had some cellulitis of the legs in the past.  His left leg has some reddish shiny areas that are at risk of breaking open again.  He plans to get his legs up when he gets home and generally wears his compression stockings throughout the day every day.    Bilateral low back pain with sciatica bilaterally, ongoing, chronic.  Limits his walking ability some.  He also has peripheral vascular disease and stable exertional angina.    Coronary artery disease with angina, stable with current medication regimen.  He has to take his pills separately throughout the day including his Ranexa and Coreg due to intolerances with higher doses as well as with difficulty  "swallowing.  Also takes his Crestor separately morning and evening dosing because of intolerance and myalgia issues.    Left lower extremity venous stasis dermatitis, chronic.    Healing diabetic foot ulcer, right second toe.  States that the skin has closed up at this time but is always at risk of breaking open again.    Screening abdominal aortic aneurysm ultrasound ordered.    Due for pneumococcal immunization, he declines today.  Due for hepatitis B immunization, he declines today.    COUNSELING:  Reviewed preventive health counseling, as reflected in patient instructions  Special attention given to:       Consider AAA screening for ages 65-75 and smoking history       Regular exercise       Healthy diet/nutrition       Vision screening       Hearing screening       Dental care       Bladder control       Fall risk prevention       Immunizations    In addition to the preventive visit, 25 minutes spent in face-to-face interaction with patient (separate from separately billed procedures) with greater than 50% spent in counseling and care coordination for his additional concern(s).     Estimated body mass index is 30.34 kg/m  as calculated from the following:    Height as of this encounter: 1.734 m (5' 8.25\").    Weight as of this encounter: 91.2 kg (201 lb).    Weight management plan: Discussed healthy diet and exercise guidelines     reports that he has never smoked. He has never used smokeless tobacco.      Appropriate preventive services were discussed with this patient, including applicable screening as appropriate for cardiovascular disease, diabetes, osteopenia/osteoporosis, and glaucoma.  As appropriate for age/gender, discussed screening for colorectal cancer, prostate cancer, breast cancer, and cervical cancer. Checklist reviewing preventive services available has been given to the patient.    Reviewed patients plan of care and provided an AVS. The Complex Care Plan (for patients with higher acuity and " needing more deliberate coordination of services) for Moses meets the Care Plan requirement. This Care Plan has been established and reviewed with the Patient.    Counseling Resources:  ATP IV Guidelines  Pooled Cohorts Equation Calculator  Breast Cancer Risk Calculator  FRAX Risk Assessment  ICSI Preventive Guidelines  Dietary Guidelines for Americans, 2010  USDA's MyPlate  ASA Prophylaxis  Lung CA Screening    Jorge Barnett MD  [facility]    He is at risk for falling and has been provided with information to reduce the risk of falling at home.

## 2020-07-28 NOTE — PATIENT INSTRUCTIONS
Diabetes has gotten out of control. A1c is too high.     Keep working to get your blood sugars down.     Recent Labs   Lab Test 07/24/20  1520 06/02/20  0415 05/31/20  0625 05/30/20  0745  04/21/20  1512  12/31/19  1425   A1C 8.3*  --   --   --   --  7.5*  --  7.8*   LDL 50  --   --   --   --  41  --  57   HDL 35  --   --   --   --  36  --  36   TRIG 264*  --   --   --   --  133  --  219*   ALT 33  --  27 19   < > 28   < > 27   CR 1.66* 1.86* 1.87* 1.68*   < > 1.63*   < > 1.42*   GFRESTIMATED 41* 36* 36* 41*   < > 42*   < > 50*   GFRESTBLACK 50* 44* 44* 49*   < > 51*   < > 60*   POTASSIUM 4.1 4.1 4.2 4.3   < > 4.0   < > 4.1    < > = values in this interval not displayed.        Return for Diabetes labs and clinic follow-up appointment every 3 to 4 months.  --- (Go for about 91 to 100 days)    Aspects of Diabetes we can improve:  Hemoglobin A1c Lab Results   Component Value Date    A1C 8.3 07/24/2020    A1C 7.5 04/21/2020    A1C 7.8 12/31/2019    A1C 7.9 09/30/2019    A1C 8.1 06/26/2019    Goal range is under 8. Best is 6.5 to 7   Blood Pressure 122/56 Goal to keep less than 140/90   Tobacco  reports that he has never smoked. He has never used smokeless tobacco. Goal to abstain from tobacco   Eye Exam -- Do Yearly -- Annual diabetic eye exam   Healthy weight Body mass index is 30.34 kg/m . Goal BMI under 30, best is under 25.      -- Trying to exercise daily (goal at least 20 min/day) with moderate aerobic activity   -- Eat healthy (resources from ADA at http://www.diabetes.org/)   -- Taking good care of my feet. Consider seeing the Podiatrist   -- Check blood sugars as directed, record in log book and bring to every appointment      Schedule lab only appointment --- A few days AFTER: 10/26/20   Schedule clinic appointment with Dr. Barnett -- Same day as labs, or 1-2 days later.     Insurance companies are now grading you and I on your blood sugar control -- Goal is to get your A1c down to 7.9% or lower and NO  Smoking!    -- Medicare and most insurance companies, will only cover Hemoglobin A1c labs to be rechecked every 91+ days.      Hemoglobin A1C   Date Value Ref Range Status   07/24/2020 8.3 (H) 4.0 - 6.0 % Final   04/21/2020 7.5 (H) 4.0 - 6.0 % Final       Next follow-up appointment with Dr. Barnett should be scheduled:  -- Approximately a few days AFTER: 10/26/20    Patient Education   Personalized Prevention Plan  You are due for the preventive services outlined below.  Your care team is available to assist you in scheduling these services.  If you have already completed any of these items, please share that information with your care team to update in your medical record.  Health Maintenance Due   Topic Date Due     Hepatitis B Vaccine (2 of 3 - Hep B Twinrix risk 3-dose series) 08/29/2014     Annual Wellness Visit  02/22/2016     Pneumococcal Vaccine (1 of 2 - PCV13) 02/22/2016     AORTIC ANEURYSM SCREENING (SYSTEM ASSIGNED)  02/22/2016     Diabetic Foot Exam  06/26/2020       Preventing Falls in the Home  An adult or child can fall for many reasons. If you are an older adult, you may fall because your reaction time slows down. Your muscles and joints may get stiff, weak, or less flexible because of illness, medicines, or a physical condition. These things can also make a child more likely to fall or be injured in a fall.  Other health problems that make falls more likely include:    Arthritis    Dizziness or lightheadedness when you get out of bed (orthostatic hypotension)    History of a stroke    Dizziness    Anemia    Certain medicines taken for mental illness or to control blood pressure.    Problems with balance or gait    Bladder or urinary problems    History of falls with or without an injury    Changes in vision (vision impairment)    Changes in thinking skills and memory (cognitive impairment)  Injuries from a fall can include broken bones, dislocated joints, internal bleeding and cuts. When these  injuries are serious enough, they can make it impossible for you or a child who is injured in a fall to live on his or her ownhome.  Prevention tips  To help prevent falls and fall-related injuries, follow the tips below.   Floors  Make floors safer by doing the following:     Put nonskid pads under area rugs.    Remove throw rugs.    Replace worn floor coverings.    Tack carpets firmly to each step on carpeted stairs. Put nonskid strips on the edges of uncarpeted stairs.    Keep floors and stairs free of clutter and cords.    Arrange furniture so there are clear pathways.    Clean up any spills right away.    Wear shoes that fit.  Bathrooms    Make bathrooms safer by doing the following:     Install grab bars in the tub or shower.    Apply nonskid strips or put a nonskid rubber mat in the tub or shower.    Sit on a bath chair to bathe.    Use bathmats with nonskid backing.  Lighting and the environment  Improve lighting in your home by doing the following:     Keep a flashlight in each room. Or put a lamp next to the bed within easy reach.    Put nightlights in the bedrooms, hallways, kitchen, and bathrooms.    Make sure all stairways have good lighting.    Take your time when going up and down stairs.    Put handrails on both sides of stairs and in walkways for more support. To prevent injury to your wrist or arm, don t use handrails to pull yourself up.    Install grab bars to pull yourself up.    Move or rearrange items that you use often. This will make them easier to find or reach.    Look at your home to find any safety hazards. Especially look at doorways, walkways, and the driveway. Remove or repair any safety problems that you find.  Date Last Reviewed: 8/1/2016 2000-2019 [employer]. [address], [place] [address]. All rights reserved. This information is not intended as a substitute for professional medical care. Always follow your healthcare professional's  instructions.

## 2020-07-28 NOTE — NURSING NOTE
Patient presents to clinic today for Medicare Wellness and medication review.  Medication reconciliation completed.  Aniyah Marquez CMA([facility])..................7/28/2020   1:28 PM

## 2020-07-29 LAB — PAIN DRUG SCR UR W RPTD MEDS: NORMAL

## 2020-08-02 ENCOUNTER — ALLIED HEALTH/NURSE VISIT (OUTPATIENT)
Dept: FAMILY MEDICINE | Facility: OTHER | Age: 70
End: 2020-08-02
Attending: FAMILY MEDICINE
Payer: MEDICARE

## 2020-08-02 DIAGNOSIS — Z20.822 COVID-19 RULED OUT: Primary | ICD-10-CM

## 2020-08-02 PROCEDURE — C9803 HOPD COVID-19 SPEC COLLECT: HCPCS

## 2020-08-02 PROCEDURE — U0003 INFECTIOUS AGENT DETECTION BY NUCLEIC ACID (DNA OR RNA); SEVERE ACUTE RESPIRATORY SYNDROME CORONAVIRUS 2 (SARS-COV-2) (CORONAVIRUS DISEASE [COVID-19]), AMPLIFIED PROBE TECHNIQUE, MAKING USE OF HIGH THROUGHPUT TECHNOLOGIES AS DESCRIBED BY CMS-2020-01-R: HCPCS | Mod: ZL | Performed by: FAMILY MEDICINE

## 2020-08-02 PROCEDURE — 99207 ZZC NO CHARGE NURSE ONLY: CPT

## 2020-08-03 ENCOUNTER — HOSPITAL ENCOUNTER (OUTPATIENT)
Dept: ULTRASOUND IMAGING | Facility: OTHER | Age: 70
Discharge: HOME | End: 2020-08-03
Attending: INTERNAL MEDICINE | Admitting: INTERNAL MEDICINE
Payer: MEDICARE

## 2020-08-03 DIAGNOSIS — Z13.6 SCREENING FOR AAA (ABDOMINAL AORTIC ANEURYSM): ICD-10-CM

## 2020-08-03 LAB
SARS-COV-2 RNA SPEC QL NAA+PROBE: NOT DETECTED
SPECIMEN SOURCE: NORMAL

## 2020-08-03 PROCEDURE — 76706 US ABDL AORTA SCREEN AAA: CPT

## 2020-08-06 ENCOUNTER — TRANSFERRED RECORDS (OUTPATIENT)
Dept: HEALTH INFORMATION MANAGEMENT | Facility: OTHER | Age: 70
End: 2020-08-06

## 2020-08-06 ENCOUNTER — TELEPHONE (OUTPATIENT)
Dept: INTERNAL MEDICINE | Facility: OTHER | Age: 70
End: 2020-08-06

## 2020-08-06 LAB — RETINOPATHY: POSITIVE

## 2020-08-06 NOTE — TELEPHONE ENCOUNTER
Patient informed not detected is referred to negative.      Barbara Sloan LPN 8/6/2020 4:19 PM

## 2020-08-06 NOTE — TELEPHONE ENCOUNTER
Patient called stating he does not understand the language of his COVID test. He said it reads 'not detected' but that everyone else's is reading 'negative' please call to clarify with patient.    Aniyah Martin on 8/6/2020 at 3:22 PM

## 2020-08-28 ENCOUNTER — TELEPHONE (OUTPATIENT)
Dept: INTERNAL MEDICINE | Facility: OTHER | Age: 70
End: 2020-08-28

## 2020-08-31 NOTE — TELEPHONE ENCOUNTER
He was wondering about his recent urine sample for his pain drug screen.  His questions were answered to the best of my ability.  Aniyah Marquez CMA([facility])..................8/31/2020   3:42 PM

## 2020-09-15 ENCOUNTER — ALLIED HEALTH/NURSE VISIT (OUTPATIENT)
Dept: FAMILY MEDICINE | Facility: OTHER | Age: 70
End: 2020-09-15
Payer: MEDICARE

## 2020-09-15 DIAGNOSIS — J02.9 SORE THROAT: ICD-10-CM

## 2020-09-15 DIAGNOSIS — R09.81 SINUS CONGESTION: ICD-10-CM

## 2020-09-15 DIAGNOSIS — R05.9 COUGH: Primary | ICD-10-CM

## 2020-09-15 PROCEDURE — C9803 HOPD COVID-19 SPEC COLLECT: HCPCS

## 2020-09-15 PROCEDURE — 99207 ZZC NO CHARGE NURSE ONLY: CPT

## 2020-09-15 PROCEDURE — U0003 INFECTIOUS AGENT DETECTION BY NUCLEIC ACID (DNA OR RNA); SEVERE ACUTE RESPIRATORY SYNDROME CORONAVIRUS 2 (SARS-COV-2) (CORONAVIRUS DISEASE [COVID-19]), AMPLIFIED PROBE TECHNIQUE, MAKING USE OF HIGH THROUGHPUT TECHNOLOGIES AS DESCRIBED BY CMS-2020-01-R: HCPCS | Mod: ZL

## 2020-09-15 NOTE — NURSING NOTE
Patient swabbed for COVID-19 testing.  Symptomatic: Cough, Sore throat, and congestion  Queta Calderón LPN on 9/15/2020 at 12:23 PM

## 2020-09-16 LAB
SARS-COV-2 RNA SPEC QL NAA+PROBE: NOT DETECTED
SPECIMEN SOURCE: NORMAL

## 2020-09-30 ENCOUNTER — VIRTUAL VISIT (OUTPATIENT)
Dept: INTERNAL MEDICINE | Facility: OTHER | Age: 70
End: 2020-09-30
Attending: INTERNAL MEDICINE
Payer: COMMERCIAL

## 2020-09-30 DIAGNOSIS — Z02.89 PAIN MEDICATION AGREEMENT: ICD-10-CM

## 2020-09-30 DIAGNOSIS — N18.30 CKD (CHRONIC KIDNEY DISEASE) STAGE 3, GFR 30-59 ML/MIN (H): ICD-10-CM

## 2020-09-30 DIAGNOSIS — G47.419 PRIMARY NARCOLEPSY WITHOUT CATAPLEXY: ICD-10-CM

## 2020-09-30 DIAGNOSIS — I25.118 ATHEROSCLEROSIS OF NATIVE CORONARY ARTERY OF NATIVE HEART WITH STABLE ANGINA PECTORIS (H): ICD-10-CM

## 2020-09-30 DIAGNOSIS — I50.32 CHRONIC DIASTOLIC HEART FAILURE (H): ICD-10-CM

## 2020-09-30 PROCEDURE — 99214 OFFICE O/P EST MOD 30 MIN: CPT | Mod: 95 | Performed by: INTERNAL MEDICINE

## 2020-09-30 RX ORDER — HYDRALAZINE HYDROCHLORIDE 25 MG/1
25-50 TABLET, FILM COATED ORAL 4 TIMES DAILY
Qty: 360 TABLET | Refills: 11 | Status: SHIPPED | OUTPATIENT
Start: 2020-09-30 | End: 2021-07-28

## 2020-09-30 RX ORDER — METOPROLOL SUCCINATE 50 MG/1
50 TABLET, EXTENDED RELEASE ORAL 2 TIMES DAILY
Qty: 180 TABLET | Refills: 3 | Status: SHIPPED | OUTPATIENT
Start: 2020-09-30 | End: 2021-07-28

## 2020-09-30 RX ORDER — IRBESARTAN 150 MG/1
150 TABLET ORAL 2 TIMES DAILY
Qty: 180 TABLET | Refills: 3 | Status: SHIPPED | OUTPATIENT
Start: 2020-09-30 | End: 2021-07-28

## 2020-09-30 RX ORDER — METHYLPHENIDATE HYDROCHLORIDE 10 MG/1
20 TABLET ORAL 2 TIMES DAILY
Qty: 360 TABLET | Refills: 0 | Status: SHIPPED | OUTPATIENT
Start: 2020-09-30 | End: 2020-12-30

## 2020-09-30 RX ORDER — DEXTROAMPHETAMINE SULFATE 10 MG/1
10 TABLET ORAL 4 TIMES DAILY
Qty: 360 TABLET | Refills: 0 | Status: SHIPPED | OUTPATIENT
Start: 2020-09-30 | End: 2020-12-30

## 2020-09-30 RX ORDER — CLOPIDOGREL BISULFATE 75 MG/1
75 TABLET ORAL DAILY
Qty: 90 TABLET | Refills: 3 | Status: SHIPPED | OUTPATIENT
Start: 2020-09-30 | End: 2021-07-28

## 2020-09-30 ASSESSMENT — ENCOUNTER SYMPTOMS
PALPITATIONS: 0
BACK PAIN: 1
BRUISES/BLEEDS EASILY: 1
NECK STIFFNESS: 1
HEMATURIA: 0
SHORTNESS OF BREATH: 0
DIARRHEA: 1
STRIDOR: 0
ARTHRALGIAS: 1
CHOKING: 0
WOUND: 1
NECK PAIN: 1
FEVER: 0
CONFUSION: 0
CHEST TIGHTNESS: 0
BLOOD IN STOOL: 1
MYALGIAS: 1
WHEEZING: 0
ABDOMINAL PAIN: 0
CHILLS: 0
ADENOPATHY: 0
COUGH: 0
FATIGUE: 0
HEADACHES: 1

## 2020-09-30 NOTE — PROGRESS NOTES
"Moses Whittaker is a 70 year old male who is being evaluated via a billable video visit.      The patient has been notified of following:     \"This video visit will be conducted via a call between you and your physician/provider. We have found that certain health care needs can be provided without the need for an in-person physical exam.  This service lets us provide the care you need with a video conversation.  If a prescription is necessary we can send it directly to your pharmacy.  If lab work is needed we can place an order for that and you can then stop by our lab to have the test done at a later time.    Video visits are billed at different rates depending on your insurance coverage.  Please reach out to your insurance provider with any questions.    If during the course of the call the physician/provider feels a video visit is not appropriate, you will not be charged for this service.\"    Patient has given verbal consent for Video visit? Yes  How would you like to obtain your AVS? Mail a copy  If you are dropped from the video visit, the video invite should be resent to: Text to cell phone: [phone]  Will anyone else be joining your video visit? No    Subjective     Moses Whittaker is a 70 year old male who presents today via video visit for the following health issues:    HPI    Video Start Time: 11:35 AM    Review of Systems   Constitutional: Negative for chills, fatigue and fever.   HENT: Positive for hearing loss. Negative for congestion.    Eyes: Negative for visual disturbance.   Respiratory: Negative for cough, choking, chest tightness, shortness of breath, wheezing and stridor.    Cardiovascular: Positive for chest pain (chronic, stable) and leg swelling. Negative for palpitations.        + claudication leg pain, worse in left leg with exercise/ambulation  + intermittent Angina - if carrying groceries, reports stable   Gastrointestinal: Positive for blood in stool and diarrhea. Negative for " abdominal pain.        + past 2-3 weeks... passing some mucus, loose stools, and scant amount of red blood.    Genitourinary: Negative for hematuria.   Musculoskeletal: Positive for arthralgias, back pain, gait problem, myalgias, neck pain and neck stiffness.   Skin: Positive for wound (right 2nd finger, dorsal, over DIP joint with infection, redness/swelling for past week. picked at area and it got infected. neosporin and bandage last night, feels little better today ). Negative for rash.   Neurological: Positive for headaches. Negative for syncope.   Hematological: Negative for adenopathy. Bruises/bleeds easily.   Psychiatric/Behavioral: Negative for confusion.            Objective           Vitals:  No vitals were obtained today due to virtual visit.    Physical Exam     GENERAL: Healthy, alert and no distress  EYES: Eyes grossly normal to inspection.  No discharge or erythema, or obvious scleral/conjunctival abnormalities.  RESP: No audible wheeze, cough, or visible cyanosis.  No visible retractions or increased work of breathing.    SKIN: right 2nd finger, dorsal, over DIP joint with infection, redness/swelling  NEURO: Cranial nerves grossly intact.  Mentation and speech appropriate for age.  PSYCH: Mentation appears normal, affect normal/bright, judgement and insight intact, normal speech and appearance well-groomed.      Allied Health/Nurse Visit on 09/15/2020   Component Date Value Ref Range Status     COVID-19 Virus PCR to [facility] - So* 09/15/2020 Nasopharyngeal   Final     COVID-19 Virus PCR to [facility] - Re* 09/15/2020 Not Detected   Final    Comment: Collection of multiple specimens from the same patient may be necessary to   detect the virus. The possibility of a false negative should be considered if   the patient's recent exposure or clinical presentation suggests 2019 nCOV   infection and diagnostic tests for other causes of illness are negative.   Repeat testing may be considered in this  setting.  Patient sample was heat inactivated and amplified using the HDPCR SARS-CoV-2   assay ([employer] The HDPCRTM SARS-CoV-2 assay is a reverse   transcription real-time polymerase chain reaction (qRT-PCR) test intended for   the qualitative detection of nucleic acid  from SARS-CoV-2 in human nasopharyngeal swabs, oropharyngeal swabs, anterior   nasal swabs, mid-turbinate nasal swabs as well as nasal aspirate, nasal wash,   and bronchoalveolar lavage (BAL) specimens from individuals who are suspected   of COVID-19 by their healthcare provider.  A negative result does not rule out the presence of real-time PCR inhibitors   in the sp                           ecimen or COVID-19 RNA in concentrations below the limit of detection   of the assay. The possibility of a false negative should be considered if the   patients recent exposure or clinical presentation suggests COVID-19.   Additional testing or repeat testing requires consultation with the   laboratory.  Nasopharyngeal specimen is the preferred choice for swab-based SARS CoV2   testing. When collection of a nasopharyngeal swab is not possible the   following are acceptable alternatives:  an oropharyngeal (OP) specimen collected by a healthcare professional, or a   nasal mid-turbinate (NMT) swab collected by a healthcare professional or by   onsite self-collection (using a flocked tapered swab), or an anterior nares   specimen collected by a healthcare professional or by onsite self-collection   (using a round foam swab). (Centers for Disease Control)  Testing performed by [facility] ([facility]) [address] [place] [address] [address] [facility] [address]  The test performance characteristics were determined by [facility]. It has not been   cleared or approved by the FDA.  The laboratory is regulated under the Clinical Laboratory Improvement   Amendments of 1988 (CLIA-88) as qualified  to perform high-complexity testing.   This test is used for clinical purposes. It should not be regarded as   investigational or for research.               ICD-10-CM    1. Uncontrolled type 2 diabetes mellitus with stage 3 chronic kidney disease, with long-term current use of insulin (H)  E11.22     E11.65     N18.3     Z79.4    2. Atherosclerosis of native coronary artery of native heart with stable angina pectoris (H)  I25.118 clopidogrel (PLAVIX) 75 MG tablet   3. CKD (chronic kidney disease) stage 3, GFR 30-59 ml/min  N18.3 hydrALAZINE (APRESOLINE) 25 MG tablet     metoprolol succinate ER (TOPROL-XL) 50 MG 24 hr tablet   4. Primary narcolepsy without cataplexy  G47.419 methylphenidate (RITALIN) 10 MG tablet     dextroamphetamine (DEXTROSTAT) 10 MG tablet   5. Pain medication agreement  Z02.89 methylphenidate (RITALIN) 10 MG tablet     dextroamphetamine (DEXTROSTAT) 10 MG tablet   6. Chronic diastolic heart failure (H)  I50.32 irbesartan (AVAPRO) 150 MG tablet     Patient contacted via video visit due to virus pandemic.    Uncontrolled type 2 diabetes.  He is due for lab work in about a month.  Last A1c was high.  He has stage III chronic kidney disease, this has been relatively stable.  Encouraged NSAID avoidance.  He does use an insulin pump.  His insulin is currently okay with refills.  Denies hypoglycemia.  Continue current dosing.    Coronary artery disease, still has stable exertional angina.  Continues on Plavix.  Needs refills.    Narcolepsy without cataplexy, controlled substance agreement has been completed.  [facility] website reviewed, no abnormal findings noted.  Proper medication use and misuse reviewed.  Patient has been use medications appropriately.  Urine drug screen is up-to-date.  Currently utilizing combination of dextroamphetamine and methylphenidate.  He has been on this combination for a number of years and has been doing quite well.  Initially started by sleep medicine.  His insurance prefers 3  months prescription, 3-month supply of each medication sent to pharmacy.    Chronic diastolic heart failure, blood pressures are doing well.  Does have some chronic swelling.  Doing well with irbesartan.  Needs refills.    Video-Visit Details    Type of service:  Video Visit    Video End Time: 11:50 AM     Originating Location (pt. Location): Home    Distant Location (provider location):  [facility]     Platform used for Video Visit: Valerio Barnett MD

## 2020-10-05 ENCOUNTER — TELEPHONE (OUTPATIENT)
Dept: INTERNAL MEDICINE | Facility: OTHER | Age: 70
End: 2020-10-05

## 2020-10-05 NOTE — TELEPHONE ENCOUNTER
Patient has infection in finger and it has gotten worse. Patient said he was told to let DJS know if it got worse.    Elvira Beckham on 10/5/2020 at 2:58 PM

## 2020-10-05 NOTE — TELEPHONE ENCOUNTER
Called patient and verified name and date of birth.     He has an infected finger. It is the right index finger at the distal joint next to the nail. Has had it for 2 weeks. Has treated at home with neosporin and bandages. No relief.  He has redness and swelling at the site. He reports the wound has since closed and there is no drainage.  It is very painful for him and was told to call if he was worsening. He uses [facility] pharmacy.     Lisha Peck LPN on 10/5/2020 at 4:34 PM

## 2020-10-06 NOTE — TELEPHONE ENCOUNTER
Sounds like the finger might need to be opened/drained.    Recommend evaluation in surgery clinic today.    Jorge Barnett MD

## 2020-10-08 ENCOUNTER — TELEPHONE (OUTPATIENT)
Dept: INTERNAL MEDICINE | Facility: OTHER | Age: 70
End: 2020-10-08

## 2020-10-08 ENCOUNTER — OFFICE VISIT (OUTPATIENT)
Dept: SURGERY | Facility: OTHER | Age: 70
End: 2020-10-08
Attending: SURGERY
Payer: MEDICARE

## 2020-10-08 ENCOUNTER — VIRTUAL VISIT (OUTPATIENT)
Dept: INTERNAL MEDICINE | Facility: OTHER | Age: 70
End: 2020-10-08
Attending: INTERNAL MEDICINE
Payer: MEDICARE

## 2020-10-08 VITALS
DIASTOLIC BLOOD PRESSURE: 80 MMHG | HEART RATE: 62 BPM | SYSTOLIC BLOOD PRESSURE: 140 MMHG | TEMPERATURE: 97.7 F | BODY MASS INDEX: 30.76 KG/M2 | WEIGHT: 203.8 LBS | RESPIRATION RATE: 18 BRPM

## 2020-10-08 DIAGNOSIS — H61.92 SKIN LESION OF LEFT EAR: ICD-10-CM

## 2020-10-08 DIAGNOSIS — L03.011 CELLULITIS OF FINGER OF RIGHT HAND: Primary | ICD-10-CM

## 2020-10-08 DIAGNOSIS — M79.644 PAIN OF FINGER OF RIGHT HAND: ICD-10-CM

## 2020-10-08 DIAGNOSIS — M54.10 RADICULAR LOW BACK PAIN: ICD-10-CM

## 2020-10-08 DIAGNOSIS — M50.30 DEGENERATIVE DISC DISEASE, CERVICAL: ICD-10-CM

## 2020-10-08 DIAGNOSIS — Z02.89 PAIN MEDICATION AGREEMENT: ICD-10-CM

## 2020-10-08 DIAGNOSIS — L08.9 FINGER INFECTION: Primary | ICD-10-CM

## 2020-10-08 PROCEDURE — 88305 TISSUE EXAM BY PATHOLOGIST: CPT

## 2020-10-08 PROCEDURE — 17999 UNLISTD PX SKN MUC MEMB SUBQ: CPT | Performed by: SURGERY

## 2020-10-08 PROCEDURE — 99214 OFFICE O/P EST MOD 30 MIN: CPT | Mod: 95 | Performed by: INTERNAL MEDICINE

## 2020-10-08 PROCEDURE — G0463 HOSPITAL OUTPT CLINIC VISIT: HCPCS

## 2020-10-08 PROCEDURE — 11443 EXC FACE-MM B9+MARG 2.1-3 CM: CPT | Performed by: SURGERY

## 2020-10-08 PROCEDURE — 11643 EXC F/E/E/N/L MAL+MRG 2.1-3: CPT | Performed by: SURGERY

## 2020-10-08 RX ORDER — HYDROCODONE BITARTRATE AND ACETAMINOPHEN 5; 325 MG/1; MG/1
1 TABLET ORAL EVERY 6 HOURS PRN
Qty: 60 TABLET | Refills: 0 | Status: SHIPPED | OUTPATIENT
Start: 2020-10-08 | End: 2021-07-28

## 2020-10-08 RX ORDER — SULFAMETHOXAZOLE/TRIMETHOPRIM 800-160 MG
1 TABLET ORAL 2 TIMES DAILY
Qty: 20 TABLET | Refills: 0 | Status: SHIPPED | OUTPATIENT
Start: 2020-10-08 | End: 2020-10-18

## 2020-10-08 ASSESSMENT — ENCOUNTER SYMPTOMS
ARTHRALGIAS: 1
NECK STIFFNESS: 1
COUGH: 0
WOUND: 1
BLOOD IN STOOL: 0
FEVER: 0
NECK PAIN: 1
CHEST TIGHTNESS: 0
MYALGIAS: 1
BACK PAIN: 1
PALPITATIONS: 0
CHOKING: 0
HEADACHES: 1
STRIDOR: 0
WHEEZING: 0
CONFUSION: 0
DIARRHEA: 0
BRUISES/BLEEDS EASILY: 1
FATIGUE: 0
SHORTNESS OF BREATH: 0
HEMATURIA: 0
CHILLS: 0
ABDOMINAL PAIN: 0
ADENOPATHY: 0

## 2020-10-08 ASSESSMENT — PAIN SCALES - GENERAL: PAINLEVEL: WORST PAIN (10)

## 2020-10-08 NOTE — PATIENT INSTRUCTIONS
Your incision was closed with stitches that will need to be removed in about 10 days.    It is ok to get the incision wet in the shower on the day after your procedure.     Don't soak in a tub, pool or lake for 1 week.  If you have concerns with increased pain, redness or drainage that is not clear. please call.

## 2020-10-08 NOTE — NURSING NOTE
"Chief Complaint   Patient presents with     Derm Problem     infected right index finger       Initial BP (!) 140/80 (BP Location: Left arm, Patient Position: Sitting, Cuff Size: Adult Large)   Pulse 62   Temp 97.7  F (36.5  C) (Tympanic)   Resp 18   Wt 92.4 kg (203 lb 12.8 oz)   BMI 30.76 kg/m   Estimated body mass index is 30.76 kg/m  as calculated from the following:    Height as of 7/28/20: 1.734 m (5' 8.25\").    Weight as of this encounter: 92.4 kg (203 lb 12.8 oz).  Medication Reconciliation: complete    Keily Sandy LPN  "

## 2020-10-08 NOTE — PROGRESS NOTES
Procedure Note     Pre/Post Operative Diagnosis:   Left ear skin lesion, right index finger infection    Procedure:    Excision of left ear skin lesion, exploration of right index finger     Surgeon: MATTHEW Liriano MD     Local Anesthesia: 1% lidocaine    Indication for the procedure:    This is a 70 year old male patient with left ear skin lesion that has been present for many years.  Patient states that it is not painful but he feels like it is growing and when he touches it it bleeds.  No previous history of skin cancer.   Right index finger swelling over the distal interphalangeal joint patient states it came on acutely and he is tried opening it up with a file but this did not work.  He denies drainage.  He cannot remember an injury to his finger.  Denies fevers, states he is had mild chills.  After explaining the risks to include bleeding, infection, recurrence or need for re-excision, and scarring the patient wished to proceed.    Procedure:   The left ear was prepped and draped in usual sterile fashion with ChloraPrep. After adequate local anesthesia, an elliptical skin incision was made to encompass the lesion, 2.2cm x 1.1 cm with margins. The skin was closed with 3-0 nylon suture in a running fashion.  Triple antibiotic ointment was applied to the incision and was covered with a clean dressing.  The right index finger was prepped and draped in usual sterile fashion with ChloraPrep.  The finger was injected with 1% lidocaine local anesthetic an 11 blade was used to make a 2 mm incision and a fluctuant area on the dorsal side of the finger.  No purulent fluid was found.  The area of swelling was explored and there is no apparent abscess.  The finger was cleansed and wrapped in a clean pressure dressing.    Plan:  The patient will be called with pathology results.  Patient will followup for suture removal  Prescription for Bactrim for finger infection sent to the patient's pharmacy      MATTHEW Liriano MD

## 2020-10-08 NOTE — NURSING NOTE
"Chief Complaint   Patient presents with     Derm Problem     infected right index finger       Initial BP (!) 140/80 (BP Location: Left arm, Patient Position: Sitting, Cuff Size: Adult Large)   Pulse 62   Temp 97.7  F (36.5  C) (Tympanic)   Resp 18   Wt 92.4 kg (203 lb 12.8 oz)   BMI 30.76 kg/m   Estimated body mass index is 30.76 kg/m  as calculated from the following:    Height as of 7/28/20: 1.734 m (5' 8.25\").    Weight as of this encounter: 92.4 kg (203 lb 12.8 oz).  Medication Reconciliation: complete    Keily Sandy LPN     TIMEOUT  [place] Protocol    A. Pre-procedure verification complete     1-relevant information / documentation available, reviewed and properly matched to the patient; 2-consent accurate and complete, 3-equipment and supplies available    B. Site marking complete     Site marked if not in continuous attendance with patient    C. TIME OUT completed     Time Out was conducted just prior to starting procedure to verify the eight required elements: 1-patient identity, 2-consent accurate and complete, 3-position, 4-correct side/site marked (if applicable), 5-procedure, 6-relevant images / results properly labeled and displayed (if applicable), 7-antibiotics / irrigation fluids (if applicable), 8-safety precautions.  "

## 2020-10-08 NOTE — PROGRESS NOTES
"Moses Whittaker is a 70 year old male who is being evaluated via a billable video visit.    The patient has been notified of following:     \"This video visit will be conducted via a call between you and your physician/provider. We have found that certain health care needs can be provided without the need for an in-person physical exam.  This service lets us provide the care you need with a video conversation.  If a prescription is necessary we can send it directly to your pharmacy.  If lab work is needed we can place an order for that and you can then stop by our lab to have the test done at a later time.    Video visits are billed at different rates depending on your insurance coverage.  Please reach out to your insurance provider with any questions.    If during the course of the call the physician/provider feels a video visit is not appropriate, you will not be charged for this service.\"    Patient has given verbal consent for Video visit? Yes  How would you like to obtain your AVS? Mail a copy  If you are dropped from the video visit, the video invite should be resent to: Text to cell phone: [phone]  Will anyone else be joining your video visit? No    Subjective     Moses Whittaker is a 70 year old male who presents today via video visit for the following health issues:    HPI     Video Start Time: 1:55 PM    Review of Systems   Constitutional: Negative for chills, fatigue and fever.   HENT: Positive for hearing loss. Negative for congestion.    Eyes: Negative for visual disturbance.   Respiratory: Negative for cough, choking, chest tightness, shortness of breath, wheezing and stridor.    Cardiovascular: Positive for chest pain (chronic, stable) and leg swelling. Negative for palpitations.        + claudication leg pain, worse in left leg with exercise/ambulation  + intermittent Angina - if carrying groceries, reports stable   Gastrointestinal: Negative for abdominal pain, blood in stool and diarrhea. "   Genitourinary: Negative for hematuria.   Musculoskeletal: Positive for arthralgias, back pain, gait problem, myalgias, neck pain and neck stiffness.   Skin: Positive for wound (right 2nd finger, dorsal, over DIP joint with infection, redness/swelling for past week. Picked at area and it got infected. I&D today in surgery clinic.  Rx sent for Bactrim at surgery appt. asking for refill of his Hydrococone today, has 3 tabs left). Negative for rash.   Neurological: Positive for headaches. Negative for syncope.   Hematological: Negative for adenopathy. Bruises/bleeds easily.   Psychiatric/Behavioral: Negative for confusion.          Objective             Physical Exam     GENERAL: Healthy, alert and no distress  EYES: Eyes grossly normal to inspection.  No discharge or erythema, or obvious scleral/conjunctival abnormalities.  RESP: No audible wheeze, cough, or visible cyanosis.  No visible retractions or increased work of breathing.    SKIN: Left ear with dressing. Right index finger with dressing  NEURO: Cranial nerves grossly intact.  Mentation and speech appropriate for age.  PSYCH: Mentation appears normal, affect normal/bright, judgement and insight intact, normal speech and appearance well-groomed.      Allied Health/Nurse Visit on 09/15/2020   Component Date Value Ref Range Status     COVID-19 Virus PCR to [facility] - So* 09/15/2020 Nasopharyngeal   Final     COVID-19 Virus PCR to [facility] - Re* 09/15/2020 Not Detected   Final    Comment: Collection of multiple specimens from the same patient may be necessary to   detect the virus. The possibility of a false negative should be considered if   the patient's recent exposure or clinical presentation suggests 2019 nCOV   infection and diagnostic tests for other causes of illness are negative.   Repeat testing may be considered in this setting.  Patient sample was heat inactivated and amplified using the HDPCR SARS-CoV-2   assay ([employer] The HDPCRTM SARS-CoV-2  assay is a reverse   transcription real-time polymerase chain reaction (qRT-PCR) test intended for   the qualitative detection of nucleic acid  from SARS-CoV-2 in human nasopharyngeal swabs, oropharyngeal swabs, anterior   nasal swabs, mid-turbinate nasal swabs as well as nasal aspirate, nasal wash,   and bronchoalveolar lavage (BAL) specimens from individuals who are suspected   of COVID-19 by their healthcare provider.  A negative result does not rule out the presence of real-time PCR inhibitors   in the sp                           ecimen or COVID-19 RNA in concentrations below the limit of detection   of the assay. The possibility of a false negative should be considered if the   patients recent exposure or clinical presentation suggests COVID-19.   Additional testing or repeat testing requires consultation with the   laboratory.  Nasopharyngeal specimen is the preferred choice for swab-based SARS CoV2   testing. When collection of a nasopharyngeal swab is not possible the   following are acceptable alternatives:  an oropharyngeal (OP) specimen collected by a healthcare professional, or a   nasal mid-turbinate (NMT) swab collected by a healthcare professional or by   onsite self-collection (using a flocked tapered swab), or an anterior nares   specimen collected by a healthcare professional or by onsite self-collection   (using a round foam swab). (Centers for Disease Control)  Testing performed by [facility] ([facility]) [address] [facility] [address]  The test performance characteristics were determined by [facility]. It has not been   cleared or approved by the FDA.  The laboratory is regulated under the Clinical Laboratory Improvement   Amendments of 1988 (CLIA-88) as qualified to perform high-complexity testing.   This test is used for clinical purposes. It should not be regarded as   investigational or for  research.               ICD-10-CM    1. Finger infection  L08.9 HYDROcodone-acetaminophen (NORCO) 5-325 MG tablet   2. Pain of finger of right hand  M79.644 HYDROcodone-acetaminophen (NORCO) 5-325 MG tablet   3. Radicular low back pain  M54.10 HYDROcodone-acetaminophen (NORCO) 5-325 MG tablet   4. Degenerative disc disease, cervical  M50.30 HYDROcodone-acetaminophen (NORCO) 5-325 MG tablet   5. Pain medication agreement  Z02.89 HYDROcodone-acetaminophen (NORCO) 5-325 MG tablet     Patient has multiple chronic issues.    He was seen by virtual appointment on 9/30.  At that point he had been dealing with finger infection after he had a scratch and was picking at it.  It became infected.  Became much worse.  He calling the clinic asking for antibiotics.  Recommend evaluation by surgery for possible I&D.  This was done this morning.  Also had a lesion on his ear removed today.    He has chronic low back pain, chronic neck pain, degenerative changes in both locations.  Has shooting pain down the legs at times.  Back pain and neck pain are constant and chronic.  Typically utilizes some Tylenol for pain relief but very rarely uses hydrocodone.  Nothing fully alleviates his pain.  Pain is been going on for months to years.  Moderate to severe at times.    The finger is been throbbing significantly since he had the infection started up and has been gradually worsening over the last week or so.  He ended up taking 1 hydrocodone this morning after he got home and a second 1 about an hour later.  Finally getting some pain relief now.  He only has 3 tablets left and would like a refill.  He does not use them very frequently.  Last 60 tablet prescription has lasted him for many months.    Prescription refilled.  Encouraged him to get started on the Bactrim which will be most effective for his finger pain/infection.      Video-Visit Details    Type of service:  Video Visit    Video End Time:2:07 PM    Originating Location (pt.  Location): Home    Distant Location (provider location):  [facility]     Platform used for Video Visit: Valerio

## 2020-10-08 NOTE — TELEPHONE ENCOUNTER
Patient placed with Jorge Barnett MD now for video visit for [place] refill.      Barbara Sloan LPN 10/8/2020 1:50 PM

## 2020-10-15 ENCOUNTER — TELEPHONE (OUTPATIENT)
Dept: SURGERY | Facility: OTHER | Age: 70
End: 2020-10-15

## 2020-10-15 NOTE — TELEPHONE ENCOUNTER
Reason for call: Request for results.    Name of test or procedure: Biopsy    Date of test or procedure: 10/08/20    Location of test or procedure: [facility]    Preferred method for responding to this message: Telephone Call    Phone number patient can be reached at: Home number on file [phone] (home)    If we can't reach you directly, may we leave a detailed response at the number you provided?No

## 2020-10-16 ENCOUNTER — OFFICE VISIT (OUTPATIENT)
Dept: FAMILY MEDICINE | Facility: OTHER | Age: 70
End: 2020-10-16
Attending: FAMILY MEDICINE
Payer: MEDICARE

## 2020-10-16 VITALS
SYSTOLIC BLOOD PRESSURE: 148 MMHG | HEART RATE: 74 BPM | BODY MASS INDEX: 30.79 KG/M2 | DIASTOLIC BLOOD PRESSURE: 80 MMHG | OXYGEN SATURATION: 97 % | WEIGHT: 204 LBS | TEMPERATURE: 97 F | RESPIRATION RATE: 16 BRPM

## 2020-10-16 DIAGNOSIS — R05.9 COUGH: ICD-10-CM

## 2020-10-16 DIAGNOSIS — R53.83 FATIGUE, UNSPECIFIED TYPE: Primary | ICD-10-CM

## 2020-10-16 DIAGNOSIS — R11.0 NAUSEA: ICD-10-CM

## 2020-10-16 LAB
BASOPHILS # BLD AUTO: 0.1 10E9/L (ref 0–0.2)
BASOPHILS NFR BLD AUTO: 1 %
CRP SERPL-MCNC: <1 MG/L
DIFFERENTIAL METHOD BLD: ABNORMAL
EOSINOPHIL # BLD AUTO: 0.1 10E9/L (ref 0–0.7)
EOSINOPHIL NFR BLD AUTO: 1.9 %
ERYTHROCYTE [DISTWIDTH] IN BLOOD BY AUTOMATED COUNT: 12.3 % (ref 10–15)
HCT VFR BLD AUTO: 40.8 % (ref 40–53)
HGB BLD-MCNC: 13.3 G/DL (ref 13.3–17.7)
IMM GRANULOCYTES # BLD: 0 10E9/L (ref 0–0.4)
IMM GRANULOCYTES NFR BLD: 0.6 %
LYMPHOCYTES # BLD AUTO: 1.3 10E9/L (ref 0.8–5.3)
LYMPHOCYTES NFR BLD AUTO: 25.8 %
MCH RBC QN AUTO: 32.8 PG (ref 26.5–33)
MCHC RBC AUTO-ENTMCNC: 32.6 G/DL (ref 31.5–36.5)
MCV RBC AUTO: 101 FL (ref 78–100)
MONOCYTES # BLD AUTO: 0.4 10E9/L (ref 0–1.3)
MONOCYTES NFR BLD AUTO: 7.2 %
NEUTROPHILS # BLD AUTO: 3.3 10E9/L (ref 1.6–8.3)
NEUTROPHILS NFR BLD AUTO: 63.5 %
PLATELET # BLD AUTO: 250 10E9/L (ref 150–450)
RBC # BLD AUTO: 4.06 10E12/L (ref 4.4–5.9)
WBC # BLD AUTO: 5.2 10E9/L (ref 4–11)

## 2020-10-16 PROCEDURE — C9803 HOPD COVID-19 SPEC COLLECT: HCPCS | Performed by: FAMILY MEDICINE

## 2020-10-16 PROCEDURE — U0003 INFECTIOUS AGENT DETECTION BY NUCLEIC ACID (DNA OR RNA); SEVERE ACUTE RESPIRATORY SYNDROME CORONAVIRUS 2 (SARS-COV-2) (CORONAVIRUS DISEASE [COVID-19]), AMPLIFIED PROBE TECHNIQUE, MAKING USE OF HIGH THROUGHPUT TECHNOLOGIES AS DESCRIBED BY CMS-2020-01-R: HCPCS | Mod: ZL | Performed by: FAMILY MEDICINE

## 2020-10-16 PROCEDURE — 86140 C-REACTIVE PROTEIN: CPT | Mod: ZL | Performed by: FAMILY MEDICINE

## 2020-10-16 PROCEDURE — 36415 COLL VENOUS BLD VENIPUNCTURE: CPT | Mod: ZL | Performed by: FAMILY MEDICINE

## 2020-10-16 PROCEDURE — 99214 OFFICE O/P EST MOD 30 MIN: CPT | Performed by: FAMILY MEDICINE

## 2020-10-16 PROCEDURE — 85025 COMPLETE CBC W/AUTO DIFF WBC: CPT | Mod: ZL | Performed by: FAMILY MEDICINE

## 2020-10-16 PROCEDURE — G0463 HOSPITAL OUTPT CLINIC VISIT: HCPCS

## 2020-10-16 RX ORDER — DESOXIMETASONE 2.5 MG/G
CREAM TOPICAL
Status: ON HOLD | COMMUNITY
Start: 2020-10-08 | End: 2023-01-01

## 2020-10-16 ASSESSMENT — PAIN SCALES - GENERAL: PAINLEVEL: MODERATE PAIN (5)

## 2020-10-16 NOTE — NURSING NOTE
Chief Complaint   Patient presents with     Infection     right index     Patient concerned about right index finger. Has a hx of sepsis. Would also like the results of ear biopsy.     Medication Reconciliation: complete    Lindsey Lundy, LPN

## 2020-10-16 NOTE — TELEPHONE ENCOUNTER
Let patient know that Ke Garner MD has not yet addressed results and that he will be back in clinic Monday.  Patient states that his finger is still not getting any better.  He reports that it is still bright red and swollen.  Patient states that he has finished his antibiotics and he feels like he may be starting to go septic again.  Patient states that he feels nauseated and fatigued and that he thinks that he might have been running a fever on and off since yesterday.  Patient given appointment with Dr. Preston as PCP and Ke Garner MD are out of office today.  Ysabel Quintanilla LPN........................10/16/2020  9:41 AM

## 2020-10-16 NOTE — PROGRESS NOTES
"Nursing Notes:   Lindsey Lundy LPN  10/16/2020  2:22 PM  Sign at exiting of workspace  Chief Complaint   Patient presents with     Infection     right index     Patient concerned about right index finger. Has a hx of sepsis. Would also like the results of ear biopsy.     Medication Reconciliation: complete    Lindsey Lundy LPN     SUBJECTIVE:  HPI: Moses Whittaker is a 70 year old male here for concern of feeling rundown, with achy joints/muscles, and a dry cough for the past few days.  He is concerned because the last time he felt this way he was developing sepsis.    He does have a history of recent right index finger abscess, which was incised and drained on 10/8.  Per surgery's note, no purulent fluid was found.  He has been compliant with Bactrim since that visit.  He reports his DIP is still slightly red and swollen, though no acute worsening since his visit.  He does have a follow-up appointment with surgery in 3 days.    Patient reports having upset stomach with nausea off and on since starting his antibiotic.  He is a diabetic on an insulin pump and checks his sugars regularly.  He did have a low this morning and needed to drink pop to normalize his blood sugar.  He denies any watery diarrhea or vomiting.  He denies fevers or chills.  He denies known Covid positive contacts.    Allergies:  Allergies   Allergen Reactions     Famotidine Other (See Comments)     + Caused Headache and Rash -- tolerated Ranitidine and worked well.     Gabapentin      Other reaction(s): Mental Status Change \"felt like in outer space\"     Hydrocortisone Other (See Comments)     Burning and stinging sensation with Hydrocortisone - doesn't help itching or rash -- tolerated Desoximetasone cream and worked well.      Atorvastatin Hives     Celebrex [Celecoxib]      Swelling      Lisinopril Cough     No Clinical Screening - See Comments Hives     Chlorine water     Norvasc [Amlodipine] Other (See Comments)     -- can't " "remember, didn't tolerate.      Pregabalin      Lyrica - Other reaction(s): Mental Status Change     Valdecoxib Swelling     \"bextra\" NSAID     Insulin Aspart Rash     ROS:  Constitutional, HEENT, cardiovascular, pulmonary, GI and  systems are negative, except as otherwise noted.    Past medical, surgical, and family history reviewed and updated as appropriate in the chart.  Relevant social history listed in HPI.    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     Gastritis without bleeding     No Comments Provided     Greater trochanteric bursitis of left hip 6/24/2015     Heart disease     Multiple coronary stents     Narcolepsy without cataplexy     No Comments Provided     Other injury of unspecified body region, initial encounter (CODE)     11/2006,Right calf hematoma     Postsurgical percutaneous transluminal coronary angioplasty status 2/15/2007    Overview:  IMO Update 10/11     Rheumatic fever without heart involvement     Rheumatic fever as a child without valvular heart disease     Venous stasis ulcer of left lower leg with edema of left lower leg (H) 9/4/2019       Past Surgical History:   Procedure Laterality Date     ANGIOPLASTY      12/00, 04/04/04,Repeat angioplasty with lt internal mammary to the lt anterior descending and lt radial artery from aorta to the diagonal.     ANGIOPLASTY      10/01,Angioplasty with 2 vessel CABG the following week from the lt internal mammary to the lt anterior descending and right radial free graft     ANGIOPLASTY      04/2003     ARTHROSCOPY SHOULDER ROTATOR CUFF REPAIR      02/06/2006,Left rotator cuff " repair and bone spur removal by Dr. Giraldo in [place]     COLONOSCOPY      1999     COLONOSCOPY  2016,-- Dr. De La Cruz -- polypectomy     COLONOSCOPY  2019    hyperplastic, follow up 10 years, 30     OTHER SURGICAL HISTORY      ,038171,IR ANGIOGRAM FEMORAL/EXTREMITY ([place]),Unremarkable angiogram at [facility]     OTHER SURGICAL HISTORY      10/05/2004,,PTCA,Angioplasty     OTHER SURGICAL HISTORY      2005,,PTCA,Angioplasty with stent.     OTHER SURGICAL HISTORY      2005,,PTCA,Angioplasty with stent.     OTHER SURGICAL HISTORY      2005,,PTCA,Angioplasty without stent     OTHER SURGICAL HISTORY      2007,[id number],IR ANGIOGRAM FEMORAL/EXTREMITY ([place]),Unremarkable coronary angiogram at [facility].     OTHER SURGICAL HISTORY      1967,SUR38,APPENDECTOMY OPEN     RELEASE CARPAL TUNNEL      11/15/01,Right carpal tunnel release by Dr. Mace     RELEASE CARPAL TUNNEL      2004,Left carpal tunnel release       Family History   Problem Relation Age of Onset     Heart Disease Mother         Heart Disease,Heart problems     Heart Disease Other         Heart Disease,Heart problems     Heart Disease Other         Heart Disease,Heart problems     Ankylosing Spondylitis Son         Ankylosing spondylitis,Ankylosing spondylitis     Other - See Comments Brother          with sudden death following shoulder surgery 2012 -- was off his Plavix for 10 days pre-operatively.     Ankylosing Spondylitis Daughter         Ankylosing spondylitis,-- Dx 2017       Current Outpatient Medications   Medication     aspirin (ASA) 81 MG tablet     blood glucose monitoring (ONETOUCH ULTRA) test strip     Blood Glucose Monitoring Suppl (GLUCOCOM BLOOD GLUCOSE MONITOR) WAQAS     cholecalciferol (VITAMIN D3) 25 mcg (1000 units) capsule     clopidogrel (PLAVIX) 75 MG tablet     co-enzyme Q-10 100 MG CAPS capsule     [employer] ALCOHOL SWABS PADS     desoximetasone (TOPICORT) 0.25 % external cream      desoximetasone (TOPICORT) 0.25 % OINT ointment     dextroamphetamine (DEXTROSTAT) 10 MG tablet     famotidine (PEPCID) 20 MG tablet     Flaxseed, Linseed, (FLAXSEED OIL) 1000 MG CAPS     hydrALAZINE (APRESOLINE) 25 MG tablet     HYDROcodone-acetaminophen (NORCO) 5-325 MG tablet     insulin lispro (HUMALOG VIAL) 100 UNIT/ML vial     Insulin Pen Needle (PEN NEEDLES) 29G X 12MM MISC     irbesartan (AVAPRO) 150 MG tablet     IV Sets-Tubing (SOLUTION ADMINISTRATION SET) MISC     levothyroxine (SYNTHROID/LEVOTHROID) 125 MCG tablet     methylphenidate (RITALIN) 10 MG tablet     metoprolol succinate ER (TOPROL-XL) 50 MG 24 hr tablet     NIFEdipine ER (ADALAT CC) 30 MG 24 hr tablet     nitroGLYcerin (NITROSTAT) 0.4 MG sublingual tablet     order for DME     ranolazine (RANEXA) 500 MG 12 hr tablet     rosuvastatin (CRESTOR) 10 MG tablet     saccharomyces boulardii (FLORASTOR) 250 MG capsule     Saline GEL     sulfamethoxazole-trimethoprim (BACTRIM DS) 800-160 MG tablet     thin (NO BRAND SPECIFIED) lancets     vitamin D2 (ERGOCALCIFEROL) 31032 units (1250 mcg) capsule     vitamin E (TOCOPHEROL) 400 units (360 mg) capsule     No current facility-administered medications for this visit.        Social History     Socioeconomic History     Marital status:      Spouse name: Not on file     Number of children: Not on file     Years of education: Not on file     Highest education level: Not on file   Occupational History     Not on file   Social Needs     Financial resource strain: Not on file     Food insecurity     Worry: Not on file     Inability: Not on file     Transportation needs     Medical: Not on file     Non-medical: Not on file   Tobacco Use     Smoking status: Never Smoker     Smokeless tobacco: Never Used   Substance and Sexual Activity     Alcohol use: No     Alcohol/week: 0.0 standard drinks     Drug use: No     Sexual activity: Yes     Partners: Female   Lifestyle     Physical activity     Days per week:  Not on file     Minutes per session: Not on file     Stress: Not on file   Relationships     Social connections     Talks on phone: Not on file     Gets together: Not on file     Attends Denominational service: Not on file     Active member of club or organization: Not on file     Attends meetings of clubs or organizations: Not on file     Relationship status: Not on file     Intimate partner violence     Fear of current or ex partner: Not on file     Emotionally abused: Not on file     Physically abused: Not on file     Forced sexual activity: Not on file   Other Topics Concern     Parent/sibling w/ CABG, MI or angioplasty before 65F 55M? Not Asked   Social History Narrative    He is on Social Security Disability for coronary artery disease and cervical arthritis and disk disease.   Avid deer obed.  No tobacco.  Lives with his wife independently.     OBJECTIVE:  BP (!) 148/80 (BP Location: Right arm, Patient Position: Sitting, Cuff Size: Adult Regular)   Pulse 74   Temp 97  F (36.1  C) (Tympanic)   Resp 16   Wt 92.5 kg (204 lb)   SpO2 97%   BMI 30.79 kg/m      EXAM:  Constitutional: Alert. No acute distress. Well-groomed, well-hydrated and well-nourished.  Appears stated age.  Head: Normocephalic, atraumatic.  Eyes: EOMI, anicteric, PERRL  OroPharynx: Moist mucous membranes. Dental hygiene adequate. Normal buccal mucosa. Normal pharynx.  Neck: Supple, with no masses or nodes. No lymphadenopathy.  No thyromegaly.  Respiratory: Non-labored respirations. Clear to auscultation bilaterally.  No wheezing, rhonchi, or rales.  Cardiovascular: Regular rate.  No murmur.  No lower extremity edema.  Abdominal: Soft, nontender, non-distended. No abnormal masses or organomegaly.  GI: Bowel sounds are present.  Skin: Warm, dry, intact.  Right index finger DIP with minor swelling and erythema, without fluctuance, ulceration or bleeding.  Surrounding skin is not warm to touch.  Musculoskeletal:  Normal tone and strength  throughout.  Neurologic: A+Ox3. CN 2-12 grossly intact. Normal gait.  Psychiatric: Does not appear anxious or depressed.  Appropriate affect and insight.    Office Visit on 10/16/2020   Component Date Value Ref Range Status     CRP Inflammation 10/16/2020 <1.0  <10.0 mg/L Final     WBC 10/16/2020 5.2  4.0 - 11.0 10e9/L Final     RBC Count 10/16/2020 4.06* 4.4 - 5.9 10e12/L Final     Hemoglobin 10/16/2020 13.3  13.3 - 17.7 g/dL Final     Hematocrit 10/16/2020 40.8  40.0 - 53.0 % Final     MCV 10/16/2020 101* 78 - 100 fl Final     MCH 10/16/2020 32.8  26.5 - 33.0 pg Final     MCHC 10/16/2020 32.6  31.5 - 36.5 g/dL Final     RDW 10/16/2020 12.3  10.0 - 15.0 % Final     Platelet Count 10/16/2020 250  150 - 450 10e9/L Final     Diff Method 10/16/2020 Automated Method   Final     % Neutrophils 10/16/2020 63.5  % Final     % Lymphocytes 10/16/2020 25.8  % Final     % Monocytes 10/16/2020 7.2  % Final     % Eosinophils 10/16/2020 1.9  % Final     % Basophils 10/16/2020 1.0  % Final     % Immature Granulocytes 10/16/2020 0.6  % Final     Absolute Neutrophil 10/16/2020 3.3  1.6 - 8.3 10e9/L Final     Absolute Lymphocytes 10/16/2020 1.3  0.8 - 5.3 10e9/L Final     Absolute Monocytes 10/16/2020 0.4  0.0 - 1.3 10e9/L Final     Absolute Eosinophils 10/16/2020 0.1  0.0 - 0.7 10e9/L Final     Absolute Basophils 10/16/2020 0.1  0.0 - 0.2 10e9/L Final     Abs Immature Granulocytes 10/16/2020 0.0  0 - 0.4 10e9/L Final       ASSESSEMENT AND PLAN:    70-year-old male with history of diabetes and recent the drained abscess, presenting with concerns of fatigue cough and nausea for 3 days.  He does not appear septic on exam, but given his viral symptoms and history of sepsis we will do blood work and a Covid test.  Blood test was within normal limits and he was called with these results.    1. Fatigue, unspecified type  2. Cough  3. Nausea  4.  Myalgia  - CBC and Differential; Future  - CRP inflammation; Future  - Symptomatic COVID-19  Virus (Coronavirus) by PCR  -No cough present on exam  -We discussed supportive home care for a viral infection, including rest hydration and healthy nutrition    5.  Right index finger abscess  -He is to continue his Bactrim and follow-up with surgery in 3 days    Return to clinic in 1 week if no improvement of his above symptoms.    Carol Preston MD  [facility]

## 2020-10-18 LAB
SARS-COV-2 RNA SPEC QL NAA+PROBE: NOT DETECTED
SPECIMEN SOURCE: NORMAL

## 2020-10-19 ENCOUNTER — OFFICE VISIT (OUTPATIENT)
Dept: SURGERY | Facility: OTHER | Age: 70
End: 2020-10-19
Payer: MEDICARE

## 2020-10-19 VITALS
SYSTOLIC BLOOD PRESSURE: 140 MMHG | RESPIRATION RATE: 16 BRPM | HEART RATE: 76 BPM | TEMPERATURE: 96.9 F | DIASTOLIC BLOOD PRESSURE: 68 MMHG | BODY MASS INDEX: 30.73 KG/M2 | WEIGHT: 203.6 LBS

## 2020-10-19 DIAGNOSIS — C44.91 CANCER OF THE SKIN, BASAL CELL: ICD-10-CM

## 2020-10-19 DIAGNOSIS — L03.011 CELLULITIS OF FINGER OF RIGHT HAND: Primary | ICD-10-CM

## 2020-10-19 PROCEDURE — G0463 HOSPITAL OUTPT CLINIC VISIT: HCPCS

## 2020-10-19 PROCEDURE — 99212 OFFICE O/P EST SF 10 MIN: CPT | Performed by: SURGERY

## 2020-10-19 ASSESSMENT — PAIN SCALES - GENERAL: PAINLEVEL: SEVERE PAIN (6)

## 2020-10-19 NOTE — PROGRESS NOTES
Moses Whittaker presents to clinic for follow-up after undergoing excision of ear lesion and evaluation of right finger redness and swelling.  Right second finger pain and swelling has not improved since starting Bactrim roughly 10 days ago.  States it is still painful to bend and painful to touch.  Patient denies fevers or chills, low energy or fatigue.  States the Bactrim gives him an upset stomach.  Patient states his left ear is mildly painful.  Denies bleeding or drainage.      BP (!) 140/68 (BP Location: Right arm, Patient Position: Sitting, Cuff Size: Adult Regular)   Pulse 76   Temp 96.9  F (36.1  C) (Temporal)   Resp 16   Wt 92.4 kg (203 lb 9.6 oz)   BMI 30.73 kg/m      Right second finger remains swollen and erythematous at the distal interphalangeal joint.  There is no fluctuance erythema extends to the middle interphalangeal joint.  Left ear wound still has stitches in this.  It appears the medial part of the wound is broken down.  The sutures removed in clinic and the medial  2 cm of the wound is opened.  No evidence of infection.  Triple antibiotic ointment was placed over the wound and covered with clean dressing.    Surgical pathology of the left ear lesion shows basal cell carcinoma excised with negative margins.       Moses Whittaker is a 70-year-old male status post left ear excision complicated by dehiscence.  Patient also has persistent right index finger erythema and swelling.  Unclear at this point if the swelling in his index finger is due to infection as the finger was explored previously and he seen little improvement with antibiotics.  Left ear lesion should heal by secondary intention.  Patient was instructed to wash his ear daily and redressed with triple antibiotic ointment and clean dressing.    Follow-up 1 week  Referral to hand specialist for right index finger pain and swelling      Ke Liriano MD

## 2020-10-19 NOTE — NURSING NOTE
"Patient presents to the clinic today for suture removal.  Rita Weaver LPN 10/19/2020   1:51 PM    Chief Complaint   Patient presents with     Suture Removal       Initial BP (!) 140/68 (BP Location: Right arm, Patient Position: Sitting, Cuff Size: Adult Regular)   Pulse 76   Temp 96.9  F (36.1  C) (Temporal)   Resp 16   Wt 92.4 kg (203 lb 9.6 oz)   BMI 30.73 kg/m   Estimated body mass index is 30.73 kg/m  as calculated from the following:    Height as of 7/28/20: 1.734 m (5' 8.25\").    Weight as of this encounter: 92.4 kg (203 lb 9.6 oz).  Medication Reconciliation: complete  Rita Weaver LPN    "

## 2020-10-26 ENCOUNTER — OFFICE VISIT (OUTPATIENT)
Dept: SURGERY | Facility: OTHER | Age: 70
End: 2020-10-26
Attending: SURGERY
Payer: MEDICARE

## 2020-10-26 VITALS
TEMPERATURE: 98.1 F | WEIGHT: 202.4 LBS | HEART RATE: 68 BPM | OXYGEN SATURATION: 97 % | DIASTOLIC BLOOD PRESSURE: 46 MMHG | RESPIRATION RATE: 12 BRPM | BODY MASS INDEX: 30.55 KG/M2 | SYSTOLIC BLOOD PRESSURE: 120 MMHG

## 2020-10-26 DIAGNOSIS — S01.302D OPEN WOUND OF LEFT EAR, UNSPECIFIED OPEN WOUND TYPE, SUBSEQUENT ENCOUNTER: Primary | ICD-10-CM

## 2020-10-26 PROCEDURE — G0463 HOSPITAL OUTPT CLINIC VISIT: HCPCS

## 2020-10-26 PROCEDURE — 99024 POSTOP FOLLOW-UP VISIT: CPT | Performed by: SURGERY

## 2020-10-26 ASSESSMENT — PAIN SCALES - GENERAL: PAINLEVEL: MODERATE PAIN (4)

## 2020-10-26 NOTE — PROGRESS NOTES
Moses Whittaker presents to clinic for follow-up after undergoing excision of ear lesion and evaluation of right finger redness and swelling.  Right second finger pain and swelling is somewhat improved.  Patient is done with antibiotics.  He denies fevers or chills.  Patient states left ear is healing nicely.  He dresses it with a bandage every day.  Denies pain, redness, swelling, drainage.      /46 (BP Location: Right arm, Patient Position: Sitting, Cuff Size: Adult Regular)   Pulse 68   Temp 98.1  F (36.7  C) (Tympanic)   Resp 12   Wt 91.8 kg (202 lb 6.4 oz)   SpO2 97%   BMI 30.55 kg/m      Right second finger remains swollen and erythematous at the distal interphalangeal joint.  There is no fluctuance erythema extends to the middle interphalangeal joint.  Left ear wound is open and measures roughly 4 mm x 6 mm.  There is no surrounding erythema or induration.  The wound is roughly 50% epithelialized and the edges appear to be yi.        Moses Whittaker is a 70-year-old male status post left ear excision complicated by dehiscence.  Patient also has persistent right index finger erythema and swelling.  Patient's finger is feeling slightly better and the ear wound is healing nicely.  Patient is scheduled to see hand specialist on 10/30/2020.  He was encouraged to keep his appointment.  Patient was offered follow-up of the left ear wound, but he states he feels like it will just heal up on its own and will watch it at home and call the clinic with concerns.      Referral to hand specialist for right index finger pain and swelling      Ke Liriano MD

## 2020-10-26 NOTE — NURSING NOTE
"Chief Complaint   Patient presents with     Follow Up     Finger cellulitis       Initial /46 (BP Location: Right arm, Patient Position: Sitting, Cuff Size: Adult Regular)   Pulse 68   Temp 98.1  F (36.7  C) (Tympanic)   Resp 12   Wt 91.8 kg (202 lb 6.4 oz)   SpO2 97%   BMI 30.55 kg/m   Estimated body mass index is 30.55 kg/m  as calculated from the following:    Height as of 7/28/20: 1.734 m (5' 8.25\").    Weight as of this encounter: 91.8 kg (202 lb 6.4 oz).  Medication Reconciliation: Completed     Phillip Mojica LPN  "

## 2020-10-28 DIAGNOSIS — E11.22 CONTROLLED TYPE 2 DIABETES MELLITUS WITH STAGE 3 CHRONIC KIDNEY DISEASE, WITH LONG-TERM CURRENT USE OF INSULIN (H): ICD-10-CM

## 2020-10-28 DIAGNOSIS — I50.32 CHRONIC DIASTOLIC HEART FAILURE (H): ICD-10-CM

## 2020-10-28 DIAGNOSIS — M79.644 PAIN OF FINGER OF RIGHT HAND: Primary | ICD-10-CM

## 2020-10-28 DIAGNOSIS — L03.011 CELLULITIS OF FINGER OF RIGHT HAND: ICD-10-CM

## 2020-10-28 DIAGNOSIS — E78.2 MIXED HYPERLIPIDEMIA: ICD-10-CM

## 2020-10-28 DIAGNOSIS — N18.30 CONTROLLED TYPE 2 DIABETES MELLITUS WITH STAGE 3 CHRONIC KIDNEY DISEASE, WITH LONG-TERM CURRENT USE OF INSULIN (H): ICD-10-CM

## 2020-10-28 DIAGNOSIS — E03.4 HYPOTHYROIDISM DUE TO ACQUIRED ATROPHY OF THYROID: ICD-10-CM

## 2020-10-28 DIAGNOSIS — Z79.4 CONTROLLED TYPE 2 DIABETES MELLITUS WITH STAGE 3 CHRONIC KIDNEY DISEASE, WITH LONG-TERM CURRENT USE OF INSULIN (H): ICD-10-CM

## 2020-10-28 LAB
ALBUMIN SERPL-MCNC: 4 G/DL (ref 3.5–5.7)
ALP SERPL-CCNC: 54 U/L (ref 34–104)
ALT SERPL W P-5'-P-CCNC: 20 U/L (ref 7–52)
ANION GAP SERPL CALCULATED.3IONS-SCNC: 6 MMOL/L (ref 3–14)
AST SERPL W P-5'-P-CCNC: 21 U/L (ref 13–39)
BILIRUB SERPL-MCNC: 0.5 MG/DL (ref 0.3–1)
BUN SERPL-MCNC: 26 MG/DL (ref 7–25)
CALCIUM SERPL-MCNC: 8.8 MG/DL (ref 8.6–10.3)
CHLORIDE SERPL-SCNC: 104 MMOL/L (ref 98–107)
CHOLEST SERPL-MCNC: 126 MG/DL
CO2 SERPL-SCNC: 27 MMOL/L (ref 21–31)
CREAT SERPL-MCNC: 2.17 MG/DL (ref 0.7–1.3)
ERYTHROCYTE [DISTWIDTH] IN BLOOD BY AUTOMATED COUNT: 12.3 % (ref 10–15)
GFR SERPL CREATININE-BSD FRML MDRD: 30 ML/MIN/{1.73_M2}
GLUCOSE SERPL-MCNC: 276 MG/DL (ref 70–105)
HBA1C MFR BLD: 7.2 % (ref 4–6)
HCT VFR BLD AUTO: 40.3 % (ref 40–53)
HDLC SERPL-MCNC: 32 MG/DL (ref 23–92)
HGB BLD-MCNC: 13.2 G/DL (ref 13.3–17.7)
LDLC SERPL CALC-MCNC: 61 MG/DL
MCH RBC QN AUTO: 33 PG (ref 26.5–33)
MCHC RBC AUTO-ENTMCNC: 32.8 G/DL (ref 31.5–36.5)
MCV RBC AUTO: 101 FL (ref 78–100)
NONHDLC SERPL-MCNC: 94 MG/DL
PLATELET # BLD AUTO: 224 10E9/L (ref 150–450)
POTASSIUM SERPL-SCNC: 4.7 MMOL/L (ref 3.5–5.1)
PROT SERPL-MCNC: 6.9 G/DL (ref 6.4–8.9)
RBC # BLD AUTO: 4 10E12/L (ref 4.4–5.9)
SODIUM SERPL-SCNC: 137 MMOL/L (ref 134–144)
TRIGL SERPL-MCNC: 167 MG/DL
TSH SERPL DL<=0.05 MIU/L-ACNC: 0.89 IU/ML (ref 0.34–5.6)
WBC # BLD AUTO: 5.8 10E9/L (ref 4–11)

## 2020-10-28 PROCEDURE — 84443 ASSAY THYROID STIM HORMONE: CPT | Mod: ZL | Performed by: INTERNAL MEDICINE

## 2020-10-28 PROCEDURE — 80053 COMPREHEN METABOLIC PANEL: CPT | Mod: ZL | Performed by: INTERNAL MEDICINE

## 2020-10-28 PROCEDURE — 36415 COLL VENOUS BLD VENIPUNCTURE: CPT | Mod: ZL | Performed by: INTERNAL MEDICINE

## 2020-10-28 PROCEDURE — 85027 COMPLETE CBC AUTOMATED: CPT | Mod: ZL | Performed by: INTERNAL MEDICINE

## 2020-10-28 PROCEDURE — 80061 LIPID PANEL: CPT | Mod: ZL | Performed by: INTERNAL MEDICINE

## 2020-10-28 PROCEDURE — 83036 HEMOGLOBIN GLYCOSYLATED A1C: CPT | Mod: ZL | Performed by: INTERNAL MEDICINE

## 2020-10-29 ENCOUNTER — VIRTUAL VISIT (OUTPATIENT)
Dept: INTERNAL MEDICINE | Facility: OTHER | Age: 70
End: 2020-10-29
Attending: INTERNAL MEDICINE
Payer: COMMERCIAL

## 2020-10-29 DIAGNOSIS — E78.2 MIXED HYPERLIPIDEMIA: ICD-10-CM

## 2020-10-29 DIAGNOSIS — N18.30 CONTROLLED TYPE 2 DIABETES MELLITUS WITH STAGE 3 CHRONIC KIDNEY DISEASE, WITH LONG-TERM CURRENT USE OF INSULIN (H): Primary | ICD-10-CM

## 2020-10-29 DIAGNOSIS — I25.118 ATHEROSCLEROSIS OF NATIVE CORONARY ARTERY OF NATIVE HEART WITH STABLE ANGINA PECTORIS (H): ICD-10-CM

## 2020-10-29 DIAGNOSIS — Z79.02 PLATELET INHIBITION DUE TO PLAVIX: ICD-10-CM

## 2020-10-29 DIAGNOSIS — T14.8XXA SURGICAL WOUND PRESENT: ICD-10-CM

## 2020-10-29 DIAGNOSIS — I50.32 CHRONIC HEART FAILURE WITH PRESERVED EJECTION FRACTION (H): ICD-10-CM

## 2020-10-29 DIAGNOSIS — F19.20 CONTROLLED DRUG DEPENDENCE (H): ICD-10-CM

## 2020-10-29 DIAGNOSIS — N18.32 STAGE 3B CHRONIC KIDNEY DISEASE (H): ICD-10-CM

## 2020-10-29 DIAGNOSIS — Z95.1 S/P CABG X 2: ICD-10-CM

## 2020-10-29 DIAGNOSIS — I73.9 PERIPHERAL VASCULAR DISEASE (H): ICD-10-CM

## 2020-10-29 DIAGNOSIS — M46.92 INFLAMMATORY SPONDYLOPATHY OF CERVICAL REGION (H): ICD-10-CM

## 2020-10-29 DIAGNOSIS — M25.50 ARTHRALGIA OF MULTIPLE JOINTS: ICD-10-CM

## 2020-10-29 DIAGNOSIS — Z96.41 INSULIN PUMP IN PLACE: ICD-10-CM

## 2020-10-29 DIAGNOSIS — I10 BENIGN ESSENTIAL HYPERTENSION: ICD-10-CM

## 2020-10-29 DIAGNOSIS — Z79.4 CONTROLLED TYPE 2 DIABETES MELLITUS WITH STAGE 3 CHRONIC KIDNEY DISEASE, WITH LONG-TERM CURRENT USE OF INSULIN (H): Primary | ICD-10-CM

## 2020-10-29 DIAGNOSIS — E11.22 CONTROLLED TYPE 2 DIABETES MELLITUS WITH STAGE 3 CHRONIC KIDNEY DISEASE, WITH LONG-TERM CURRENT USE OF INSULIN (H): Primary | ICD-10-CM

## 2020-10-29 PROBLEM — I83.022 VENOUS STASIS ULCER OF LEFT CALF LIMITED TO BREAKDOWN OF SKIN WITH VARICOSE VEINS (H): Status: RESOLVED | Noted: 2019-08-29 | Resolved: 2020-10-29

## 2020-10-29 PROBLEM — L97.221 VENOUS STASIS ULCER OF LEFT CALF LIMITED TO BREAKDOWN OF SKIN WITH VARICOSE VEINS (H): Status: RESOLVED | Noted: 2019-08-29 | Resolved: 2020-10-29

## 2020-10-29 PROCEDURE — 99214 OFFICE O/P EST MOD 30 MIN: CPT | Mod: 95 | Performed by: INTERNAL MEDICINE

## 2020-10-29 RX ORDER — NIFEDIPINE 30 MG
30 TABLET, EXTENDED RELEASE ORAL 2 TIMES DAILY
COMMUNITY
Start: 2020-10-29 | End: 2021-07-28

## 2020-10-29 ASSESSMENT — ENCOUNTER SYMPTOMS
HEMATURIA: 0
ARTHRALGIAS: 1
BRUISES/BLEEDS EASILY: 1
DIARRHEA: 0
COUGH: 0
SHORTNESS OF BREATH: 0
CHILLS: 0
FEVER: 0
CONFUSION: 0
WOUND: 1

## 2020-10-29 NOTE — PROGRESS NOTES
"Moses Whittaker is a 70 year old male who is being evaluated via a billable video visit.      The patient has been notified of following:     \"This video visit will be conducted via a call between you and your physician/provider. We have found that certain health care needs can be provided without the need for an in-person physical exam.  This service lets us provide the care you need with a video conversation.  If a prescription is necessary we can send it directly to your pharmacy.  If lab work is needed we can place an order for that and you can then stop by our lab to have the test done at a later time.    Video visits are billed at different rates depending on your insurance coverage.  Please reach out to your insurance provider with any questions.    If during the course of the call the physician/provider feels a video visit is not appropriate, you will not be charged for this service.\"    Patient has given verbal consent for Video visit? Yes  How would you like to obtain your AVS? Mail a copy  If you are dropped from the video visit, the video invite should be resent to: Text to cell phone: [phone]  Will anyone else be joining your video visit? No    Subjective     Moses Whittaker is a 70 year old male who presents today via video visit for the following health issues:    HPI       Video Start Time: 3:20 PM    Review of Systems   Constitutional: Negative for chills and fever.   HENT: Negative for congestion.    Respiratory: Negative for cough and shortness of breath.    Cardiovascular: Positive for chest pain (stable angina) and peripheral edema.   Gastrointestinal: Negative for diarrhea (C-diff appears resolved s/p vancomycin, still with mucus with wiping at times).   Genitourinary: Negative for hematuria.   Musculoskeletal: Positive for arthralgias.   Skin: Positive for wound (left ear wound is slowly healing, still weeping).   Neurological: Negative for syncope.   Hematological: Bruises/bleeds " easily.   Psychiatric/Behavioral: Negative for confusion.          Objective           Vitals:  No vitals were obtained today due to virtual visit.    Physical Exam     GENERAL: Healthy, alert and no distress  EYES: Eyes grossly normal to inspection.  No discharge or erythema, or obvious scleral/conjunctival abnormalities.  RESP: No audible wheeze, cough, or visible cyanosis.  No visible retractions or increased work of breathing.    SKIN: Visible skin clear. No significant rash, abnormal pigmentation or lesions. + open healing left ear wound s/p surgical lesion removal.   NEURO: Cranial nerves grossly intact.  Mentation and speech appropriate for age.  PSYCH: Mentation appears normal, affect normal/bright, judgement and insight intact, normal speech and appearance well-groomed.      Orders Only on 10/28/2020   Component Date Value Ref Range Status     TSH Reflex 10/28/2020 0.89  0.34 - 5.60 IU/mL Final     Cholesterol 10/28/2020 126  <200 mg/dL Final     Triglycerides 10/28/2020 167* <150 mg/dL Final    Comment: Borderline high:  150-199 mg/dl  High:             200-499 mg/dl  Very high:       >499 mg/dl       HDL Cholesterol 10/28/2020 32  23 - 92 mg/dL Final     LDL Cholesterol Calculated 10/28/2020 61  <100 mg/dL Final    Desirable:       <100 mg/dl     Non HDL Cholesterol 10/28/2020 94  <130 mg/dL Final     Sodium 10/28/2020 137  134 - 144 mmol/L Final     Potassium 10/28/2020 4.7  3.5 - 5.1 mmol/L Final     Chloride 10/28/2020 104  98 - 107 mmol/L Final     Carbon Dioxide 10/28/2020 27  21 - 31 mmol/L Final     Anion Gap 10/28/2020 6  3 - 14 mmol/L Final     Glucose 10/28/2020 276* 70 - 105 mg/dL Final     Urea Nitrogen 10/28/2020 26* 7 - 25 mg/dL Final     Creatinine 10/28/2020 2.17* 0.70 - 1.30 mg/dL Final     GFR Estimate 10/28/2020 30* >60 mL/min/[1.73_m2] Final     GFR Estimate If Black 10/28/2020 37* >60 mL/min/[1.73_m2] Final     Calcium 10/28/2020 8.8  8.6 - 10.3 mg/dL Final     Bilirubin Total  10/28/2020 0.5  0.3 - 1.0 mg/dL Final     Albumin 10/28/2020 4.0  3.5 - 5.7 g/dL Final     Protein Total 10/28/2020 6.9  6.4 - 8.9 g/dL Final     Alkaline Phosphatase 10/28/2020 54  34 - 104 U/L Final     ALT 10/28/2020 20  7 - 52 U/L Final     AST 10/28/2020 21  13 - 39 U/L Final     WBC 10/28/2020 5.8  4.0 - 11.0 10e9/L Final     RBC Count 10/28/2020 4.00* 4.4 - 5.9 10e12/L Final     Hemoglobin 10/28/2020 13.2* 13.3 - 17.7 g/dL Final     Hematocrit 10/28/2020 40.3  40.0 - 53.0 % Final     MCV 10/28/2020 101* 78 - 100 fl Final     MCH 10/28/2020 33.0  26.5 - 33.0 pg Final     MCHC 10/28/2020 32.8  31.5 - 36.5 g/dL Final     RDW 10/28/2020 12.3  10.0 - 15.0 % Final     Platelet Count 10/28/2020 224  150 - 450 10e9/L Final     Hemoglobin A1C 10/28/2020 7.2* 4.0 - 6.0 % Final           ICD-10-CM    1. Controlled type 2 diabetes mellitus with stage 3 chronic kidney disease, with long-term current use of insulin (H)  E11.22     N18.30     Z79.4    2. Stage 3b chronic kidney disease  N18.32    3. Atherosclerosis of native coronary artery of native heart with stable angina pectoris (H)  I25.118 NIFEdipine ER (ADALAT CC) 30 MG 24 hr tablet   4. Chronic heart failure with preserved ejection fraction (H)  I50.32    5. Platelet inhibition due to Plavix  Z79.02    6. S/P CABG x 2  Z95.1    7. Peripheral vascular disease (H)  I73.9    8. Benign essential hypertension  I10 NIFEdipine ER (ADALAT CC) 30 MG 24 hr tablet   9. Insulin pump in place  Z96.41    10. Mixed hyperlipidemia  E78.2    11. Arthralgia of multiple joints  M25.50    12. Surgical wound present  T14.8XXA    13. Inflammatory spondylopathy of cervical region (H)  M46.92    14. Controlled drug dependence (H)  F19.20      Patient contacted via video visit due to virus pandemic.    Type 2 diabetes, currently stable.  Has insulin pump.  Also has continuous glucose monitor.  Denies hypoglycemia.  Last labs were uncontrolled.  He had very high blood sugars.  Doing much  better recently.    Has stage IIIb chronic kidney disease.  Creatinine has worsened, kidney function has dropped.  Has been taking diclofenac and also drinking a can of Coke every couple days.     Coronary artery disease with stable, chronic angina pectoris.  No significant changes recently.  Did have his nifedipine dose increased from 30 mg daily up to 60 mg, he did not tolerate one-time dose of 60 mg so he split it into half and takes 30 mg in the morning and 30 mg in the afternoon.  Med list updated today.    Chronic heart failure with preserved ejection fraction, appears stable.  Does have some chronic edema.  Takes Plavix for platelet inhibition.  Has history of bypass surgery x2 grafts in the past.  Also has peripheral vascular disease and claudication with ambulation.    Hypertension, blood pressures have been better controlled with dose increase of nifedipine.    Mixed hyperlipidemia, cholesterol levels have improved.  Continue statin therapy.  He has a hard time swallowing so he takes his Crestor half dose in the morning half in the afternoon.  Otherwise he gets significant myalgias.    Still has a draining surgical wound on his left ear.  This does seem to be healing slowly.    Has multiple joint arthritis.  Utilizes diclofenac intermittently.  He has not been able to stop this completely.  Did encourage reduced or avoidance of NSAIDs due to his CKD stage IIIb.    Has controlled drug dependence, takes stimulants for his narcolepsy.  Has chronic cervical pain due to inflammatory spondyloarthropathy.    Video-Visit Details    Type of service:  Video Visit    Video End Time:3:45 PM    Originating Location (pt. Location): Home    Distant Location (provider location):  [facility]     Platform used for Video Visit: Valerio Barnett MD

## 2020-10-30 ENCOUNTER — TELEPHONE (OUTPATIENT)
Dept: INTERNAL MEDICINE | Facility: OTHER | Age: 70
End: 2020-10-30

## 2020-10-30 ENCOUNTER — OFFICE VISIT (OUTPATIENT)
Dept: ORTHOPEDICS | Facility: OTHER | Age: 70
End: 2020-10-30
Attending: SURGERY
Payer: MEDICARE

## 2020-10-30 ENCOUNTER — HOSPITAL ENCOUNTER (OUTPATIENT)
Dept: GENERAL RADIOLOGY | Facility: OTHER | Age: 70
End: 2020-10-30
Attending: SPECIALIST
Payer: MEDICARE

## 2020-10-30 DIAGNOSIS — M79.644 PAIN OF FINGER OF RIGHT HAND: ICD-10-CM

## 2020-10-30 DIAGNOSIS — L03.011 CELLULITIS OF FINGER OF RIGHT HAND: ICD-10-CM

## 2020-10-30 DIAGNOSIS — M79.644 PAIN OF FINGER OF RIGHT HAND: Primary | ICD-10-CM

## 2020-10-30 PROBLEM — F19.20: Status: ACTIVE | Noted: 2020-10-30

## 2020-10-30 PROBLEM — M46.92 INFLAMMATORY SPONDYLOPATHY OF CERVICAL REGION (H): Status: ACTIVE | Noted: 2020-10-30

## 2020-10-30 PROCEDURE — 99202 OFFICE O/P NEW SF 15 MIN: CPT | Performed by: SPECIALIST

## 2020-10-30 PROCEDURE — 73140 X-RAY EXAM OF FINGER(S): CPT | Mod: RT

## 2020-10-30 PROCEDURE — G0463 HOSPITAL OUTPT CLINIC VISIT: HCPCS

## 2020-10-30 NOTE — PROGRESS NOTES
Visit Date:   10/30/2020      HISTORY OF PRESENT ILLNESS:  Mr. Morelos is a 70-year-old right-hand dominant retired  whom I am seeing today for evaluation of right index finger DIP joint pain.  He has noted this for about 4 weeks.  He was originally treated with cellulitis.  He has had no history of penetrating trauma, though there was a question whether he may have had a paronychia, by his report.  In any case, he has been noticing ongoing instability and pain at the DIP joint.      PAST MEDICAL HISTORY, PAST SURGICAL HISTORY, MEDICATIONS AND ALLERGIES:  Reviewed.      PHYSICAL EXAMINATION:  A 70-year-old male, alert and oriented x 3 and appropriate.  Gait and station are appropriate, well-groomed and well-kempt.  Examination of both upper extremities reveals full and symmetric range of motion of elbows, wrists.  Examination of both hands shows scattered Heberden's nodes.  The index finger of the right hand shows instability of the PIP joint.  Gentle range of motion is mildly tender.  Profundus and sublimis are intact, as is the extensor mechanism.      X-RAYS:  Reviewed AP and lateral, as well as oblique views of the right index finger.  These reveal severe degenerative changes of the DIP joint.      IMPRESSION:  Endstage arthrosis distal interphalangeal joint, right index finger.        PLAN:  At this point, I think there is no evidence of ongoing infection.  We discussed options at length.  We discussed proceeding with an arthrodesis.  Risks, complications and benefits reviewed, and this can be scheduled at his convenience.         JENA MEI MD             D: 10/30/2020   T: 10/30/2020   MT: JON      Name:     STEPHIE MORELOS   MRN:      [id number]        Account:      [id number]   :      1950           Visit Date:   10/30/2020      Document: [id number]

## 2020-10-30 NOTE — PROGRESS NOTES
Patient is here for consult on his right finger.   Janay Singer LPN .....................10/30/2020 4:01 PM

## 2020-11-30 ENCOUNTER — ALLIED HEALTH/NURSE VISIT (OUTPATIENT)
Dept: FAMILY MEDICINE | Facility: OTHER | Age: 70
End: 2020-11-30
Attending: FAMILY MEDICINE
Payer: MEDICARE

## 2020-11-30 DIAGNOSIS — R05.9 COUGH: Primary | ICD-10-CM

## 2020-11-30 PROCEDURE — C9803 HOPD COVID-19 SPEC COLLECT: HCPCS

## 2020-11-30 PROCEDURE — 99207 PR NO CHARGE NURSE ONLY: CPT

## 2020-11-30 PROCEDURE — U0003 INFECTIOUS AGENT DETECTION BY NUCLEIC ACID (DNA OR RNA); SEVERE ACUTE RESPIRATORY SYNDROME CORONAVIRUS 2 (SARS-COV-2) (CORONAVIRUS DISEASE [COVID-19]), AMPLIFIED PROBE TECHNIQUE, MAKING USE OF HIGH THROUGHPUT TECHNOLOGIES AS DESCRIBED BY CMS-2020-01-R: HCPCS | Mod: ZL | Performed by: FAMILY MEDICINE

## 2020-11-30 NOTE — PROGRESS NOTES
Patient is here for covid testing for cough.  Bee Rao LPN on 11/30/2020 at 2:27 PM     SUBJECTIVE:     Tessie Azul is a 12 month old male, here for a routine health maintenance visit.    Monitor for problem solving and personal social last time on ASQ.     Patient was roomed by: IVY Adames    HPI  He eats just about whatever mom will give him. Mom will brush his teeth.       Well Child     Social History  Patient accompanied by:  Mother, sister and brother  Questions or concerns?: No    Forms to complete? No  Child lives with::  Mother, father, sister and brother  Who takes care of your child?:  Mother  Languages spoken in the home:  English  Recent family changes/ special stressors?:  None noted    Safety / Health Risk  Is your child around anyone who smokes?  No    TB Exposure:     No TB exposure    Car seat < 6 years old, in  back seat, rear-facing, 5-point restraint? Yes    Home Safety Survey:      Stairs Gated?:  Yes     Wood stove / Fireplace screened?  Not applicable     Poisons / cleaning supplies out of reach?:  Yes     Swimming pool?:  No     Firearms in the home?: No      Hearing / Vision  Hearing or vision concerns?  No concerns, hearing and vision subjectively normal    Daily Activities  Nutrition:  Good appetite, eats variety of foods and cows milk  Vitamins & Supplements:  No    Sleep      Sleep arrangement:crib    Sleep pattern: sleeps through the night    Elimination       Urinary frequency:4-6 times per 24 hours     Stool frequency: 1-3 times per 24 hours     Stool consistency: soft     Elimination problems:  None    Dental    Water source:  Bottled water and filtered water    Dental provider: patient does not have a dental home    Risks: a parent has had a cavity in past 3 years      Dental visit recommended: No  Dental varnish declined by parent    DEVELOPMENT  Screening tool used, reviewed with parent/guardian: Adithya passed all         PROBLEM LIST  Patient Active Problem List   Diagnosis     Normal  (single liveborn)     MEDICATIONS  No current outpatient  "medications on file.      ALLERGY  No Known Allergies    IMMUNIZATIONS  Immunization History   Administered Date(s) Administered     DTAP-IPV/HIB (PENTACEL) 2018, 2018, 01/18/2019     Hep B, Peds or Adolescent 2018, 2018, 01/18/2019     Pneumo Conj 13-V (2010&after) 2018, 2018, 01/18/2019     Rotavirus, monovalent, 2-dose 2018, 2018       HEALTH HISTORY SINCE LAST VISIT  No surgery, major illness or injury since last physical exam    ROS  Constitutional, eye, ENT, skin, respiratory, cardiac, GI, MSK, neuro, and allergy are normal except as otherwise noted.    This document serves as a record of the services and decisions personally performed and made by Anny Gonzalez MD. It was created on his behalf by Richard Garcia, a trained medical scribe. The creation of this document is based on the provider's statements to the medical scribe.  Richard Garcia July 24, 2019 11:00 AM        OBJECTIVE:   EXAM  Pulse 118   Temp 97.2  F (36.2  C) (Axillary)   Resp 30   Ht 2' 7.5\" (0.8 m)   Wt 23 lb 8.5 oz (10.7 kg)   HC 18.05\" (45.8 cm)   SpO2 98%   BMI 16.67 kg/m    95 %ile based on WHO (Boys, 0-2 years) Length-for-age data based on Length recorded on 7/24/2019.  81 %ile based on WHO (Boys, 0-2 years) weight-for-age data based on Weight recorded on 7/24/2019.  42 %ile based on WHO (Boys, 0-2 years) head circumference-for-age based on Head Circumference recorded on 7/24/2019.     GENERAL: Active, alert, in no acute distress.  SKIN: Clear. No significant rash, abnormal pigmentation or lesions  HEAD: Normocephalic. Normal fontanels and sutures.  EYES: Conjunctivae and cornea normal. Red reflexes present bilaterally. Symmetric light reflex and no eye movement on cover/uncover test  EARS: Normal canals. Tympanic membranes are normal; gray and translucent.  NOSE: Normal without discharge.  MOUTH/THROAT: Clear. No oral lesions.  NECK: Supple, no masses.  LYMPH NODES: " No adenopathy  LUNGS: Clear. No rales, rhonchi, wheezing or retractions  HEART: Regular rhythm. Normal S1/S2. No murmurs. Normal femoral pulses.  ABDOMEN: Soft, non-tender, not distended, no masses or hepatosplenomegaly. Normal umbilicus and bowel sounds.   GENITALIA: Large suprapubic fat pad. Without any penile adhesions.  Normal male external genitalia. Riki stage I,  Testes descended bilaterally, no hernia or hydrocele.    EXTREMITIES: Hips normal with full range of motion. Symmetric extremities, no deformities  NEUROLOGIC: Normal tone throughout. Normal reflexes for age               ASSESSMENT/PLAN:       ICD-10-CM    1. Encounter for routine child health examination w/o abnormal findings Z00.129 MMR VIRUS IMMUNIZATION, SUBCUT [07803]     CHICKEN POX VACCINE,LIVE,SUBCUT [13383]     HEPA VACCINE PED/ADOL-2 DOSE(aka HEP A) [61585]       Anticipatory Guidance  Reviewed Anticipatory Guidance in patient instructions    Preventive Care Plan  Immunizations     I provided face to face vaccine counseling, answered questions, and explained the benefits and risks of the vaccine components ordered today including:  Hepatitis A - Pediatric 2 dose, MMR and Varicella - Chicken Pox  Referrals/Ongoing Specialty care: No   [facility]    Resources:  [place] Child and Teen Checkups (C&TC) Schedule of Age-Related Screening Standards    FOLLOW-UP:     15 month Preventive Care visit    Discussed that growth and development are appropriate for his age.     ACUTE/CHRONIC    Reviewed the way to use vaseline / Desitin on a regular basis to prevent penile adhesions.     The information in this document, created by the medical scribe for me, accurately reflects the services I personally performed and the decisions made by me. I have reviewed and approved this document for accuracy prior to leaving the patient care area.  July 24, 2019 11:12 AM      Anny Gonzalez MD, MD  [facility]

## 2020-12-01 ENCOUNTER — TRANSFERRED RECORDS (OUTPATIENT)
Dept: HEALTH INFORMATION MANAGEMENT | Facility: OTHER | Age: 70
End: 2020-12-01

## 2020-12-02 LAB
SARS-COV-2 RNA SPEC QL NAA+PROBE: NOT DETECTED
SPECIMEN SOURCE: NORMAL

## 2020-12-08 ENCOUNTER — ALLIED HEALTH/NURSE VISIT (OUTPATIENT)
Dept: FAMILY MEDICINE | Facility: OTHER | Age: 70
End: 2020-12-08
Attending: FAMILY MEDICINE
Payer: MEDICARE

## 2020-12-08 DIAGNOSIS — R05.9 COUGH: Primary | ICD-10-CM

## 2020-12-08 DIAGNOSIS — R09.81 NASAL CONGESTION: ICD-10-CM

## 2020-12-08 PROCEDURE — 99207 PR NO CHARGE NURSE ONLY: CPT

## 2020-12-08 PROCEDURE — U0003 INFECTIOUS AGENT DETECTION BY NUCLEIC ACID (DNA OR RNA); SEVERE ACUTE RESPIRATORY SYNDROME CORONAVIRUS 2 (SARS-COV-2) (CORONAVIRUS DISEASE [COVID-19]), AMPLIFIED PROBE TECHNIQUE, MAKING USE OF HIGH THROUGHPUT TECHNOLOGIES AS DESCRIBED BY CMS-2020-01-R: HCPCS | Mod: ZL | Performed by: FAMILY MEDICINE

## 2020-12-08 PROCEDURE — C9803 HOPD COVID-19 SPEC COLLECT: HCPCS

## 2020-12-10 ENCOUNTER — TELEPHONE (OUTPATIENT)
Dept: NURSING | Facility: CLINIC | Age: 70
End: 2020-12-10

## 2020-12-10 ENCOUNTER — TELEPHONE (OUTPATIENT)
Dept: INTERNAL MEDICINE | Facility: OTHER | Age: 70
End: 2020-12-10

## 2020-12-10 DIAGNOSIS — U07.1 INFECTION DUE TO 2019 NOVEL CORONAVIRUS: Primary | ICD-10-CM

## 2020-12-10 LAB
SARS-COV-2 RNA SPEC QL NAA+PROBE: ABNORMAL
SPECIMEN SOURCE: ABNORMAL

## 2020-12-10 NOTE — TELEPHONE ENCOUNTER
"Coronavirus (COVID-19) Notification    Caller Name (Patient, parent, daughter/son, grandparent, etc)  The patient, Pat.    Care advice reviewed with him. He verbalized understanding.    Reason for call  Notify of Positive Coronavirus (COVID-19) lab results, assess symptoms,  review [facility] recommendations    Lab Result    Lab test:  2019-nCoV rRt-PCR or SARS-CoV-2 PCR    Oropharyngeal AND/OR nasopharyngeal swabs is POSITIVE for 2019-nCoV RNA/SARS-COV-2 PCR (COVID-19 virus)    RN Recommendations/Instructions per  [facility] Coronavirus COVID-19 recommendations    Brief introduction script  Introduce self then review script:  \"I am calling on behalf of [facility].  We were notified that your Coronavirus test (COVID-19) for was POSITIVE for the virus.  I have some information to relay to you but first I wanted to mention that the [place] Dept of Health will be contacting you shortly [it's possible MD already called Patient] to talk to you more about how you are feeling and other people you have had contact with who might now also have the virus.  Also, [employer] [facility] is Partnering with the [facility] for Covid-19 research, you may be contacted directly by research staff.\"     Assessment (Inquire about Patient's current symptoms)   Assessment   Current Symptoms at time of phone call: (if no symptoms, document No symptoms] Nasal congestion, cough. Mild shortness of breath.   Symptoms onset (if applicable) 12/02/2020     If at time of call, Patients symptoms hare worsened, the Patient should contact 911 or have someone drive them to Emergency Dept promptly:      If Patient calling 911, inform 911 personal that you have tested positive for the Coronavirus (COVID-19).  Place mask on and await 911 to arrive.    If Emergency Dept, If possible, please have another adult drive you to the Emergency Dept but you need to wear mask when in contact with other people.      Review information with " Patient    Your result was positive. This means you have COVID-19 (coronavirus).  We have sent you a letter that reviews the information that I'll be reviewing with you now.    How can I protect others?    If you have symptoms: stay home and away from others (self-isolate) until:    You've had no fever--and no medicine that reduces fever--for 1 full day (24 hours). And       Your other symptoms have gotten better. For example, your cough or breathing has improved. And     At least 10 days have passed since your symptoms started. (If you've been told by a doctor that you have a weak immune system, wait 20 days.)     If you don't have symptoms: Stay home and away from others (self-isolate) until at least 10 days have passed since your first positive COVID-19 test. (Date test collected)    During this time:    Stay in your own room, including for meals. Use your own bathroom if you can.    Stay away from others in your home. No hugging, kissing or shaking hands. No visitors.     Don't go to work, school or anywhere else.     Clean  high touch  surfaces often (doorknobs, counters, handles, etc.). Use a household cleaning spray or wipes. You'll find a full list on the EPA website at www.epa.gov/pesticide-registration/list-n-disinfectants-use-against-sars-cov-2.     Cover your mouth and nose with a mask, tissue or other face covering to avoid spreading germs.    Wash your hands and face often with soap and water.    Caregivers in these groups are at risk for severe illness due to COVID-19:  o People 65 years and older  o People who live in a nursing home or long-term care facility  o People with chronic disease (lung, heart, cancer, diabetes, kidney, liver, immunologic)  o People who have a weakened immune system, including those who:  - Are in cancer treatment  - Take medicine that weakens the immune system, such as corticosteroids  - Had a bone marrow or organ transplant  - Have an immune deficiency  - Have poorly  controlled HIV or AIDS  - Are obese (body mass index of 40 or higher)  - Smoke regularly    Caregivers should wear gloves while washing dishes, handling laundry and cleaning bedrooms and bathrooms.    Wash and dry laundry with special caution. Don't shake dirty laundry, and use the warmest water setting you can.    If you have a weakened immune system, ask your doctor about other actions you should take.    For more tips, go to www.cdc.gov/coronavirus/2019-ncov/downloads/10Things.pdf.    You should not go back to work until you meet the guidelines above for ending your home isolation. You don't need to be retested for COVID-19 before going back to work--studies show that you won't spread the virus if it's been at least 10 days since your symptoms started (or 20 days, if you have a weak immune system).    Employers: This document serves as formal notice of your employee's medical guidelines for going back to work. They must meet the above guidelines before going back to work in person.    How can I take care of myself?    1. Get lots of rest. Drink extra fluids (unless a doctor has told you not to).    2. Take Tylenol (acetaminophen) for fever or pain. If you have liver or kidney problems, ask your family doctor if it's okay to take Tylenol.     Take either:     650 mg (two 325 mg pills) every 4 to 6 hours, or     1,000 mg (two 500 mg pills) every 8 hours as needed.     Note: Don't take more than 3,000 mg in one day. Acetaminophen is found in many medicines (both prescribed and over-the-counter medicines). Read all labels to be sure you don't take too much.    For children, check the Tylenol bottle for the right dose (based on their age or weight).    3. If you have other health problems (like cancer, heart failure, an organ transplant or severe kidney disease): Call your specialty clinic if you don't feel better in the next 2 days.    4. Know when to call 911: Emergency warning signs include:    Trouble breathing or  shortness of breath    Pain or pressure in the chest that doesn't go away    Feeling confused like you haven't felt before, or not being able to wake up    Bluish-colored lips or face    5. Sign up for [employer]. We know it's scary to hear that you have COVID-19. We want to track your symptoms to make sure you're okay over the next 2 weeks. Please look for an email from [employer]--this is a free, online program that we'll use to keep in touch. To sign up, follow the link in the email. Learn more at [email or web address].    Where can I get more information?    [facility]: [email or web address]    Coronavirus Basics: www.health.[facility].[place]/diseases/coronavirus/basics.html    What to Do If You're Sick: www.cdc.gov/coronavirus/2019-ncov/about/steps-when-sick.html    Ending Home Isolation: www.cdc.gov/coronavirus/2019-ncov/hcp/disposition-in-home-patients.html     Caring for Someone with COVID-19: www.cdc.gov/coronavirus/2019-ncov/if-you-are-sick/care-for-someone.html     [facility] clinical trials (COVID-19 research studies): clinicalaffairs.[facility]/n-clinical-trials     A Positive COVID-19 letter will be sent via [employer] or the mail. (Exception, no letters sent to Presurgerical/Preprocedure Patients)    Ade Chambers

## 2020-12-10 NOTE — TELEPHONE ENCOUNTER
Informed patient of the covid information. Please place a covid loop referral. Chen Morris LPN .............12/10/2020  4:23 PM

## 2020-12-10 NOTE — TELEPHONE ENCOUNTER
Patient just received a call that he is positive for covid and wants to talk to the doctor as soon as possible.  Patient is upset.

## 2020-12-14 ENCOUNTER — NURSE TRIAGE (OUTPATIENT)
Dept: CARE COORDINATION | Facility: CLINIC | Age: 70
End: 2020-12-14

## 2020-12-17 ENCOUNTER — NURSE TRIAGE (OUTPATIENT)
Dept: CARE COORDINATION | Facility: CLINIC | Age: 70
End: 2020-12-17

## 2020-12-17 ENCOUNTER — TELEPHONE (OUTPATIENT)
Dept: INTERNAL MEDICINE | Facility: OTHER | Age: 70
End: 2020-12-17

## 2020-12-17 NOTE — TELEPHONE ENCOUNTER
Patient is wondering if he could get a pulse oximeter for free from the clinic. Patient wanted to know how long to quarantine. Patient was informed to quarantine until the 22nd or if not well patient needs to still quarantine. Patient was informed if worsening to follow up with Jorge Barnett MD.   Chen Morris LPN .............12/17/2020  4:24 PM

## 2020-12-17 NOTE — TELEPHONE ENCOUNTER
"Nevro alert for dyspnea, in addition to patient sending a message to [facility] requesting more information on anxiety resources, as he's been unable to get through when texting '[phone].'  RN called and spoke briefly with patient who states he's on another call and requested RN call back in about 10 minutes.    Follow up call to patient regarding dyspnea.  Pt reports he's experiencing \"very slight\" SOB going up/down the stairs, but does not have much in way of SOB walking room to room in his home.  States he has some SOB at baseline, but sx he's experiencing with COVID have been slightly worse.  States he's had frequent stomach-aches and nausea after eating, denies vomiting or diarrhea.  Taking GaxEx with good results.  States he still has a good appetite and eating well, but eating smaller more frequent meals.  His sense of smell is decreased but not completely gone, and he has no problems tasting his food.    He confirms he keeping himself isolated and had questions regarding length of time he should quarantine for; he tested positive for COVID-19 on 12/8/20.     He is diabetic and wears a CGM & insulin pump.  He monitors his BS frequently.      RN reviewed COVID precautions and reasons to be seen in the ER with patient.  Informed he should continue self isolation for 20 days vs 10 days due to his history of diabetes, heart disease, & chronic kidney disease, as this can weaken his immune system.  Recommended OTC Mucinex to help thin and loosen secretions, and warm fluids for coughing spasms.  Encouraged close follow up with his PCP should sx worsen.  With regards to the crisis number he has been trying to reach by texting [phone], RN informed pt he has to text \"HOME to [phone]\" exactly as it appears in [employer] Loop, to reach a counselor 24/7.  Pt verbalized understanding of information discussed and agrees with plan.  All questions answered.    Kim Mckeon RN, Nevro   "

## 2020-12-23 ENCOUNTER — NURSE TRIAGE (OUTPATIENT)
Dept: NURSING | Facility: CLINIC | Age: 70
End: 2020-12-23

## 2020-12-23 DIAGNOSIS — Z11.59 SCREENING FOR VIRAL DISEASE: Primary | ICD-10-CM

## 2020-12-23 NOTE — TELEPHONE ENCOUNTER
Triage Call:    Patient calling regarding if he should continue quarantine. Patient has been in self isolation for more than 20 days. Patient was tested positive on December 8th and sx started on December 1st. Patient was already informed of information below regarding guidelines to meet to end self isolation. Patient states he was told by his DrSimba That he has a weak immune system and thus should have 20 days pass since sx started. RN informed patient of guidelines below. Patient meets all three guidelines.         You've had no fever--and no medicine that reduces fever--for 1 full day (24 hours). And       Your other symptoms have gotten better. For example, your cough or breathing has improved. And     At least 10 days have passed since your symptoms started. (If you've been told by a doctor that you have a weak immune system, wait 20 days.)     Patient had more questions about COVID-19 and RN referred patient to [facility] for this.Patient also had many sexual health questions that RN advised she could send message over to provider/PCP. Patient declined. Patient had sexual health questions related to COVID-19. Patient was advised to speak with MD regarding his questions. Patient asked if he could get an antibody test. Patient meets criteria at this time. Antibody test ordered for patient. Patient was then transferred to [facility] and told that patient will receive a call most likely next week since it is the holiday regarding setting up an appointment for lab only for antibody test.     Patient is calling requesting COVID serologic antibody testing.  NOTE: Serologic testing is a blood test for 'antibodies' which are made at 10-14 days after you have had symptoms of COVID or were exposed and had an asymptomatic infection.  This does NOT test you for 'active' infection or tell you if you are contagious.    Are you a healthcare worker? no  Do you currently have a cough, fever, body aches, shortness of breath, or difficulty  "breathing?  No  Did you previously have cough, fever, body aches, shortness of breath, or difficulty breathing that have now resolved? Has had previous covid symptoms.   Symptoms began December 1st.   Symptoms started > 14 days ago. Lab order placed per SARS-CoV-2 Serology test Standing Order using indication \"Previously symptomatic >14d since onset, currently asymptomatic\" and diagnosis code \"Screening for viral disease\" (Z11.59)      The patient was informed: \"Testing is limited each day and it may take time for testing to be available to everyone who has called. You will receive a call within 48-72 hours to schedule the serology testing. Please confirm the best number to reach you is [phone]. If you have any questions about scheduling, call [phone].\"       Kaya Sanchez RN, BSN Nurse Triage Advisor 2:16 PM 12/23/2020       Reason for Disposition    COVID-19 Disease, questions about    Protocols used: CORONAVIRUS (COVID-19) DIAGNOSED OR [id number] 12.1      "

## 2020-12-26 DIAGNOSIS — Z95.1 S/P CABG X 2: ICD-10-CM

## 2020-12-28 RX ORDER — DICLOFENAC SODIUM 75 MG/1
TABLET, DELAYED RELEASE ORAL
Qty: 360 TABLET | Refills: 0 | OUTPATIENT
Start: 2020-12-28

## 2020-12-28 NOTE — TELEPHONE ENCOUNTER
Diclofenac was discontinued on 3/15/2020 by Cuco Mathis Reason given was: stop on D/C. Patient must follow up with PCP for refills of this medication.    Unable to complete prescription refill per RN Medication Refill Policy.................... Ysabel Fernandez RN ....................  12/28/2020   8:46 AM

## 2020-12-29 ENCOUNTER — TELEPHONE (OUTPATIENT)
Dept: INTERNAL MEDICINE | Facility: OTHER | Age: 70
End: 2020-12-29

## 2020-12-29 DIAGNOSIS — Z02.89 PAIN MEDICATION AGREEMENT: ICD-10-CM

## 2020-12-29 DIAGNOSIS — G47.419 PRIMARY NARCOLEPSY WITHOUT CATAPLEXY: ICD-10-CM

## 2020-12-29 DIAGNOSIS — Z95.1 S/P CABG X 2: ICD-10-CM

## 2020-12-29 RX ORDER — DICLOFENAC SODIUM 75 MG/1
75 TABLET, DELAYED RELEASE ORAL 4 TIMES DAILY PRN
Qty: 360 TABLET | Refills: 3 | Status: SHIPPED | OUTPATIENT
Start: 2020-12-29 | End: 2020-12-30

## 2020-12-29 RX ORDER — DEXTROAMPHETAMINE SULFATE 10 MG/1
10 TABLET ORAL 4 TIMES DAILY
Qty: 360 TABLET | Refills: 0 | Status: CANCELLED | OUTPATIENT
Start: 2020-12-29

## 2020-12-29 RX ORDER — METHYLPHENIDATE HYDROCHLORIDE 10 MG/1
20 TABLET ORAL 2 TIMES DAILY
Qty: 360 TABLET | Refills: 0 | Status: CANCELLED | OUTPATIENT
Start: 2020-12-29

## 2020-12-29 NOTE — TELEPHONE ENCOUNTER
The patient was set up for a video visit with you tomorrow. He is wanting these medications refill today if you would as he wants to get them before the end of the year. His diclofenac was taken off his medication list. He is wanting to get an Rx for this again as states he needs to take it again.  Maria Antonia Watson LPN..................12/29/2020   2:39 PM

## 2020-12-29 NOTE — TELEPHONE ENCOUNTER
Janette has opening this afternoon, and could do a video visit today...   -- if that works, cancel appt 12/30 with Ericka.     Jorge Barnett MD

## 2020-12-29 NOTE — TELEPHONE ENCOUNTER
Spoke with patient and he does not want to have his visit with anyone other than Dr. Barnett.  He will keep tomorrow's appointment as scheduled.  Kayleigh Rubio on 12/29/2020 at 3:17 PM

## 2020-12-30 ENCOUNTER — VIRTUAL VISIT (OUTPATIENT)
Dept: INTERNAL MEDICINE | Facility: OTHER | Age: 70
End: 2020-12-30
Attending: INTERNAL MEDICINE
Payer: COMMERCIAL

## 2020-12-30 ENCOUNTER — TELEPHONE (OUTPATIENT)
Dept: INTERNAL MEDICINE | Facility: OTHER | Age: 70
End: 2020-12-30

## 2020-12-30 DIAGNOSIS — Z95.1 S/P CABG X 2: ICD-10-CM

## 2020-12-30 DIAGNOSIS — N18.30 CONTROLLED TYPE 2 DIABETES MELLITUS WITH STAGE 3 CHRONIC KIDNEY DISEASE, WITH LONG-TERM CURRENT USE OF INSULIN (H): ICD-10-CM

## 2020-12-30 DIAGNOSIS — U07.1 INFECTION DUE TO 2019 NOVEL CORONAVIRUS: ICD-10-CM

## 2020-12-30 DIAGNOSIS — E78.2 MIXED HYPERLIPIDEMIA: ICD-10-CM

## 2020-12-30 DIAGNOSIS — I50.32 CHRONIC HEART FAILURE WITH PRESERVED EJECTION FRACTION (H): ICD-10-CM

## 2020-12-30 DIAGNOSIS — I10 BENIGN ESSENTIAL HYPERTENSION: ICD-10-CM

## 2020-12-30 DIAGNOSIS — E11.22 CONTROLLED TYPE 2 DIABETES MELLITUS WITH STAGE 3 CHRONIC KIDNEY DISEASE, WITH LONG-TERM CURRENT USE OF INSULIN (H): ICD-10-CM

## 2020-12-30 DIAGNOSIS — Z96.41 INSULIN PUMP IN PLACE: ICD-10-CM

## 2020-12-30 DIAGNOSIS — I25.118 ATHEROSCLEROSIS OF NATIVE CORONARY ARTERY OF NATIVE HEART WITH STABLE ANGINA PECTORIS (H): ICD-10-CM

## 2020-12-30 DIAGNOSIS — Z79.4 CONTROLLED TYPE 2 DIABETES MELLITUS WITH STAGE 3 CHRONIC KIDNEY DISEASE, WITH LONG-TERM CURRENT USE OF INSULIN (H): ICD-10-CM

## 2020-12-30 DIAGNOSIS — E03.4 HYPOTHYROIDISM DUE TO ACQUIRED ATROPHY OF THYROID: ICD-10-CM

## 2020-12-30 DIAGNOSIS — Z02.89 PAIN MEDICATION AGREEMENT: ICD-10-CM

## 2020-12-30 DIAGNOSIS — E11.40 PAINFUL DIABETIC NEUROPATHY (H): ICD-10-CM

## 2020-12-30 DIAGNOSIS — G47.419 PRIMARY NARCOLEPSY WITHOUT CATAPLEXY: Primary | ICD-10-CM

## 2020-12-30 PROCEDURE — 99214 OFFICE O/P EST MOD 30 MIN: CPT | Mod: 95 | Performed by: INTERNAL MEDICINE

## 2020-12-30 RX ORDER — RANOLAZINE 500 MG/1
1000 TABLET, EXTENDED RELEASE ORAL 2 TIMES DAILY
Qty: 360 TABLET | Refills: 3 | Status: ON HOLD | OUTPATIENT
Start: 2020-12-30 | End: 2023-01-01

## 2020-12-30 RX ORDER — DEXTROAMPHETAMINE SULFATE 10 MG/1
10 TABLET ORAL 4 TIMES DAILY
Qty: 360 TABLET | Refills: 0 | Status: SHIPPED | OUTPATIENT
Start: 2020-12-30 | End: 2021-04-21

## 2020-12-30 RX ORDER — DICLOFENAC SODIUM 75 MG/1
75 TABLET, DELAYED RELEASE ORAL 4 TIMES DAILY PRN
Qty: 360 TABLET | Refills: 3 | Status: SHIPPED | OUTPATIENT
Start: 2020-12-30 | End: 2021-03-09

## 2020-12-30 RX ORDER — METHYLPHENIDATE HYDROCHLORIDE 10 MG/1
20 TABLET ORAL 2 TIMES DAILY
Qty: 360 TABLET | Refills: 0 | Status: SHIPPED | OUTPATIENT
Start: 2020-12-30 | End: 2021-04-21

## 2020-12-30 ASSESSMENT — PAIN SCALES - GENERAL: PAINLEVEL: SEVERE PAIN (6)

## 2020-12-30 ASSESSMENT — ENCOUNTER SYMPTOMS
DIARRHEA: 0
BRUISES/BLEEDS EASILY: 1
FEVER: 0
SHORTNESS OF BREATH: 0
CONFUSION: 0
WOUND: 0
HEMATURIA: 0
CHILLS: 0
ARTHRALGIAS: 1
COUGH: 0

## 2020-12-30 NOTE — TELEPHONE ENCOUNTER
Pharmacy is calling concerning his RX for Diclofenac. They states that his chart shows he has stage 3 kidney disease and we should be avoiding all NSAID.  Please advise?   Virginie Vargas LPN ..........12/30/2020 10:33 AM

## 2020-12-30 NOTE — PATIENT INSTRUCTIONS

## 2020-12-30 NOTE — PROGRESS NOTES
"Moses Whittaker is a 70 year old male who is being evaluated via a billable video visit.    The patient has been notified of following:   \"This video visit will be conducted via a call between you and your physician/provider. We have found that certain health care needs can be provided without the need for an in-person physical exam.  This service lets us provide the care you need with a video conversation.  If a prescription is necessary we can send it directly to your pharmacy.  If lab work is needed we can place an order for that and you can then stop by our lab to have the test done at a later time.    Video visits are billed at different rates depending on your insurance coverage.  Please reach out to your insurance provider with any questions.    If during the course of the call the physician/provider feels a video visit is not appropriate, you will not be charged for this service.\"    Patient has given verbal consent for Video visit? Yes  How would you like to obtain your AVS? MyChart  If you are dropped from the video visit, the video invite should be resent to: Text to cell phone: [phone]  Will anyone else be joining your video visit? No    Subjective     Moses Whittaker is a 70 year old male who presents today via video visit for the following health issues:    HPI     Video Start Time: 11:25 AM    Review of Systems   Constitutional: Negative for chills and fever.        Lost sense of smell with 12/8/2020 COVID-19 infection   HENT: Negative for congestion.    Respiratory: Negative for cough and shortness of breath.    Cardiovascular: Positive for chest pain (stable angina) and peripheral edema.   Gastrointestinal: Negative for diarrhea (C-diff resolved s/p vancomycin ).   Genitourinary: Negative for hematuria.   Musculoskeletal: Positive for arthralgias.   Skin: Negative for wound.   Neurological: Negative for syncope.   Hematological: Bruises/bleeds easily.   Psychiatric/Behavioral: Negative for " confusion.          Objective    Vitals - Patient Reported  Pain Score: Severe Pain (6)  Pain Loc: Low Back      Vitals:  No vitals were obtained today due to virtual visit.    Physical Exam     GENERAL: Healthy, alert and no distress  EYES: Eyes grossly normal to inspection.  No discharge or erythema, or obvious scleral/conjunctival abnormalities.  RESP: No audible wheeze, cough, or visible cyanosis.  No visible retractions or increased work of breathing.    SKIN: Visible skin clear. No significant rash, abnormal pigmentation or lesions.  NEURO: Cranial nerves grossly intact.  Mentation and speech appropriate for age.  PSYCH: Mentation appears normal, affect normal/bright, judgement and insight intact, normal speech and appearance well-groomed.      Allied Health/Nurse Visit on 12/08/2020   Component Date Value Ref Range Status     COVID-19 Virus PCR to [facility]* 12/08/2020 Nasopharyngeal   Final     COVID-19 Virus PCR to [facility]* 12/08/2020 Detected, Abnormal Result*  Final    Comment: Positive for 2019-nCoV.  Patient sample was heat inactivated and amplified using the HDPCR SARS-CoV-2   assay ([employer] The HDPCRTM SARS-CoV-2 assay is a reverse   transcription real-time polymerase chain reaction (qRT-PCR) test intended for   the qualitative detection of nucleic acid  from SARS-CoV-2 in human nasopharyngeal swabs, oropharyngeal swabs, anterior   nasal swabs, mid-turbinate nasal swabs as well as nasal aspirate, nasal wash,   and bronchoalveolar lavage (BAL) specimens from individuals who are suspected   of COVID-19 by their healthcare provider.  Positive results should also be reported in accordance with local, state, and   federal regulations.  Nasopharyngeal specimen is the preferred choice for swab-based SARS CoV2   testing. When collection of a nasopharyngeal swab is not possible the   following are acceptable alternatives:  an oropharyngeal (OP) specimen collected by a healthcare professional,  or a   nasal mid-turbinate (NMT) swab collected by a healthcar                           e professional or by   onsite self-collection (using a flocked tapered swab), or an anterior nares   specimen collected by a healthcare professional or by onsite self-collection   (using a round foam swab). (Centers for Disease Control)  Testing performed by [facility] ([facility]) [address] [place] [address]  The test performance characteristics were determined by [facility]. It has not been   cleared or approved by the FDA.  The laboratory is regulated under the Clinical Laboratory Improvement   Amendments of 1988 (CLIA-88) as qualified to perform high-complexity testing.   This test is used for clinical purposes. It should not be regarded as   investigational or for research.             ICD-10-CM    1. Primary narcolepsy without cataplexy  G47.419 dextroamphetamine (DEXTROSTAT) 10 MG tablet     methylphenidate (RITALIN) 10 MG tablet   2. Pain medication agreement  Z02.89 dextroamphetamine (DEXTROSTAT) 10 MG tablet     methylphenidate (RITALIN) 10 MG tablet   3. Controlled type 2 diabetes mellitus with stage 3 chronic kidney disease, with long-term current use of insulin (H)  E11.22     N18.30     Z79.4    4. Painful diabetic neuropathy (H)  E11.40    5. Chronic heart failure with preserved ejection fraction (H)  I50.32    6. Hypothyroidism due to acquired atrophy of thyroid  E03.4    7. Benign essential hypertension  I10    8. Insulin pump in place  Z96.41    9. Mixed hyperlipidemia  E78.2    10. S/P CABG x 2  Z95.1 diclofenac (VOLTAREN) 75 MG EC tablet   11. Atherosclerosis of native coronary artery of native heart with stable angina pectoris (H)  I25.118 ranolazine (RANEXA) 500 MG 12 hr tablet   12. Infection due to 2019 novel coronavirus - 12/8/2020  U07.1      Patient contacted via video visit due to virus pandemic.    Narcolepsy without cataplexy.   Has completed controlled substance agreement.  [facility] website reviewed, no abnormal findings noted.  No benzodiazepine use.  Proper medication use and misuse reviewed, patient has been using medications appropriately.  Uses accommodation of dextroamphetamine and methylphenidate.  Typically gets a 3-month supply of his medications.  He would like to try getting medications refilled prior to January 1 when his co-pays restart.  Prescriptions updated x3-month supply.    Type 2 diabetes, has stage III chronic kidney disease.  Does continue to use insulin therapy.  Continues to use insulin pump.    Painful diabetic neuropathy, chronic and ongoing.    Hypothyroidism, taking oral replacement.  Doing well with current dosing.  No changes for now.    Hypertension, blood pressures have been well controlled.  No syncope or presyncope.  Continue current dosing.    Mixed hyperlipidemia, continues on statin therapy.  Takes his Crestor as twice daily dosing because he has difficulty swallowing larger pills.  Also gets more body aches if taking his one-time dose per day.    History of coronary artery bypass surgery, has chronic stable angina.  Continues on Ranexa.  Needs refills.    Multiple joint pain, continues with Voltaren tablet 75 mg once daily as needed with food for arthritis pain.    Recovered well from his COVID-19 infection on 12/8.     Plan for follow-up appointment in about 3 months or sooner as needed for controlled med refills.    Due for repeat diabetic follow-up in early February with lab work.    Recent Labs   Lab Test 10/28/20  1329 07/24/20  1520 05/31/20  0625 05/31/20  0625 04/21/20  1512 04/21/20  1512   A1C 7.2* 8.3*  --   --   --  7.5*   LDL 61 50  --   --   --  41   HDL 32 35  --   --   --  36   TRIG 167* 264*  --   --   --  133   ALT 20 33  --  27   < > 28   CR 2.17* 1.66*   < > 1.87*   < > 1.63*   GFRESTIMATED 30* 41*   < > 36*   < > 42*   GFRESTBLACK 37* 50*   < > 44*   < > 51*   POTASSIUM 4.7 4.1   < >  4.2   < > 4.0    < > = values in this interval not displayed.         Video-Visit Details    Type of service:  Video Visit    Video End Time:11:40 AM    Originating Location (pt. Location): Home    Distant Location (provider location):  [facility] AND [facility]     Platform used for Video Visit: [employer]

## 2021-01-01 NOTE — TELEPHONE ENCOUNTER
After birth date was verified, spoke with patient and let him know the below information. No further questions or concerns.        Stephan Akbar LPN 03/14/18 11:54 AM          Infant (Birth)

## 2021-01-03 ENCOUNTER — HEALTH MAINTENANCE LETTER (OUTPATIENT)
Age: 71
End: 2021-01-03

## 2021-03-01 DIAGNOSIS — Z95.1 S/P CABG X 2: ICD-10-CM

## 2021-03-03 RX ORDER — DICLOFENAC SODIUM 75 MG/1
TABLET, DELAYED RELEASE ORAL
Qty: 360 TABLET | Refills: 0 | OUTPATIENT
Start: 2021-03-03

## 2021-03-03 NOTE — TELEPHONE ENCOUNTER
Disp Refills Start End ASHANTI   diclofenac (VOLTAREN) 75 MG EC tablet 360 tablet 3 12/30/2020  No   Sig - Route: Take 1 tablet (75 mg) by mouth 4 times daily as needed for moderate pain - Oral     Redundant refill request refused: Too soon: Should have refills available.Alma Judge RN  ....................  3/3/2021   4:24 PM

## 2021-03-05 DIAGNOSIS — I73.9 PERIPHERAL VASCULAR DISEASE (H): Primary | ICD-10-CM

## 2021-03-05 DIAGNOSIS — R60.0 BILATERAL LOWER EXTREMITY EDEMA: ICD-10-CM

## 2021-03-05 NOTE — TELEPHONE ENCOUNTER
Routing refill request to provider for review/approval because:  Drug not on the [facility] refill protocol     LOV: 12/30/2021    Margie Calderón RN on 3/5/2021 at 11:31 AM

## 2021-03-07 ENCOUNTER — NURSE TRIAGE (OUTPATIENT)
Dept: NURSING | Facility: CLINIC | Age: 71
End: 2021-03-07

## 2021-03-07 NOTE — TELEPHONE ENCOUNTER
"Pt is calling in about having chest pain, and pressure last night, and today also feels that same heavy pressure in his stomach, and his chest, but it is more of an ache, like a \"5-6\" today.  Pt reports last night he felt like he ached all over his chest and stomach last night, it was much worse and it went down both arms, it was \"9-10. Pt also felt it in the shoulders last night, but did not feel it in the jaw, and it hurt to move around at all. Pt also reports he felt some diaphoresis last night, like he was \"overheated\", but also reports this was during activity. Pt denies fever, dizziness, or light headedness, but reports slight difficulty breathing. Pt reports this ache is continuous, and started gradually to get worse this morning. Pt has had a \"blood clot in his arteries in the past.\"   Care advice given, and per protocol, pt should be evaluated in the ER, and should have someone drive him. Pt is refusing to go to the  ER until he eats and tries some Famotidine. Pt was advised again to go to the ER. Pt agrees if the heaviness continues he will go to the ER. Pt was advised to call 911 if pressure and pain increases. Pt verbalized understanding.     Fernando Ruano RN on 3/7/2021 at 12:59 PM    Reason for Disposition    Patient sounds very sick or weak to the triager    Additional Information    Negative: Severe difficulty breathing (e.g., struggling for each breath, speaks in single words)    Negative: Difficult to awaken or acting confused (e.g., disoriented, slurred speech)    Negative: Shock suspected (e.g., cold/pale/clammy skin, too weak to stand, low BP, rapid pulse)    Negative: [1] Chest pain lasts > 5 minutes AND [2] history of heart disease  (i.e., heart attack, bypass surgery, angina, angioplasty, CHF; not just a heart murmur)    Negative: [1] Chest pain lasts > 5 minutes AND [2] described as crushing, pressure-like, or heavy    Negative: [1] Chest pain lasts > 5 minutes AND [2] age > 50    " "Negative: [1] Chest pain lasts > 5 minutes AND [2] age > 30 AND [3] at least one cardiac risk factor (i.e., hypertension, diabetes, obesity, smoker or strong family history of heart disease)    Negative: [1] Chest pain lasts > 5 minutes AND [2] not relieved with nitroglycerin    Negative: Passed out (i.e., lost consciousness, collapsed and was not responding)    Negative: Heart beating < 50 beats per minute OR > 140 beats per minute    Negative: Visible sweat on face or sweat dripping down face    Negative: Sounds like a life-threatening emergency to the triager    Negative: Followed a chest injury    Negative: SEVERE chest pain    Negative: [1] Intermittent  chest pain or \"angina\" AND [2] increasing in severity or frequency  (Exception: pains lasting a few seconds)    Negative: Pain also present in shoulder(s) or arm(s) or jaw  (Exception: pain is clearly made worse by movement)    Negative: Difficulty breathing    Negative: Dizziness or lightheadedness    Negative: Coughing up blood    Negative: Cocaine use within last 3 days    Negative: History of prior \"blood clot\" in leg or lungs (i.e., deep vein thrombosis, pulmonary embolism)    Negative: Recent illness requiring prolonged bedrest (i.e., immobilization)    Negative: Hip or leg fracture in past 2 months (e.g., had cast on leg or ankle)    Negative: Major surgery in the past month    Negative: Recent long-distance travel with prolonged time in car, bus, plane, or train (i.e., within past 2 weeks; 6 or  more hours duration)    Negative: Chest pain lasts > 5 minutes (Exceptions: chest pain occurring > 3 days ago and now asymptomatic; same as previously diagnosed heartburn and has accompanying sour taste in mouth)    Negative: Taking a deep breath makes pain worse    Protocols used: CHEST PAIN-A-AH      "

## 2021-03-08 ENCOUNTER — TELEPHONE (OUTPATIENT)
Dept: INTERNAL MEDICINE | Facility: OTHER | Age: 71
End: 2021-03-08

## 2021-03-08 ENCOUNTER — VIRTUAL VISIT (OUTPATIENT)
Dept: PHARMACY | Facility: CLINIC | Age: 71
End: 2021-03-08
Payer: COMMERCIAL

## 2021-03-08 DIAGNOSIS — M54.50 CHRONIC LOW BACK PAIN, UNSPECIFIED BACK PAIN LATERALITY, UNSPECIFIED WHETHER SCIATICA PRESENT: ICD-10-CM

## 2021-03-08 DIAGNOSIS — I50.32 CHRONIC DIASTOLIC HEART FAILURE (H): Primary | ICD-10-CM

## 2021-03-08 DIAGNOSIS — G89.29 CHRONIC LOW BACK PAIN, UNSPECIFIED BACK PAIN LATERALITY, UNSPECIFIED WHETHER SCIATICA PRESENT: ICD-10-CM

## 2021-03-08 DIAGNOSIS — E78.2 MIXED HYPERLIPIDEMIA: ICD-10-CM

## 2021-03-08 DIAGNOSIS — E11.22 CONTROLLED TYPE 2 DIABETES MELLITUS WITH STAGE 3 CHRONIC KIDNEY DISEASE, WITH LONG-TERM CURRENT USE OF INSULIN (H): ICD-10-CM

## 2021-03-08 DIAGNOSIS — I73.9 PERIPHERAL VASCULAR DISEASE (H): ICD-10-CM

## 2021-03-08 DIAGNOSIS — M48.02 CERVICAL STENOSIS OF SPINAL CANAL: Primary | ICD-10-CM

## 2021-03-08 DIAGNOSIS — E03.4 HYPOTHYROIDISM DUE TO ACQUIRED ATROPHY OF THYROID: ICD-10-CM

## 2021-03-08 DIAGNOSIS — Z78.9 TAKES DIETARY SUPPLEMENTS: ICD-10-CM

## 2021-03-08 DIAGNOSIS — G47.419 PRIMARY NARCOLEPSY WITHOUT CATAPLEXY: ICD-10-CM

## 2021-03-08 DIAGNOSIS — K21.9 GASTROESOPHAGEAL REFLUX DISEASE, UNSPECIFIED WHETHER ESOPHAGITIS PRESENT: ICD-10-CM

## 2021-03-08 DIAGNOSIS — N18.30 CONTROLLED TYPE 2 DIABETES MELLITUS WITH STAGE 3 CHRONIC KIDNEY DISEASE, WITH LONG-TERM CURRENT USE OF INSULIN (H): ICD-10-CM

## 2021-03-08 DIAGNOSIS — M47.9 OSTEOARTHRITIS OF SPINE, UNSPECIFIED SPINAL OSTEOARTHRITIS COMPLICATION STATUS, UNSPECIFIED SPINAL REGION: ICD-10-CM

## 2021-03-08 DIAGNOSIS — I10 ESSENTIAL HYPERTENSION: ICD-10-CM

## 2021-03-08 DIAGNOSIS — I25.118 ATHEROSCLEROSIS OF NATIVE CORONARY ARTERY OF NATIVE HEART WITH STABLE ANGINA PECTORIS (H): ICD-10-CM

## 2021-03-08 DIAGNOSIS — Z79.4 CONTROLLED TYPE 2 DIABETES MELLITUS WITH STAGE 3 CHRONIC KIDNEY DISEASE, WITH LONG-TERM CURRENT USE OF INSULIN (H): ICD-10-CM

## 2021-03-08 PROCEDURE — 99605 MTMS BY PHARM NP 15 MIN: CPT | Mod: TEL | Performed by: PHARMACIST

## 2021-03-08 PROCEDURE — 99607 MTMS BY PHARM ADDL 15 MIN: CPT | Mod: TEL | Performed by: PHARMACIST

## 2021-03-08 NOTE — TELEPHONE ENCOUNTER
Called Kim at [facility] pharmacy, they got a prescription transferred to them for diclofenac 75 mg tablets.  They have 2 concerns to address before this can be filled.    1.  Please review the use of this with his kidney disease.  2.  His insurance will only cover 2 tablets per day.    Please let them know if and how you want to proceed with this medication order.

## 2021-03-08 NOTE — LETTER
"        Date: 3/8/2021    Moses Whittaker  [address] [address]    Dear Mr. Whittaker,    Thank you for talking with me on 3/8/2021 about your health and medications. Medicare s MTM (Medication Therapy Management) program helps you understand your medications and use them safely.      This letter includes an action plan (Medication Action Plan) and medication list (Personal Medication List). The action plan has steps you should take to help you get the best results from your medications. The medication list will help you keep track of your medications and how to use them the right way.       Have your action plan and medication list with you when you talk with your doctors, pharmacists, and other healthcare providers in your care team.     Ask your doctors, pharmacists, and other healthcare providers to update the action plan and medication list at every visit.     Take your medication list with you if you go to the hospital or emergency room.     Give a copy of the action plan and medication list to your family or caregivers.     If you want to talk about this letter or any of the papers with it, please call   [phone].We look forward to working with you, your doctors, and other healthcare providers to help you stay healthy through the [employer] [place] MTM program.    Sincerely,  Silvio Jaramillo [facility]    Enclosed: Medication Action Plan and Personal Medication List    MEDICATION ACTION PLAN FOR Moses Whittaker,  1950     This action plan will help you get the best results from your medications if you:   1. Read \"What we talked about.\"   2. Take the steps listed in the \"What I need to do\" boxes.   3. Fill in \"What I did and when I did it.\"   4. Fill in \"My follow-up plan\" and \"Questions I want to ask.\"     Have this action plan with you when you talk with your doctors, pharmacists, and other healthcare providers in your care team. Share this with your " family or caregivers too.  DATE PREPARED: 3/8/2021  What we talked about: Diclofenac can sometimes interfere with the efficacy of the aspirin in blocking platelets which can increase risk for heart attacks and blockages.                                            What I need to do:  Move aspirin to early dosing with nifedipine and levothyroxine to reduce interaction between aspirin and diclofenac. I typically recommend  by at least 1 hour.      What I did and when I did it:                                              My follow-up plan:                 Questions I want to ask:              If you have any questions about your action plan, call Silvio Jaramillo [facility], Phone: [phone] , Monday-Friday 8-4:30pm.           PERSONAL MEDICATION LIST FOR Moses Whittaker,  1950     This medication list was made for you after we talked. We also used information from your doctor's chart.      Use blank rows to add new medications. Then fill in the dates you started using them.    Cross out medications when you no longer use them. Then write the date and why you stopped using them.    Ask your doctors, pharmacists, and other healthcare providers to update this list at every visit. Keep this list up-to-date with:       Prescription medications    Over the counter drugs     Herbals    Vitamins    Minerals      If you go to the hospital or emergency room, take this list with you. Share this with your family or caregivers too.     DATE PREPARED: 3/8/2021  Allergies or side effects: Famotidine, Gabapentin, Hydrocortisone, Atorvastatin, Celebrex [celecoxib], Lisinopril, No clinical screening - see comments, Norvasc [amlodipine], Pregabalin, Valdecoxib, and Insulin aspart     Medication:  ASPIRIN 81 MG PO TABS      How I use it:  Take 1 tablet (81 mg) by mouth daily      Why I use it: Heart disease    Prescriber:  Jorge Barnett MD      Date I started using it:       Date I stopped using it:         Why I  stopped using it:            Medication:  CLOPIDOGREL BISULFATE 75 MG PO TABS      How I use it:  Take 1 tablet (75 mg) by mouth daily      Why I use it: Heart disease    Prescriber:  Jorge Barnett MD      Date I started using it:       Date I stopped using it:         Why I stopped using it:            Medication:  COENZYME Q10 100 MG PO CAPS      How I use it:  Take 100 mg by mouth daily       Why I use it:  General health    Prescriber:  Patient Reported      Date I started using it:       Date I stopped using it:         Why I stopped using it:            Medication:  DESOXIMETASONE 0.25 % EX CREA      How I use it:  APPLY CREAM EXTERNALLY SPARINGLY TWICE DAILY      Why I use it:  Rash    Prescriber:  Jorge Barnett MD      Date I started using it:       Date I stopped using it:         Why I stopped using it:            Medication:  DEXTROAMPHETAMINE SULFATE 10 MG PO TABS      How I use it:  Take 1 tablet (10 mg) by mouth 4 times daily      Why I use it: Narcolepsy    Prescriber:  Jorge Barnett MD      Date I started using it:       Date I stopped using it:         Why I stopped using it:            Medication:  DICLOFENAC SODIUM 75 MG PO TBEC      How I use it:  Take 1 tablet (75 mg) by mouth 4 times daily as needed for moderate pain      Why I use it: Pain    Prescriber:  Jorge Barnett MD      Date I started using it:       Date I stopped using it:         Why I stopped using it:            Medication:  FAMOTIDINE 20 MG PO TABS      How I use it:  Take 20 mg by mouth 2 times daily as needed       Why I use it:  Heartburn    Prescriber:  Entered By History Unknown      Date I started using it:       Date I stopped using it:         Why I stopped using it:            Medication:  FLAXSEED OIL 1000 MG PO CAPS      How I use it:  Take 1 capsule by mouth daily       Why I use it:  General health    Prescriber:  Patient Reported      Date I started using it:       Date I stopped using it:         Why I stopped  using it:            Medication:  HYDRALAZINE HCL 25 MG PO TABS      How I use it:  Take 1-2 tablets (25-50 mg) by mouth 4 times daily       Why I use it: Blood pressure    Prescriber:  Jorge Barnett MD      Date I started using it:       Date I stopped using it:         Why I stopped using it:            Medication:  HYDROCODONE-ACETAMINOPHEN 5-325 MG PO TABS      How I use it:  Take 1 tablet by mouth every 6 hours as needed for severe pain      Why I use it: Pain    Prescriber:  Jorge Barnett MD      Date I started using it:       Date I stopped using it:         Why I stopped using it:            Medication:  INSULIN LISPRO (HUMAN) VIAL 100 UNIT/ML SC SOLN      How I use it:  INJECT UNDER THE SKIN USING INSULIN PUMP. MAX DAILY DOSE  UNITS      Why I use it: Type 2 Diabetes    Prescriber:  Jorge Barnett MD      Date I started using it:       Date I stopped using it:         Why I stopped using it:            Medication:  IRBESARTAN 150 MG PO TABS      How I use it:  Take 1 tablet (150 mg) by mouth 2 times daily      Why I use it: Heart failure    Prescriber:  Jorge Barnett MD      Date I started using it:       Date I stopped using it:         Why I stopped using it:            Medication:  LEVOTHYROXINE SODIUM 125 MCG PO TABS      How I use it:  TAKE 1 TABLET BY MOUTH ONCE DAILY IN THE MORNING      Why I use it: Hypothyroidism     Prescriber:  Jorge Barnett MD      Date I started using it:       Date I stopped using it:         Why I stopped using it:            Medication:  METHYLPHENIDATE HCL 10 MG PO TABS      How I use it:  Take 2 tablets (20 mg) by mouth 2 times daily      Why I use it: Narcolepsy    Prescriber:  Jorge Barnett MD      Date I started using it:       Date I stopped using it:         Why I stopped using it:            Medication:  METOPROLOL SUCCINATE ER 50 MG PO TB24      How I use it:  Take 1 tablet (50 mg) by mouth 2 times daily      Why I use it: Blood pressure    Prescriber:   Jorge Barnett MD      Date I started using it:       Date I stopped using it:         Why I stopped using it:            Medication:  NIFEDIPINE ER 30 MG PO TB24      How I use it:  Take 1 tablet (30 mg) by mouth 2 times daily      Why I use it: Blood pressure; Chest pain    Prescriber:  Jorge Barnett MD      Date I started using it:       Date I stopped using it:         Why I stopped using it:            Medication:  NITROGLYCERIN 0.4 MG SL SUBL      How I use it:  Place 1 tablet (0.4 mg) under the tongue every 5 minutes as needed for chest pain Call 911 after 1st dose.      Why I use it: Chest Pain    Prescriber:  Cuco Mathis MD      Date I started using it:       Date I stopped using it:         Why I stopped using it:            Medication:  RANOLAZINE  MG PO TB12      How I use it:  Take 2 tablets (1,000 mg) by mouth 2 times daily      Why I use it: Heart disease; angina    Prescriber:  Jorge Barnett MD      Date I started using it:       Date I stopped using it:         Why I stopped using it:            Medication:  ROSUVASTATIN CALCIUM 10 MG PO TABS      How I use it:  Take 1 tablet by mouth twice daily      Why I use it: Heart; Cholesterol    Prescriber:  Jorge Barnett MD      Date I started using it:       Date I stopped using it:         Why I stopped using it:            Medication:  SACCHAROMYCES BOULARDII 250 MG PO CAPS      How I use it:  Take 1 capsule (250 mg) by mouth 2 times daily      Why I use it: History of Clostridium difficile infection    Prescriber:  Cuco Mathis MD      Date I started using it:       Date I stopped using it:         Why I stopped using it:            Medication:  SALINE NA GEL      How I use it:  Spray 1 spray in nostril every hour as needed For Nasal Dryness      Why I use it:  Nasal dryness    Prescriber:  Patient Reported      Date I started using it:       Date I stopped using it:         Why I stopped using it:            Medication:  VITAMIN D3  (CHOLECALCIFEROL) 25 MCG (1000 UNIT) PO CAPS      How I use it:  Take 1 capsule by mouth daily      Why I use it: Chronic kidney disease    Prescriber:  Patient Reported      Date I started using it:       Date I stopped using it:         Why I stopped using it:            Medication:  VITAMIN E 400 UNITS PO CAPS      How I use it:  Take 1 capsule by mouth daily      Why I use it:  General health    Prescriber:  Patient Reported      Date I started using it:       Date I stopped using it:         Why I stopped using it:            Medication:         How I use it:         Why I use it:      Prescriber:         Date I started using it:       Date I stopped using it:         Why I stopped using it:            Medication:         How I use it:         Why I use it:      Prescriber:         Date I started using it:       Date I stopped using it:         Why I stopped using it:            Medication:         How I use it:         Why I use it:      Prescriber:         Date I started using it:       Date I stopped using it:         Why I stopped using it:              Other Information:     If you have any questions about your medication list, call Silvio Jaramillo [facility], Phone: [phone] , Monday-Friday 8-4:30pm.    According to the Paperwork Reduction Act of 1995, no persons are required to respond to a collection of information unless it displays a valid OMB control number. The valid OMB number for this information collection is [id number]. The time required to complete this information collection is estimated to average 40 minutes per response, including the time to review instructions, searching existing data resources, gather the data needed, and complete and review the information collection. If you have any comments concerning the accuracy of the time estimate(s) or suggestions for improving this form, please write to: CMS, Attn: [employer] Reports Clearance Officer, [address] [place] [address]  [phone].

## 2021-03-08 NOTE — TELEPHONE ENCOUNTER
[facility] is asking about the medication diclofenac please call and advise    Thank You    Shereen Dolan on 3/8/2021 at 1:23 PM

## 2021-03-09 RX ORDER — DICLOFENAC SODIUM 75 MG/1
75 TABLET, DELAYED RELEASE ORAL 2 TIMES DAILY PRN
Qty: 180 TABLET | Refills: 3 | Status: ON HOLD | OUTPATIENT
Start: 2021-03-09 | End: 2023-01-01

## 2021-03-09 NOTE — TELEPHONE ENCOUNTER
1. Cervical stenosis of spinal canal    Rx changed to 2x daily.     - diclofenac (VOLTAREN) 75 MG EC tablet; Take 1 tablet (75 mg) by mouth 2 times daily as needed for moderate pain  Dispense: 180 tablet; Refill: 3      Jorge Barnett MD

## 2021-03-10 ENCOUNTER — TELEPHONE (OUTPATIENT)
Dept: INTERNAL MEDICINE | Facility: OTHER | Age: 71
End: 2021-03-10

## 2021-03-10 ENCOUNTER — HOSPITAL ENCOUNTER (EMERGENCY)
Facility: OTHER | Age: 71
Discharge: HOME | End: 2021-03-10
Attending: STUDENT IN AN ORGANIZED HEALTH CARE EDUCATION/TRAINING PROGRAM | Admitting: STUDENT IN AN ORGANIZED HEALTH CARE EDUCATION/TRAINING PROGRAM
Payer: MEDICARE

## 2021-03-10 ENCOUNTER — HOSPITAL ENCOUNTER (EMERGENCY)
Facility: OTHER | Age: 71
End: 2021-03-10
Payer: MEDICARE

## 2021-03-10 ENCOUNTER — APPOINTMENT (OUTPATIENT)
Dept: GENERAL RADIOLOGY | Facility: OTHER | Age: 71
End: 2021-03-10
Attending: STUDENT IN AN ORGANIZED HEALTH CARE EDUCATION/TRAINING PROGRAM
Payer: MEDICARE

## 2021-03-10 VITALS
OXYGEN SATURATION: 97 % | BODY MASS INDEX: 30.14 KG/M2 | HEART RATE: 63 BPM | RESPIRATION RATE: 14 BRPM | HEIGHT: 70 IN | DIASTOLIC BLOOD PRESSURE: 93 MMHG | TEMPERATURE: 98 F | WEIGHT: 210.54 LBS | SYSTOLIC BLOOD PRESSURE: 164 MMHG

## 2021-03-10 DIAGNOSIS — E83.42 HYPOMAGNESEMIA: ICD-10-CM

## 2021-03-10 DIAGNOSIS — R07.9 CHEST PAIN, UNSPECIFIED TYPE: ICD-10-CM

## 2021-03-10 LAB
ALBUMIN SERPL-MCNC: 3.7 G/DL (ref 3.5–5.7)
ALP SERPL-CCNC: 70 U/L (ref 34–104)
ALT SERPL W P-5'-P-CCNC: 17 U/L (ref 7–52)
ANION GAP SERPL CALCULATED.3IONS-SCNC: 9 MMOL/L (ref 3–14)
AST SERPL W P-5'-P-CCNC: 19 U/L (ref 13–39)
BASOPHILS # BLD AUTO: 0 10E9/L (ref 0–0.2)
BASOPHILS NFR BLD AUTO: 0.5 %
BILIRUB SERPL-MCNC: 0.4 MG/DL (ref 0.3–1)
BUN SERPL-MCNC: 19 MG/DL (ref 7–25)
CALCIUM SERPL-MCNC: 9 MG/DL (ref 8.6–10.3)
CHLORIDE SERPL-SCNC: 100 MMOL/L (ref 98–107)
CO2 SERPL-SCNC: 24 MMOL/L (ref 21–31)
CREAT SERPL-MCNC: 1.75 MG/DL (ref 0.7–1.3)
D DIMER PPP FEU-MCNC: 0.6 UG/ML FEU (ref 0–0.5)
DIFFERENTIAL METHOD BLD: NORMAL
EOSINOPHIL # BLD AUTO: 0.3 10E9/L (ref 0–0.7)
EOSINOPHIL NFR BLD AUTO: 3.2 %
ERYTHROCYTE [DISTWIDTH] IN BLOOD BY AUTOMATED COUNT: 12.5 % (ref 10–15)
GFR SERPL CREATININE-BSD FRML MDRD: 39 ML/MIN/{1.73_M2}
GLUCOSE SERPL-MCNC: 194 MG/DL (ref 70–105)
HCT VFR BLD AUTO: 43.8 % (ref 40–53)
HGB BLD-MCNC: 15 G/DL (ref 13.3–17.7)
IMM GRANULOCYTES # BLD: 0 10E9/L (ref 0–0.4)
IMM GRANULOCYTES NFR BLD: 0.4 %
LIPASE SERPL-CCNC: 20 U/L (ref 11–82)
LYMPHOCYTES # BLD AUTO: 1.8 10E9/L (ref 0.8–5.3)
LYMPHOCYTES NFR BLD AUTO: 22.5 %
MAGNESIUM SERPL-MCNC: 1.8 MG/DL (ref 1.9–2.7)
MCH RBC QN AUTO: 32.3 PG (ref 26.5–33)
MCHC RBC AUTO-ENTMCNC: 34.2 G/DL (ref 31.5–36.5)
MCV RBC AUTO: 94 FL (ref 78–100)
MONOCYTES # BLD AUTO: 0.4 10E9/L (ref 0–1.3)
MONOCYTES NFR BLD AUTO: 5 %
NEUTROPHILS # BLD AUTO: 5.3 10E9/L (ref 1.6–8.3)
NEUTROPHILS NFR BLD AUTO: 68.4 %
PLATELET # BLD AUTO: 253 10E9/L (ref 150–450)
POTASSIUM SERPL-SCNC: 3.8 MMOL/L (ref 3.5–5.1)
PROT SERPL-MCNC: 6.4 G/DL (ref 6.4–8.9)
RBC # BLD AUTO: 4.65 10E12/L (ref 4.4–5.9)
SODIUM SERPL-SCNC: 133 MMOL/L (ref 134–144)
TROPONIN I SERPL-MCNC: 23.8 PG/ML
WBC # BLD AUTO: 7.8 10E9/L (ref 4–11)

## 2021-03-10 PROCEDURE — 93010 ELECTROCARDIOGRAM REPORT: CPT | Performed by: INTERNAL MEDICINE

## 2021-03-10 PROCEDURE — 85379 FIBRIN DEGRADATION QUANT: CPT | Performed by: STUDENT IN AN ORGANIZED HEALTH CARE EDUCATION/TRAINING PROGRAM

## 2021-03-10 PROCEDURE — 99285 EMERGENCY DEPT VISIT HI MDM: CPT | Mod: 25 | Performed by: STUDENT IN AN ORGANIZED HEALTH CARE EDUCATION/TRAINING PROGRAM

## 2021-03-10 PROCEDURE — 93005 ELECTROCARDIOGRAM TRACING: CPT | Performed by: STUDENT IN AN ORGANIZED HEALTH CARE EDUCATION/TRAINING PROGRAM

## 2021-03-10 PROCEDURE — 99283 EMERGENCY DEPT VISIT LOW MDM: CPT | Performed by: STUDENT IN AN ORGANIZED HEALTH CARE EDUCATION/TRAINING PROGRAM

## 2021-03-10 PROCEDURE — 84484 ASSAY OF TROPONIN QUANT: CPT | Performed by: STUDENT IN AN ORGANIZED HEALTH CARE EDUCATION/TRAINING PROGRAM

## 2021-03-10 PROCEDURE — 85025 COMPLETE CBC W/AUTO DIFF WBC: CPT | Performed by: STUDENT IN AN ORGANIZED HEALTH CARE EDUCATION/TRAINING PROGRAM

## 2021-03-10 PROCEDURE — 80053 COMPREHEN METABOLIC PANEL: CPT | Performed by: STUDENT IN AN ORGANIZED HEALTH CARE EDUCATION/TRAINING PROGRAM

## 2021-03-10 PROCEDURE — 36415 COLL VENOUS BLD VENIPUNCTURE: CPT | Performed by: STUDENT IN AN ORGANIZED HEALTH CARE EDUCATION/TRAINING PROGRAM

## 2021-03-10 PROCEDURE — 71045 X-RAY EXAM CHEST 1 VIEW: CPT

## 2021-03-10 PROCEDURE — 250N000013 HC RX MED GY IP 250 OP 250 PS 637: Mod: GY | Performed by: STUDENT IN AN ORGANIZED HEALTH CARE EDUCATION/TRAINING PROGRAM

## 2021-03-10 PROCEDURE — 83735 ASSAY OF MAGNESIUM: CPT | Performed by: STUDENT IN AN ORGANIZED HEALTH CARE EDUCATION/TRAINING PROGRAM

## 2021-03-10 PROCEDURE — 250N000009 HC RX 250: Performed by: STUDENT IN AN ORGANIZED HEALTH CARE EDUCATION/TRAINING PROGRAM

## 2021-03-10 PROCEDURE — 83690 ASSAY OF LIPASE: CPT | Performed by: STUDENT IN AN ORGANIZED HEALTH CARE EDUCATION/TRAINING PROGRAM

## 2021-03-10 RX ORDER — LIDOCAINE HYDROCHLORIDE 20 MG/ML
15 SOLUTION OROPHARYNGEAL ONCE
Status: COMPLETED | OUTPATIENT
Start: 2021-03-10 | End: 2021-03-10

## 2021-03-10 RX ORDER — MAGNESIUM HYDROXIDE/ALUMINUM HYDROXICE/SIMETHICONE 120; 1200; 1200 MG/30ML; MG/30ML; MG/30ML
15 SUSPENSION ORAL ONCE
Status: COMPLETED | OUTPATIENT
Start: 2021-03-10 | End: 2021-03-10

## 2021-03-10 RX ORDER — NITROGLYCERIN 0.4 MG/1
0.4 TABLET SUBLINGUAL ONCE
Status: COMPLETED | OUTPATIENT
Start: 2021-03-10 | End: 2021-03-10

## 2021-03-10 RX ORDER — MAGNESIUM OXIDE 400 MG/1
400 TABLET ORAL ONCE
Status: COMPLETED | OUTPATIENT
Start: 2021-03-10 | End: 2021-03-10

## 2021-03-10 RX ADMIN — Medication 400 MG: at 19:12

## 2021-03-10 RX ADMIN — ALUMINA, MAGNESIA, AND SIMETHICONE ORAL SUSPENSION REGULAR STRENGTH 15 ML: 1200; 1200; 120 SUSPENSION ORAL at 18:00

## 2021-03-10 RX ADMIN — NITROGLYCERIN 0.4 MG: 0.4 TABLET SUBLINGUAL at 19:12

## 2021-03-10 RX ADMIN — LIDOCAINE HYDROCHLORIDE 15 ML: 20 SOLUTION ORAL; TOPICAL at 18:01

## 2021-03-10 ASSESSMENT — MIFFLIN-ST. JEOR: SCORE: 1721.25

## 2021-03-10 NOTE — TELEPHONE ENCOUNTER
ISA Barnett-pt not forthcoming but said wanted to speak with you re heart issue. Would not let me transfer to triage. Please call. Thank you.  Lashay Adamson

## 2021-03-10 NOTE — TELEPHONE ENCOUNTER
Patient called and stated that he thinks he had a heart attack on Saturday. Patient states he looks pale and is complaining of stomach pain and pain around the zyphoid process. Patient wanted to see Jorge Barnett MD today in the clinic. Patient was advised to go to the ER to be seen. Patient states that he will go in. .brendain

## 2021-03-10 NOTE — ED PROVIDER NOTES
"  History     Chief Complaint   Patient presents with     Chest Pain       Moses Whittaker is a 71 year old male with history including CAD s/p PCI and CAB presents for chest pain.  Chest pain started after laying down in bed 4 days ago and was severe, electrical, substernal with radiation to his epigastrium and bilaterally into his shoulders and arms.  Severe pain lasted for approximately 45 minutes before lessening and subsequently has been intermittent with mild to moderate severity.  He did try famotidine with some improvement.  He states he has nitroglycerin but does not like to take this due to headache side effect.  Reports possibly some increased exertional dyspnea over the past 4 days and occasional mild diaphoresis.  He does have some loose stools also associate with constipation.  Very significant cardiac history with stents in bypass.  He is never experienced pain like this before, is different than his past angina.  No recent medication changes.  Currently rates pain 6/10.  Denies fevers, cough, pleuritic pain, other pain, nausea, vomiting, lightheadedness, worsening leg swelling. Denies recent trauma, new activities. No change of symptoms with PO intake.    Allergies   Allergen Reactions     Famotidine Other (See Comments)     + Caused Headache and Rash -- tolerated Ranitidine and worked well.     Gabapentin      Other reaction(s): Mental Status Change \"felt like in outer space\"     Hydrocortisone Other (See Comments)     Burning and stinging sensation with Hydrocortisone - doesn't help itching or rash -- tolerated Desoximetasone cream and worked well.      Atorvastatin Hives     Celebrex [Celecoxib]      Swelling      Lisinopril Cough     No Clinical Screening - See Comments Hives     Chlorine water     Norvasc [Amlodipine] Other (See Comments)     -- can't remember, didn't tolerate.      Pregabalin      Lyrica - Other reaction(s): Mental Status Change     Valdecoxib Swelling     \"bextra\" NSAID     " Insulin Aspart Rash       Patient Active Problem List    Diagnosis Date Noted     Infection due to 2019 novel coronavirus - 12/8/2020 12/10/2020     Priority: Medium     Inflammatory spondylopathy of cervical region (H) 10/30/2020     Priority: Medium     Controlled drug dependence (H) 10/30/2020     Priority: Medium     History of diabetic ulcer of foot 06/09/2020     Priority: Medium     Diabetic nephropathy with proteinuria (H) 04/28/2020     Priority: Medium     Venous stasis dermatitis of left lower extremity 09/24/2019     Priority: Medium     Bilateral lower extremity edema 08/29/2019     Priority: Medium     Degeneration of cervical intervertebral disc 01/31/2018     Priority: Medium     Hypothyroidism due to acquired atrophy of thyroid 01/31/2018     Priority: Medium     Mixed hyperlipidemia 01/31/2018     Priority: Medium     Primary narcolepsy without cataplexy 01/31/2018     Priority: Medium     Overview:   Total dose recommended by pulmonology he is dextro- amphetamine 40 mg plus   methylphenidate 40 mg in divided doses per day.  Total of 80 mg of short   acting amphetamines daily.       Osteoarthritis of spine 01/31/2018     Priority: Medium     Peptic ulcer disease 01/31/2018     Priority: Medium     Neuroforaminal stenosis of lumbar spine 01/03/2018     Priority: Medium     Radicular low back pain 01/03/2018     Priority: Medium     Chronic bilateral low back pain with bilateral sciatica 10/27/2017     Priority: Medium     Chronic low back pain 08/08/2017     Priority: Medium     Intermittent constipation 08/08/2017     Priority: Medium     Controlled type 2 diabetes mellitus with stage 3 chronic kidney disease, with long-term current use of insulin (H) 03/30/2017     Priority: Medium     Erectile dysfunction 09/26/2016     Priority: Medium     Insulin pump in place 03/10/2016     Priority: Medium     Myofacial muscle pain 12/17/2015     Priority: Medium     Pain medication agreement - 10-21-19  12/17/2015     Priority: Medium     Cervical stenosis of spinal canal 06/17/2014     Priority: Medium     Degenerative disc disease, cervical 06/17/2014     Priority: Medium     Facet arthritis of cervical region 06/17/2014     Priority: Medium     Cervical radicular pain 06/05/2014     Priority: Medium     Left arm numbness 06/05/2014     Priority: Medium     Left atrial enlargement 01/23/2014     Priority: Medium     Chronic heart failure with preserved ejection fraction (H) 01/10/2014     Priority: Medium     Stage 3b chronic kidney disease 01/10/2014     Priority: Medium     Benign essential hypertension 01/10/2014     Priority: Medium     Myalgia 01/10/2014     Priority: Medium     Old inferior wall myocardial infarction 01/10/2014     Priority: Medium     Platelet inhibition due to Plavix 01/10/2014     Priority: Medium     S/P CABG x 2 01/10/2014     Priority: Medium     S/P coronary artery stent placement 01/10/2014     Priority: Medium     Painful diabetic neuropathy (H) 04/22/2013     Priority: Medium     Esophageal reflux 05/17/2012     Priority: Medium     Obesity 05/17/2012     Priority: Medium     Diastasis of muscle 10/06/2011     Priority: Medium     Coronary atherosclerosis 09/08/2011     Priority: Medium     Psychosexual dysfunction with inhibited sexual excitement 06/30/2011     Priority: Medium     Angina pectoris (H) 12/10/2010     Priority: Medium     Atherosclerosis of native coronary artery of native heart with stable angina pectoris (H) 02/15/2007     Priority: Medium     Overview:   IMO Update 10/11       Other specified forms of chronic ischemic heart disease 02/15/2007     Priority: Medium     Peripheral vascular disease (H) 02/15/2007     Priority: Medium     Overview:   IMO Update 10/11         Past Medical History:   Diagnosis Date     Atherosclerotic heart disease of native coronary artery without angina pectoris      Carpal tunnel syndrome      Chronic maxillary sinusitis       Clostridium difficile infection 3/14/2020     Colitis due to Clostridium difficile 1/5/2020     Edema      Gastritis without bleeding      Greater trochanteric bursitis of left hip 6/24/2015     Heart disease      Narcolepsy without cataplexy      Other injury of unspecified body region, initial encounter (CODE)      Postsurgical percutaneous transluminal coronary angioplasty status 2/15/2007     Rheumatic fever without heart involvement      Venous stasis ulcer of left calf limited to breakdown of skin with varicose veins (H) 8/29/2019     Venous stasis ulcer of left lower leg with edema of left lower leg (H) 9/4/2019       Past Surgical History:   Procedure Laterality Date     ANGIOPLASTY      12/00, 04/04/04,Repeat angioplasty with lt internal mammary to the lt anterior descending and lt radial artery from aorta to the diagonal.     ANGIOPLASTY      10/01,Angioplasty with 2 vessel CABG the following week from the lt internal mammary to the lt anterior descending and right radial free graft     ANGIOPLASTY      04/2003     ARTHROSCOPY SHOULDER ROTATOR CUFF REPAIR      02/06/2006,Left rotator cuff repair and bone spur removal by Dr. Giraldo in [place]     COLONOSCOPY      1999     COLONOSCOPY  01/08/2016 01/08/2016,-- Dr. De La Cruz -- polypectomy     COLONOSCOPY  04/18/2019    hyperplastic, follow up 10 years, 4/18/29     OTHER SURGICAL HISTORY      09/03,170549,IR ANGIOGRAM FEMORAL/EXTREMITY ([place]),Unremarkable angiogram at [facility]     OTHER SURGICAL HISTORY      10/05/2004,,PTCA,Angioplasty     OTHER SURGICAL HISTORY      02/2005,,PTCA,Angioplasty with stent.     OTHER SURGICAL HISTORY      03/02/2005,,PTCA,Angioplasty with stent.     OTHER SURGICAL HISTORY      09/23/2005,,PTCA,Angioplasty without stent     OTHER SURGICAL HISTORY      2/26/2007,[id number],IR ANGIOGRAM FEMORAL/EXTREMITY ([place]),Unremarkable coronary angiogram at [facility].     OTHER SURGICAL HISTORY      1967,SUR38,APPENDECTOMY OPEN     RELEASE  CARPAL TUNNEL      11/15/01,Right carpal tunnel release by Dr. Mace     RELEASE CARPAL TUNNEL      2004,Left carpal tunnel release       Family History   Problem Relation Age of Onset     Heart Disease Mother         Heart Disease,Heart problems     Heart Disease Other         Heart Disease,Heart problems     Heart Disease Other         Heart Disease,Heart problems     Ankylosing Spondylitis Son         Ankylosing spondylitis,Ankylosing spondylitis     Other - See Comments Brother          with sudden death following shoulder surgery 2012 -- was off his Plavix for 10 days pre-operatively.     Ankylosing Spondylitis Daughter         Ankylosing spondylitis,-- Dx        Social History     Tobacco Use     Smoking status: Never Smoker     Smokeless tobacco: Never Used   Substance Use Topics     Alcohol use: No     Alcohol/week: 0.0 standard drinks     Drug use: No       Medications:    aspirin (ASA) 81 MG tablet  blood glucose monitoring (ONETOUCH ULTRA) test strip  Blood Glucose Monitoring Suppl (FreeBrieM BLOOD GLUCOSE MONITOR) WAQAS  cholecalciferol (VITAMIN D3) 25 mcg (1000 units) capsule  clopidogrel (PLAVIX) 75 MG tablet  co-enzyme Q-10 100 MG CAPS capsule  COMPRESSION STOCKINGS  COMPRESSION STOCKINGS  desoximetasone (TOPICORT) 0.25 % external cream  dextroamphetamine (DEXTROSTAT) 10 MG tablet  diclofenac (VOLTAREN) 75 MG EC tablet  Flaxseed, Linseed, (FLAXSEED OIL) 1000 MG CAPS  hydrALAZINE (APRESOLINE) 25 MG tablet  HYDROcodone-acetaminophen (NORCO) 5-325 MG tablet  insulin lispro (HUMALOG VIAL) 100 UNIT/ML vial  irbesartan (AVAPRO) 150 MG tablet  levothyroxine (SYNTHROID/LEVOTHROID) 125 MCG tablet  methylphenidate (RITALIN) 10 MG tablet  metoprolol succinate ER (TOPROL-XL) 50 MG 24 hr tablet  NIFEdipine ER (ADALAT CC) 30 MG 24 hr tablet  order for DME  ranolazine (RANEXA) 500 MG 12 hr tablet  vitamin D2 (ERGOCALCIFEROL) 61177 units (1250 mcg) capsule  vitamin E (TOCOPHEROL) 400 units (360 mg)  "capsule  [employer] ALCOHOL SWABS PADS  famotidine (PEPCID) 20 MG tablet  Insulin Pen Needle (PEN NEEDLES) 29G X 12MM MISC  IV Sets-Tubing (SOLUTION ADMINISTRATION SET) MISC  nitroGLYcerin (NITROSTAT) 0.4 MG sublingual tablet  saccharomyces boulardii (FLORASTOR) 250 MG capsule  Saline GEL  thin (NO BRAND SPECIFIED) lancets        Review of Systems: See HPI for pertinent negative and positive findings.    Physical Exam   BP (!) 159/89   Pulse 65   Temp 98  F (36.7  C) (Tympanic)   Resp 9   Ht 1.778 m (5' 10\")   Wt 95.5 kg (210 lb 8.6 oz)   SpO2 95%   BMI 30.21 kg/m       General: awake, comfortable  HEENT: atraumatic, neck supple  Respiratory: normal effort, clear to auscultation bilaterally  Cardiovascular: regular rate and rhythm, no murmurs, distal pulses 2+  Abdomen: soft, mild epigastric tenderness, nondistended  Extremities: no deformities, 1+ BLE edema, no tenderness  MSK: no chest wall tenderness  Skin: warm, dry, no rashes  Neuro: alert, no focal deficits    ED Course      ED Course as of Mar 10 1915   Wed Mar 10, 2021   1840 Gi cocktail reduce pain from 6/10-4/10. Willing to try nitroglycerin (ordered) if we can treat any resulting headache.           Results for orders placed or performed during the hospital encounter of 03/10/21 (from the past 24 hour(s))   CBC with platelets differential   Result Value Ref Range    WBC 7.8 4.0 - 11.0 10e9/L    RBC Count 4.65 4.4 - 5.9 10e12/L    Hemoglobin 15.0 13.3 - 17.7 g/dL    Hematocrit 43.8 40.0 - 53.0 %    MCV 94 78 - 100 fl    MCH 32.3 26.5 - 33.0 pg    MCHC 34.2 31.5 - 36.5 g/dL    RDW 12.5 10.0 - 15.0 %    Platelet Count 253 150 - 450 10e9/L    Diff Method Automated Method     % Neutrophils 68.4 %    % Lymphocytes 22.5 %    % Monocytes 5.0 %    % Eosinophils 3.2 %    % Basophils 0.5 %    % Immature Granulocytes 0.4 %    Absolute Neutrophil 5.3 1.6 - 8.3 10e9/L    Absolute Lymphocytes 1.8 0.8 - 5.3 10e9/L    Absolute Monocytes 0.4 0.0 - 1.3 10e9/L    Absolute " Eosinophils 0.3 0.0 - 0.7 10e9/L    Absolute Basophils 0.0 0.0 - 0.2 10e9/L    Abs Immature Granulocytes 0.0 0 - 0.4 10e9/L   Troponin GH   Result Value Ref Range    Troponin 23.8 <34.0 pg/mL   Comprehensive metabolic panel   Result Value Ref Range    Sodium 133 (L) 134 - 144 mmol/L    Potassium 3.8 3.5 - 5.1 mmol/L    Chloride 100 98 - 107 mmol/L    Carbon Dioxide 24 21 - 31 mmol/L    Anion Gap 9 3 - 14 mmol/L    Glucose 194 (H) 70 - 105 mg/dL    Urea Nitrogen 19 7 - 25 mg/dL    Creatinine 1.75 (H) 0.70 - 1.30 mg/dL    GFR Estimate 39 (L) >60 mL/min/[1.73_m2]    GFR Estimate If Black 47 (L) >60 mL/min/[1.73_m2]    Calcium 9.0 8.6 - 10.3 mg/dL    Bilirubin Total 0.4 0.3 - 1.0 mg/dL    Albumin 3.7 3.5 - 5.7 g/dL    Protein Total 6.4 6.4 - 8.9 g/dL    Alkaline Phosphatase 70 34 - 104 U/L    ALT 17 7 - 52 U/L    AST 19 13 - 39 U/L   D dimer quantitative   Result Value Ref Range    D Dimer 0.6 (H) 0.0 - 0.50 ug/ml FEU   Magnesium   Result Value Ref Range    Magnesium 1.8 (L) 1.9 - 2.7 mg/dL   Lipase   Result Value Ref Range    Lipase 20 11 - 82 U/L       Medications   alum & mag hydroxide-simethicone (MAALOX) suspension 15 mL (15 mLs Oral Given 3/10/21 1800)     And   lidocaine (XYLOCAINE) 2 % solution 15 mL (15 mLs Mouth/Throat Given 3/10/21 1801)   magnesium oxide (MAG-OX) tablet 400 mg (400 mg Oral Given 3/10/21 1912)   nitroGLYcerin (NITROSTAT) sublingual tablet 0.4 mg (0.4 mg Sublingual Given 3/10/21 1912)       Assessments & Plan (with Medical Decision Making)     I have reviewed the nursing notes.    Evaluated for chest pain. Differential includes acute coronary syndrome, pulmonary embolism, aortic dissection, pneumothorax, esophageal tear, pneumonia, esophageal spasm, gastroesophageal reflux, PUD. EKG, CXR, and laboratory evaluation were performed with concern for life threatening etiology, and unremarkable. With improvement with famotidine at home and GI cocktail here a GI etiology is possible. Pending  nitroglycerin trial. Patient signed out in stable condition to Dr. Scott night shift provider to follow up on response to nitroglycerin and for further management and disposition.       New Prescriptions    No medications on file       Final diagnoses:   Hypomagnesemia   Chest pain, unspecified type       3/10/2021   [facility]  03/10/21 1915    Care patient turned over to myself at change of shift after he had been given some nitro.  I check back with him and he said that that did help a little bit but not a lot.  Is unclear what is causing his symptoms at this time.  There is no objective evidence for any type of acute cardiac etiology at this time.  We discussed options including observation here versus letting him go home.  He did not want to stay here and chose to go home.  He does have famotidine at home which he takes irregularly, I suggested taking it regularly once or twice a day for the next week to see if that helps.  If anything gets worse however he should return to the ER for further evaluation.       Roberto Scott MD  03/10/21 2021

## 2021-03-10 NOTE — PATIENT INSTRUCTIONS
Recommendations from today's MTM visit:                                                       1. Move aspirin to early dosing with nifedipine and levothyroxine to reduce interaction between aspirin and diclofenac. Diclofenac can sometimes interfere with the efficacy of the aspirin in blocking platelets which can increase risk for heart attacks and blockages. I typically recommend  by at least 1 hour.    It was great to speak with you today.  I value your experience and would be very thankful for your time with providing feedback on our clinic survey. You may receive a survey via email or text message in the next few days.     Next MTM visit: 6 months or sooner if needed    To schedule another MTM appointment, please call the clinic directly or you may call the MTM scheduling line at [phone] or toll-free at [phone].     My Clinical Pharmacist's contact information:                                                      It was a pleasure talking with you today!  Please feel free to contact me with any questions or concerns you have.      Jr Jaramillo, Ramu, BCACP  Medication Therapy Management Pharmacist  Pager: [phone]

## 2021-03-11 LAB — INTERPRETATION ECG - MUSE: NORMAL

## 2021-03-11 NOTE — ED NOTES
Patient's saline lock IV removed prior to discharge. Patient provided instructions, no further questions or concerns, and ambulated out.

## 2021-03-15 ENCOUNTER — TRANSFERRED RECORDS (OUTPATIENT)
Dept: HEALTH INFORMATION MANAGEMENT | Facility: OTHER | Age: 71
End: 2021-03-15

## 2021-03-22 ENCOUNTER — TRANSFERRED RECORDS (OUTPATIENT)
Dept: HEALTH INFORMATION MANAGEMENT | Facility: OTHER | Age: 71
End: 2021-03-22

## 2021-03-22 LAB — EJECTION FRACTION: 55 %

## 2021-03-24 ENCOUNTER — TRANSFERRED RECORDS (OUTPATIENT)
Dept: HEALTH INFORMATION MANAGEMENT | Facility: OTHER | Age: 71
End: 2021-03-24

## 2021-03-24 LAB — EJECTION FRACTION: NORMAL %

## 2021-04-07 ENCOUNTER — TELEPHONE (OUTPATIENT)
Dept: PHARMACY | Facility: CLINIC | Age: 71
End: 2021-04-07

## 2021-04-07 NOTE — TELEPHONE ENCOUNTER
Patient tried to stop diclofenac, but neck pain and headaches were significant. Back on diclofenac 75 mg once daily.  Wonders what options there may be. Recommend trying turmeric 500 mg daily or CBD product. Patient will consider.    Jr Jaramillo, PharmD, [facility]  Medication Therapy Management Pharmacist  Pager: [phone]

## 2021-04-14 DIAGNOSIS — G47.419 PRIMARY NARCOLEPSY WITHOUT CATAPLEXY: ICD-10-CM

## 2021-04-14 DIAGNOSIS — Z02.89 PAIN MEDICATION AGREEMENT: ICD-10-CM

## 2021-04-14 RX ORDER — METHYLPHENIDATE HYDROCHLORIDE 10 MG/1
20 TABLET ORAL 2 TIMES DAILY
Qty: 360 TABLET | Refills: 3 | OUTPATIENT
Start: 2021-04-14

## 2021-04-14 RX ORDER — DEXTROAMPHETAMINE SULFATE 10 MG/1
10 TABLET ORAL 4 TIMES DAILY
Qty: 360 TABLET | Refills: 3 | OUTPATIENT
Start: 2021-04-14

## 2021-04-14 NOTE — TELEPHONE ENCOUNTER
Pt called needing refills on dextoamhetamine and Ritalin, says he needs a 90 days supply, please call if questions..  [facility], please call if questions.      Vianney Gabriel on 4/14/2021 at 2:23 PM

## 2021-04-14 NOTE — TELEPHONE ENCOUNTER
Patient was contacted and an appointment was made with JUAN for his first available appointment at this time.  Kayleigh Rubio on 4/14/2021 at 4:46 PM

## 2021-04-15 RX ORDER — METHYLPHENIDATE HYDROCHLORIDE 10 MG/1
TABLET ORAL
Qty: 360 TABLET | Refills: 0 | OUTPATIENT
Start: 2021-04-15

## 2021-04-15 RX ORDER — DEXTROAMPHETAMINE SULFATE 10 MG/1
TABLET ORAL
Qty: 360 TABLET | Refills: 0 | OUTPATIENT
Start: 2021-04-15

## 2021-04-15 NOTE — TELEPHONE ENCOUNTER
Routing refill request to provider for review/approval because:  Drug not on the [facility] refill protocol     LOV: 12/30/2020    Margie Calderón RN on 4/15/2021 at 8:19 AM

## 2021-04-21 ENCOUNTER — VIRTUAL VISIT (OUTPATIENT)
Dept: INTERNAL MEDICINE | Facility: OTHER | Age: 71
End: 2021-04-21
Attending: INTERNAL MEDICINE
Payer: COMMERCIAL

## 2021-04-21 DIAGNOSIS — Z02.89 PAIN MEDICATION AGREEMENT: ICD-10-CM

## 2021-04-21 DIAGNOSIS — I25.118 ATHEROSCLEROSIS OF NATIVE CORONARY ARTERY OF NATIVE HEART WITH STABLE ANGINA PECTORIS (H): ICD-10-CM

## 2021-04-21 DIAGNOSIS — M25.551 BILATERAL HIP PAIN: ICD-10-CM

## 2021-04-21 DIAGNOSIS — E11.22 CONTROLLED TYPE 2 DIABETES MELLITUS WITH STAGE 3 CHRONIC KIDNEY DISEASE, WITH LONG-TERM CURRENT USE OF INSULIN (H): ICD-10-CM

## 2021-04-21 DIAGNOSIS — M46.92 INFLAMMATORY SPONDYLOPATHY OF CERVICAL REGION (H): ICD-10-CM

## 2021-04-21 DIAGNOSIS — N18.30 CONTROLLED TYPE 2 DIABETES MELLITUS WITH STAGE 3 CHRONIC KIDNEY DISEASE, WITH LONG-TERM CURRENT USE OF INSULIN (H): ICD-10-CM

## 2021-04-21 DIAGNOSIS — R60.0 BILATERAL LOWER EXTREMITY EDEMA: ICD-10-CM

## 2021-04-21 DIAGNOSIS — Z79.02 PLATELET INHIBITION DUE TO PLAVIX: ICD-10-CM

## 2021-04-21 DIAGNOSIS — G47.419 PRIMARY NARCOLEPSY WITHOUT CATAPLEXY: Primary | ICD-10-CM

## 2021-04-21 DIAGNOSIS — N18.32 STAGE 3B CHRONIC KIDNEY DISEASE (H): ICD-10-CM

## 2021-04-21 DIAGNOSIS — M25.552 BILATERAL HIP PAIN: ICD-10-CM

## 2021-04-21 DIAGNOSIS — F19.20 CONTROLLED DRUG DEPENDENCE (H): ICD-10-CM

## 2021-04-21 DIAGNOSIS — Z79.4 CONTROLLED TYPE 2 DIABETES MELLITUS WITH STAGE 3 CHRONIC KIDNEY DISEASE, WITH LONG-TERM CURRENT USE OF INSULIN (H): ICD-10-CM

## 2021-04-21 DIAGNOSIS — I10 BENIGN ESSENTIAL HYPERTENSION: ICD-10-CM

## 2021-04-21 DIAGNOSIS — Z95.1 S/P CABG X 2: ICD-10-CM

## 2021-04-21 PROCEDURE — 99214 OFFICE O/P EST MOD 30 MIN: CPT | Mod: 95 | Performed by: INTERNAL MEDICINE

## 2021-04-21 RX ORDER — FUROSEMIDE 20 MG
20 TABLET ORAL AT BEDTIME
Status: ON HOLD | COMMUNITY
Start: 2021-04-21 | End: 2023-01-01

## 2021-04-21 RX ORDER — DOXAZOSIN 2 MG/1
2 TABLET ORAL AT BEDTIME
COMMUNITY
Start: 2021-04-21

## 2021-04-21 RX ORDER — DEXTROAMPHETAMINE SULFATE 10 MG/1
10 TABLET ORAL 4 TIMES DAILY
Qty: 360 TABLET | Refills: 0 | Status: SHIPPED | OUTPATIENT
Start: 2021-04-21 | End: 2021-07-28

## 2021-04-21 RX ORDER — DOXAZOSIN 1 MG/1
1 TABLET ORAL DAILY
COMMUNITY
Start: 2021-04-20 | End: 2021-10-15

## 2021-04-21 RX ORDER — FUROSEMIDE 40 MG
20 TABLET ORAL DAILY
COMMUNITY
Start: 2021-03-26 | End: 2021-10-15

## 2021-04-21 RX ORDER — METHYLPHENIDATE HYDROCHLORIDE 10 MG/1
20 TABLET ORAL 2 TIMES DAILY
Qty: 360 TABLET | Refills: 0 | Status: SHIPPED | OUTPATIENT
Start: 2021-04-21 | End: 2021-07-28

## 2021-04-21 ASSESSMENT — ENCOUNTER SYMPTOMS
WHEEZING: 0
SHORTNESS OF BREATH: 0
BRUISES/BLEEDS EASILY: 1
MYALGIAS: 1
CHOKING: 0
BLOOD IN STOOL: 0
CHEST TIGHTNESS: 0
WOUND: 1
NECK PAIN: 1
BACK PAIN: 1
HEMATURIA: 0
COUGH: 0
ARTHRALGIAS: 1
CHILLS: 0
CONFUSION: 0
STRIDOR: 0
NECK STIFFNESS: 1
PALPITATIONS: 0
DIARRHEA: 0
FATIGUE: 0
HEADACHES: 1
ADENOPATHY: 0
FEVER: 0
ABDOMINAL PAIN: 0

## 2021-04-21 ASSESSMENT — PAIN SCALES - GENERAL: PAINLEVEL: MODERATE PAIN (5)

## 2021-04-21 NOTE — PATIENT INSTRUCTIONS
Due for Diabetic labs.   -- need to schedule lab only appointment.     Medications refilled.     Bilateral hip pain - xrays ordered.     Return in approximately 12 weeks from today, or sooner as needed for follow-up with Dr. Barnett.    - Med Refills - Controlled RX    - Bring your remaining pills / pill bottles with to: Each of your Med Management/refill  appointments for pill counts.   - Urine drug screens for controlled medications will be completed every 3 to 12 months.   - Random pill counts and urine drug testing may occur.   - No illicit drug use or pill sharing will be tolerated.     Clinic : [phone]  Appointment line: [phone]

## 2021-04-21 NOTE — PROGRESS NOTES
Medication Reconciliation: complete   Chen Morris, SARAHY  4/21/2021 7:56 AM      Pat is a 71 year old who is being evaluated via a billable video visit.      How would you like to obtain your AVS? MyChart  If the video visit is dropped, the invitation should be resent by: Text to cell phone: [phone]  Will anyone else be joining your video visit? No    Video Start Time: 12:00 PM    Assessment & Plan       ICD-10-CM    1. Primary narcolepsy without cataplexy  G47.419 dextroamphetamine (DEXTROSTAT) 10 MG tablet     methylphenidate (RITALIN) 10 MG tablet   2. Pain medication agreement  Z02.89 dextroamphetamine (DEXTROSTAT) 10 MG tablet     methylphenidate (RITALIN) 10 MG tablet   3. Inflammatory spondylopathy of cervical region (H)  M46.92    4. Controlled drug dependence (H)  F19.20    5. Benign essential hypertension  I10 doxazosin (CARDURA) 1 MG tablet     furosemide (LASIX) 20 MG tablet   6. Controlled type 2 diabetes mellitus with stage 3 chronic kidney disease, with long-term current use of insulin (H)  E11.22     N18.30     Z79.4    7. Stage 3b chronic kidney disease  N18.32    8. Atherosclerosis of native coronary artery of native heart with stable angina pectoris (H)  I25.118 doxazosin (CARDURA) 1 MG tablet   9. Platelet inhibition due to Plavix  Z79.02    10. S/P CABG x 2  Z95.1    11. Bilateral lower extremity edema  R60.0 furosemide (LASIX) 20 MG tablet   12. Bilateral hip pain  M25.551 XR Pelvis and Hip Bilateral 2 Views    M25.552      Patient is contacted via video visit due to virus pandemic.  Initially just for medication management refills however he is has multiple other questions and concerns because he has not been into the office in quite a while.    States that he is having a lot more neck pain.  Takes diclofenac 75 mg tablet typically 1 daily very rare occasion takes a second 1.  He is asking to try getting up to 4 tablets daily which I declined because he does not use nearly that  "many medications.    He has chronic narcolepsy without cataplexy.  Using combination of dextroamphetamine and methylphenidate.  He has been on this regimen for years.  He gets a 3-month supply.  Prescription sent to pharmacy.    Hypertension, has been following with [facility] cardiology now on doxazosin 1 mg daily and Lasix 20 mg daily.  Has yet to  the doxazosin prescription.  These were added to his med list today as historical.    Type 2 diabetes, has been controlled.  He is overdue for lab work.  Stage IIIb chronic kidney disease.  Encourage NSAID avoidance/limitation.  He does use diclofenac as noted above.    Coronary artery disease, has chronic angina.  Needs to start doxazosin.  Blood pressures today at home were 142 systolic.  Continues on Plavix and ranolazine.  Has history of bypass surgery of his coronary arteries.    Has some chronic lower extremity edema.  Now on Lasix 20 mg daily.  Does not feel it gives a whole lot for his edema.  Compression socks and elevation help the most.    Having bilateral hip pain left worse than right.  He would like to get hip x-rays.  Orders placed.    Patient currently uses a Dexcom G6 CGM for continuous monitoring of glucose.   Patient injects or boluses insulin 3 or more times daily before breakfast, before lunch, before dinner, and bedtime.  Patient requires frequent adjustments to their insulin treatment regimen on the basis of therapeutic CGM testing results.    + uses insulin pump.     -- using alcohol pads frequently with alcohol hand . States that he frequently will not change or re-load his devices when he is at home.   -- uses the IV-3000 prep pads prior to putting on his insulin pump or DEXCOM device. Otherwise his devices fall off / wash off in the shower.     BMI:   Estimated body mass index is 30.21 kg/m  as calculated from the following:    Height as of 3/10/21: 1.778 m (5' 10\").    Weight as of 3/10/21: 95.5 kg (210 lb 8.6 oz).   Weight " management plan: Discussed healthy diet and exercise guidelines    Work on weight loss  Regular exercise  See Patient Instructions    Return in about 12 weeks (around 7/14/2021) for - Med Refills - Controlled RX.    Jorge Barnett MD  [facility]    Subjective   Leslie is a 71 year old who presents for the following health issues     HPI   Review of Systems   Constitutional: Negative for chills, fatigue and fever.   HENT: Positive for hearing loss. Negative for congestion.    Eyes: Negative for visual disturbance.   Respiratory: Negative for cough, choking, chest tightness, shortness of breath, wheezing and stridor.    Cardiovascular: Positive for chest pain (chronic, ongoing - had pretty bad angina 2-2.5 weeks ago, went to ER) and leg swelling. Negative for palpitations.        + claudication leg pain, worse in left leg with exercise/ambulation  + intermittent Angina - if carrying groceries, reports chronic   Gastrointestinal: Negative for abdominal pain, blood in stool and diarrhea.   Genitourinary: Negative for hematuria.   Musculoskeletal: Positive for arthralgias (+ bilateral hip pain), back pain, gait problem, myalgias, neck pain and neck stiffness.   Skin: Positive for wound (right 2nd finger, dorsal, over DIP joint with infection, redness/swelling for past week. Picked at area and it got infected. I&D today in surgery clinic.  Rx sent for Bactrim at surgery appt. asking for refill of his Hydrococone today, has 3 tabs left). Negative for rash.   Neurological: Positive for headaches. Negative for syncope.   Hematological: Negative for adenopathy. Bruises/bleeds easily.   Psychiatric/Behavioral: Negative for confusion.         Objective    Vitals - Patient Reported  Pain Score: Moderate Pain (5)  Pain Loc: Neck      Vitals:  No vitals were obtained today due to virtual visit.    Physical Exam   GENERAL: Healthy, alert and no distress  EYES: Eyes grossly normal to inspection.  No discharge or  erythema, or obvious scleral/conjunctival abnormalities.  RESP: No audible wheeze, cough, or visible cyanosis.  No visible retractions or increased work of breathing.    SKIN: + wearing 2 different diabetes injection equipment on his abdomen. Visible skin clear. No significant rash, abnormal pigmentation or lesions.  NEURO: Cranial nerves grossly intact.  Mentation and speech appropriate for age.  PSYCH: Mentation appears normal, affect normal/bright, judgement and insight intact, normal speech and appearance well-groomed.    Transferred Records on 03/24/2021   Component Date Value Ref Range Status     Ejection Fraction 03/24/2021 60-65  % Final         Video-Visit Details    Type of service:  Video Visit    Video End Time:12:26 PM    Originating Location (pt. Location): Home    Distant Location (provider location):  [facility]     Platform used for Video Visit: Valerio Barnett MD

## 2021-04-26 ENCOUNTER — HOSPITAL ENCOUNTER (OUTPATIENT)
Dept: GENERAL RADIOLOGY | Facility: OTHER | Age: 71
End: 2021-04-26
Attending: INTERNAL MEDICINE
Payer: MEDICARE

## 2021-04-26 DIAGNOSIS — E78.2 MIXED HYPERLIPIDEMIA: ICD-10-CM

## 2021-04-26 DIAGNOSIS — E03.4 HYPOTHYROIDISM DUE TO ACQUIRED ATROPHY OF THYROID: ICD-10-CM

## 2021-04-26 DIAGNOSIS — Z79.891 LONG TERM CURRENT USE OF OPIATE ANALGESIC: ICD-10-CM

## 2021-04-26 DIAGNOSIS — N18.30 CONTROLLED TYPE 2 DIABETES MELLITUS WITH STAGE 3 CHRONIC KIDNEY DISEASE, WITH LONG-TERM CURRENT USE OF INSULIN (H): ICD-10-CM

## 2021-04-26 DIAGNOSIS — M25.552 BILATERAL HIP PAIN: ICD-10-CM

## 2021-04-26 DIAGNOSIS — E11.22 CONTROLLED TYPE 2 DIABETES MELLITUS WITH STAGE 3 CHRONIC KIDNEY DISEASE, WITH LONG-TERM CURRENT USE OF INSULIN (H): ICD-10-CM

## 2021-04-26 DIAGNOSIS — Z79.4 CONTROLLED TYPE 2 DIABETES MELLITUS WITH STAGE 3 CHRONIC KIDNEY DISEASE, WITH LONG-TERM CURRENT USE OF INSULIN (H): ICD-10-CM

## 2021-04-26 DIAGNOSIS — M25.551 BILATERAL HIP PAIN: ICD-10-CM

## 2021-04-26 DIAGNOSIS — I50.32 CHRONIC DIASTOLIC HEART FAILURE (H): ICD-10-CM

## 2021-04-26 LAB
ALBUMIN SERPL-MCNC: 3.9 G/DL (ref 3.5–5.7)
ALBUMIN UR-MCNC: 100 MG/DL
ALP SERPL-CCNC: 65 U/L (ref 34–104)
ALT SERPL W P-5'-P-CCNC: 21 U/L (ref 7–52)
ANION GAP SERPL CALCULATED.3IONS-SCNC: 9 MMOL/L (ref 3–14)
APPEARANCE UR: CLEAR
AST SERPL W P-5'-P-CCNC: 24 U/L (ref 13–39)
BILIRUB SERPL-MCNC: 0.7 MG/DL (ref 0.3–1)
BILIRUB UR QL STRIP: NEGATIVE
BUN SERPL-MCNC: 22 MG/DL (ref 7–25)
CALCIUM SERPL-MCNC: 9.3 MG/DL (ref 8.6–10.3)
CHLORIDE SERPL-SCNC: 102 MMOL/L (ref 98–107)
CHOLEST SERPL-MCNC: 212 MG/DL
CO2 SERPL-SCNC: 24 MMOL/L (ref 21–31)
COLOR UR AUTO: ABNORMAL
CREAT SERPL-MCNC: 2.13 MG/DL (ref 0.7–1.3)
ERYTHROCYTE [DISTWIDTH] IN BLOOD BY AUTOMATED COUNT: 12.9 % (ref 10–15)
GFR SERPL CREATININE-BSD FRML MDRD: 31 ML/MIN/{1.73_M2}
GLUCOSE SERPL-MCNC: 158 MG/DL (ref 70–105)
GLUCOSE UR STRIP-MCNC: 300 MG/DL
HBA1C MFR BLD: 8.4 % (ref 4–6)
HCT VFR BLD AUTO: 43.9 % (ref 40–53)
HDLC SERPL-MCNC: 37 MG/DL (ref 23–92)
HGB BLD-MCNC: 15.1 G/DL (ref 13.3–17.7)
HGB UR QL STRIP: NEGATIVE
HYALINE CASTS #/AREA URNS LPF: 2 /LPF (ref 0–2)
KETONES UR STRIP-MCNC: NEGATIVE MG/DL
LDLC SERPL CALC-MCNC: 124 MG/DL
LEUKOCYTE ESTERASE UR QL STRIP: NEGATIVE
MCH RBC QN AUTO: 32.9 PG (ref 26.5–33)
MCHC RBC AUTO-ENTMCNC: 34.4 G/DL (ref 31.5–36.5)
MCV RBC AUTO: 96 FL (ref 78–100)
MUCOUS THREADS #/AREA URNS LPF: PRESENT /LPF
NITRATE UR QL: NEGATIVE
NONHDLC SERPL-MCNC: 175 MG/DL
PH UR STRIP: 6 PH (ref 5–7)
PLATELET # BLD AUTO: 240 10E9/L (ref 150–450)
POTASSIUM SERPL-SCNC: 4.2 MMOL/L (ref 3.5–5.1)
PROT SERPL-MCNC: 6.9 G/DL (ref 6.4–8.9)
RBC # BLD AUTO: 4.59 10E12/L (ref 4.4–5.9)
RBC #/AREA URNS AUTO: <1 /HPF (ref 0–2)
SODIUM SERPL-SCNC: 135 MMOL/L (ref 134–144)
SOURCE: ABNORMAL
SP GR UR STRIP: 1.02 (ref 1–1.03)
TRIGL SERPL-MCNC: 254 MG/DL
TSH SERPL DL<=0.05 MIU/L-ACNC: 0.34 IU/ML (ref 0.34–5.6)
UROBILINOGEN UR STRIP-MCNC: NORMAL MG/DL (ref 0–2)
WBC # BLD AUTO: 5.4 10E9/L (ref 4–11)
WBC #/AREA URNS AUTO: 1 /HPF (ref 0–5)

## 2021-04-26 PROCEDURE — 80061 LIPID PANEL: CPT | Mod: ZL | Performed by: INTERNAL MEDICINE

## 2021-04-26 PROCEDURE — 85027 COMPLETE CBC AUTOMATED: CPT | Mod: ZL | Performed by: INTERNAL MEDICINE

## 2021-04-26 PROCEDURE — 84443 ASSAY THYROID STIM HORMONE: CPT | Mod: ZL | Performed by: INTERNAL MEDICINE

## 2021-04-26 PROCEDURE — 81001 URINALYSIS AUTO W/SCOPE: CPT | Mod: ZL,XU | Performed by: INTERNAL MEDICINE

## 2021-04-26 PROCEDURE — 82043 UR ALBUMIN QUANTITATIVE: CPT | Mod: ZL | Performed by: INTERNAL MEDICINE

## 2021-04-26 PROCEDURE — 83036 HEMOGLOBIN GLYCOSYLATED A1C: CPT | Mod: ZL | Performed by: INTERNAL MEDICINE

## 2021-04-26 PROCEDURE — 80053 COMPREHEN METABOLIC PANEL: CPT | Mod: ZL | Performed by: INTERNAL MEDICINE

## 2021-04-26 PROCEDURE — 73523 X-RAY EXAM HIPS BI 5/> VIEWS: CPT

## 2021-04-26 PROCEDURE — 80307 DRUG TEST PRSMV CHEM ANLYZR: CPT | Performed by: INTERNAL MEDICINE

## 2021-04-26 PROCEDURE — 36415 COLL VENOUS BLD VENIPUNCTURE: CPT | Mod: ZL | Performed by: INTERNAL MEDICINE

## 2021-04-26 NOTE — LETTER
April 26, 2021      [facility]  [address] [id number]        Dear ,    We are writing to inform you of your test results.    Low back arthritis changes noted.     Jorge Barnett MD     Resulted Orders   XR Pelvis and Hip Bilateral 2 Views    Narrative    PROCEDURE: XR PELVIS AND HIP BILATERAL 2 VIEWS 4/26/2021 1:22 PM    HISTORY: Bilateral hip pain; Bilateral hip pain    COMPARISONS: None.    TECHNIQUE: Pelvis one view  Right hip 2 views, left hip 2 views    FINDINGS: Pelvis: There is degenerative changes lower lumbar spine.  Sacrum and sacroiliac joints appear normal. The bony pelvis is intact.    Left hip 2 views: Articular spaces normal height. The acetabulum and  proximal femur appears normal.    Right hip 2 views: Articular spaces normal height. The acetabulum and  proximal femur appears normal.         Impression    IMPRESSION: Lumbar degenerative changes. Normal pelvis and both hips    IOANA NIEVES MD       If you have any questions or concerns, please call the clinic at the number listed above.       Sincerely,      Jorge Barnett MD

## 2021-04-27 LAB
CREAT UR-MCNC: 193 MG/DL
MICROALBUMIN UR-MCNC: 2270 MG/L
MICROALBUMIN/CREAT UR: 1176.17 MG/G CR (ref 0–17)

## 2021-04-28 ENCOUNTER — TELEPHONE (OUTPATIENT)
Dept: INTERNAL MEDICINE | Facility: OTHER | Age: 71
End: 2021-04-28

## 2021-04-28 LAB
AMPHET UR CFM-MCNC: ABNORMAL NG/ML
AMPHET/CREAT UR: ABNORMAL NG/MG{CREAT}
CREAT UR-MCNC: 195 MG/DL
ME-PHENIDATE UR CFM-MCNC: 147 NG/ML
ME-PHENIDATE UR CFM-MCNC: ABNORMAL NG/ML
ME-PHENIDATE/CREAT UR: 75 NG/MG{CREAT}
ME-PHENIDATE/CREAT UR: ABNORMAL NG/MG{CREAT}
RPT COMMENT: ABNORMAL

## 2021-04-28 NOTE — TELEPHONE ENCOUNTER
Patient wanted to know about the drug confirmation panel and patient wanted to know why it was done. Informed it was due to the drug contract.   Patient would like to discuss xray results and   Read lab results. Patient will pay more attention to diet.  Read letter regarding xray.  Transferred to scheduling to make lab appointment. Chen Morris LPN .............4/28/2021  4:24 PM

## 2021-05-12 DIAGNOSIS — E11.22 CONTROLLED TYPE 2 DIABETES MELLITUS WITH STAGE 3 CHRONIC KIDNEY DISEASE, WITH LONG-TERM CURRENT USE OF INSULIN (H): ICD-10-CM

## 2021-05-12 DIAGNOSIS — Z79.4 CONTROLLED TYPE 2 DIABETES MELLITUS WITH STAGE 3 CHRONIC KIDNEY DISEASE, WITH LONG-TERM CURRENT USE OF INSULIN (H): ICD-10-CM

## 2021-05-12 DIAGNOSIS — N18.30 CONTROLLED TYPE 2 DIABETES MELLITUS WITH STAGE 3 CHRONIC KIDNEY DISEASE, WITH LONG-TERM CURRENT USE OF INSULIN (H): ICD-10-CM

## 2021-05-13 NOTE — TELEPHONE ENCOUNTER
Routing refill request to provider for review/approval because:    Due to side effect warning.    LOV; 4/21/2021    Margie Calderón RN on 5/13/2021 at 8:22 AM

## 2021-06-04 DIAGNOSIS — E03.4 HYPOTHYROIDISM DUE TO ACQUIRED ATROPHY OF THYROID: ICD-10-CM

## 2021-06-07 RX ORDER — LEVOTHYROXINE SODIUM 125 UG/1
TABLET ORAL
Qty: 90 TABLET | Refills: 2 | Status: ON HOLD | OUTPATIENT
Start: 2021-06-07 | End: 2023-01-01

## 2021-06-07 NOTE — TELEPHONE ENCOUNTER
"[facility] sent Rx request for the following:   EUTHYROX 125 MCG tablet  Sig: TAKE 1 TABLET BY MOUTH ONCE DAILY IN THE MORNING    Last Prescription Date:   06/18/2020  Last Fill Qty/Refills:         90, R-3    Last Office Visit:              04/21/2021 (Ericka)   Future Office visit:           None noted   Thyroid Protocol Passed - 6/4/2021  3:19 PM        Passed - Patient is 12 years or older        Passed - Recent (12 mo) or future (30 days) visit within the authorizing provider's specialty     Patient has had an office visit with the authorizing provider or a provider within the authorizing providers department within the previous 12 mos or has a future within next 30 days. See \"Patient Info\" tab in inbasket, or \"Choose Columns\" in Meds & Orders section of the refill encounter.              Passed - Medication is active on med list        Passed - Normal TSH on file in past 12 months     No lab results found.            Unable to complete prescription refill per RN Medication Refill Policy.................... Selam Lomeli RN ....................  6/7/2021   8:48 AM        "

## 2021-06-25 ENCOUNTER — TELEPHONE (OUTPATIENT)
Dept: INTERNAL MEDICINE | Facility: OTHER | Age: 71
End: 2021-06-25

## 2021-06-25 NOTE — TELEPHONE ENCOUNTER
Patient is looking to have a routine MRI. Patient can hardly walk and was referred back to the doctor by his chiropractor.     PT aware Ericka is out until Tuesday

## 2021-06-28 NOTE — TELEPHONE ENCOUNTER
Recommend evaluation.   Appropriate imaging will be ordered based on exam.     Consider Dr. Bustos... or someone who does a lot of orthopedics. Possibly Dr. Mathis.     Jorge Barnett MD

## 2021-07-01 ENCOUNTER — OFFICE VISIT (OUTPATIENT)
Dept: FAMILY MEDICINE | Facility: OTHER | Age: 71
End: 2021-07-01
Attending: FAMILY MEDICINE
Payer: COMMERCIAL

## 2021-07-01 VITALS
TEMPERATURE: 97.5 F | WEIGHT: 211.4 LBS | RESPIRATION RATE: 16 BRPM | OXYGEN SATURATION: 96 % | DIASTOLIC BLOOD PRESSURE: 72 MMHG | HEART RATE: 77 BPM | HEIGHT: 67 IN | SYSTOLIC BLOOD PRESSURE: 144 MMHG | BODY MASS INDEX: 33.18 KG/M2

## 2021-07-01 DIAGNOSIS — M62.830 SPASM OF LUMBAR PARASPINOUS MUSCLE: ICD-10-CM

## 2021-07-01 DIAGNOSIS — M54.17 LUMBOSACRAL RADICULOPATHY AT L4: Primary | ICD-10-CM

## 2021-07-01 DIAGNOSIS — E66.811 OBESITY (BMI 30.0-34.9): ICD-10-CM

## 2021-07-01 DIAGNOSIS — R29.898 HIP TIGHTNESS: ICD-10-CM

## 2021-07-01 PROBLEM — E83.42 HYPOMAGNESEMIA: Status: ACTIVE | Noted: 2020-12-01

## 2021-07-01 PROBLEM — E55.9 VITAMIN D DEFICIENCY: Status: ACTIVE | Noted: 2020-12-01

## 2021-07-01 PROBLEM — G47.419 NARCOLEPSY: Status: ACTIVE | Noted: 2021-07-01

## 2021-07-01 PROBLEM — H92.11 DRAINAGE FROM EAR, RIGHT: Status: ACTIVE | Noted: 2021-03-27

## 2021-07-01 PROBLEM — R80.9 PROTEINURIA, UNSPECIFIED TYPE: Status: ACTIVE | Noted: 2020-12-01

## 2021-07-01 PROBLEM — N18.30 STAGE 3 CHRONIC KIDNEY DISEASE, UNSPECIFIED WHETHER STAGE 3A OR 3B CKD (H): Status: ACTIVE | Noted: 2020-12-01

## 2021-07-01 PROCEDURE — 99215 OFFICE O/P EST HI 40 MIN: CPT | Performed by: FAMILY MEDICINE

## 2021-07-01 PROCEDURE — 99417 PROLNG OP E/M EACH 15 MIN: CPT | Performed by: FAMILY MEDICINE

## 2021-07-01 PROCEDURE — G0463 HOSPITAL OUTPT CLINIC VISIT: HCPCS

## 2021-07-01 ASSESSMENT — PAIN SCALES - GENERAL: PAINLEVEL: EXTREME PAIN (9)

## 2021-07-01 ASSESSMENT — MIFFLIN-ST. JEOR: SCORE: 1677.53

## 2021-07-01 NOTE — Clinical Note
Hi Dr. Barnett,    I saw your patient in the office today for his back pain.  I have provided several recommendations for further work-up.  Please let me know if you have questions.  Thanks.    Som       Nurse,    Please send a copy of this consultation note to Gilbert Perez DC as patient has requested this to be done.  Thanks.

## 2021-07-01 NOTE — PROGRESS NOTES
"HPI:  71-year-old male coming in for consultation of chronic back pain.  Patient reports that he has had back pain dating back to at least 2007.  Patient reports that his back pain is lower across the bilateral lumbar region.  He also has pain in his neck which is also chronic but has acutely worsened in recent weeks.  His neck pain is localized to the bilateral upper trapezius musculature when asked to demonstrate pain location in clinic.  He reports limited range of motion with his neck in multiple planes that has been improved with chiropractic manipulation in the past.  He feels like he has significant improvement with turning his neck to the right.  His pain radiates into his shoulders.  He does not describe any current radicular symptoms into the distal arm, hand, or fingers.    The patient's low back pain is particularly bothersome with many activities of daily living including bending, lifting, squatting, standing, sitting, running, jumping, and stairs.  He rates his pain as 8-9/10.  He characterizes the pain as dull, sharp, aching, and throbbing.  He reports radicular pain extending down the right lateral thigh.  This pain does not extend below the knee.  He has had intermittent symptoms of radicular pain below the knee in the past.  He feels like he has associated limping, weakness, and instability of the bilateral lower extremities due to his pain.  He sees a chiropractor 2-3 days/week.  He currently takes Tylenol which she says \"does nothing.\"  He also takes NSAIDs which provides moderate relief of his pain but because of his type IIIb chronic kidney disease his neurologist has recommended not taking any NSAIDs at all.  He has a prescription for hydrocodone and tries to use this medication very sparingly.  He is under pain contract through his PCP.    Patient reports that he has received a diagnosis of ankylosing spondylitis.  He has 1 grown child who has received this diagnosis and another one who has " "similar symptoms and is either being worked up or is considered to have also have this condition.  The patient is interested in an MRI of his low back and possibly being started on a biologic (Remicade) to help with his pain.      EXAM:  BP (!) 144/72 (BP Location: Right arm, Patient Position: Sitting, Cuff Size: Adult Large)   Pulse 77   Temp 97.5  F (36.4  C) (Tympanic)   Resp 16   Ht 1.702 m (5' 7\")   Wt 95.9 kg (211 lb 6.4 oz)   SpO2 96%   BMI 33.11 kg/m    MUSCULOSKELETAL EXAM:  CERVICAL SPINE  Inspection:  -Patient rest with his neck in a flexed position  -No bruising or swelling  -Scars:  None    Tenderness to palpation of the:  -Spinous processes: Mild pain  -Paracervical musculature: Positive bilaterally  -Upper trapezius musculature: Positive bilaterally    Range of Motion:  -Active flexion:  45  -Active extension:  5  -Left lateral rotation:  50  -Right lateral rotation:  45  -Left lateral side bending:  10  -Right lateral side bending:  10    Strength:  -Deltoids:  5/5 left, 5/5 right  -Biceps: 5/5 left, 5/5 right  -Triceps: 5/5 left, 5/5 right  - strength: 5/5 left, 5/5 right  -Wrist extension: 5/5 left, 5/5 right  -Interosseous: 5/5 left, 5/5 right  -Thumb-index finger pull-through: 5/5 left, 5/5 right    Sensation:  -Intact sensation to light touch in all dermatomes of the bilateral upper extremities    Other:  -No signs of cyanosis. Normal skin temperature of the upper extremities.      MUSCULOSKELETAL EXAM:  LOW BACK  Inspection:  -No gross deformity  -No significant scoliosis, kyphosis, or lordosis  -No bruising or swelling  -Able to walk on heel and toes    Tenderness to palpation of the:  -Lower thoracic spine:  Negative  -Upper lumbar spine: Positive  -Lower lumbar spine: Positive  -Sacral spine: Positive  -Left lumbar paraspinal musculature: Positive  -Right lumbar paraspinal musculature: Positive  -Left SI joint: Positive  -Right SI joint: Positive  -Left gluteal musculature: Mild " pain  -Right gluteal musculature: Mild pain  -Left greater trochanter: Positive  -Right greater trochanter: Positive    Range of Motion:  -Forward flexion:  30  -Extension:  10  -Passive left hip flexion:  110  -Passive right hip flexion:  110  -Passive left hip internal rotation:  15  -Passive left hip external rotation:  45  -Passive right hip internal rotation:  15  -Passive right hip external rotation:  45    Strength:  -Left hip flexion:  5/5  -Right hip flexion:  5/5  -Left hip abduction:  5/5  -Right hip abduction:  5/5  -Left hip adduction:  5/5  -Right hip adduction:  5/5  -Left knee extension:  5/5  -Right knee extension:  5/5  -Left knee flexion:  5/5  -Right knee flexion:  5/5  -Left ankle plantar flexion:  5/5  -Right ankle plantar flexion:  5/5  -Left ankle dorsiflexion:  5/5  -Right ankle dorsiflexion:  5/5  -Left hallux dorsiflexion:  5/5  -Right hallux dorsiflexion:  5/5    Sensation:  -Intact sensation to light touch in the L2-S1 dermatomes    Reflexes:  -Patellar: Absent on the left  -Semitendinosus: Difficult to elicit bilaterally  -Achilles: Difficult to elicit bilaterally    Special tests:  -Active forward flexion:  Painful  -Active extension:  Painful  -Active side-bending:  Painful bilaterally  -Active twisting:  Painful bilaterally  -Facet loading:  Painful bilaterally in the buttock area  -Straight leg raise: Negative bilaterally    MUSCULOSKELETAL EXAM:  SACROILIAC JOINT  CASH/Moses test: Positive bilaterally  Thigh thrust test: Positive on the right, negative on the left  Compression test:  Negative  Distraction test:  Negative  Gaenslen test: Positive  Jennifer finger sign:  Negative      IMAGIN2017: 3 view lumbosacral x-ray  -Grade 1 anteriolisthesis of L5 on S1.  Severe disc space narrowing of L3-L4.  Moderate levoscoliosis.  Moderate diffuse degenerative changes throughout the L-spine.    2021: Pelvis and 4 view bilateral hip x-ray  -Moderate degenerative changes  throughout the lower part of the lumbar spine.  Mild degeneration of the bilateral femoroacetabular joints.  No acute fracture.      ASSESSMENT/PLAN:  Diagnoses and all orders for this visit:  Lumbosacral radiculopathy at L4  Spasm of lumbar paraspinous muscle  Hip tightness  Obesity (BMI 30.0-34.9)    71-year-old male coming in for consultation of low back pain.  Patient's right-sided pain with radicular symptoms would be most consistent with an L4 radiculopathy as he does have a diminished L4 reflex and symptoms involving the anterolateral aspect of the thigh.  This could also be due to a sensory problem such as meralgia paresthetica as his symptoms do not go below the knee.  I feel that this would be best worked up with nerve studies.  The patient does have known scoliosis from x-rays back in 2017.  His reported previous findings of ankylosing spondylitis could possibly be supported with further laboratory work-up.  When asked about this, he does not know if he has had previous work-up for this.  Additionally, his neck symptoms extending into his shoulders, in combination with hip/pelvis pain, could represent a new diagnosis of PMR.  This too could be further evaluated with laboratory studies.  -Patient is interested in an MRI of his lumbar spine, this would be an appropriate next step in work-up as he does have radicular symptoms and potential for finding pathology that would prompt surgical intervention (consider neurosurgeon consultation or referral for TFESI under fluoroscopic guidance with radiology pending findings)  -Given that his pain is diffuse throughout the low back and hips and his radicular symptoms are less well-defined, he may be a better candidate for EMG/NCS as a best next step in work-up of his low back symptoms to better assess the extent of radicular involvement -consider referral to Dr. Mark  -This patient does have family history of rheumatologic conditions and could consider laboratory  work-up to include (but not limited to) inflammatory markers and HLA-B27 would be appropriate through PCPs office or with referral to rheumatology  -If rheumatology were to find rheumatologic cause for pain, could consider steroids or a biologic medication to be used for pain relief in place of NSAIDs use in the setting of CKD  -This patient has not tried physical therapy and I think this would be very beneficial for him as he does have tight muscles about the hips with likely tight/spasmed lower back muscles; the pain he experiences in his neck could also be helped with PT  -Continue chiropractics as he feels that he is getting relief  -Limit use of NSAIDs in the setting of CKD type IIIb  -Recommend limited use of narcotics  -Consider trying lidocaine patches for low back pain  -Supplement APAP as needed for breakthrough pain throughout the day  -Discussed the importance of weight loss  -Follow-up with PCP for further management of chronic back pain      Som Dominguez MD  7/1/2021  2:15 PM    Total time spent with this patient was 74 minutes which included chart review, visualization and interpretation of images, time spent with the patient, and documentation.

## 2021-07-01 NOTE — NURSING NOTE
"Chief Complaint   Patient presents with     Pain     back/neck/right hip/right leg, pain 8-9/10, ongoing for many years worsening in the past 4 weeks, multiple injuries over the years     Patient presents to clinic today for low back, neck, right hip and right leg pain. He has been experiencing a sharp pain down his leg. He states \"that down to his feet part of it hurts and part of it is numb.\"  Pain 8-9/10 that has been ongoing for many years but worsening in the past 4 weeks. He notes multiple injures over the years including falling out of tree strands and an injury at [employer]. He would like an MRI requested by his chiropractor, Gilbert Perez. He also mentions he would like all reports and notes sent to Gilbert Perez. He goes to the chiropractor 2-3 times weekly.     Initial BP (!) 142/74 (BP Location: Right arm, Patient Position: Sitting, Cuff Size: Adult Large)   Pulse 77   Temp 97.5  F (36.4  C) (Tympanic)   Resp 16   Ht 1.702 m (5' 7\")   Wt 95.9 kg (211 lb 6.4 oz)   SpO2 96%   BMI 33.11 kg/m   Estimated body mass index is 33.11 kg/m  as calculated from the following:    Height as of this encounter: 1.702 m (5' 7\").    Weight as of this encounter: 95.9 kg (211 lb 6.4 oz).     FOOD SECURITY SCREENING QUESTIONS  Hunger Vital Signs:  Within the past 12 months we worried whether our food would run out before we got money to buy more. Never  Within the past 12 months the food we bought just didn't last and we didn't have money to get more. Never      Medication Reconciliation: Complete      Litzy Adkins, SARAHY   "

## 2021-07-06 ENCOUNTER — TELEPHONE (OUTPATIENT)
Dept: FAMILY MEDICINE | Facility: OTHER | Age: 71
End: 2021-07-06

## 2021-07-06 ENCOUNTER — TELEPHONE (OUTPATIENT)
Dept: INTERNAL MEDICINE | Facility: OTHER | Age: 71
End: 2021-07-06

## 2021-07-06 NOTE — TELEPHONE ENCOUNTER
AJD-patient is looking for orders for an MRI patient would like call back 07.06.21    Please call and advise    Thank You    Shereen Dolan on 7/6/2021 at 3:46 PM

## 2021-07-07 NOTE — TELEPHONE ENCOUNTER
Patient spoke with Greg Rosa yesterday. Spoke with Greg about patient phone call. There is no need for a nurse to call patient back at this point.    Ltizy Adkins LPN .......  7/7/2021  2:18 PM

## 2021-07-08 ENCOUNTER — TELEPHONE (OUTPATIENT)
Dept: INTERNAL MEDICINE | Facility: OTHER | Age: 71
End: 2021-07-08

## 2021-07-08 DIAGNOSIS — G89.29 CHRONIC BILATERAL LOW BACK PAIN WITH BILATERAL SCIATICA: Primary | ICD-10-CM

## 2021-07-08 DIAGNOSIS — M54.42 CHRONIC BILATERAL LOW BACK PAIN WITH BILATERAL SCIATICA: Primary | ICD-10-CM

## 2021-07-08 DIAGNOSIS — M54.41 CHRONIC BILATERAL LOW BACK PAIN WITH BILATERAL SCIATICA: Primary | ICD-10-CM

## 2021-07-08 NOTE — TELEPHONE ENCOUNTER
Patient states he is having back pain intermittent 10/10. If patient rests it goes for a while but if moves for a while it comes back. It is forcing neck in uncomfortable position. Patient uses [facility] pharmacy. Patient doesn't just want something prescribed he wants to be seen. He also wants diabetic supplies to be discussed. Patient states he is short on supplies. Patient states he has codes that need to be added for this for his pump supplies.  Patient states Dr Bustos was to discuss back issues with Jorge Barnett MD and would like to know what was discussed. Patient states [place] and will need a updated MRI. Patient states that he will get back with the name of the neurosurgeon he wants to see and return. Patient is okay with a referral for MRI and neurology without being seen.  Chen Morris LPN .............7/8/2021  4:29 PM

## 2021-07-08 NOTE — TELEPHONE ENCOUNTER
Please call with a work in soon with DJS.  The patient is in pain / back pain  He will only see his PCP at this point.      Ela Crisostomo on 7/8/2021 at 3:36 PM

## 2021-07-08 NOTE — TELEPHONE ENCOUNTER
Not sure what is available, pending schedule openings, may need to see another provider.      Jorge Barnett MD

## 2021-07-09 ENCOUNTER — TELEPHONE (OUTPATIENT)
Dept: INTERNAL MEDICINE | Facility: OTHER | Age: 71
End: 2021-07-09

## 2021-07-09 DIAGNOSIS — M54.12 CERVICAL RADICULAR PAIN: Primary | ICD-10-CM

## 2021-07-09 NOTE — TELEPHONE ENCOUNTER
Patient was contacted and informed of Dr. Barnett response.  An appointment was made for his next available on 7.28.2021.  He was told to expect a call from [facility] to schedule the MRI.  A referral for neurosurgery was placed with Dr. Lui from [facility] list as the provider, patient was asked if that was okay and he said yes, unless he finds a different one [place] the cities that [place] would prefer instead.  He'll let us know after the MRI has been completed.  Kayleigh Rubio on 7/9/2021 at 10:57 AM

## 2021-07-13 DIAGNOSIS — Z02.89 PAIN MEDICATION AGREEMENT: ICD-10-CM

## 2021-07-13 DIAGNOSIS — M54.10 RADICULAR LOW BACK PAIN: ICD-10-CM

## 2021-07-13 DIAGNOSIS — M50.30 DEGENERATIVE DISC DISEASE, CERVICAL: ICD-10-CM

## 2021-07-13 DIAGNOSIS — M79.644 PAIN OF FINGER OF RIGHT HAND: ICD-10-CM

## 2021-07-13 DIAGNOSIS — L08.9 FINGER INFECTION: ICD-10-CM

## 2021-07-13 NOTE — TELEPHONE ENCOUNTER
DJS-patient is looking for his Hydrocodone to be refilled and is asking why it has not been done as of 07.13.21    Please call and advise    Thank You    Shereen Dolan on 7/13/2021 at 3:29 PM

## 2021-07-14 RX ORDER — HYDROCODONE BITARTRATE AND ACETAMINOPHEN 5; 325 MG/1; MG/1
1 TABLET ORAL EVERY 6 HOURS PRN
Qty: 60 TABLET | Refills: 0 | Status: CANCELLED | OUTPATIENT
Start: 2021-07-14

## 2021-07-14 NOTE — TELEPHONE ENCOUNTER
Patient notified of needing an appointment. Patient was very upset and stated I was no help and he would be cancelling his appt on the 28th.    Meche Purcell LPN

## 2021-07-14 NOTE — TELEPHONE ENCOUNTER
Please schedule Clinic Appointment for Controlled med refills.  Uncertain what is available, may need to see another provider.    Jorge Barnett MD

## 2021-07-14 NOTE — TELEPHONE ENCOUNTER
Routing refill request to provider for review/approval because:  Drug not on the [facility] refill protocol   LOV: virtual 4/21/2021  Per [facility] patient last refilled #60 on 10/8/2021    Called and spoke with patient and patient states that he is out of [place].  Patient states he scheduled an appointment with Dr. Barnett but cannot get into 7/28/2021.  Patient states his neck and back are bad and states he is going to chiropractor today.    Informed patient will route to Dr. Barnett for review and consideration.    Margie Calderón RN on 7/14/2021 at 9:24 AM

## 2021-07-19 ENCOUNTER — HOSPITAL ENCOUNTER (OUTPATIENT)
Dept: MRI IMAGING | Facility: OTHER | Age: 71
End: 2021-07-19
Attending: INTERNAL MEDICINE
Payer: MEDICARE

## 2021-07-19 DIAGNOSIS — M54.42 CHRONIC BILATERAL LOW BACK PAIN WITH BILATERAL SCIATICA: ICD-10-CM

## 2021-07-19 DIAGNOSIS — G89.29 CHRONIC BILATERAL LOW BACK PAIN WITH BILATERAL SCIATICA: ICD-10-CM

## 2021-07-19 DIAGNOSIS — M54.41 CHRONIC BILATERAL LOW BACK PAIN WITH BILATERAL SCIATICA: ICD-10-CM

## 2021-07-19 DIAGNOSIS — M54.12 CERVICAL RADICULAR PAIN: ICD-10-CM

## 2021-07-19 PROCEDURE — 72148 MRI LUMBAR SPINE W/O DYE: CPT

## 2021-07-19 PROCEDURE — 72141 MRI NECK SPINE W/O DYE: CPT

## 2021-07-28 ENCOUNTER — TELEPHONE (OUTPATIENT)
Dept: INTERNAL MEDICINE | Facility: OTHER | Age: 71
End: 2021-07-28

## 2021-07-28 ENCOUNTER — OFFICE VISIT (OUTPATIENT)
Dept: INTERNAL MEDICINE | Facility: OTHER | Age: 71
End: 2021-07-28
Attending: INTERNAL MEDICINE
Payer: COMMERCIAL

## 2021-07-28 VITALS
RESPIRATION RATE: 16 BRPM | WEIGHT: 213.6 LBS | OXYGEN SATURATION: 98 % | SYSTOLIC BLOOD PRESSURE: 138 MMHG | BODY MASS INDEX: 33.45 KG/M2 | HEART RATE: 64 BPM | DIASTOLIC BLOOD PRESSURE: 78 MMHG | TEMPERATURE: 97.9 F

## 2021-07-28 DIAGNOSIS — N18.30 CONTROLLED TYPE 2 DIABETES MELLITUS WITH STAGE 3 CHRONIC KIDNEY DISEASE, WITH LONG-TERM CURRENT USE OF INSULIN (H): Primary | ICD-10-CM

## 2021-07-28 DIAGNOSIS — G89.4 CHRONIC PAIN SYNDROME: ICD-10-CM

## 2021-07-28 DIAGNOSIS — R12 HEARTBURN: Primary | ICD-10-CM

## 2021-07-28 DIAGNOSIS — Z79.899 OTHER LONG TERM (CURRENT) DRUG THERAPY: ICD-10-CM

## 2021-07-28 DIAGNOSIS — Z96.41 INSULIN PUMP IN PLACE: ICD-10-CM

## 2021-07-28 DIAGNOSIS — Z79.4 CONTROLLED TYPE 2 DIABETES MELLITUS WITH STAGE 3 CHRONIC KIDNEY DISEASE, WITH LONG-TERM CURRENT USE OF INSULIN (H): Primary | ICD-10-CM

## 2021-07-28 DIAGNOSIS — I10 BENIGN ESSENTIAL HYPERTENSION: ICD-10-CM

## 2021-07-28 DIAGNOSIS — M54.12 CERVICAL RADICULAR PAIN: ICD-10-CM

## 2021-07-28 DIAGNOSIS — G47.419 PRIMARY NARCOLEPSY WITHOUT CATAPLEXY: ICD-10-CM

## 2021-07-28 DIAGNOSIS — K21.9 GASTROESOPHAGEAL REFLUX DISEASE, UNSPECIFIED WHETHER ESOPHAGITIS PRESENT: Primary | ICD-10-CM

## 2021-07-28 DIAGNOSIS — M46.92 INFLAMMATORY SPONDYLOPATHY OF CERVICAL REGION (H): ICD-10-CM

## 2021-07-28 DIAGNOSIS — M48.02 CERVICAL STENOSIS OF SPINAL CANAL: ICD-10-CM

## 2021-07-28 DIAGNOSIS — E03.4 HYPOTHYROIDISM DUE TO ACQUIRED ATROPHY OF THYROID: ICD-10-CM

## 2021-07-28 DIAGNOSIS — E11.22 CONTROLLED TYPE 2 DIABETES MELLITUS WITH STAGE 3 CHRONIC KIDNEY DISEASE, WITH LONG-TERM CURRENT USE OF INSULIN (H): Primary | ICD-10-CM

## 2021-07-28 DIAGNOSIS — R79.0 LOW BLOOD MAGNESIUM: ICD-10-CM

## 2021-07-28 DIAGNOSIS — E78.2 MIXED HYPERLIPIDEMIA: ICD-10-CM

## 2021-07-28 DIAGNOSIS — I25.118 ATHEROSCLEROSIS OF NATIVE CORONARY ARTERY OF NATIVE HEART WITH STABLE ANGINA PECTORIS (H): ICD-10-CM

## 2021-07-28 DIAGNOSIS — R12 HEARTBURN: ICD-10-CM

## 2021-07-28 DIAGNOSIS — N18.32 STAGE 3B CHRONIC KIDNEY DISEASE (H): ICD-10-CM

## 2021-07-28 DIAGNOSIS — M50.30 DEGENERATIVE DISC DISEASE, CERVICAL: ICD-10-CM

## 2021-07-28 DIAGNOSIS — F43.21 SITUATIONAL DEPRESSION: ICD-10-CM

## 2021-07-28 DIAGNOSIS — I50.32 CHRONIC HEART FAILURE WITH PRESERVED EJECTION FRACTION (H): ICD-10-CM

## 2021-07-28 PROBLEM — M54.50 CHRONIC LOW BACK PAIN: Status: RESOLVED | Noted: 2017-08-08 | Resolved: 2021-07-28

## 2021-07-28 PROBLEM — H92.11 DRAINAGE FROM EAR, RIGHT: Status: RESOLVED | Noted: 2021-03-27 | Resolved: 2021-07-28

## 2021-07-28 PROBLEM — G89.29 CHRONIC LOW BACK PAIN: Status: RESOLVED | Noted: 2017-08-08 | Resolved: 2021-07-28

## 2021-07-28 LAB
ALBUMIN SERPL-MCNC: 4 G/DL (ref 3.5–5.7)
ALBUMIN UR-MCNC: 300 MG/DL
ALP SERPL-CCNC: 62 U/L (ref 34–104)
ALT SERPL W P-5'-P-CCNC: 17 U/L (ref 7–52)
ANION GAP SERPL CALCULATED.3IONS-SCNC: 6 MMOL/L (ref 3–14)
APPEARANCE UR: CLEAR
AST SERPL W P-5'-P-CCNC: 18 U/L (ref 13–39)
BILIRUB SERPL-MCNC: 0.4 MG/DL (ref 0.3–1)
BILIRUB UR QL STRIP: NEGATIVE
BUN SERPL-MCNC: 16 MG/DL (ref 7–25)
CALCIUM SERPL-MCNC: 9.2 MG/DL (ref 8.6–10.3)
CHLORIDE BLD-SCNC: 102 MMOL/L (ref 98–107)
CHOLEST SERPL-MCNC: 197 MG/DL
CO2 SERPL-SCNC: 28 MMOL/L (ref 21–31)
COLOR UR AUTO: ABNORMAL
CREAT SERPL-MCNC: 1.87 MG/DL (ref 0.7–1.3)
ERYTHROCYTE [DISTWIDTH] IN BLOOD BY AUTOMATED COUNT: 12.4 % (ref 10–15)
FASTING STATUS PATIENT QL REPORTED: ABNORMAL
GFR SERPL CREATININE-BSD FRML MDRD: 36 ML/MIN/1.73M2
GLUCOSE BLD-MCNC: 176 MG/DL (ref 70–105)
GLUCOSE UR STRIP-MCNC: 300 MG/DL
HBA1C MFR BLD: 7.9 % (ref 4–6.2)
HCT VFR BLD AUTO: 44.7 % (ref 40–53)
HDLC SERPL-MCNC: 34 MG/DL (ref 23–92)
HGB BLD-MCNC: 15.3 G/DL (ref 13.3–17.7)
HGB UR QL STRIP: NEGATIVE
KETONES UR STRIP-MCNC: NEGATIVE MG/DL
LDLC SERPL CALC-MCNC: 101 MG/DL
LEUKOCYTE ESTERASE UR QL STRIP: NEGATIVE
MAGNESIUM SERPL-MCNC: 2.1 MG/DL (ref 1.9–2.7)
MCH RBC QN AUTO: 32.9 PG (ref 26.5–33)
MCHC RBC AUTO-ENTMCNC: 34.2 G/DL (ref 31.5–36.5)
MCV RBC AUTO: 96 FL (ref 78–100)
NITRATE UR QL: NEGATIVE
NONHDLC SERPL-MCNC: 163 MG/DL
PH UR STRIP: 6 [PH] (ref 5–9)
PLATELET # BLD AUTO: 229 10E3/UL (ref 150–450)
POTASSIUM BLD-SCNC: 4 MMOL/L (ref 3.5–5.1)
PROT SERPL-MCNC: 6.2 G/DL (ref 6.4–8.9)
RBC # BLD AUTO: 4.65 10E6/UL (ref 4.4–5.9)
RBC URINE: 1 /HPF
SODIUM SERPL-SCNC: 136 MMOL/L (ref 134–144)
SP GR UR STRIP: 1.02 (ref 1–1.03)
T4 FREE SERPL-MCNC: 0.9 NG/DL (ref 0.6–1.6)
TRIGL SERPL-MCNC: 310 MG/DL
TSH SERPL DL<=0.005 MIU/L-ACNC: 8.53 MU/L (ref 0.4–4)
UROBILINOGEN UR STRIP-MCNC: NORMAL MG/DL
WBC # BLD AUTO: 5.4 10E3/UL (ref 4–11)
WBC URINE: 1 /HPF

## 2021-07-28 PROCEDURE — 82040 ASSAY OF SERUM ALBUMIN: CPT | Mod: ZL | Performed by: INTERNAL MEDICINE

## 2021-07-28 PROCEDURE — 84443 ASSAY THYROID STIM HORMONE: CPT | Mod: ZL | Performed by: INTERNAL MEDICINE

## 2021-07-28 PROCEDURE — 83735 ASSAY OF MAGNESIUM: CPT | Mod: ZL | Performed by: INTERNAL MEDICINE

## 2021-07-28 PROCEDURE — 80061 LIPID PANEL: CPT | Mod: ZL | Performed by: INTERNAL MEDICINE

## 2021-07-28 PROCEDURE — 81001 URINALYSIS AUTO W/SCOPE: CPT | Mod: ZL | Performed by: INTERNAL MEDICINE

## 2021-07-28 PROCEDURE — G0463 HOSPITAL OUTPT CLINIC VISIT: HCPCS

## 2021-07-28 PROCEDURE — 84439 ASSAY OF FREE THYROXINE: CPT | Mod: ZL | Performed by: INTERNAL MEDICINE

## 2021-07-28 PROCEDURE — 83036 HEMOGLOBIN GLYCOSYLATED A1C: CPT | Mod: ZL | Performed by: INTERNAL MEDICINE

## 2021-07-28 PROCEDURE — 36415 COLL VENOUS BLD VENIPUNCTURE: CPT | Mod: ZL | Performed by: INTERNAL MEDICINE

## 2021-07-28 PROCEDURE — 82043 UR ALBUMIN QUANTITATIVE: CPT | Mod: ZL | Performed by: INTERNAL MEDICINE

## 2021-07-28 PROCEDURE — 85041 AUTOMATED RBC COUNT: CPT | Mod: ZL | Performed by: INTERNAL MEDICINE

## 2021-07-28 PROCEDURE — 99215 OFFICE O/P EST HI 40 MIN: CPT | Performed by: INTERNAL MEDICINE

## 2021-07-28 RX ORDER — DEXTROAMPHETAMINE SULFATE 10 MG/1
10 TABLET ORAL 4 TIMES DAILY
Qty: 360 TABLET | Refills: 0 | Status: ON HOLD | OUTPATIENT
Start: 2021-07-28 | End: 2023-01-01

## 2021-07-28 RX ORDER — FAMOTIDINE 20 MG/1
20 TABLET, FILM COATED ORAL 2 TIMES DAILY PRN
Status: CANCELLED | OUTPATIENT
Start: 2021-07-28

## 2021-07-28 RX ORDER — DEXTROAMPHETAMINE SULFATE 10 MG/1
10 TABLET ORAL 4 TIMES DAILY
Qty: 360 TABLET | Refills: 0 | Status: SHIPPED | OUTPATIENT
Start: 2021-07-28 | End: 2021-07-28

## 2021-07-28 RX ORDER — METHYLPHENIDATE HYDROCHLORIDE 10 MG/1
20 TABLET ORAL 2 TIMES DAILY
Qty: 360 TABLET | Refills: 0 | Status: ON HOLD | OUTPATIENT
Start: 2021-07-28 | End: 2023-01-01

## 2021-07-28 RX ORDER — METHYLPHENIDATE HYDROCHLORIDE 10 MG/1
20 TABLET ORAL 2 TIMES DAILY
Qty: 360 TABLET | Refills: 0 | Status: SHIPPED | OUTPATIENT
Start: 2021-07-28 | End: 2021-07-28

## 2021-07-28 RX ORDER — FAMOTIDINE 20 MG/1
20 TABLET, FILM COATED ORAL 2 TIMES DAILY PRN
Qty: 60 TABLET | Refills: 5 | Status: SHIPPED | OUTPATIENT
Start: 2021-07-28

## 2021-07-28 RX ORDER — NIFEDIPINE 30 MG
30 TABLET, EXTENDED RELEASE ORAL 2 TIMES DAILY
Qty: 90 TABLET | Refills: 3 | Status: SHIPPED | OUTPATIENT
Start: 2021-07-28

## 2021-07-28 RX ORDER — CLOPIDOGREL BISULFATE 75 MG/1
75 TABLET ORAL DAILY
Qty: 90 TABLET | Refills: 3 | Status: SHIPPED | OUTPATIENT
Start: 2021-07-28

## 2021-07-28 RX ORDER — HYDRALAZINE HYDROCHLORIDE 25 MG/1
25-50 TABLET, FILM COATED ORAL 4 TIMES DAILY
Qty: 360 TABLET | Refills: 11 | Status: ON HOLD | OUTPATIENT
Start: 2021-07-28 | End: 2023-01-01

## 2021-07-28 RX ORDER — HYDROCODONE BITARTRATE AND ACETAMINOPHEN 5; 325 MG/1; MG/1
1 TABLET ORAL EVERY 6 HOURS PRN
Qty: 18 TABLET | Refills: 0 | Status: ON HOLD | OUTPATIENT
Start: 2021-07-28 | End: 2023-01-01

## 2021-07-28 RX ORDER — LOPERAMIDE HYDROCHLORIDE 2 MG/1
2 TABLET ORAL 4 TIMES DAILY PRN
Status: CANCELLED | OUTPATIENT
Start: 2021-07-28

## 2021-07-28 RX ORDER — FAMOTIDINE 20 MG/1
20 TABLET, FILM COATED ORAL 2 TIMES DAILY PRN
Qty: 60 TABLET | Refills: 5 | Status: CANCELLED | OUTPATIENT
Start: 2021-07-28

## 2021-07-28 RX ORDER — METOPROLOL SUCCINATE 50 MG/1
50 TABLET, EXTENDED RELEASE ORAL 2 TIMES DAILY
Qty: 180 TABLET | Refills: 3 | Status: ON HOLD | OUTPATIENT
Start: 2021-07-28 | End: 2023-01-01

## 2021-07-28 RX ORDER — IRBESARTAN 150 MG/1
150 TABLET ORAL 2 TIMES DAILY
Qty: 180 TABLET | Refills: 3 | Status: SHIPPED | OUTPATIENT
Start: 2021-07-28

## 2021-07-28 ASSESSMENT — ENCOUNTER SYMPTOMS
ARTHRALGIAS: 1
HEMATURIA: 0
HEADACHES: 1
MYALGIAS: 1
PALPITATIONS: 0
SHORTNESS OF BREATH: 0
ABDOMINAL PAIN: 0
ADENOPATHY: 0
NECK STIFFNESS: 1
CONFUSION: 0
NECK PAIN: 1
BRUISES/BLEEDS EASILY: 1
WHEEZING: 0
WOUND: 1
CHILLS: 0
STRIDOR: 0
BACK PAIN: 1
FATIGUE: 0
CHEST TIGHTNESS: 0
FEVER: 0
BLOOD IN STOOL: 0
COUGH: 0
DIARRHEA: 0
CHOKING: 0

## 2021-07-28 ASSESSMENT — PAIN SCALES - GENERAL: PAINLEVEL: EXTREME PAIN (9)

## 2021-07-28 ASSESSMENT — PATIENT HEALTH QUESTIONNAIRE - PHQ9: SUM OF ALL RESPONSES TO PHQ QUESTIONS 1-9: 18

## 2021-07-28 NOTE — TELEPHONE ENCOUNTER
Patient said his prescription for Famotidine was not refilled. Was it missed or can it not be refilled?    Elvira Beckham on 7/28/2021 at 1:16 PM

## 2021-07-28 NOTE — TELEPHONE ENCOUNTER
Patient called stating he wanted to discuss his MRI results. He stated that he had an appt with his PCP today but didn't get any answers as to what his MRI indicates. Had lengthy conversation with patient. He stated that in 2018 his cervical MRI indicated that he had ankylosing spondylitis and he was told that those words were in the report in 2018. Informed patient that I did not see those specific words in the MRI report from 2018 but that doesn't mean that Dr. Landon did not tell him that he has that diagnosis. Patient was concerned that that information may have been removed from his MRI report in 2018 for some reason. Informed him that there was no indication that an addendum was made to that report. Assured patient that when he sees a neurosurgeon that they are able to request and review his MRI results and can discuss the cause of his chronic back pain with him. Offered patient an opportunity to discuss his MRI results further with his PCP via phone or clinic appointment but he declined. He stated he will wait to see a neurosurgeon. Morales Shaffer RN, BSN  ....................  7/28/2021   4:08 PM

## 2021-07-28 NOTE — LETTER
August 2, 2021      [facility] Whittaker  [address] [facility] [address]        Dear ,    We are writing to inform you of your test results.    Diabetes levels are now within the controlled range of 7.9% or lower.    TSH is elevated indicating low functioning thyroid and possible need for thyroid/Synthroid dose increase.    Urine protein levels have increased, possibly due to your high blood sugars the last couple of months, as well as possible high blood pressure.  We will need to watch this closely    Blood pressure checks at home - check some in AM, some in Afternoon, some in Evening and record   -- bring these with you to your next appointment.     Goal blood pressures -- less than 140 and less than 90.    -- Ideally would like the numbers about 110-130 and 70-80.  -- If running higher or lower than this on regular basis, will need to adjust your medications.        To help with weight loss and improve blood sugar control....    -- Try to avoid Carbohydrates as much as possible -- breads, pasta, baked goods, cakes, oatmeal, cold cereal, potatoes.   -- Eat more lean meats, proteins, eggs, nuts, vegetables.    -- Start or Continue regular daily exercise.        Continue current medications.   Plan to recheck labs again in 3 to 4 months.     Electronically signed by:  Jorge Barnett MD  on August 2, 2021     Resulted Orders   UA reflex to Microscopic   Result Value Ref Range    Color Urine Light Yellow Colorless, Straw, Light Yellow, Yellow    Appearance Urine Clear Clear    Glucose Urine 300  (A) Negative mg/dL    Bilirubin Urine Negative Negative    Ketones Urine Negative Negative mg/dL    Specific Gravity Urine 1.018 1.000 - 1.030    Blood Urine Negative Negative    pH Urine 6.0 5.0 - 9.0    Protein Albumin Urine 300  (A) Negative mg/dL    Urobilinogen Urine Normal Normal, 2.0 mg/dL    Nitrite Urine Negative Negative    Leukocyte Esterase Urine Negative Negative    RBC Urine 1 <=2 /HPF     WBC Urine 1 <=5 /HPF   TSH with free T4 reflex   Result Value Ref Range    TSH 8.53 (H) 0.40 - 4.00 mU/L   Hemoglobin A1c   Result Value Ref Range    Hemoglobin A1C 7.9 (H) 4.0 - 6.2 %   CBC with platelets   Result Value Ref Range    WBC Count 5.4 4.0 - 11.0 10e3/uL    RBC Count 4.65 4.40 - 5.90 10e6/uL    Hemoglobin 15.3 13.3 - 17.7 g/dL    Hematocrit 44.7 40.0 - 53.0 %    MCV 96 78 - 100 fL    MCH 32.9 26.5 - 33.0 pg    MCHC 34.2 31.5 - 36.5 g/dL    RDW 12.4 10.0 - 15.0 %    Platelet Count 229 150 - 450 10e3/uL   Albumin Random Urine Quantitative with Creat Ratio   Result Value Ref Range    Creatinine Urine mg/dL 218 mg/dL    Albumin Urine mg/L 3,360 mg/dL    Albumin Urine mg/g Cr 1,541.28 (H) 0.00 - 17.00 mg/g Cr   Lipid Profile   Result Value Ref Range    Cholesterol 197 <200 mg/dL      Comment:      Age 0-19 years  Desirable: <170 mg/dL  Borderline high:  170-199 mg/dl  High:            >199 mg/dl    Age 20 years and older  Desirable: <200 mg/dL    Triglycerides 310 (H) <150 mg/dL      Comment:      0-9 years:  Normal:    Less than 75 mg/dL  Borderline high:  75-99 mg/dL  High:             Greater than or equal to 100 mg/dL    0-19 years:  Normal:    Less than 90 mg/dL  Borderline high:   mg/dL  High:             Greater than or equal to 130 mg/dL    20 years and older:  Normal:    Less than 150 mg/dL  Borderline high:  150-199 mg/dL  High:             200-499 mg/dL  Very high:   Greater than or equal to 500 mg/dL    Direct Measure HDL 34 23 - 92 mg/dL      Comment:      0-19 years:       Greater than or equal to 45 mg/dL   Low: Less than 40 mg/dL   Borderline low: 40-44 mg/dL     20 years and older:   Female: Greater than or equal to 50 mg/dL   Male:   Greater than or equal to 40 mg/dL         LDL Cholesterol Calculated 101 (H) <=100 mg/dL      Comment:      Age 0-19 years:  Desirable: 0-110 mg/dL   Borderline high: 110-129 mg/dL   High: >= 130 mg/dL    Age 20 years and older:  Desirable:  <100mg/dL  Above desirable: 100-129 mg/dL   Borderline high: 130-159 mg/dL   High: 160-189 mg/dL   Very high: >= 190 mg/dL    Non HDL Cholesterol 163 (H) <130 mg/dL      Comment:      0-19 years:  Desirable:          Less than 120 mg/dL  Borderline high:   120-144 mg/dL  High:                   Greater than or equal to 145 mg/dL    20 years and older:  Desirable:          130 mg/dL  Above Desirable: 130-159 mg/dL  Borderline high:   160-189 mg/dL  High:               190-219 mg/dL  Very high:     Greater than or equal to 220 mg/dL    Patient Fasting > 8hrs? Unknown    Comprehensive metabolic panel   Result Value Ref Range    Sodium 136 134 - 144 mmol/L    Potassium 4.0 3.5 - 5.1 mmol/L    Chloride 102 98 - 107 mmol/L    Carbon Dioxide (CO2) 28 21 - 31 mmol/L    Anion Gap 6 3 - 14 mmol/L    Urea Nitrogen 16 7 - 25 mg/dL    Creatinine 1.87 (H) 0.70 - 1.30 mg/dL    Calcium 9.2 8.6 - 10.3 mg/dL    Glucose 176 (H) 70 - 105 mg/dL    Alkaline Phosphatase 62 34 - 104 U/L    AST 18 13 - 39 U/L    ALT 17 7 - 52 U/L    Protein Total 6.2 (L) 6.4 - 8.9 g/dL    Albumin 4.0 3.5 - 5.7 g/dL    Bilirubin Total 0.4 0.3 - 1.0 mg/dL    GFR Estimate 36 (L) >60 mL/min/1.73m2      Comment:      As of July 11, 2021, eGFR is calculated by the CKD-EPI creatinine equation, without race adjustment. eGFR can be influenced by muscle mass, exercise, and diet. The reported eGFR is an estimation only and is only applicable if the renal function is stable.   Magnesium   Result Value Ref Range    Magnesium 2.1 1.9 - 2.7 mg/dL   T4 free   Result Value Ref Range    Free T4 0.90 0.60 - 1.60 ng/dL       If you have any questions or concerns, please call the clinic at the number listed above.       Sincerely,      Jorge Barnett MD

## 2021-07-28 NOTE — PROGRESS NOTES
Mr. Whittaker is a 71 year old male who presents to the clinic for evaluation of the following problems:      ICD-10-CM    1. Controlled type 2 diabetes mellitus with stage 3 chronic kidney disease, with long-term current use of insulin (H)  E11.22 UA reflex to Microscopic    N18.30 TSH with free T4 reflex    Z79.4 Hemoglobin A1c     CBC with platelets     Albumin Random Urine Quantitative with Creat Ratio     Comprehensive metabolic panel     UA reflex to Microscopic     TSH with free T4 reflex     Hemoglobin A1c     CBC with platelets     Albumin Random Urine Quantitative with Creat Ratio     Comprehensive metabolic panel     T4 free   2. Mixed hyperlipidemia  E78.2 Lipid Profile     Lipid Profile   3. Stage 3b chronic kidney disease  N18.32    4. Atherosclerosis of native coronary artery of native heart with stable angina pectoris (H)  I25.118 clopidogrel (PLAVIX) 75 MG tablet     NIFEdipine ER (ADALAT CC) 30 MG 24 hr tablet   5. Benign essential hypertension  I10 hydrALAZINE (APRESOLINE) 25 MG tablet     irbesartan (AVAPRO) 150 MG tablet     metoprolol succinate ER (TOPROL-XL) 50 MG 24 hr tablet     NIFEdipine ER (ADALAT CC) 30 MG 24 hr tablet   6. Cervical stenosis of spinal canal  M48.02    7. Cervical radicular pain  M54.12    8. Chronic heart failure with preserved ejection fraction (H)  I50.32 hydrALAZINE (APRESOLINE) 25 MG tablet     irbesartan (AVAPRO) 150 MG tablet     metoprolol succinate ER (TOPROL-XL) 50 MG 24 hr tablet   9. Hypothyroidism due to acquired atrophy of thyroid  E03.4    10. Insulin pump in place - Changes pump supplies / infusion kit every 2 days  Z96.41    11. Primary narcolepsy without cataplexy  G47.419 methylphenidate (RITALIN) 10 MG tablet     dextroamphetamine (DEXTROSTAT) 10 MG tablet     DISCONTINUED: dextroamphetamine (DEXTROSTAT) 10 MG tablet     DISCONTINUED: methylphenidate (RITALIN) 10 MG tablet   12. Degenerative disc disease, cervical  M50.30 HYDROcodone-acetaminophen  (NORCO) 5-325 MG tablet   13. Inflammatory spondylopathy of cervical region (H)  M46.92 HYDROcodone-acetaminophen (NORCO) 5-325 MG tablet   14. Other long term (current) drug therapy - Non-opiate/opiate - 7/28/2021  Z79.899 HYDROcodone-acetaminophen (NORCO) 5-325 MG tablet     methylphenidate (RITALIN) 10 MG tablet     dextroamphetamine (DEXTROSTAT) 10 MG tablet     DISCONTINUED: dextroamphetamine (DEXTROSTAT) 10 MG tablet     DISCONTINUED: methylphenidate (RITALIN) 10 MG tablet   15. Chronic pain syndrome  G89.4 HYDROcodone-acetaminophen (NORCO) 5-325 MG tablet   16. Low blood magnesium  R79.0 Magnesium     Magnesium   17. Situational depression  F43.21 MENTAL HEALTH REFERRAL  - Adult; Psychiatry; Psychiatry; Other: [facility] [phone]; We will contact you to schedule the appointment or please call with any questions     HPI  Patient presents for follow-up of multiple issues.    Recently with uncontrolled type 2 diabetes with hyperglycemia.  Has been trying to be better about his insulin dose adjustment for his snacking and extra food that he eats.  Labs are due today.  Hemoglobin A1c thankfully has improved slightly now down to 7.9% or within the goal range of less than or equal to 7.9%.  He left the clinic prior to lab results returning.  Encouraged him to continue to work on reduced oral carbohydrate intake, regular exercise, weight loss.  Plan to recheck labs again in 3 to 4 months.  -Patient uses an insulin pump and it is his pump supplies and infusion kit every 2 days.  He has a tendency to have them fall off if they are on for any longer than that.  Reports that he is running short on insulin pump supplies because of this.  They are only supplying him with supplies to change every 3 days.    Stage IIIb chronic kidney disease, kidney function has been slowly declining.  But has actually improved compared to his lab work in April.  Creatinine at that point was 2.13, now down to 1.87.  GFR is  improved by 5%.    Mixed hyperlipidemia, cholesterol are elevated.  He is no longer on statin therapy.  He had problems with Lipitor in the past.    Coronary artery disease, has exertional angina, peripheral artery disease.  Continues with Ranexa, aspirin, Plavix.    Hypertension, blood pressure is a bit elevated at 140/82, recheck did improve.  States that he typically run about this level at home.    Patient has chronic neck pain, stenosis noted on recent MRI.  Has radicular pain in the neck.  Been going on for months if not years, gradually worsening.   -We reviewed the recent MRI findings.  He has a number of questions about this.  He would like referral to spine surgery.  Orders placed.    Chronic heart failure with preserved ejection fraction, ongoing.  Having some fluid retention issues and edema.  Encouraged sodium restriction, leg elevation.    Hypothyroidism, taking oral replacement.  Recheck lab work.  TSH did return a little high.  We may need to make some adjustments.    Narcolepsy without cataplexy, chronic.  Controlled substance agreement is up-to-date.  website reviewed, he last filled hydrocodone back in December 2020.  He is hoping for another refill of 60 tablets.  Advised with the regulation changes this is not possible anymore.  He tries very strongly to avoid use of opioid pain medication because he does not care for them but they are effective when needed.  Advised that this would be a new, index prescription and only 18 tablets can be prescribed at maximum.  He is fine at this time plans to follow-up as needed.  Controlled substance agreement was updated today.   website reviewed.  He does use dextroamphetamine and methylphenidate for his chronic narcolepsy.  Has been using medications properly.  Uses a 3-month supply.  Prescription refilled.  Plan for follow-up in 3 months for med refills.    Low blood magnesium, check lab work.      Duration of depression.  He is frustrated by his  aches and pains in the virus pandemic and his PHQ-9 is elevated.  Mental health/psychiatry referral placed.    Functional Capacity: less than 4 METS.   Review of Systems   Constitutional: Negative for chills, fatigue and fever.   HENT: Positive for hearing loss. Negative for congestion.    Eyes: Negative for visual disturbance.   Respiratory: Negative for cough, choking, chest tightness, shortness of breath, wheezing and stridor.    Cardiovascular: Positive for chest pain (chronic, ongoing - had pretty bad angina 2-2.5 weeks ago, went to ER) and leg swelling. Negative for palpitations.        + claudication leg pain, worse in left leg with exercise/ambulation  + intermittent Angina - if carrying groceries, reports chronic   Gastrointestinal: Negative for abdominal pain, blood in stool and diarrhea.   Genitourinary: Negative for hematuria.   Musculoskeletal: Positive for arthralgias (+ bilateral hip pain), back pain, gait problem, myalgias, neck pain and neck stiffness.   Skin: Positive for wound (right 2nd finger, dorsal, over DIP joint with infection, redness/swelling for past week. Picked at area and it got infected. I&D today in surgery clinic.  Rx sent for Bactrim at surgery appt. asking for refill of his Hydrococone today, has 3 tabs left). Negative for rash.   Neurological: Positive for headaches. Negative for syncope.   Hematological: Negative for adenopathy. Bruises/bleeds easily.   Psychiatric/Behavioral: Negative for confusion.        Reviewed and updated as needed this visit by Provider   Tobacco  Allergies  Meds  Problems  Med Hx  Surg Hx  Fam Hx          EXAM:   Vitals:    07/28/21 0955 07/28/21 1007   BP: (!) 140/82 138/78   BP Location: Left arm Left arm   Patient Position: Sitting Sitting   Cuff Size: Adult Regular Adult Regular   Pulse: 64    Resp: 16    Temp: 97.9  F (36.6  C)    TempSrc: Tympanic    SpO2: 98%    Weight: 96.9 kg (213 lb 9.6 oz)        Current Pain Score: Extreme Pain (9)  "    BP Readings from Last 3 Encounters:   21 138/78   21 (!) 144/72   03/10/21 (!) 164/93      Wt Readings from Last 3 Encounters:   21 96.9 kg (213 lb 9.6 oz)   21 95.9 kg (211 lb 6.4 oz)   03/10/21 95.5 kg (210 lb 8.6 oz)      Estimated body mass index is 33.45 kg/m  as calculated from the following:    Height as of 21: 1.702 m (5' 7\").    Weight as of this encounter: 96.9 kg (213 lb 9.6 oz).     Physical Exam  Constitutional:       General: He is not in acute distress.     Appearance: Normal appearance. He is well-developed. He is obese. He is not diaphoretic.   HENT:      Head: Normocephalic and atraumatic.   Eyes:      General: No scleral icterus.     Conjunctiva/sclera: Conjunctivae normal.   Cardiovascular:      Rate and Rhythm: Normal rate and regular rhythm.   Pulmonary:      Effort: Pulmonary effort is normal.      Breath sounds: Normal breath sounds.   Abdominal:      Palpations: Abdomen is soft.      Tenderness: There is no abdominal tenderness.   Musculoskeletal:         General: No deformity.      Cervical back: Neck supple.      Right lower le+ Pitting Edema present.      Left lower le+ Pitting Edema present.   Lymphadenopathy:      Cervical: No cervical adenopathy.   Skin:     General: Skin is warm and dry.      Findings: Erythema (venous stasis changes bilateral lower legs -- left with some redness) present. No rash.   Neurological:      Mental Status: He is alert and oriented to person, place, and time. Mental status is at baseline.   Psychiatric:         Mood and Affect: Mood normal.         Behavior: Behavior normal.          Procedures   INVESTIGATIONS:  - Labs reviewed in Epic     ASSESSMENT AND PLAN:  Problem List Items Addressed This Visit        Nervous and Auditory    Cervical radicular pain    Relevant Medications    HYDROcodone-acetaminophen (NORCO) 5-325 MG tablet    Atherosclerosis of native coronary artery of native heart with stable angina pectoris " (H)    Relevant Medications    clopidogrel (PLAVIX) 75 MG tablet    NIFEdipine ER (ADALAT CC) 30 MG 24 hr tablet    Chronic pain syndrome    Relevant Medications    HYDROcodone-acetaminophen (NORCO) 5-325 MG tablet       Endocrine    Uncontrolled type 2 diabetes mellitus with stage 3 chronic kidney disease, with long-term current use of insulin (H) - Primary    Hypothyroidism due to acquired atrophy of thyroid    Mixed hyperlipidemia    Relevant Orders    Lipid Profile       Circulatory    Chronic heart failure with preserved ejection fraction (H)    Relevant Medications    hydrALAZINE (APRESOLINE) 25 MG tablet    irbesartan (AVAPRO) 150 MG tablet    metoprolol succinate ER (TOPROL-XL) 50 MG 24 hr tablet    Benign essential hypertension    Relevant Medications    hydrALAZINE (APRESOLINE) 25 MG tablet    irbesartan (AVAPRO) 150 MG tablet    metoprolol succinate ER (TOPROL-XL) 50 MG 24 hr tablet    NIFEdipine ER (ADALAT CC) 30 MG 24 hr tablet       Musculoskeletal and Integumentary    Degenerative disc disease, cervical    Relevant Medications    HYDROcodone-acetaminophen (NORCO) 5-325 MG tablet    Inflammatory spondylopathy of cervical region (H)    Relevant Medications    HYDROcodone-acetaminophen (NORCO) 5-325 MG tablet       Urinary    Stage 3b chronic kidney disease       Other    Cervical stenosis of spinal canal    Relevant Medications    HYDROcodone-acetaminophen (NORCO) 5-325 MG tablet    Insulin pump in place - Changes pump supplies / infusion kit every 2 days    Primary narcolepsy without cataplexy    Relevant Medications    methylphenidate (RITALIN) 10 MG tablet    dextroamphetamine (DEXTROSTAT) 10 MG tablet    Other long term (current) drug therapy - Non-opiate/opiate - 7/28/2021    Relevant Medications    HYDROcodone-acetaminophen (NORCO) 5-325 MG tablet    methylphenidate (RITALIN) 10 MG tablet    dextroamphetamine (DEXTROSTAT) 10 MG tablet      Other Visit Diagnoses     Low blood magnesium         Relevant Orders    Magnesium (Completed)    Situational depression        Relevant Orders    MENTAL HEALTH REFERRAL  - Adult; Psychiatry; Psychiatry; Other: [facility] [phone]; We will contact you to schedule the appointment or please call with any questions        reviewed diet, exercise and weight control, recommended sodium restriction, cardiovascular risk and specific lipid/LDL goals reviewed, specific diabetic recommendations low cholesterol diet, weight control and daily exercise discussed, use of aspirin to prevent MI and TIA's discussed  -- Expected clinical course discussed    -- Medications and their side effects discussed    Total time: 45 minutes spent on the date of the encounter doing chart review, history and exam, documentation and further activities as noted above.     Patient Instructions     Blood pressure is well controlled.   Diabetes has not been well controlled.   Medications refilled.   Labs are pending.      Aspects of Diabetes:   Recent Labs   Lab Test 04/26/21  1313 03/10/21  1801 10/28/20  1329 07/24/20  1520   A1C 8.4*  --  7.2* 8.3*   *  --  61 50   HDL 37  --  32 35   TRIG 254*  --  167* 264*   ALT 21 17 20 33   CR 2.13* 1.75* 2.17* 1.66*   GFRESTIMATED 31* 39* 30* 41*   GFRESTBLACK 37* 47* 37* 50*   POTASSIUM 4.2 3.8 4.7 4.1      Hemoglobin A1c  Goal range is under 8%. Best is 6.5 to 7   Blood Pressure 138/78 Goal to keep less than 140/90   Tobacco  reports that he has never smoked. He has never used smokeless tobacco. Goal to abstain from tobacco   Aspirin or Plavix Anti-platelet therapy Aspirin or Plavix reduces risk of heart disease and stroke  -- sometimes used with other blood thinners, depending on bleeding risk and risk factors.    ACE/ARB Specific blood pressure meds These medications can reduce risk of kidney disease   Cholesterol Statins (Lipitor, Crestor, vs others) Statins reduce risk of heart disease and stroke   Eye Exam -- Do Yearly -- Annual  diabetic eye exam   Healthy weight Wt Readings from Last 3 Encounters:   07/28/21 96.9 kg (213 lb 9.6 oz)   07/01/21 95.9 kg (211 lb 6.4 oz)   03/10/21 95.5 kg (210 lb 8.6 oz)     Body mass index is 33.45 kg/m .  Goal BMI under 30, best is under 25.      -- Trying to exercise daily (goal at least 20 min/day) with moderate aerobic activity   -- Eat healthy (resources from ADA at http://www.diabetes.org/)   -- Taking good care of my feet. Consider seeing the Podiatrist   -- Check blood sugars as directed, record in log book and bring to every appointment    Insurance companies are grading you and I on your blood sugar control -- Goal is to get your A1c down to 7.9% or lower and NO Smoking!  -- Medicare and most insurance companies, will only cover Hemoglobin A1c labs to be rechecked every 91+ days.      Return for Diabetes labs and clinic follow-up appointment every 3 to 4 months.    Schedule lab only appointment --- A few days AFTER: 10/26/21   Schedule clinic appointment with Dr. Barnett -- Same day as labs, or 1-2 days later.            Return in approximately 4 weeks from today if needed for pain medication review, or sooner as needed for follow-up with Dr. Barnett.  - Med Refills - Controlled RX    -- Encourage Regular use of stool softeners.     - Bring your remaining pills / pill bottles with to: Each of your Med Management/refill  appointments for pill counts.   - Urine drug screens for controlled medications will be completed every 3 to 12 months.   - Random pill counts and urine drug testing may occur.   - No illicit drug use or pill sharing will be tolerated.     Clinic : [phone]  Appointment line: [phone]     Electronically signed by:  Jorge Barnett MD on 7/28/2021  Internal Medicine  [facility]

## 2021-07-28 NOTE — PATIENT INSTRUCTIONS
Blood pressure is well controlled.   Diabetes has not been well controlled.   Medications refilled.   Labs are pending.      Aspects of Diabetes:   Recent Labs   Lab Test 04/26/21  1313 03/10/21  1801 10/28/20  1329 07/24/20  1520   A1C 8.4*  --  7.2* 8.3*   *  --  61 50   HDL 37  --  32 35   TRIG 254*  --  167* 264*   ALT 21 17 20 33   CR 2.13* 1.75* 2.17* 1.66*   GFRESTIMATED 31* 39* 30* 41*   GFRESTBLACK 37* 47* 37* 50*   POTASSIUM 4.2 3.8 4.7 4.1      Hemoglobin A1c  Goal range is under 8%. Best is 6.5 to 7   Blood Pressure 138/78 Goal to keep less than 140/90   Tobacco  reports that he has never smoked. He has never used smokeless tobacco. Goal to abstain from tobacco   Aspirin or Plavix Anti-platelet therapy Aspirin or Plavix reduces risk of heart disease and stroke  -- sometimes used with other blood thinners, depending on bleeding risk and risk factors.    ACE/ARB Specific blood pressure meds These medications can reduce risk of kidney disease   Cholesterol Statins (Lipitor, Crestor, vs others) Statins reduce risk of heart disease and stroke   Eye Exam -- Do Yearly -- Annual diabetic eye exam   Healthy weight Wt Readings from Last 3 Encounters:   07/28/21 96.9 kg (213 lb 9.6 oz)   07/01/21 95.9 kg (211 lb 6.4 oz)   03/10/21 95.5 kg (210 lb 8.6 oz)     Body mass index is 33.45 kg/m .  Goal BMI under 30, best is under 25.      -- Trying to exercise daily (goal at least 20 min/day) with moderate aerobic activity   -- Eat healthy (resources from ADA at http://www.diabetes.org/)   -- Taking good care of my feet. Consider seeing the Podiatrist   -- Check blood sugars as directed, record in log book and bring to every appointment    Insurance companies are grading you and I on your blood sugar control -- Goal is to get your A1c down to 7.9% or lower and NO Smoking!  -- Medicare and most insurance companies, will only cover Hemoglobin A1c labs to be rechecked every 91+ days.      Return for Diabetes labs and  clinic follow-up appointment every 3 to 4 months.    Schedule lab only appointment --- A few days AFTER: 10/26/21   Schedule clinic appointment with Dr. Barnett -- Same day as labs, or 1-2 days later.            Return in approximately 4 weeks from today if needed for pain medication review, or sooner as needed for follow-up with Dr. Barnett.  - Med Refills - Controlled RX    -- Encourage Regular use of stool softeners.     - Bring your remaining pills / pill bottles with to: Each of your Med Management/refill  appointments for pill counts.   - Urine drug screens for controlled medications will be completed every 3 to 12 months.   - Random pill counts and urine drug testing may occur.   - No illicit drug use or pill sharing will be tolerated.     Clinic : [phone]  Appointment line: [phone]

## 2021-07-28 NOTE — TELEPHONE ENCOUNTER
[id number] sent Rx request for the following:      Requested Prescriptions   Pending Prescriptions Disp Refills     famotidine (PEPCID) 20 MG tablet 60 tablet 3     Sig: Take 1 tablet (20 mg) by mouth 2 times daily as needed (indigestion)       There is no refill protocol information for this order          Last Prescription Date:   3/15/20  Last Fill Qty/Refills:         Historical    Last Office Visit:              7/28/21   Future Office visit:          10/28/21  Routing refill request to provider for review/approval because:  Drug not on the [facility], [facility] or [facility] refill protocol or controlled substance  Medication is reported/historical  Will orute to provider for review and consideration. Ibeth Red RN on 7/28/2021 at 2:20 PM

## 2021-07-28 NOTE — TELEPHONE ENCOUNTER
Patient looking for MRI results from the 19th of August.   Seen his PCP this am and didn't get any answers, and was the PCP was in such a rush to get out of the room.   Patient asked if even a RN can look at the results and call him.   Please call patient back. Thank you   Charmaine Cordero on 7/28/2021 at 3:32 PM

## 2021-07-28 NOTE — NURSING NOTE
"Chief Complaint   Patient presents with     RECHECK     Follow up pain       Initial BP (!) 140/82 (BP Location: Left arm, Patient Position: Sitting, Cuff Size: Adult Regular)   Pulse 64   Temp 97.9  F (36.6  C) (Tympanic)   Resp 16   Wt 96.9 kg (213 lb 9.6 oz)   SpO2 98%   BMI 33.45 kg/m   Estimated body mass index is 33.45 kg/m  as calculated from the following:    Height as of 7/1/21: 1.702 m (5' 7\").    Weight as of this encounter: 96.9 kg (213 lb 9.6 oz).  Medication Reconciliation: complete  FOOD SECURITY SCREENING QUESTIONS  Hunger Vital Signs:  Within the past 12 months we worried whether our food would run out before we got money to buy more. Never  Within the past 12 months the food we bought just didn't last and we didn't have money to get more. Never      Anu Dobbs LPN    "

## 2021-07-28 NOTE — TELEPHONE ENCOUNTER
Returned patient call and read Dr. Barnett's results of MRI from 7/19/21.  Patient also requesting a refill be sent to pharmacy for his famotidine; prescription pended.  Anu Dobbs LPN on 7/28/2021 at 4:06 PM

## 2021-07-29 LAB
CREAT UR-MCNC: 218 MG/DL
MICROALBUMIN UR-MCNC: 3360 MG/DL
MICROALBUMIN/CREAT UR: 1541.28 MG/G CR (ref 0–17)

## 2021-07-30 ENCOUNTER — TELEPHONE (OUTPATIENT)
Dept: INTERNAL MEDICINE | Facility: OTHER | Age: 71
End: 2021-07-30

## 2021-07-30 NOTE — TELEPHONE ENCOUNTER
This writer called patient to ask where he would like to go for his psychiatry referral and patient refused referral.  He would like DJS to call him and tell him what made him think he needed an appointment for depression?  Please call to discuss and advise.  Katarina Rubio on 7/30/2021 at 2:36 PM

## 2021-08-02 NOTE — TELEPHONE ENCOUNTER
The computer flagged his PHQ-9 scores and advised that he obtain referral.   If he does not feel this is necessary... he does not have to schedule an appointment with psychiatry / psychology for depression.     Jorge Barnett MD

## 2021-08-05 ENCOUNTER — TELEPHONE (OUTPATIENT)
Dept: INTERNAL MEDICINE | Facility: OTHER | Age: 71
End: 2021-08-05

## 2021-08-05 NOTE — TELEPHONE ENCOUNTER
Please call the patient.  He would like his test results explained to him.  He wants to know what he should do next.      Ela Crisostomo on 8/5/2021 at 12:09 PM

## 2021-08-05 NOTE — TELEPHONE ENCOUNTER
Had lab results that were sunday, needs an increase in medication, no prescription called into pharmacy, please call, thanks!    Vianney Gabriel on 8/5/2021 at 2:08 PM

## 2021-08-05 NOTE — TELEPHONE ENCOUNTER
Spoke with patient and read letter sent on the 2nd. Chen Morris LPN .............8/5/2021  1:28 PM

## 2021-08-05 NOTE — TELEPHONE ENCOUNTER
Read patient the letter again and told him that we will review labs again.  Chen Morris LPN .............8/5/2021  2:44 PM

## 2021-08-09 ENCOUNTER — TELEPHONE (OUTPATIENT)
Dept: INTERNAL MEDICINE | Facility: OTHER | Age: 71
End: 2021-08-09

## 2021-08-09 NOTE — TELEPHONE ENCOUNTER
Wants to have you look up a document that was done during last appt, please call, thanks!    Vianney Gabriel on 8/9/2021 at 2:57 PM

## 2021-08-10 ENCOUNTER — TELEPHONE (OUTPATIENT)
Dept: EDUCATION SERVICES | Facility: OTHER | Age: 71
End: 2021-08-10

## 2021-08-10 NOTE — TELEPHONE ENCOUNTER
"Patient shares he is having difficulty getting skin cleaning and adhesive supplies for his insulin pump and cgm systems.  He has history of skin issues and needing to use barrier IV prep wipes, alcohol pads, and [id number] transparent dressings.      He changes his infusion set every 2 days and is receiving pump supplies without issue at this time.    Patient had been getting supplies per note 5/19/2020.  Patient states [employer] is now not coding the needed supplies correctly.  He is concerned he will no longer be able to get supplies.  \"Please call [employer] and give them the correct bill codes again for [employer] and Medicare for insurance coverage of these needed supplies!\"    Will follow up with [facility] soon.    Radha Trammell RN, BSN, [facility]  8/10/2021 5:14 PM     Per note 5/19/2020:  Patient received notice his [id number] transparent dressing and IV Prep Wipes will no longer be covered under Medicare Part B.  Patient states he has been receiving these for the past 9 years, but now they will no longer be covered.     Patient uses [id number] dressings box of 100 each month due to skin rash issues with insulin pump adhesive sites.     He has been successful with Dexcom G6 CGM using IV Prep Wipes.       Patient concerned as \"I really need these supplies or I won't be able to wear my pump and CGM!\"     Patient will now be filing an appeal and is waiting for Karli Clowdy to submit the form.  He is working with his [employer] insurance.     Patient shares he may need help to get these supplies covered.  Advised patient to call MIHAELA and Dr. Barnett if he needs any documentation to prove need of [id number] transparent dressing and IV Prep Wipes  "

## 2021-08-10 NOTE — TELEPHONE ENCOUNTER
"Patient was notified that he needs to get records from [facility]. He wants a copy of his last visit sent to him so he can send them to a different diabetic monitoring supply company.  He doesn't want to come in to \"sign the release\" but I can't print an office.  He says he can't get the note off of [employer] either.    Will you print his last note to mail to him? Or somehow share it with his [employer] account?  Aniyah Marquez CMA([facility])..................8/10/2021   12:08 PM   "

## 2021-08-11 NOTE — TELEPHONE ENCOUNTER
Left message to call back....................  8/11/2021   3:01 PM  Rita Weaver LPN 8/11/2021   3:01 PM

## 2021-08-12 NOTE — TELEPHONE ENCOUNTER
Spoke with patient and relayed message and transferred to [facility]. Chen Morris LPN .............8/12/2021  12:13 PM

## 2021-08-16 NOTE — NURSING NOTE
"Patient presents to the clinic for medication management.  Last administration of Ritalin was last night around 1830, Dextrostat was yesterday around 1000.  Contract updated today.      Previous A1C is at goal of <8  Lab Results   Component Value Date    A1C 7.9 09/30/2019    A1C 8.1 06/26/2019    A1C 7.7 03/08/2019    A1C 7.5 11/21/2018    A1C 8.0 08/09/2018     Urine microalbumin:creatine: n/a  Foot exam unknown-declines today  Eye exam 02/2019    Tobacco User no  Patient is on a daily aspirin  Patient is on a Statin.  Blood pressure today of:     BP Readings from Last 1 Encounters:   10/01/19 (!) 160/82      is at the goal of <139/89 for diabetics.      Chief Complaint   Patient presents with     Recheck Medication       Initial /70 (BP Location: Right arm, Patient Position: Sitting, Cuff Size: Adult Regular)   Pulse 84   Temp 97.9  F (36.6  C) (Tympanic)   Resp 20   Wt 90.3 kg (199 lb)   BMI 28.55 kg/m   Estimated body mass index is 28.55 kg/m  as calculated from the following:    Height as of 9/24/19: 1.778 m (5' 10\").    Weight as of this encounter: 90.3 kg (199 lb).  Medication Reconciliation: complete    Barbara Connor LPN      " Albendazole Counseling:  I discussed with the patient the risks of albendazole including but not limited to cytopenia, kidney damage, nausea/vomiting and severe allergy.  The patient understands that this medication is being used in an off-label manner.

## 2021-08-30 ENCOUNTER — TELEPHONE (OUTPATIENT)
Dept: INTERNAL MEDICINE | Facility: OTHER | Age: 71
End: 2021-08-30

## 2021-08-30 DIAGNOSIS — M48.02 CERVICAL STENOSIS OF SPINAL CANAL: Primary | ICD-10-CM

## 2021-08-30 DIAGNOSIS — M48.061 SPINAL STENOSIS OF LUMBAR REGION, UNSPECIFIED WHETHER NEUROGENIC CLAUDICATION PRESENT: ICD-10-CM

## 2021-08-30 NOTE — TELEPHONE ENCOUNTER
[facility] is asking if you got fax and it it is being processed medical equipment---There fax is [phone] [email or web address]

## 2021-08-31 NOTE — PROGRESS NOTES
Patient returns to clinic today for recheck of lower extremity wound.  Patient has a wound on the right second toe.  Patient has only been changing the dressing every few days .  Patient denies fevers chills.  He denies fatigue or lethargy.  He thinks the wound is healing. He has been wearing compression stockings and doing his best to keep his feet elevated.      /70 (BP Location: Left arm, Patient Position: Sitting, Cuff Size: Adult Large)   Pulse 70   Temp 98.2  F (36.8  C) (Tympanic)   Resp 20   Wt 90.1 kg (198 lb 9.6 oz)   BMI 28.50 kg/m      Right calf wound is healed   Right second toe wound measures 0.5 x 0.3 cm. The base is clean with granulation tissue, seems to be filling in. No surrounding erythema or induration.       Moses Whittaker is a 69-year-old male with right toe wound. The right toe wound continues to look better. No signs of infection.     Continue daily or every other day dressing changes   Follow up in clinic in 2 weeks       Ke Liriano MD          [facility]

## 2021-09-02 ENCOUNTER — OFFICE VISIT (OUTPATIENT)
Dept: ORTHOPEDICS | Facility: OTHER | Age: 71
End: 2021-09-02
Attending: INTERNAL MEDICINE
Payer: MEDICARE

## 2021-09-02 ENCOUNTER — HOSPITAL ENCOUNTER (OUTPATIENT)
Dept: GENERAL RADIOLOGY | Facility: OTHER | Age: 71
End: 2021-09-02
Attending: STUDENT IN AN ORGANIZED HEALTH CARE EDUCATION/TRAINING PROGRAM
Payer: MEDICARE

## 2021-09-02 DIAGNOSIS — G89.29 CHRONIC BILATERAL LOW BACK PAIN WITH BILATERAL SCIATICA: ICD-10-CM

## 2021-09-02 DIAGNOSIS — M54.41 CHRONIC BILATERAL LOW BACK PAIN WITH BILATERAL SCIATICA: ICD-10-CM

## 2021-09-02 DIAGNOSIS — M48.061 SPINAL STENOSIS OF LUMBAR REGION, UNSPECIFIED WHETHER NEUROGENIC CLAUDICATION PRESENT: ICD-10-CM

## 2021-09-02 DIAGNOSIS — M48.02 CERVICAL STENOSIS OF SPINAL CANAL: ICD-10-CM

## 2021-09-02 DIAGNOSIS — M54.42 CHRONIC BILATERAL LOW BACK PAIN WITH BILATERAL SCIATICA: ICD-10-CM

## 2021-09-02 PROCEDURE — 99214 OFFICE O/P EST MOD 30 MIN: CPT | Performed by: STUDENT IN AN ORGANIZED HEALTH CARE EDUCATION/TRAINING PROGRAM

## 2021-09-02 PROCEDURE — G0463 HOSPITAL OUTPT CLINIC VISIT: HCPCS | Mod: 25 | Performed by: STUDENT IN AN ORGANIZED HEALTH CARE EDUCATION/TRAINING PROGRAM

## 2021-09-02 PROCEDURE — 72100 X-RAY EXAM L-S SPINE 2/3 VWS: CPT

## 2021-09-02 PROCEDURE — 72040 X-RAY EXAM NECK SPINE 2-3 VW: CPT

## 2021-09-02 NOTE — NURSING NOTE
"Chief Complaint   Patient presents with     Consult     Low Back Pain    Neck and Low Back Pain Consult       Initial There were no vitals taken for this visit. Estimated body mass index is 33.45 kg/m  as calculated from the following:    Height as of 7/1/21: 1.702 m (5' 7\").    Weight as of 7/28/21: 96.9 kg (213 lb 9.6 oz).  Medication Reconciliation: complete    Gunjan Clarke LPN  "

## 2021-09-02 NOTE — PROGRESS NOTES
Visit Date: 09/02/2021    NEW PATIENT CLINIC VISIT    HISTORY OF PRESENT ILLNESS:  Leslie is a 71-year-old male who presents for evaluation of predominantly cervical issues.  We have deferred his lumbar spine discussion to another clinic visit. His predominating pain is in his neck.  Pain is 9/10 in severity and radiates from the neck into the posterior occipital region and into both of his arms.  He also has paresthesias and progressive numbness, tingling and weakness in bilateral hand .  This has been debilitating over the last decade or so and he has actually been recommended to have surgery on several different occasions by several different neurosurgeons which he has declined on all of those visits.    This has now become progressively worse over the last year and is becoming debilitating to his functional status and he is interested in surgical intervention at this time.    PHYSICAL EXAMINATION:    GENERAL:  Well-appearing, well-nourished.  MUSCULOSKELETAL/NEUROLOGIC:  He has 4+/5 bilateral deltoid, biceps, triceps, wrist extension, flexion; 4-/5 bilateral hand  and hand intrinsics; 4+/5 bilateral hip flexion, knee extension, flexion, ankle dorsiflexion, plantarflexion, EHL.  He has patchy numbness of bilateral upper extremities, predominantly over the hands.  Normal sensation otherwise throughout bilateral upper and lower extremities.    IMAGING:  Available for review today includes an MRI of the cervical spine, which shows severe degenerative changes with a kyphotic deformity centered around the C5 and C6 vertebral bodies with near complete displaced collapse of C4-5, C5-6 and C6-7, resulting in kinking of the spinal cord and significant kyphotic deformity.  He also has a T2-T3 disk bulge with ventral thecal sac narrowing as well.  He has flexion/extension films of the cervical spine and lumbar spine obtained in clinic today.  The lumbar spine shows mild degenerative changes, which will be documented in  a separate clinic visit in more detail and he has significant degenerative changes seen in the flexion and extension films of the cervical spine spanning from C4 to C7.    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  Risks, benefits and alternatives were discussed with have him in detail and, prior to any sort of surgical counseling, he needs extensive clearance from his primary care physician and the Anesthesia team at [facility], which will be done through Kim in our office down at [facility]. After he obtains thorough clearance for a large cervical surgery, we discussed with him that this would be a C5 and C6 anterior cervical corpectomy and cage reconstruction and a C2 to T3 instrumented fixation and fusion for treatment of his severe kyphotic deformity with multilevel stenosis. Once we get clearance, we can discuss this with him in more detail and plan surgery accordingly.    This clinic visit took 45-60 minutes.    Odilon Lui MD        D: 2021   T: 2021   MT: TERRELL    Name:     STEPHIE MORELOS  MRN:      [id number]        Account:    [id number]   :      1950           Visit Date: 2021     Document: [id number]

## 2021-09-08 NOTE — TELEPHONE ENCOUNTER
Form received, faxed to [employer] today.    Radha Trammell RN, BSN, [facility]  9/8/2021 5:53 PM

## 2021-09-08 NOTE — TELEPHONE ENCOUNTER
Patient states he is no longer working with [employer].  He is now getting pump/cgm supplies from [employer].  PCP signed DWO and form was faxed to [employer] today.    Radha Trammell RN, BSN, [facility]  9/8/2021 5:52 PM

## 2021-09-17 ENCOUNTER — HOSPITAL ENCOUNTER (OUTPATIENT)
Dept: CT IMAGING | Facility: OTHER | Age: 71
End: 2021-09-17
Attending: PHYSICIAN ASSISTANT
Payer: MEDICARE

## 2021-09-17 ENCOUNTER — OFFICE VISIT (OUTPATIENT)
Dept: INTERNAL MEDICINE | Facility: OTHER | Age: 71
End: 2021-09-17
Attending: STUDENT IN AN ORGANIZED HEALTH CARE EDUCATION/TRAINING PROGRAM
Payer: MEDICARE

## 2021-09-17 ENCOUNTER — HOSPITAL ENCOUNTER (OUTPATIENT)
Dept: GENERAL RADIOLOGY | Facility: OTHER | Age: 71
End: 2021-09-17
Attending: PHYSICIAN ASSISTANT
Payer: MEDICARE

## 2021-09-17 ENCOUNTER — TELEPHONE (OUTPATIENT)
Dept: INTERNAL MEDICINE | Facility: OTHER | Age: 71
End: 2021-09-17

## 2021-09-17 VITALS
BODY MASS INDEX: 33.05 KG/M2 | HEART RATE: 81 BPM | OXYGEN SATURATION: 96 % | SYSTOLIC BLOOD PRESSURE: 110 MMHG | WEIGHT: 211 LBS | RESPIRATION RATE: 16 BRPM | TEMPERATURE: 98 F | DIASTOLIC BLOOD PRESSURE: 52 MMHG

## 2021-09-17 DIAGNOSIS — N50.89 SCROTAL SWELLING: Primary | ICD-10-CM

## 2021-09-17 DIAGNOSIS — Z98.890 S/P LAMINECTOMY: ICD-10-CM

## 2021-09-17 PROCEDURE — 72040 X-RAY EXAM NECK SPINE 2-3 VW: CPT | Mod: XU

## 2021-09-17 PROCEDURE — 72125 CT NECK SPINE W/O DYE: CPT

## 2021-09-17 PROCEDURE — 72082 X-RAY EXAM ENTIRE SPI 2/3 VW: CPT

## 2021-09-17 PROCEDURE — 99213 OFFICE O/P EST LOW 20 MIN: CPT | Performed by: STUDENT IN AN ORGANIZED HEALTH CARE EDUCATION/TRAINING PROGRAM

## 2021-09-17 PROCEDURE — G0463 HOSPITAL OUTPT CLINIC VISIT: HCPCS | Mod: 25

## 2021-09-17 ASSESSMENT — ENCOUNTER SYMPTOMS
DIFFICULTY URINATING: 0
CHILLS: 0
DYSURIA: 0
FREQUENCY: 0
COUGH: 1
SHORTNESS OF BREATH: 0
FEVER: 0
FATIGUE: 0

## 2021-09-17 ASSESSMENT — PAIN SCALES - GENERAL: PAINLEVEL: MODERATE PAIN (4)

## 2021-09-17 NOTE — TELEPHONE ENCOUNTER
Patient would like to have any mention of patient having depression or likely appiah of depression from last appointment removed. Chen Morris LPN .............9/17/2021  2:49 PM

## 2021-09-17 NOTE — PROGRESS NOTES
"Mr. Whittaker is a 71 year old male who presents to the clinic for testicular concerns    HPI    History of epididymitis.   L > R feels like the testicle encompasses the whole scrotum    Slight redness of scrotum  None of groin.   No fever or chills  No significant pain in the testicles  History of vasectomy    Sees [facility] for ED. Gives himself shot into penis. Works well.   Increased dose on own his shot and wondering if this is the cause.            Review of Systems   Constitutional: Negative for chills, fatigue and fever.   Respiratory: Positive for cough. Negative for shortness of breath.    Genitourinary: Positive for scrotal swelling. Negative for decreased urine volume, difficulty urinating, dysuria, enuresis, frequency, genital sores, testicular pain and urgency.   Skin: Negative for rash.        Reviewed and updated as needed this visit by Provider                  EXAM:   Vitals:    09/17/21 1442   BP: 110/52   BP Location: Right arm   Patient Position: Sitting   Cuff Size: Adult Regular   Pulse: 81   Resp: 16   Temp: 98  F (36.7  C)   TempSrc: Tympanic   SpO2: 96%   Weight: 95.7 kg (211 lb)         BP Readings from Last 3 Encounters:   09/17/21 110/52   07/28/21 138/78   07/01/21 (!) 144/72      Wt Readings from Last 3 Encounters:   09/17/21 95.7 kg (211 lb)   07/28/21 96.9 kg (213 lb 9.6 oz)   07/01/21 95.9 kg (211 lb 6.4 oz)      Estimated body mass index is 33.05 kg/m  as calculated from the following:    Height as of 7/1/21: 1.702 m (5' 7\").    Weight as of this encounter: 95.7 kg (211 lb).     Physical Exam  Vitals and nursing note reviewed.   Constitutional:       Appearance: Normal appearance. He is obese.   Genitourinary:     Comments: Normal penis without ulceration or abnormality.  Testicular exam with fullness throughout the left testicle.  Epididymis appears slightly dilated on left but no tenderness to palpation.  Left testicle slightly larger than the right  Neurological:      Mental Status: " He is alert.          INVESTIGATIONS:  - Labs reviewed in [facility]     ASSESSMENT AND PLAN:    ICD-10-CM    1. Scrotal swelling  N50.89 US SCROTUM AND CONTENTS       Assessment/Plan:   Patient with 2 to 3 weeks of scrotal swelling on the left.  Left testicle feels slightly larger than the right, no tenderness on palpation, no erythema on the scrotum at this time.  Discussed that this is likely not an infection but likely an epididymal inclusion cyst.  Discussed indications to return to clinic or present to the ED such as worsening scrotal pain or scrotal redness or evidence of infection.  We agreed to hold antibiotics at this time to monitor if symptoms resolve on their own and obtain a ultrasound of his scrotum and testicle to ensure that there is not abscess formation.  If there is evidence of infection would treat with a 10-day course of ciprofloxacin.        Electronically signed by:  Michi Carlisle MD on 9/17/2021  Internal Medicine  [facility]

## 2021-09-17 NOTE — TELEPHONE ENCOUNTER
He had elevated anxiety or depression score with the nurse.   - It was then noted in his chart that day.     This is permanent. This is not removable.     Things people say are taken seriously and should not be joking around about these questions.     -- Nursing was advised to tell patient this. He indicated that he will adam us and talk with patient advocate, Greg Rosa.       He will need to find himself another primary provider.   I am no longer going to be his primary care provider.     I removed myself as his PCP on his chart.     He will need to have his appointments rescheduled that are currently scheduled with me.     I will send this to Greg Rosa.     Electronically signed by:  Jorge Barnett MD  on September 17, 2021 2:59 PM

## 2021-09-17 NOTE — PROGRESS NOTES
Medication Reconciliation: complete   Advance Care Directive Reviewed    FOOD SECURITY SCREENING QUESTIONS  Hunger Vital Signs:  Within the past 12 months we worried whether our food would run out before we got money to buy more. Never  Within the past 12 months the food we bought just didn't last and we didn't have money to get more. Never  Chen Morris LPN .............9/17/2021  2:44 PM  Patient states he has a larger painful left testicle.  Patient states its been for 3 weeks and is increasing in size.     Previous A1C is at goal of <8  Lab Results   Component Value Date    A1C 7.9 07/28/2021    A1C 8.4 04/26/2021    A1C 7.2 10/28/2020    A1C 8.3 07/24/2020    A1C 7.5 04/21/2020    A1C 7.8 12/31/2019     Urine microalbumin:creatine: 7-28-21  Foot exam unknown  Eye exam 8-10-20    Tobacco User no  Patient is on a daily aspirin  Patient is not on a Statin.  Blood pressure today of:     BP Readings from Last 1 Encounters:   09/17/21 110/52      is at the goal of <139/89 for diabetics.    Chen Morris LPN on 9/17/2021 at 2:45 PM

## 2021-09-21 ENCOUNTER — TELEPHONE (OUTPATIENT)
Dept: INTERNAL MEDICINE | Facility: OTHER | Age: 71
End: 2021-09-21

## 2021-09-21 DIAGNOSIS — N50.89 SCROTAL SWELLING: ICD-10-CM

## 2021-09-21 NOTE — TELEPHONE ENCOUNTER
Patient waiting for an order from [facility] for an ultrasound and has questions on an antibiotic.     Elvira Beckham on 9/21/2021 at 1:12 PM

## 2021-09-22 NOTE — TELEPHONE ENCOUNTER
Spoke with patient ultrasound scheduled 9-23 at 1445. Chen Morris LPN .............9/22/2021  1:10 PM

## 2021-09-23 ENCOUNTER — HOSPITAL ENCOUNTER (OUTPATIENT)
Dept: ULTRASOUND IMAGING | Facility: OTHER | Age: 71
Discharge: HOME | End: 2021-09-23
Attending: STUDENT IN AN ORGANIZED HEALTH CARE EDUCATION/TRAINING PROGRAM | Admitting: STUDENT IN AN ORGANIZED HEALTH CARE EDUCATION/TRAINING PROGRAM
Payer: MEDICARE

## 2021-09-23 DIAGNOSIS — N50.89 SCROTAL SWELLING: ICD-10-CM

## 2021-09-23 PROCEDURE — 76870 US EXAM SCROTUM: CPT

## 2021-10-01 LAB — RETINOPATHY: NORMAL

## 2021-10-09 ENCOUNTER — HEALTH MAINTENANCE LETTER (OUTPATIENT)
Age: 71
End: 2021-10-09

## 2021-10-11 ENCOUNTER — VIRTUAL VISIT (OUTPATIENT)
Dept: PHARMACY | Facility: CLINIC | Age: 71
End: 2021-10-11
Payer: COMMERCIAL

## 2021-10-11 DIAGNOSIS — M47.9 OSTEOARTHRITIS OF SPINE, UNSPECIFIED SPINAL OSTEOARTHRITIS COMPLICATION STATUS, UNSPECIFIED SPINAL REGION: ICD-10-CM

## 2021-10-11 DIAGNOSIS — I10 ESSENTIAL HYPERTENSION: ICD-10-CM

## 2021-10-11 DIAGNOSIS — N18.30 CONTROLLED TYPE 2 DIABETES MELLITUS WITH STAGE 3 CHRONIC KIDNEY DISEASE, WITH LONG-TERM CURRENT USE OF INSULIN (H): ICD-10-CM

## 2021-10-11 DIAGNOSIS — E78.2 MIXED HYPERLIPIDEMIA: ICD-10-CM

## 2021-10-11 DIAGNOSIS — G89.29 CHRONIC LOW BACK PAIN, UNSPECIFIED BACK PAIN LATERALITY, UNSPECIFIED WHETHER SCIATICA PRESENT: ICD-10-CM

## 2021-10-11 DIAGNOSIS — I50.32 CHRONIC DIASTOLIC HEART FAILURE (H): Primary | ICD-10-CM

## 2021-10-11 DIAGNOSIS — M54.50 CHRONIC LOW BACK PAIN, UNSPECIFIED BACK PAIN LATERALITY, UNSPECIFIED WHETHER SCIATICA PRESENT: ICD-10-CM

## 2021-10-11 DIAGNOSIS — I25.118 ATHEROSCLEROSIS OF NATIVE CORONARY ARTERY OF NATIVE HEART WITH STABLE ANGINA PECTORIS (H): ICD-10-CM

## 2021-10-11 DIAGNOSIS — I73.9 PERIPHERAL VASCULAR DISEASE (H): ICD-10-CM

## 2021-10-11 DIAGNOSIS — E11.22 CONTROLLED TYPE 2 DIABETES MELLITUS WITH STAGE 3 CHRONIC KIDNEY DISEASE, WITH LONG-TERM CURRENT USE OF INSULIN (H): ICD-10-CM

## 2021-10-11 DIAGNOSIS — Z79.4 CONTROLLED TYPE 2 DIABETES MELLITUS WITH STAGE 3 CHRONIC KIDNEY DISEASE, WITH LONG-TERM CURRENT USE OF INSULIN (H): ICD-10-CM

## 2021-10-11 PROCEDURE — 99606 MTMS BY PHARM EST 15 MIN: CPT | Performed by: PHARMACIST

## 2021-10-11 PROCEDURE — 99607 MTMS BY PHARM ADDL 15 MIN: CPT | Performed by: PHARMACIST

## 2021-10-11 NOTE — PROGRESS NOTES
"Medication Therapy Management (MTM) Encounter    ASSESSMENT:                            Medication Adherence/Access: No issues identified    CAD/Hypertension/CHF/PVD: Stable per patient.     Hyperlipidemia: Unchanged - could potentially consider ezetimibe if patient needs therapy but he prefers to wait at this time.     Type 2 Diabetes:  If pump settings continue to be off would benefit from considering follow-up diabetes ed visit for adjustment.     Arthritis/Back Pain: Plan in place for surgery.     PLAN:                            1. Consider follow-up with diabetes educator to review and adjust your pump settings.     Follow-up: 6 months or sooner if needed    SUBJECTIVE/OBJECTIVE:                          Moses Whittaker is a 71 year old male called for a follow-up visit. He was referred to me from his [employer] insurance plan.  Today's visit is a follow-up MTM visit from 3/8/2021     Reason for visit: Diabetes/pain follow-up.    Allergies/ADRs: Reviewed in chart  Past Medical History: Reviewed in chart  Tobacco: He reports that he has never smoked. He has never used smokeless tobacco.  Alcohol: none      Medication Adherence/Access: affording medications - no issues at this time    CAD/Hypertension/CHF/PVD: Current medications include clopidogrel 75 mg daily, aspirin 81 mg daily, furosemide 20 mg daily, doxazosin 1 mg at bedtime, hydralazine 25-50 mg four times daily, irbesartan 150 mg twice daily, nifedipine ER 30 mg twice daily (had issues taking 60 mg at one time - states \"anaphylaxis\"), metoprolol XL 50 mg twice daily, ranolazine 1,000 mg twice daily and nitroglycerin SL 0.4 mg PRN (rare). [facility] [facility] program is no more. Generic ranolazine is still very expensive. Does have to wear compression stockings. Pt does self-monitor BP. Home BP monitoring in range of 130-140's systolic over 60-70's diastolic.  Patient reports current medication side effects: balance concerns - feels that it started after nifedipine " "- though admits that neuropathy may be playing a significant role as well.   BP Readings from Last 3 Encounters:   09/17/21 110/52   07/28/21 138/78   07/01/21 (!) 144/72     Hyperlipidemia: Current therapy includes no current medications.  Patient reports \"feeling 100 years old\" on statin. Does not think he will take again in the future.    Recent Labs   Lab Test 07/28/21  1053 04/26/21  1313   CHOL 197 212*   HDL 34 37   * 124*   TRIG 310* 254*     Type 2 Diabetes:  Currently taking insulin lispro per insulin pump. Patient is not experiencing side effects.  Blood sugar monitoring: Continuous Glucose Monitor. Ranges (patient reported): 200s mg/dL - states he feels that they are higher on generic insulin lispro vs brand. Has not met for some time with diabetes education.   Symptoms of low blood sugar? none  Symptoms of high blood sugar? none  Eye exam: due  Foot exam: due  Diet/Exercise: unchanged  Aspirin: Taking 81mg daily and denies side effects  Statin: No - did not tolerate  ACEi/ARB: Yes: irbesartan.   Urine Albumin:   Lab Results   Component Value Date    UMALCR 1,541.28 (H) 07/28/2021      Lab Results   Component Value Date    A1C 7.9 07/28/2021    A1C 8.4 04/26/2021    A1C 7.2 10/28/2020    A1C 8.3 07/24/2020    A1C 7.5 04/21/2020    A1C 7.8 12/31/2019     Arthritis/Back Pain: Currently rarely he will take a Norco 5-325 mg PRN. Does manage constipation with PRN docusate. Not currently taking diclofenac as he prepares for neck/back surgery [facility]. Pt finds this to be fairly effective though does have ongoing issues with pain. Could not tolerate gabapentin - could consider Lyrica - though patient may be hesitant if related to a medication he did not tolerate. Pt reports no known side effects.     Today's Vitals: There were no vitals taken for this visit.     Wt Readings from Last 5 Encounters:   09/17/21 211 lb (95.7 kg)   07/28/21 213 lb 9.6 oz (96.9 kg)   07/01/21 211 lb 6.4 oz (95.9 kg) "   03/10/21 210 lb 8.6 oz (95.5 kg)   10/26/20 202 lb 6.4 oz (91.8 kg)     ----------------      I spent 20 minutes with this patient today. A copy of the visit note was provided to the patient's primary care provider.    The patient was sent via clinic portal a summary of these recommendations.     Daniel StokesD, [facility]  Medication Therapy Management Pharmacist  Pager: [phone]    Telemedicine Visit Details  Type of service:  Telephone visit  Start Time: 11:30 AM  End Time: 11:50 AM  Originating Location (patient location): [place]  Distant Location (provider location):  [facility]     Medication Therapy Recommendations  No medication therapy recommendations to display

## 2021-10-15 NOTE — PATIENT INSTRUCTIONS
Recommendations from today's MTM visit:                                                    MTM (medication therapy management) is a service provided by a clinical pharmacist designed to help you get the most of out of your medicines.      1. Consider follow-up with diabetes educator to review and adjust your pump settings.     Follow-up: 6 months or sooner if needed      It was great to speak with you today.  I value your experience and would be very thankful for your time with providing feedback on our clinic survey. You may receive a survey via email or text message in the next few days.     To schedule another MTM appointment, please call the clinic directly or you may call the MTM scheduling line at [phone] or toll-free at [phone].     My Clinical Pharmacist's contact information:                                                      Please feel free to contact me with any questions or concerns you have.      Jr Jaramillo, PharmD, [facility]  Medication Therapy Management Pharmacist  Pager: [phone]

## 2021-10-21 ENCOUNTER — TRANSFERRED RECORDS (OUTPATIENT)
Dept: HEALTH INFORMATION MANAGEMENT | Facility: OTHER | Age: 71
End: 2021-10-21

## 2021-11-04 ENCOUNTER — OFFICE VISIT (OUTPATIENT)
Dept: UROLOGY | Facility: OTHER | Age: 71
End: 2021-11-04
Attending: UROLOGY
Payer: COMMERCIAL

## 2021-11-04 VITALS
HEART RATE: 88 BPM | OXYGEN SATURATION: 99 % | SYSTOLIC BLOOD PRESSURE: 136 MMHG | DIASTOLIC BLOOD PRESSURE: 60 MMHG | RESPIRATION RATE: 18 BRPM | WEIGHT: 209 LBS | BODY MASS INDEX: 32.73 KG/M2

## 2021-11-04 DIAGNOSIS — N52.9 ED (ERECTILE DYSFUNCTION) OF ORGANIC ORIGIN: Primary | ICD-10-CM

## 2021-11-04 PROCEDURE — 99213 OFFICE O/P EST LOW 20 MIN: CPT | Performed by: UROLOGY

## 2021-11-04 PROCEDURE — G0463 HOSPITAL OUTPT CLINIC VISIT: HCPCS

## 2021-11-04 NOTE — PROGRESS NOTES
Type of Visit  EST    Chief Complaint  ED    HPI  Mr. Whittaker is a 71 y.o. male with history of ED using intracavernosal injections.  Historically he has been using Trimix but after titrating the dose up to 0.5 mL we transitioned last year to a higher concentration.  Using Super Trimix over the last year his results have been less beneficial.  He has titrated up to a similar dose but is concerned the vial may be old/outdated.  Starting using Trimix 5 years ago.  He reports no erections over 4 hours.  He does keep it in the fridge.      Review of Systems  I personally reviewed the ROS with the patient.    Nursing Notes:   Odilon Gan LPN  11/4/2021  2:02 PM  Signed  Review of Systems:    Weight loss:    No     Recent fever/chills:  No   Night sweats:   No  Current skin rash:  No   Recent hair loss:  No  Heat intolerance:  No   Cold intolerance:  No  Chest pain:   No   Palpitations:   No  Shortness of breath:  No   Wheezing:   No  Constipation:    No   Diarrhea:   No   Nausea:   No   Vomiting:   No   Kidney/side pain:  No   Back pain:   No  Frequent headaches:  No   Dizziness:     No  Leg swelling:   No   Calf pain:    No    Odilon Gan LPN on 11/4/2021 at 2:02 PM           Physical Exam  /60   Pulse 88   Resp 18   Wt 94.8 kg (209 lb)   SpO2 99%   BMI 32.73 kg/m    Constitutional: No acute distress.  Alert and cooperative   Head: NCAT  Eyes: Conjunctivae normal  Cardiovascular: Regular rate.  Pulmonary/Chest: Respirations are even and non-labored bilaterally, no audible wheezing  Abdominal: Soft. No distension, tenderness, masses or guarding.   Neurological: A + O x 3.  Cranial Nerves II-XII grossly intact.  Extremities: JAGDISH x 4, Warm. No clubbing.  No cyanosis.    Skin: Pink, warm and dry.  No visible rashes noted.  Psychiatric:  Normal mood and affect  Back:  No left CVA tenderness.  No right CVA tenderness.  Genitourinary:  Nonpalpable bladder  :  Prostate 30-40g, no nodules,  symmetric    Labs  Results for ROLAND MORELOS (MRN [id number]) as of 11/4/2021 13:57   1/5/2018 10:10   PSA 1.525     Assessment  Mr. Morelos is a 71 y.o. male who follows up with ED doing well with Trimix.  Continue current dosing -needs to acquire a fresh bottle.    Plan  Continue Super Trimix 0.25mL per dose  Follow-up in 1 year

## 2021-11-04 NOTE — NURSING NOTE
Review of Systems:    Weight loss:    No     Recent fever/chills:  No   Night sweats:   No  Current skin rash:  No   Recent hair loss:  No  Heat intolerance:  No   Cold intolerance:  No  Chest pain:   No   Palpitations:   No  Shortness of breath:  No   Wheezing:   No  Constipation:    No   Diarrhea:   No   Nausea:   No   Vomiting:   No   Kidney/side pain:  No   Back pain:   No  Frequent headaches:  No   Dizziness:     No  Leg swelling:   No   Calf pain:    No    Odilon Gan LPN on 11/4/2021 at 2:02 PM

## 2022-02-07 ENCOUNTER — TRANSFERRED RECORDS (OUTPATIENT)
Dept: HEALTH INFORMATION MANAGEMENT | Facility: CLINIC | Age: 72
End: 2022-02-07
Payer: COMMERCIAL

## 2022-02-07 LAB — HBA1C MFR BLD: 7.9 % (ref 4–5.6)

## 2022-03-07 ENCOUNTER — ANCILLARY PROCEDURE (OUTPATIENT)
Dept: RADIOLOGY | Facility: CLINIC | Age: 72
End: 2022-03-07
Payer: COMMERCIAL

## 2022-03-08 ENCOUNTER — PRE VISIT (OUTPATIENT)
Dept: UROLOGY | Facility: OTHER | Age: 72
End: 2022-03-08
Payer: COMMERCIAL

## 2022-03-10 ENCOUNTER — OFFICE VISIT (OUTPATIENT)
Dept: UROLOGY | Facility: OTHER | Age: 72
End: 2022-03-10
Attending: UROLOGY
Payer: COMMERCIAL

## 2022-03-10 VITALS
BODY MASS INDEX: 32.3 KG/M2 | RESPIRATION RATE: 16 BRPM | WEIGHT: 206.2 LBS | OXYGEN SATURATION: 96 % | HEART RATE: 119 BPM | SYSTOLIC BLOOD PRESSURE: 110 MMHG | DIASTOLIC BLOOD PRESSURE: 60 MMHG

## 2022-03-10 DIAGNOSIS — N45.2 ORCHITIS OF LEFT TESTICLE: Primary | ICD-10-CM

## 2022-03-10 PROCEDURE — G0463 HOSPITAL OUTPT CLINIC VISIT: HCPCS | Performed by: UROLOGY

## 2022-03-10 PROCEDURE — 99213 OFFICE O/P EST LOW 20 MIN: CPT | Performed by: UROLOGY

## 2022-03-10 ASSESSMENT — PAIN SCALES - GENERAL: PAINLEVEL: NO PAIN (0)

## 2022-03-10 NOTE — PROGRESS NOTES
Type of Visit  EST    Chief Complaint  Left orchitis    HPI  Mr. Whittaker is a 72 year old male who presents with left orchitis.  He began experiencing left testicular pain about 3 weeks ago.  He presented to his PCP 3 days ago and was diagnosed with orchitis.  He was prescribed renally dosed Levaquin.  The patient also has ongoing back issues and is taking a Medrol Dosepak.  Overall he reports less pain today than any time in the past few weeks.  He feels significantly better and denies fevers.      Review of Systems  I personally reviewed the ROS with the patient.    Nursing Notes:   Ade Bryan LPN  3/10/2022  2:04 PM  Addendum  Chief Complaint   Patient presents with     Follow Up     left testicle pain      Patient presents to the clinic today for a follow up for left testicle pain and swelling    Review of Systems:    Weight loss:    No     Recent fever/chills:  No   Night sweats:   No  Current skin rash:  Yes   Recent hair loss:  No  Heat intolerance:  No   Cold intolerance:  Yes  Chest pain:   No   Palpitations:   No  Shortness of breath:  No   Wheezing:   No  Constipation:    Yes   Diarrhea:   No   Nausea:   No   Vomiting:   No   Kidney/side pain:  Yes   Back pain:   Yes  Frequent headaches:  Yes   Dizziness:     Yes  Leg swelling:   No   Calf pain:    No      Medication Reconciliation: completed   Ade Bryan LPN  3/10/2022 1:56 PM       Physical Exam  Vitals:    03/10/22 1401   BP: 110/60   BP Location: Right arm   Patient Position: Sitting   Cuff Size: Adult Large   Pulse: 119   Resp: 16   SpO2: 96%   Weight: 93.5 kg (206 lb 3.2 oz)     Constitutional: No acute distress.  Alert and cooperative   Head: NCAT  Eyes: Conjunctivae normal  Cardiovascular: Regular rate.  Pulmonary/Chest: Respirations are even and non-labored bilaterally, no audible wheezing  Abdominal: Soft. No distension, tenderness, masses or guarding.   Neurological: A + O x 3.  Cranial Nerves II-XII grossly intact.  Extremities: JAGDISH x  4, Warm. No clubbing.  No cyanosis.    Skin: Pink, warm and dry.  No visible rashes noted.  Psychiatric:  Normal mood and affect  Back:  No left CVA tenderness.  No right CVA tenderness.  Genitourinary  Left testicle firm -tender-consistent with left orchitis with a small reactive hydrocele    Labs  Reviewed labs and scrotal ultrasound report from outside hospital dated 3/7/2022  Patient has CKD    Assessment  Mr. Whittaker is a 72 year old male who presents with left orchitis.  Reviewed the outside hospital records -notes labs and imaging to conduct this visit.    Discussed pathophysiology of this condition.  Discussed the importance of compliance with antibiotics and reasons he would need to be seen urgently.    Plan  Warm tub soaks twice daily after antibiotic dose.  Complete course of renally dosed Levaquin  Jockstrap  Follow-up as previously scheduled  Follow up in 2 weeks, sooner if symptoms worsen.

## 2022-03-10 NOTE — NURSING NOTE
Chief Complaint   Patient presents with     Follow Up     left testicle pain      Patient presents to the clinic today for a follow up for left testicle pain and swelling    Review of Systems:    Weight loss:    No     Recent fever/chills:  No   Night sweats:   No  Current skin rash:  Yes   Recent hair loss:  No  Heat intolerance:  No   Cold intolerance:  Yes  Chest pain:   No   Palpitations:   No  Shortness of breath:  No   Wheezing:   No  Constipation:    Yes   Diarrhea:   No   Nausea:   No   Vomiting:   No   Kidney/side pain:  Yes   Back pain:   Yes  Frequent headaches:  Yes   Dizziness:     Yes  Leg swelling:   No   Calf pain:    No      Medication Reconciliation: completed   Ade Bryan LPN  3/10/2022 1:56 PM

## 2022-03-13 ENCOUNTER — HOSPITAL ENCOUNTER (EMERGENCY)
Facility: OTHER | Age: 72
Discharge: HOME | End: 2022-03-13
Attending: STUDENT IN AN ORGANIZED HEALTH CARE EDUCATION/TRAINING PROGRAM | Admitting: STUDENT IN AN ORGANIZED HEALTH CARE EDUCATION/TRAINING PROGRAM
Payer: MEDICARE

## 2022-03-13 VITALS
TEMPERATURE: 98.3 F | BODY MASS INDEX: 32.01 KG/M2 | SYSTOLIC BLOOD PRESSURE: 169 MMHG | HEART RATE: 71 BPM | RESPIRATION RATE: 20 BRPM | OXYGEN SATURATION: 95 % | WEIGHT: 204.37 LBS | DIASTOLIC BLOOD PRESSURE: 94 MMHG

## 2022-03-13 DIAGNOSIS — N45.2 ORCHITIS, LEFT: ICD-10-CM

## 2022-03-13 DIAGNOSIS — M79.652 PAIN OF LEFT THIGH: ICD-10-CM

## 2022-03-13 LAB
ALBUMIN UR-MCNC: 300 MG/DL
APPEARANCE UR: CLEAR
BACTERIA #/AREA URNS HPF: ABNORMAL /HPF
BILIRUB UR QL STRIP: NEGATIVE
COLOR UR AUTO: ABNORMAL
GLUCOSE UR STRIP-MCNC: 100 MG/DL
HGB UR QL STRIP: ABNORMAL
KETONES UR STRIP-MCNC: NEGATIVE MG/DL
LEUKOCYTE ESTERASE UR QL STRIP: NEGATIVE
MUCOUS THREADS #/AREA URNS LPF: PRESENT /LPF
NITRATE UR QL: NEGATIVE
PH UR STRIP: 6.5 [PH] (ref 5–9)
RBC URINE: 1 /HPF
SP GR UR STRIP: 1.02 (ref 1–1.03)
UROBILINOGEN UR STRIP-MCNC: NORMAL MG/DL
WBC URINE: <1 /HPF

## 2022-03-13 PROCEDURE — 81001 URINALYSIS AUTO W/SCOPE: CPT | Performed by: STUDENT IN AN ORGANIZED HEALTH CARE EDUCATION/TRAINING PROGRAM

## 2022-03-13 PROCEDURE — 250N000011 HC RX IP 250 OP 636: Performed by: STUDENT IN AN ORGANIZED HEALTH CARE EDUCATION/TRAINING PROGRAM

## 2022-03-13 PROCEDURE — 87086 URINE CULTURE/COLONY COUNT: CPT | Performed by: STUDENT IN AN ORGANIZED HEALTH CARE EDUCATION/TRAINING PROGRAM

## 2022-03-13 PROCEDURE — 99283 EMERGENCY DEPT VISIT LOW MDM: CPT | Performed by: STUDENT IN AN ORGANIZED HEALTH CARE EDUCATION/TRAINING PROGRAM

## 2022-03-13 PROCEDURE — 250N000013 HC RX MED GY IP 250 OP 250 PS 637: Performed by: STUDENT IN AN ORGANIZED HEALTH CARE EDUCATION/TRAINING PROGRAM

## 2022-03-13 PROCEDURE — 96372 THER/PROPH/DIAG INJ SC/IM: CPT | Performed by: STUDENT IN AN ORGANIZED HEALTH CARE EDUCATION/TRAINING PROGRAM

## 2022-03-13 PROCEDURE — 99284 EMERGENCY DEPT VISIT MOD MDM: CPT | Performed by: STUDENT IN AN ORGANIZED HEALTH CARE EDUCATION/TRAINING PROGRAM

## 2022-03-13 RX ORDER — ACETAMINOPHEN 325 MG/1
975 TABLET ORAL ONCE
Status: COMPLETED | OUTPATIENT
Start: 2022-03-13 | End: 2022-03-13

## 2022-03-13 RX ADMIN — HYDROMORPHONE HYDROCHLORIDE 1 MG: 1 INJECTION, SOLUTION INTRAMUSCULAR; INTRAVENOUS; SUBCUTANEOUS at 04:21

## 2022-03-13 RX ADMIN — HYDROMORPHONE HYDROCHLORIDE 1 MG: 1 INJECTION, SOLUTION INTRAMUSCULAR; INTRAVENOUS; SUBCUTANEOUS at 05:28

## 2022-03-13 RX ADMIN — ACETAMINOPHEN 975 MG: 500 TABLET, FILM COATED ORAL at 04:21

## 2022-03-13 NOTE — ED PROVIDER NOTES
"Emergency Department Provider Note  : 1950 Age: 72 year old Sex: male MRN: [id number]    Chief Complaint   Patient presents with     Hip Pain     Groin Swelling       Medical Decision Making / Assessment / Plan   72 year old male with a PMH of chronic sciatica and recent diagnosis of orchitis of the left testicle who presents with seemingly acutely worsening pain in the left testicle and the left leg.  Apparently this is the identical pain has been doing it for the past 3 weeks.  He also has a history of bilateral sciatica and is on steroids for this.  Reports compliance with Levaquin.  He arrives with a large quantity of Norco but states he has not taken them since yesterday afternoon despite the fact that he is here in severe pain.  After given a single milligram of intramuscular Dilaudid, roughly 30 seconds after, he stated that he needed to have 4 mg of Dilaudid because that is what he is taking at home and that is what he took before coming in which apparently did not help. The first thing this gentleman told me when I entered the room was \"I am going to kill myself if I don't get this pain fixed.\" Also stating, \"I have to say that so you take me seriously.\" I am quite concerned about this patient's behavior today.     Fortunately, however, we were able to get his pain improved with intramuscular Dilaudid.  Really not understand the patient's concept of how pain medications were because he states that oxycodone did not work despite not taking it with any sort of regularity whatsoever.  We had a thorough discussion about appropriate dosing and timing of this medication.  Urinalysis sent for culture and talked to the patient about following up with his PCP on Monday for culture of results to see if Levaquin is indeed an appropriate antibiotic choice in the event this culture is positive.  What is more likely is that the patient has not given the Levaquin enough time to become effective and he is using in " adequate doses and timing of pain medication.  Regardless, will give return precautions and discharged home once comfortable.    ED Course as of 03/13/22 0625   Sun Mar 13, 2022   0518 Mucus Urine(!): Present   0518 Bacteria Urine(!): Few   0518 Nitrite Urine: Negative   0518 Urobilinogen mg/dL: Normal         The patient was informed of the plan and verbalized understanding and agreed with the plan. The patient was given strict return to Emergency Department precautions as well as appropriate follow up instructions. The patient was discharged in stable condition.    New Prescriptions    No medications on file       Final diagnoses:   Orchitis, left   Pain of left thigh       Nirmal Bingham MD  3/13/2022   Emergency Department    Subjective   Moses is a 72 year old male with PMH of chronic sciatica bilaterally, diabetic neuropathy, history of myalgias, and recent diagnosis of orchitis who presents at  3:54 AM for evaluation of worsening pain along the left testicle and along the left leg.  It appears this testicular pain is the identical pain that he has been having for the past 3 weeks which prompted this diagnosis of orchitis.  He is currently on renally dosed Levaquin for this and was prescribed Norco but apparently, despite presenting here for significant pain, has not taken his Norco since yesterday afternoon.  He feels that overall the swelling of his left testicle is perhaps slightly worsened but certainly has not improved much.    Pain along his left leg is described as essentially circumferentially and is not associated with any numbness, paresthesias, or weakness in his lower extremities.  No change in ambulation or gait. No saddle anesthesia. No urinary retention. Is currently on a medrol dosepak and is to undergo lumbar surgery in the not too distant future per his report.  He is here today because he states his pain is unbearable.    I have reviewed the Medications, Allergies, Past Medical and  Surgical History, and Social History in the [facility] and with family.    Review of Systems:  Please see HPI for pertinent positives and negatives. All other systems reviewed and found to be negative.      Objective   BP: (!) 187/96  Pulse: 75  Temp: 98.3  F (36.8  C)  Resp: 20  Weight: 92.7 kg (204 lb 5.9 oz)  SpO2: 96 %    Physical Exam:   Gen: Mildly uncomfortable appearing.  HEENT: MMM. AT/NC.  Eye: EOMI.   CV: Well perfused.   Pulm: Nonlabored, equal chest rise  Abd:  Mildly tympanic and distended abdomen with minimal discomfort throughout.  No rebound or guarding.  : Quite swollen left testicle with palpable hydrocele and moderate TTP.  No erythema.  No drainage from the urethra.  Right testicle is normal in size.  No superficial lesions noted.  Ext:  Unremarkable left lower extremity.  Perhaps mildly reproduced pain to palpation fairly diffusely across the lateral aspect of the left leg but there is no evidence of trauma or overlying skin changes.  Full range of motion of the left hip, knee.  Neuro:  Motor grossly intact BLE. Seemingly grossly intact sensation BLE. No hyporeflexia.  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     Gastritis without bleeding     No Comments Provided     Greater trochanteric bursitis of left hip 6/24/2015     Heart disease     Multiple coronary stents     Narcolepsy without cataplexy     No Comments Provided     Other injury of unspecified body region, initial encounter (CODE)      11/2006,Right calf hematoma     Postsurgical percutaneous transluminal coronary angioplasty status 2/15/2007    Overview:  IMO Update 10/11     Rheumatic fever without heart involvement     Rheumatic fever as a child without valvular heart disease     Venous stasis ulcer of left calf limited to breakdown of skin with varicose veins (H) 8/29/2019     Venous stasis ulcer of left lower leg with edema of left lower leg (H) 9/4/2019     Past Surgical History:   Procedure Laterality Date     ANGIOPLASTY      12/00, 04/04/04,Repeat angioplasty with lt internal mammary to the lt anterior descending and lt radial artery from aorta to the diagonal.     ANGIOPLASTY      10/01,Angioplasty with 2 vessel CABG the following week from the lt internal mammary to the lt anterior descending and right radial free graft     ANGIOPLASTY      04/2003     ARTHROSCOPY SHOULDER ROTATOR CUFF REPAIR      02/06/2006,Left rotator cuff repair and bone spur removal by Dr. Giraldo in [place]     COLONOSCOPY      1999     COLONOSCOPY  01/08/2016 01/08/2016,-- Dr. De La Cruz -- polypectomy     COLONOSCOPY  04/18/2019    hyperplastic, follow up 10 years, 4/18/29     OTHER SURGICAL HISTORY      09/03,091310,IR ANGIOGRAM FEMORAL/EXTREMITY ([place]),Unremarkable angiogram at [facility]     OTHER SURGICAL HISTORY      10/05/2004,,PTCA,Angioplasty     OTHER SURGICAL HISTORY      02/2005,,PTCA,Angioplasty with stent.     OTHER SURGICAL HISTORY      03/02/2005,,PTCA,Angioplasty with stent.     OTHER SURGICAL HISTORY      09/23/2005,,PTCA,Angioplasty without stent     OTHER SURGICAL HISTORY      2/26/2007,[id number],IR ANGIOGRAM FEMORAL/EXTREMITY ([place]),Unremarkable coronary angiogram at [facility].     OTHER SURGICAL HISTORY      1967,SUR38,APPENDECTOMY OPEN     RELEASE CARPAL TUNNEL      11/15/01,Right carpal tunnel release by Dr. Mace     RELEASE CARPAL TUNNEL      01/2004,Left carpal tunnel release       Medications:  No current facility-administered  medications for this encounter.     Current Outpatient Medications   Medication     aspirin (ASA) 81 MG tablet     blood glucose monitoring (ONETOUCH ULTRA) test strip     Blood Glucose Monitoring Suppl (GLUCOCOM BLOOD GLUCOSE MONITOR) WAQAS     cholecalciferol (VITAMIN D3) 25 mcg (1000 units) capsule     clopidogrel (PLAVIX) 75 MG tablet     co-enzyme Q-10 100 MG CAPS capsule     COMPRESSION STOCKINGS     [employer] ALCOHOL SWABS PADS     desoximetasone (TOPICORT) 0.25 % external cream     dextroamphetamine (DEXTROSTAT) 10 MG tablet     diclofenac (VOLTAREN) 75 MG EC tablet     doxazosin (CARDURA) 1 MG tablet     EUTHYROX 125 MCG tablet     famotidine (PEPCID) 20 MG tablet     furosemide (LASIX) 20 MG tablet     hydrALAZINE (APRESOLINE) 25 MG tablet     HYDROcodone-acetaminophen (NORCO) 5-325 MG tablet     insulin lispro (HUMALOG) 100 UNIT/ML vial     Insulin Pen Needle (PEN NEEDLES) 29G X 12MM MISC     irbesartan (AVAPRO) 150 MG tablet     IV Sets-Tubing (SOLUTION ADMINISTRATION SET) MISC     methylphenidate (RITALIN) 10 MG tablet     metoprolol succinate ER (TOPROL-XL) 50 MG 24 hr tablet     NIFEdipine ER (ADALAT CC) 30 MG 24 hr tablet     nitroGLYcerin (NITROSTAT) 0.4 MG sublingual tablet     Probiotic Product (DAILY PROBIOTIC PO)     ranolazine (RANEXA) 500 MG 12 hr tablet     Saline GEL     thin (NO BRAND SPECIFIED) lancets     vitamin D2 (ERGOCALCIFEROL) 16477 units (1250 mcg) capsule     vitamin E (TOCOPHEROL) 400 units (360 mg) capsule       Allergies:  Famotidine, Gabapentin, Hydrocortisone, Atorvastatin, Celebrex [celecoxib], Lisinopril, No clinical screening - see comments, Norvasc [amlodipine], Pregabalin, Valdecoxib, and Insulin aspart    Relevant labs, images, EKGs, Epic and outside hospital (if applicable) charts were reviewed. The findings, diagnosis, plan, and need for follow up were discussed with the patient/family. Nursing notes were reviewed.

## 2022-03-13 NOTE — DISCHARGE INSTRUCTIONS
Like we discussed, you need to take your prescribed pain medications as scheduled for more consistent pain relief.  Use the jockstrap to alleviate the pressure on your testicle and also try applying ice to that area to see if that is helpful.

## 2022-03-13 NOTE — ED NOTES
"Patient reports pain has improved during ER visit. Patient called wife and she will drive him home. Patient aware not to drive while taking narcotics. Encouraged patient to follow up with PCP on Monday.    See physicians note regarding patient's statement regarding SI upon arriving to ED. Patient now denies suicidal ideation at this time, stating \"I would never actually do that, I just said that so I would get taken seriously.\" Patient cleared for discharge by MD.     Patient dressed self and ambulated well out of ED.   "

## 2022-03-13 NOTE — ED TRIAGE NOTES
"ED Nursing Triage Note (General)   ________________________________    Moses Whittaker is a 72 year old Male that presents to triage private car  With history of  Left hip pain and left testicle \"pain and swelling\" x 2 weeks.  Describes testicular pain as \"something twisted in there\" Denies injury. Reports chronic hip pain but much worse over the past to weeks. Was seen by primary recently and had US of testicle.    Patient appears alert  and oriented, in no acute distress., and cooperative behavior.  Airway: intact  Breathing noted as Normal  Circulation Normal  Skin:  Normal        PRE HOSPITAL PRIOR LIVING SITUATION Spouse    "

## 2022-03-15 LAB — BACTERIA UR CULT: NO GROWTH

## 2022-03-21 ENCOUNTER — OFFICE VISIT (OUTPATIENT)
Dept: UROLOGY | Facility: OTHER | Age: 72
End: 2022-03-21
Attending: UROLOGY
Payer: MEDICARE

## 2022-03-21 VITALS
BODY MASS INDEX: 31.67 KG/M2 | WEIGHT: 202.2 LBS | RESPIRATION RATE: 16 BRPM | DIASTOLIC BLOOD PRESSURE: 60 MMHG | OXYGEN SATURATION: 95 % | HEART RATE: 107 BPM | SYSTOLIC BLOOD PRESSURE: 110 MMHG

## 2022-03-21 DIAGNOSIS — N45.2 ORCHITIS OF LEFT TESTICLE: ICD-10-CM

## 2022-03-21 DIAGNOSIS — N50.812 LEFT TESTICULAR PAIN: ICD-10-CM

## 2022-03-21 DIAGNOSIS — G89.29 CHRONIC PAIN IN TESTICLE: Primary | ICD-10-CM

## 2022-03-21 DIAGNOSIS — N50.819 CHRONIC PAIN IN TESTICLE: Primary | ICD-10-CM

## 2022-03-21 PROCEDURE — 64450 NJX AA&/STRD OTHER PN/BRANCH: CPT | Mod: GZ | Performed by: UROLOGY

## 2022-03-21 PROCEDURE — G0463 HOSPITAL OUTPT CLINIC VISIT: HCPCS | Mod: 25

## 2022-03-21 PROCEDURE — 99214 OFFICE O/P EST MOD 30 MIN: CPT | Mod: 25 | Performed by: UROLOGY

## 2022-03-21 PROCEDURE — 64450 NJX AA&/STRD OTHER PN/BRANCH: CPT | Performed by: UROLOGY

## 2022-03-21 ASSESSMENT — PAIN SCALES - GENERAL: PAINLEVEL: WORST PAIN (10)

## 2022-03-21 NOTE — NURSING NOTE
Xylocaine orderd by Jason Moran MD     Route Cord block  Dr Moran administered  Lot# [id number]  Exp. 7/2025  NDC 95180-084-67  Patient tolerated well.  Kim Crisostomo LPN.......3/21/2022 4:00 PM LPN.......3/21/2022 4:00 PM

## 2022-03-21 NOTE — NURSING NOTE
Chief Complaint   Patient presents with     Follow Up     testicular pain    Patient presents to the clinic today for a follow up for testicular pain     Review of Systems:    Weight loss:    No     Recent fever/chills:  No   Night sweats:   No  Current skin rash:  No   Recent hair loss:  No  Heat intolerance:  No   Cold intolerance:  No  Chest pain:   No   Palpitations:   No  Shortness of breath:  No   Wheezing:   No  Constipation:    No   Diarrhea:   No   Nausea:   No   Vomiting:   No   Kidney/side pain:  No   Back pain:   No  Frequent headaches:  No   Dizziness:     No  Leg swelling:   No   Calf pain:    No      Medication Reconciliation: completed   Ade Bryan LPN  3/21/2022 3:15 PM

## 2022-03-21 NOTE — PROGRESS NOTES
Type of Visit  NPV    Chief Complaint  Testicular pain    HPI  Mr. Whittaker is a 72 year old male who presents with intermittent and chronic left testicular pain.  Patient has CKD.  The patient was diagnosed with orchitis about 2 months ago.  He was prescribed a course of Levaquin, renally dosed.  Following the course of antibiotics his symptoms did not improve and his pain persisted.  He describes his pain as daily for the past 2 months.  The pain has also been constant.  He is quite frustrated and the pain is described as severe.  He recently was prescribed a second course of Levaquin.  He has 2 days remaining of this course of antibiotic and his symptoms have persisted.  He denies fevers or other systemic symptoms but does have severe localized pain.      Review of Systems  I personally reviewed the ROS with the patient.    Nursing Notes:   Ade Bryan LPN  3/21/2022  3:16 PM  Signed  Chief Complaint   Patient presents with     Follow Up     testicular pain    Patient presents to the clinic today for a follow up for testicular pain     Review of Systems:    Weight loss:    No     Recent fever/chills:  No   Night sweats:   No  Current skin rash:  No   Recent hair loss:  No  Heat intolerance:  No   Cold intolerance:  No  Chest pain:   No   Palpitations:   No  Shortness of breath:  No   Wheezing:   No  Constipation:    No   Diarrhea:   No   Nausea:   No   Vomiting:   No   Kidney/side pain:  No   Back pain:   No  Frequent headaches:  No   Dizziness:     No  Leg swelling:   No   Calf pain:    No      Medication Reconciliation: completed   Ade Bryan LPN  3/21/2022 3:15 PM       Physical Exam  Vitals:    03/21/22 1518   BP: 110/60   BP Location: Right arm   Patient Position: Sitting   Cuff Size: Adult Large   Pulse: 107   Resp: 16   SpO2: 95%   Weight: 91.7 kg (202 lb 3.2 oz)     Constitutional: No acute distress.  Alert and cooperative   Head: NCAT  Eyes: Conjunctivae normal  Cardiovascular: Regular  rate.  Pulmonary/Chest: Respirations are even and non-labored bilaterally, no audible wheezing  Abdominal: Soft. No distension, tenderness, masses or guarding.   Neurological: A + O x 3.  Cranial Nerves II-XII grossly intact.  Extremities: JAGDISH x 4, Warm. No clubbing.  No cyanosis.    Skin: Pink, warm and dry.  No visible rashes noted.  Psychiatric:  Normal mood and affect  Back:  No left CVA tenderness.  No right CVA tenderness.  Genitourinary:  Nonpalpable bladder  Normal male phallus without discharge or lesions, normal pubic hair distribution.  Testicles descended bilaterally.  Left testicle firm and enlarged consistent with orchitis.    Labs  Results for ROLAND MORELOS (MRN [id number]) as of 3/21/2022 15:16   3/13/2022 04:20   Color Urine Light Yellow   Appearance Urine Clear   Glucose Urine 100 (A)   Bilirubin Urine Negative   Ketones Urine Negative   Specific Gravity Urine 1.019   pH Urine 6.5   Protein Albumin Urine 300 (A)   Urobilinogen mg/dL Normal   Nitrite Urine Negative   Blood Urine Trace (A)   Leukocyte Esterase Urine Negative   WBC Urine <1   RBC Urine 1   Bacteria Urine Few (A)   Mucus Urine Present (A)     UCx from 3/13/2022:  No Growth    Results for ROLAND MORELOS (MRN [id number]) as of 3/21/2022 15:40   7/28/2021 10:53   Creatinine 1.87 (H)   GFR Estimate 36 (L)     Imaging  Scrotal US  9/23/2021  Impression:  Moderate left and small right hydroceles.  No focal testicular lesions. No evidence of testicular torsion.    Lidocaine injection  The patient received an injection of lidocaine 1%, 10mL  This was given in the left testicular cord.  The results of this injection: No improvement with left testicular pain    Assessment  Mr. Morelos is a 72 year old male who presents with left orchitis.  The patient has CKD and multiple allergies affecting his antibiotic selection.  I recommended a course of antibiotics from a different class of medication.  The patient has allergies to sulfa related  medications so Bactrim is not an option.  I recommended a course of doxycycline.    The patient's pain has been difficult for him to manage.  The patient is asking for alternative treatment options.  I explained that after conservative management with antibiotics the only options are observation versus orchiectomy versus cord denervation.  I explained that cord denervation is not typically performed for an identifiable treatable condition such as orchitis.  His situation is somewhat different however and the fact that the patient's orchitis has not resolved with antibiotics.  In any event I recommended a third course of antibiotics and this if this is unsuccessful we could seriously consider orchiectomy.    Plan  Start doxycycline 100 mg twice daily for 14 days  If this is ineffective, follow-up              A total of 30 minutes (exclusive of separately billed services/procedures) was spent with the patient, reviewing records, tests, ordering medications, tests or procedures, counseling regarding the above described medical concern, recommendations regarding management and documenting clinical information in the EHR.

## 2022-03-22 RX ORDER — DOXYCYCLINE 100 MG/1
100 TABLET ORAL 2 TIMES DAILY
Qty: 28 TABLET | Refills: 0 | Status: ON HOLD | OUTPATIENT
Start: 2022-03-22 | End: 2023-01-01

## 2022-03-31 NOTE — NURSING NOTE
Chief Complaint   Patient presents with     WOUND CARE       Medication Reconciliation: complete  Chen Morris LPN  ..................8/7/2019   11:17 AM   Previous A1C is not at goal of <8  Lab Results   Component Value Date    A1C 8.1 06/26/2019    A1C 7.7 03/08/2019    A1C 7.5 11/21/2018    A1C 8.0 08/09/2018    A1C 7.8 05/03/2018     Urine microalbumin:creatine: unknown  Foot exam 7-9-19  Eye exam 2-2019    Tobacco User no  Patient is on a daily aspirin  Patient is on a Statin.  Blood pressure today of:     BP Readings from Last 1 Encounters:   08/07/19 132/80      is at the goal of <139/89 for diabetics.    Chen Morris LPN on 8/7/2019 at 11:19 AM    
[id number]

## 2022-05-25 ENCOUNTER — DOCUMENTATION ONLY (OUTPATIENT)
Dept: PHARMACY | Facility: CLINIC | Age: 72
End: 2022-05-25
Payer: COMMERCIAL

## 2022-05-25 NOTE — PROGRESS NOTES
Please abstract the following data from this visit with this patient into the appropriate field in Epic:    Tests that can be patient reported without a hard copy:    Eye exam with ophthalmology on this date: 10/1/2021    Other Tests found in the patient's chart through Chart Review/Care Everywhere:    A1c done by this group [facility] on this date: 2/7/2022    Note to Abstraction: If this section is blank, no results were found via Chart Review/Care Everywhere.    Jr Jaramillo, DanielD, BCACP  Medication Therapy Management Pharmacist  Pager: [phone]

## 2022-06-27 DIAGNOSIS — Z79.4 CONTROLLED TYPE 2 DIABETES MELLITUS WITH STAGE 3 CHRONIC KIDNEY DISEASE, WITH LONG-TERM CURRENT USE OF INSULIN (H): ICD-10-CM

## 2022-06-27 DIAGNOSIS — N18.30 CONTROLLED TYPE 2 DIABETES MELLITUS WITH STAGE 3 CHRONIC KIDNEY DISEASE, WITH LONG-TERM CURRENT USE OF INSULIN (H): ICD-10-CM

## 2022-06-27 DIAGNOSIS — E11.22 CONTROLLED TYPE 2 DIABETES MELLITUS WITH STAGE 3 CHRONIC KIDNEY DISEASE, WITH LONG-TERM CURRENT USE OF INSULIN (H): ICD-10-CM

## 2022-06-28 RX ORDER — INSULIN LISPRO 100 [IU]/ML
INJECTION, SOLUTION INTRAVENOUS; SUBCUTANEOUS
Qty: 180 ML | OUTPATIENT
Start: 2022-06-28

## 2022-06-28 NOTE — TELEPHONE ENCOUNTER
[facility] sent Rx request for the following:   insulin lispro (HUMALOG) 100 UNIT/ML vial  Sig: INJECT UNDER THE SKIN BY INSULIN PUMP. MAX DAILY DOSE  UNITS    Last Prescription Date:   05/13/2021  Last Fill Qty/Refills:         180 mL, R-3      Patient is not longer under provider care. PCP Estevan Morton. Selam Lomeli RN ....................  6/28/2022   2:23 PM

## 2022-07-09 ENCOUNTER — MYC MEDICAL ADVICE (OUTPATIENT)
Dept: INTERNAL MEDICINE | Facility: OTHER | Age: 72
End: 2022-07-09

## 2022-07-21 ENCOUNTER — VIRTUAL VISIT (OUTPATIENT)
Dept: EDUCATION SERVICES | Facility: OTHER | Age: 72
End: 2022-07-21
Payer: MEDICARE

## 2022-07-21 PROCEDURE — G0108 DIAB MANAGE TRN  PER INDIV: HCPCS | Mod: TEL,95 | Performed by: REGISTERED NURSE

## 2022-07-21 NOTE — PROGRESS NOTES
"Leslie is a 72 year old male who is being evaluated via a billable telephone visit.       The patient has been notified of following:      \"This telephone visit will be conducted via a call between you and your physician/provider. We have found that certain health care needs can be provided without the need for a physical exam.  This service lets us provide the care you need with a short phone conversation.  If a prescription is necessary we can send it directly to your pharmacy.  If lab work is needed we can place an order for that and you can then stop by our lab to have the test done at a later time.     Telephone visits are billed at different rates depending on your insurance coverage. During this emergency period, for some insurers they may be billed the same as an in-person visit.  Please reach out to your insurance provider with any questions.     If during the course of the call the physician/provider feels a telephone visit is not appropriate, you will not be charged for this service.\"     Patient has given verbal consent for Telephone visit?  YES     What phone number would you like to be contacted at? [phone]     How would you like to obtain your AVS? Mail a copy     Phone call duration: 45 minutes     Radha Trammell RN     Telephone Visit:  Diabetes Education with BG Assessment    SUBJECTIVE:  Moses Whittaker is an 72 year old male who visits, by telephone, for evaluation and treatment of Type 2 diabetes mellitus.   Current Diabetes Management per Patient:  Diabetes medications   yes:     Diabetes Medication(s)     Insulin       insulin lispro (HUMALOG) 100 UNIT/ML vial    INJECT UNDER THE SKIN BY INSULIN PUMP. MAX DAILY DOSE  UNITS      Patient states he is NOT on his insulin pump at this time, but would like to restart it as soon as possible.  He shares history of spinal surgery and has been off his pump and CGM since mid-April 2022.    He states he is taking Lantus 18 units at bedtime.  " "Humalog for CHO and correction TID AC.  Patient states his dosing is different at every meal, did not relay specifics.    Current monitoring regimen: home blood tests - multiple times daily  Home blood sugar records: Patient states, \"My glucose has been high.\"  He did not give specific results.      No results found for: GLUCOSE, HGBA1C  Cholesterol   Date/Time Value Ref Range Status   07/28/2021 10:53  <200 mg/dL Final     Comment:     Age 0-19 years  Desirable: <170 mg/dL  Borderline high:  170-199 mg/dl  High:            >199 mg/dl    Age 20 years and older  Desirable: <200 mg/dL   04/26/2021 01:13  (H) <200 mg/dL Final     HDL Cholesterol   Date/Time Value Ref Range Status   04/26/2021 01:13 PM 37 23 - 92 mg/dL Final     Direct Measure HDL   Date/Time Value Ref Range Status   07/28/2021 10:53 AM 34 23 - 92 mg/dL Final     Comment:     0-19 years:       Greater than or equal to 45 mg/dL   Low: Less than 40 mg/dL   Borderline low: 40-44 mg/dL     20 years and older:   Female: Greater than or equal to 50 mg/dL   Male:   Greater than or equal to 40 mg/dL          LDL Cholesterol Calculated   Date/Time Value Ref Range Status   07/28/2021 10:53  (H) <=100 mg/dL Final     Comment:     Age 0-19 years:  Desirable: 0-110 mg/dL   Borderline high: 110-129 mg/dL   High: >= 130 mg/dL    Age 20 years and older:  Desirable: <100mg/dL  Above desirable: 100-129 mg/dL   Borderline high: 130-159 mg/dL   High: 160-189 mg/dL   Very high: >= 190 mg/dL   04/26/2021 01:13  (H) <100 mg/dL Final     Comment:     Above desirable:  100-129 mg/dl  Borderline High:  130-159 mg/dL  High:             160-189 mg/dL  Very high:       >189 mg/dl       No components found for: MICROALBCRU, [id number], MICROALBRAND    Social History     Tobacco Use     Smoking status: Never Smoker     Smokeless tobacco: Never Used   Substance Use Topics     Alcohol use: No     Alcohol/week: 0.0 standard drinks          Allergies:Famotidine, " "Gabapentin, Hydrocortisone, Atorvastatin, Celebrex [celecoxib], Lisinopril, No clinical screening - see comments, Norvasc [amlodipine], Pregabalin, Valdecoxib, and Insulin aspart     OBJECTIVE:    ASSESSMENT:  Patient has called [employer] to inquire why he cannot get his pump working again.  He notes, \"they helped me get it charging and the date/time entered.\"      Discussed safety concerns with restarting insulin pump today.  Recommend patient continue current MDI plan for insulin and begin using his Dexcom G6 CGM using his cell phone.  Patient is currently sharing data with [facility].      Patient will gather data (insulin doses and CHO at mealtimes), begin his CGM session soon, and follow up next Wednesday, 7/27, 2:00 pm, for BG assessment and insulin adjustment BEFORE pump will be recommended.  Patient states has been meeting with [facility], [facility] will contact them for orders before restarting the pump.  Patient states understanding and will NOT begin using his pump at this time.        Educational Handouts Mailed:  No new materials mailed today.    PLAN:    Medication recommendations:continue current medication regimen unchanged    Patient will test blood glucose as above or as previously directed.    Patient will begin low CHO meal plan to help improve BG results with possible weight loss benefit.    Patient encouraged to develop physical activity plan or continue with current plan.    Patient will follow up with provider as directed by PCP.      Diabetes recommendations: use and side effects of insulin is reviewed, glycohemoglobin monitoring discussed and long term diabetic complications discussed    Time spent on phone visit was 45 minutes.     Any diabetes medication dose changes were made via the CDE Protocol and Collaborative Practice Agreement with the patient's primary care provider. A copy of this encounter was shared with the provider.    Radha Trammell RN, " RAQUEL, [facility]  7/21/2022 10:58 AM

## 2022-07-26 ENCOUNTER — TELEPHONE (OUTPATIENT)
Dept: EDUCATION SERVICES | Facility: OTHER | Age: 72
End: 2022-07-26

## 2022-07-26 NOTE — TELEPHONE ENCOUNTER
Message received from GLENDA Nixon, [facility], states patient shares he has restarted his insulin pump and this is approved by Dr. Butt.  She states patient will visit [facility] tomorrow, as scheduled.    Radha Trammell RN, BSN, [facility]  7/26/2022 11:14 AM

## 2022-07-27 ENCOUNTER — ALLIED HEALTH/NURSE VISIT (OUTPATIENT)
Dept: EDUCATION SERVICES | Facility: OTHER | Age: 72
End: 2022-07-27
Payer: MEDICARE

## 2022-07-27 PROCEDURE — G0108 DIAB MANAGE TRN  PER INDIV: HCPCS | Performed by: REGISTERED NURSE

## 2022-07-27 NOTE — PATIENT INSTRUCTIONS
Diabetes Goals and Reminders    Your A1c test should be done every 3 months.  Your goal is less than 8%.   Your last result is:  A1c 7.1% 7/2022  Lab Results   Component Value Date    A1C 7.9 07/28/2021    A1C 8.4 04/26/2021       Your LDL cholesterol test should be done at least once a year.  Take a statin, if prescribed by your doctor, based on age and risk factors.  Your last result is:  LDL Cholesterol Calculated   Date Value Ref Range Status   07/28/2021 101 (H) <=100 mg/dL Final     Comment:     Age 0-19 years:  Desirable: 0-110 mg/dL   Borderline high: 110-129 mg/dL   High: >= 130 mg/dL    Age 20 years and older:  Desirable: <100mg/dL  Above desirable: 100-129 mg/dL   Borderline high: 130-159 mg/dL   High: 160-189 mg/dL   Very high: >= 190 mg/dL   04/26/2021 124 (H) <100 mg/dL Final     Comment:     Above desirable:  100-129 mg/dl  Borderline High:  130-159 mg/dL  High:             160-189 mg/dL  Very high:       >189 mg/dl         Have blood pressure and weight checked every three months.  Your last blood pressure reading was:   BP Readings from Last 1 Encounters:   03/21/22 110/60       Use your CGM to check sensor glucose readings a minimum of 4 times per day.  Your home glucose target ranges are:  Before meals:    2 hours after a meal:  Less than 200  Bedtime:  110-140 mg/dL        Avoid all tobacco.  Follow your healthy diet and exercise plan.  See the eye doctor every year.  See the dentist every six months.  Have kidney function tested yearly.    Take all medicine as prescribed.  Please let me know if you are having symptoms you don t expect or if you wish to stop any medicine.    Follow Up Plan  Please call or visit Diabetes Education if blood sugars are consistently out of target.  Your future lab plan is:  HgA1c in October 2022.    If you need your cholesterol checked at your next appointment, you should fast 8 to 10 hours before your appointment.  Do not eat or drink anything besides water.   Drink plenty of water and take all your usual medicine.    SUMMARY FOR TODAY'S VISIT    CGM Report:    Report shows 140 mg/dl average sensor glucose over the past three days since restarting Dexcom.    Standard deviation (SD) is +- 43 mg/dl. Goal for SD is +-50 mg/dl or lower.      Glucose 84% in target (70 - 180), 12% high (181 - 199), 3% very high (200 - 400), <1% low (56 - 69) and 0% very low (39 - 55).      Most significant patterns of highs found between 5:05 AM and 6:15 AM.    No significant patterns of lows found.      Recommendations:     Due to report of needing to consume carbohydrates without Humalog, recommend basal decrease from 2.9 unit/hr to 2.8 unit/hr.  This will decrease your basal slightly from 69.6 to 67.2 units in 24 hours.  If you still need to consume carb to keep your blood sugar level, please consider dropping your basal by another 0.1 unit/hour.    2.  Discussed Emergency Plan and what to do for pump malfunction.  See step by step instructions.    3.  Reviewed Sick Day plan.    4.  Discussed D5 Health Goals.  You have met 5 of 5 goals at this time.  (BP less than 140/90, Statin therapy as recommended, A1c less than 8%, tobacco free, Aspirin therapy as recommended).      Achieving the five treatment goals that make up the D5 not only reduces your risk for serious cardiovascular problems like heart attack and stroke, it puts you on a path to better health!  See www.theD5.org for more information.     5.  Please follow up for continued Insulin Pump Management, as needed.      Radha Trammell RN, BSN, [facility]  7/27/2022 3:50 PM

## 2022-07-28 VITALS — WEIGHT: 180.6 LBS | BODY MASS INDEX: 28.29 KG/M2

## 2022-07-28 NOTE — PROGRESS NOTES
"Diabetes Self-Management Education & Support    Presents for: Individual review    SUBJECTIVE/OBJECTIVE:  Presents for: Individual review  Accompanied by: Self, Spouse  Diabetes education in the past 24mo: Yes  Focus of Visit: Problem Solving, Insulin Pump, CGM  Type of Pump visit: Pump Review  Type of CGM visit: Personal CGM Follow-up  Diabetes type: Type 2  Disease course: Stable  How confident are you filling out medical forms by yourself:: Extremely  Diabetes management related comments/concerns: There s a good chance diabetes will kill me someday or damage my kidneys beyond repair!  Cultural Influences/Ethnic Background:  Choose not to answer  Patient visits with his wife.  He shares he ran out of Lantus on Sunday evening, 7/24, so he restarted his insulin pump.  He visits today with his supplies to change his infusion set during office visit today.  Patient states, \"At times, I feel like I need to eat or I will go low.  The pump helps though, as it suspends my insulin.\"      Diabetes Symptoms & Complications:  Fatigue: Yes  Neuropathy: Yes  Polydipsia: Yes  Polyphagia: No  Polyuria: Yes  Visual change: Yes  Slow healing wounds: Yes  Autonomic neuropathy: No  CVA: No  Heart disease: Yes  Nephropathy: Yes  Peripheral neuropathy: Yes  Peripheral Vascular Disease: Yes  Retinopathy: Yes  Sexual dysfunction: Yes    Patient Problem List and Family Medical History reviewed for relevant medical history, current medical status, and diabetes risk factors.    Vitals:  There were no vitals taken for this visit.  Estimated body mass index is 31.67 kg/m  as calculated from the following:    Height as of 7/1/21: 1.702 m (5' 7\").    Weight as of 3/21/22: 91.7 kg (202 lb 3.2 oz).   Last 3 BP:   BP Readings from Last 3 Encounters:   03/21/22 110/60   03/13/22 (!) 169/94   03/10/22 110/60       History   Smoking Status     Never Smoker   Smokeless Tobacco     Never Used       Labs:  Lab Results   Component Value Date    A1C 7.9 " 07/28/2021    A1C 8.4 04/26/2021     Lab Results   Component Value Date     07/28/2021     04/26/2021     Lab Results   Component Value Date     07/28/2021     04/26/2021     HDL Cholesterol   Date Value Ref Range Status   04/26/2021 37 23 - 92 mg/dL Final     Direct Measure HDL   Date Value Ref Range Status   07/28/2021 34 23 - 92 mg/dL Final     Comment:     0-19 years:       Greater than or equal to 45 mg/dL   Low: Less than 40 mg/dL   Borderline low: 40-44 mg/dL     20 years and older:   Female: Greater than or equal to 50 mg/dL   Male:   Greater than or equal to 40 mg/dL        ]  GFR Estimate   Date Value Ref Range Status   07/28/2021 36 (L) >60 mL/min/1.73m2 Final     Comment:     As of July 11, 2021, eGFR is calculated by the CKD-EPI creatinine equation, without race adjustment. eGFR can be influenced by muscle mass, exercise, and diet. The reported eGFR is an estimation only and is only applicable if the renal function is stable.   04/26/2021 31 (L) >60 mL/min/[1.73_m2] Final     GFR Estimate If Black   Date Value Ref Range Status   04/26/2021 37 (L) >60 mL/min/[1.73_m2] Final     Lab Results   Component Value Date    CR 1.87 07/28/2021    CR 2.13 04/26/2021     No results found for: MICROALBUMIN    Healthy Eating:  Healthy Eating Assessed Today: No  Cultural/Holiness diet restrictions?: No  Meal planning/habits: Carb counting, Low carb, Heart healthy, Low salt, Smaller portions, Frequent snacking  How many times a week on average do you eat food made away from home (restaurant/take-out)?: 1  Meals include: Lunch, Dinner, Evening Snack  Beverages: Water, Diet soda    Being Active:  Days per week of moderate to strenuous exercise (like a brisk walk): 0  On average, minutes per day of exercise at this level: 10  How intense was your typical exercise? : Light (like stretching or slow walking)  Exercise Minutes per Week: 0  Barrier to exercise: Time, Safety, Access, Physical  limitation    Monitoring:  Blood Glucose Meter: Accu-chek, CGM  Times checking blood sugar at home (number): 5+  Times checking blood sugar at home (per): Day  Blood glucose trend: Fluctuating    Taking Medications:  Diabetes Medication(s)     Insulin       insulin lispro (HUMALOG) 100 UNIT/ML vial    INJECT UNDER THE SKIN BY INSULIN PUMP. MAX DAILY DOSE  UNITS          Current Treatments: Insulin Pump  Dose schedule: Pre-lunch, Pre-dinner, At bedtime  Given by: Patient  Injection/Infusion sites: Abdomen    Watched patient as he followed steps on pump screen and changed his infusion set, and filled and installed a new cartridge without difficulty.  Patient uses [id number] barrier on his skin before placing infusion set due to allergies to adhesive.    Problem Solving:  Is the patient at risk for hypoglycemia?: Yes  Hypoglycemia Frequency: Rarely  Hypoglycemia Treatment: Other food, Candy  Patient carries a carbohydrate source: Yes     Reducing Risks:  CAD Risks: Diabetes Mellitus, Family history, Hypertension, Sedentary lifestyle, Male sex  Has dilated eye exam at least once a year?: Yes  Sees dentist every 6 months?: Yes  Feet checked by healthcare provider in the last year?: No    Healthy Coping:  Informal Support system:: [place] based, Family, Friends, Partner, Significant other, Spouse  Stage of change: ACTION (Actively working towards change)  Support resources: Offerings in Clinic Communities  Patient Activation Measure Survey Score:  No flowsheet data found.    Diabetes knowledge and skills assessment:   Patient is knowledgeable in diabetes management concepts related to: Monitoring, Taking Medication and Problem Solving    Patient needs further education on the following diabetes management concepts: Healthy Eating, Being Active, Problem Solving, Reducing Risks, Healthy Coping and Insulin Pump Concepts Hands on practice with basic pump button use    Based on learning assessment above, most appropriate  "setting for further diabetes education would be: Group class or Individual setting.      REPORTS:  ASSESSMENT    Insulin Pump download:    Changing infusion set every N/A days.     Average daily carbohydrates: UNKNOWN grams  Patient has used an insulin pump for many years and learned to estimate insulin need for CHO grams and correction of high BG.  He does NOT use his bolus advice feature.  \"I know what I need to eat my meal and I add in the extra for any high BG, I've been doing this for years when I had my AccuChek pump.\"    Average total daily insulin: 62.85 units  Basal: 65%  Bolus: 35%    Discussed basal insulin and delivery units versus basal setting.  Patient HCL Basal IQ pump delivered less basal over the past three days (40.75 units per 24 hours) versus scheduled basal 2.90 unit/hour or (69.6 units per 24 hours).  Report shows pump suspends five times daily for average of 20 minutes each time.  Average glucose upon suspend, 95 mg/dL and average glucose on resume, 92 mg/dL.      Discussed need to reduce basal, but patient did not want to decrease basal.  He did agree to basal reduction of 0.10 unit per hour or 2.4 units less every 24 hours.  New basal 2.80 unit/hour or 67.2 units per 24 hours.       Clarity CGM glucose results:      Report shows 140 mg/dl average sensor glucose over the past three days since restarting Dexcom.    Standard deviation (SD) is +- 43 mg/dl. Goal for SD is +-50 mg/dl or lower.      Glucose 84% in target (70 - 180), 12% high (181 - 199), 3% very high (200 - 400), <1% low (56 - 69) and 0% very low (39 - 55).      Most significant patterns of highs found between 5:05 AM and 6:15 AM.      INTERVENTION    Plan:    1. Due to report of needing to consume carbohydrates without Humalog, recommend basal decrease from 2.9 unit/hr to 2.8 unit/hr.  This will decrease your basal slightly from 69.6 to 67.2 units in 24 hours.  If you still need to consume carb to keep your blood sugar level, " please consider dropping your basal by another 0.1 unit/hour.    2.  Discussed Emergency Plan and what to do for pump malfunction.  See step by step instructions.    3.  Reviewed Sick Day plan.    4.  Discussed D5 Health Goals.  You have met 5 of 5 goals at this time.  (BP less than 140/90, Statin therapy as recommended, A1c less than 8%, tobacco free, Aspirin therapy as recommended).      Achieving the five treatment goals that make up the D5 not only reduces your risk for serious cardiovascular problems like heart attack and stroke, it puts you on a path to better health!  See www.theD5.org for more information.     5.  Please follow up for continued Insulin Pump Management, as needed.      Insulin Pump Information  Insulin Pump Brand: Tandem  Infusion Set: Tandem  Tandem Infusion Set: Auto Soft XC  Does patient have an insulin multiple daily injection back-up plan?: Yes    Education provided today on:  AADE Self-Care Behaviors:  Problem Solving: high blood glucose - causes, signs/symptoms, treatment and prevention, low blood glucose - causes, signs/symptoms, treatment and prevention, carrying a carbohydrate source at all times and sick day arrangements  Reducing Risks: major complications of diabetes, prevention, early diagnostic measures and treatment of complications, aspirin therapy, A1C - goals, relating to blood glucose levels, how often to check, lipids levels and goals and blood pressure and goals  Healthy Coping: benefits of making appropriate lifestyle changes    Education specific to insulin pump provided today on:   importance of changing infusion set every 3 days, pump button pressing, how to use the bolus calculator appropriately, importance of bolusing before meals, importance of counting carbohydrates accurately, sick day management and multiple daily injection back-up plan in case of pump failure    Opportunities for ongoing education and support in diabetes-self management were discussed.    Pt  verbalized understanding of concepts discussed and recommendations provided today.       Education Materials Provided:  Diabetes 5 Health Goals.    Pump Education Materials:  Control IQ information with steps to begin software update process.      PLAN  See Patient Instructions for co-developed, patient-stated behavior change goals.  AVS printed and provided to patient today. See Follow-Up section for recommended follow-up.    Radha Trammell RN, BSN, [facility]  7/27/2022 3:37 PM   Time Spent: 85 minutes  Encounter Type: Individual    Any diabetes medication dose changes were made via the CDE Protocol and Collaborative Practice Agreement with the patient's primary care provider. A copy of this encounter was shared with the provider.

## 2022-08-01 ENCOUNTER — TRANSFERRED RECORDS (OUTPATIENT)
Dept: MULTI SPECIALTY CLINIC | Facility: CLINIC | Age: 72
End: 2022-08-01

## 2022-08-01 LAB — RETINOPATHY: POSITIVE

## 2022-09-15 ENCOUNTER — TELEPHONE (OUTPATIENT)
Dept: UROLOGY | Facility: OTHER | Age: 72
End: 2022-09-15

## 2022-09-15 NOTE — TELEPHONE ENCOUNTER
After proper verification, patient was informed of the new Company that we are going through for Trimix. He was given the number and order was faxed over to [email or web address]. Patient understood and had no other questions.  Ade Bryan LPN on 9/15/2022 at 3:49 PM

## 2022-09-15 NOTE — TELEPHONE ENCOUNTER
Patient called and is wondering where get the trimix medication from since [facility] is no longer compounding it. Please contact patient.    Myranda Merida on 9/15/2022 at 2:37 PM

## 2022-09-17 ENCOUNTER — HEALTH MAINTENANCE LETTER (OUTPATIENT)
Age: 72
End: 2022-09-17

## 2022-09-28 ENCOUNTER — DOCUMENTATION ONLY (OUTPATIENT)
Dept: PHARMACY | Facility: CLINIC | Age: 72
End: 2022-09-28

## 2022-09-28 NOTE — PROGRESS NOTES
Please abstract the following data from this visit with this patient into the appropriate field in Epic:    Tests that can be patient reported without a hard copy:    Other Tests found in the patient's chart through Chart Review/Care Everywhere:    A1c done by this group [facility] on this date: 7/12/22 and Microalbumin done by this group [facility] on this date: 7/12/22     Note to [employer]: If this section is blank, no results were found via Chart Review/Care Everywhere.    Jr Jaramillo, DanielD, BCACP  Medication Therapy Management Pharmacist  Pager: [phone]

## 2022-12-20 ENCOUNTER — HOSPITAL ENCOUNTER (OUTPATIENT)
Dept: GENERAL RADIOLOGY | Facility: OTHER | Age: 72
Discharge: HOME | End: 2022-12-20
Attending: NURSE PRACTITIONER
Payer: MEDICARE

## 2022-12-20 ENCOUNTER — HOSPITAL ENCOUNTER (OUTPATIENT)
Dept: MRI IMAGING | Facility: OTHER | Age: 72
Discharge: HOME | End: 2022-12-20
Attending: NURSE PRACTITIONER
Payer: MEDICARE

## 2022-12-20 DIAGNOSIS — G89.29 OTHER CHRONIC PAIN: ICD-10-CM

## 2022-12-20 DIAGNOSIS — M54.50 CHRONIC BILATERAL LOW BACK PAIN WITHOUT SCIATICA: ICD-10-CM

## 2022-12-20 DIAGNOSIS — G89.29 CHRONIC BILATERAL LOW BACK PAIN WITHOUT SCIATICA: ICD-10-CM

## 2022-12-20 PROCEDURE — 72100 X-RAY EXAM L-S SPINE 2/3 VWS: CPT

## 2022-12-20 PROCEDURE — 72148 MRI LUMBAR SPINE W/O DYE: CPT

## 2023-01-01 ENCOUNTER — APPOINTMENT (OUTPATIENT)
Dept: PHYSICAL THERAPY | Facility: OTHER | Age: 73
DRG: 871 | End: 2023-01-01
Attending: INTERNAL MEDICINE
Payer: MEDICARE

## 2023-01-01 ENCOUNTER — OFFICE VISIT (OUTPATIENT)
Dept: FAMILY MEDICINE | Facility: OTHER | Age: 73
End: 2023-01-01
Attending: NURSE PRACTITIONER
Payer: COMMERCIAL

## 2023-01-01 ENCOUNTER — ANESTHESIA (OUTPATIENT)
Dept: MEDSURG UNIT | Facility: OTHER | Age: 73
DRG: 871 | End: 2023-01-01
Payer: MEDICARE

## 2023-01-01 ENCOUNTER — TRANSFERRED RECORDS (OUTPATIENT)
Dept: MULTI SPECIALTY CLINIC | Facility: CLINIC | Age: 73
End: 2023-01-01

## 2023-01-01 ENCOUNTER — APPOINTMENT (OUTPATIENT)
Dept: OCCUPATIONAL THERAPY | Facility: OTHER | Age: 73
DRG: 871 | End: 2023-01-01
Attending: INTERNAL MEDICINE
Payer: MEDICARE

## 2023-01-01 ENCOUNTER — HEALTH MAINTENANCE LETTER (OUTPATIENT)
Age: 73
End: 2023-01-01

## 2023-01-01 ENCOUNTER — APPOINTMENT (OUTPATIENT)
Dept: ULTRASOUND IMAGING | Facility: OTHER | Age: 73
DRG: 871 | End: 2023-01-01
Attending: INTERNAL MEDICINE
Payer: MEDICARE

## 2023-01-01 ENCOUNTER — HOSPITAL ENCOUNTER (INPATIENT)
Facility: OTHER | Age: 73
LOS: 17 days | Discharge: ANOTHER HOSPITAL | DRG: 871 | End: 2023-10-23
Attending: INTERNAL MEDICINE | Admitting: INTERNAL MEDICINE
Payer: MEDICARE

## 2023-01-01 ENCOUNTER — ANESTHESIA EVENT (OUTPATIENT)
Dept: MEDSURG UNIT | Facility: OTHER | Age: 73
DRG: 871 | End: 2023-01-01
Payer: MEDICARE

## 2023-01-01 ENCOUNTER — APPOINTMENT (OUTPATIENT)
Dept: GENERAL RADIOLOGY | Facility: OTHER | Age: 73
DRG: 871 | End: 2023-01-01
Attending: INTERNAL MEDICINE
Payer: MEDICARE

## 2023-01-01 ENCOUNTER — PATIENT OUTREACH (OUTPATIENT)
Dept: FAMILY MEDICINE | Facility: OTHER | Age: 73
End: 2023-01-01
Payer: COMMERCIAL

## 2023-01-01 ENCOUNTER — APPOINTMENT (OUTPATIENT)
Dept: CARDIOLOGY | Facility: OTHER | Age: 73
DRG: 871 | End: 2023-01-01
Attending: FAMILY MEDICINE
Payer: MEDICARE

## 2023-01-01 ENCOUNTER — LAB REQUISITION (OUTPATIENT)
Dept: LAB | Facility: OTHER | Age: 73
End: 2023-01-01

## 2023-01-01 ENCOUNTER — TRANSFERRED RECORDS (OUTPATIENT)
Dept: MULTI SPECIALTY CLINIC | Facility: CLINIC | Age: 73
End: 2023-01-01
Payer: COMMERCIAL

## 2023-01-01 VITALS
RESPIRATION RATE: 18 BRPM | OXYGEN SATURATION: 97 % | HEART RATE: 78 BPM | WEIGHT: 184.4 LBS | TEMPERATURE: 98.5 F | BODY MASS INDEX: 28.88 KG/M2 | DIASTOLIC BLOOD PRESSURE: 58 MMHG | SYSTOLIC BLOOD PRESSURE: 124 MMHG

## 2023-01-01 VITALS
HEART RATE: 70 BPM | SYSTOLIC BLOOD PRESSURE: 103 MMHG | DIASTOLIC BLOOD PRESSURE: 59 MMHG | OXYGEN SATURATION: 96 % | RESPIRATION RATE: 20 BRPM | TEMPERATURE: 98.8 F | HEIGHT: 67 IN | WEIGHT: 188.3 LBS | BODY MASS INDEX: 29.55 KG/M2

## 2023-01-01 DIAGNOSIS — D62 ACUTE POSTHEMORRHAGIC ANEMIA: ICD-10-CM

## 2023-01-01 DIAGNOSIS — I21.4 NSTEMI (NON-ST ELEVATED MYOCARDIAL INFARCTION) (H): ICD-10-CM

## 2023-01-01 DIAGNOSIS — N39.0 COMPLICATED UTI (URINARY TRACT INFECTION): ICD-10-CM

## 2023-01-01 DIAGNOSIS — R39.89 URINARY PROBLEM: Primary | ICD-10-CM

## 2023-01-01 DIAGNOSIS — M54.30 SCIATICA, UNSPECIFIED SIDE: ICD-10-CM

## 2023-01-01 DIAGNOSIS — I20.9 ANGINA PECTORIS (H): Primary | ICD-10-CM

## 2023-01-01 LAB
ALBUMIN (URINE) MG/SPEC: 106.8 MG/DL
ALBUMIN SERPL BCG-MCNC: 1.2 G/DL (ref 3.5–5.2)
ALBUMIN SERPL BCG-MCNC: 1.4 G/DL (ref 3.5–5.2)
ALBUMIN SERPL BCG-MCNC: 2.1 G/DL (ref 3.5–5.2)
ALBUMIN SERPL BCG-MCNC: 2.2 G/DL (ref 3.5–5.2)
ALBUMIN SERPL BCG-MCNC: 2.2 G/DL (ref 3.5–5.2)
ALBUMIN SERPL BCG-MCNC: 2.3 G/DL (ref 3.5–5.2)
ALBUMIN SERPL BCG-MCNC: 2.3 G/DL (ref 3.5–5.2)
ALBUMIN SERPL BCG-MCNC: 2.4 G/DL (ref 3.5–5.2)
ALBUMIN SERPL BCG-MCNC: 2.5 G/DL (ref 3.5–5.2)
ALBUMIN SERPL BCG-MCNC: 2.6 G/DL (ref 3.5–5.2)
ALBUMIN UR-MCNC: 600 MG/DL
ALBUMIN UR-MCNC: 600 MG/DL
ALBUMIN/CREATININE RATIO: 2225 (ref 0–29)
ALP SERPL-CCNC: 115 U/L (ref 40–129)
ALP SERPL-CCNC: 195 U/L (ref 40–129)
ALP SERPL-CCNC: 200 U/L (ref 40–129)
ALP SERPL-CCNC: 224 U/L (ref 40–129)
ALP SERPL-CCNC: 255 U/L (ref 40–129)
ALP SERPL-CCNC: 276 U/L (ref 40–129)
ALP SERPL-CCNC: 278 U/L (ref 40–129)
ALP SERPL-CCNC: 91 U/L (ref 40–129)
ALP SERPL-CCNC: 95 U/L (ref 40–129)
ALP SERPL-CCNC: 95 U/L (ref 40–129)
ALT SERPL W P-5'-P-CCNC: 10 U/L (ref 0–70)
ALT SERPL W P-5'-P-CCNC: 12 U/L (ref 0–70)
ALT SERPL W P-5'-P-CCNC: 13 U/L (ref 0–70)
ALT SERPL W P-5'-P-CCNC: 15 U/L (ref 0–70)
ALT SERPL W P-5'-P-CCNC: 16 U/L (ref 0–70)
ALT SERPL W P-5'-P-CCNC: 7 U/L (ref 0–70)
ALT SERPL W P-5'-P-CCNC: 8 U/L (ref 0–70)
ALT SERPL W P-5'-P-CCNC: 8 U/L (ref 0–70)
AMMONIA PLAS-SCNC: <10 UMOL/L (ref 16–60)
ANION GAP SERPL CALCULATED.3IONS-SCNC: 10 MMOL/L (ref 7–15)
ANION GAP SERPL CALCULATED.3IONS-SCNC: 11 MMOL/L (ref 7–15)
ANION GAP SERPL CALCULATED.3IONS-SCNC: 12 MMOL/L (ref 7–15)
ANION GAP SERPL CALCULATED.3IONS-SCNC: 13 MMOL/L (ref 7–15)
ANION GAP SERPL CALCULATED.3IONS-SCNC: 14 MMOL/L (ref 7–15)
ANION GAP SERPL CALCULATED.3IONS-SCNC: 17 MMOL/L (ref 7–15)
ANION GAP SERPL CALCULATED.3IONS-SCNC: 18 MMOL/L (ref 7–15)
ANION GAP SERPL CALCULATED.3IONS-SCNC: 21 MMOL/L (ref 7–15)
APPEARANCE UR: ABNORMAL
APPEARANCE UR: CLEAR
APTT PPP: 116 SECONDS (ref 22–38)
APTT PPP: 130 SECONDS (ref 22–38)
APTT PPP: 48 SECONDS (ref 22–38)
APTT PPP: 60 SECONDS (ref 22–38)
APTT PPP: 61 SECONDS (ref 22–38)
APTT PPP: 65 SECONDS (ref 22–38)
APTT PPP: 69 SECONDS (ref 22–38)
AST SERPL W P-5'-P-CCNC: 18 U/L (ref 0–45)
AST SERPL W P-5'-P-CCNC: 22 U/L (ref 0–45)
AST SERPL W P-5'-P-CCNC: 22 U/L (ref 0–45)
AST SERPL W P-5'-P-CCNC: 29 U/L (ref 0–45)
AST SERPL W P-5'-P-CCNC: 31 U/L (ref 0–45)
AST SERPL W P-5'-P-CCNC: 32 U/L (ref 0–45)
AST SERPL W P-5'-P-CCNC: 34 U/L (ref 0–45)
AST SERPL W P-5'-P-CCNC: 57 U/L (ref 0–45)
AST SERPL W P-5'-P-CCNC: 59 U/L (ref 0–45)
AST SERPL W P-5'-P-CCNC: 9 U/L (ref 0–45)
ATRIAL RATE - MUSE: 100 BPM
ATRIAL RATE - MUSE: 93 BPM
BACTERIA #/AREA URNS HPF: ABNORMAL /HPF
BACTERIA #/AREA URNS HPF: ABNORMAL /HPF
BACTERIA BLD CULT: NO GROWTH
BACTERIA UR CULT: NO GROWTH
BACTERIA WND CULT: NO GROWTH
BASO+EOS+MONOS # BLD AUTO: ABNORMAL 10*3/UL
BASO+EOS+MONOS NFR BLD AUTO: ABNORMAL %
BASOPHILS # BLD AUTO: 0 10E3/UL (ref 0–0.2)
BASOPHILS # BLD AUTO: 0.1 10E3/UL (ref 0–0.2)
BASOPHILS # BLD AUTO: ABNORMAL 10*3/UL
BASOPHILS # BLD MANUAL: 0 10E3/UL (ref 0–0.2)
BASOPHILS # BLD MANUAL: 0 10E3/UL (ref 0–0.2)
BASOPHILS NFR BLD AUTO: 1 %
BASOPHILS NFR BLD AUTO: 1 %
BASOPHILS NFR BLD AUTO: ABNORMAL %
BASOPHILS NFR BLD MANUAL: 0 %
BASOPHILS NFR BLD MANUAL: 0 %
BILIRUB DIRECT SERPL-MCNC: 1.19 MG/DL (ref 0–0.3)
BILIRUB DIRECT SERPL-MCNC: 1.52 MG/DL (ref 0–0.3)
BILIRUB DIRECT SERPL-MCNC: 2.12 MG/DL (ref 0–0.3)
BILIRUB DIRECT SERPL-MCNC: 2.83 MG/DL (ref 0–0.3)
BILIRUB SERPL-MCNC: 0.8 MG/DL
BILIRUB SERPL-MCNC: 1 MG/DL
BILIRUB SERPL-MCNC: 1.1 MG/DL
BILIRUB SERPL-MCNC: 1.3 MG/DL
BILIRUB SERPL-MCNC: 1.4 MG/DL
BILIRUB SERPL-MCNC: 1.6 MG/DL
BILIRUB SERPL-MCNC: 1.9 MG/DL
BILIRUB SERPL-MCNC: 2.7 MG/DL
BILIRUB SERPL-MCNC: 3.7 MG/DL
BILIRUB SERPL-MCNC: 4 MG/DL
BILIRUB UR QL STRIP: ABNORMAL
BILIRUB UR QL STRIP: NEGATIVE
BUN SERPL-MCNC: 18.9 MG/DL (ref 8–23)
BUN SERPL-MCNC: 26.2 MG/DL (ref 8–23)
BUN SERPL-MCNC: 34.4 MG/DL (ref 8–23)
BUN SERPL-MCNC: 40.6 MG/DL (ref 8–23)
BUN SERPL-MCNC: 44.7 MG/DL (ref 8–23)
BUN SERPL-MCNC: 51.3 MG/DL (ref 8–23)
BUN SERPL-MCNC: 52.3 MG/DL (ref 8–23)
BUN SERPL-MCNC: 58.8 MG/DL (ref 8–23)
BUN SERPL-MCNC: 59.2 MG/DL (ref 8–23)
BUN SERPL-MCNC: 63.3 MG/DL (ref 8–23)
BUN SERPL-MCNC: 72.6 MG/DL (ref 8–23)
BUN SERPL-MCNC: 73.6 MG/DL (ref 8–23)
BUN SERPL-MCNC: 83.7 MG/DL (ref 8–23)
CALCIUM SERPL-MCNC: 7.4 MG/DL (ref 8.8–10.2)
CALCIUM SERPL-MCNC: 7.7 MG/DL (ref 8.8–10.2)
CALCIUM SERPL-MCNC: 7.8 MG/DL (ref 8.8–10.2)
CALCIUM SERPL-MCNC: 7.9 MG/DL (ref 8.8–10.2)
CALCIUM SERPL-MCNC: 8 MG/DL (ref 8.8–10.2)
CALCIUM SERPL-MCNC: 8.2 MG/DL (ref 8.8–10.2)
CALCIUM SERPL-MCNC: 8.3 MG/DL (ref 8.8–10.2)
CALCIUM SERPL-MCNC: 8.4 MG/DL (ref 8.8–10.2)
CALCIUM SERPL-MCNC: 8.7 MG/DL (ref 8.8–10.2)
CHLORIDE SERPL-SCNC: 100 MMOL/L (ref 98–107)
CHLORIDE SERPL-SCNC: 100 MMOL/L (ref 98–107)
CHLORIDE SERPL-SCNC: 101 MMOL/L (ref 98–107)
CHLORIDE SERPL-SCNC: 104 MMOL/L (ref 98–107)
CHLORIDE SERPL-SCNC: 105 MMOL/L (ref 98–107)
CHLORIDE SERPL-SCNC: 105 MMOL/L (ref 98–107)
CHLORIDE SERPL-SCNC: 107 MMOL/L (ref 98–107)
CHLORIDE SERPL-SCNC: 98 MMOL/L (ref 98–107)
CHLORIDE SERPL-SCNC: 98 MMOL/L (ref 98–107)
CHLORIDE SERPL-SCNC: 99 MMOL/L (ref 98–107)
CHLORIDE SERPL-SCNC: 99 MMOL/L (ref 98–107)
CHOLESTEROL (EXTERNAL): 210 MG/DL (ref 0–200)
CK SERPL-CCNC: 145 U/L (ref 39–308)
CK SERPL-CCNC: 412 U/L (ref 39–308)
CK SERPL-CCNC: 575 U/L (ref 39–308)
COLOR UR AUTO: ABNORMAL
COLOR UR AUTO: ABNORMAL
CREAT SERPL-MCNC: 2.17 MG/DL (ref 0.67–1.17)
CREAT SERPL-MCNC: 2.61 MG/DL (ref 0.67–1.17)
CREAT SERPL-MCNC: 2.87 MG/DL (ref 0.67–1.17)
CREAT SERPL-MCNC: 2.94 MG/DL (ref 0.67–1.17)
CREAT SERPL-MCNC: 3.39 MG/DL (ref 0.67–1.17)
CREAT SERPL-MCNC: 3.52 MG/DL (ref 0.67–1.17)
CREAT SERPL-MCNC: 3.72 MG/DL (ref 0.67–1.17)
CREAT SERPL-MCNC: 3.78 MG/DL (ref 0.67–1.17)
CREAT SERPL-MCNC: 3.87 MG/DL (ref 0.67–1.17)
CREAT SERPL-MCNC: 3.92 MG/DL (ref 0.67–1.17)
CREAT SERPL-MCNC: 3.98 MG/DL (ref 0.67–1.17)
CREAT SERPL-MCNC: 3.98 MG/DL (ref 0.67–1.17)
CREAT SERPL-MCNC: 4.12 MG/DL (ref 0.67–1.17)
CREATININE (URINE): 48 MG/DL
CRP SERPL-MCNC: 316.78 MG/L
CRP SERPL-MCNC: 456.05 MG/L
CRP SERPL-MCNC: 482.83 MG/L
CRP SERPL-MCNC: 569.55 MG/L
DEPRECATED HCO3 PLAS-SCNC: 17 MMOL/L (ref 22–29)
DEPRECATED HCO3 PLAS-SCNC: 18 MMOL/L (ref 22–29)
DEPRECATED HCO3 PLAS-SCNC: 19 MMOL/L (ref 22–29)
DEPRECATED HCO3 PLAS-SCNC: 21 MMOL/L (ref 22–29)
DEPRECATED HCO3 PLAS-SCNC: 22 MMOL/L (ref 22–29)
DEPRECATED HCO3 PLAS-SCNC: 22 MMOL/L (ref 22–29)
DEPRECATED HCO3 PLAS-SCNC: 24 MMOL/L (ref 22–29)
DEPRECATED HCO3 PLAS-SCNC: 24 MMOL/L (ref 22–29)
DEPRECATED HCO3 PLAS-SCNC: 25 MMOL/L (ref 22–29)
DEPRECATED HCO3 PLAS-SCNC: 26 MMOL/L (ref 22–29)
DIASTOLIC BLOOD PRESSURE - MUSE: NORMAL MMHG
DIASTOLIC BLOOD PRESSURE - MUSE: NORMAL MMHG
EGFRCR SERPLBLD CKD-EPI 2021: 15 ML/MIN/1.73M2
EGFRCR SERPLBLD CKD-EPI 2021: 16 ML/MIN/1.73M2
EGFRCR SERPLBLD CKD-EPI 2021: 17 ML/MIN/1.73M2
EGFRCR SERPLBLD CKD-EPI 2021: 18 ML/MIN/1.73M2
EGFRCR SERPLBLD CKD-EPI 2021: 18 ML/MIN/1.73M2
EGFRCR SERPLBLD CKD-EPI 2021: 22 ML/MIN/1.73M2
EGFRCR SERPLBLD CKD-EPI 2021: 23 ML/MIN/1.73M2
EOSINOPHIL # BLD AUTO: 0.4 10E3/UL (ref 0–0.7)
EOSINOPHIL # BLD AUTO: 0.4 10E3/UL (ref 0–0.7)
EOSINOPHIL # BLD AUTO: ABNORMAL 10*3/UL
EOSINOPHIL # BLD MANUAL: 0 10E3/UL (ref 0–0.7)
EOSINOPHIL # BLD MANUAL: 0.1 10E3/UL (ref 0–0.7)
EOSINOPHIL NFR BLD AUTO: 4 %
EOSINOPHIL NFR BLD AUTO: 6 %
EOSINOPHIL NFR BLD AUTO: ABNORMAL %
EOSINOPHIL NFR BLD MANUAL: 0 %
EOSINOPHIL NFR BLD MANUAL: 1 %
ERYTHROCYTE [DISTWIDTH] IN BLOOD BY AUTOMATED COUNT: 13.4 % (ref 10–15)
ERYTHROCYTE [DISTWIDTH] IN BLOOD BY AUTOMATED COUNT: 14.2 % (ref 10–15)
ERYTHROCYTE [DISTWIDTH] IN BLOOD BY AUTOMATED COUNT: 14.3 % (ref 10–15)
ERYTHROCYTE [DISTWIDTH] IN BLOOD BY AUTOMATED COUNT: 14.3 % (ref 10–15)
ERYTHROCYTE [DISTWIDTH] IN BLOOD BY AUTOMATED COUNT: 14.4 % (ref 10–15)
ERYTHROCYTE [DISTWIDTH] IN BLOOD BY AUTOMATED COUNT: 14.7 % (ref 10–15)
ERYTHROCYTE [DISTWIDTH] IN BLOOD BY AUTOMATED COUNT: 15.2 % (ref 10–15)
ERYTHROCYTE [DISTWIDTH] IN BLOOD BY AUTOMATED COUNT: 15.2 % (ref 10–15)
ERYTHROCYTE [DISTWIDTH] IN BLOOD BY AUTOMATED COUNT: 15.9 % (ref 10–15)
ERYTHROCYTE [DISTWIDTH] IN BLOOD BY AUTOMATED COUNT: 16.2 % (ref 10–15)
ERYTHROCYTE [DISTWIDTH] IN BLOOD BY AUTOMATED COUNT: 16.2 % (ref 10–15)
ERYTHROCYTE [DISTWIDTH] IN BLOOD BY AUTOMATED COUNT: 16.4 % (ref 10–15)
ERYTHROCYTE [DISTWIDTH] IN BLOOD BY AUTOMATED COUNT: 16.5 % (ref 10–15)
ERYTHROCYTE [DISTWIDTH] IN BLOOD BY AUTOMATED COUNT: 16.8 % (ref 10–15)
GFR SERPL CREATININE-BSD FRML MDRD: 25 ML/MIN/1.73M2
GFR SERPL CREATININE-BSD FRML MDRD: 32 ML/MIN/1.73M2
GGT SERPL-CCNC: 93 U/L (ref 8–61)
GLUCOSE BLDC GLUCOMTR-MCNC: 100 MG/DL (ref 70–99)
GLUCOSE BLDC GLUCOMTR-MCNC: 106 MG/DL (ref 70–99)
GLUCOSE BLDC GLUCOMTR-MCNC: 110 MG/DL (ref 70–99)
GLUCOSE BLDC GLUCOMTR-MCNC: 113 MG/DL (ref 70–99)
GLUCOSE BLDC GLUCOMTR-MCNC: 120 MG/DL (ref 70–99)
GLUCOSE BLDC GLUCOMTR-MCNC: 120 MG/DL (ref 70–99)
GLUCOSE BLDC GLUCOMTR-MCNC: 121 MG/DL (ref 70–99)
GLUCOSE BLDC GLUCOMTR-MCNC: 123 MG/DL (ref 70–99)
GLUCOSE BLDC GLUCOMTR-MCNC: 131 MG/DL (ref 70–99)
GLUCOSE BLDC GLUCOMTR-MCNC: 131 MG/DL (ref 70–99)
GLUCOSE BLDC GLUCOMTR-MCNC: 136 MG/DL (ref 70–99)
GLUCOSE BLDC GLUCOMTR-MCNC: 138 MG/DL (ref 70–99)
GLUCOSE BLDC GLUCOMTR-MCNC: 138 MG/DL (ref 70–99)
GLUCOSE BLDC GLUCOMTR-MCNC: 144 MG/DL (ref 70–99)
GLUCOSE BLDC GLUCOMTR-MCNC: 146 MG/DL (ref 70–99)
GLUCOSE BLDC GLUCOMTR-MCNC: 147 MG/DL (ref 70–99)
GLUCOSE BLDC GLUCOMTR-MCNC: 147 MG/DL (ref 70–99)
GLUCOSE BLDC GLUCOMTR-MCNC: 148 MG/DL (ref 70–99)
GLUCOSE BLDC GLUCOMTR-MCNC: 149 MG/DL (ref 70–99)
GLUCOSE BLDC GLUCOMTR-MCNC: 150 MG/DL (ref 70–99)
GLUCOSE BLDC GLUCOMTR-MCNC: 150 MG/DL (ref 70–99)
GLUCOSE BLDC GLUCOMTR-MCNC: 151 MG/DL (ref 70–99)
GLUCOSE BLDC GLUCOMTR-MCNC: 151 MG/DL (ref 70–99)
GLUCOSE BLDC GLUCOMTR-MCNC: 154 MG/DL (ref 70–99)
GLUCOSE BLDC GLUCOMTR-MCNC: 155 MG/DL (ref 70–99)
GLUCOSE BLDC GLUCOMTR-MCNC: 162 MG/DL (ref 70–99)
GLUCOSE BLDC GLUCOMTR-MCNC: 164 MG/DL (ref 70–99)
GLUCOSE BLDC GLUCOMTR-MCNC: 166 MG/DL (ref 70–99)
GLUCOSE BLDC GLUCOMTR-MCNC: 170 MG/DL (ref 70–99)
GLUCOSE BLDC GLUCOMTR-MCNC: 171 MG/DL (ref 70–99)
GLUCOSE BLDC GLUCOMTR-MCNC: 171 MG/DL (ref 70–99)
GLUCOSE BLDC GLUCOMTR-MCNC: 172 MG/DL (ref 70–99)
GLUCOSE BLDC GLUCOMTR-MCNC: 174 MG/DL (ref 70–99)
GLUCOSE BLDC GLUCOMTR-MCNC: 183 MG/DL (ref 70–99)
GLUCOSE BLDC GLUCOMTR-MCNC: 189 MG/DL (ref 70–99)
GLUCOSE BLDC GLUCOMTR-MCNC: 190 MG/DL (ref 70–99)
GLUCOSE BLDC GLUCOMTR-MCNC: 190 MG/DL (ref 70–99)
GLUCOSE BLDC GLUCOMTR-MCNC: 192 MG/DL (ref 70–99)
GLUCOSE BLDC GLUCOMTR-MCNC: 193 MG/DL (ref 70–99)
GLUCOSE BLDC GLUCOMTR-MCNC: 194 MG/DL (ref 70–99)
GLUCOSE BLDC GLUCOMTR-MCNC: 197 MG/DL (ref 70–99)
GLUCOSE BLDC GLUCOMTR-MCNC: 200 MG/DL (ref 70–99)
GLUCOSE BLDC GLUCOMTR-MCNC: 208 MG/DL (ref 70–99)
GLUCOSE BLDC GLUCOMTR-MCNC: 218 MG/DL (ref 70–99)
GLUCOSE BLDC GLUCOMTR-MCNC: 219 MG/DL (ref 70–99)
GLUCOSE BLDC GLUCOMTR-MCNC: 226 MG/DL (ref 70–99)
GLUCOSE BLDC GLUCOMTR-MCNC: 228 MG/DL (ref 70–99)
GLUCOSE BLDC GLUCOMTR-MCNC: 240 MG/DL (ref 70–99)
GLUCOSE BLDC GLUCOMTR-MCNC: 243 MG/DL (ref 70–99)
GLUCOSE BLDC GLUCOMTR-MCNC: 248 MG/DL (ref 70–99)
GLUCOSE BLDC GLUCOMTR-MCNC: 256 MG/DL (ref 70–99)
GLUCOSE BLDC GLUCOMTR-MCNC: 258 MG/DL (ref 70–99)
GLUCOSE BLDC GLUCOMTR-MCNC: 263 MG/DL (ref 70–99)
GLUCOSE BLDC GLUCOMTR-MCNC: 265 MG/DL (ref 70–99)
GLUCOSE BLDC GLUCOMTR-MCNC: 269 MG/DL (ref 70–99)
GLUCOSE BLDC GLUCOMTR-MCNC: 269 MG/DL (ref 70–99)
GLUCOSE BLDC GLUCOMTR-MCNC: 297 MG/DL (ref 70–99)
GLUCOSE BLDC GLUCOMTR-MCNC: 300 MG/DL (ref 70–99)
GLUCOSE BLDC GLUCOMTR-MCNC: 309 MG/DL (ref 70–99)
GLUCOSE BLDC GLUCOMTR-MCNC: 323 MG/DL (ref 70–99)
GLUCOSE BLDC GLUCOMTR-MCNC: 323 MG/DL (ref 70–99)
GLUCOSE BLDC GLUCOMTR-MCNC: 344 MG/DL (ref 70–99)
GLUCOSE BLDC GLUCOMTR-MCNC: 345 MG/DL (ref 70–99)
GLUCOSE BLDC GLUCOMTR-MCNC: 347 MG/DL (ref 70–99)
GLUCOSE BLDC GLUCOMTR-MCNC: 370 MG/DL (ref 70–99)
GLUCOSE BLDC GLUCOMTR-MCNC: 45 MG/DL (ref 70–99)
GLUCOSE BLDC GLUCOMTR-MCNC: 59 MG/DL (ref 70–99)
GLUCOSE BLDC GLUCOMTR-MCNC: 59 MG/DL (ref 70–99)
GLUCOSE BLDC GLUCOMTR-MCNC: 61 MG/DL (ref 70–99)
GLUCOSE BLDC GLUCOMTR-MCNC: 62 MG/DL (ref 70–99)
GLUCOSE BLDC GLUCOMTR-MCNC: 74 MG/DL (ref 70–99)
GLUCOSE BLDC GLUCOMTR-MCNC: 76 MG/DL (ref 70–99)
GLUCOSE BLDC GLUCOMTR-MCNC: 86 MG/DL (ref 70–99)
GLUCOSE BLDC GLUCOMTR-MCNC: 91 MG/DL (ref 70–99)
GLUCOSE BLDC GLUCOMTR-MCNC: 92 MG/DL (ref 70–99)
GLUCOSE BLDC GLUCOMTR-MCNC: 96 MG/DL (ref 70–99)
GLUCOSE SERPL-MCNC: 109 MG/DL (ref 70–99)
GLUCOSE SERPL-MCNC: 157 MG/DL (ref 70–99)
GLUCOSE SERPL-MCNC: 159 MG/DL (ref 70–99)
GLUCOSE SERPL-MCNC: 165 MG/DL (ref 70–99)
GLUCOSE SERPL-MCNC: 166 MG/DL (ref 70–99)
GLUCOSE SERPL-MCNC: 170 MG/DL (ref 70–99)
GLUCOSE SERPL-MCNC: 191 MG/DL (ref 70–99)
GLUCOSE SERPL-MCNC: 202 MG/DL (ref 70–99)
GLUCOSE SERPL-MCNC: 214 MG/DL (ref 70–99)
GLUCOSE SERPL-MCNC: 222 MG/DL (ref 70–99)
GLUCOSE SERPL-MCNC: 236 MG/DL (ref 70–99)
GLUCOSE SERPL-MCNC: 273 MG/DL (ref 70–99)
GLUCOSE SERPL-MCNC: 352 MG/DL (ref 70–99)
GLUCOSE UR STRIP-MCNC: 300 MG/DL
GLUCOSE UR STRIP-MCNC: >1000 MG/DL
GRAM STAIN RESULT: NORMAL
GRAM STAIN RESULT: NORMAL
HBA1C MFR BLD: 7.6 % (ref 4–5.6)
HBA1C MFR BLD: 9.6 % (ref 4–6.2)
HCT VFR BLD AUTO: 22.9 % (ref 40–53)
HCT VFR BLD AUTO: 23.3 % (ref 40–53)
HCT VFR BLD AUTO: 24.2 % (ref 40–53)
HCT VFR BLD AUTO: 24.7 % (ref 40–53)
HCT VFR BLD AUTO: 24.8 % (ref 40–53)
HCT VFR BLD AUTO: 25.4 % (ref 40–53)
HCT VFR BLD AUTO: 26.4 % (ref 40–53)
HCT VFR BLD AUTO: 26.5 % (ref 40–53)
HCT VFR BLD AUTO: 26.8 % (ref 40–53)
HCT VFR BLD AUTO: 27.8 % (ref 40–53)
HCT VFR BLD AUTO: 28 % (ref 40–53)
HCT VFR BLD AUTO: 28.6 % (ref 40–53)
HCT VFR BLD AUTO: 28.7 % (ref 40–53)
HCT VFR BLD AUTO: 29.3 % (ref 40–53)
HDLC SERPL-MCNC: 44 MG/DL
HGB BLD-MCNC: 7.3 G/DL (ref 13.3–17.7)
HGB BLD-MCNC: 7.4 G/DL (ref 13.3–17.7)
HGB BLD-MCNC: 7.5 G/DL (ref 13.3–17.7)
HGB BLD-MCNC: 7.7 G/DL (ref 13.3–17.7)
HGB BLD-MCNC: 8 G/DL (ref 13.3–17.7)
HGB BLD-MCNC: 8.3 G/DL (ref 13.3–17.7)
HGB BLD-MCNC: 8.4 G/DL (ref 13.3–17.7)
HGB BLD-MCNC: 8.6 G/DL (ref 13.3–17.7)
HGB BLD-MCNC: 9.1 G/DL (ref 13.3–17.7)
HGB BLD-MCNC: 9.1 G/DL (ref 13.3–17.7)
HGB BLD-MCNC: 9.2 G/DL (ref 13.3–17.7)
HGB BLD-MCNC: 9.3 G/DL (ref 13.3–17.7)
HGB BLD-MCNC: 9.7 G/DL (ref 13.3–17.7)
HGB BLD-MCNC: 9.7 G/DL (ref 13.3–17.7)
HGB UR QL STRIP: ABNORMAL
HGB UR QL STRIP: ABNORMAL
HOLD SPECIMEN: NORMAL
HYALINE CASTS: 13 /LPF
IMM GRANULOCYTES # BLD: 0 10E3/UL
IMM GRANULOCYTES # BLD: 0.1 10E3/UL
IMM GRANULOCYTES # BLD: ABNORMAL 10*3/UL
IMM GRANULOCYTES NFR BLD: 1 %
IMM GRANULOCYTES NFR BLD: 1 %
IMM GRANULOCYTES NFR BLD: ABNORMAL %
INR PPP: 1.32 (ref 0.85–1.15)
INR PPP: 1.57 (ref 0.85–1.15)
INTERPRETATION ECG - MUSE: NORMAL
INTERPRETATION ECG - MUSE: NORMAL
KETONES UR STRIP-MCNC: NEGATIVE MG/DL
KETONES UR STRIP-MCNC: NEGATIVE MG/DL
L PNEUMO1 AG UR QL IA: NEGATIVE
LACTATE SERPL-SCNC: 1.2 MMOL/L (ref 0.7–2)
LACTATE SERPL-SCNC: 1.6 MMOL/L (ref 0.7–2)
LACTATE SERPL-SCNC: 1.6 MMOL/L (ref 0.7–2)
LACTATE SERPL-SCNC: 1.7 MMOL/L (ref 0.7–2)
LACTATE SERPL-SCNC: 1.7 MMOL/L (ref 0.7–2)
LACTATE SERPL-SCNC: 1.9 MMOL/L (ref 0.7–2)
LACTATE SERPL-SCNC: 2 MMOL/L (ref 0.7–2)
LACTATE SERPL-SCNC: 2.2 MMOL/L (ref 0.7–2)
LACTATE SERPL-SCNC: 2.6 MMOL/L (ref 0.7–2)
LACTATE SERPL-SCNC: 2.6 MMOL/L (ref 0.7–2)
LACTATE SERPL-SCNC: 2.8 MMOL/L (ref 0.7–2)
LACTATE SERPL-SCNC: 2.9 MMOL/L (ref 0.7–2)
LACTATE SERPL-SCNC: 3.2 MMOL/L (ref 0.7–2)
LACTATE SERPL-SCNC: 3.3 MMOL/L (ref 0.7–2)
LACTATE SERPL-SCNC: 3.6 MMOL/L (ref 0.7–2)
LDL CHOLESTEROL CALCULATED (EXTERNAL): 118 MG/DL (ref 0–100)
LEUKOCYTE ESTERASE UR QL STRIP: NEGATIVE
LEUKOCYTE ESTERASE UR QL STRIP: NEGATIVE
LVEF ECHO: NORMAL
LYMPHOCYTES # BLD AUTO: 0.6 10E3/UL (ref 0.8–5.3)
LYMPHOCYTES # BLD AUTO: 1.1 10E3/UL (ref 0.8–5.3)
LYMPHOCYTES # BLD AUTO: ABNORMAL 10*3/UL
LYMPHOCYTES # BLD MANUAL: 0.2 10E3/UL (ref 0.8–5.3)
LYMPHOCYTES # BLD MANUAL: 0.4 10E3/UL (ref 0.8–5.3)
LYMPHOCYTES NFR BLD AUTO: 20 %
LYMPHOCYTES NFR BLD AUTO: 8 %
LYMPHOCYTES NFR BLD AUTO: ABNORMAL %
LYMPHOCYTES NFR BLD MANUAL: 13 %
LYMPHOCYTES NFR BLD MANUAL: 3 %
MAGNESIUM SERPL-MCNC: 1.7 MG/DL (ref 1.7–2.3)
MCH RBC QN AUTO: 30.2 PG (ref 26.5–33)
MCH RBC QN AUTO: 30.9 PG (ref 26.5–33)
MCH RBC QN AUTO: 31.1 PG (ref 26.5–33)
MCH RBC QN AUTO: 31.4 PG (ref 26.5–33)
MCH RBC QN AUTO: 31.5 PG (ref 26.5–33)
MCH RBC QN AUTO: 31.6 PG (ref 26.5–33)
MCH RBC QN AUTO: 31.7 PG (ref 26.5–33)
MCH RBC QN AUTO: 31.7 PG (ref 26.5–33)
MCH RBC QN AUTO: 31.8 PG (ref 26.5–33)
MCH RBC QN AUTO: 31.9 PG (ref 26.5–33)
MCHC RBC AUTO-ENTMCNC: 30.2 G/DL (ref 31.5–36.5)
MCHC RBC AUTO-ENTMCNC: 31.2 G/DL (ref 31.5–36.5)
MCHC RBC AUTO-ENTMCNC: 31.8 G/DL (ref 31.5–36.5)
MCHC RBC AUTO-ENTMCNC: 31.8 G/DL (ref 31.5–36.5)
MCHC RBC AUTO-ENTMCNC: 32.2 G/DL (ref 31.5–36.5)
MCHC RBC AUTO-ENTMCNC: 32.3 G/DL (ref 31.5–36.5)
MCHC RBC AUTO-ENTMCNC: 32.5 G/DL (ref 31.5–36.5)
MCHC RBC AUTO-ENTMCNC: 32.7 G/DL (ref 31.5–36.5)
MCHC RBC AUTO-ENTMCNC: 32.7 G/DL (ref 31.5–36.5)
MCHC RBC AUTO-ENTMCNC: 32.8 G/DL (ref 31.5–36.5)
MCHC RBC AUTO-ENTMCNC: 33.1 G/DL (ref 31.5–36.5)
MCHC RBC AUTO-ENTMCNC: 33.2 G/DL (ref 31.5–36.5)
MCHC RBC AUTO-ENTMCNC: 33.8 G/DL (ref 31.5–36.5)
MCHC RBC AUTO-ENTMCNC: 34 G/DL (ref 31.5–36.5)
MCV RBC AUTO: 100 FL (ref 78–100)
MCV RBC AUTO: 104 FL (ref 78–100)
MCV RBC AUTO: 93 FL (ref 78–100)
MCV RBC AUTO: 93 FL (ref 78–100)
MCV RBC AUTO: 94 FL (ref 78–100)
MCV RBC AUTO: 95 FL (ref 78–100)
MCV RBC AUTO: 95 FL (ref 78–100)
MCV RBC AUTO: 97 FL (ref 78–100)
MCV RBC AUTO: 98 FL (ref 78–100)
MCV RBC AUTO: 99 FL (ref 78–100)
MCV RBC AUTO: 99 FL (ref 78–100)
METAMYELOCYTES # BLD MANUAL: 0.1 10E3/UL
METAMYELOCYTES # BLD MANUAL: 0.3 10E3/UL
METAMYELOCYTES NFR BLD MANUAL: 1 %
METAMYELOCYTES NFR BLD MANUAL: 21 %
MONOCYTES # BLD AUTO: 0.2 10E3/UL (ref 0–1.3)
MONOCYTES # BLD AUTO: 0.3 10E3/UL (ref 0–1.3)
MONOCYTES # BLD AUTO: ABNORMAL 10*3/UL
MONOCYTES # BLD MANUAL: 0.1 10E3/UL (ref 0–1.3)
MONOCYTES # BLD MANUAL: 0.1 10E3/UL (ref 0–1.3)
MONOCYTES NFR BLD AUTO: 3 %
MONOCYTES NFR BLD AUTO: 5 %
MONOCYTES NFR BLD AUTO: ABNORMAL %
MONOCYTES NFR BLD MANUAL: 1 %
MONOCYTES NFR BLD MANUAL: 4 %
MRSA DNA SPEC QL NAA+PROBE: NEGATIVE
MUCOUS THREADS #/AREA URNS LPF: PRESENT /LPF
MUCOUS THREADS #/AREA URNS LPF: PRESENT /LPF
MYELOCYTES # BLD MANUAL: 0 10E3/UL
MYELOCYTES NFR BLD MANUAL: 2 %
NEUTROPHILS # BLD AUTO: 3.8 10E3/UL (ref 1.6–8.3)
NEUTROPHILS # BLD AUTO: 7.1 10E3/UL (ref 1.6–8.3)
NEUTROPHILS # BLD AUTO: ABNORMAL 10*3/UL
NEUTROPHILS # BLD MANUAL: 0.8 10E3/UL (ref 1.6–8.3)
NEUTROPHILS # BLD MANUAL: 11.9 10E3/UL (ref 1.6–8.3)
NEUTROPHILS NFR BLD AUTO: 67 %
NEUTROPHILS NFR BLD AUTO: 83 %
NEUTROPHILS NFR BLD AUTO: ABNORMAL %
NEUTROPHILS NFR BLD MANUAL: 60 %
NEUTROPHILS NFR BLD MANUAL: 94 %
NITRATE UR QL: NEGATIVE
NITRATE UR QL: NEGATIVE
NON HDL CHOLESTEROL (EXTERNAL): 166 MG/DL (ref 0–130)
NRBC # BLD AUTO: 0 10E3/UL
NRBC BLD AUTO-RTO: 0 /100
NRBC BLD MANUAL-RTO: 1 %
NT-PROBNP SERPL-MCNC: ABNORMAL PG/ML (ref 0–900)
P AXIS - MUSE: 36 DEGREES
P AXIS - MUSE: 76 DEGREES
PH UR STRIP: 6 [PH] (ref 5–9)
PH UR STRIP: 6 [PH] (ref 5–9)
PLAT MORPH BLD: ABNORMAL
PLAT MORPH BLD: ABNORMAL
PLATELET # BLD AUTO: 245 10E3/UL (ref 150–450)
PLATELET # BLD AUTO: 266 10E3/UL (ref 150–450)
PLATELET # BLD AUTO: 275 10E3/UL (ref 150–450)
PLATELET # BLD AUTO: 275 10E3/UL (ref 150–450)
PLATELET # BLD AUTO: 285 10E3/UL (ref 150–450)
PLATELET # BLD AUTO: 294 10E3/UL (ref 150–450)
PLATELET # BLD AUTO: 295 10E3/UL (ref 150–450)
PLATELET # BLD AUTO: 322 10E3/UL (ref 150–450)
PLATELET # BLD AUTO: 326 10E3/UL (ref 150–450)
PLATELET # BLD AUTO: 361 10E3/UL (ref 150–450)
PLATELET # BLD AUTO: 373 10E3/UL (ref 150–450)
PLATELET # BLD AUTO: 421 10E3/UL (ref 150–450)
PLATELET # BLD AUTO: 423 10E3/UL (ref 150–450)
PLATELET # BLD AUTO: 626 10E3/UL (ref 150–450)
POTASSIUM SERPL-SCNC: 3 MMOL/L (ref 3.4–5.3)
POTASSIUM SERPL-SCNC: 3 MMOL/L (ref 3.4–5.3)
POTASSIUM SERPL-SCNC: 3.1 MMOL/L (ref 3.4–5.3)
POTASSIUM SERPL-SCNC: 3.2 MMOL/L (ref 3.4–5.3)
POTASSIUM SERPL-SCNC: 3.3 MMOL/L (ref 3.4–5.3)
POTASSIUM SERPL-SCNC: 3.5 MMOL/L (ref 3.4–5.3)
POTASSIUM SERPL-SCNC: 3.6 MMOL/L (ref 3.4–5.3)
POTASSIUM SERPL-SCNC: 3.7 MMOL/L (ref 3.4–5.3)
POTASSIUM SERPL-SCNC: 3.7 MMOL/L (ref 3.4–5.3)
POTASSIUM SERPL-SCNC: 3.8 MMOL/L (ref 3.4–5.3)
POTASSIUM SERPL-SCNC: 4.3 MMOL/L (ref 3.4–5.3)
POTASSIUM SERPL-SCNC: 4.5 MMOL/L (ref 3.4–5.3)
POTASSIUM SERPL-SCNC: 4.7 MMOL/L (ref 3.4–5.3)
POTASSIUM SERPL-SCNC: 4.7 MMOL/L (ref 3.4–5.3)
POTASSIUM SERPL-SCNC: 5.3 MMOL/L (ref 3.4–5.3)
POTASSIUM SERPL-SCNC: 5.4 MMOL/L (ref 3.4–5.3)
POTASSIUM SERPL-SCNC: 5.8 MMOL/L (ref 3.4–5.3)
POTASSIUM SERPL-SCNC: 6 MMOL/L (ref 3.4–5.3)
POTASSIUM SERPL-SCNC: 6 MMOL/L (ref 3.4–5.3)
PR INTERVAL - MUSE: 202 MS
PR INTERVAL - MUSE: 202 MS
PREALB SERPL-MCNC: 3.6 MG/DL (ref 20–40)
PROCALCITONIN SERPL IA-MCNC: 1.23 NG/ML
PROCALCITONIN SERPL IA-MCNC: 14.28 NG/ML
PROCALCITONIN SERPL IA-MCNC: 22.43 NG/ML
PROCALCITONIN SERPL IA-MCNC: 3.1 NG/ML
PROCALCITONIN SERPL IA-MCNC: 6.92 NG/ML
PROT SERPL-MCNC: 4.3 G/DL (ref 6.4–8.3)
PROT SERPL-MCNC: 4.6 G/DL (ref 6.4–8.3)
PROT SERPL-MCNC: 4.7 G/DL (ref 6.4–8.3)
PROT SERPL-MCNC: 4.7 G/DL (ref 6.4–8.3)
PROT SERPL-MCNC: 4.8 G/DL (ref 6.4–8.3)
PROT SERPL-MCNC: 4.9 G/DL (ref 6.4–8.3)
PROT SERPL-MCNC: 5 G/DL (ref 6.4–8.3)
PROT SERPL-MCNC: 5.3 G/DL (ref 6.4–8.3)
PROT SERPL-MCNC: 5.4 G/DL (ref 6.4–8.3)
PROT SERPL-MCNC: 5.6 G/DL (ref 6.4–8.3)
QRS DURATION - MUSE: 110 MS
QRS DURATION - MUSE: 132 MS
QT - MUSE: 358 MS
QT - MUSE: 380 MS
QTC - MUSE: 461 MS
QTC - MUSE: 472 MS
R AXIS - MUSE: -24 DEGREES
R AXIS - MUSE: 9 DEGREES
RBC # BLD AUTO: 2.32 10E6/UL (ref 4.4–5.9)
RBC # BLD AUTO: 2.33 10E6/UL (ref 4.4–5.9)
RBC # BLD AUTO: 2.37 10E6/UL (ref 4.4–5.9)
RBC # BLD AUTO: 2.49 10E6/UL (ref 4.4–5.9)
RBC # BLD AUTO: 2.51 10E6/UL (ref 4.4–5.9)
RBC # BLD AUTO: 2.62 10E6/UL (ref 4.4–5.9)
RBC # BLD AUTO: 2.72 10E6/UL (ref 4.4–5.9)
RBC # BLD AUTO: 2.78 10E6/UL (ref 4.4–5.9)
RBC # BLD AUTO: 2.87 10E6/UL (ref 4.4–5.9)
RBC # BLD AUTO: 2.88 10E6/UL (ref 4.4–5.9)
RBC # BLD AUTO: 2.91 10E6/UL (ref 4.4–5.9)
RBC # BLD AUTO: 2.99 10E6/UL (ref 4.4–5.9)
RBC # BLD AUTO: 3.07 10E6/UL (ref 4.4–5.9)
RBC # BLD AUTO: 3.09 10E6/UL (ref 4.4–5.9)
RBC MORPH BLD: ABNORMAL
RBC MORPH BLD: ABNORMAL
RBC URINE: 1 /HPF
RBC URINE: 4 /HPF
S PNEUM AG SPEC QL: NEGATIVE
SA TARGET DNA: NEGATIVE
SARS-COV-2 RNA RESP QL NAA+PROBE: NEGATIVE
SARS-COV-2 RNA RESP QL NAA+PROBE: NEGATIVE
SMUDGE CELLS BLD QL SMEAR: PRESENT
SODIUM SERPL-SCNC: 133 MMOL/L (ref 136–145)
SODIUM SERPL-SCNC: 135 MMOL/L (ref 135–145)
SODIUM SERPL-SCNC: 135 MMOL/L (ref 135–145)
SODIUM SERPL-SCNC: 135 MMOL/L (ref 136–145)
SODIUM SERPL-SCNC: 136 MMOL/L (ref 135–145)
SODIUM SERPL-SCNC: 137 MMOL/L (ref 135–145)
SODIUM SERPL-SCNC: 138 MMOL/L (ref 135–145)
SODIUM SERPL-SCNC: 140 MMOL/L (ref 135–145)
SODIUM SERPL-SCNC: 140 MMOL/L (ref 135–145)
SODIUM SERPL-SCNC: 142 MMOL/L (ref 135–145)
SODIUM SERPL-SCNC: 142 MMOL/L (ref 135–145)
SP GR UR STRIP: 1.02 (ref 1–1.03)
SP GR UR STRIP: 1.02 (ref 1–1.03)
SYSTOLIC BLOOD PRESSURE - MUSE: NORMAL MMHG
SYSTOLIC BLOOD PRESSURE - MUSE: NORMAL MMHG
T AXIS - MUSE: 121 DEGREES
T AXIS - MUSE: 145 DEGREES
T3FREE SERPL-MCNC: 1 PG/ML (ref 2–4.4)
T4 FREE SERPL-MCNC: 0.43 NG/DL (ref 0.9–1.7)
TRIGLYCERIDES (EXTERNAL): 241 MG/DL
TROPONIN T SERPL HS-MCNC: 1167 NG/L
TROPONIN T SERPL HS-MCNC: 1347 NG/L
TROPONIN T SERPL HS-MCNC: 1650 NG/L
TROPONIN T SERPL HS-MCNC: 1745 NG/L
TROPONIN T SERPL HS-MCNC: 1856 NG/L
TROPONIN T SERPL HS-MCNC: 2258 NG/L
TROPONIN T SERPL HS-MCNC: 766 NG/L
TROPONIN T SERPL HS-MCNC: 873 NG/L
TSH SERPL DL<=0.005 MIU/L-ACNC: 21.44 UIU/ML (ref 0.3–4.2)
TSH SERPL-ACNC: 46.68 UIU/ML (ref 0.4–3.99)
UROBILINOGEN UR STRIP-MCNC: 2 MG/DL
UROBILINOGEN UR STRIP-MCNC: NORMAL MG/DL
VANCOMYCIN SERPL-MCNC: 22.1 UG/ML
VENTRICULAR RATE- MUSE: 100 BPM
VENTRICULAR RATE- MUSE: 93 BPM
WBC # BLD AUTO: 1.4 10E3/UL (ref 4–11)
WBC # BLD AUTO: 10.6 10E3/UL (ref 4–11)
WBC # BLD AUTO: 12.7 10E3/UL (ref 4–11)
WBC # BLD AUTO: 13.9 10E3/UL (ref 4–11)
WBC # BLD AUTO: 15.1 10E3/UL (ref 4–11)
WBC # BLD AUTO: 5.3 10E3/UL (ref 4–11)
WBC # BLD AUTO: 5.6 10E3/UL (ref 4–11)
WBC # BLD AUTO: 6.1 10E3/UL (ref 4–11)
WBC # BLD AUTO: 6.2 10E3/UL (ref 4–11)
WBC # BLD AUTO: 7.2 10E3/UL (ref 4–11)
WBC # BLD AUTO: 7.3 10E3/UL (ref 4–11)
WBC # BLD AUTO: 7.5 10E3/UL (ref 4–11)
WBC # BLD AUTO: 8.4 10E3/UL (ref 4–11)
WBC # BLD AUTO: 8.7 10E3/UL (ref 4–11)
WBC URINE: 0 /HPF
WBC URINE: 2 /HPF

## 2023-01-01 PROCEDURE — 87635 SARS-COV-2 COVID-19 AMP PRB: CPT | Performed by: FAMILY MEDICINE

## 2023-01-01 PROCEDURE — 87899 AGENT NOS ASSAY W/OPTIC: CPT | Performed by: INTERNAL MEDICINE

## 2023-01-01 PROCEDURE — 250N000013 HC RX MED GY IP 250 OP 250 PS 637: Performed by: INTERNAL MEDICINE

## 2023-01-01 PROCEDURE — 97530 THERAPEUTIC ACTIVITIES: CPT | Mod: GP

## 2023-01-01 PROCEDURE — 83605 ASSAY OF LACTIC ACID: CPT | Performed by: INTERNAL MEDICINE

## 2023-01-01 PROCEDURE — 36415 COLL VENOUS BLD VENIPUNCTURE: CPT | Performed by: INTERNAL MEDICINE

## 2023-01-01 PROCEDURE — P9047 ALBUMIN (HUMAN), 25%, 50ML: HCPCS | Performed by: INTERNAL MEDICINE

## 2023-01-01 PROCEDURE — 36410 VNPNXR 3YR/> PHY/QHP DX/THER: CPT | Performed by: NURSE ANESTHETIST, CERTIFIED REGISTERED

## 2023-01-01 PROCEDURE — 120N000001 HC R&B MED SURG/OB

## 2023-01-01 PROCEDURE — 99232 SBSQ HOSP IP/OBS MODERATE 35: CPT | Performed by: INTERNAL MEDICINE

## 2023-01-01 PROCEDURE — 250N000011 HC RX IP 250 OP 636: Mod: JZ | Performed by: INTERNAL MEDICINE

## 2023-01-01 PROCEDURE — 85610 PROTHROMBIN TIME: CPT | Performed by: FAMILY MEDICINE

## 2023-01-01 PROCEDURE — 84134 ASSAY OF PREALBUMIN: CPT | Performed by: INTERNAL MEDICINE

## 2023-01-01 PROCEDURE — 250N000013 HC RX MED GY IP 250 OP 250 PS 637: Performed by: FAMILY MEDICINE

## 2023-01-01 PROCEDURE — 97535 SELF CARE MNGMENT TRAINING: CPT | Mod: GO

## 2023-01-01 PROCEDURE — 87040 BLOOD CULTURE FOR BACTERIA: CPT | Performed by: INTERNAL MEDICINE

## 2023-01-01 PROCEDURE — 258N000003 HC RX IP 258 OP 636: Performed by: INTERNAL MEDICINE

## 2023-01-01 PROCEDURE — 84132 ASSAY OF SERUM POTASSIUM: CPT | Performed by: INTERNAL MEDICINE

## 2023-01-01 PROCEDURE — 84132 ASSAY OF SERUM POTASSIUM: CPT | Performed by: FAMILY MEDICINE

## 2023-01-01 PROCEDURE — 82962 GLUCOSE BLOOD TEST: CPT

## 2023-01-01 PROCEDURE — 36415 COLL VENOUS BLD VENIPUNCTURE: CPT | Performed by: FAMILY MEDICINE

## 2023-01-01 PROCEDURE — 80053 COMPREHEN METABOLIC PANEL: CPT | Performed by: INTERNAL MEDICINE

## 2023-01-01 PROCEDURE — 97161 PT EVAL LOW COMPLEX 20 MIN: CPT | Mod: GP

## 2023-01-01 PROCEDURE — 84145 PROCALCITONIN (PCT): CPT | Performed by: INTERNAL MEDICINE

## 2023-01-01 PROCEDURE — 97530 THERAPEUTIC ACTIVITIES: CPT | Mod: GO

## 2023-01-01 PROCEDURE — 85610 PROTHROMBIN TIME: CPT | Performed by: INTERNAL MEDICINE

## 2023-01-01 PROCEDURE — G0463 HOSPITAL OUTPT CLINIC VISIT: HCPCS

## 2023-01-01 PROCEDURE — 87205 SMEAR GRAM STAIN: CPT | Performed by: INTERNAL MEDICINE

## 2023-01-01 PROCEDURE — 84145 PROCALCITONIN (PCT): CPT | Performed by: FAMILY MEDICINE

## 2023-01-01 PROCEDURE — 85027 COMPLETE CBC AUTOMATED: CPT | Performed by: FAMILY MEDICINE

## 2023-01-01 PROCEDURE — 84484 ASSAY OF TROPONIN QUANT: CPT | Performed by: INTERNAL MEDICINE

## 2023-01-01 PROCEDURE — 82550 ASSAY OF CK (CPK): CPT | Performed by: INTERNAL MEDICINE

## 2023-01-01 PROCEDURE — 250N000011 HC RX IP 250 OP 636: Mod: JZ | Performed by: HOSPITALIST

## 2023-01-01 PROCEDURE — 97116 GAIT TRAINING THERAPY: CPT | Mod: GP

## 2023-01-01 PROCEDURE — 84443 ASSAY THYROID STIM HORMONE: CPT | Performed by: INTERNAL MEDICINE

## 2023-01-01 PROCEDURE — 80053 COMPREHEN METABOLIC PANEL: CPT | Performed by: FAMILY MEDICINE

## 2023-01-01 PROCEDURE — G0378 HOSPITAL OBSERVATION PER HR: HCPCS

## 2023-01-01 PROCEDURE — 84439 ASSAY OF FREE THYROXINE: CPT | Performed by: FAMILY MEDICINE

## 2023-01-01 PROCEDURE — 82248 BILIRUBIN DIRECT: CPT | Performed by: INTERNAL MEDICINE

## 2023-01-01 PROCEDURE — 250N000011 HC RX IP 250 OP 636: Performed by: FAMILY MEDICINE

## 2023-01-01 PROCEDURE — 97535 SELF CARE MNGMENT TRAINING: CPT | Mod: GO | Performed by: OCCUPATIONAL THERAPIST

## 2023-01-01 PROCEDURE — 99232 SBSQ HOSP IP/OBS MODERATE 35: CPT | Performed by: FAMILY MEDICINE

## 2023-01-01 PROCEDURE — 99239 HOSP IP/OBS DSCHRG MGMT >30: CPT | Performed by: FAMILY MEDICINE

## 2023-01-01 PROCEDURE — 82040 ASSAY OF SERUM ALBUMIN: CPT | Performed by: FAMILY MEDICINE

## 2023-01-01 PROCEDURE — 36415 COLL VENOUS BLD VENIPUNCTURE: CPT | Performed by: HOSPITALIST

## 2023-01-01 PROCEDURE — 250N000013 HC RX MED GY IP 250 OP 250 PS 637: Performed by: HOSPITALIST

## 2023-01-01 PROCEDURE — 82977 ASSAY OF GGT: CPT | Performed by: FAMILY MEDICINE

## 2023-01-01 PROCEDURE — 76770 US EXAM ABDO BACK WALL COMP: CPT

## 2023-01-01 PROCEDURE — 97530 THERAPEUTIC ACTIVITIES: CPT | Mod: GO | Performed by: OCCUPATIONAL THERAPIST

## 2023-01-01 PROCEDURE — 99231 SBSQ HOSP IP/OBS SF/LOW 25: CPT | Performed by: FAMILY MEDICINE

## 2023-01-01 PROCEDURE — 85730 THROMBOPLASTIN TIME PARTIAL: CPT | Performed by: INTERNAL MEDICINE

## 2023-01-01 PROCEDURE — 99233 SBSQ HOSP IP/OBS HIGH 50: CPT | Performed by: INTERNAL MEDICINE

## 2023-01-01 PROCEDURE — 86140 C-REACTIVE PROTEIN: CPT | Performed by: INTERNAL MEDICINE

## 2023-01-01 PROCEDURE — 81001 URINALYSIS AUTO W/SCOPE: CPT | Performed by: INTERNAL MEDICINE

## 2023-01-01 PROCEDURE — 99214 OFFICE O/P EST MOD 30 MIN: CPT | Performed by: NURSE PRACTITIONER

## 2023-01-01 PROCEDURE — 80048 BASIC METABOLIC PNL TOTAL CA: CPT | Performed by: FAMILY MEDICINE

## 2023-01-01 PROCEDURE — 80048 BASIC METABOLIC PNL TOTAL CA: CPT | Performed by: INTERNAL MEDICINE

## 2023-01-01 PROCEDURE — 250N000009 HC RX 250: Performed by: INTERNAL MEDICINE

## 2023-01-01 PROCEDURE — 71045 X-RAY EXAM CHEST 1 VIEW: CPT

## 2023-01-01 PROCEDURE — 85027 COMPLETE CBC AUTOMATED: CPT | Performed by: INTERNAL MEDICINE

## 2023-01-01 PROCEDURE — 85027 COMPLETE CBC AUTOMATED: CPT | Performed by: HOSPITALIST

## 2023-01-01 PROCEDURE — 87086 URINE CULTURE/COLONY COUNT: CPT | Performed by: INTERNAL MEDICINE

## 2023-01-01 PROCEDURE — 84484 ASSAY OF TROPONIN QUANT: CPT | Performed by: FAMILY MEDICINE

## 2023-01-01 PROCEDURE — 85018 HEMOGLOBIN: CPT | Performed by: FAMILY MEDICINE

## 2023-01-01 PROCEDURE — 85730 THROMBOPLASTIN TIME PARTIAL: CPT | Performed by: HOSPITALIST

## 2023-01-01 PROCEDURE — 85004 AUTOMATED DIFF WBC COUNT: CPT | Performed by: FAMILY MEDICINE

## 2023-01-01 PROCEDURE — 250N000012 HC RX MED GY IP 250 OP 636 PS 637: Performed by: INTERNAL MEDICINE

## 2023-01-01 PROCEDURE — 83605 ASSAY OF LACTIC ACID: CPT | Performed by: FAMILY MEDICINE

## 2023-01-01 PROCEDURE — 97110 THERAPEUTIC EXERCISES: CPT | Mod: GP

## 2023-01-01 PROCEDURE — 85018 HEMOGLOBIN: CPT | Performed by: INTERNAL MEDICINE

## 2023-01-01 PROCEDURE — 93306 TTE W/DOPPLER COMPLETE: CPT

## 2023-01-01 PROCEDURE — 99207 PR NO CHARGE LOS: CPT | Performed by: INTERNAL MEDICINE

## 2023-01-01 PROCEDURE — 250N000011 HC RX IP 250 OP 636: Performed by: INTERNAL MEDICINE

## 2023-01-01 PROCEDURE — 84484 ASSAY OF TROPONIN QUANT: CPT | Performed by: HOSPITALIST

## 2023-01-01 PROCEDURE — 87641 MR-STAPH DNA AMP PROBE: CPT | Performed by: INTERNAL MEDICINE

## 2023-01-01 PROCEDURE — 93306 TTE W/DOPPLER COMPLETE: CPT | Mod: 26 | Performed by: STUDENT IN AN ORGANIZED HEALTH CARE EDUCATION/TRAINING PROGRAM

## 2023-01-01 PROCEDURE — 97165 OT EVAL LOW COMPLEX 30 MIN: CPT | Mod: GO

## 2023-01-01 PROCEDURE — 80202 ASSAY OF VANCOMYCIN: CPT | Performed by: INTERNAL MEDICINE

## 2023-01-01 PROCEDURE — 83880 ASSAY OF NATRIURETIC PEPTIDE: CPT | Performed by: INTERNAL MEDICINE

## 2023-01-01 PROCEDURE — 83880 ASSAY OF NATRIURETIC PEPTIDE: CPT | Performed by: FAMILY MEDICINE

## 2023-01-01 PROCEDURE — 93010 ELECTROCARDIOGRAM REPORT: CPT | Performed by: INTERNAL MEDICINE

## 2023-01-01 PROCEDURE — 85007 BL SMEAR W/DIFF WBC COUNT: CPT | Performed by: INTERNAL MEDICINE

## 2023-01-01 PROCEDURE — 81001 URINALYSIS AUTO W/SCOPE: CPT | Mod: ZL | Performed by: NURSE PRACTITIONER

## 2023-01-01 PROCEDURE — 82310 ASSAY OF CALCIUM: CPT | Performed by: FAMILY MEDICINE

## 2023-01-01 PROCEDURE — 250N000011 HC RX IP 250 OP 636: Mod: JZ | Performed by: FAMILY MEDICINE

## 2023-01-01 PROCEDURE — 85025 COMPLETE CBC W/AUTO DIFF WBC: CPT | Performed by: FAMILY MEDICINE

## 2023-01-01 PROCEDURE — 83036 HEMOGLOBIN GLYCOSYLATED A1C: CPT | Performed by: INTERNAL MEDICINE

## 2023-01-01 PROCEDURE — 87070 CULTURE OTHR SPECIMN AEROBIC: CPT | Performed by: INTERNAL MEDICINE

## 2023-01-01 PROCEDURE — 76705 ECHO EXAM OF ABDOMEN: CPT

## 2023-01-01 PROCEDURE — 93971 EXTREMITY STUDY: CPT | Mod: LT

## 2023-01-01 PROCEDURE — 87040 BLOOD CULTURE FOR BACTERIA: CPT | Performed by: FAMILY MEDICINE

## 2023-01-01 PROCEDURE — 84481 FREE ASSAY (FT-3): CPT | Performed by: FAMILY MEDICINE

## 2023-01-01 PROCEDURE — 85007 BL SMEAR W/DIFF WBC COUNT: CPT | Performed by: FAMILY MEDICINE

## 2023-01-01 PROCEDURE — 83735 ASSAY OF MAGNESIUM: CPT | Performed by: INTERNAL MEDICINE

## 2023-01-01 PROCEDURE — 82140 ASSAY OF AMMONIA: CPT | Performed by: FAMILY MEDICINE

## 2023-01-01 RX ORDER — FUROSEMIDE 10 MG/ML
40 INJECTION INTRAMUSCULAR; INTRAVENOUS EVERY 8 HOURS
Status: DISCONTINUED | OUTPATIENT
Start: 2023-01-01 | End: 2023-01-01

## 2023-01-01 RX ORDER — SODIUM CHLORIDE 9 MG/ML
INJECTION, SOLUTION INTRAVENOUS CONTINUOUS PRN
Status: DISCONTINUED | OUTPATIENT
Start: 2023-01-01 | End: 2023-01-01

## 2023-01-01 RX ORDER — HYDROMORPHONE HYDROCHLORIDE 1 MG/ML
0.5 INJECTION, SOLUTION INTRAMUSCULAR; INTRAVENOUS; SUBCUTANEOUS
Status: DISCONTINUED | OUTPATIENT
Start: 2023-01-01 | End: 2023-01-01

## 2023-01-01 RX ORDER — OXYCODONE HYDROCHLORIDE 5 MG/1
5 TABLET ORAL EVERY 4 HOURS PRN
Status: DISCONTINUED | OUTPATIENT
Start: 2023-01-01 | End: 2023-01-01 | Stop reason: HOSPADM

## 2023-01-01 RX ORDER — ACETAMINOPHEN 500 MG
1000 TABLET ORAL EVERY 6 HOURS PRN
COMMUNITY

## 2023-01-01 RX ORDER — NALOXONE HYDROCHLORIDE 0.4 MG/ML
0.2 INJECTION, SOLUTION INTRAMUSCULAR; INTRAVENOUS; SUBCUTANEOUS
Status: DISCONTINUED | OUTPATIENT
Start: 2023-01-01 | End: 2023-01-01 | Stop reason: HOSPADM

## 2023-01-01 RX ORDER — NIFEDIPINE 30 MG/1
TABLET, EXTENDED RELEASE ORAL
Status: ON HOLD | COMMUNITY
Start: 2022-06-16 | End: 2023-01-01

## 2023-01-01 RX ORDER — POLYETHYLENE GLYCOL 3350 17 G/17G
17 POWDER, FOR SOLUTION ORAL
Status: ON HOLD | COMMUNITY
Start: 2023-01-01 | End: 2023-01-01

## 2023-01-01 RX ORDER — OLANZAPINE 10 MG/2ML
2.5 INJECTION, POWDER, FOR SOLUTION INTRAMUSCULAR EVERY 6 HOURS PRN
Status: DISCONTINUED | OUTPATIENT
Start: 2023-01-01 | End: 2023-01-01 | Stop reason: HOSPADM

## 2023-01-01 RX ORDER — CEFEPIME HYDROCHLORIDE 2 G/1
2 INJECTION, POWDER, FOR SOLUTION INTRAVENOUS EVERY 24 HOURS
Status: DISCONTINUED | OUTPATIENT
Start: 2023-01-01 | End: 2023-01-01

## 2023-01-01 RX ORDER — HYDROMORPHONE HCL IN WATER/PF 6 MG/30 ML
0.2 PATIENT CONTROLLED ANALGESIA SYRINGE INTRAVENOUS
Status: DISCONTINUED | OUTPATIENT
Start: 2023-01-01 | End: 2023-01-01

## 2023-01-01 RX ORDER — HEPARIN SODIUM 5000 [USP'U]/.5ML
5000 INJECTION, SOLUTION INTRAVENOUS; SUBCUTANEOUS 2 TIMES DAILY
Status: DISCONTINUED | OUTPATIENT
Start: 2023-01-01 | End: 2023-01-01

## 2023-01-01 RX ORDER — ASPIRIN 81 MG/1
81 TABLET, CHEWABLE ORAL DAILY
Status: DISCONTINUED | OUTPATIENT
Start: 2023-01-01 | End: 2023-01-01 | Stop reason: HOSPADM

## 2023-01-01 RX ORDER — VANCOMYCIN HYDROCHLORIDE 500 MG/10ML
500 INJECTION, POWDER, LYOPHILIZED, FOR SOLUTION INTRAVENOUS EVERY 24 HOURS
Status: DISCONTINUED | OUTPATIENT
Start: 2023-01-01 | End: 2023-01-01

## 2023-01-01 RX ORDER — NALOXONE HYDROCHLORIDE 0.4 MG/ML
0.4 INJECTION, SOLUTION INTRAMUSCULAR; INTRAVENOUS; SUBCUTANEOUS
Status: DISCONTINUED | OUTPATIENT
Start: 2023-01-01 | End: 2023-01-01 | Stop reason: HOSPADM

## 2023-01-01 RX ORDER — POTASSIUM CHLORIDE 1500 MG/1
20 TABLET, EXTENDED RELEASE ORAL ONCE
Status: COMPLETED | OUTPATIENT
Start: 2023-01-01 | End: 2023-01-01

## 2023-01-01 RX ORDER — BACITRACIN ZINC 500 [USP'U]/G
OINTMENT TOPICAL 2 TIMES DAILY PRN
Status: DISCONTINUED | OUTPATIENT
Start: 2023-01-01 | End: 2023-01-01 | Stop reason: HOSPADM

## 2023-01-01 RX ORDER — ALBUMIN (HUMAN) 12.5 G/50ML
25 SOLUTION INTRAVENOUS EVERY 6 HOURS
Status: DISCONTINUED | OUTPATIENT
Start: 2023-01-01 | End: 2023-01-01

## 2023-01-01 RX ORDER — POTASSIUM CHLORIDE 1500 MG/1
40 TABLET, EXTENDED RELEASE ORAL DAILY
COMMUNITY

## 2023-01-01 RX ORDER — BISACODYL 10 MG
10 SUPPOSITORY, RECTAL RECTAL DAILY PRN
Status: DISCONTINUED | OUTPATIENT
Start: 2023-01-01 | End: 2023-01-01 | Stop reason: HOSPADM

## 2023-01-01 RX ORDER — HEPARIN SODIUM 10000 [USP'U]/100ML
0-5000 INJECTION, SOLUTION INTRAVENOUS CONTINUOUS
Status: DISPENSED | OUTPATIENT
Start: 2023-01-01 | End: 2023-01-01

## 2023-01-01 RX ORDER — MORPHINE SULFATE 2 MG/ML
2 INJECTION, SOLUTION INTRAMUSCULAR; INTRAVENOUS EVERY 4 HOURS PRN
Status: DISCONTINUED | OUTPATIENT
Start: 2023-01-01 | End: 2023-01-01 | Stop reason: HOSPADM

## 2023-01-01 RX ORDER — DOXAZOSIN 1 MG/1
2 TABLET ORAL AT BEDTIME
Status: DISCONTINUED | OUTPATIENT
Start: 2023-01-01 | End: 2023-01-01

## 2023-01-01 RX ORDER — POTASSIUM CHLORIDE 1500 MG/1
20 TABLET, EXTENDED RELEASE ORAL 2 TIMES DAILY
Status: DISCONTINUED | OUTPATIENT
Start: 2023-01-01 | End: 2023-01-01

## 2023-01-01 RX ORDER — POTASSIUM CHLORIDE 1500 MG/1
40 TABLET, EXTENDED RELEASE ORAL ONCE
Status: COMPLETED | OUTPATIENT
Start: 2023-01-01 | End: 2023-01-01

## 2023-01-01 RX ORDER — DOXAZOSIN 4 MG/1
4 TABLET ORAL AT BEDTIME
Status: DISCONTINUED | OUTPATIENT
Start: 2023-01-01 | End: 2023-01-01 | Stop reason: HOSPADM

## 2023-01-01 RX ORDER — POTASSIUM CHLORIDE 1.5 G/1.58G
20 POWDER, FOR SOLUTION ORAL ONCE
Status: COMPLETED | OUTPATIENT
Start: 2023-01-01 | End: 2023-01-01

## 2023-01-01 RX ORDER — SODIUM CHLORIDE 9 MG/ML
INJECTION, SOLUTION INTRAVENOUS CONTINUOUS
Status: DISCONTINUED | OUTPATIENT
Start: 2023-01-01 | End: 2023-01-01

## 2023-01-01 RX ORDER — DULOXETIN HYDROCHLORIDE 20 MG/1
2 CAPSULE, DELAYED RELEASE ORAL EVERY MORNING
COMMUNITY
Start: 2023-01-01

## 2023-01-01 RX ORDER — PANTOPRAZOLE SODIUM 40 MG/1
40 TABLET, DELAYED RELEASE ORAL
Status: DISCONTINUED | OUTPATIENT
Start: 2023-01-01 | End: 2023-01-01 | Stop reason: HOSPADM

## 2023-01-01 RX ORDER — DIPHENHYDRAMINE HCL 25 MG
25 CAPSULE ORAL EVERY 6 HOURS PRN
Status: DISCONTINUED | OUTPATIENT
Start: 2023-01-01 | End: 2023-01-01 | Stop reason: HOSPADM

## 2023-01-01 RX ORDER — OXYCODONE HYDROCHLORIDE 5 MG/1
1 TABLET ORAL EVERY 6 HOURS PRN
COMMUNITY
Start: 2023-01-01

## 2023-01-01 RX ORDER — BACITRACIN ZINC 500 [USP'U]/G
OINTMENT TOPICAL 2 TIMES DAILY
COMMUNITY

## 2023-01-01 RX ORDER — FUROSEMIDE 10 MG/ML
40 INJECTION INTRAMUSCULAR; INTRAVENOUS EVERY 12 HOURS
Status: DISCONTINUED | OUTPATIENT
Start: 2023-01-01 | End: 2023-01-01

## 2023-01-01 RX ORDER — POTASSIUM CHLORIDE 20MEQ/15ML
40 LIQUID (ML) ORAL ONCE
Status: COMPLETED | OUTPATIENT
Start: 2023-01-01 | End: 2023-01-01

## 2023-01-01 RX ORDER — TORSEMIDE 20 MG/1
40 TABLET ORAL 2 TIMES DAILY
COMMUNITY

## 2023-01-01 RX ORDER — NITROGLYCERIN 0.4 MG/1
0.4 TABLET SUBLINGUAL EVERY 5 MIN PRN
Status: DISCONTINUED | OUTPATIENT
Start: 2023-01-01 | End: 2023-01-01 | Stop reason: HOSPADM

## 2023-01-01 RX ORDER — METHYLPHENIDATE HYDROCHLORIDE 5 MG/1
10 TABLET ORAL 4 TIMES DAILY
Status: DISCONTINUED | OUTPATIENT
Start: 2023-01-01 | End: 2023-01-01 | Stop reason: HOSPADM

## 2023-01-01 RX ORDER — FUROSEMIDE 10 MG/ML
20 INJECTION INTRAMUSCULAR; INTRAVENOUS ONCE
Status: COMPLETED | OUTPATIENT
Start: 2023-01-01 | End: 2023-01-01

## 2023-01-01 RX ORDER — NICOTINE POLACRILEX 4 MG
15-30 LOZENGE BUCCAL
Status: DISCONTINUED | OUTPATIENT
Start: 2023-01-01 | End: 2023-01-01 | Stop reason: HOSPADM

## 2023-01-01 RX ORDER — LEVOTHYROXINE SODIUM 200 UG/1
1 TABLET ORAL DAILY
COMMUNITY
Start: 2023-01-01

## 2023-01-01 RX ORDER — ENOXAPARIN SODIUM 100 MG/ML
30 INJECTION SUBCUTANEOUS EVERY 24 HOURS
Status: DISCONTINUED | OUTPATIENT
Start: 2023-01-01 | End: 2023-01-01

## 2023-01-01 RX ORDER — CIPROFLOXACIN 500 MG/1
500 TABLET, FILM COATED ORAL EVERY 24 HOURS
Status: COMPLETED | OUTPATIENT
Start: 2023-01-01 | End: 2023-01-01

## 2023-01-01 RX ORDER — NICOTINE POLACRILEX 4 MG
15-30 LOZENGE BUCCAL
Status: DISCONTINUED | OUTPATIENT
Start: 2023-01-01 | End: 2023-01-01

## 2023-01-01 RX ORDER — ONDANSETRON 4 MG/1
4 TABLET, ORALLY DISINTEGRATING ORAL EVERY 6 HOURS PRN
Status: DISCONTINUED | OUTPATIENT
Start: 2023-01-01 | End: 2023-01-01 | Stop reason: HOSPADM

## 2023-01-01 RX ORDER — CEFAZOLIN SODIUM 1 G/50ML
2000 SOLUTION INTRAVENOUS ONCE
Status: COMPLETED | OUTPATIENT
Start: 2023-01-01 | End: 2023-01-01

## 2023-01-01 RX ORDER — METHYLPHENIDATE HYDROCHLORIDE 10 MG/1
10 TABLET ORAL 4 TIMES DAILY
COMMUNITY

## 2023-01-01 RX ORDER — LANOLIN ALCOHOL/MO/W.PET/CERES
3 CREAM (GRAM) TOPICAL AT BEDTIME
Status: ON HOLD | COMMUNITY
Start: 2023-01-01 | End: 2023-01-01

## 2023-01-01 RX ORDER — HEPARIN SODIUM 10000 [USP'U]/100ML
0-5000 INJECTION, SOLUTION INTRAVENOUS CONTINUOUS
Status: DISCONTINUED | OUTPATIENT
Start: 2023-01-01 | End: 2023-01-01

## 2023-01-01 RX ORDER — DULOXETIN HYDROCHLORIDE 20 MG/1
40 CAPSULE, DELAYED RELEASE ORAL EVERY MORNING
Status: DISCONTINUED | OUTPATIENT
Start: 2023-01-01 | End: 2023-01-01 | Stop reason: HOSPADM

## 2023-01-01 RX ORDER — CEPHALEXIN 500 MG/1
500 CAPSULE ORAL 2 TIMES DAILY
Qty: 14 CAPSULE | Refills: 0 | Status: SHIPPED | OUTPATIENT
Start: 2023-01-01 | End: 2023-01-01

## 2023-01-01 RX ORDER — MORPHINE SULFATE 20 MG/ML
5 SOLUTION ORAL
Status: DISCONTINUED | OUTPATIENT
Start: 2023-01-01 | End: 2023-01-01 | Stop reason: HOSPADM

## 2023-01-01 RX ORDER — POLYETHYLENE GLYCOL 3350 17 G/17G
17 POWDER, FOR SOLUTION ORAL DAILY
Status: DISCONTINUED | OUTPATIENT
Start: 2023-01-01 | End: 2023-01-01 | Stop reason: HOSPADM

## 2023-01-01 RX ORDER — DEXTROSE MONOHYDRATE 25 G/50ML
25-50 INJECTION, SOLUTION INTRAVENOUS
Status: DISCONTINUED | OUTPATIENT
Start: 2023-01-01 | End: 2023-01-01

## 2023-01-01 RX ORDER — CIPROFLOXACIN 2 MG/ML
400 INJECTION, SOLUTION INTRAVENOUS EVERY 24 HOURS
Status: DISCONTINUED | OUTPATIENT
Start: 2023-01-01 | End: 2023-01-01

## 2023-01-01 RX ORDER — ALBUMIN (HUMAN) 12.5 G/50ML
25 SOLUTION INTRAVENOUS EVERY 6 HOURS
Status: COMPLETED | OUTPATIENT
Start: 2023-01-01 | End: 2023-01-01

## 2023-01-01 RX ORDER — INSULIN GLARGINE 100 [IU]/ML
30 INJECTION, SOLUTION SUBCUTANEOUS AT BEDTIME
COMMUNITY
Start: 2023-01-01

## 2023-01-01 RX ORDER — METHYLPHENIDATE HYDROCHLORIDE 20 MG/1
TABLET ORAL
Status: ON HOLD | COMMUNITY
Start: 2023-01-01 | End: 2023-01-01

## 2023-01-01 RX ORDER — ONDANSETRON 2 MG/ML
4 INJECTION INTRAMUSCULAR; INTRAVENOUS EVERY 6 HOURS PRN
Status: DISCONTINUED | OUTPATIENT
Start: 2023-01-01 | End: 2023-01-01 | Stop reason: HOSPADM

## 2023-01-01 RX ORDER — BISACODYL 5 MG
5 TABLET, DELAYED RELEASE (ENTERIC COATED) ORAL ONCE
Status: DISCONTINUED | OUTPATIENT
Start: 2023-01-01 | End: 2023-01-01

## 2023-01-01 RX ORDER — RANOLAZINE 500 MG/1
500 TABLET, EXTENDED RELEASE ORAL DAILY
COMMUNITY

## 2023-01-01 RX ORDER — LEVOTHYROXINE SODIUM 100 UG/1
200 TABLET ORAL DAILY
Status: DISCONTINUED | OUTPATIENT
Start: 2023-01-01 | End: 2023-01-01 | Stop reason: HOSPADM

## 2023-01-01 RX ORDER — INSULIN LISPRO 100 [IU]/ML
INJECTION, SOLUTION INTRAVENOUS; SUBCUTANEOUS
COMMUNITY

## 2023-01-01 RX ORDER — POTASSIUM CHLORIDE 1.5 G/1.58G
40 POWDER, FOR SOLUTION ORAL ONCE
Status: COMPLETED | OUTPATIENT
Start: 2023-01-01 | End: 2023-01-01

## 2023-01-01 RX ORDER — METRONIDAZOLE 500 MG/100ML
500 INJECTION, SOLUTION INTRAVENOUS EVERY 8 HOURS
Status: DISCONTINUED | OUTPATIENT
Start: 2023-01-01 | End: 2023-01-01

## 2023-01-01 RX ORDER — POTASSIUM CHLORIDE 20MEQ/15ML
20 LIQUID (ML) ORAL ONCE
Status: DISCONTINUED | OUTPATIENT
Start: 2023-01-01 | End: 2023-01-01 | Stop reason: ALTCHOICE

## 2023-01-01 RX ORDER — PIPERACILLIN SODIUM, TAZOBACTAM SODIUM 3; .375 G/15ML; G/15ML
3.38 INJECTION, POWDER, LYOPHILIZED, FOR SOLUTION INTRAVENOUS EVERY 6 HOURS
Status: DISCONTINUED | OUTPATIENT
Start: 2023-01-01 | End: 2023-01-01

## 2023-01-01 RX ORDER — SODIUM CHLORIDE AND POTASSIUM CHLORIDE 150; 900 MG/100ML; MG/100ML
INJECTION, SOLUTION INTRAVENOUS CONTINUOUS
Status: DISCONTINUED | OUTPATIENT
Start: 2023-01-01 | End: 2023-01-01

## 2023-01-01 RX ORDER — BISACODYL 10 MG
10 SUPPOSITORY, RECTAL RECTAL ONCE
Status: COMPLETED | OUTPATIENT
Start: 2023-01-01 | End: 2023-01-01

## 2023-01-01 RX ORDER — HEPARIN SODIUM 5000 [USP'U]/.5ML
5000 INJECTION, SOLUTION INTRAVENOUS; SUBCUTANEOUS 2 TIMES DAILY
Status: DISCONTINUED | OUTPATIENT
Start: 2023-01-01 | End: 2023-01-01 | Stop reason: HOSPADM

## 2023-01-01 RX ORDER — ACETAMINOPHEN 650 MG/1
650 SUPPOSITORY RECTAL EVERY 4 HOURS PRN
Status: DISCONTINUED | OUTPATIENT
Start: 2023-01-01 | End: 2023-01-01 | Stop reason: HOSPADM

## 2023-01-01 RX ORDER — CLOPIDOGREL BISULFATE 75 MG/1
75 TABLET ORAL DAILY
Status: DISCONTINUED | OUTPATIENT
Start: 2023-01-01 | End: 2023-01-01 | Stop reason: HOSPADM

## 2023-01-01 RX ORDER — RANOLAZINE 500 MG/1
500 TABLET, EXTENDED RELEASE ORAL 2 TIMES DAILY
Status: DISCONTINUED | OUTPATIENT
Start: 2023-01-01 | End: 2023-01-01 | Stop reason: HOSPADM

## 2023-01-01 RX ORDER — ACETAMINOPHEN 500 MG
1000 TABLET ORAL EVERY 6 HOURS PRN
Status: DISCONTINUED | OUTPATIENT
Start: 2023-01-01 | End: 2023-01-01

## 2023-01-01 RX ORDER — ACETAMINOPHEN 325 MG/1
650 TABLET ORAL EVERY 4 HOURS PRN
Status: DISCONTINUED | OUTPATIENT
Start: 2023-01-01 | End: 2023-01-01 | Stop reason: HOSPADM

## 2023-01-01 RX ORDER — SODIUM CHLORIDE 9 MG/ML
INJECTION, SOLUTION INTRAVENOUS CONTINUOUS
Status: ACTIVE | OUTPATIENT
Start: 2023-01-01 | End: 2023-01-01

## 2023-01-01 RX ORDER — BACITRACIN ZINC 500 [USP'U]/G
OINTMENT TOPICAL 2 TIMES DAILY
Status: DISCONTINUED | OUTPATIENT
Start: 2023-01-01 | End: 2023-01-01

## 2023-01-01 RX ORDER — METOPROLOL TARTRATE 25 MG/1
6.25 TABLET, FILM COATED ORAL 2 TIMES DAILY
COMMUNITY
Start: 2023-01-01

## 2023-01-01 RX ORDER — SPIRONOLACTONE 25 MG/1
25 TABLET ORAL DAILY
Status: DISCONTINUED | OUTPATIENT
Start: 2023-01-01 | End: 2023-01-01

## 2023-01-01 RX ORDER — CEPHALEXIN 500 MG/1
500 CAPSULE ORAL 4 TIMES DAILY
Qty: 28 CAPSULE | Refills: 0 | Status: ON HOLD | OUTPATIENT
Start: 2023-01-01 | End: 2023-01-01

## 2023-01-01 RX ORDER — AMOXICILLIN 250 MG
2 CAPSULE ORAL 2 TIMES DAILY
COMMUNITY
Start: 2023-01-01

## 2023-01-01 RX ORDER — ALBUMIN (HUMAN) 12.5 G/50ML
25 SOLUTION INTRAVENOUS EVERY 6 HOURS
Status: DISPENSED | OUTPATIENT
Start: 2023-01-01 | End: 2023-01-01

## 2023-01-01 RX ORDER — BISACODYL 5 MG
5 TABLET, DELAYED RELEASE (ENTERIC COATED) ORAL DAILY PRN
Status: DISCONTINUED | OUTPATIENT
Start: 2023-01-01 | End: 2023-01-01 | Stop reason: HOSPADM

## 2023-01-01 RX ORDER — ISOSORBIDE MONONITRATE 30 MG/1
30 TABLET, EXTENDED RELEASE ORAL DAILY
Status: DISCONTINUED | OUTPATIENT
Start: 2023-01-01 | End: 2023-01-01 | Stop reason: HOSPADM

## 2023-01-01 RX ADMIN — LEVOTHYROXINE SODIUM 200 MCG: 0.1 TABLET ORAL at 11:42

## 2023-01-01 RX ADMIN — HYDROMORPHONE HYDROCHLORIDE 0.5 MG: 1 INJECTION, SOLUTION INTRAMUSCULAR; INTRAVENOUS; SUBCUTANEOUS at 05:41

## 2023-01-01 RX ADMIN — METOPROLOL SUCCINATE 12.5 MG: 25 TABLET, EXTENDED RELEASE ORAL at 10:57

## 2023-01-01 RX ADMIN — POTASSIUM CHLORIDE 20 MEQ: 1500 TABLET, EXTENDED RELEASE ORAL at 00:11

## 2023-01-01 RX ADMIN — METHYLPHENIDATE HYDROCHLORIDE 10 MG: 5 TABLET ORAL at 21:12

## 2023-01-01 RX ADMIN — RANOLAZINE 500 MG: 500 TABLET, FILM COATED, EXTENDED RELEASE ORAL at 22:21

## 2023-01-01 RX ADMIN — CIPROFLOXACIN HYDROCHLORIDE 500 MG: 500 TABLET, FILM COATED ORAL at 11:41

## 2023-01-01 RX ADMIN — OXYCODONE HYDROCHLORIDE 5 MG: 5 TABLET ORAL at 21:23

## 2023-01-01 RX ADMIN — CEFEPIME 2 G: 2 INJECTION, POWDER, FOR SOLUTION INTRAVENOUS at 09:19

## 2023-01-01 RX ADMIN — DULOXETINE HYDROCHLORIDE 40 MG: 20 CAPSULE, DELAYED RELEASE ORAL at 10:50

## 2023-01-01 RX ADMIN — DULOXETINE HYDROCHLORIDE 40 MG: 20 CAPSULE, DELAYED RELEASE ORAL at 09:00

## 2023-01-01 RX ADMIN — OXYCODONE HYDROCHLORIDE 5 MG: 5 TABLET ORAL at 22:48

## 2023-01-01 RX ADMIN — METHYLPHENIDATE HYDROCHLORIDE 10 MG: 5 TABLET ORAL at 21:40

## 2023-01-01 RX ADMIN — ACETAMINOPHEN 650 MG: 325 TABLET, FILM COATED ORAL at 11:18

## 2023-01-01 RX ADMIN — HEPARIN SODIUM 5000 UNITS: 10000 INJECTION, SOLUTION INTRAVENOUS; SUBCUTANEOUS at 10:58

## 2023-01-01 RX ADMIN — INSULIN ASPART 1 UNITS: 100 INJECTION, SOLUTION INTRAVENOUS; SUBCUTANEOUS at 08:53

## 2023-01-01 RX ADMIN — LEVOTHYROXINE SODIUM 200 MCG: 0.1 TABLET ORAL at 10:29

## 2023-01-01 RX ADMIN — LEVOTHYROXINE SODIUM 200 MCG: 0.1 TABLET ORAL at 09:14

## 2023-01-01 RX ADMIN — BACITRACIN ZINC: 500 OINTMENT TOPICAL at 21:58

## 2023-01-01 RX ADMIN — INSULIN ASPART 3 UNITS: 100 INJECTION, SOLUTION INTRAVENOUS; SUBCUTANEOUS at 08:53

## 2023-01-01 RX ADMIN — SPIRONOLACTONE 25 MG: 25 TABLET ORAL at 11:42

## 2023-01-01 RX ADMIN — CLOPIDOGREL BISULFATE 75 MG: 75 TABLET, FILM COATED ORAL at 10:50

## 2023-01-01 RX ADMIN — METOPROLOL SUCCINATE 12.5 MG: 25 TABLET, EXTENDED RELEASE ORAL at 09:53

## 2023-01-01 RX ADMIN — METRONIDAZOLE 500 MG: 500 INJECTION, SOLUTION INTRAVENOUS at 18:08

## 2023-01-01 RX ADMIN — METOPROLOL SUCCINATE 12.5 MG: 25 TABLET, EXTENDED RELEASE ORAL at 09:03

## 2023-01-01 RX ADMIN — METRONIDAZOLE 500 MG: 500 INJECTION, SOLUTION INTRAVENOUS at 11:08

## 2023-01-01 RX ADMIN — OXYCODONE HYDROCHLORIDE 5 MG: 5 TABLET ORAL at 19:54

## 2023-01-01 RX ADMIN — OXYCODONE HYDROCHLORIDE 2.5 MG: 5 TABLET ORAL at 08:50

## 2023-01-01 RX ADMIN — OXYCODONE HYDROCHLORIDE 5 MG: 5 TABLET ORAL at 19:22

## 2023-01-01 RX ADMIN — POTASSIUM CHLORIDE 20 MEQ: 1500 TABLET, EXTENDED RELEASE ORAL at 05:55

## 2023-01-01 RX ADMIN — HYDROMORPHONE HYDROCHLORIDE 0.5 MG: 1 INJECTION, SOLUTION INTRAMUSCULAR; INTRAVENOUS; SUBCUTANEOUS at 03:53

## 2023-01-01 RX ADMIN — HYDROMORPHONE HYDROCHLORIDE 0.2 MG: 0.2 INJECTION, SOLUTION INTRAMUSCULAR; INTRAVENOUS; SUBCUTANEOUS at 23:36

## 2023-01-01 RX ADMIN — METHYLPHENIDATE HYDROCHLORIDE 10 MG: 5 TABLET ORAL at 13:33

## 2023-01-01 RX ADMIN — ASPIRIN 81 MG CHEWABLE TABLET 81 MG: 81 TABLET CHEWABLE at 10:50

## 2023-01-01 RX ADMIN — VANCOMYCIN HYDROCHLORIDE 500 MG: 500 INJECTION, POWDER, LYOPHILIZED, FOR SOLUTION INTRAVENOUS at 23:47

## 2023-01-01 RX ADMIN — MORPHINE SULFATE 5 MG: 100 SOLUTION ORAL at 22:47

## 2023-01-01 RX ADMIN — DOXAZOSIN 4 MG: 4 TABLET ORAL at 23:16

## 2023-01-01 RX ADMIN — OXYCODONE HYDROCHLORIDE 5 MG: 5 TABLET ORAL at 06:39

## 2023-01-01 RX ADMIN — DULOXETINE HYDROCHLORIDE 40 MG: 20 CAPSULE, DELAYED RELEASE ORAL at 10:37

## 2023-01-01 RX ADMIN — FUROSEMIDE 20 MG: 10 INJECTION, SOLUTION INTRAVENOUS at 08:49

## 2023-01-01 RX ADMIN — BACITRACIN ZINC: 500 OINTMENT TOPICAL at 14:59

## 2023-01-01 RX ADMIN — METHYLPHENIDATE HYDROCHLORIDE 10 MG: 5 TABLET ORAL at 22:21

## 2023-01-01 RX ADMIN — DULOXETINE HYDROCHLORIDE 40 MG: 20 CAPSULE, DELAYED RELEASE ORAL at 10:57

## 2023-01-01 RX ADMIN — METOPROLOL SUCCINATE 12.5 MG: 25 TABLET, EXTENDED RELEASE ORAL at 12:06

## 2023-01-01 RX ADMIN — RANOLAZINE 500 MG: 500 TABLET, FILM COATED, EXTENDED RELEASE ORAL at 10:50

## 2023-01-01 RX ADMIN — HYDROMORPHONE HYDROCHLORIDE 0.5 MG: 1 INJECTION, SOLUTION INTRAMUSCULAR; INTRAVENOUS; SUBCUTANEOUS at 10:10

## 2023-01-01 RX ADMIN — CLOPIDOGREL BISULFATE 75 MG: 75 TABLET, FILM COATED ORAL at 11:07

## 2023-01-01 RX ADMIN — POTASSIUM CHLORIDE 20 MEQ: 1500 TABLET, EXTENDED RELEASE ORAL at 12:19

## 2023-01-01 RX ADMIN — DULOXETINE HYDROCHLORIDE 40 MG: 20 CAPSULE, DELAYED RELEASE ORAL at 10:26

## 2023-01-01 RX ADMIN — INSULIN ASPART 1 UNITS: 100 INJECTION, SOLUTION INTRAVENOUS; SUBCUTANEOUS at 17:26

## 2023-01-01 RX ADMIN — BACITRACIN ZINC: 500 OINTMENT TOPICAL at 23:39

## 2023-01-01 RX ADMIN — POTASSIUM CHLORIDE 20 MEQ: 1500 TABLET, EXTENDED RELEASE ORAL at 21:40

## 2023-01-01 RX ADMIN — METOPROLOL SUCCINATE 12.5 MG: 25 TABLET, EXTENDED RELEASE ORAL at 10:15

## 2023-01-01 RX ADMIN — INSULIN ASPART 1 UNITS: 100 INJECTION, SOLUTION INTRAVENOUS; SUBCUTANEOUS at 17:01

## 2023-01-01 RX ADMIN — CEFEPIME 2 G: 2 INJECTION, POWDER, FOR SOLUTION INTRAVENOUS at 09:45

## 2023-01-01 RX ADMIN — POTASSIUM CHLORIDE 20 MEQ: 1500 TABLET, EXTENDED RELEASE ORAL at 21:34

## 2023-01-01 RX ADMIN — ASPIRIN 81 MG CHEWABLE TABLET 81 MG: 81 TABLET CHEWABLE at 09:09

## 2023-01-01 RX ADMIN — DULOXETINE HYDROCHLORIDE 40 MG: 20 CAPSULE, DELAYED RELEASE ORAL at 09:07

## 2023-01-01 RX ADMIN — CIPROFLOXACIN HYDROCHLORIDE 500 MG: 500 TABLET, FILM COATED ORAL at 10:15

## 2023-01-01 RX ADMIN — POTASSIUM CHLORIDE 20 MEQ: 1500 TABLET, EXTENDED RELEASE ORAL at 10:49

## 2023-01-01 RX ADMIN — OXYCODONE HYDROCHLORIDE 5 MG: 5 TABLET ORAL at 11:28

## 2023-01-01 RX ADMIN — METRONIDAZOLE 500 MG: 500 INJECTION, SOLUTION INTRAVENOUS at 01:50

## 2023-01-01 RX ADMIN — METHYLPHENIDATE HYDROCHLORIDE 10 MG: 5 TABLET ORAL at 11:19

## 2023-01-01 RX ADMIN — INSULIN ASPART 1 UNITS: 100 INJECTION, SOLUTION INTRAVENOUS; SUBCUTANEOUS at 10:12

## 2023-01-01 RX ADMIN — OXYCODONE HYDROCHLORIDE 5 MG: 5 TABLET ORAL at 03:42

## 2023-01-01 RX ADMIN — HYDROMORPHONE HYDROCHLORIDE 0.2 MG: 0.2 INJECTION, SOLUTION INTRAMUSCULAR; INTRAVENOUS; SUBCUTANEOUS at 01:20

## 2023-01-01 RX ADMIN — METOPROLOL SUCCINATE 12.5 MG: 25 TABLET, EXTENDED RELEASE ORAL at 11:07

## 2023-01-01 RX ADMIN — BISACODYL 5 MG: 5 TABLET, COATED ORAL at 14:21

## 2023-01-01 RX ADMIN — SPIRONOLACTONE 25 MG: 25 TABLET ORAL at 10:26

## 2023-01-01 RX ADMIN — HEPARIN SODIUM 5000 UNITS: 10000 INJECTION, SOLUTION INTRAVENOUS; SUBCUTANEOUS at 11:50

## 2023-01-01 RX ADMIN — FUROSEMIDE 40 MG: 10 INJECTION, SOLUTION INTRAMUSCULAR; INTRAVENOUS at 18:05

## 2023-01-01 RX ADMIN — POLYETHYLENE GLYCOL 3350 17 G: 17 POWDER, FOR SOLUTION ORAL at 14:41

## 2023-01-01 RX ADMIN — CEFEPIME 2 G: 2 INJECTION, POWDER, FOR SOLUTION INTRAVENOUS at 07:32

## 2023-01-01 RX ADMIN — CLOPIDOGREL BISULFATE 75 MG: 75 TABLET, FILM COATED ORAL at 09:53

## 2023-01-01 RX ADMIN — METHYLPHENIDATE HYDROCHLORIDE 10 MG: 5 TABLET ORAL at 22:01

## 2023-01-01 RX ADMIN — HYDROMORPHONE HYDROCHLORIDE 0.2 MG: 0.2 INJECTION, SOLUTION INTRAMUSCULAR; INTRAVENOUS; SUBCUTANEOUS at 16:27

## 2023-01-01 RX ADMIN — HEPARIN SODIUM 5000 UNITS: 10000 INJECTION, SOLUTION INTRAVENOUS; SUBCUTANEOUS at 10:19

## 2023-01-01 RX ADMIN — HEPARIN SODIUM 650 UNITS/HR: 10000 INJECTION, SOLUTION INTRAVENOUS at 15:27

## 2023-01-01 RX ADMIN — INSULIN ASPART 5 UNITS: 100 INJECTION, SOLUTION INTRAVENOUS; SUBCUTANEOUS at 17:56

## 2023-01-01 RX ADMIN — FUROSEMIDE 40 MG: 10 INJECTION, SOLUTION INTRAMUSCULAR; INTRAVENOUS at 05:41

## 2023-01-01 RX ADMIN — ASPIRIN 81 MG CHEWABLE TABLET 81 MG: 81 TABLET CHEWABLE at 10:26

## 2023-01-01 RX ADMIN — DULOXETINE HYDROCHLORIDE 40 MG: 20 CAPSULE, DELAYED RELEASE ORAL at 09:53

## 2023-01-01 RX ADMIN — HEPARIN SODIUM 5000 UNITS: 10000 INJECTION, SOLUTION INTRAVENOUS; SUBCUTANEOUS at 10:51

## 2023-01-01 RX ADMIN — SODIUM CHLORIDE: 9 INJECTION, SOLUTION INTRAVENOUS at 18:22

## 2023-01-01 RX ADMIN — METOPROLOL SUCCINATE 12.5 MG: 25 TABLET, EXTENDED RELEASE ORAL at 09:00

## 2023-01-01 RX ADMIN — INSULIN ASPART 1 UNITS: 100 INJECTION, SOLUTION INTRAVENOUS; SUBCUTANEOUS at 12:53

## 2023-01-01 RX ADMIN — INSULIN GLARGINE 15 UNITS: 100 INJECTION, SOLUTION SUBCUTANEOUS at 09:11

## 2023-01-01 RX ADMIN — HEPARIN SODIUM 5000 UNITS: 10000 INJECTION, SOLUTION INTRAVENOUS; SUBCUTANEOUS at 23:38

## 2023-01-01 RX ADMIN — HEPARIN SODIUM 5000 UNITS: 10000 INJECTION, SOLUTION INTRAVENOUS; SUBCUTANEOUS at 21:52

## 2023-01-01 RX ADMIN — BACITRACIN ZINC: 500 OINTMENT TOPICAL at 21:50

## 2023-01-01 RX ADMIN — INSULIN ASPART 3 UNITS: 100 INJECTION, SOLUTION INTRAVENOUS; SUBCUTANEOUS at 13:06

## 2023-01-01 RX ADMIN — INSULIN ASPART 2 UNITS: 100 INJECTION, SOLUTION INTRAVENOUS; SUBCUTANEOUS at 17:04

## 2023-01-01 RX ADMIN — PANTOPRAZOLE SODIUM 40 MG: 40 TABLET, DELAYED RELEASE ORAL at 08:23

## 2023-01-01 RX ADMIN — SPIRONOLACTONE 25 MG: 25 TABLET ORAL at 11:07

## 2023-01-01 RX ADMIN — ALBUMIN HUMAN 25 G: 0.25 SOLUTION INTRAVENOUS at 03:31

## 2023-01-01 RX ADMIN — INSULIN ASPART 1 UNITS: 100 INJECTION, SOLUTION INTRAVENOUS; SUBCUTANEOUS at 17:35

## 2023-01-01 RX ADMIN — HYDROMORPHONE HYDROCHLORIDE 0.2 MG: 0.2 INJECTION, SOLUTION INTRAMUSCULAR; INTRAVENOUS; SUBCUTANEOUS at 08:20

## 2023-01-01 RX ADMIN — HEPARIN SODIUM 5000 UNITS: 10000 INJECTION, SOLUTION INTRAVENOUS; SUBCUTANEOUS at 21:14

## 2023-01-01 RX ADMIN — HEPARIN SODIUM 5000 UNITS: 10000 INJECTION, SOLUTION INTRAVENOUS; SUBCUTANEOUS at 23:18

## 2023-01-01 RX ADMIN — OXYCODONE HYDROCHLORIDE 5 MG: 5 TABLET ORAL at 21:45

## 2023-01-01 RX ADMIN — HEPARIN SODIUM 5000 UNITS: 10000 INJECTION, SOLUTION INTRAVENOUS; SUBCUTANEOUS at 22:53

## 2023-01-01 RX ADMIN — OXYCODONE HYDROCHLORIDE 2.5 MG: 5 TABLET ORAL at 17:41

## 2023-01-01 RX ADMIN — VANCOMYCIN HYDROCHLORIDE 2000 MG: 10 INJECTION, POWDER, LYOPHILIZED, FOR SOLUTION INTRAVENOUS at 05:39

## 2023-01-01 RX ADMIN — LEVOTHYROXINE SODIUM 200 MCG: 0.1 TABLET ORAL at 09:05

## 2023-01-01 RX ADMIN — FUROSEMIDE 40 MG: 10 INJECTION, SOLUTION INTRAMUSCULAR; INTRAVENOUS at 05:26

## 2023-01-01 RX ADMIN — BACITRACIN ZINC: 500 OINTMENT TOPICAL at 16:04

## 2023-01-01 RX ADMIN — BACITRACIN ZINC: 500 OINTMENT TOPICAL at 11:24

## 2023-01-01 RX ADMIN — FUROSEMIDE 40 MG: 10 INJECTION, SOLUTION INTRAMUSCULAR; INTRAVENOUS at 05:52

## 2023-01-01 RX ADMIN — OXYCODONE HYDROCHLORIDE 5 MG: 5 TABLET ORAL at 16:31

## 2023-01-01 RX ADMIN — HEPARIN SODIUM 5000 UNITS: 10000 INJECTION, SOLUTION INTRAVENOUS; SUBCUTANEOUS at 12:05

## 2023-01-01 RX ADMIN — CIPROFLOXACIN 400 MG: 400 INJECTION, SOLUTION INTRAVENOUS at 12:37

## 2023-01-01 RX ADMIN — POTASSIUM CHLORIDE FOR ORAL SOLUTION 20 MEQ: 1.5 POWDER, FOR SOLUTION ORAL at 12:31

## 2023-01-01 RX ADMIN — LEVOTHYROXINE SODIUM 175 MCG: 0.15 TABLET ORAL at 10:26

## 2023-01-01 RX ADMIN — HYDROMORPHONE HYDROCHLORIDE 0.5 MG: 1 INJECTION, SOLUTION INTRAMUSCULAR; INTRAVENOUS; SUBCUTANEOUS at 22:53

## 2023-01-01 RX ADMIN — OXYCODONE HYDROCHLORIDE 5 MG: 5 TABLET ORAL at 06:03

## 2023-01-01 RX ADMIN — INSULIN ASPART 4 UNITS: 100 INJECTION, SOLUTION INTRAVENOUS; SUBCUTANEOUS at 10:33

## 2023-01-01 RX ADMIN — HEPARIN SODIUM 5000 UNITS: 10000 INJECTION, SOLUTION INTRAVENOUS; SUBCUTANEOUS at 23:08

## 2023-01-01 RX ADMIN — DOXAZOSIN 4 MG: 4 TABLET ORAL at 23:02

## 2023-01-01 RX ADMIN — CLOPIDOGREL BISULFATE 75 MG: 75 TABLET, FILM COATED ORAL at 11:41

## 2023-01-01 RX ADMIN — SPIRONOLACTONE 25 MG: 25 TABLET ORAL at 09:13

## 2023-01-01 RX ADMIN — OXYCODONE HYDROCHLORIDE 5 MG: 5 TABLET ORAL at 20:32

## 2023-01-01 RX ADMIN — OXYCODONE HYDROCHLORIDE 5 MG: 5 TABLET ORAL at 11:09

## 2023-01-01 RX ADMIN — OXYCODONE HYDROCHLORIDE 5 MG: 5 TABLET ORAL at 19:36

## 2023-01-01 RX ADMIN — METOPROLOL SUCCINATE 12.5 MG: 25 TABLET, EXTENDED RELEASE ORAL at 14:01

## 2023-01-01 RX ADMIN — OXYCODONE HYDROCHLORIDE 5 MG: 5 TABLET ORAL at 16:11

## 2023-01-01 RX ADMIN — PANTOPRAZOLE SODIUM 40 MG: 40 TABLET, DELAYED RELEASE ORAL at 12:13

## 2023-01-01 RX ADMIN — ALBUMIN HUMAN 25 G: 0.25 SOLUTION INTRAVENOUS at 16:39

## 2023-01-01 RX ADMIN — INSULIN ASPART 1 UNITS: 100 INJECTION, SOLUTION INTRAVENOUS; SUBCUTANEOUS at 17:54

## 2023-01-01 RX ADMIN — METHYLPHENIDATE HYDROCHLORIDE 10 MG: 5 TABLET ORAL at 17:33

## 2023-01-01 RX ADMIN — MORPHINE SULFATE 5 MG: 100 SOLUTION ORAL at 07:26

## 2023-01-01 RX ADMIN — ALBUMIN HUMAN 25 G: 0.25 SOLUTION INTRAVENOUS at 22:06

## 2023-01-01 RX ADMIN — CLOPIDOGREL BISULFATE 75 MG: 75 TABLET, FILM COATED ORAL at 10:15

## 2023-01-01 RX ADMIN — POTASSIUM CHLORIDE 20 MEQ: 1500 TABLET, EXTENDED RELEASE ORAL at 23:55

## 2023-01-01 RX ADMIN — MORPHINE SULFATE 5 MG: 100 SOLUTION ORAL at 08:01

## 2023-01-01 RX ADMIN — BACITRACIN ZINC: 500 OINTMENT TOPICAL at 16:44

## 2023-01-01 RX ADMIN — PANTOPRAZOLE SODIUM 40 MG: 40 TABLET, DELAYED RELEASE ORAL at 08:49

## 2023-01-01 RX ADMIN — INSULIN GLARGINE 15 UNITS: 100 INJECTION, SOLUTION SUBCUTANEOUS at 11:53

## 2023-01-01 RX ADMIN — LEVOTHYROXINE SODIUM 200 MCG: 0.1 TABLET ORAL at 10:15

## 2023-01-01 RX ADMIN — METOPROLOL SUCCINATE 12.5 MG: 25 TABLET, EXTENDED RELEASE ORAL at 10:50

## 2023-01-01 RX ADMIN — POTASSIUM CHLORIDE 20 MEQ: 1500 TABLET, EXTENDED RELEASE ORAL at 06:08

## 2023-01-01 RX ADMIN — METRONIDAZOLE 500 MG: 500 INJECTION, SOLUTION INTRAVENOUS at 12:28

## 2023-01-01 RX ADMIN — DOXAZOSIN 4 MG: 4 TABLET ORAL at 21:45

## 2023-01-01 RX ADMIN — SPIRONOLACTONE 25 MG: 25 TABLET ORAL at 10:58

## 2023-01-01 RX ADMIN — LEVOTHYROXINE SODIUM 175 MCG: 0.15 TABLET ORAL at 10:37

## 2023-01-01 RX ADMIN — DOXAZOSIN 4 MG: 4 TABLET ORAL at 22:03

## 2023-01-01 RX ADMIN — OXYCODONE HYDROCHLORIDE 5 MG: 5 TABLET ORAL at 10:28

## 2023-01-01 RX ADMIN — HYDROMORPHONE HYDROCHLORIDE 0.5 MG: 1 INJECTION, SOLUTION INTRAMUSCULAR; INTRAVENOUS; SUBCUTANEOUS at 23:53

## 2023-01-01 RX ADMIN — HEPARIN SODIUM 5000 UNITS: 10000 INJECTION, SOLUTION INTRAVENOUS; SUBCUTANEOUS at 12:43

## 2023-01-01 RX ADMIN — OXYCODONE HYDROCHLORIDE 5 MG: 5 TABLET ORAL at 11:41

## 2023-01-01 RX ADMIN — INSULIN ASPART 1 UNITS: 100 INJECTION, SOLUTION INTRAVENOUS; SUBCUTANEOUS at 14:45

## 2023-01-01 RX ADMIN — CIPROFLOXACIN 400 MG: 400 INJECTION, SOLUTION INTRAVENOUS at 13:47

## 2023-01-01 RX ADMIN — SPIRONOLACTONE 25 MG: 25 TABLET ORAL at 09:53

## 2023-01-01 RX ADMIN — CIPROFLOXACIN 400 MG: 400 INJECTION, SOLUTION INTRAVENOUS at 12:20

## 2023-01-01 RX ADMIN — POLYETHYLENE GLYCOL 3350 17 G: 17 POWDER, FOR SOLUTION ORAL at 09:01

## 2023-01-01 RX ADMIN — RANOLAZINE 500 MG: 500 TABLET, FILM COATED, EXTENDED RELEASE ORAL at 22:48

## 2023-01-01 RX ADMIN — HEPARIN SODIUM 5000 UNITS: 10000 INJECTION, SOLUTION INTRAVENOUS; SUBCUTANEOUS at 23:17

## 2023-01-01 RX ADMIN — HYDROMORPHONE HYDROCHLORIDE 0.2 MG: 0.2 INJECTION, SOLUTION INTRAMUSCULAR; INTRAVENOUS; SUBCUTANEOUS at 03:04

## 2023-01-01 RX ADMIN — HEPARIN SODIUM 5000 UNITS: 10000 INJECTION, SOLUTION INTRAVENOUS; SUBCUTANEOUS at 09:26

## 2023-01-01 RX ADMIN — DOXAZOSIN 4 MG: 4 TABLET ORAL at 21:43

## 2023-01-01 RX ADMIN — METHYLPHENIDATE HYDROCHLORIDE 10 MG: 5 TABLET ORAL at 16:47

## 2023-01-01 RX ADMIN — POTASSIUM CHLORIDE FOR ORAL SOLUTION 20 MEQ: 1.5 POWDER, FOR SOLUTION ORAL at 12:12

## 2023-01-01 RX ADMIN — METHYLPHENIDATE HYDROCHLORIDE 10 MG: 5 TABLET ORAL at 22:48

## 2023-01-01 RX ADMIN — HYDROMORPHONE HYDROCHLORIDE 0.2 MG: 0.2 INJECTION, SOLUTION INTRAMUSCULAR; INTRAVENOUS; SUBCUTANEOUS at 19:23

## 2023-01-01 RX ADMIN — OXYCODONE HYDROCHLORIDE 5 MG: 5 TABLET ORAL at 05:52

## 2023-01-01 RX ADMIN — ONDANSETRON 4 MG: 2 INJECTION INTRAMUSCULAR; INTRAVENOUS at 14:19

## 2023-01-01 RX ADMIN — SODIUM CHLORIDE: 9 INJECTION, SOLUTION INTRAVENOUS at 04:30

## 2023-01-01 RX ADMIN — METHYLPHENIDATE HYDROCHLORIDE 10 MG: 5 TABLET ORAL at 14:41

## 2023-01-01 RX ADMIN — ISOSORBIDE MONONITRATE 30 MG: 30 TABLET, EXTENDED RELEASE ORAL at 11:07

## 2023-01-01 RX ADMIN — ACETAMINOPHEN 650 MG: 325 TABLET, FILM COATED ORAL at 08:45

## 2023-01-01 RX ADMIN — BACITRACIN ZINC: 500 OINTMENT TOPICAL at 12:17

## 2023-01-01 RX ADMIN — DULOXETINE HYDROCHLORIDE 40 MG: 20 CAPSULE, DELAYED RELEASE ORAL at 12:06

## 2023-01-01 RX ADMIN — METRONIDAZOLE 500 MG: 500 INJECTION, SOLUTION INTRAVENOUS at 01:13

## 2023-01-01 RX ADMIN — LEVOTHYROXINE SODIUM 175 MCG: 0.15 TABLET ORAL at 12:08

## 2023-01-01 RX ADMIN — DOXAZOSIN 4 MG: 4 TABLET ORAL at 22:48

## 2023-01-01 RX ADMIN — LEVOTHYROXINE SODIUM 200 MCG: 0.1 TABLET ORAL at 09:53

## 2023-01-01 RX ADMIN — SODIUM CHLORIDE: 9 INJECTION, SOLUTION INTRAVENOUS at 11:57

## 2023-01-01 RX ADMIN — INSULIN ASPART 4 UNITS: 100 INJECTION, SOLUTION INTRAVENOUS; SUBCUTANEOUS at 18:29

## 2023-01-01 RX ADMIN — INSULIN ASPART 1 UNITS: 100 INJECTION, SOLUTION INTRAVENOUS; SUBCUTANEOUS at 17:27

## 2023-01-01 RX ADMIN — CIPROFLOXACIN 400 MG: 400 INJECTION, SOLUTION INTRAVENOUS at 12:44

## 2023-01-01 RX ADMIN — METOPROLOL SUCCINATE 12.5 MG: 25 TABLET, EXTENDED RELEASE ORAL at 09:08

## 2023-01-01 RX ADMIN — SPIRONOLACTONE 25 MG: 25 TABLET ORAL at 10:15

## 2023-01-01 RX ADMIN — DULOXETINE HYDROCHLORIDE 40 MG: 20 CAPSULE, DELAYED RELEASE ORAL at 10:27

## 2023-01-01 RX ADMIN — OXYCODONE HYDROCHLORIDE 5 MG: 5 TABLET ORAL at 04:26

## 2023-01-01 RX ADMIN — PANTOPRAZOLE SODIUM 40 MG: 40 TABLET, DELAYED RELEASE ORAL at 08:36

## 2023-01-01 RX ADMIN — METRONIDAZOLE 500 MG: 500 INJECTION, SOLUTION INTRAVENOUS at 10:14

## 2023-01-01 RX ADMIN — METHYLPHENIDATE HYDROCHLORIDE 10 MG: 5 TABLET ORAL at 10:49

## 2023-01-01 RX ADMIN — NITROGLYCERIN 0.4 MG: 0.4 TABLET SUBLINGUAL at 02:48

## 2023-01-01 RX ADMIN — RANOLAZINE 500 MG: 500 TABLET, FILM COATED, EXTENDED RELEASE ORAL at 09:13

## 2023-01-01 RX ADMIN — POTASSIUM CHLORIDE AND SODIUM CHLORIDE: 900; 150 INJECTION, SOLUTION INTRAVENOUS at 09:04

## 2023-01-01 RX ADMIN — INSULIN ASPART 1 UNITS: 100 INJECTION, SOLUTION INTRAVENOUS; SUBCUTANEOUS at 07:58

## 2023-01-01 RX ADMIN — METRONIDAZOLE 500 MG: 500 INJECTION, SOLUTION INTRAVENOUS at 10:49

## 2023-01-01 RX ADMIN — HEPARIN SODIUM 5000 UNITS: 10000 INJECTION, SOLUTION INTRAVENOUS; SUBCUTANEOUS at 21:48

## 2023-01-01 RX ADMIN — RANOLAZINE 500 MG: 500 TABLET, FILM COATED, EXTENDED RELEASE ORAL at 21:40

## 2023-01-01 RX ADMIN — ACETAMINOPHEN 650 MG: 325 TABLET, FILM COATED ORAL at 14:27

## 2023-01-01 RX ADMIN — FUROSEMIDE 40 MG: 10 INJECTION, SOLUTION INTRAMUSCULAR; INTRAVENOUS at 18:27

## 2023-01-01 RX ADMIN — CEFEPIME 2 G: 2 INJECTION, POWDER, FOR SOLUTION INTRAVENOUS at 09:00

## 2023-01-01 RX ADMIN — POLYETHYLENE GLYCOL 3350 17 G: 17 POWDER, FOR SOLUTION ORAL at 09:16

## 2023-01-01 RX ADMIN — HEPARIN SODIUM 5000 UNITS: 10000 INJECTION, SOLUTION INTRAVENOUS; SUBCUTANEOUS at 11:47

## 2023-01-01 RX ADMIN — ACETAMINOPHEN 650 MG: 325 TABLET, FILM COATED ORAL at 22:00

## 2023-01-01 RX ADMIN — INSULIN ASPART 1 UNITS: 100 INJECTION, SOLUTION INTRAVENOUS; SUBCUTANEOUS at 12:19

## 2023-01-01 RX ADMIN — OXYCODONE HYDROCHLORIDE 5 MG: 5 TABLET ORAL at 02:32

## 2023-01-01 RX ADMIN — DOXAZOSIN 2 MG: 1 TABLET ORAL at 23:17

## 2023-01-01 RX ADMIN — CLOPIDOGREL BISULFATE 75 MG: 75 TABLET, FILM COATED ORAL at 10:58

## 2023-01-01 RX ADMIN — CLOPIDOGREL BISULFATE 75 MG: 75 TABLET, FILM COATED ORAL at 10:57

## 2023-01-01 RX ADMIN — Medication 10 MG: at 19:50

## 2023-01-01 RX ADMIN — INSULIN ASPART 1 UNITS: 100 INJECTION, SOLUTION INTRAVENOUS; SUBCUTANEOUS at 18:15

## 2023-01-01 RX ADMIN — ALBUMIN HUMAN 25 G: 0.25 SOLUTION INTRAVENOUS at 21:46

## 2023-01-01 RX ADMIN — METHYLPHENIDATE HYDROCHLORIDE 10 MG: 5 TABLET ORAL at 19:36

## 2023-01-01 RX ADMIN — PANTOPRAZOLE SODIUM 40 MG: 40 TABLET, DELAYED RELEASE ORAL at 07:58

## 2023-01-01 RX ADMIN — LEVOTHYROXINE SODIUM 200 MCG: 0.1 TABLET ORAL at 10:57

## 2023-01-01 RX ADMIN — HYDROMORPHONE HYDROCHLORIDE 0.5 MG: 1 INJECTION, SOLUTION INTRAMUSCULAR; INTRAVENOUS; SUBCUTANEOUS at 02:19

## 2023-01-01 RX ADMIN — POTASSIUM CHLORIDE 20 MEQ: 1500 TABLET, EXTENDED RELEASE ORAL at 22:01

## 2023-01-01 RX ADMIN — OXYCODONE HYDROCHLORIDE 2.5 MG: 5 TABLET ORAL at 23:17

## 2023-01-01 RX ADMIN — POTASSIUM CHLORIDE 20 MEQ: 1500 TABLET, EXTENDED RELEASE ORAL at 10:29

## 2023-01-01 RX ADMIN — HEPARIN SODIUM 5000 UNITS: 10000 INJECTION, SOLUTION INTRAVENOUS; SUBCUTANEOUS at 09:53

## 2023-01-01 RX ADMIN — SPIRONOLACTONE 25 MG: 25 TABLET ORAL at 10:50

## 2023-01-01 RX ADMIN — POTASSIUM CHLORIDE FOR ORAL SOLUTION 40 MEQ: 1.5 POWDER, FOR SOLUTION ORAL at 18:35

## 2023-01-01 RX ADMIN — ASPIRIN 81 MG CHEWABLE TABLET 81 MG: 81 TABLET CHEWABLE at 22:00

## 2023-01-01 RX ADMIN — ALBUMIN HUMAN 25 G: 0.25 SOLUTION INTRAVENOUS at 16:25

## 2023-01-01 RX ADMIN — METOPROLOL SUCCINATE 12.5 MG: 25 TABLET, EXTENDED RELEASE ORAL at 10:28

## 2023-01-01 RX ADMIN — MORPHINE SULFATE 5 MG: 100 SOLUTION ORAL at 20:44

## 2023-01-01 RX ADMIN — INSULIN ASPART 1 UNITS: 100 INJECTION, SOLUTION INTRAVENOUS; SUBCUTANEOUS at 07:29

## 2023-01-01 RX ADMIN — LEVOTHYROXINE SODIUM 200 MCG: 0.1 TABLET ORAL at 10:50

## 2023-01-01 RX ADMIN — METRONIDAZOLE 500 MG: 500 INJECTION, SOLUTION INTRAVENOUS at 02:56

## 2023-01-01 RX ADMIN — HYDROMORPHONE HYDROCHLORIDE 0.5 MG: 1 INJECTION, SOLUTION INTRAMUSCULAR; INTRAVENOUS; SUBCUTANEOUS at 17:48

## 2023-01-01 RX ADMIN — POTASSIUM CHLORIDE 20 MEQ: 1500 TABLET, EXTENDED RELEASE ORAL at 22:21

## 2023-01-01 RX ADMIN — METHYLPHENIDATE HYDROCHLORIDE 10 MG: 5 TABLET ORAL at 17:07

## 2023-01-01 RX ADMIN — LEVOTHYROXINE SODIUM 200 MCG: 0.1 TABLET ORAL at 10:49

## 2023-01-01 RX ADMIN — POTASSIUM CHLORIDE 20 MEQ: 1500 TABLET, EXTENDED RELEASE ORAL at 09:16

## 2023-01-01 RX ADMIN — POTASSIUM CHLORIDE 20 MEQ: 1500 TABLET, EXTENDED RELEASE ORAL at 21:12

## 2023-01-01 RX ADMIN — METHYLPHENIDATE HYDROCHLORIDE 10 MG: 5 TABLET ORAL at 09:02

## 2023-01-01 RX ADMIN — HYDROMORPHONE HYDROCHLORIDE 0.2 MG: 0.2 INJECTION, SOLUTION INTRAMUSCULAR; INTRAVENOUS; SUBCUTANEOUS at 16:43

## 2023-01-01 RX ADMIN — HEPARIN SODIUM 5000 UNITS: 10000 INJECTION, SOLUTION INTRAVENOUS; SUBCUTANEOUS at 22:01

## 2023-01-01 RX ADMIN — ACETAMINOPHEN 650 MG: 325 TABLET, FILM COATED ORAL at 21:49

## 2023-01-01 RX ADMIN — METOPROLOL SUCCINATE 12.5 MG: 25 TABLET, EXTENDED RELEASE ORAL at 10:26

## 2023-01-01 RX ADMIN — DOXAZOSIN 4 MG: 4 TABLET ORAL at 22:22

## 2023-01-01 RX ADMIN — METHYLPHENIDATE HYDROCHLORIDE 10 MG: 5 TABLET ORAL at 16:31

## 2023-01-01 RX ADMIN — SPIRONOLACTONE 25 MG: 25 TABLET ORAL at 09:01

## 2023-01-01 RX ADMIN — ACETAMINOPHEN 650 MG: 325 TABLET, FILM COATED ORAL at 23:16

## 2023-01-01 RX ADMIN — DOXAZOSIN 4 MG: 4 TABLET ORAL at 21:00

## 2023-01-01 RX ADMIN — FUROSEMIDE 20 MG: 10 INJECTION, SOLUTION INTRAVENOUS at 17:02

## 2023-01-01 RX ADMIN — SPIRONOLACTONE 25 MG: 25 TABLET ORAL at 10:57

## 2023-01-01 RX ADMIN — DULOXETINE HYDROCHLORIDE 40 MG: 20 CAPSULE, DELAYED RELEASE ORAL at 10:14

## 2023-01-01 RX ADMIN — PIPERACILLIN AND TAZOBACTAM 3.38 G: 3; .375 INJECTION, POWDER, LYOPHILIZED, FOR SOLUTION INTRAVENOUS at 04:28

## 2023-01-01 RX ADMIN — BACITRACIN ZINC: 500 OINTMENT TOPICAL at 12:31

## 2023-01-01 RX ADMIN — RANOLAZINE 500 MG: 500 TABLET, FILM COATED, EXTENDED RELEASE ORAL at 11:07

## 2023-01-01 RX ADMIN — POLYETHYLENE GLYCOL 3350 17 G: 17 POWDER, FOR SOLUTION ORAL at 10:50

## 2023-01-01 RX ADMIN — OXYCODONE HYDROCHLORIDE 5 MG: 5 TABLET ORAL at 10:17

## 2023-01-01 RX ADMIN — POLYETHYLENE GLYCOL 3350 17 G: 17 POWDER, FOR SOLUTION ORAL at 11:06

## 2023-01-01 RX ADMIN — METRONIDAZOLE 500 MG: 500 INJECTION, SOLUTION INTRAVENOUS at 17:09

## 2023-01-01 RX ADMIN — ACETAMINOPHEN 650 MG: 325 TABLET, FILM COATED ORAL at 04:26

## 2023-01-01 RX ADMIN — METRONIDAZOLE 500 MG: 500 INJECTION, SOLUTION INTRAVENOUS at 11:14

## 2023-01-01 RX ADMIN — OXYCODONE HYDROCHLORIDE 5 MG: 5 TABLET ORAL at 04:18

## 2023-01-01 RX ADMIN — INSULIN GLARGINE 15 UNITS: 100 INJECTION, SOLUTION SUBCUTANEOUS at 12:31

## 2023-01-01 RX ADMIN — INSULIN ASPART 2 UNITS: 100 INJECTION, SOLUTION INTRAVENOUS; SUBCUTANEOUS at 17:08

## 2023-01-01 RX ADMIN — HYDROMORPHONE HYDROCHLORIDE 0.5 MG: 1 INJECTION, SOLUTION INTRAMUSCULAR; INTRAVENOUS; SUBCUTANEOUS at 12:34

## 2023-01-01 RX ADMIN — METRONIDAZOLE 500 MG: 500 INJECTION, SOLUTION INTRAVENOUS at 12:57

## 2023-01-01 RX ADMIN — HEPARIN SODIUM 5000 UNITS: 10000 INJECTION, SOLUTION INTRAVENOUS; SUBCUTANEOUS at 22:21

## 2023-01-01 RX ADMIN — METHYLPHENIDATE HYDROCHLORIDE 10 MG: 5 TABLET ORAL at 11:24

## 2023-01-01 RX ADMIN — INSULIN ASPART 2 UNITS: 100 INJECTION, SOLUTION INTRAVENOUS; SUBCUTANEOUS at 12:42

## 2023-01-01 RX ADMIN — DULOXETINE HYDROCHLORIDE 40 MG: 20 CAPSULE, DELAYED RELEASE ORAL at 11:40

## 2023-01-01 RX ADMIN — HEPARIN SODIUM 5000 UNITS: 10000 INJECTION, SOLUTION INTRAVENOUS; SUBCUTANEOUS at 21:45

## 2023-01-01 RX ADMIN — INSULIN ASPART 2 UNITS: 100 INJECTION, SOLUTION INTRAVENOUS; SUBCUTANEOUS at 08:47

## 2023-01-01 RX ADMIN — HYDROMORPHONE HYDROCHLORIDE 0.2 MG: 0.2 INJECTION, SOLUTION INTRAMUSCULAR; INTRAVENOUS; SUBCUTANEOUS at 06:44

## 2023-01-01 RX ADMIN — DOXAZOSIN 4 MG: 4 TABLET ORAL at 21:34

## 2023-01-01 RX ADMIN — HEPARIN SODIUM 1100 UNITS/HR: 10000 INJECTION, SOLUTION INTRAVENOUS at 02:59

## 2023-01-01 RX ADMIN — INSULIN ASPART 3 UNITS: 100 INJECTION, SOLUTION INTRAVENOUS; SUBCUTANEOUS at 08:27

## 2023-01-01 RX ADMIN — DOXAZOSIN 4 MG: 4 TABLET ORAL at 22:21

## 2023-01-01 RX ADMIN — ALBUMIN HUMAN 25 G: 0.25 SOLUTION INTRAVENOUS at 10:26

## 2023-01-01 RX ADMIN — PANTOPRAZOLE SODIUM 40 MG: 40 TABLET, DELAYED RELEASE ORAL at 08:01

## 2023-01-01 RX ADMIN — ACETAMINOPHEN 650 MG: 325 TABLET, FILM COATED ORAL at 04:18

## 2023-01-01 RX ADMIN — POLYETHYLENE GLYCOL 3350 17 G: 17 POWDER, FOR SOLUTION ORAL at 10:51

## 2023-01-01 RX ADMIN — CLOPIDOGREL BISULFATE 75 MG: 75 TABLET, FILM COATED ORAL at 09:00

## 2023-01-01 RX ADMIN — POTASSIUM CHLORIDE 40 MEQ: 1500 TABLET, EXTENDED RELEASE ORAL at 23:16

## 2023-01-01 RX ADMIN — CEFEPIME 2 G: 2 INJECTION, POWDER, FOR SOLUTION INTRAVENOUS at 11:56

## 2023-01-01 RX ADMIN — METHYLPHENIDATE HYDROCHLORIDE 10 MG: 5 TABLET ORAL at 08:12

## 2023-01-01 RX ADMIN — OXYCODONE HYDROCHLORIDE 2.5 MG: 5 TABLET ORAL at 05:01

## 2023-01-01 RX ADMIN — PANTOPRAZOLE SODIUM 40 MG: 40 TABLET, DELAYED RELEASE ORAL at 08:19

## 2023-01-01 RX ADMIN — LEVOTHYROXINE SODIUM 200 MCG: 0.1 TABLET ORAL at 11:07

## 2023-01-01 RX ADMIN — DULOXETINE HYDROCHLORIDE 40 MG: 20 CAPSULE, DELAYED RELEASE ORAL at 10:49

## 2023-01-01 RX ADMIN — ASPIRIN 81 MG CHEWABLE TABLET 81 MG: 81 TABLET CHEWABLE at 11:07

## 2023-01-01 RX ADMIN — OXYCODONE HYDROCHLORIDE 5 MG: 5 TABLET ORAL at 06:29

## 2023-01-01 RX ADMIN — RANOLAZINE 500 MG: 500 TABLET, FILM COATED, EXTENDED RELEASE ORAL at 22:01

## 2023-01-01 RX ADMIN — MORPHINE SULFATE 5 MG: 100 SOLUTION ORAL at 14:39

## 2023-01-01 RX ADMIN — ALBUMIN HUMAN 25 G: 0.25 SOLUTION INTRAVENOUS at 05:02

## 2023-01-01 RX ADMIN — ALBUMIN HUMAN 25 G: 0.25 SOLUTION INTRAVENOUS at 17:25

## 2023-01-01 RX ADMIN — METRONIDAZOLE 500 MG: 500 INJECTION, SOLUTION INTRAVENOUS at 18:31

## 2023-01-01 RX ADMIN — OXYCODONE HYDROCHLORIDE 5 MG: 5 TABLET ORAL at 10:15

## 2023-01-01 RX ADMIN — POTASSIUM CHLORIDE 20 MEQ: 1500 TABLET, EXTENDED RELEASE ORAL at 09:00

## 2023-01-01 RX ADMIN — BACITRACIN ZINC: 500 OINTMENT TOPICAL at 12:04

## 2023-01-01 RX ADMIN — CLOPIDOGREL BISULFATE 75 MG: 75 TABLET, FILM COATED ORAL at 12:30

## 2023-01-01 RX ADMIN — BACITRACIN ZINC: 500 OINTMENT TOPICAL at 12:54

## 2023-01-01 RX ADMIN — BACITRACIN ZINC: 500 OINTMENT TOPICAL at 23:18

## 2023-01-01 RX ADMIN — RANOLAZINE 500 MG: 500 TABLET, FILM COATED, EXTENDED RELEASE ORAL at 21:12

## 2023-01-01 RX ADMIN — OXYCODONE HYDROCHLORIDE 5 MG: 5 TABLET ORAL at 22:00

## 2023-01-01 RX ADMIN — INSULIN GLARGINE 15 UNITS: 100 INJECTION, SOLUTION SUBCUTANEOUS at 09:28

## 2023-01-01 RX ADMIN — INSULIN ASPART 2 UNITS: 100 INJECTION, SOLUTION INTRAVENOUS; SUBCUTANEOUS at 11:56

## 2023-01-01 RX ADMIN — ACETAMINOPHEN 650 MG: 325 TABLET, FILM COATED ORAL at 19:54

## 2023-01-01 RX ADMIN — INSULIN ASPART 2 UNITS: 100 INJECTION, SOLUTION INTRAVENOUS; SUBCUTANEOUS at 12:31

## 2023-01-01 RX ADMIN — METRONIDAZOLE 500 MG: 500 INJECTION, SOLUTION INTRAVENOUS at 01:33

## 2023-01-01 RX ADMIN — CLOPIDOGREL BISULFATE 75 MG: 75 TABLET, FILM COATED ORAL at 12:06

## 2023-01-01 RX ADMIN — INSULIN ASPART 1 UNITS: 100 INJECTION, SOLUTION INTRAVENOUS; SUBCUTANEOUS at 08:20

## 2023-01-01 RX ADMIN — SODIUM CHLORIDE: 9 INJECTION, SOLUTION INTRAVENOUS at 07:36

## 2023-01-01 RX ADMIN — PANTOPRAZOLE SODIUM 40 MG: 40 TABLET, DELAYED RELEASE ORAL at 08:46

## 2023-01-01 RX ADMIN — DOXAZOSIN 4 MG: 4 TABLET ORAL at 21:23

## 2023-01-01 RX ADMIN — CLOPIDOGREL BISULFATE 75 MG: 75 TABLET, FILM COATED ORAL at 10:26

## 2023-01-01 RX ADMIN — PANTOPRAZOLE SODIUM 40 MG: 40 TABLET, DELAYED RELEASE ORAL at 08:52

## 2023-01-01 RX ADMIN — ONDANSETRON 4 MG: 2 INJECTION INTRAMUSCULAR; INTRAVENOUS at 21:16

## 2023-01-01 RX ADMIN — RANOLAZINE 500 MG: 500 TABLET, FILM COATED, EXTENDED RELEASE ORAL at 21:34

## 2023-01-01 RX ADMIN — RANOLAZINE 500 MG: 500 TABLET, FILM COATED, EXTENDED RELEASE ORAL at 09:00

## 2023-01-01 RX ADMIN — PANTOPRAZOLE SODIUM 40 MG: 40 TABLET, DELAYED RELEASE ORAL at 07:29

## 2023-01-01 RX ADMIN — ASPIRIN 81 MG CHEWABLE TABLET 81 MG: 81 TABLET CHEWABLE at 10:49

## 2023-01-01 RX ADMIN — HEPARIN SODIUM 5000 UNITS: 10000 INJECTION, SOLUTION INTRAVENOUS; SUBCUTANEOUS at 21:00

## 2023-01-01 RX ADMIN — PANTOPRAZOLE SODIUM 40 MG: 40 TABLET, DELAYED RELEASE ORAL at 10:27

## 2023-01-01 RX ADMIN — METRONIDAZOLE 500 MG: 500 INJECTION, SOLUTION INTRAVENOUS at 18:56

## 2023-01-01 RX ADMIN — ASPIRIN 81 MG CHEWABLE TABLET 81 MG: 81 TABLET CHEWABLE at 09:00

## 2023-01-01 RX ADMIN — METRONIDAZOLE 500 MG: 500 INJECTION, SOLUTION INTRAVENOUS at 01:45

## 2023-01-01 RX ADMIN — INSULIN GLARGINE 15 UNITS: 100 INJECTION, SOLUTION SUBCUTANEOUS at 09:14

## 2023-01-01 RX ADMIN — HEPARIN SODIUM 5000 UNITS: 10000 INJECTION, SOLUTION INTRAVENOUS; SUBCUTANEOUS at 21:23

## 2023-01-01 RX ADMIN — INSULIN ASPART 4 UNITS: 100 INJECTION, SOLUTION INTRAVENOUS; SUBCUTANEOUS at 12:18

## 2023-01-01 RX ADMIN — METHYLPHENIDATE HYDROCHLORIDE 10 MG: 5 TABLET ORAL at 17:37

## 2023-01-01 RX ADMIN — RANOLAZINE 500 MG: 500 TABLET, FILM COATED, EXTENDED RELEASE ORAL at 10:25

## 2023-01-01 RX ADMIN — INSULIN ASPART 1 UNITS: 100 INJECTION, SOLUTION INTRAVENOUS; SUBCUTANEOUS at 12:01

## 2023-01-01 RX ADMIN — BACITRACIN ZINC: 500 OINTMENT TOPICAL at 23:49

## 2023-01-01 RX ADMIN — CIPROFLOXACIN HYDROCHLORIDE 500 MG: 500 TABLET, FILM COATED ORAL at 10:57

## 2023-01-01 RX ADMIN — CEFEPIME 2 G: 2 INJECTION, POWDER, FOR SOLUTION INTRAVENOUS at 10:37

## 2023-01-01 RX ADMIN — ACETAMINOPHEN 650 MG: 325 TABLET, FILM COATED ORAL at 20:44

## 2023-01-01 RX ADMIN — OLANZAPINE 2.5 MG: 10 INJECTION, POWDER, FOR SOLUTION INTRAMUSCULAR at 02:56

## 2023-01-01 RX ADMIN — SODIUM CHLORIDE: 9 INJECTION, SOLUTION INTRAVENOUS at 17:32

## 2023-01-01 RX ADMIN — METHYLPHENIDATE HYDROCHLORIDE 10 MG: 5 TABLET ORAL at 10:26

## 2023-01-01 RX ADMIN — METRONIDAZOLE 500 MG: 500 INJECTION, SOLUTION INTRAVENOUS at 20:35

## 2023-01-01 RX ADMIN — OXYCODONE HYDROCHLORIDE 5 MG: 5 TABLET ORAL at 14:54

## 2023-01-01 RX ADMIN — ALBUMIN HUMAN 25 G: 0.25 SOLUTION INTRAVENOUS at 04:02

## 2023-01-01 RX ADMIN — SODIUM CHLORIDE 500 ML: 9 INJECTION, SOLUTION INTRAVENOUS at 15:30

## 2023-01-01 RX ADMIN — CLOPIDOGREL BISULFATE 75 MG: 75 TABLET, FILM COATED ORAL at 09:06

## 2023-01-01 RX ADMIN — METRONIDAZOLE 500 MG: 500 INJECTION, SOLUTION INTRAVENOUS at 18:23

## 2023-01-01 RX ADMIN — SPIRONOLACTONE 25 MG: 25 TABLET ORAL at 09:07

## 2023-01-01 RX ADMIN — METHYLPHENIDATE HYDROCHLORIDE 10 MG: 5 TABLET ORAL at 06:29

## 2023-01-01 RX ADMIN — ISOSORBIDE MONONITRATE 30 MG: 30 TABLET, EXTENDED RELEASE ORAL at 10:50

## 2023-01-01 RX ADMIN — POTASSIUM CHLORIDE FOR ORAL SOLUTION 20 MEQ: 1.5 POWDER, FOR SOLUTION ORAL at 18:17

## 2023-01-01 RX ADMIN — OXYCODONE HYDROCHLORIDE 5 MG: 5 TABLET ORAL at 02:36

## 2023-01-01 RX ADMIN — POTASSIUM CHLORIDE 20 MEQ: 1500 TABLET, EXTENDED RELEASE ORAL at 17:30

## 2023-01-01 RX ADMIN — FUROSEMIDE 40 MG: 10 INJECTION, SOLUTION INTRAMUSCULAR; INTRAVENOUS at 17:58

## 2023-01-01 RX ADMIN — METRONIDAZOLE 500 MG: 500 INJECTION, SOLUTION INTRAVENOUS at 02:41

## 2023-01-01 RX ADMIN — ALBUMIN HUMAN 25 G: 0.25 SOLUTION INTRAVENOUS at 22:16

## 2023-01-01 RX ADMIN — POTASSIUM CHLORIDE 40 MEQ: 1.5 SOLUTION ORAL at 09:52

## 2023-01-01 RX ADMIN — HEPARIN SODIUM 5000 UNITS: 10000 INJECTION, SOLUTION INTRAVENOUS; SUBCUTANEOUS at 10:29

## 2023-01-01 RX ADMIN — HEPARIN SODIUM 5000 UNITS: 10000 INJECTION, SOLUTION INTRAVENOUS; SUBCUTANEOUS at 10:28

## 2023-01-01 RX ADMIN — METHYLPHENIDATE HYDROCHLORIDE 10 MG: 5 TABLET ORAL at 14:53

## 2023-01-01 RX ADMIN — DULOXETINE HYDROCHLORIDE 40 MG: 20 CAPSULE, DELAYED RELEASE ORAL at 11:07

## 2023-01-01 RX ADMIN — LEVOTHYROXINE SODIUM 200 MCG: 0.1 TABLET ORAL at 09:00

## 2023-01-01 RX ADMIN — OXYCODONE HYDROCHLORIDE 5 MG: 5 TABLET ORAL at 14:17

## 2023-01-01 RX ADMIN — CEFEPIME 2 G: 2 INJECTION, POWDER, FOR SOLUTION INTRAVENOUS at 11:35

## 2023-01-01 RX ADMIN — CLOPIDOGREL BISULFATE 75 MG: 75 TABLET, FILM COATED ORAL at 09:14

## 2023-01-01 RX ADMIN — BISACODYL 5 MG: 5 TABLET, COATED ORAL at 10:14

## 2023-01-01 RX ADMIN — HYDROMORPHONE HYDROCHLORIDE 0.5 MG: 1 INJECTION, SOLUTION INTRAMUSCULAR; INTRAVENOUS; SUBCUTANEOUS at 16:01

## 2023-01-01 RX ADMIN — PANTOPRAZOLE SODIUM 40 MG: 40 TABLET, DELAYED RELEASE ORAL at 14:41

## 2023-01-01 RX ADMIN — PANTOPRAZOLE SODIUM 40 MG: 40 TABLET, DELAYED RELEASE ORAL at 09:17

## 2023-01-01 RX ADMIN — DULOXETINE HYDROCHLORIDE 40 MG: 20 CAPSULE, DELAYED RELEASE ORAL at 09:12

## 2023-01-01 RX ADMIN — HYDROMORPHONE HYDROCHLORIDE 0.5 MG: 1 INJECTION, SOLUTION INTRAMUSCULAR; INTRAVENOUS; SUBCUTANEOUS at 05:52

## 2023-01-01 ASSESSMENT — ACTIVITIES OF DAILY LIVING (ADL)
ADLS_ACUITY_SCORE: 56
DRESS: 0-->ASSISTANCE NEEDED (DEVELOPMENTALLY APPROPRIATE)
ADLS_ACUITY_SCORE: 54
VISION_MANAGEMENT: GLASSES
ADLS_ACUITY_SCORE: 59
ADLS_ACUITY_SCORE: 62
ADLS_ACUITY_SCORE: 54
ADLS_ACUITY_SCORE: 60
ADLS_ACUITY_SCORE: 60
ADLS_ACUITY_SCORE: 59
ADLS_ACUITY_SCORE: 56
ADLS_ACUITY_SCORE: 61
ADLS_ACUITY_SCORE: 58
ADLS_ACUITY_SCORE: 60
ADLS_ACUITY_SCORE: 54
ADLS_ACUITY_SCORE: 60
ADLS_ACUITY_SCORE: 61
DRESS: 1-->ASSISTANCE (EQUIPMENT/PERSON) NEEDED
ADLS_ACUITY_SCORE: 52
ADLS_ACUITY_SCORE: 58
ADLS_ACUITY_SCORE: 59
ADLS_ACUITY_SCORE: 54
ADLS_ACUITY_SCORE: 59
ADLS_ACUITY_SCORE: 58
ADLS_ACUITY_SCORE: 58
ADLS_ACUITY_SCORE: 59
ADLS_ACUITY_SCORE: 59
ADLS_ACUITY_SCORE: 56
ADLS_ACUITY_SCORE: 60
ADLS_ACUITY_SCORE: 60
ADLS_ACUITY_SCORE: 62
ADLS_ACUITY_SCORE: 62
ADLS_ACUITY_SCORE: 59
ADLS_ACUITY_SCORE: 57
ADLS_ACUITY_SCORE: 60
ADLS_ACUITY_SCORE: 60
ADLS_ACUITY_SCORE: 54
FALL_HISTORY_WITHIN_LAST_SIX_MONTHS: NO
ADLS_ACUITY_SCORE: 59
ADLS_ACUITY_SCORE: 57
ADLS_ACUITY_SCORE: 56
ADLS_ACUITY_SCORE: 54
ADLS_ACUITY_SCORE: 56
ADLS_ACUITY_SCORE: 60
ADLS_ACUITY_SCORE: 62
ADLS_ACUITY_SCORE: 54
ADLS_ACUITY_SCORE: 56
ADLS_ACUITY_SCORE: 60
ADLS_ACUITY_SCORE: 60
ADLS_ACUITY_SCORE: 54
ADLS_ACUITY_SCORE: 56
ADLS_ACUITY_SCORE: 54
ADLS_ACUITY_SCORE: 57
ADLS_ACUITY_SCORE: 57
ADLS_ACUITY_SCORE: 56
ADLS_ACUITY_SCORE: 57
ADLS_ACUITY_SCORE: 56
ADLS_ACUITY_SCORE: 61
ADLS_ACUITY_SCORE: 60
WEAR_GLASSES_OR_BLIND: YES
ADLS_ACUITY_SCORE: 54
ADLS_ACUITY_SCORE: 60
ADLS_ACUITY_SCORE: 62
ADLS_ACUITY_SCORE: 59
ADLS_ACUITY_SCORE: 52
DRESSING/BATHING: BATHING DIFFICULTY, REQUIRES EQUIPMENT;BATHING DIFFICULTY, ASSISTANCE 1 PERSON
ADLS_ACUITY_SCORE: 60
ADLS_ACUITY_SCORE: 58
ADLS_ACUITY_SCORE: 54
ADLS_ACUITY_SCORE: 57
ADLS_ACUITY_SCORE: 41
ADLS_ACUITY_SCORE: 61
ADLS_ACUITY_SCORE: 59
ADLS_ACUITY_SCORE: 57
ADLS_ACUITY_SCORE: 59
ADLS_ACUITY_SCORE: 60
ADLS_ACUITY_SCORE: 59
ADLS_ACUITY_SCORE: 59
ADLS_ACUITY_SCORE: 62
ADLS_ACUITY_SCORE: 59
ADLS_ACUITY_SCORE: 59
ADLS_ACUITY_SCORE: 56
ADLS_ACUITY_SCORE: 56
ADLS_ACUITY_SCORE: 59
ADLS_ACUITY_SCORE: 59
ADLS_ACUITY_SCORE: 54
ADLS_ACUITY_SCORE: 58
ADLS_ACUITY_SCORE: 61
ADLS_ACUITY_SCORE: 59
ADLS_ACUITY_SCORE: 60
ADLS_ACUITY_SCORE: 59
ADLS_ACUITY_SCORE: 52
DOING_ERRANDS_INDEPENDENTLY_DIFFICULTY: YES
ADLS_ACUITY_SCORE: 60
ADLS_ACUITY_SCORE: 54
ADLS_ACUITY_SCORE: 58
ADLS_ACUITY_SCORE: 60
ADLS_ACUITY_SCORE: 56
ADLS_ACUITY_SCORE: 59
ADLS_ACUITY_SCORE: 57
DRESSING/BATHING_DIFFICULTY: YES
ADLS_ACUITY_SCORE: 56
ADLS_ACUITY_SCORE: 62
ADLS_ACUITY_SCORE: 58
ADLS_ACUITY_SCORE: 59
ADLS_ACUITY_SCORE: 57
ADLS_ACUITY_SCORE: 54
ADLS_ACUITY_SCORE: 54
DIFFICULTY_EATING/SWALLOWING: NO
ADLS_ACUITY_SCORE: 59
ADLS_ACUITY_SCORE: 57
ADLS_ACUITY_SCORE: 58
ADLS_ACUITY_SCORE: 57
TOILETING_ASSISTANCE: TOILETING DIFFICULTY, ASSISTANCE 1 PERSON
ADLS_ACUITY_SCORE: 58
ADLS_ACUITY_SCORE: 59
ADLS_ACUITY_SCORE: 56
ADLS_ACUITY_SCORE: 58
ADLS_ACUITY_SCORE: 60
ADLS_ACUITY_SCORE: 56
ADLS_ACUITY_SCORE: 60
ADLS_ACUITY_SCORE: 61
ADLS_ACUITY_SCORE: 60
ADLS_ACUITY_SCORE: 41
ADLS_ACUITY_SCORE: 60
ADLS_ACUITY_SCORE: 60
ADLS_ACUITY_SCORE: 57
ADLS_ACUITY_SCORE: 59
ADLS_ACUITY_SCORE: 56
ADLS_ACUITY_SCORE: 62
ADLS_ACUITY_SCORE: 59
ADLS_ACUITY_SCORE: 56
ADLS_ACUITY_SCORE: 60
ADLS_ACUITY_SCORE: 59
ADLS_ACUITY_SCORE: 61
ADLS_ACUITY_SCORE: 57
ADLS_ACUITY_SCORE: 60
ADLS_ACUITY_SCORE: 57
ADLS_ACUITY_SCORE: 59
ADLS_ACUITY_SCORE: 56
ADLS_ACUITY_SCORE: 58
ADLS_ACUITY_SCORE: 61
ADLS_ACUITY_SCORE: 59
ADLS_ACUITY_SCORE: 56
ADLS_ACUITY_SCORE: 62
ADLS_ACUITY_SCORE: 60
ADLS_ACUITY_SCORE: 59
ADLS_ACUITY_SCORE: 61
TOILETING_ISSUES: YES
ADLS_ACUITY_SCORE: 60
BATHING: 1-->ASSISTANCE NEEDED
ADLS_ACUITY_SCORE: 61
ADLS_ACUITY_SCORE: 59
ADLS_ACUITY_SCORE: 54
ADLS_ACUITY_SCORE: 56
ADLS_ACUITY_SCORE: 57
ADLS_ACUITY_SCORE: 56
ADLS_ACUITY_SCORE: 56
ADLS_ACUITY_SCORE: 59
ADLS_ACUITY_SCORE: 57
ADLS_ACUITY_SCORE: 60
ADLS_ACUITY_SCORE: 56
ADLS_ACUITY_SCORE: 58
ADLS_ACUITY_SCORE: 60
ADLS_ACUITY_SCORE: 59
ADLS_ACUITY_SCORE: 59
ADLS_ACUITY_SCORE: 60
ADLS_ACUITY_SCORE: 60
ADLS_ACUITY_SCORE: 58
ADLS_ACUITY_SCORE: 59
ADLS_ACUITY_SCORE: 61
ADLS_ACUITY_SCORE: 59
ADLS_ACUITY_SCORE: 57
ADLS_ACUITY_SCORE: 57
ADLS_ACUITY_SCORE: 56
ADLS_ACUITY_SCORE: 35
TOILETING: 0-->NOT TOILET TRAINED OR ASSISTANCE NEEDED (DEVELOPMENTALLY APPROPRIATE)
ADLS_ACUITY_SCORE: 56
ADLS_ACUITY_SCORE: 56
ADLS_ACUITY_SCORE: 60
ADLS_ACUITY_SCORE: 60
ADLS_ACUITY_SCORE: 59
ADLS_ACUITY_SCORE: 59
ADLS_ACUITY_SCORE: 57
ADLS_ACUITY_SCORE: 56
WALKING_OR_CLIMBING_STAIRS_DIFFICULTY: NO
ADLS_ACUITY_SCORE: 61
ADLS_ACUITY_SCORE: 59
ADLS_ACUITY_SCORE: 57
ADLS_ACUITY_SCORE: 35
ADLS_ACUITY_SCORE: 60
ADLS_ACUITY_SCORE: 60
ADLS_ACUITY_SCORE: 52
ADLS_ACUITY_SCORE: 59
ADLS_ACUITY_SCORE: 58
ADLS_ACUITY_SCORE: 61
TOILETING: 1-->ASSISTANCE (EQUIPMENT/PERSON) NEEDED
ADLS_ACUITY_SCORE: 59
ADLS_ACUITY_SCORE: 57
ADLS_ACUITY_SCORE: 57
ADLS_ACUITY_SCORE: 54
ADLS_ACUITY_SCORE: 56
ADLS_ACUITY_SCORE: 57
ADLS_ACUITY_SCORE: 60
ADLS_ACUITY_SCORE: 54
ADLS_ACUITY_SCORE: 59
ADLS_ACUITY_SCORE: 57
CONCENTRATING,_REMEMBERING_OR_MAKING_DECISIONS_DIFFICULTY: OTHER (SEE COMMENTS)
ADLS_ACUITY_SCORE: 61
ADLS_ACUITY_SCORE: 60
ADLS_ACUITY_SCORE: 60
ADLS_ACUITY_SCORE: 59

## 2023-01-01 ASSESSMENT — COLUMBIA-SUICIDE SEVERITY RATING SCALE - C-SSRS
6. HAVE YOU EVER DONE ANYTHING, STARTED TO DO ANYTHING, OR PREPARED TO DO ANYTHING TO END YOUR LIFE?: NO
2. HAVE YOU ACTUALLY HAD ANY THOUGHTS OF KILLING YOURSELF IN THE PAST MONTH?: NO
5. HAVE YOU STARTED TO WORK OUT OR WORKED OUT THE DETAILS OF HOW TO KILL YOURSELF? DO YOU INTEND TO CARRY OUT THIS PLAN?: NO
1. IN THE PAST MONTH, HAVE YOU WISHED YOU WERE DEAD OR WISHED YOU COULD GO TO SLEEP AND NOT WAKE UP?: NO
3. HAVE YOU BEEN THINKING ABOUT HOW YOU MIGHT KILL YOURSELF?: NO
4. HAVE YOU HAD THESE THOUGHTS AND HAD SOME INTENTION OF ACTING ON THEM?: NO

## 2023-01-01 ASSESSMENT — PAIN SCALES - GENERAL: PAINLEVEL: WORST PAIN (10)

## 2023-04-07 NOTE — PROGRESS NOTES
Patient Information     Patient Name MRMoses Oliveira [id number] Male 1950      Progress Notes by Jorge Barnett MD at 2/3/2017  2:30 PM     Author:  Jorge Barnett MD Service:  (none) Author Type:  Physician     Filed:  2/3/2017  5:35 PM Encounter Date:  2/3/2017 Status:  Signed     :  Jorge Barnett MD (Physician)            Nursing Notes:   NikolasBarbara  2/3/2017  3:05 PM  Signed  Patient presents to the clinic for watery eyes, red nasal drainage, productive cough with yellow/green/red sputum, post nasal drip, decrease in food/fluid intake, bilateral ear discomfort, fatigue and dry throat noted over the past couple of weeks.  Patient denies fevers/chills at this time.  Patient tried OTC Claritin with no effect.  Glucose has been elevated over the past 3 weeks.  Last eye exam was 1 year ago, upcoming appointment next week.    Barbara Connor LPN        2/3/2017 2:50 PM    Moses Whittaker presents to clinic today for:   Chief Complaint    Patient presents with      General Illness/Other     HPI: Mr. Whittaker is a 66 y.o. male who presents today for evaluation of above.     (H10.13,  J30.9) Allergic conjunctivitis and rhinitis, bilateral  (primary encounter diagnosis)  (J34.89) Nasal dryness  (R04.0) Recurrent epistaxis  (D69.59,  T45.515A) Platelet inhibition due to Plavix -- On Plavix (Brand Name due to generic intolerance) For Life.   (E11.9,  Z79.4) Controlled type 2 diabetes mellitus with insulin therapy (HC)  (I10) Hypertension  (E78.2) Mixed hyperlipidemia     Patient presents with multiple complaints.  He states he's got itchy burning eyes.  Watery nose.  Rhinitis, sneezing.  Sore throat.  Postnasal drip.  He is blowing his nose frequently and has been having bloody noses repeatedly in the last couple of weeks.    We talked about possible triggers for allergy-like symptoms.  He has a hot water boiler.  Does not use a wood burning fireplace or furnace.  Patient does report  that his wife has had multiple fragrant candles and has been burning a lot of candles around the house recently.  States that when it's cold out and is indoors his allergy symptoms are much worse.  He did try Claritin for about 4 days which reportedly helped significantly but it made him very tired and subsequently stopped it.      He is wondering about possible Flonase.  Reports he has had a lot of bloody crusty nose bleeds recently however.  Is very dry in his house.    He does not use a humidifier -- advised that he try keep the humidity in his house higher.    Patient is on chronic Plavix platelet inhibition due to coronary artery disease.  Denies exertional chest pain or heaviness.    Diabetes, recently blood sugars running in the 200 range almost every morning for about 2 weeks.  He is due for labs now but declines today.  He wants to get these in a couple weeks or even a month once his blood sugars are better controlled.    Hypertension, currently elevated.  Measuring in the 150/90 range.  Previously worked controlled.  Advised to monitor his blood pressures at home.    Hyperlipidemia, due for lipid panel.  He declines today.  He is currently taking Crestor 10 mg daily.    Mr. Whittaker's Body mass index is 30.64 kg/(m^2). This is out of the normal range for a 66 y.o. Normal range for ages 18-64 is between 18.5 and 24.9; normal range for ages 65+ is 23-30. To lose weight we reviewed risks and benefits of appropriate options such as diet, exercise, and medications. Patient's strategy will be  self-directed nutrition plan and self-directed exercise program   BP Readings from Last 1 Encounters:12/21/16 : 134/78  Mr. Stokes blood pressure is out of the normal range for adults. Per JNC-8 guidelines normal adult blood pressure is < 120/80, pre-hypertensive is between 120/80 and 139/89, and hypertension is 140/90 or greater. Risks of hypertension were discussed. Patient's strategy will be weight loss, increased  activity and reduced salt intake    Functional Capacity: > 4 METS.   Reports that he can climb a flight of stairs without any chest pain/heaviness or shortness of breath.   Patient reports no current symptoms of fevers, chills, nausea/vomiting.   + allergy symptoms   No cough. No shortness of breath.   No change in bowel/bladder habits. No melena, hematochezia. No Hematuria.   No rashes. No palpitations.  No orthopnea/paroxysmal nocturnal dyspnea   No vision or hearing issues.   No significant mood issues   No bruising.     [place]:  No flowsheet data found.    PHQ9:  PHQ Depression Screening 12/21/2016 2/3/2017   Date of PHQ exam (doc flow) 12/21/2016 2/3/2017   1. Lack of interest/pleasure 0 - Not at all 0 - Not at all   2. Feeling down/depressed 0 - Not at all 0 - Not at all   PHQ-2 TOTAL SCORE 0 0   3. Trouble sleeping 0 - Not at all 0 - Not at all   4. Decreased energy 0 - Not at all 0 - Not at all   5. Appetite change 0 - Not at all 0 - Not at all   6. Feelings of failure 0 - Not at all 0 - Not at all   7. Trouble concentrating 0 - Not at all 0 - Not at all   8. Activity level 0 - Not at all 0 - Not at all   9. Hurting yourself 0 - Not at all 0 - Not at all   PHQ-9 TOTAL SCORE 0 0   PHQ-9 Severity Level none none   Functional Impairment not difficult at all not difficult at all        I have personally reviewed the past medical history, past surgical history, medications, allergies, family and social history as listed below, on 2/3/2017.    Patient Active Problem List       Diagnosis  Date Noted     Erectile dysfunction  09/26/2016     Trigger point with neck pain  07/14/2016     Trigger point with back pain  07/14/2016     Thrombophlebitis of superficial veins of left lower extremity - 6/17/2016 06/17/2016     Controlled type 2 diabetes mellitus with insulin therapy (HC)  03/10/2016     Insulin pump in place  03/10/2016     Pain medication agreement - 12/17/2015 12/17/2015     Trigger point of extremity   12/17/2015     Myofacial muscle pain  12/17/2015     Greater trochanteric bursitis of left hip  06/24/2015     Blister of toe of right foot  12/09/2014     Cervical stenosis of spinal canal  06/17/2014     Facet arthritis of cervical region (HC)  06/17/2014     Degenerative disc disease, cervical  06/17/2014     Left arm numbness  06/05/2014     Cervical radicular pain  06/05/2014     Left atrial enlargement - Moderate - 1/20/2014 ECHO  01/23/2014     S/P coronary artery stent placement  01/10/2014     Old inferior wall myocardial infarction  01/10/2014     S/P CABG x 2  01/10/2014     Hypertension  01/10/2014     Platelet inhibition due to Plavix -- On Plavix (Brand Name due to generic intolerance) For Life.   01/10/2014     Myalgia  01/10/2014     CKD (chronic kidney disease) stage 3, GFR 30-59 ml/min  01/10/2014     Chronic diastolic heart failure - Mild - 1/20/2014 ECHO - EF 65%  01/10/2014     1/20/2014 ECHO FINAL IMPRESSION:   1. Normal left ventricular size and systolic function. Estimated ejection fraction 65%.   2. Mild increase in wall thickness of the ventricular septum with hypokinesis of the basilar segment of the ventricular septum and inferior wall.   3. Mild to moderate left atrial enlargement.   4. Trace tricuspid regurgitation.   5. Mild left ventricular diastolic dysfunction.         ACP (advance care planning)  12/05/2013     Diabetic neuropathy, painful (HC)  04/22/2013     G E R D  05/17/2012     OBESITY  05/17/2012     NARCOLEPSY       Total dose recommended by pulmonology he is dextro- amphetamine 40 mg plus   methylphenidate 40 mg in divided doses per day.  Total of 80 mg of short   acting amphetamines daily.          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       Angioplasty   10/01     Angioplasty with 2 vessel CABG the following week from the lt internal mammary to the lt anterior descending and right radial free graft       Carpal tunnel release   11/15/01     Right carpal tunnel release by Dr. Mace       Angioplasty   04/2003     Ir angiogram femoral/extremity (ia)   09/03     Unremarkable angiogram at [facility]       Carpal tunnel release   01/2004     Left carpal tunnel release        Ptca   10/05/2004     Angioplasty        Ptca   02/2005     Angioplasty with stent.         Ptca   03/02/2005     Angioplasty with stent.        Ptca   09/23/2005     Angioplasty without stent       Rotator cuff repair   02/06/2006     Left rotator cuff repair and bone spur removal by Dr. Giraldo in [place]       Ir angiogram femoral/extremity (ia)   2/26/2007     Unremarkable coronary angiogram at [facility].       Appendectomy open   1967     Current Outpatient Prescriptions       Medication  Sig Dispense Refill     alcohol swabs (ALCOHOL PREP PADS) For home use. 1 box 0     aspirin 325 mg tablet Take  by mouth.       blood sugar diagnostic (ONE TOUCH ULTRA TEST) strip Dispense test  "strips covered by the patient insurance. Test 10 times per day. 900 Each 3     Blood-Glucose Meter (ACCU-CHEK ABBI PLUS METER) Dispense glucose meter, test strips and lancets covered by the patient insurance. Test 10 times per day. 1 Device 0     clopidogrel (PLAVIX) 75 mg tablet Take 1 tablet by mouth once daily. 90 tablet 4     desoximetasone 0.25% topical (TOPICORT) 0.25 % cream Apply twice daily 180 g 1     Dextroamphetamine Sulfate (DEXTROSTAT) 10 mg tablet Take 1 tablet by mouth four times daily - for on or after 12/21/2016 360 tablet 0     diclofenac (VOLTAREN) 75 mg delayed-release tablet TAKE 1 TABLET BY MOUTH FOUR TIMES DAILY 360 tablet 3     fexofenadine (ALLEGRA ALLERGY) 60 mg tablet Take 1 tablet by mouth 2 times daily. AS NEEDED for Allergy symptoms 60 tablet 3     fish oil-omega-3 fatty acids (FISH OIL) 1,000-340 mg capsule Take  by mouth.       flaxseed oil 1,000 mg cap Take  by mouth.       furosemide (LASIX) 40 mg tablet TAKE 2 TO 4 TABLETS BY MOUTH DAILY OR AS INSTRUCTED FOR LEG SWELLING 360 tablet 4     HUMALOG 100 unit/mL injection INJECT UNDER THE SKIN USING INSULIN PUMP. MAX DOSE  UNITS DAILY. 180 mL 2     hydrALAZINE (APRESOLINE) 25 mg tablet Take 1-2 tablets by mouth 4 times daily. - Take lowest effective dose for blood pressure management 720 tablet 3     HYDROcodone-acetaminophen, 5-325 mg, (NORCO) per tablet Take 1 tablet by mouth every 6 hours if needed for Pain. - for on or after 06/07/16 60 tablet 0     Insulin Needles, Disposable, (BD INSULIN PEN NEEDLE UF) 29 x 1/2 \" For administering insulin at home. 600 Each 2     IV Administration Set (INFUSION SET) iset As directed.  0     lancets (ONE TOUCH ULTRASOFT LANCETS) Test 10 times per day. 600 Each 6     levothyroxine (SYNTHROID) 125 mcg tablet Take 1 tablet by mouth every morning. 90 tablet 4     methylphenidate (RITALIN) 10 mg tablet One tablet by mouth four times daily - for on or after 12/21/2016 360 tablet 0     metoprolol " succinate (TOPROL XL) 50 mg sustained-release tablet Take 1 tablet by mouth 2 times daily. 180 tablet 3     nitroglycerin (NITROSTAT) 0.4 mg sublingual tablet Place 1 tablet under the tongue every 5 minutes if needed for Chest Pain. 3 Bottle 1     ranitidine (ZANTAC) 150 mg tablet Take 1 tablet by mouth 2 times daily. 180 tablet 3     ranolazine (RANEXA) 500 mg Controlled-Release tablet Take 2 tablets by mouth 2 times daily. -- cancel other Rx -- give 3 month supply 360 tablet 3     rosuvastatin (CRESTOR) 10 mg tablet TAKE 1 TABLET BY MOUTH TWICE DAILY 180 tablet 3     sodium chloride-aloe vera (SALINE NASAL, ALOE VERA,) nasal gel Inhale  in the nostril(s) every hour if needed for Nasal Dryness. 14.1 g 3     Sub-Q Infusion Pump Access (ACCU-CHEK SPIRIT CARTRIDGE SYS) misc As directed.  0     valsartan (DIOVAN) 160 mg tablet Take 1 tablet by mouth 2 times daily. 180 tablet 3     Allergies     Allergen  Reactions     Gabapentin Mental Status Change     Bextra [Valdecoxib] Edema     Lipitor [Atorvastatin] Hives     Lisinopril Cough     Lyrica [Pregabalin] Mental Status Change     Novolog [Insulin Aspart] Rash     Family History       Problem   Relation Age of Onset     Heart Disease  Mother      Heart problems       Heart Disease  Other      Heart problems       Heart Disease  Other      Heart problems       Other  Son      Ankylosing spondylitis        Other  Brother       with sudden death following shoulder surgery 2012 -- was off his Plavix for 10 days pre-operatively.       Family Status     Relation  Status     Brother      with sudden death following shoulder surgery 2012 -- was off his Plavix for 10 days pre-operatively.      Son Alive     Mother      Other      Other      Son      Brother      Social History     Social History        Marital status:       Spouse name: N/A     Number of children:  N/A     Years of education:  N/A     Social History Main Topics       Smoking  "status: Never Smoker     Smokeless tobacco: Never Used     Alcohol use No     Drug use: No     Sexual activity: Yes     Other Topics  Concern     Not on file      Social History Narrative     He is on Social Security Disability for coronary artery disease and cervical arthritis and disk disease.   Jakobd obed.  No tobacco.             Pertinent ROS was performed and was negative as noted in HPI above.     EXAM:   Vitals:     02/03/17 1454   Pulse: 99   Temp: 98.6  F (37  C)   TempSrc: Tympanic   SpO2: 98%   Weight: 94.8 kg (209 lb)   Height: 1.759 m (5' 9.25\")     BP Readings from Last 3 Encounters:    12/21/16 134/78   09/26/16 144/70   09/23/16 138/88     Wt Readings from Last 3 Encounters:    02/03/17 94.8 kg (209 lb)   12/21/16 94.4 kg (208 lb 3.2 oz)   09/26/16 95.4 kg (210 lb 6.4 oz)     Estimated body mass index is 30.64 kg/(m^2) as calculated from the following:    Height as of this encounter: 1.759 m (5' 9.25\").    Weight as of this encounter: 94.8 kg (209 lb).     EXAM:  Constitutional: Pleasant, alert, appropriate appearance for age. No acute distress  ENT: Normocephalic, Atraumatic, Thyroid without nodules or tenderness   Left Ear: normal; external ear canal and TM clear, nontender.   Right Ear: normal; external ear canal and TM clear, nontender.   Nose/Mouth: Oral pharynx without erythema or exudates, Mucous membranes are moist, Nose is patent bilaterally, -- scabs on septum, clear rhinorrhea, Dental hygeine adequate and Dentition is intact   Eyes:   Extraocular muscles intact, Sclera non-icteric, Conjunctiva with mild erythema  Lymphatic Exam: Non-palpable nodes in neck, clavicular regions  Pulmonary: Lungs are clear to auscultation bilaterally, without wheezes or crackles  Cardiovascular Exam: regular rate and rhythm, no pedal edema  Gastrointestinal Exam: Obese  Integument: No abnormal rashes, sores, or ulcerations noted  Neurologic Exam: CN 3-12 grossly intact   Musculoskeletal Exam: Moves upper and " lower extremities symmetrically, No focal weakness  Gait and station appear grossly normal  Psychiatric Exam: Awake and Alert, Affect and mood appropriate  Speech is fluent, Thought process is normal    INVESTIGATIONS:  Results for orders placed or performed in visit on 12/21/16      COMPLIANCE DRUG ANALYSIS      Result  Value Ref Range    6-MONOACETYL MORPHINE NEG NEG ng/mL    AMPHETAMINE URINE POS (A) <=500 ng/mL    BARBITURATE URINE NEG <=200 ng/mL    BENZODIAZEPINE URINE NEG <=200 ng/mL    BUPRENORPHRINE URINE NEG <=5 ng/mL    COCAINE METAB URINE NEG <=300 ng/mL    ETHYLGLUCURONIDE URINE NEG <=250 ng/mL    FENTANYL URINE NEG <=4 ng/mL    METHADONE URINE NEG <=300 ng/mL    OPIATES URINE NEG <=300 ng/mL    OXYCODONE URINE NEG <=100 ng/mL    PROPOXYPHENE URINE NEG <=300 ng/mL    THC 50 URINE NEG <=50 ng/mL    TRAMADOL NEG <=200 ng/mL    PH URINE 6.3 5.0 - 7.0    CREAT  >=20 mg/dL    MASS SPECTROMETRY URINE See Below    URINALYSIS W REFLEX MICROSCOPIC IF POSITIVE      Result  Value Ref Range    COLOR                     Yellow Yellow Color    CLARITY                   Clear Clear Clarity    SPECIFIC GRAVITY,URINE    1.025 1.010, 1.015, 1.020, 1.025                    PH,URINE                  6.0 6.0, 7.0, 8.0, 5.5, 6.5, 7.5, 8.5                    UROBILINOGEN,QUALITATIVE  Normal Normal EU/dl    PROTEIN, URINE Negative Negative mg/dL    GLUCOSE, URINE 250 (A) Negative mg/dL    KETONES,URINE             Negative Negative mg/dL    BILIRUBIN,URINE           Negative Negative                    OCCULT BLOOD,URINE        Negative Negative                    NITRITE                   Negative Negative                    LEUKOCYTE ESTERASE        Negative Negative                   MICROALBUMIN RANDOM URINE      Result  Value Ref Range    ALB RAND URINE            66.2 mg/L    CREATININE,URINE          1.64 g/L    MICROALBUMIN,RAND UR      40.4 (H) <30.0 mg/g creat   URINALYSIS MICROSCOPIC      Result  Value Ref  Range    RBC None Seen 0-2, None Seen /HPF    WBC None Seen 0-2, 3-5, None Seen /HPF    BACTERIA                  None Seen None Seen, Rare, Occasional, Few Bacteria/HPF    EPITHELIAL CELLS          None Seen None Seen, Few Epi/HPF       ASSESSMENT AND PLAN:  Moses was seen today for general illness/other.    Diagnoses and all orders for this visit:    Allergic conjunctivitis and rhinitis, bilateral  -     fexofenadine (ALLEGRA ALLERGY) 60 mg tablet; Take 1 tablet by mouth 2 times daily. AS NEEDED for Allergy symptoms  -     sodium chloride-aloe vera (SALINE NASAL, ALOE VERA,) nasal gel; Inhale  in the nostril(s) every hour if needed for Nasal Dryness.    Nasal dryness  -     fexofenadine (ALLEGRA ALLERGY) 60 mg tablet; Take 1 tablet by mouth 2 times daily. AS NEEDED for Allergy symptoms  -     sodium chloride-aloe vera (SALINE NASAL, ALOE VERA,) nasal gel; Inhale  in the nostril(s) every hour if needed for Nasal Dryness.    Recurrent epistaxis  -     fexofenadine (ALLEGRA ALLERGY) 60 mg tablet; Take 1 tablet by mouth 2 times daily. AS NEEDED for Allergy symptoms  -     sodium chloride-aloe vera (SALINE NASAL, ALOE VERA,) nasal gel; Inhale  in the nostril(s) every hour if needed for Nasal Dryness.    Platelet inhibition due to Plavix -- On Plavix (Brand Name due to generic intolerance) For Life.     Controlled type 2 diabetes mellitus with insulin therapy (HC)  -     HEMOGLOBIN A1C MONITORING (POCT); Standing  -     URINALYSIS W REFLEX MICROSCOPIC IF POSITIVE; Future  -     MICROALBUMIN RANDOM URINE; Future    Hypertension  -     CBC W PLT NO DIFF; Standing  -     COMP METABOLIC PANEL; Standing    Mixed hyperlipidemia  -     LIPID PANEL; Standing    lab results and schedule of future lab studies reviewed with patient, reviewed diet, exercise and weight control, recommended sodium restriction, cardiovascular risk and specific lipid/LDL goals reviewed, specific diabetic recommendations referral to Diabetic Education  department, low cholesterol diet, weight control and daily exercise discussed, home glucose monitoring emphasized, foot care discussed and Podiatry visits discussed, annual eye examinations at Ophthalmology discussed, glycohemoglobin and other lab monitoring discussed and long term diabetic complications discussed, use of aspirin to prevent MI and TIA's discussed    -- Expected clinical course discussed   -- Medications and their side effects discussed    The ASCVD Risk score ([place] THOMAS Jr., et al., 2013) failed to calculate for the following reasons:    The patient has a prior MCI or stroke diagnosis    Moses is also recommended to eat a heart-healthy diet, do regular aerobic exercises, maintain a desirable body weight, and avoid tobacco products. These recommendations are from the American Heart Association (AHA) which stresses the importance of lifestyle changes to lower cardiovascular disease risk.     Return in about 4 months (around 6/3/2017).    Patient Instructions     NO More candles / smoke exposure.     Get rid of anything that could be causing allergy symptoms.     1. Allergic conjunctivitis and rhinitis, bilateral  2. Nasal dryness  3. Recurrent epistaxis    - fexofenadine (ALLEGRA ALLERGY) 60 mg tablet; Take 1 tablet by mouth 2 times daily. AS NEEDED for Allergy symptoms  Dispense: 60 tablet; Refill: 3    - sodium chloride-aloe vera (SALINE NASAL, ALOE VERA,) nasal gel; Inhale  in the nostril(s) every hour if needed for Nasal Dryness.  Dispense: 14.1 g; Refill: 3     Consider trial of Zyrtec, Claritin, or Allegra -- as needed to help with hives or allergies.      Return for Diabetes labs and clinic follow-up appointment every 3 to 4 months.  --- (Go for about 91 to 100 days)    Schedule lab only appointment --- A few days AFTER: 4 months from now    Schedule clinic appointment with Dr. Barnett -- Same day as labs, or 1-2 days later.     Insurance companies are now grading you and I on your blood sugar  control -- Goal is to get your A1c down to 7.9% or lower and NO Smoking!    -- Medicare and most insurance companies, will only cover Hemoglobin A1c labs to be rechecked every 91+ days.      HEMOGLOBIN A1C MONITORING (POCT)    Date Value   10/19/2016 7.4 % (H)   04/02/2013 7.4 % NGSP (H)     HEMOGLOBIN A1C GIH (%)    Date Value   07/24/2012 8.0 (H)        Next follow-up appointment with Dr. Barnett should be scheduled:  -- Approximately a few days AFTER: 4 months from now        Return as needed for follow-up with Dr. Barnett.    Clinic : [phone]  Appointment line: [phone]     Jorge Barnett MD           Cosentyx Pregnancy And Lactation Text: This medication is Pregnancy Category B and is considered safe during pregnancy. It is unknown if this medication is excreted in breast milk.

## 2023-05-19 NOTE — PATIENT INSTRUCTIONS
Please bring urine sample back tonight.     Will follow urine culture results.     I have sent prescription for cephalexin to pharmacy.  tomorrow, take twice daily for 7 days.

## 2023-05-19 NOTE — PROGRESS NOTES
ASSESSMENT/PLAN:    I have reviewed the nursing notes.  I have reviewed the findings, diagnosis, plan and need for follow up with the patient.    1. Urinary problem  - UA Macroscopic with reflex to Microscopic and Culture    2. Complicated UTI (urinary tract infection)  Patient agreeable to bringing urine sample back this evening to the ER after his next void; he would prefer this. Is not willing to drink water and wait longer here to void. Initiated broad spectrum abx. Reviewed prior renal function, no renal adjustment is indicated for cephalexin. Will watch for urinalysis and culture results. Recommend follow up emergently if worsening condition despite antibiotic. At this time, stable and without evidence of systemic infection including pyelonephritis or sepsis.   - cephALEXin (KEFLEX) 500 MG capsule; Take 1 capsule (500 mg) by mouth 2 times daily for 7 days  Dispense: 14 capsule; Refill: 0    Discussed warning signs/symptoms indicative of need to f/u    Follow up if symptoms persist or worsen or concerns    I explained my diagnostic considerations and recommendations to the patient, who voiced understanding and agreement with the treatment plan. All questions were answered. We discussed potential side effects of any prescribed or recommended therapies, as well as expectations for response to treatments.    Lindsey Martin NP  5/19/2023  6:24 PM    HPI:  Moses Whittaker is a 73 year old male who presents to [facility] today for concerns of urinary symptoms. He is here with his wife today.  He reports some urinary frequency and dysuria for the past week or so.  But he says that the pain never really left from the last UTI he had. He had a catheter for about a week or more at some point as well he tells me. He has had some chills but no known fevers. Some nausea without vomiting. No acute flank pain. No gross hematuria. Has had back surgery recently and he is going back down to the [facility] for surgery next  Wednesday related to his back pain. He tells me that he is here because a nurse recommended he be tested for urinary infection. Last urination was about an hour before presenting here. No acute confusion. He attempted to void here today but states was not able as he went just prior to coming in and that he would like to bring the sample back this evening.     Urine is odorous, not cloudy. He believes he was last treated for UTI last around 4-6 weeks ago. He believes it was bactrim that was prescribed by Dr. Griffith at [facility].     PCP is usually Selene at [facility].     ROS otherwise negative if not mentioned above.    Pre-op physical was yesterday at [facility].     Here with his wife.     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     Gastritis without bleeding     No Comments Provided     Greater trochanteric bursitis of left hip 6/24/2015     Heart disease     Multiple coronary stents     Narcolepsy without cataplexy     No Comments Provided     Other injury of unspecified body region, initial encounter (CODE)     11/2006,Right calf hematoma     Postsurgical percutaneous transluminal coronary angioplasty status 2/15/2007    Overview:  IMO Update 10/11     Rheumatic fever without heart involvement     Rheumatic fever as a child without valvular heart disease     Venous stasis ulcer of left calf limited to breakdown of skin with varicose veins (H) 8/29/2019     Venous stasis ulcer of left lower leg with edema of left lower leg (H) 9/4/2019     Past Surgical History:   Procedure Laterality Date     ANGIOPLASTY      12/00, 04/04/04,Repeat  angioplasty with lt internal mammary to the lt anterior descending and lt radial artery from aorta to the diagonal.     ANGIOPLASTY      10/01,Angioplasty with 2 vessel CABG the following week from the lt internal mammary to the lt anterior descending and right radial free graft     ANGIOPLASTY      04/2003     ARTHROSCOPY SHOULDER ROTATOR CUFF REPAIR      02/06/2006,Left rotator cuff repair and bone spur removal by Dr. Giraldo in [place]     COLONOSCOPY      1999     COLONOSCOPY  01/08/2016 01/08/2016,-- Dr. De La Cruz -- polypectomy     COLONOSCOPY  04/18/2019    hyperplastic, follow up 10 years, 4/18/29     OTHER SURGICAL HISTORY      09/03,369029,IR ANGIOGRAM FEMORAL/EXTREMITY (IA),Unremarkable angiogram at [facility]     OTHER SURGICAL HISTORY      10/05/2004,,PTCA,Angioplasty     OTHER SURGICAL HISTORY      02/2005,,PTCA,Angioplasty with stent.     OTHER SURGICAL HISTORY      03/02/2005,,PTCA,Angioplasty with stent.     OTHER SURGICAL HISTORY      09/23/2005,,PTCA,Angioplasty without stent     OTHER SURGICAL HISTORY      2/26/2007,360235,IR ANGIOGRAM FEMORAL/EXTREMITY (IA),Unremarkable coronary angiogram at [facility].     OTHER SURGICAL HISTORY      1967,SUR38,APPENDECTOMY OPEN     RELEASE CARPAL TUNNEL      11/15/01,Right carpal tunnel release by Dr. Mace     RELEASE CARPAL TUNNEL      01/2004,Left carpal tunnel release     Social History     Tobacco Use     Smoking status: Never     Smokeless tobacco: Never   Vaping Use     Vaping status: Never Used   Substance Use Topics     Alcohol use: No     Alcohol/week: 0.0 standard drinks of alcohol     Current Outpatient Medications   Medication Sig Dispense Refill     aspirin (ASA) 81 MG tablet Take 1 tablet (81 mg) by mouth daily -- Dose Reduced 2/18/2020       blood glucose monitoring (ONETOUCH ULTRA) test strip Dispense test strips covered by the patient insurance. Test 10 times per day.       Blood Glucose Monitoring Suppl (BitePalM BLOOD GLUCOSE MONITOR)  WAQAS Dispense glucose meter, test strips and lancets covered by the patient insurance. Test 10 times per day.       cholecalciferol (VITAMIN D3) 25 mcg (1000 units) capsule Take 1 capsule by mouth daily       clopidogrel (PLAVIX) 75 MG tablet Take 1 tablet (75 mg) by mouth daily 90 tablet 3     co-enzyme Q-10 100 MG CAPS capsule Take 100 mg by mouth daily        COMPRESSION STOCKINGS JOBST REIEF 30-40mmHg OPEN/ TOE/KNEE HIGH COMPRESSION STOCKINGS ITEM #[id number] DX:I83.022/I83.029 [facility]: SZ:LARGE/BEIGE LEFT LEG 3 each 6     [employer] ALCOHOL SWABS PADS For home use.       dextroamphetamine (DEXTROSTAT) 10 MG tablet Take 1 tablet (10 mg) by mouth 4 times daily 360 tablet 0     DULoxetine (CYMBALTA) 20 MG capsule Take 2 capsules by mouth every morning       famotidine (PEPCID) 20 MG tablet Take 1 tablet (20 mg) by mouth 2 times daily as needed (indigestion) 60 tablet 5     furosemide (LASIX) 20 MG tablet Take 1 tablet (20 mg) by mouth daily       glucagon 1 MG kit Inject  as directed.       insulin lispro (HUMALOG) 100 UNIT/ML vial INJECT UNDER THE SKIN BY INSULIN PUMP. MAX DAILY DOSE  UNITS 180 mL 3     Insulin Pen Needle (PEN NEEDLES) 29G X 12MM MISC For administering insulin at home.       LANTUS SOLOSTAR 100 UNIT/ML soln ADMINISTER 20 UNITS UNDER THE SKIN EVERY DAY       levothyroxine (SYNTHROID/LEVOTHROID) 175 MCG tablet Take 1 tablet by mouth daily at 2 pm       melatonin 3 MG tablet Take 3 mg by mouth At Bedtime       methylphenidate (RITALIN) 10 MG tablet Take 2 tablets (20 mg) by mouth 2 times daily 360 tablet 0     methylphenidate (RITALIN) 20 MG tablet TAKE 1 TABLET (20 MG) BY MOUTH TWICE DAILY. INDICATIONS RECURRING SLEEP EPISODES DURING THE DAY       metoprolol succinate ER (TOPROL-XL) 50 MG 24 hr tablet Take 1 tablet (50 mg) by mouth 2 times daily 180 tablet 3     metoprolol tartrate (LOPRESSOR) 25 MG tablet        NIFEdipine ER (ADALAT CC) 30 MG 24 hr tablet Take 1 tablet (30 mg) by mouth 2 times daily  90 tablet 3     NIFEdipine ER OSMOTIC (PROCARDIA XL) 30 MG 24 hr tablet TAKE 1 TAB BY MOUTH TWICE A DAY       nitroGLYcerin (NITROSTAT) 0.4 MG sublingual tablet Place 1 tablet (0.4 mg) under the tongue every 5 minutes as needed for chest pain Call 911 after 1st dose. 25 tablet 0     oxyCODONE (ROXICODONE) 5 MG tablet Take 1-2 tablets by mouth every 4 hours as needed       polyethylene glycol (MIRALAX) 17 GM/Dose powder Take 17 g by mouth       Probiotic Product (DAILY PROBIOTIC PO) Take 1 capsule by mouth daily       ranolazine (RANEXA) 500 MG 12 hr tablet Take 2 tablets (1,000 mg) by mouth 2 times daily - cancel other Rx - give 3 month supply 360 tablet 3     senna-docusate (SENOKOT-S/PERICOLACE) 8.6-50 MG tablet Take 2 tablets by mouth 2 times daily       thin (NO BRAND SPECIFIED) lancets Test 10 times per day.       vitamin D2 (ERGOCALCIFEROL) 84367 units (1250 mcg) capsule Take 50,000 Units by mouth once a week       vitamin E (TOCOPHEROL) 400 units (360 mg) capsule Take 1 capsule by mouth daily       desoximetasone (TOPICORT) 0.25 % external cream APPLY CREAM EXTERNALLY SPARINGLY TWICE DAILY (Patient not taking: No sig reported)       diclofenac (VOLTAREN) 75 MG EC tablet Take 1 tablet (75 mg) by mouth 2 times daily as needed for moderate pain (Patient not taking: Reported on 10/11/2021) 180 tablet 3     doxazosin (CARDURA) 1 MG tablet Take 1 tablet (1 mg) by mouth At Bedtime (Patient not taking: Reported on 5/19/2023)       doxycycline monohydrate (ADOXA) 100 MG tablet Take 1 tablet (100 mg) by mouth 2 times daily (Patient not taking: Reported on 5/19/2023) 28 tablet 0     EUTHYROX 125 MCG tablet TAKE 1 TABLET BY MOUTH ONCE DAILY IN THE MORNING (Patient not taking: No sig reported) 90 tablet 2     hydrALAZINE (APRESOLINE) 25 MG tablet Take 1-2 tablets (25-50 mg) by mouth 4 times daily - Take lowest effective dose for blood pressure management (Patient not taking: Reported on 5/19/2023) 360 tablet 11      "HYDROcodone-acetaminophen (NORCO) 5-325 MG tablet Take 1 tablet by mouth every 6 hours as needed for severe pain (Patient not taking: Reported on 5/19/2023) 18 tablet 0     irbesartan (AVAPRO) 150 MG tablet Take 1 tablet (150 mg) by mouth 2 times daily -- needs 2 smaller pills daily (Patient not taking: Reported on 5/19/2023) 180 tablet 3     IV Sets-Tubing (SOLUTION ADMINISTRATION SET) MISC As directed. (Patient not taking: Reported on 3/8/2022)       Saline GEL Spray 1 spray in nostril every hour as needed For Nasal Dryness (Patient not taking: Reported on 5/19/2023)       Allergies   Allergen Reactions     Atenolol Other (See Comments)     Atenolol caused constipation and Indigestion -- Toprol XL (Metoprolol Succinate ER) worked and tolerated well     Famotidine Other (See Comments)     + Caused Headache and Rash -- tolerated Ranitidine and worked well.     Gabapentin      Other reaction(s): Mental Status Change \"felt like in outer space\"     Hydrocortisone Other (See Comments)     Burning and stinging sensation with Hydrocortisone - doesn't help itching or rash -- tolerated Desoximetasone cream and worked well.      Atorvastatin Hives     Celebrex [Celecoxib]      Swelling      Lisinopril Cough     No Clinical Screening - See Comments Hives     Chlorine water     Norvasc [Amlodipine] Other (See Comments)     -- can't remember, didn't tolerate.      Pregabalin      Lyrica - Other reaction(s): Mental Status Change     Valdecoxib Swelling     \"bextra\" NSAID     Adhesive Tape Rash     Irritation at skin site with insulin infusion sets and CGM sensor sites.  Patient uses tegaderm bandage ([id number]) as barrier.     Insulin Aspart Rash     Rosuvastatin Other (See Comments)     Feels unwell on this medication  Feels unwell on this medication       Past medical history, past surgical history, current medications and allergies reviewed and accurate to the best of my knowledge.      ROS:  Refer to HPI    /58 (BP " Location: Right arm, Patient Position: Sitting, Cuff Size: Adult Regular)   Pulse 78   Temp 98.5  F (36.9  C) (Tympanic)   Resp 18   Wt 83.6 kg (184 lb 6.4 oz)   SpO2 97%   BMI 28.88 kg/m      EXAM:  General Appearance: nontoxic appearing 73 year old male, appropriate appearance for age. No acute distress   Respiratory: normal chest wall and respirations.  Normal effort.  Clear to auscultation bilaterally, no wheezing, crackles or rhonchi.  No increased work of breathing.  No cough appreciated.  Cardiac: RRR with no murmurs   :  Absent CVA tenderness to palpation.  Psychological:  alert, oriented, and irritable; verbalizing disappointment and frustration, impatient.     No results found for any visits on 05/19/23.

## 2023-05-19 NOTE — NURSING NOTE
Chief Complaint   Patient presents with     Urinary Problem     Patient presents to the clinic today with his wife  he has urinary frequency, and burning started about a week ago. patient stated he also has chills.       Tri Nielsne LPN         FOOD SECURITY SCREENING QUESTIONS:    The next two questions are to help us understand your food security.  If you are feeling you need any assistance in this area, we have resources available to support you today.    Hunger Vital Signs:  Within the past 12 months we worried whether our food would run out before we got money to buy more. Never  Within the past 12 months the food we bought just didn't last and we didn't have money to get more. Never  Tri Nielsen LPN,LPN on 5/19/2023 at 5:58 PM    Food Insecurity: Not on file

## 2023-05-22 NOTE — TELEPHONE ENCOUNTER
States that pt needs a new Rx for his Humalog for Medicare purposes. Rx needs diagnosis code, states that is used in a pump and a max daily dose.   Other

## 2023-06-02 NOTE — ED NOTES
"Pt resting on cot. States abdominal pain \"much better than earlier, now just more of an ache.\" Will try clear liquids.   "
Lab at bedside.   
MD at bedside.  
MD at bedside.  
Pt had scant amount of stool, will send sample to lab.   
Pt tolerating clear liquids.   
Pt up to bedside commode.  
Pt watching observation video, handout given.   
Report given to Carmen on MSP. All questions answered.   
left back mass

## 2023-09-14 NOTE — TELEPHONE ENCOUNTER
JUAN-Pt requesting lab for kidney failure to be done prior to scheduled visit. Please call. Thank you.  Lashay Adamson     Humira Counseling:  I discussed with the patient the risks of adalimumab including but not limited to myelosuppression, immunosuppression, autoimmune hepatitis, demyelinating diseases, lymphoma, and serious infections.  The patient understands that monitoring is required including a PPD at baseline and must alert us or the primary physician if symptoms of infection or other concerning signs are noted.

## 2023-10-06 PROBLEM — L03.116 BILATERAL LOWER LEG CELLULITIS: Status: ACTIVE | Noted: 2023-01-01

## 2023-10-06 PROBLEM — L03.90 CELLULITIS: Status: ACTIVE | Noted: 2023-01-01

## 2023-10-06 PROBLEM — A41.9 SEPSIS (H): Status: ACTIVE | Noted: 2023-01-01

## 2023-10-06 PROBLEM — A40.9 SEPSIS DUE TO STREPTOCOCCUS SPECIES WITHOUT ACUTE ORGAN DYSFUNCTION (H): Status: ACTIVE | Noted: 2023-01-01

## 2023-10-06 PROBLEM — L03.115 BILATERAL LOWER LEG CELLULITIS: Status: ACTIVE | Noted: 2023-01-01

## 2023-10-06 NOTE — PROGRESS NOTES
10/06/23 1155   Appointment Info   Signing Clinician's Name / Credentials (OT) Jenni Mustafa OTR/L   Living Environment   People in Home spouse   Current Living Arrangements house   Living Environment Comments Unable to gather clear PLOF info from patient.  Per notes and patient, patient lives with his wife in a home and receives homecare services through [employer]   Self-Care   Activity/Exercise/Self-Care Comment Patient states he is independent at baseline but not these past few days.  Reports he does not drive.  Unclear how accurate this information is.   General Information   Onset of Illness/Injury or Date of Surgery 10/05/23   Referring Physician Dr. Chavez   Patient/Family Therapy Goal Statement (OT) To return home with homecare   Left Lower Extremity (Weight-bearing Status) weight-bearing as tolerated (WBAT)   Right Lower Extremity (Weight-bearing Status) weight-bearing as tolerated (WBAT)   Cognitive Status Examination   Orientation Status person   Behavioral Issues uncooperative   Affect/Mental Status (Cognitive) confused   Bed Mobility   Bed Mobility supine-sit   Supine-Sit [place] (Bed Mobility) maximum assist (25% patient effort);2 person assist   Transfers   Transfers sit-stand transfer   Sit-Stand Transfer   Sit-Stand [place] (Transfers) moderate assist (50% patient effort);2 person assist   Clinical Impression   Criteria for Skilled Therapeutic Interventions Met (OT) Yes, treatment indicated   OT Diagnosis Decreased independence with ADLs and functional mobility   OT Problem List-Impairments impacting ADL problems related to;balance;cognition;mobility;pain;strength   Assessment of Occupational Performance 1-3 Performance Deficits   Planned Therapy Interventions (OT) ADL retraining;bed mobility training;transfer training   Clinical Decision Making Complexity (OT) low complexity   Risk & Benefits of therapy have been explained evaluation/treatment results reviewed;participants included;patient    OT Total Evaluation Time   OT Anthony, Low Complexity Minutes (26890) 15   OT Goals   Therapy Frequency (OT) Daily   OT Predicted Duration/Target Date for Goal Attainment 10/10/23   OT Goals Toilet Transfer/Toileting;Upper Body Dressing;Hygiene/Grooming   OT: Hygiene/Grooming supervision/stand-by assist   OT: Upper Body Dressing Supervision/stand-by assist   OT: Toilet Transfer/Toileting Minimal assist;toilet transfer;cleaning and garment management   OT Discharge Planning   OT Plan Continue OT in hospital   OT Discharge Recommendation (DC Rec) home with home care occupational therapy;Transitional Care Facility   OT Rationale for DC Rec Upon PT/OT eval patient presents with confusion, so unclear about baseline functioning.  He is also having a lot of pain so unclear if true measure of his mobility.  At this time he requires A x 2 for cares and functional mobility.  Will continue assessing throughout hospital stay.   OT Brief overview of current status See above.   Total Session Time   Total Session Time (sum of timed and untimed services) 15

## 2023-10-06 NOTE — PROGRESS NOTES
"[facility]    Medicine Progress Note - Hospitalist Service    Date of Admission:  10/6/2023    Assessment & Plan      Sepsis likely from leg cellulitis L > R with abacteremia - clinically not suggestive of compartment syndrome. ADDENDUM: report from OSH later today that his BC taken yesterday grew out G-jesse.  -Continue with Vanco, will change Zosyn to cefepime and Flagyl to help reduce kidney injury.   -Follow blood culture  -Check for MRSA PCR.  -Check CRP, CK  -US LLE  -CBC, BMP, CRP in AM  -Bolus fluid as needed and trend lactate  -Wound care  -recheck BC since OSH reported G-jesse bacteremia.      Acute on chronic kidney injury stage 3  -IVF at 100/hr  -Hold home torsemide and irbesaertan      Uncontrolled type 2 diabetes mellitus with stage 3 chronic kidney disease, with long-term current use of insulin  -Continue home Lantus at half dose and monitor blood sugar with sliding scale insulin.  Of note patient has \"allergy\" rash with aspart.  Likely like low-level allergy not hives.  Will place on sliding scale with aspart and Benadryl as needed we will monitor for any hives.  -Hypoglycemic protocol  -Check hemoglobin A1c    Chronic pain syndrome Chronic bilateral low back pain with bilateral sciatica  -Tylenol prn  -Continue home Cymbalta  -Home Oxycodone prn  -PT/OT    Atherosclerosis of native coronary artery of native heart with stable angina pectoris (H24)  S/P CABG x 2  Benign essential hypertension  Mixed hyperlipidemia  Peripheral vascular disease, unspecified (H24) with edema  -Continue home aspirin, plavix  -Continue home metoprolol with hold parameters  -Hold Ranexa with low CrCl  -Hold home irbesartan and nifedipine with soft blood pressure  -Hold home torsemide    Esophageal reflux  -Change from Pepcid to PPI poor creatinine    Hypothyroidism due to acquired atrophy of thyroid  -Continue home levothyroxine  -Check TSH, if high would not change levothyroxine dose yet as patient had " "not been compliant with his med       Diet: Combination Diet Low Saturated Fat Na <2400mg Diet, No Caffeine Diet    DVT Prophylaxis: Heparin SQ  Loja Catheter: Not present  Lines: None     Cardiac Monitoring: None  Code Status: Full Code      Clinically Significant Risk Factors Present on Admission              # Hypoalbuminemia: Lowest albumin = 1.2 g/dL at 10/6/2023  3:45 AM, will monitor as appropriate   # Drug Induced Platelet Defect: home medication list includes an antiplatelet medication   # Hypertension: Noted on problem list      # Obesity: Estimated body mass index is 31.01 kg/m  as calculated from the following:    Height as of this encounter: 1.702 m (5' 7\").    Weight as of this encounter: 89.8 kg (198 lb).              Disposition Plan      Expected Discharge Date: 10/08/2023                  Carter Chavez MD  Hospitalist Service  [facility]  Securely message with [employer] (more info)  Text page via [employer] Paging/Directory   ______________________________________________________________________    Interval History   Patient did denied any subjective fever, chills.  He has pain in his left leg but he states that has been chronic and always more swollen than his left leg down right leg.  Patient states he is not have any insulin pump at home.  He uses the insulin pen.  Patient denies any nausea, vomiting, diarrhea.  No bleeding noted.  Patient did not have any issues with urination.  Patient stated he takes his med whenever he can.  When further questioning if he takes it every day he said sometimes he missed a dose.    Physical Exam   Vital Signs: Temp: 98.9  F (37.2  C) Temp src: Tympanic BP: 101/52 Pulse: 101   Resp: 20 SpO2: 93 % O2 Device: Nasal cannula Oxygen Delivery: 1 LPM  Weight: 198 lbs 0 oz    General appearance: NAD  HEENT: Normocephalic, atraumatic  Neck: no JVD  Cardiac: Regular with transient tachycardia, normal S1 and S2  Respiratory/Chest: CTAB. No rhonchi, crackle or " wheezing. Equal breath sounds bilaterally  GI/Abdomen: +BS in all 4 quadrants, soft, non-tender, non-distended, no guarding, no rebound  Musculoskeletal/Extremities: L>R bilateral lower extremity stasis dermatitis.  Swelling 2+ edema in left foot and 1+ lower leg, erythema in the left lower shin with tenderness.  Left leg erythema slightly warmer compared to surrounding skin.  Capillary refill and the toes less than 2 seconds.  Neuro: Patient is alert and oriented to self and place but not detail medical history or detailed medication use.  Sensation intact in lower extremities.  Patient has movement in the ankles and the toes on L leg.  Skin: erythema L>R lower extremities.      Medical Decision Making       45 MINUTES SPENT BY ME on the date of service doing chart review, history, exam, documentation & further activities per the note.      Data     I have personally reviewed the following data over the past 24 hrs:    1.4 (L)  \   9.1 (L)   / 275     136 100 40.6 (H) /  190 (H)   3.8 26 3.52 (H) \     ALT: 10 AST: 31 AP: 91 TBILI: 1.1   ALB: 1.2 (L) TOT PROTEIN: 4.3 (L) LIPASE: N/A     Procal: N/A CRP: 482.83 (H) Lactic Acid: 2.8 (H)         Imaging results reviewed over the past 24 hrs:   No results found for this or any previous visit (from the past 24 hour(s)).

## 2023-10-06 NOTE — PHARMACY-ADMISSION MEDICATION HISTORY
Pharmacist Admission Medication History    Admission medication history is complete. The information provided in this note is only as accurate as the sources available at the time of the update.    Information Source(s): Patient, Family member, Clinic records, [facility], and   via in-person, phone, and chart review    Pertinent Information: Currently patient's wife sets out his medications for him and patient chooses which ones he will take at that time. Patient is very noncompliant with his medications at home per wife and home care services. He takes his medications here and there per wife. Home care has witnessed him administering insulin at home without checking blood sugars and feels his current home medications are dangerous for him. They have tried setting up his medications for him in the past however he refuses to use this system. Medication list updated to reflect currently prescribed and recently filled medications - however he has not been taking them consistently at all.     Prior to admission patient received the following from the transferring facility:  10/5/23 1225: hydromorphone 1 mg IV  10/5/23 1225: Potassium 40 mEq PO  10/5/23 1438: hydromorphone 0.5 mg IV  10/5/23 1841:  mL IV  10/5/23 1940:  mL IV  10/5/23 2100: Zosyn 4.5 grams IV  10/5/23 2200: Vancomycin 2000 mg IV    Changes made to PTA medication list:  Added:   Bacitracin ointment twice daily to legs - per wife and [facility]  Torsemide 40 mg - per wife he has not taken this in a few days - as he was directed to hold this for 3 days per note 10/3/23 & phone nite 10/4/23  Acetaminophen - per wife he has been taking this as needed for fever  Vitamin B12 - [facility]'s home medication list - per wife he takes this occasionally  Potassium chloride - [facility], [facility]'s home medication list - per wife he does take this (recently started per phone note 10/4/23)  Slow Mag - per wife he has  not started this yet - it was recently prescribed on 10/4/23  Deleted:   Cephalexin - completed  Co-enzyme Q10 - per wife  Desoximetasone - per wife  Dextroamphetamine - per  this has not been filled sense 3/2023, per wife he is not taking this, per most recent progress note (he has been on amphetamines for almost 40 years. Dextroamphetamine 40 mg at 7-8 AM. Ritalin 20 mg bid, 3 pm and 7 on. Occasional drug holidays. has established amphetamine contract)  Diclofenac - per wife  Melatonin - per wife  Metoprolol succinate - [facility] and [facility]  Changed:   Doxazosin to 2 mg dose - per [facility] and [facility], per wife he doesn't take this often  Famotidine to once daily scheduled (with PRN use as well)- per [facility], [facility], and Wife (she states he does take this)  Insulin lispro - patient is NOT currently using his insulin pump per patient wife and chart notes. He uses a scale at home that he self administers - he was not able to discuss the scale at this time. Per chart the calculation is the resulted glucose minus 100. Then this number divided by 6. This number isgiven as units three times daily.  Lantus dose updated to 30 units - per [facility] records (patient reported 38 units? However he states he has not taken in a while because he has not felt well?)  Levothyroxine updated to 200 mcg - [facility] and [facility] - per wife he occasionally takes this  Methylphenidate - updated to 10 mg four times daily - [facility] Records, [facility]/ recently filled, wife states he has not been taking? 71l[facility] progress notes state he was taking differently in combination with dextroamphetamine previously (see above)?  Metoprolol tartrate - updated dose to 1/4 tablet per [facility] notes and medication list - Wife reports when he does take this it is 1/2 tablet, but he only takes off and on  Oxycodone 5 mg tablet - patient's wife reports he is taking 2  tablets three times daily - the dose was updated to reflect current dose per [facility] Records as 1 tablet every 6 hours as needed  Renolazine - dose updated to reflect [facility] records of daily    Medication Affordability: no noted issues    Allergies reviewed with patient's wife and Care Everywhere and updates made in EHR: yes    Medication History Completed By: Lori Garcia RPH 10/6/2023 10:39 AM

## 2023-10-06 NOTE — PROGRESS NOTES
10/06/23 1342   Appointment Info   Signing Clinician's Name / Credentials (PT) Young Oakesblanca MPT   Living Environment   People in Home spouse   Current Living Arrangements house   Home Accessibility no concerns   Transportation Anticipated family or friend will provide;health plan transportation   Self-Care   Usual Activity Tolerance fair   Current Activity Tolerance poor   Equipment Currently Used at Home none   General Information   Referring Physician Le   Patient/Family Therapy Goals Statement (PT) return home with family   Existing Precautions/Restrictions fall;oxygen therapy device and L/min   Weight-Bearing Status - LLE full weight-bearing   Weight-Bearing Status - RLE full weight-bearing   Cognition   Affect/Mental Status (Cognition) confused   Orientation Status (Cognition) person   Follows Commands (Cognition) verbal cues/prompting required   Integumentary/Edema   Integumentary/Edema Comments left lower leg with edemotous and reddened tissue   Range of Motion (ROM)   Range of Motion ROM is WFL   Strength (Manual Muscle Testing)   Strength (Manual Muscle Testing) strength is WFL   Strength Comments however, patient fatigues with activity   Bed Mobility   Impairments Contributing to Impaired Bed Mobility pain;decreased strength   Comment, (Bed Mobility) maximal assist of 2 for supine to sit   Transfers   Impairments Contributing to Impaired Transfers pain;decreased strength   Comment, (Transfers) sit to stand with maximial to moderate assist of 2 due to c/o left LE pain; patient unable to complete stand pivot transfer; total mechanical lift use with assist of 2 bed to recliner   Gait/Stairs (Locomotion)   Comment, (Gait/Stairs) patient presently unable to functionally ambulate due to his c/o left LE pain with motion   Balance   Balance Comments poor standing stability   Sensory Examination   Sensory Perception WFL   Sensory Perception Comments patient very sensative to touch in left lower leg    Coordination   Coordination no deficits were identified   Muscle Tone   Muscle Tone no deficits were identified   Clinical Impression   Criteria for Skilled Therapeutic Intervention Yes, treatment indicated   PT Diagnosis (PT) impaired mobility   Influenced by the following impairments pain, fatigue and weakness   Functional limitations due to impairments activity/gait tolerance and stability   Clinical Presentation (PT Evaluation Complexity) Evolving/Changing   Clinical Decision Making (Complexity) moderate complexity   Planned Therapy Interventions (PT) bed mobility training;gait training;transfer training   Anticipated Equipment Needs at Discharge (PT) walker, rolling;wheelchair   Risk & Benefits of therapy have been explained evaluation/treatment results reviewed;risks/benefits reviewed;patient   PT Total Evaluation Time   PT Eval, Low Complexity Minutes (86722) 15   Physical Therapy Goals   PT Frequency Daily   PT Predicted Duration/Target Date for Goal Attainment 10/10/23   PT Goals Bed Mobility;Transfers;Gait   PT: Bed Mobility Minimal assist   PT: Transfers Minimal assist;Assistive device   PT: Gait Minimal assist;Rolling walker;5 feet   PT Discharge Planning   PT Plan continue PT   PT Discharge Recommendation (DC Rec) Transitional Care Facility;home with assist;home with home care physical therapy   PT Rationale for DC Rec to promote patient's highest level of functional mobility prior to returning home   PT Brief overview of current status patient c/o left LE pain with motion results in his hesitancy to move/participate with PT; anticipate that he will be more amenable to participating as discomfort/pain begin to decrease om severity   Total Session Time   Total Session Time (sum of timed and untimed services) 15

## 2023-10-06 NOTE — PLAN OF CARE
"Patient unable to complete goals at this time. Frida Hoang RN on 10/6/2023 at 6:15 PM  Problem: Plan of Care - These are the overarching goals to be used throughout the patient stay.    Goal: Plan of Care Review  Description: The Plan of Care Review/Shift note should be completed every shift.  The Outcome Evaluation is a brief statement about your assessment that the patient is improving, declining, or no change.  This information will be displayed automatically on your shift note.  Outcome: Unable to Meet  Goal: Patient-Specific Goal (Individualized)  Description: You can add care plan individualizations to a care plan. Examples of Individualization might be:  \"Parent requests to be called daily at 9am for status\", \"I have a hard time hearing out of my right ear\", or \"Do not touch me to wake me up as it startles me\".  Outcome: Unable to Meet  Goal: Absence of Hospital-Acquired Illness or Injury  Outcome: Unable to Meet  Intervention: Prevent Skin Injury  Recent Flowsheet Documentation  Taken 10/6/2023 1401 by Frida Hoang RN  Body Position:   tilted   lower extremity elevated  Taken 10/6/2023 0900 by Frida Hoang RN  Body Position: heels elevated  Intervention: Prevent and Manage VTE (Venous Thromboembolism) Risk  Recent Flowsheet Documentation  Taken 10/6/2023 1700 by Frida Hoang RN  VTE Prevention/Management: SCDs (sequential compression devices) off  Taken 10/6/2023 0900 by Frida Hoang RN  VTE Prevention/Management: SCDs (sequential compression devices) off  Goal: Optimal Comfort and Wellbeing  Outcome: Unable to Meet  Intervention: Monitor Pain and Promote Comfort  Recent Flowsheet Documentation  Taken 10/6/2023 0900 by Frida Hoang RN  Pain Management Interventions: declines  Goal: Readiness for Transition of Care  Outcome: Unable to Meet  Intervention: Mutually Develop Transition Plan  Recent Flowsheet Documentation  Taken 10/6/2023 0900 by Frida Hoang RN  Equipment " Currently Used at Home: none     Problem: Sepsis/Septic Shock  Goal: Optimal Coping  Outcome: Unable to Meet  Goal: Blood Glucose Level Within Targeted Range  Outcome: Unable to Meet  Goal: Absence of Infection Signs and Symptoms  Outcome: Unable to Meet  Intervention: Promote Recovery  Recent Flowsheet Documentation  Taken 10/6/2023 0900 by Frida Hoang RN  Activity Management: activity adjusted per tolerance  Goal: Optimal Nutrition Intake  Outcome: Unable to Meet   Goal Outcome Evaluation:

## 2023-10-06 NOTE — PROGRESS NOTES
Patient up with reji and 2 assist to recliner, patient became very verbally abusive to staff, was not able to be redirected has been swearing and making threats of violence, staff has given him some space and time/ patient was brought back to bed with reji and 2 assistance, nurse put an air mattress on bed. Patient has been incont. Has external cath on and tolerating well. Frida Hoang RN on 10/6/2023 at 6:14 PM

## 2023-10-06 NOTE — PROGRESS NOTES
Attempted to call patient's wife for admission data due to patient not being able to answer. No call, no voicemail set up. Will attempt again.    Litzy Adkins RN .......  10/6/2023  5:29 AM

## 2023-10-06 NOTE — DISCHARGE INSTRUCTIONS
Follow-up with  in [facility] on 10/18 @ 945..Ela Pizarro on 10/6/2023 at 2:12 PM

## 2023-10-06 NOTE — PROVIDER NOTIFICATION
10/06/23 0521   Valuables   Patient Belongings remains with patient   Patient Belongings Remaining with Patient clothing;glasses;shoes;cell phone/electronics   Did you bring any home meds/supplements to the hospital?    (pt did not answer)     A               Admission:  I am responsible for any personal items that are not sent to the safe or pharmacy.  [facility] is not responsible for loss, theft or damage of any property in my possession.    Signature:  _________________________________ Date: _______  Time: _____                                              Staff Signature:  ____________________________ Date: ________  Time: _____      2nd Staff person, if patient is unable/unwilling to sign:    Signature: ________________________________ Date: ________  Time: _____     Discharge:  [facility] has returned all of my personal belongings:    Signature: _________________________________ Date: ________  Time: _____                                          Staff Signature:  ____________________________ Date: ________  Time: _____

## 2023-10-06 NOTE — PROGRESS NOTES
:    Met with patient in his room to discuss discharge planning needs. Patient stated he has [facility]. He declined short-term rehab when offered.     No further needs from .     PETER Loera on 10/6/2023 at 3:58 PM

## 2023-10-06 NOTE — PHARMACY-VANCOMYCIN DOSING SERVICE
Pharmacy Vancomycin Note  Date of Service 2023  Patient's  1950   73 year old, male    Indication: Sepsis  Day of Therapy: 2  Current vancomycin regimen: Received total of 4 G load between 10/5 and 10/6  Current vancomycin monitoring method: AUC  Current vancomycin therapeutic monitoring goal: 400-600 mg*h/L    [employer] Prediction of Current Vancomycin Regimen  Loading dose: 2000 mg at 06:00 10/06/2023.  Regimen: 1500 mg IV every 48 hours.  Start time: 04:25 on 10/06/2023  Exposure target: AUC24 (range)400-600 mg/L.hr   AUC24,ss: 585 mg/L.hr  Probability of AUC24 > 400: 87 %  Ctrough,ss: 18.4 mg/L  Probability of Ctrough,ss > 20: 42 %  Probability of nephrotoxicity (Lodise LEONA ): 15 %    Current estimated CrCl = Estimated Creatinine Clearance: 20.3 mL/min (A) (based on SCr of 3.52 mg/dL (H)).    Creatinine for last 3 days  10/6/2023:  3:45 AM Creatinine 3.52 mg/dL    Recent Vancomycin Levels (past 3 days)  No results found for requested labs within last 3 days.    Vancomycin IV Administrations (past 72 hours)                     vancomycin (VANCOCIN) 2,000 mg in sodium chloride 0.9 % 500 mL intermittent infusion (mg) 2,000 mg New Bag 10/06/23 0539                    Nephrotoxins and other renal medications (From now, onward)      Start     Dose/Rate Route Frequency Ordered Stop    10/06/23 0424  vancomycin place watson - receiving intermittent dosing         1 each Intravenous SEE ADMIN INSTRUCTIONS 10/06/23 0424                 Contrast Orders - past 72 hours (72h ago, onward)      None            Interpretation of levels and current regimen:  Vancomycin level is reflective of supratherapeutic level (assumed)    Has serum creatinine changed greater than 50% in last 72 hours: Yes- patient is in active MICHAEL    Urine output:  unable to determine- no U/O Documented    Renal Function:  Active MICHAEL    [employer] Prediction of Planned New Vancomycin Regimen  Loading dose: N/A  Regimen: 500 mg IV every  24 hours.  Start time: 22:00 on 10/07/2023  Exposure target: AUC24 (range)400-600 mg/L.hr   AUC24,ss: 447 mg/L.hr  Probability of AUC24 > 400: 61 %  Ctrough,ss: 16.1 mg/L  Probability of Ctrough,ss > 20: 31 %  Probability of nephrotoxicity (Lodise LEONA 2009): 11 %    Plan:  Patient did receive 2G of Vanco in [facility] Prior to admission. Upon admission, patient was loaded again in 2 G  at 0539  Vancomycin monitoring method: AUC  Vancomycin therapeutic monitoring goal: 400-600 mg*h/L  Pharmacy will check vancomycin levels as appropriate in  Level tomorrow AM due to supra therapeutic load .  Serum creatinine levels will be ordered daily for the first week of therapy and at least twice weekly for subsequent weeks.    Darcy Sanford RPH

## 2023-10-06 NOTE — PROGRESS NOTES
SAFETY CHECKLIST  ID Bands and Risk clasps correct and in place (DNR, Fall risk, Allergy, Latex, Limb):  Yes  All Lines Reconciled and labeled correctly: Yes  Whiteboard updated:Yes  Environmental interventions: Yes    Verify Tele #:   Frida Hoang RN on 10/6/2023 at 7:35 AM

## 2023-10-06 NOTE — PROGRESS NOTES
Interdisciplinary Discharge Planning Note    Anticipated Discharge Date: 10/8    Anticipated Discharge Location: Home    Clinical Needs Before Discharge:  stable functional status    Treatment Needs After Discharge:   Patient has [facility]. He declined Short-term rehab.     Potential Barriers to Discharge: None identified at this time.     PETER Loera  10/6/2023,  3:56 PM

## 2023-10-06 NOTE — PROGRESS NOTES
:    Received E-mail from Anny at [facility] asking to be updated about patients discharge plans. Patient is receiving homecare services through [facility].      will continue to follow.     PETER Loera on 10/6/2023 at 9:48 AM

## 2023-10-06 NOTE — PROGRESS NOTES
SAFETY CHECKLIST  ID Bands and Risk clasps correct and in place (DNR, Fall risk, Allergy, Latex, Limb):  Yes  All Lines Reconciled and labeled correctly: Yes  Whiteboard updated:Yes  Environmental interventions: Yes  Verify Tele #: N/A    Litzy Adkins RN .......  10/6/2023  3:16 AM

## 2023-10-06 NOTE — PHARMACY-VANCOMYCIN DOSING SERVICE
"Pharmacy Vancomycin Initial Note  Date of Service 2023  Patient's  1950  73 year old, male    Indication: Skin and Soft Tissue Infection    Current estimated CrCl = Estimated Creatinine Clearance: 20.3 mL/min (A) (based on SCr of 3.52 mg/dL (H)).    Creatinine for last 3 days  10/6/2023:  3:45 AM Creatinine 3.52 mg/dL    Recent Vancomycin Level(s) for last 3 days  No results found for requested labs within last 3 days.      Vancomycin IV Administrations (past 72 hours)        No vancomycin orders with administrations in past 72 hours.                    Nephrotoxins and other renal medications (From now, onward)      Start     Dose/Rate Route Frequency Ordered Stop    10/06/23 0430  vancomycin (VANCOCIN) 2,000 mg in sodium chloride 0.9 % 500 mL intermittent infusion         2,000 mg  over 2 Hours Intravenous ONCE 10/06/23 0424      10/06/23 0424  vancomycin place watson - receiving intermittent dosing         1 each Intravenous SEE ADMIN INSTRUCTIONS 10/06/23 0424      10/06/23 0330  piperacillin-tazobactam (ZOSYN) 3.375 g vial to attach to  mL bag        Note to Pharmacy: For [facility], [facility] and [facility]: For Zosyn-naive patients, use the \"Zosyn initial dose + extended infusion\" order panel.    3.375 g  over 30 Minutes Intravenous EVERY 6 HOURS 10/06/23 031              Contrast Orders - past 72 hours (72h ago, onward)      None            InsightRX Prediction of Planned Initial Vancomycin Regimen  Loading dose: 2000 mg at 06:00 10/06/2023.  Regimen: 1500 mg IV every 48 hours.  Start time: 04:25 on 10/06/2023  Exposure target: AUC24 (range)400-600 mg/L.hr   AUC24,ss: 585 mg/L.hr  Probability of AUC24 > 400: 87 %  Ctrough,ss: 18.4 mg/L  Probability of Ctrough,ss > 20: 42 %  Probability of nephrotoxicity (Lodise LEONA ): 15 %          Plan:  Start vancomycin  2000 mg IV loading dose.  Vancomycin monitoring method: AUC  Vancomycin therapeutic monitoring goal: 400-600 mg*h/L  Pharmacy will check " vancomycin levels as appropriate in 1-3 Days.    Serum creatinine levels will be ordered daily for the first week of therapy and at least twice weekly for subsequent weeks.  If stable kidney function, continue with vancomycin 1500 mg every 48 hours or continue dosing intermittently.     Geronimo Lemon RP

## 2023-10-06 NOTE — PHARMACY-CONSULT NOTE
Pharmacy- Renal Dose Adjustment    Patient Active Problem List   Diagnosis    Chronic bilateral low back pain with bilateral sciatica    Angina pectoris (H24)    Cervical radicular pain    Cervical stenosis of spinal canal    Chronic heart failure with preserved ejection fraction (H)    Stage 3b chronic kidney disease (H)    Uncontrolled type 2 diabetes mellitus with stage 3 chronic kidney disease, with long-term current use of insulin    Degenerative disc disease, cervical    Painful diabetic neuropathy (H)    Diastasis of muscle    Degeneration of cervical intervertebral disc    Psychosexual dysfunction with inhibited sexual excitement    Erectile dysfunction    Facet arthritis of cervical region    Esophageal reflux    Benign essential hypertension    Hypothyroidism due to acquired atrophy of thyroid    Insulin pump in place - Changes pump supplies / infusion kit every 2 days    Intermittent constipation    Left arm numbness    Left atrial enlargement    Mixed hyperlipidemia    Myalgia    Myofacial muscle pain    Primary narcolepsy without cataplexy    Neuroforaminal stenosis of lumbar spine    Obesity    Old inferior wall myocardial infarction    Osteoarthritis of spine    Peptic ulcer disease    Platelet inhibition due to Plavix    Radicular low back pain    S/P CABG x 2    S/P coronary artery stent placement    Atherosclerosis of native coronary artery of native heart with stable angina pectoris (H24)    Other specified forms of chronic ischemic heart disease    Peripheral vascular disease (H24)    Bilateral lower extremity edema    Venous stasis dermatitis of left lower extremity    Diabetic nephropathy with proteinuria (H)    History of diabetic ulcer of foot    Inflammatory spondylopathy of cervical region (H24)    Controlled drug dependence (H)    Infection due to 2019 novel coronavirus - 12/8/2020    Hypomagnesemia    Proteinuria, unspecified type    Vitamin D deficiency    Chronic diastolic heart failure  (H)    Peripheral vascular disease, unspecified (H24)    Postsurgical percutaneous transluminal coronary angioplasty status    Narcolepsy    Coronary atherosclerosis of native coronary artery    Other long term (current) drug therapy - Non-opiate/opiate - 7/28/2021    Chronic pain syndrome    Cellulitis    Sepsis due to Streptococcus species without acute organ dysfunction (H)    Bilateral lower leg cellulitis        Relevant Labs:  Recent Labs   Lab Test 10/06/23  0345 04/12/23  1430   WBC 1.4* 5.6   HGB 9.1* 8.4*    423        CrCl: 20.3    No intake or output data in the 24 hours ending 10/06/23 0845       Per Renal Dose Adjustment Protocol, will adjust:  Cefepime to 2G Q24 hours for indication of sepsis, as CrCl within 10-29 mL/min      Will continue to follow and make adjustments accordingly. Thank You.    Darcy Sanford [facility] ....................  10/6/2023   8:45 AM

## 2023-10-06 NOTE — PROGRESS NOTES
Patient Lactic elevated 3.6 Dr Chavez notified via phone call waiting orders. Frida Hoang RN on 10/6/2023 at 2:39 PM

## 2023-10-06 NOTE — PLAN OF CARE
Pt confused and agitated on arrival via ambulance for [facility]. No IV access as patient pulled it out before leaving [facility]. Patient was febrile and low BP with O2 sats 85% on RA. Pt put on 1 Liter nasal cannula and O2 sats increased to 92%. Pt started on IV fluids and antibiotics. Pt has severe bilateral lower extremity cellulitis/edema, see pictures in chart. Pt is oriented to person and time, not to situation or place. Unable to reach patient's wife for admission data. Called with no answer and no voicemail set up. Pt vocalized pain.     Litzy Adkins RN .......  10/6/2023  7:41 AM      Goal Outcome Evaluation:      Plan of Care Reviewed With: patient    Overall Patient Progress: no change      Problem: Plan of Care - These are the overarching goals to be used throughout the patient stay.    Goal: Plan of Care Review  Description: The Plan of Care Review/Shift note should be completed every shift.  The Outcome Evaluation is a brief statement about your assessment that the patient is improving, declining, or no change.  This information will be displayed automatically on your shift note.  Outcome: Progressing  Flowsheets (Taken 10/6/2023 0613)  Outcome Evaluation: confused, pt in pain, transfer from [facility], bilateral lower extremity cellulitis, pictures in chart  Plan of Care Reviewed With: patient  Overall Patient Progress: no change

## 2023-10-06 NOTE — PROGRESS NOTES
Admission Note    Data:  Moses Whittaker admitted to [facility] from [facility] emergency department via ambulance at 0235.      Action:  Dr. Chaidez has been notified of admission. Pt confused and agitated, call light in reach.     Response:  Patient febrile, low blood pressure. Pt is disoriented to place and situation. Pictures in chart upon arrival of bilateral lower extremities.     Litzy Adkins RN .......  10/6/2023 02.40 AM

## 2023-10-07 NOTE — PROGRESS NOTES
PT/OT approached and patient refused to participate in any activity with a lot of encouragement. Will attempt again tomorrow.

## 2023-10-07 NOTE — PLAN OF CARE
Goal Outcome Evaluation:       Left leg cellulitis still red.  Scabs.  Bacitracin. Bilateral edema of 3.  Finally urinated at 1530 with incontinence of yellow urine.  Contacted Dr. Chavez with new orders of 1000 ml bolus and increase fluids to 150/hour.  Cranky attitude at times.  Refused K pills.  New orders placed for IV K.  Refused to eat today.   Wife and dtr here today.  Refused B/P.  Air mattress.

## 2023-10-07 NOTE — PLAN OF CARE
Goal Outcome Evaluation:                      Resting comfortably between cares, Left and right shin/ankles are red and bruce. Left shin peeling/scabbed and very painful to touch. Significant edema to bilateral lower extremities. Cardura held due to low BP 93/53. Urine is dark ema colored. PRN tylenol given for pain which was effective. Lactic 2.2

## 2023-10-07 NOTE — PROGRESS NOTES
"[facility]    Medicine Progress Note - Hospitalist Service    Date of Admission:  10/6/2023    Assessment & Plan      Sepsis likely from leg cellulitis L > R with bacteremia - clinically not suggestive of compartment syndrome. Report from OSH ([facility]) that BC taken on 10/5 grew out G- jesse. Hx MRSA, MRSA PCR this hospitalization is pending. LLE neg for DVT.  CK is mildly elevated but not to the level to suggest compartment syndrome.  -Continue with Vanco, cefepime and Flagyl and follow blood culture here.  Check with outside facility today and no further identification of the gram-negative jesse.  -CBC, BMP, CRP, CK in AM  -Bolus fluid as needed and trend lactate  -Wound care  -IVF      Acute on chronic kidney injury stage 3  -IVF at 150/hr  -Hold home torsemide and irbesaertan    Mild LFT elevation -no abdominal pain, nausea or vomiting.  -Recheck LFT, if significantly worsening or patient develops abdominal pain consider ultrasound    Hypokalemia  -Replace potassium and recheck  -Check magnesium    Uncontrolled type 2 diabetes mellitus with stage 3 chronic kidney disease, with long-term current use of insulin -hemoglobin A1c in October of this year 9.6  -Continue home Lantus at half dose and monitor blood sugar with sliding scale insulin.  Of note patient has \"allergy\" rash with aspart.  Note no rash here while receiving aspart.  Continue on sliding scale with aspart.  -Hypoglycemic protocol    Chronic pain syndrome Chronic bilateral low back pain with bilateral sciatica  -Tylenol prn  -Continue home Cymbalta  -Home Oxycodone prn  -PT/OT    Atherosclerosis of native coronary artery of native heart with stable angina pectoris (H24)  S/P CABG x 2  Benign essential hypertension  Mixed hyperlipidemia  Peripheral vascular disease, unspecified (H24) with edema  -Continue home aspirin, plavix  -Continue home metoprolol with hold parameters  -Hold Ranexa with low CrCl  -Hold home irbesartan and " "nifedipine with soft blood pressure  -Hold home torsemide    Esophageal reflux  -Change from Pepcid to PPI poor creatinine    Hypothyroidism due to acquired atrophy of thyroid - TSH - 21.  -Continue home levothyroxine at home dose for now as concern that patient has not been taking this consistently at home, thus causing his TSH to be elevated.       Diet: Low Saturated Fat Na <2400 mg    DVT Prophylaxis: Heparin SQ  Loja Catheter: Not present  Lines: None     Cardiac Monitoring: None  Code Status: Full Code      Clinically Significant Risk Factors        # Hypokalemia: Lowest K = 3.3 mmol/L in last 2 days, will replace as needed       # Hypoalbuminemia: Lowest albumin = 1.2 g/dL at 10/6/2023  3:45 AM, will monitor as appropriate         # Hypertension: Noted on problem list       # DMII: A1C = 9.6 % (Ref range: 4.0 - 6.2 %) within past 6 months   # Obesity: Estimated body mass index is 31.34 kg/m  as calculated from the following:    Height as of this encounter: 1.702 m (5' 7\").    Weight as of this encounter: 90.8 kg (200 lb 1.6 oz).  , PRESENT ON ADMISSION            Disposition Plan     Expected Discharge Date: 10/08/2023                  Carter Chavez MD  Hospitalist Service  [facility]  Securely message with [employer] (more info)  Text page via [facility] Paging/Directory   ______________________________________________________________________    Interval History   Patient denies any chest pains.  She he has no shortness of breath.  He has been pain in chest and the legs left greater than right.  He has no subjective fever, chills.  Tolerating oral intake with no nausea or vomiting.    Physical Exam   Vital Signs: Temp: 98  F (36.7  C) Temp src: Tympanic BP: 107/55 Pulse: 84   Resp: 16 SpO2: 92 % O2 Device: Nasal cannula Oxygen Delivery: 2 LPM  Weight: 200 lbs 1.6 oz    General appearance: NAD  HEENT: Normocephalic, atraumatic  Neck: no JVD  Cardiac: Regular with transient tachycardia, normal S1 " and S2  Respiratory/Chest: CTAB. No rhonchi, but faint crackle at bases, with no wheezing. Equal breath sounds bilaterally  GI/Abdomen: +BS in all 4 quadrants, soft, non-tender, non-distended, no guarding, no rebound  Musculoskeletal/Extremities: L>R bilateral lower extremity stasis dermatitis.  Swelling 2+ edema in left foot and 1+ lower leg, erythema in the left lower shin with tenderness.  Left leg erythema slightly warmer compared to surrounding skin.  Capillary refill and the toes less than 2 seconds. Pulses are difficult to feel with swelling in both ankles and dorsal pedis but foot are warm.  Neuro: Patient is alert and oriented to self and place but not detail medical history or detailed medication use.  Sensation intact in lower extremities.  Patient has movement in the ankles and the toes.  Limited movement on the left leg due to pain.  Passive movement of the toes and ankle did not elicit pain in his upper legs.  Skin: erythema L>R lower extremities.      Medical Decision Making       50 MINUTES SPENT BY ME on the date of service doing chart review, history, exam, documentation & further activities per the note.      Data     I have personally reviewed the following data over the past 24 hrs:    7.2  \   8.3 (L)   / 266     135 99 44.7 (H) /  96   3.3 (L) 25 3.72 (H) \     ALT: 15 AST: 57 (H) AP: 95 TBILI: 1.9 (H)   ALB: 2.3 (L) TOT PROTEIN: 5.0 (L) LIPASE: N/A     TSH: 21.44 (H) T4: N/A A1C: N/A     Procal: N/A CRP: 569.55 (H) Lactic Acid: 2.2 (H)         Imaging results reviewed over the past 24 hrs:   Recent Results (from the past 24 hour(s))   US Lower Extremity Venous Duplex Left    Narrative    Exam:US LOWER EXTREMITY VENOUS DUPLEX LEFT    History: Leg pain and swelling    Comparisons: None    Technique: Venous duplex ultrasonography of the left lower extremity  was performed. Study was limited by the patient's inability to  cooperate.     Findings: The common femoral vein, superficial femoral vein  and  popliteal vein are fully compressible with spontaneous and augmentable  venous flow.           Impression    Impression: No evidence of deep venous thrombosis within the left  lower extremity.    IOANA NIEVES MD         SYSTEM ID:  [id number]

## 2023-10-07 NOTE — PROVIDER NOTIFICATION
10/07/23 1621 10/07/23 1700   Vital Signs   Temp 98  F (36.7  C)  --    Temp src Tympanic  --    Resp 18  --    Pulse 91  --    /49  --    Oxygen Therapy   SpO2 (!) 87 % 90 %   O2 Device Nasal cannula Nasal cannula   Oxygen Delivery 2 LPM  (increased to 3l) 3 LPM     Updated MD that patient had a large incontinence episode.  Patient also has crackles and diminished in bases of bilateral lobes. Required an increase in oxygen as noted above. Patient denies feeling short of breath.  Orders to not administer bolus.  Albumin infusing and afterwards will receive increased rate of IVFs.  Potassium protocol order will be placed and replaced per order.     unknown

## 2023-10-07 NOTE — PHARMACY-VANCOMYCIN DOSING SERVICE
Pharmacy Vancomycin Note  Date of Service 2023  Patient's  1950   73 year old, male    Indication: Sepsis  Day of Therapy: 3  Current vancomycin regimen:  500 mg IV q24h  Current vancomycin monitoring method: AUC  Current vancomycin therapeutic monitoring goal: 400-600 mg*h/L    [employer] Prediction of Current Vancomycin Regimen  Loading dose: 2,000 mg (x2- received at OSH 2100 on 10/5 and here at 0539 on 10/6)  Regimen: 500 mg IV every 24 hours.  Start time: 16:40 on 10/07/2023  Exposure target: AUC24 (range)400-600 mg/L.hr   AUC24,ss: 416 mg/L.hr  Probability of AUC24 > 400: 56 %  Ctrough,ss: 15.3 mg/L  Probability of Ctrough,ss > 20: 19 %  Probability of nephrotoxicity (Lodise LEONA ): 11 %      Current estimated CrCl = Estimated Creatinine Clearance: 19.3 mL/min (A) (based on SCr of 3.72 mg/dL (H)).    Creatinine for last 3 days  10/6/2023:  3:45 AM Creatinine 3.52 mg/dL  10/7/2023:  5:55 AM Creatinine 3.72 mg/dL    Recent Vancomycin Levels (past 3 days)  10/7/2023:  5:55 AM Vancomycin 22.1 ug/mL    Vancomycin IV Administrations (past 72 hours)                     vancomycin (VANCOCIN) 2,000 mg in sodium chloride 0.9 % 500 mL intermittent infusion (mg) 2,000 mg New Bag 10/06/23 0539                    Nephrotoxins and other renal medications (From now, onward)      Start     Dose/Rate Route Frequency Ordered Stop    10/07/23 2200  vancomycin (VANCOCIN) 500 mg vial to attach to  mL bag         500 mg  over 1 Hours Intravenous EVERY 24 HOURS 10/06/23 1307      10/07/23 1630  furosemide (LASIX) injection 20 mg         20 mg  over 1-3 Minutes Intravenous ONCE 10/07/23 1611      10/06/23 0424  vancomycin place watson - receiving intermittent dosing         1 each Intravenous SEE ADMIN INSTRUCTIONS 10/06/23 0424                 Contrast Orders - past 72 hours (72h ago, onward)      None            Interpretation of levels and current regimen:  Vancomycin level is reflective of AUC greater  than 600    Has serum creatinine changed greater than 50% in last 72 hours: No    Urine output:  diminished urine output    Renal Function: Worsening    Insight[employer] Prediction of Planned New Vancomycin Regimen  Loading dose: 2,000 mg (x2- received at OSH 2100 on 10/5 and here at 0539 on 10/6)  Regimen: 500 mg IV every 24 hours.  Start time: 16:40 on 10/07/2023  Exposure target: AUC24 (range)400-600 mg/L.hr   AUC24,ss: 416 mg/L.hr  Probability of AUC24 > 400: 56 %  Ctrough,ss: 15.3 mg/L  Probability of Ctrough,ss > 20: 19 %  Probability of nephrotoxicity (Lodise LEONA 2009): 11 %      Plan:  Will initiate Vancomycin 500 mg Q24H this evening ~2200 (will allow for more time to vanco to clear prior to administration of dose).  Vancomycin monitoring method: AUC  Vancomycin therapeutic monitoring goal: 400-600 mg*h/L  Pharmacy will check vancomycin levels as appropriate in 1-3 Days.  Serum creatinine levels will be ordered daily for the first week of therapy and at least twice weekly for subsequent weeks.    Mynor Alejandro, [facility]

## 2023-10-08 NOTE — PROGRESS NOTES
SAFETY CHECKLIST  ID Bands and Risk clasps correct and in place (DNR, Fall risk, Allergy, Latex, Limb):  Yes  All Lines Reconciled and labeled correctly: Yes  Whiteboard updated:Yes  Environmental interventions: Yes      Resumed care of patient at 1515.

## 2023-10-08 NOTE — PLAN OF CARE
Alert and disoriented, VSS. Lung sounds have course crackles bilaterally throughout. Patient was very agitated with any disruptions throughout the night like VS, lab, etc. Patient initially refused 2200 meds but did eventually take later. Patient was able to get some sleep.       Problem: Plan of Care - These are the overarching goals to be used throughout the patient stay.    Goal: Plan of Care Review  Description: The Plan of Care Review/Shift note should be completed every shift.  The Outcome Evaluation is a brief statement about your assessment that the patient is improving, declining, or no change.  This information will be displayed automatically on your shift note.  Outcome: Not Progressing  Flowsheets (Taken 10/8/2023 0500)  Overall Patient Progress: no change  Goal: Absence of Hospital-Acquired Illness or Injury  Intervention: Identify and Manage Fall Risk  Recent Flowsheet Documentation  Taken 10/8/2023 0352 by Radha Duong RN  Safety Promotion/Fall Prevention:   assistive device/personal items within reach   clutter free environment maintained   room door open   room near nurse's station   room organization consistent   safety round/check completed   treat reversible contributory factors   treat underlying cause  Taken 10/7/2023 2103 by Radha Duong RN  Safety Promotion/Fall Prevention:   assistive device/personal items within reach   clutter free environment maintained   room door open   room near nurse's station   room organization consistent   safety round/check completed   treat reversible contributory factors   treat underlying cause  Intervention: Prevent and Manage VTE (Venous Thromboembolism) Risk  Recent Flowsheet Documentation  Taken 10/8/2023 0352 by Radha Duong RN  VTE Prevention/Management: (Lovenox injection) other (see comments)  Taken 10/7/2023 2103 by Radha Duong RN  VTE Prevention/Management: (Lovenox injection) other (see comments)  Goal: Optimal Comfort and  Wellbeing  Intervention: Monitor Pain and Promote Comfort  Recent Flowsheet Documentation  Taken 10/8/2023 0005 by Radha Duong, RN  Pain Management Interventions: medication (see MAR)   Goal Outcome Evaluation:           Overall Patient Progress: no changeOverall Patient Progress: no change

## 2023-10-08 NOTE — PROGRESS NOTES
10/08/23 1200   Appointment Info   Signing Clinician's Name / Credentials (OT) Katherin Hopkins OTR/L   Interventions   Interventions Quick Adds Therapeutic Activity   Therapeutic Activities   Therapeutic Activity Minutes (07882) 15   Symptoms noted during/after treatment increased pain   Treatment Detail/Skilled Intervention Patient requires max encouragement to participate in bed mobilities/repositioning.  He is uncooperative with attempts.   He was more awake/alert this date and did participate in some ankle pumps and ROM for RUE.  He refused to reposition to get weight off of LUE despite being uncomfortable.   OT Discharge Planning   OT Plan Continue OT in hospital   OT Discharge Recommendation (DC Rec) home with home care occupational therapy;Transitional Care Facility   OT Rationale for DC Rec patient continues to be in a lot of pain and refusing to participate in much movement or repositioning.  ARequiring Max A x 2 if allowing assist.   OT Brief overview of current status See above   Total Session Time   Timed Code Treatment Minutes 15   Total Session Time (sum of timed and untimed services) 15

## 2023-10-08 NOTE — PLAN OF CARE
"Pt is disorientated x4.  Pt has been uncooperative with staff.  Pt has threatened to hit staff.  Staff have used therapeutic communication and redirection with patient.  Redness on LE no wheeping noted on legs throughout shift.      /49 (BP Location: Right arm)   Pulse 93   Temp 98.5  F (36.9  C) (Tympanic)   Resp 24   Ht 1.702 m (5' 7\")   Wt 94.6 kg (208 lb 9.6 oz)   SpO2 (!) 81%   BMI 32.67 kg/m       Ibeth Duncan RN on 10/8/2023 at 5:01 PM   Goal Outcome Evaluation:                        "

## 2023-10-08 NOTE — PROGRESS NOTES
[facility]    Medicine Progress Note - Hospitalist Service    Date of Admission:  10/6/2023    Assessment & Plan      Sepsis/severe sepsis likely from leg cellulitis L > R with bacteremia - clinically not suggestive of compartment syndrome. Report from OSH ([facility]) that BC taken on 10/5 grew out G- jesse. Hx MRSA, MRSA PCR this hospitalization is neg. LLE neg for DVT.  CK is mildly elevated but not to the level to suggest compartment syndrome.  -Continue with cefepime and Flagyl and will discontinue vancomycin as MRSA PCR is negative.  We will add Cipro for secondary coverage in case of resistive Pseudomonas with gram-negative jesse in his blood.  Checked with outside facility today and no further identification of the gram-negative jesse.  -CBC, BMP, CRP, CK, procalcitonin in AM  -Wound care  -discharge IVF with evidence of fluid overload and lactic acid and CK are normalized.      Pleural effusion -possible CHF versus fluid overload from IV fluid given during severe sepsis episode  -Stop IV fluid  -We will cautiously dose 1 dose Lasix 20 mg today and monitor creatinine.  Consider further dosing based on creatinine and fluid status.  Continue with albumin given hypoalbuminemia.  -Echo in a.m.    Acute on chronic kidney injury stage 3 -acute injury likely from sepsis above.  Also of note patient received 2 loading dose of Vanco, 1 from outside hospital and 1 at admission here, which also likely contributed to his MICHAEL.  -stop IVF  -Hold home torsemide and irbesaertan  -Monitor creatinine with intermittent daily Lasix above.  -renal US    Total bilirubin elevation -mostly direct bilirubin, normal AST, ALT, alk phos, no abdominal pain, nausea or vomiting.  -Recheck LFT  -RUQ US    Hypokalemia - Mg wnl  -Replaced potassium and recheck in am    Uncontrolled type 2 diabetes mellitus with stage 3 chronic kidney disease, with long-term current use of insulin -hemoglobin A1c in October of this year  "9.6  -Continue home Lantus at half dose and monitor blood sugar with sliding scale insulin.  Of note patient has \"allergy\" rash with aspart.  Note no rash here while receiving aspart.  Continue on sliding scale with aspart.  -Hypoglycemic protocol    Chronic pain syndrome Chronic bilateral low back pain with bilateral sciatica  -Tylenol prn  -Continue home Cymbalta  -Home Oxycodone prn  -PT/OT    Atherosclerosis of native coronary artery of native heart with stable angina pectoris (H24)  S/P CABG x 2  Benign essential hypertension  Mixed hyperlipidemia  Peripheral vascular disease, unspecified (H24) with edema  -Continue home aspirin, plavix  -Continue home metoprolol with hold parameters  -Hold Ranexa with low CrCl  -Hold home irbesartan and nifedipine with soft blood pressure  -Hold home torsemide    Esophageal reflux  -Change from Pepcid to PPI poor creatinine    Hypothyroidism due to acquired atrophy of thyroid - TSH - 21.  -Continue home levothyroxine at home dose for now as concern that patient has not been taking this consistently at home, thus causing his TSH to be elevated.       Diet: Low Saturated Fat Na <2400 mg    DVT Prophylaxis: Heparin SQ  Loja Catheter: Not present  Lines: None     Cardiac Monitoring: None  Code Status: Full Code      Clinically Significant Risk Factors        # Hypokalemia: Lowest K = 3.3 mmol/L in last 2 days, will replace as needed      # Anion Gap Metabolic Acidosis: Highest Anion Gap = 21 mmol/L in last 2 days, will monitor and treat as appropriate  # Hypoalbuminemia: Lowest albumin = 1.2 g/dL at 10/6/2023  3:45 AM, will monitor as appropriate         # Hypertension: Noted on problem list       # DMII: A1C = 9.6 % (Ref range: 4.0 - 6.2 %) within past 6 months     # Obesity: Estimated body mass index is 32.67 kg/m  as calculated from the following:    Height as of this encounter: 1.702 m (5' 7\").    Weight as of this encounter: 94.6 kg (208 lb 9.6 oz).  , PRESENT ON ADMISSION   "          Disposition Plan      Expected Discharge Date: 10/10/2023                  Carter Chavez MD  Hospitalist Service  [facility]  Securely message with [employer] (more info)  Text page via [facility] Paging/Directory   ______________________________________________________________________    Interval History   Patient states he has some coughing up white clear secretion.  He denies any chest pain or shortness of breath.  He is tolerating oral intake with no nausea or vomiting.  No fever, chills.  Nursing report patient had multiple incontinence of urine yesterday and overnight.    Physical Exam   Vital Signs: Temp: 97.6  F (36.4  C) Temp src: Tympanic BP: 98/41 Pulse: 84   Resp: 18 SpO2: 92 % O2 Device: None (Room air) Oxygen Delivery: Others (comment) (3.5 LPM)  Weight: 208 lbs 9.6 oz    General appearance: NAD  HEENT: Normocephalic, atraumatic  Cardiac: Regular with transient tachycardia, normal S1 and S2  Respiratory/Chest: Crackles at the bases. No rhonchi, no wheezing. Equal breath sounds bilaterally  GI/Abdomen: +BS in all 4 quadrants, soft, non-tender, non-distended, no guarding, no rebound  Musculoskeletal/Extremities: L>R bilateral lower extremity stasis dermatitis.  Swelling 2+ edema in left foot and 1+ lower leg, erythema in the left lower shin with tenderness.  Left leg erythema slightly warmer compared to surrounding skin.  Capillary refill and the toes less than 2 seconds. Pulses are difficult to feel with swelling in both ankles and dorsal pedis but feet are warm.  Neuro: Patient is alert and oriented to self.  Sensation intact in lower extremities.  Patient has movement in the ankles and the toes.  Limited movement on the left leg due to pain.  Passive movement of the toes and ankle did not elicit pain in his upper legs.  Skin: erythema L>R lower extremities.      Medical Decision Making       50 MINUTES SPENT BY ME on the date of service doing chart review, history, exam,  documentation & further activities per the note.      Data     I have personally reviewed the following data over the past 24 hrs:    13.9 (H)  \   8.0 (L)   / 245     137 99 51.3 (H) /  150 (H)   3.6 17 (L) 3.78 (H) \     ALT: 13 AST: 34 AP: 115 TBILI: 2.7 (H)   ALB: 2.6 (L) TOT PROTEIN: 4.9 (L) LIPASE: N/A     Procal: 22.43 (HH) CRP: 456.05 (H) Lactic Acid: 1.2         Imaging results reviewed over the past 24 hrs:   Recent Results (from the past 24 hour(s))   XR Chest Port 1 View    Narrative    PROCEDURE:  XR CHEST PORT 1 VIEW    HISTORY:  crackles in lungs, SOB.     COMPARISON:  2021    FINDINGS:   The heart is enlarged widespread interstitial opacities are noted  suspicious for interstitial pulmonary edema there is a possible  left-sided pleural effusion. There is some increased density at the  left lung base most likely compressive atelectasis.      Impression    IMPRESSION:  Cardiomegaly, interstitial thickening and possible  left-sided pleural effusion. These findings would be most consistent  with congestive heart failure and interstitial pulmonary edema      IOANA NIEVES MD         SYSTEM ID:  [id number]

## 2023-10-08 NOTE — PROGRESS NOTES
10/08/23 1202   Appointment Info   Signing Clinician's Name / Credentials (PT) Vania Quezada DPT   Interventions   Interventions Quick Adds Therapeutic Activity   Therapeutic Activity   Therapeutic Activities: dynamic activities to improve functional performance Minutes (99570) 15   Symptoms Noted During/After Treatment Increased pain   Treatment Detail/Skilled Intervention Pat complains of pain but yells out when therapist tries to move sheet or help with ROM. He did small leg lift on R x2 and minimal ROM with ankle pumps, refused to lift leg enough to take pillow out from under R leg and refused to let me lift his leg or pull pillow out to try and reposition. Confused also, states he came in this morning and when told he's been here a few days he says he's been home in between.   PT Discharge Planning   PT Plan continue PT   PT Discharge Recommendation (DC Rec) Transitional Care Facility;home with assist;home with home care physical therapy   PT Rationale for DC Rec to promote patient's highest level of functional mobility prior to returning home   PT Brief overview of current status pt refuses to allow therapist to help with ROM or repositioning but he is not able to move much ROM on his own   Total Session Time   Timed Code Treatment Minutes 15   Total Session Time (sum of timed and untimed services) 15

## 2023-10-09 PROBLEM — N18.32 TYPE 2 DIABETES MELLITUS WITH STAGE 3B CHRONIC KIDNEY DISEASE (H): Status: ACTIVE | Noted: 2017-03-30

## 2023-10-09 NOTE — PROGRESS NOTES
Lab was successful at getting a blood draw and was very helpful in helping staff getting pt changed and repositioned.  VS were also obtained.  Carine Martins RN............................ 10/9/2023 6:50 AM

## 2023-10-09 NOTE — PLAN OF CARE
Patient presenting with continuing agitation this shift. Patient has been refusing assessments, medications and insulin management. Redirection provided with minimal effectiveness. Patient unable to voice pain concerns but per family statement, patient had back surgery in the past and the majority of pain concerns stem around surgical area of spine. PRN Dilaudid in place for pain management if patient refuses oral medication. Continue to monitor and address needs PRN.     Problem: Plan of Care - These are the overarching goals to be used throughout the patient stay.    Goal: Absence of Hospital-Acquired Illness or Injury  Intervention: Identify and Manage Fall Risk  Recent Flowsheet Documentation  Taken 10/9/2023 0900 by Artur Telles RN  Safety Promotion/Fall Prevention:   activity supervised   assistive device/personal items within reach   clutter free environment maintained   increase visualization of patient   nonskid shoes/slippers when out of bed   patient and family education   room door open   room near nurse's station   room organization consistent   safety round/check completed  Goal: Optimal Comfort and Wellbeing  Outcome: Progressing  Intervention: Monitor Pain and Promote Comfort  Recent Flowsheet Documentation  Taken 10/9/2023 1658 by Artur Telles RN  Pain Management Interventions: medication (see MAR)  Taken 10/9/2023 1500 by Artur Telles RN  Pain Management Interventions: medication (see MAR)     Problem: Sepsis/Septic Shock  Goal: Absence of Infection Signs and Symptoms  Outcome: Progressing  Intervention: Promote Recovery  Recent Flowsheet Documentation  Taken 10/9/2023 1500 by Artur Telles RN  Activity Management:   activity adjusted per tolerance   activity encouraged   patient refuses activity  Taken 10/9/2023 0900 by Artur Telles RN  Activity Management:   activity adjusted per tolerance   activity encouraged   patient refuses activity   Goal Outcome Evaluation:

## 2023-10-09 NOTE — PROGRESS NOTES
Interdisciplinary Discharge Planning Note    Anticipated Discharge Date: When placement is found      Anticipated Discharge Location: TBD (Referral sent to [facility].    Clinical Needs Before Discharge:   Waiting for placement     Treatment Needs After Discharge:  rehab (PT, OT, ST) and / or Behavioral Health Unit    Potential Barriers to Discharge: Finding placement     PETER Loera  10/9/2023,  2:00 PM

## 2023-10-09 NOTE — PROGRESS NOTES
[facility]    Medicine Progress Note - Hospitalist Service    Date of Admission:  10/6/2023    Assessment & Plan   Principal Problem:    Sepsis due to Streptococcus species without acute organ dysfunction (H)  Cellulitis bilateral lower legs    Assessment: Improving    Plan: Continue on Bacitracin topically, cefepime, ciprofloxacin and metronidazole pending blood culture results which are still pending longer than 72 hours into admission.  Greatly appreciate pharmacy recommendations and following up on results.  Cultures had to be sent to [place] for ID and sensitivities so it may be 2 more days for results.  Patient clinically improving so continue current management.    Active Problems:    Chronic bilateral low back pain with bilateral sciatica    Assessment: Stable    Plan: Tylenol as needed, continue Cymbalta.      Type 2 diabetes mellitus with stage 3b chronic kidney disease (H)    Assessment: Stable    Plan: Continue Lantus 15 units every morning and cover with sliding scale if needed.  Sugars all under 200 so adequate control.  Last HgbA1C 7.1 in March 2023, now 9.6 so likely not taking his medications regularly at home.      Gastroesophageal reflux disease without esophagitis    Assessment: Stable    Plan: Continue Protonix.      Benign essential hypertension    Assessment: Stable    Plan: Continue current management with Cardura which is also being used for urinary retention, and metoprolol providing good control.      Hypothyroidism due to acquired atrophy of thyroid    Assessment: Stable    Plan: TSH very elevated at the time of admission during acute illness but with his lethargy this may be contributing.  Check T3 and T4 with AM labs but may need to increase his dose of levothyroxine.  Last chart results had TSH 6.41 in March 2023.  Family reports he does not take his medications very regularly so this may account for some of the increase in his TSH.      Mixed hyperlipidemia     Assessment: Stable    Plan: Last lipids from January 2018 with , chol 119, HDL 31, LDL 9.  Would not recommend starting any medications now.      S/P CABG x 2    Assessment: Stable    Plan: No complaints of chest pain at this time.      Atherosclerosis of native coronary artery of native heart with stable angina pectoris (H24)    Assessment: Stable    Plan: As above.      Venous stasis dermatitis of left lower extremity    Assessment: Stable    Plan: Now with cellulitis of bilateral lower legs.  Treating as above.  Once his infection is improving would consider compression stockings.      Peripheral vascular disease, unspecified (H24)    Assessment: Stable    Plan: As above regarding venous stasis and CAD.      Chronic pain syndrome    Assessment: Stable    Plan: Has had several back surgeries, essentially his entire back is metal per his daughter.  Has been taking oxycodone 12.5 mg 2-3 times daily per her report.  Family feels his mental status changes are due to withdrawal but he does not have other signs of withdrawal from opiates such as yawning, diarrhea, hypertension, tachycardia, tremors.  No oral pain meds given since yesterday morning but he has received Dilaudid IV twice today.  Continue to monitor pain levels and mentation.      Bilateral lower leg cellulitis    Assessment: Improving    Plan: Decreased erythema since admission.  Bacteremic with a gram negative jesse which is yet to be identified.  Continue triple abx coverage with cefepime, Cipro and Flagyl pending final culture and sensitivity results.  Will try to repeat blood cultures in the morning if he will allow.  AMS could be due to sepsis encephalopathy.       Diet: Low Saturated Fat Na <2400 mg    DVT Prophylaxis: Heparin SQ  Loja Catheter: Not present  Lines: None     Cardiac Monitoring: None  Code Status: Full Code      Clinically Significant Risk Factors        # Hypokalemia: Lowest K = 3.3 mmol/L in last 2 days, will replace as needed    "   # Anion Gap Metabolic Acidosis: Highest Anion Gap = 21 mmol/L in last 2 days, will monitor and treat as appropriate  # Hypoalbuminemia: Lowest albumin = 1.2 g/dL at 10/6/2023  3:45 AM, will monitor as appropriate     # Hypertension: Noted on problem list       # DMII: A1C = 9.6 % (Ref range: 4.0 - 6.2 %) within past 6 months   # Obesity: Estimated body mass index is 33.41 kg/m  as calculated from the following:    Height as of this encounter: 1.702 m (5' 7\").    Weight as of this encounter: 96.8 kg (213 lb 4.8 oz)., PRESENT ON ADMISSION            Disposition Plan     Expected Discharge Date: 10/10/2023                  Vania Pugh MD  Hospitalist Service  [facility]  Securely message with [employer] (more info)  Text page via [facility] Paging/Directory   ______________________________________________________________________    Interval History   Nurses report he refuses most treatments and became combative overnight.  During the day just states \"please don't\" anytime cares or evaluation are attempted.  Daughter visited and reports that PTA he was ambulatory, independent, swears a lot, is demanding and controlling but not this confused.  Family thinks he is withdrawing from his pain medications.  Patient did not answer questions during the visit but did speak with his daughter some.    Physical Exam   Vital Signs: Temp: 97.2  F (36.2  C) Temp src: Tympanic BP: 124/80 Pulse: 88   Resp: 18 SpO2: (!) 82 % O2 Device: None (Room air) Oxygen Delivery: Others (comment) (Pt declined to keep O2 on)  Weight: 213 lbs 4.8 oz    Constitutional: awake, alert, uncooperative, and no apparent distress  Eyes: pupils equal, round and reactive to light, extra-ocular muscles intact, and conjunctiva normal  ENT: normocepalic, without obvious abnormality, atramatic  Respiratory: no increased work of breathing and did not allow auscultation.  Cardiovascular: Did not allow exam.  GI: Did not allow exam.  Skin: Erythema " "of left lower leg decreased since admission.  Right lower leg has erythema medially but also decreased since admission.  Musculoskeletal: Not moving much.  3+ pitting edema left lower leg and foot, right leg without edema.  Declines much exam.  Neurologic: Mental Status Exam:  Level of Alertness:   awake  Orientation:   Not answering questions, just states repeatedly \"please don't.\"  Memory:  unable to assess  Fund of Knowledge:  abnormal - unable to assess  Cranial Nerves:  cranial nerves II-XII are grossly intact    Medical Decision Making       40 MINUTES SPENT BY ME on the date of service doing chart review, history, exam, documentation & further activities per the note.  NOTE(S)/MEDICAL RECORDS REVIEWED over the past 24 hours: Chart  Tests ORDERED & REVIEWED in the past 24 hours:  - See lab/imaging results included in the data section of the note  - Historical labs including culture results, lipids, HgbA1C, UC       Data     I have personally reviewed the following data over the past 24 hrs:    15.1 (H)  \   9.2 (L)   / 275     135 100 59.2 (H) /  202 (H)   3.7 18 (L) 3.92 (H) \     ALT: 12 AST: 29 AP: 195 (H) TBILI: 3.7 (H)   ALB: 2.5 (L) TOT PROTEIN: 5.4 (L) LIPASE: N/A     Procal: 14.28 (HH) CRP: 316.78 (H) Lactic Acid: 1.6         Imaging results reviewed over the past 24 hrs:   Recent Results (from the past 24 hour(s))   US Abdomen Limited    Narrative    PROCEDURES: US ABDOMEN LIMITED    HISTORY: elevated direct bili, here with sepsis (cellulitis) and  bacteremia    TECHNIQUE: Grayscale ultrasound of the upper abdomen was performed.    COMPARISON: none     FINDINGS:    MEASUREMENTS:   Liver length:  16 cm.   Common duct diameter:  0.7 cm.    LIVER: The liver is free of masses. No gallstones are seen. There is  no biliary ductal enlargement.      ABDOMINAL AORTA AND IVC: The mid and distal portions of the aorta was  obscured by bowel gas. The inferior vena cava is patent        PANCREAS: Pancreas was " obscured by bowel gas    Right KIDNEY: No right renal masses or hydronephrosis is seen.    OTHER: Small amount of free fluid is seen in the abdomen.      Impression    IMPRESSION:     Small amount of ascites.    No gallstones or biliary ductal enlargement    IOANA NIEVES MD         SYSTEM ID:  [id number]   US Renal Complete Non-Vascular    Narrative    PROCEDURE: US RENAL COMPLETE NON-VASCULAR    HISTORY: .    TECHNIQUE: Targeted sonographic assessment of the kidneys and bladder  was performed.    COMPARISON:  None.    MEASUREMENTS:    Right renal length: 9.5 cm  Left renal length: 11.8 cm    RENAL FINDINGS: No renal masses or hydronephrosis is noted.    BLADDER: Bladder volume was 109 mL's. No bladder masses are seen. A  small amount of free fluid is seen in the pelvic cul-de-sac      Impression    IMPRESSION:  Poor visualization of the kidneys. No renal masses or  hydronephrosis is seen.      IOANA NIEVES MD         SYSTEM ID:  [id number]

## 2023-10-09 NOTE — PROGRESS NOTES
Sepsis protocol fired and lactic draw was ordered.  However, lab was unable to draw any blood.  Pt profusely refused the blood draw and got combative with .    Carine Martins RN............................ 10/9/2023 2:23 AM

## 2023-10-09 NOTE — PROGRESS NOTES
":    Met with patient and his wife to discuss discharge planning needs. Patient is currently refusing treatment and constantly states, \"please don't\" when asked any questions. Patients wife Jeri reports that patient has been refusing to take his medications and has been inconsistent with taking his medications for at least the last two months. I provided Jeri with information about [facility]. She asked that a referral is sent at this time.     A referral has been faxed to [facility].      will continue to follow.     PETER Loera on 10/9/2023 at 10:41 AM    Called [facility] and they stated they never received the referral. A referral was faxed to them at [phone].    Sent referral to [facility] [facility] at [phone]. They stated that Bee could be contacted at [phone] with any questions.      will continue to follow.     PETER Loera on 10/9/2023 at 3:59 PM        "

## 2023-10-09 NOTE — PROGRESS NOTES
Patient is refusing to have an Echo done. Writer attempted to educate the patient o n the importance of the Echo and the patient continued to refuse.   Efe Edwards RN on 10/9/2023 at 9:36 AM

## 2023-10-09 NOTE — PROGRESS NOTES
Protocol fired again.  Ordered lactic this time in hopes that for morning labs they will be able to do the blood draw.  Carine Martins RN............................ 10/9/2023 5:41 AM

## 2023-10-09 NOTE — PROGRESS NOTES
Patient has been refusing all assessments, vitals and oral medications. IV fluids and ABX therapy currently running.

## 2023-10-09 NOTE — PROGRESS NOTES
Protocol fired again.  Tried doing VS, but pt refused to let us get his BP and pulse.  Temp and RR was obtained and charted.  Did not order a lactic due to pt being non compliant and combative.  Carine Martins RN............................ 10/9/2023 3:40 AM

## 2023-10-09 NOTE — PLAN OF CARE
VSS, afebrile, complains of pain but won't or can't rate it.  Pt has been combative and non compliant so far this shift.  IV medications are tolerated but is refusing and lashing out with all other medications and care.  Tolerated VS, but wasn't happy about it.  IV Dilaudid is being utilized and does seem to be helping with some of his pain, but is still combative during check, change, repo's.

## 2023-10-10 NOTE — PROGRESS NOTES
:     Call placed to Lis[facility]oyer in [place] to discuss patients referral. No answer. Left voicemail.     Checked the [place] [facility] website for geriatric bed availability. It states that [facility] currently does not have openings at this time.     PETER Bee on 10/10/2023 at 8:34 AM     :     Spoke with GLENDA Portillo at [facility]. She stated that they have patients referral but do not have bed availability until Friday.     PETER Bee on 10/10/2023 at 9:16 AM     :     Spoke on the phone with patients son, Tomer, to provide update on patients discharge planning needs. Tomer stated that he is concerned that patient could be withdrawing from opiod medication. MD made aware of this.     PETER Bee on 10/10/2023 at 9:45 AM     :     Spoke with Maria Antonia at [facility] in [place]. She stated that patient needs to be medically stable to admit to their facility. She stated when he is medically clear, they can re-screen patient.     PETER Bee on 10/10/2023 at 11:28 AM

## 2023-10-10 NOTE — PROGRESS NOTES
[facility]    Medicine Progress Note - Hospitalist Service    Date of Admission:  10/6/2023    Assessment & Plan   Principal Problem:    Sepsis due to Streptococcus species without acute organ dysfunction (H)  Cellulitis bilateral lower legs    Assessment: Improving    Plan: Continue on Bacitracin topically, continue cefepime, ciprofloxacin and metronidazole pending blood culture results.  Greatly appreciate pharmacy recommendations and following up on results.  Cultures had to be sent to [place] for ID and sensitivities so it may be 4-5 more days for results.  Patient clinically improving so continue current management.  Repeated blood cultures this morning to see if anything new grows.    Active Problems:    Chronic bilateral low back pain with bilateral sciatica    Assessment: Stable    Plan: Tylenol, oxycodone or hydrocodone as needed, continue Cymbalta.  Children feel he is withdrawing from opiates but has been receiving these so not likely.      Type 2 diabetes mellitus with stage 3b chronic kidney disease (H)    Assessment: Stable    Plan: Continue Lantus 15 units every morning and cover with sliding scale if needed.  Sugars mostly under 200 so adequate control.  Last HgbA1C 7.1 in March 2023, now 9.6 so likely not taking his medications regularly at home.      Gastroesophageal reflux disease without esophagitis    Assessment: Stable    Plan: Continue Protonix.      Benign essential hypertension    Assessment: Stable    Plan: Continue current management with Cardura which is also being used for urinary retention, and metoprolol providing good control.      Hypothyroidism due to acquired atrophy of thyroid    Assessment: Stable    Plan: TSH very elevated at the time of admission during acute illness but this may be contributing to his lethargy.  T4 also low so increased his dose of levothyroxine.  T3 still pending.  Last chart results had TSH 6.41 in March 2023.  Family reports he does not  take his medications very regularly so this may account for some of the increase in his TSH.      Mixed hyperlipidemia    Assessment: Stable    Plan: Last lipids from January 2018 with , chol 119, HDL 31, LDL 9.  Would not recommend starting any medications now.      S/P CABG x 2    Assessment: Stable    Plan: No complaints of chest pain at this time.      Atherosclerosis of native coronary artery of native heart with stable angina pectoris (H24)    Assessment: Stable    Plan: As above.      Venous stasis dermatitis of left lower extremity    Assessment: Stable    Plan: Now with cellulitis of bilateral lower legs.  Treating as above.  Edema significantly decreased since yesterday.  Once his infection is improving would consider compression stockings.      Peripheral vascular disease, unspecified (H24)    Assessment: Stable    Plan: As above regarding venous stasis and CAD.      Chronic pain syndrome    Assessment: Stable    Plan: Has had several back surgeries, essentially his entire back is metal per his daughter.  Has been taking oxycodone 12.5 mg 2-3 times daily per her report.  Family feels his mental status changes are due to withdrawal but he does not have other signs of withdrawal from opiates such as yawning, diarrhea, hypertension, tachycardia, tremors.  No oral pain meds given recently but he has received Dilaudid IV.  Continue to monitor pain levels and mentation.  With him sometimes responding to questions wonder if he may have some behavioral concerns and manipulation.      Bilateral lower leg cellulitis    Assessment: Improving    Plan: Decreased erythema since admission.  Bacteremic with a gram negative jesse which is yet to be identified.  Continue triple abx coverage with cefepime, Cipro and Flagyl pending final culture and sensitivity results.  Repeated blood cultures this morning.  AMS could be due to sepsis encephalopathy or behavioral.       Diet: Low Saturated Fat Na <2400 mg    DVT  "Prophylaxis: Heparin SQ  Loja Catheter: Not present  Lines: None     Cardiac Monitoring: None  Code Status: Full Code      Clinically Significant Risk Factors              # Hypoalbuminemia: Lowest albumin = 1.2 g/dL at 10/6/2023  3:45 AM, will monitor as appropriate     # Hypertension: Noted on problem list       # DMII: A1C = 9.6 % (Ref range: 4.0 - 6.2 %) within past 6 months   # Overweight: Estimated body mass index is 29.95 kg/m  as calculated from the following:    Height as of this encounter: 1.702 m (5' 7\").    Weight as of this encounter: 86.7 kg (191 lb 3.2 oz)., PRESENT ON ADMISSION            Disposition Plan     Expected Discharge Date: 10/10/2023                  Vania Pugh MD  Hospitalist Service  [facility]  Securely message with [email or web address] (more info)  Text page via [facility] Paging/Directory   ______________________________________________________________________    Interval History   No new concerns from nursing staff.  No significant change in condition.  Patient continues to have repetition of the same phrases but also seems to briefly pay attention and answer questions appropriately.  Discussed with wife who does not offer any new information on his prior hx.  Denies alcohol consumption.    Physical Exam   Vital Signs: Temp: 98.5  F (36.9  C) Temp src: Tympanic BP: 134/40 Pulse: 97   Resp: 18 SpO2: 90 % O2 Device: None (Room air)    Weight: 191 lbs 3.2 oz    Constitutional: awake, alert, uncooperative, and no apparent distress  Eyes: pupils equal, round and reactive to light, extra-ocular muscles intact, and conjunctiva normal  ENT: normocepalic, without obvious abnormality, atramatic  Respiratory: no increased work of breathing and did not allow auscultation.  Cardiovascular: Did not allow exam.  GI: Did not allow exam.  Skin: Erythema of left lower leg decreased since yesterday.  Right lower leg has erythema medially but also decreased since admission.  Musculoskeletal: Not " moving left leg much but did move right leg and is moving arms more today.  1-2+ pitting edema left lower leg and foot but significantly decreased since yesterday with wrinkling of the skin, right leg without edema.  Declines exam - screamed in pain before the leg was even touched.  Neurologic: Mental Status Exam:  Level of Alertness:   awake  Orientation:   Not usually answering questions, repeats phrases but sometimes seems to attend to conversation and respond appropriately.  Memory:  unable to assess as not answering questions appropriately.  Fund of Knowledge:  nable to assess  Cranial Nerves:  cranial nerves II-XII are grossly intact    Medical Decision Making           Data     I have personally reviewed the following data over the past 24 hrs:    12.7 (H)  \   9.7 (L)   / 285     138 101 63.3 (H) /  228 (H)   3.6 19 (L) 3.98 (H) \     ALT: 12 AST: 22 AP: 200 (H) TBILI: 4.0 (H)   ALB: 2.2 (L) TOT PROTEIN: 4.8 (L) LIPASE: N/A     TSH: N/A T4: 0.43 (L) A1C: N/A     Procal: N/A CRP: N/A Lactic Acid: 2.0

## 2023-10-10 NOTE — PLAN OF CARE
"Pt confused,oriented to self, hollers out for his wife. Afebrile, vs, . Pt can be combative with cares, was able to perform assessment and dressing change with help of 3+ staff to redirect behaviors. Pt incontinent of urine, CCRQ2 pt did refuse some repositions at beginning of shift.    +2 to 3 edema BLE, scrotum and bilateral hips. Pt requested pain medication, PRN dilaudid given x 2  O2 sats 88-94% on RA, pt refused to let staff apply oxygen, MD Real aware.    /40 (BP Location: Right leg, Patient Position: Semi-Martin's)   Pulse 97   Temp 98.5  F (36.9  C) (Tympanic)   Resp 20   Ht 1.702 m (5' 7\")   Wt 86.7 kg (191 lb 3.2 oz)   SpO2 90%   BMI 29.95 kg/m        Goal Outcome Evaluation:      Plan of Care Reviewed With: patient    Overall Patient Progress: no change      Problem: Plan of Care - These are the overarching goals to be used throughout the patient stay.    Goal: Patient-Specific Goal (Individualized)    Outcome: Not Progressing  Flowsheets (Taken 10/10/2023 0421)  Individualized Care Needs: pain management, wound care  Anxieties, Fears or Concerns: ASA-confused  Patient/Family-Specific Goals (Include Timeframe): treat pain as needed, CCRQ2, redress wound, redirect as needed.     Problem: Plan of Care - These are the overarching goals to be used throughout the patient stay.    Goal: Absence of Hospital-Acquired Illness or Injury  Outcome: Not Progressing  Intervention: Identify and Manage Fall Risk  Recent Flowsheet Documentation  Taken 10/9/2023 2330 by Ashley Guo, RN  Safety Promotion/Fall Prevention:   activity supervised   clutter free environment maintained   nonskid shoes/slippers when out of bed   safety round/check completed   room near nurse's station  Intervention: Prevent Skin Injury  Recent Flowsheet Documentation  Taken 10/10/2023 0154 by Ashley Guo, RN  Body Position:   weight shifting   supine, head elevated  Intervention: Prevent and Manage VTE (Venous " Thromboembolism) Risk  Recent Flowsheet Documentation  Taken 10/9/2023 2330 by Ashley Guo, RN  VTE Prevention/Management:   patient refused intervention   SCDs (sequential compression devices) off     Problem: Sepsis/Septic Shock  Goal: Absence of Infection Signs and Symptoms  Outcome: Not Progressing  Intervention: Initiate Sepsis Management  Recent Flowsheet Documentation  Taken 10/9/2023 2330 by Ashley Guo, RN  Isolation Precautions: contact precautions maintained  Intervention: Promote Recovery  Recent Flowsheet Documentation  Taken 10/10/2023 0154 by Ashley Guo, RN  Activity Management: activity adjusted per tolerance

## 2023-10-10 NOTE — PLAN OF CARE
Patient still presenting with combative gestures with all cares. Staff x 4 to complete vitals, skin cares to bilateral LE, scrotum area. IV replaced to left forearm as past line began to leak. All cares completed. Noted extreme fluid overload. MD began new orders. Increase brief checks. Barrier cream applied to reddened areas of scrotum and groin area. Monitor any pain, breathing and anxiety concerns and address.     Problem: Sepsis/Septic Shock  Goal: Absence of Bleeding  Outcome: Progressing     Problem: Plan of Care - These are the overarching goals to be used throughout the patient stay.    Goal: Absence of Hospital-Acquired Illness or Injury  Intervention: Identify and Manage Fall Risk  Recent Flowsheet Documentation  Taken 10/10/2023 1811 by Artur Telles RN  Safety Promotion/Fall Prevention: safety round/check completed  Taken 10/10/2023 1517 by Artur Telles RN  Safety Promotion/Fall Prevention: safety round/check completed  Taken 10/10/2023 0900 by Artur Telles RN  Safety Promotion/Fall Prevention: room organization consistent  Intervention: Prevent Skin Injury  Recent Flowsheet Documentation  Taken 10/10/2023 1811 by Artur Telles RN  Body Position: refuses positioning  Taken 10/10/2023 1517 by Artur Telles RN  Body Position: refuses positioning  Taken 10/10/2023 0900 by Artur Telles RN  Body Position: position maintained  Intervention: Prevent and Manage VTE (Venous Thromboembolism) Risk  Recent Flowsheet Documentation  Taken 10/10/2023 1811 by Artur Telles RN  VTE Prevention/Management:   patient refused intervention   SCDs (sequential compression devices) off  Taken 10/10/2023 1517 by Artur Telles RN  VTE Prevention/Management:   patient refused intervention   SCDs (sequential compression devices) off  Taken 10/10/2023 0900 by Artur Telles RN  VTE Prevention/Management:   patient refused intervention   SCDs (sequential compression devices) off  Goal: Optimal Comfort and  Wellbeing  Intervention: Monitor Pain and Promote Comfort  Recent Flowsheet Documentation  Taken 10/10/2023 1811 by Artur Telles RN  Pain Management Interventions: medication (see MAR)  Taken 10/10/2023 1517 by Artur Telles RN  Pain Management Interventions: medication (see MAR)     Problem: Sepsis/Septic Shock  Goal: Absence of Infection Signs and Symptoms  Intervention: Initiate Sepsis Management  Recent Flowsheet Documentation  Taken 10/10/2023 0900 by Artur Telles RN  Isolation Precautions: contact precautions maintained  Intervention: Promote Recovery  Recent Flowsheet Documentation  Taken 10/10/2023 1811 by Artur Telles RN  Activity Management: activity adjusted per tolerance  Taken 10/10/2023 1517 by Artur Telles RN  Activity Management: activity adjusted per tolerance  Taken 10/10/2023 0900 by Artur Telles RN  Activity Management: activity adjusted per tolerance     Problem: Sepsis/Septic Shock  Goal: Absence of Infection Signs and Symptoms  Intervention: Initiate Sepsis Management  Recent Flowsheet Documentation  Taken 10/10/2023 0900 by Artur Telles RN  Isolation Precautions: contact precautions maintained  Intervention: Promote Recovery  Recent Flowsheet Documentation  Taken 10/10/2023 1811 by Artur Telles RN  Activity Management: activity adjusted per tolerance  Taken 10/10/2023 1517 by Artur Telles RN  Activity Management: activity adjusted per tolerance  Taken 10/10/2023 0900 by Artur Telles RN  Activity Management: activity adjusted per tolerance   Goal Outcome Evaluation:

## 2023-10-10 NOTE — PROGRESS NOTES
Patient continues to yell out, presents with agitation and cursing. Refusing all cares and medications with the exception of IV ABX therapy.

## 2023-10-10 NOTE — PROGRESS NOTES
Interdisciplinary Discharge Planning Note    Anticipated Discharge Date:TBD    Anticipated Discharge Location:TBD- waiting for placement in a [facility] unit      Clinical Needs Before Discharge:   Iv Antibiotics and waiting for cultures     Treatment Needs After Discharge:   Geripsych Unit     Potential Barriers to Discharge:     PETER Bee  10/10/2023,  2:16 PM

## 2023-10-10 NOTE — PROGRESS NOTES
"Patient was yelling out. When staff asked asked what's wrong the patient would just yell \"please don't\" patient was very agitated and moving around in his bed. Staff got pain medication for the patient and returned to the room.  The patient stated \"you fucking asshole\" and the writer asked the patient not to name call and to not yell. Patient calmed down after receiving the pain medication.   "

## 2023-10-11 NOTE — PROGRESS NOTES
SAFETY CHECKLIST  ID Bands and Risk clasps correct and in place (DNR, Fall risk, Allergy, Latex, Limb):  Yes  All Lines Reconciled and labeled correctly: Yes  Whiteboard updated:Yes  Environmental interventions: Yes  Verify Tele #:    Frida James RN on 10/10/2023 at 9:44 PM

## 2023-10-11 NOTE — PROGRESS NOTES
:     Received a call from [facility] at [facility]. They are denying patient at this time because he is not medically stable.     PETER Bee on 10/11/2023 at 12:56 PM

## 2023-10-11 NOTE — PROGRESS NOTES
Patient had blanchable redness on his left hip and perineum area. Redness was outlined and dated. Pictures were added to the chart per MD. The left hip was weeping and a culture swab was taken.

## 2023-10-11 NOTE — PROGRESS NOTES
Interdisciplinary Discharge Planning Note    Anticipated Discharge Date:TBD    Anticipated Discharge Location: TBD    Clinical Needs Before Discharge:   Patient currently refusing most medical interventions . Continue to diurese. Awaiting cultures.     Treatment Needs After Discharge:   Patient may need placement in a geripsych unit     Potential Barriers to Discharge: Patient cannot be placed at a geripsych unit until he is medically stable     PETER Bee  10/11/2023,  1:21 PM

## 2023-10-11 NOTE — PLAN OF CARE
"Pt continues to be confused but will answer \"what\" when you say his name.  He will sometimes say that he hurts.  Very difficult to assess pt.  He will not allow you to touch him, he will yell out and try to hit or grab at you with positions.  Given pain medication IV dilaudid which does seem to help with calming pt down.  Pt has been refusing oral medications.  His wife cortez was here today and was able to get pt to drink 1/2 of ensure, a few bites of pancakes and some mashed potatoes.  CCRQ2 HR's  pt has been incontinent of urine.  Mepilex on sacrum for prevention.  Blanchable redness to left upper thigh and padilla area which is outlined and dated, looks like redness is receding a little. Left shin skin tear, pink foam dressing applied with bacitracin Pt has IV 22g in left wrist at TKO 10ml/hr.  Edema 3+ to hands, scrotum, thighs, BLE's.      Goal Outcome Evaluation:      Plan of Care Reviewed With: patient    Overall Patient Progress: no changeOverall Patient Progress: no change      Problem: Plan of Care - These are the overarching goals to be used throughout the patient stay.    Goal: Plan of Care Review  Description: The Plan of Care Review/Shift note should be completed every shift.  The Outcome Evaluation is a brief statement about your assessment that the patient is improving, declining, or no change.  This information will be displayed automatically on your shift note.  Outcome: Not Progressing  Flowsheets (Taken 10/11/2023 1657)  Plan of Care Reviewed With: patient  Overall Patient Progress: no change  Goal: Patient-Specific Goal (Individualized)  Description: You can add care plan individualizations to a care plan. Examples of Individualization might be:  \"Parent requests to be called daily at 9am for status\", \"I have a hard time hearing out of my right ear\", or \"Do not touch me to wake me up as it startles me\".  Outcome: Not Progressing  Flowsheets (Taken 10/11/2023 1657)  Individualized Care Needs: wound " cares, wound cares  Anxieties, Fears or Concerns: lori, confused  Goal: Absence of Hospital-Acquired Illness or Injury  Outcome: Not Progressing  Intervention: Identify and Manage Fall Risk  Recent Flowsheet Documentation  Taken 10/11/2023 1551 by Ysabel Haddad RN  Safety Promotion/Fall Prevention: safety round/check completed  Intervention: Prevent and Manage VTE (Venous Thromboembolism) Risk  Recent Flowsheet Documentation  Taken 10/11/2023 1551 by Ysabel Haddad RN  VTE Prevention/Management:   patient refused intervention   SCDs (sequential compression devices) off  Taken 10/11/2023 0800 by Ysabel Haddad RN  VTE Prevention/Management:   patient refused intervention   SCDs (sequential compression devices) off  Goal: Optimal Comfort and Wellbeing  Outcome: Not Progressing  Intervention: Monitor Pain and Promote Comfort  Recent Flowsheet Documentation  Taken 10/11/2023 1010 by Ysabel Haddad RN  Pain Management Interventions: medication (see MAR)  Goal: Readiness for Transition of Care  Outcome: Not Progressing     Problem: Sepsis/Septic Shock  Goal: Optimal Coping  Outcome: Not Progressing  Goal: Blood Glucose Level Within Targeted Range  Outcome: Not Progressing  Goal: Absence of Infection Signs and Symptoms  Outcome: Not Progressing  Intervention: Initiate Sepsis Management  Recent Flowsheet Documentation  Taken 10/11/2023 1551 by Ysabel Haddad RN  Isolation Precautions: contact precautions maintained  Taken 10/11/2023 0800 by Ysabel Haddad RN  Isolation Precautions: contact precautions maintained  Intervention: Promote Recovery  Recent Flowsheet Documentation  Taken 10/11/2023 1551 by Ysabel Haddad RN  Activity Management: activity adjusted per tolerance  Goal: Optimal Nutrition Intake  Outcome: Not Progressing     Problem: Sepsis/Septic Shock  Goal: Optimal Coping  Outcome: Not Progressing  Goal: Absence of Bleeding  Outcome: Not Progressing  Goal: Blood Glucose Level Within  Targeted Range  Outcome: Not Progressing  Goal: Absence of Infection Signs and Symptoms  Outcome: Not Progressing  Intervention: Initiate Sepsis Management  Recent Flowsheet Documentation  Taken 10/11/2023 1551 by Ysabel Haddad RN  Isolation Precautions: contact precautions maintained  Taken 10/11/2023 0800 by Ysabel Haddad RN  Isolation Precautions: contact precautions maintained  Intervention: Promote Recovery  Recent Flowsheet Documentation  Taken 10/11/2023 1551 by Ysabel Haddad, RN  Activity Management: activity adjusted per tolerance  Goal: Optimal Nutrition Intake  Outcome: Not Progressing     Problem: Diabetes Comorbidity  Goal: Blood Glucose Level Within Targeted Range  Outcome: Not Progressing

## 2023-10-11 NOTE — PLAN OF CARE
"  Problem: Plan of Care - These are the overarching goals to be used throughout the patient stay.    Goal: Plan of Care Review  Description: The Plan of Care Review/Shift note should be completed every shift.  The Outcome Evaluation is a brief statement about your assessment that the patient is improving, declining, or no change.  This information will be displayed automatically on your shift note.  Outcome: Progressing  Goal: Patient-Specific Goal (Individualized)  Description: You can add care plan individualizations to a care plan. Examples of Individualization might be:  \"Parent requests to be called daily at 9am for status\", \"I have a hard time hearing out of my right ear\", or \"Do not touch me to wake me up as it startles me\".  Outcome: Progressing  Goal: Absence of Hospital-Acquired Illness or Injury  Outcome: Progressing  Intervention: Identify and Manage Fall Risk  Recent Flowsheet Documentation  Taken 10/11/2023 0122 by Frida James RN  Safety Promotion/Fall Prevention: safety round/check completed  Intervention: Prevent Skin Injury  Recent Flowsheet Documentation  Taken 10/10/2023 1900 by Frida James RN  Body Position: refuses positioning  Intervention: Prevent and Manage VTE (Venous Thromboembolism) Risk  Recent Flowsheet Documentation  Taken 10/11/2023 0122 by Frida James RN  VTE Prevention/Management:   patient refused intervention   SCDs (sequential compression devices) off  Taken 10/10/2023 1900 by Frida James, RN  VTE Prevention/Management:   patient refused intervention   SCDs (sequential compression devices) off  Goal: Optimal Comfort and Wellbeing  Outcome: Progressing  Intervention: Monitor Pain and Promote Comfort  Recent Flowsheet Documentation  Taken 10/11/2023 0403 by Friad James RN  Pain Management Interventions: medication (see MAR)  Goal: Readiness for Transition of Care  Outcome: Progressing   Goal Outcome Evaluation:  Disoriented, VSS- except increased BP, patient has " "been agitated throughout shift and combative at times with position changes. Patient was given IV dilaudid for pain and has calmed down some. Patient refusing oral medications. Incontinent of urine with lots of output. Barrier cream applied with each change. Declines fluid intake when offered. Patient currently in bed resting at this time.        BP (!) 171/67 (BP Location: Right arm, Patient Position: Semi-Martin's, Cuff Size: Adult Large)   Pulse 87   Temp 98.1  F (36.7  C) (Tympanic)   Resp 20   Ht 1.702 m (5' 7\")   Wt 93.4 kg (206 lb)   SpO2 92%   BMI 32.26 kg/m      Frida James RN on 10/11/2023 at 4:24 AM                   "

## 2023-10-11 NOTE — PROGRESS NOTES
Patient combative when repositioning. Hitting and yelling out. When holding hand to prevent hitting, patient attempts to twist arms. Patient does stop briefly when told to stop hitting.  IV Dilaudid (not taking oral meds) given for possible pain, patient seems to have relaxed some.    Frida James RN on 10/11/2023 at 4:10 AM

## 2023-10-11 NOTE — PROGRESS NOTES
[facility]    Medicine Progress Note - Hospitalist Service    Date of Admission:  10/6/2023    Assessment & Plan   Principal Problem:    Sepsis due to Streptococcus species without acute organ dysfunction (H)  Cellulitis bilateral lower legs  Pseudomonas bacteremia    Assessment: Improving    Plan: Continue on Bacitracin topically, continue cefepime, ciprofloxacin and metronidazole pending blood culture results.  Greatly appreciate pharmacy recommendations and following up on results.  Cultures had to be sent to [facility] for ID and sensitivities so it may be 4-5 more days for final results but preliminary report shows a Pseudomonas species.  Patient clinically improving so continue current management.  Repeat blood cultures done 10/10 with no growth to date.  Wound culture with no growth.    Active Problems:    Chronic bilateral low back pain with bilateral sciatica    Assessment: Stable    Plan: Tylenol, oxycodone or hydrocodone as needed, continue Cymbalta.  Encourage him to take oxycodone as it will last longer than the IV pain medications.      Type 2 diabetes mellitus with stage 3b chronic kidney disease (H)    Assessment: Stable    Plan: Continue Lantus 15 units every morning and cover with sliding scale if needed.  Sugars increased the past 2 days so will increase to 18 units daily.  Last HgbA1C 7.1 in March 2023, now 9.6 so likely not taking his medications regularly at home.      Gastroesophageal reflux disease without esophagitis    Assessment: Stable    Plan: Continue Protonix.      Benign essential hypertension    Assessment: Stable    Plan: Continue current management with Cardura which is also being used for urinary retention, and metoprolol providing good control.  May resume nifedipine if BP's increase.      Hypothyroidism due to acquired atrophy of thyroid    Assessment: Stable    Plan: TSH very elevated at the time of admission during acute illness but this may be contributing to  his lethargy.  T4 and T3 also low so increased his dose of levothyroxine.  Last chart results had TSH 6.41 in March 2023.  Family reports he does not take his medications very regularly so this may account for some of the increase in his TSH.      Mixed hyperlipidemia    Assessment: Stable    Plan: Last lipids from January 2018 with , chol 119, HDL 31, LDL 9.  Would not recommend starting any medications now.      S/P CABG x 2    Assessment: Stable    Plan: No complaints of chest pain at this time.      Atherosclerosis of native coronary artery of native heart with stable angina pectoris (H24)    Assessment: Stable    Plan: As above.      Venous stasis dermatitis of left lower extremity    Assessment: Stable    Plan: Cellulitis of bilateral lower legs and left upper leg/hip/thigh.  Treating as above.  Edema continues to decrease.  As his infection is improving would consider compression stockings if he will allow.      Peripheral vascular disease, unspecified (H24)    Assessment: Stable    Plan: As above regarding venous stasis and CAD.      Chronic pain syndrome    Assessment: Stable    Plan: Has had several back surgeries, essentially his entire back is metal per his daughter.  Has been taking oxycodone 12.5 mg 2-3 times daily per her report.  Family feels his mental status changes are due to withdrawal but he does not have other signs of withdrawal from opiates such as yawning, diarrhea, hypertension, tachycardia, tremors.  No oral pain meds given recently but he has received Dilaudid IV.  With him sometimes responding to questions wonder if he may have some behavioral concerns and manipulation.  He also has some lab and imaging changes, including elevated GGT, which are concerning for some alcohol abuse but family denies.      Bilateral lower leg cellulitis    Assessment: Improving    Plan: Decreased erythema since admission. Bacteremic with a gram negative jesse which has been identified as a Pseudomonas.   "Continue triple abx coverage with cefepime, Cipro and Flagyl pending final sensitivity results.  Repeat blood cultures negative at 1 day.  AMS could be due to sepsis encephalopathy, hx alcohol abuse or behavioral.  Discussed with the patient and wife today that he needs to start eating, drinking, cooperating with cares and moving or he will die.  IV fluids have to be stopped as he is third spacing everything.  He will need to take po enough to support his needs.          Diet: Regular Diet Adult    DVT Prophylaxis: Heparin SQ  Loja Catheter: Not present  Lines: None     Cardiac Monitoring: None  Code Status: Full Code      Clinically Significant Risk Factors              # Hypoalbuminemia: Lowest albumin = 1.2 g/dL at 10/6/2023  3:45 AM, will monitor as appropriate  # Coagulation Defect: INR = 1.57 (Ref range: 0.85 - 1.15) and/or PTT = N/A, will monitor for bleeding    # Hypertension: Noted on problem list       # DMII: A1C = 9.6 % (Ref range: 4.0 - 6.2 %) within past 6 months   # Obesity: Estimated body mass index is 32.26 kg/m  as calculated from the following:    Height as of this encounter: 1.702 m (5' 7\").    Weight as of this encounter: 93.4 kg (206 lb).             Disposition Plan      Expected Discharge Date: 10/12/2023                    Vania Pugh MD  Hospitalist Service  [facility]  Securely message with [employer] (more info)  Text page via [facility] Paging/Directory   ______________________________________________________________________    Interval History   Was found to have increased edema and erythema of his left hip and thigh when staff was finally able to examine him better.  Had general puffiness of arms, legs and scrotum so IV fluids stopped and started on some IV Lasix.  Answering questions more consistently but still has times where he repeats phrases.    Physical Exam   Vital Signs: Temp: 98.1  F (36.7  C) Temp src: Tympanic BP: (!) 160/73 (pts wife had to help hold pts " hands) Pulse: 87   Resp: 18 SpO2: 92 % O2 Device: None (Room air)    Weight: 206 lbs 0 oz    Constitutional: awake, alert, slightly more cooperative, and no apparent distress.  Responding more to conversation.  Sometimes answers questions.  When not hearing what he wants to hear he closes his eyes and stops talking.  Eyes: pupils equal, round and reactive to light, constricted, extra-ocular muscles intact, and conjunctiva normal  ENT: normocepalic, without obvious abnormality, atramatic  Respiratory: no increased work of breathing and did not allow auscultation.  Cardiovascular: Did not allow exam.  No cyanosis noted.  GI: Did not allow exam.  Skin: Erythema of lower legs decreased further since yesterday.  Did not allow visualization of his left thigh and groin today.  Musculoskeletal:  Moving more today.  1+ pitting edema left lower leg, 2+ pitting on dorsum of left foot but significantly decreased since yesterday with wrinkling of the skin, right leg without edema.  Declines exam but not as loud as yesterday.  Neurologic: Mental Status Exam:  Level of Alertness:   awake  Orientation:   Not usually answering questions, repeats phrases but sometimes seems to attend to conversation and respond appropriately.  Memory:  unable to assess as not answering questions appropriately.  Fund of Knowledge:  unable to assess  Cranial Nerves:  cranial nerves II-XII are grossly intact    Medical Decision Making             Data     I have personally reviewed the following data over the past 24 hrs:    10.6  \   9.7 (L)   / 294     142 107 73.6 (H) /  256 (H)   3.5 21 (L) 3.98 (H) \     Trop: N/A BNP: >70,000 (H)     Procal: 6.92 (HH) CRP: N/A Lactic Acid: 1.9       INR:  1.57 (H) PTT:  N/A   D-dimer:  N/A Fibrinogen:  N/A

## 2023-10-12 NOTE — PROGRESS NOTES
SAFETY CHECKLIST  ID Bands and Risk clasps correct and in place (DNR, Fall risk, Allergy, Latex, Limb):  Yes  All Lines Reconciled and labeled correctly: Yes  Whiteboard updated:Yes  Environmental interventions: Yes  Verify Tele #: N/A    Litzy Adkins RN .......  10/12/2023  7:38 AM

## 2023-10-12 NOTE — PLAN OF CARE
Pt alert on shift today. Was able to perform mostly full assessment aside from lung sounds. Patient has ManWick on that does leak so is wearing a brief while in bed. Pt is edematous all over body especially in all extremities. Pt IV in left forearm was leaking this shift so new IV was placed in left hand and right hand by CRNA. Pt is check, change, repo q 2 hours. Pt states pain level 10/10, given PRN Diluadid as needed on shift. Left lower leg dressing change completed on shift.     Litzy Adkins RN .......  10/12/2023  6:38 PM      Goal Outcome Evaluation:      Plan of Care Reviewed With: spouse, patient    Overall Patient Progress: improving      Problem: Plan of Care - These are the overarching goals to be used throughout the patient stay.    Goal: Plan of Care Review  Description: The Plan of Care Review/Shift note should be completed every shift.  The Outcome Evaluation is a brief statement about your assessment that the patient is improving, declining, or no change.  This information will be displayed automatically on your shift note.  Outcome: Progressing  Flowsheets (Taken 10/12/2023 1816)  Outcome Evaluation: pt more alert today, 3-4+ edema all extremities, cellulitis BLE  Plan of Care Reviewed With:   spouse   patient  Overall Patient Progress: improving

## 2023-10-12 NOTE — PROGRESS NOTES
Interdisciplinary Discharge Planning Note    Anticipated Discharge Date: TBD    Anticipated Discharge Location: TBD    Clinical Needs Before Discharge:   Medical Stability     Treatment Needs After Discharge:   Geriatric Psych Unit     Potential Barriers to Discharge: Patient will not be considered for a dave psych bed until he is medically stable.    PETER Bee  10/12/2023,  1:13 PM

## 2023-10-12 NOTE — PLAN OF CARE
"  Pt disruptive, uncooperative, noncompliant. Not directable. Speech is nonsensical. Difficult to assess since he yells and fights any time attempts are made to assess or do interventions. Managed pain with prn dilaudid. Managed behavior with prn zyprexa. Pt tolerated PRN meds well.     Problem: Plan of Care - These are the overarching goals to be used throughout the patient stay.    Goal: Plan of Care Review  Description: The Plan of Care Review/Shift note should be completed every shift.  The Outcome Evaluation is a brief statement about your assessment that the patient is improving, declining, or no change.  This information will be displayed automatically on your shift note.  Outcome: Not Progressing  Goal: Patient-Specific Goal (Individualized)  Description: You can add care plan individualizations to a care plan. Examples of Individualization might be:  \"Parent requests to be called daily at 9am for status\", \"I have a hard time hearing out of my right ear\", or \"Do not touch me to wake me up as it startles me\".  Outcome: Not Progressing  Goal: Absence of Hospital-Acquired Illness or Injury  Outcome: Not Progressing  Intervention: Prevent Skin Injury  Recent Flowsheet Documentation  Taken 10/12/2023 0400 by Fransisca Whitman, RN  Body Position:   turned   right   legs elevated   log-rolled   weight shifting  Goal: Optimal Comfort and Wellbeing  Outcome: Not Progressing  Goal: Readiness for Transition of Care  Outcome: Not Progressing     Problem: Sepsis/Septic Shock  Goal: Optimal Coping  Outcome: Not Progressing  Goal: Blood Glucose Level Within Targeted Range  Outcome: Not Progressing  Goal: Absence of Infection Signs and Symptoms  Outcome: Not Progressing  Intervention: Promote Recovery  Recent Flowsheet Documentation  Taken 10/12/2023 0400 by Fransisca Whitman, RN  Activity Management:   activity adjusted per tolerance   activity encouraged  Goal: Optimal Nutrition Intake  Outcome: Not Progressing     Problem: " Sepsis/Septic Shock  Goal: Optimal Coping  Outcome: Not Progressing  Goal: Absence of Bleeding  Outcome: Not Progressing  Goal: Blood Glucose Level Within Targeted Range  Outcome: Not Progressing  Goal: Absence of Infection Signs and Symptoms  Outcome: Not Progressing  Intervention: Promote Recovery  Recent Flowsheet Documentation  Taken 10/12/2023 0400 by Fransisca Whitman, RN  Activity Management:   activity adjusted per tolerance   activity encouraged  Goal: Optimal Nutrition Intake  Outcome: Not Progressing     Problem: Diabetes Comorbidity  Goal: Blood Glucose Level Within Targeted Range  Outcome: Not Progressing   Goal Outcome Evaluation:

## 2023-10-12 NOTE — PROGRESS NOTES
:     At this time, patient cannot admit to an inpatient dave psych bed until he is medically stable. Will send out more referrals when he is medically cleared.      will continue to follow for discharge planning needs.     PETER Bee on 10/12/2023 at 1:12 PM

## 2023-10-12 NOTE — PROGRESS NOTES
[facility]    Medicine Progress Note - Hospitalist Service    Date of Admission:  10/6/2023    Assessment & Plan   Principal Problem:    Sepsis due to Streptococcus species without acute organ dysfunction (H)  Cellulitis bilateral lower legs  Pseudomonas putida bacteremia    Assessment: Improving    Plan: Continue on Bacitracin topically for one more day and then as needed.  Sensitivities returned indicating the Pseudomonas putida is sensitive to cefepime, ciprofloxacin and metronidazole so now can de-escalate abx to just Cipro.  Greatly appreciate pharmacy recommendations and following up on results.  Repeat blood cultures done 10/10 with no growth to date.  Wound culture with no growth.    Active Problems:    Chronic bilateral low back pain with bilateral sciatica    Assessment: Stable    Plan: Tylenol, oxycodone or hydromorphone as needed, continue Cymbalta.  Encourage him to take oxycodone as it will last longer than the IV pain medications.      Type 2 diabetes mellitus with stage 3b chronic kidney disease (H)    Assessment: Stable    Plan: Sugars increased the past 2 days so increased to 18 units daily with the first increased dose on 10/12.  Continue to cover carbs and with sliding scale if needed.  Last HgbA1C 7.1 in March 2023, now 9.6 so likely not taking his medications regularly at home.      Gastroesophageal reflux disease without esophagitis    Assessment: Stable    Plan: Continue Protonix as he will take.      Benign essential hypertension    Assessment: Stable    Plan: Continue current management with Cardura which is also being used for urinary retention, and metoprolol providing good control.  Try an increased dose of Cardura starting the night of 10/12 but may need to resume nifedipine if BP's remain elevated.      Hypothyroidism due to acquired atrophy of thyroid    Assessment: Stable    Plan: TSH very elevated to 21.44 at the time of admission during acute illness but this  may be contributing to his lethargy but would not account for the encephalopathy.  T4 and T3 also low so increased his dose of levothyroxine.  Last chart results had TSH 6.41 in March 2023.  Family reports he does not take his medications very regularly so this may account for some of the increase in his TSH.      Mixed hyperlipidemia    Assessment: Stable    Plan: Last lipids from January 2018 with , chol 119, HDL 31, LDL 9.  Would not recommend starting any medications now.      S/P CABG x 2    Assessment: Stable    Plan: No complaints of chest pain at this time.      Atherosclerosis of native coronary artery of native heart with stable angina pectoris (H24)    Assessment: Stable    Plan: As above.      Venous stasis dermatitis of left lower extremity    Assessment: Stable    Plan: Cellulitis of bilateral lower legs and left upper leg/hip/thigh.  Treating as above.  Edema continues to decrease from the outline although it is still very erythematous on the proximal thigh.  As his infection is improving would consider compression stockings if he will allow but he is very sensitive to touch on his legs so likely will not be tolerated.      Peripheral vascular disease, unspecified (H24)    Assessment: Stable    Plan: As above regarding venous stasis and CAD.      Chronic pain syndrome    Assessment: Stable    Plan: Has had several back surgeries, essentially his entire back is metal per his daughter.  Has been taking oxycodone 12.5 mg 2-3 times daily per her report.  No oral pain meds given recently but he has received Dilaudid IV.  With him sometimes responding to questions wonder if he may have some behavioral concerns and manipulation.  He also has some lab and imaging changes, including elevated GGT, which are concerning for some alcohol abuse but family denies.      Bilateral lower leg cellulitis    Assessment: Improving    Plan: Decreased erythema since admission. Bacteremic with Pseudomonas putida.  Taper  "abx to just Cipro based on sensitivity results.  Repeat blood cultures negative at 2 days.  AMS could be due to sepsis encephalopathy, hx alcohol abuse or behavioral and is starting to improve some.  Pt is starting to eat, drink and cooperate with cares sometimes.  IV fluids have to be stopped as he is third spacing everything.  He will need to take po enough to support his needs.          Diet: Regular Diet Adult    DVT Prophylaxis: Heparin SQ  Loja Catheter: Not present  Lines: None     Cardiac Monitoring: None  Code Status: Full Code      Clinically Significant Risk Factors              # Hypoalbuminemia: Lowest albumin = 1.2 g/dL at 10/6/2023  3:45 AM, will monitor as appropriate  # Coagulation Defect: INR = 1.57 (Ref range: 0.85 - 1.15) and/or PTT = N/A, will monitor for bleeding    # Hypertension: Noted on problem list       # DMII: A1C = 9.6 % (Ref range: 4.0 - 6.2 %) within past 6 months   # Obesity: Estimated body mass index is 30.92 kg/m  as calculated from the following:    Height as of this encounter: 1.702 m (5' 7\").    Weight as of this encounter: 89.5 kg (197 lb 6.4 oz).             Disposition Plan     Expected Discharge Date: 10/12/2023                    Vania Pugh MD  Hospitalist Service  [facility]  Securely message with [employer] (more info)  Text page via [facility] Paging/Directory   ______________________________________________________________________    Interval History   Had some yelling and fighting during the night so received prn Zyprexa.  This morning staff has been able to give oral meds for the first time, he drank some water and Ensure, got both doses of insulin and allowed the nurse to complete her assessment.    Physical Exam   Vital Signs: Temp: 97.3  F (36.3  C) Temp src: Tympanic BP: (!) 171/56 Pulse: 77   Resp: 18 SpO2: 93 % O2 Device: None (Room air)    Weight: 197 lbs 6.4 oz    Constitutional: awake, alert, slightly more cooperative, and no apparent " distress.  Responding more to conversation.  Sometimes answers questions.  When not hearing what he wants to hear he closes his eyes and stops talking.  Eyes: pupils equal, round and reactive to light, constricted, extra-ocular muscles intact, and conjunctiva normal  ENT: normocepalic, without obvious abnormality, atramatic  Respiratory: no increased work of breathing and did not allow auscultation.  Cardiovascular: Did not allow exam.  No cyanosis noted.  GI: Did not allow exam.  Skin: Erythema of lower legs decreased further since yesterday.  Did not allow visualization of his left thigh and groin today.  Musculoskeletal:  Moving more today.  1+ pitting edema left lower leg, 2+ pitting on dorsum of left foot but significantly decreased since yesterday with wrinkling of the skin, right leg without edema.  Declines exam but not as loud as yesterday.  Neurologic: Mental Status Exam:  Level of Alertness:   awake  Orientation:   Not usually answering questions, repeats phrases but sometimes seems to attend to conversation and respond appropriately.  Memory:  unable to assess as not answering questions appropriately.  Fund of Knowledge:  unable to assess  Cranial Nerves:  cranial nerves II-XII are grossly intact    Medical Decision Making             Data     I have personally reviewed the following data over the past 24 hrs:    N/A  \   N/A   / N/A     N/A N/A N/A /  243 (H)   3.7 N/A N/A \     Procal: N/A CRP: N/A Lactic Acid: 1.6

## 2023-10-13 NOTE — PROGRESS NOTES
Interdisciplinary Discharge Planning Note    Anticipated Discharge Date:TBD    Anticipated Discharge Location: SNF     Clinical Needs Before Discharge:   Medical Stability     Treatment Needs After Discharge:  rehab (PT, OT, ST)    Potential Barriers to Discharge: Patient is confused/ aggressive at this time. This is a barrier to placement at times.     PETER Bee  10/13/2023,  1:02 PM

## 2023-10-13 NOTE — PROGRESS NOTES
"   10/13/23 1500   Appointment Info   Signing Clinician's Name / Credentials (PT) Young Nicholson MPT   Interventions   Interventions Quick Adds Therapeutic Activity;Therapeutic Procedure   Therapeutic Procedure/Exercise   Symptoms Noted During/After Treatment fatigue;increased pain   Treatment Detail/Skilled Intervention PROM to LEs with poor patient participation due to his c/o pain   Therapeutic Activity   Symptoms Noted During/After Treatment Increased pain;Fatigue   Treatment Detail/Skilled Intervention adjusted pillows under LE to allow bilateral heels to \"float\"; patient requiring maximal assist for bed mobilities   PT Discharge Planning   PT Plan continue PT as patient will allow   PT Discharge Recommendation (DC Rec) Transitional Care Facility;home with assist;home with home care physical therapy   PT Rationale for DC Rec to promote patient's highest level of functional mobility prior to returning home   PT Brief overview of current status patient very resistant to ROM of LEs due to his c/o pain; OT had more success with ROM of UEs; goal is for increased activity and possible return to functional mobility       "

## 2023-10-13 NOTE — PROGRESS NOTES
Clinical Nutrition / Initial Assessment     Reason for Assessment:  Length of stay    Assessment:   Client History:  pt admitted with sepsis, cellulitis BLE. Has been refusing cares, meals, medications at times. Has started to improve his appetite, drinking Ensures at times. Will add to his trays TID. Staff/family to assist as needed/allows.   Diet Order:  regular   Oral Intake:  poor  Supplement Intake:  Will add Ensure TID to trays  Weight:   Wt Readings from Last 10 Encounters:   10/12/23 89.5 kg (197 lb 6.4 oz)   05/19/23 83.6 kg (184 lb 6.4 oz)   07/27/22 81.9 kg (180 lb 9.6 oz)   03/21/22 91.7 kg (202 lb 3.2 oz)   03/13/22 92.7 kg (204 lb 5.9 oz)   03/10/22 93.5 kg (206 lb 3.2 oz)   11/04/21 94.8 kg (209 lb)   09/17/21 95.7 kg (211 lb)   07/28/21 96.9 kg (213 lb 9.6 oz)   07/01/21 95.9 kg (211 lb 6.4 oz)      Malnutrition Criteria:  (Need to have 2 indicators to qualify recommendation)  Energy Intake:  Acute Severe: </= 50% of estimated energy requirement for >/= 5 days  Interpretation of Weight Loss:  No significant weight loss  Physical Findings:  Acute Fluid Accumulation:  moderate to severe  Reduced  Strength:  Not Measured    Recommended Nutrition Diagnosis:   Severe Malnutrition in the context of acute illness or injury - based on AND/ASPEN Clinical Characterstics of Malnutrition May 2012  Malnutrition:    - Level of malnutrition: Severe       Nutrition Education: Nutrition education will be provided as appropriate.    Nutrition Diagnosis: Nutrient Intake:  inadequate energy intakes  related to decreased appetite and cooperativeness as evidenced by refusal of meals at times, poor intakes.    Intervention:  Nutrition Prescription:     Nutrition Intervention(s):Recommended general, healthful diet  1. Meals and Snacks: small/frequent meals/snacks  2. Medical Food Supplement: Ensure TID on trays    Nutrition Goal(s):  1. Pt will consume 50% or more at meals and supplements   2. Pt will allow staff to  assist at meals/snacks  3. Pt will tolerate diet as ordered    Monitoring and Evaluation:   Food Intake, diet tolerance     Discharge Recommendation:   Nutrition Discharge Planning  Recommend supplements on discharge if intakes remain less than 50% at meals    RD will reassess in within 1-5 days or sooner.  Maria Antonia Dunne RD on 10/13/2023 at 10:05 AM

## 2023-10-13 NOTE — PROGRESS NOTES
10/13/23 3985   Appointment Info   Signing Clinician's Name / Credentials (OT) Carine Bazzi OTR/L   Therapeutic Activities   Therapeutic Activity Minutes (68408) 25   Symptoms noted during/after treatment increased pain;fatigue   Treatment Detail/Skilled Intervention Pt was in bed upon approach, complained of pain with very light touch to lower extremities, tolerated U/E A/AROM better, pt noted to have edema in bilateral U/E's, instructed pt to try to keep bilateral U/E's positioned on pillows in elevation while in bed and provided squeeze ball for hand AROM.   OT Discharge Planning   OT Plan Continue OT in hospital   OT Discharge Recommendation (DC Rec) Transitional Care Facility   OT Rationale for DC Rec patient continues to be in a lot of pain and refusing to participate in much movement or repositioning.  ARequiring Max A x 2 if allowing assist.   OT Brief overview of current status Pt was open and receptive to techniques to attempt to decrease bilateral U/E edema, see above, did not attempt further movement due to pt refusal and pain.   Total Session Time   Timed Code Treatment Minutes 25   Total Session Time (sum of timed and untimed services) 25

## 2023-10-13 NOTE — PROGRESS NOTES
"Attempted to give medication to patient. Patient was combative and stating, \"no, no, no\". Patient was grabbing my arm and swinging his arm at me. Medication not given at this time.   "

## 2023-10-13 NOTE — PLAN OF CARE
Patient is orientated self, but not to time, situation, or place. VSS. Patient complains of pain in both lower extremities. PRN oxycodone given. CCR q2h. Primo fit in place with adequate output. Patient has been much more cooperative and calm when wife present at bedside.     Goal Outcome Evaluation:      Plan of Care Reviewed With: patient, spouse    Overall Patient Progress: no changeOverall Patient Progress: no change

## 2023-10-13 NOTE — PROGRESS NOTES
[facility]    Medicine Progress Note - Hospitalist Service    Date of Admission:  10/6/2023    Assessment & Plan   Principal Problem:    Sepsis due to Streptococcus species without acute organ dysfunction (H)  Cellulitis bilateral lower legs  Pseudomonas putida bacteremia    Assessment: Improving    Plan: Bacitracin topically as needed.  Sensitivities returned indicating the Pseudomonas putida is sensitive to Cipro.  Greatly appreciate pharmacy recommendations and following up on results.  Repeat blood cultures done 10/10 with no growth at 3 days.  Wound culture with no growth.  No IV access so he will have to take abx orally.  May see decline in condition again if he continues to refuse.    Active Problems:    Chronic bilateral low back pain with bilateral sciatica    Assessment: Stable    Plan: Tylenol, oxycodone or morphine liquid as needed, continue Cymbalta if he will.  IV pain medications no longer an option as he is too hard to get an IV in.      Type 2 diabetes mellitus with stage 3b chronic kidney disease (H)    Assessment: Stable    Plan: Sugars increased the past several days so increased Lantus to 22 units daily with the first increased dose on 10/13.  Continue to cover carbs and with sliding scale if needed as he allows.  Last HgbA1C 7.1 in March 2023, now 9.6 so clearly not taking his medications regularly at home.      Gastroesophageal reflux disease without esophagitis    Assessment: Stable    Plan: Continue Protonix as he will take.      Benign essential hypertension    Assessment: Stable    Plan: On Cardura (which is also being used for urinary retention) and metoprolol providing fair control but started on an increased dose of Cardura starting the night of 10/12 with some improvement.      Hypothyroidism due to acquired atrophy of thyroid    Assessment: Stable    Plan: TSH very elevated to 21.44 at the time of admission during acute illness but this may be contributing to his  lethargy but would not account for the encephalopathy.  T4 and T3 also low so increased his dose of levothyroxine.  Last chart results had TSH 6.41 in March 2023.  Family reports he does not take his medications very regularly so this may account for some of the increase in his TSH.      Mixed hyperlipidemia    Assessment: Stable    Plan: Last lipids from January 2018 with , chol 119, HDL 31, LDL 9.  Would not recommend starting any medications now.      S/P CABG x 2    Assessment: Stable    Plan: No complaints of chest pain at this time.      Atherosclerosis of native coronary artery of native heart with stable angina pectoris (H24)    Assessment: Stable    Plan: As above.      Venous stasis dermatitis of left lower extremity    Assessment: Stable    Plan: Cellulitis of bilateral lower legs and left upper leg/hip/thigh.  Treating as above.  Erythema continues to decrease from the outline on the thigh although it is still very erythematous on the proximal thigh.  As his infection is improving would consider compression stockings if he will allow but he is very sensitive to touch on his legs so likely will not be tolerated.  Would also benefit from compression to his arms to help decrease third spacing edema there.  IV fluids stopped and using Lasix IV with some hypokalemia developing.  Replace per protocol, stop IV Lasix as there is no IV access.  Monitor for the need to po Lasix in addition to spironolactone to help with third spacing.      Peripheral vascular disease, unspecified (H24)    Assessment: Stable    Plan: As above regarding venous stasis and CAD.      Chronic pain syndrome    Assessment: Stable    Plan: Has had several back surgeries, essentially his entire back is metal per his daughter.  Had been taking oxycodone 12.5 mg 2-3 times daily PTA per her report.  He has received Dilaudid IV but he no longer has IV access.  With him sometimes responding to questions wonder if he may have some  "behavioral concerns and manipulation.  He also has some lab and imaging changes, including elevated GGT, which are concerning for some alcohol abuse but family denies.  Change pain medications to oxycodone or Roxanol.      Bilateral lower leg cellulitis    Assessment: Improving    Plan: Decreased erythema since admission. Bacteremic with Pseudomonas putida.  Tapered abx to just Cipro on 10/12 based on sensitivity results.  Repeat blood cultures negative at 3 days.  AMS could be due to sepsis encephalopathy, hx alcohol abuse or behavioral and continues to improve some.  Pt is starting to eat, drink and cooperate with cares sometimes.  He will need to take po enough to support his needs as he no longer has IV access.          Diet: Regular Diet Adult    DVT Prophylaxis: Heparin SQ  Loja Catheter: Not present  Lines: None     Cardiac Monitoring: None  Code Status: Full Code      Clinically Significant Risk Factors        # Hypokalemia: Lowest K = 3.1 mmol/L in last 2 days, will replace as needed       # Hypoalbuminemia: Lowest albumin = 1.2 g/dL at 10/6/2023  3:45 AM, will monitor as appropriate     # Hypertension: Noted on problem list       # DMII: A1C = 9.6 % (Ref range: 4.0 - 6.2 %) within past 6 months   # Obesity: Estimated body mass index is 30.92 kg/m  as calculated from the following:    Height as of this encounter: 1.702 m (5' 7\").    Weight as of this encounter: 89.5 kg (197 lb 6.4 oz).             Disposition Plan    10/17/2023         Vania Pugh MD  Hospitalist Service  [facility]  Securely message with [employer] (more info)  Text page via [facility] Paging/Directory   ______________________________________________________________________    Interval History   Did well overall during the night but refused all cares this AM per nursing staff.  With MD he was pleasant, answered and even asked questions appropriately.  Discussed that he no longer has IV access so he needs to take " "medications by mouth, including his pain meds and abx.    Physical Exam   Vital Signs: Temp: 97.9  F (36.6  C) Temp src: Tympanic BP: 96/56 Pulse: 76   Resp: 16 SpO2: 91 % O2 Device: None (Room air)    Weight: 197 lbs 6.4 oz    Constitutional: awakens easily, briefly alert, slightly more cooperative, and no apparent distress.  Responding more to conversation.  Answers questions and even asked some questions today.  Eyes: pupils equal, round and reactive to light, constricted, extra-ocular muscles intact, and conjunctiva normal  ENT: normocepalic, without obvious abnormality, atramatic  Respiratory: no increased work of breathing, no audible wheezing.  Cardiovascular:  No cyanosis noted.  GI: Bowel sounds present.  Skin: Erythema of lower legs continues to decrease except on the distal dorsum of his left foot which remains stable.  Musculoskeletal:  Moving more today.  1+ pitting edema left lower leg, 2+ pitting on dorsum of left foot, right leg without edema.  Declines exam but not as uncooperative as yesterday.  Neurologic: Mental Status Exam:  Level of Alertness:   awake  Orientation:   Answering and asking some questions, repeats phrases less often but still says \"please don't\" and \"oh my God.\"  Greeting with \"what the hell are you doing here\" but did engage in conversation when told here to check on him.  Memory:  seems to be improving.  Fund of Knowledge:  unable to assess  Cranial Nerves:  cranial nerves II-XII are grossly intact    Medical Decision Making             Data     I have personally reviewed the following data over the past 24 hrs:    8.7  \   9.3 (L)   / 295     142 105 83.7 (H) /  323 (H)   3.1 (L) 24 4.12 (H) \     ALT: 7 AST: 9 AP: 255 (H) TBILI: 1.6 (H)   ALB: 2.1 (L) TOT PROTEIN: 4.7 (L) LIPASE: N/A     Trop: N/A BNP: >70,000 (H)     Procal: 3.10 (H) CRP: N/A Lactic Acid: N/A         "

## 2023-10-13 NOTE — PROGRESS NOTES
:     It was mentioned by MD that patient would benefit from short term rehab at a SNF. Attempted to call patients wife to discuss this. No answer. Could not leave voicemail.     PETER Bee on 10/13/2023 at 1:02 PM

## 2023-10-13 NOTE — PLAN OF CARE
"Patient has been alert and mostly oriented this shift. Patient has been very cooperative with staff. Patient tolerated head-to-toe assessment except posterior lung sounds. Patient has been calm, withdrawn, and sad since daughter left last evening. Edema is still very prevalent. Wound care was completed, mepilex on bottom changed. IV in right hand is difficult to flush/inject medications and IV is more visible now than at beginning of shift. Patient became slightly more irritable with the last repo/vs check. Patient has not had BM since admission according to documentation.      Problem: Plan of Care - These are the overarching goals to be used throughout the patient stay.    Goal: Plan of Care Review  Description: The Plan of Care Review/Shift note should be completed every shift.  The Outcome Evaluation is a brief statement about your assessment that the patient is improving, declining, or no change.  This information will be displayed automatically on your shift note.  Outcome: Not Progressing  Flowsheets (Taken 10/13/2023 1839)  Overall Patient Progress: no change  Goal: Patient-Specific Goal (Individualized)  Description: You can add care plan individualizations to a care plan. Examples of Individualization might be:  \"Parent requests to be called daily at 9am for status\", \"I have a hard time hearing out of my right ear\", or \"Do not touch me to wake me up as it startles me\".  Outcome: Not Progressing  Goal: Absence of Hospital-Acquired Illness or Injury  Outcome: Not Progressing  Intervention: Identify and Manage Fall Risk  Recent Flowsheet Documentation  Taken 10/13/2023 0226 by Radha Duong RN  Safety Promotion/Fall Prevention:   assistive device/personal items within reach   clutter free environment maintained   increased rounding and observation   increase visualization of patient   room door open   room near nurse's station   room organization consistent   safety round/check completed   treat " reversible contributory factors   treat underlying cause  Taken 10/12/2023 1919 by Radha Duong RN  Safety Promotion/Fall Prevention:   assistive device/personal items within reach   clutter free environment maintained   increased rounding and observation   increase visualization of patient   room door open   room near nurse's station   room organization consistent   safety round/check completed   treat reversible contributory factors   treat underlying cause  Intervention: Prevent and Manage VTE (Venous Thromboembolism) Risk  Recent Flowsheet Documentation  Taken 10/13/2023 0226 by Radha Duong RN  VTE Prevention/Management: (Lovenox injection) other (see comments)  Taken 10/12/2023 1919 by Radha Duong RN  VTE Prevention/Management: (Lovenox injection) other (see comments)  Goal: Optimal Comfort and Wellbeing  Outcome: Not Progressing  Intervention: Monitor Pain and Promote Comfort  Recent Flowsheet Documentation  Taken 10/13/2023 0325 by Radha Duong RN  Pain Management Interventions:   medication (see MAR)   repositioned  Taken 10/12/2023 2009 by Radha Duong RN  Pain Management Interventions: medication (see MAR)  Goal: Readiness for Transition of Care  Outcome: Not Progressing     Problem: Sepsis/Septic Shock  Goal: Optimal Coping  Outcome: Not Progressing  Goal: Blood Glucose Level Within Targeted Range  Outcome: Not Progressing  Goal: Absence of Infection Signs and Symptoms  Outcome: Not Progressing  Intervention: Initiate Sepsis Management  Recent Flowsheet Documentation  Taken 10/13/2023 0226 by Radha Duong RN  Isolation Precautions: contact precautions maintained  Taken 10/12/2023 1919 by Radha Duong RN  Isolation Precautions: contact precautions maintained  Goal: Optimal Nutrition Intake  Outcome: Not Progressing     Problem: Sepsis/Septic Shock  Goal: Optimal Coping  Outcome: Not Progressing  Goal: Absence of Bleeding  Outcome: Not Progressing  Goal: Blood  Glucose Level Within Targeted Range  Outcome: Not Progressing  Goal: Absence of Infection Signs and Symptoms  Outcome: Not Progressing  Intervention: Initiate Sepsis Management  Recent Flowsheet Documentation  Taken 10/13/2023 0226 by Radha Duong RN  Isolation Precautions: contact precautions maintained  Taken 10/12/2023 1919 by Radha Duong RN  Isolation Precautions: contact precautions maintained  Goal: Optimal Nutrition Intake  Outcome: Not Progressing     Problem: Diabetes Comorbidity  Goal: Blood Glucose Level Within Targeted Range  Outcome: Not Progressing   Goal Outcome Evaluation:           Overall Patient Progress: no changeOverall Patient Progress: no change

## 2023-10-14 PROBLEM — E43 SEVERE MALNUTRITION (H): Status: ACTIVE | Noted: 2023-01-01

## 2023-10-14 NOTE — PROGRESS NOTES
[facility]    Medicine Progress Note - Hospitalist Service    Date of Admission:  10/6/2023    Assessment & Plan   Principal Problem:    Sepsis due to Streptococcus species without acute organ dysfunction (H)  Cellulitis bilateral lower legs  Pseudomonas putida bacteremia    Assessment: Improving    Plan: Bacitracin topically as needed.  Sensitivities returned indicating the Pseudomonas putida is sensitive to Cipro.  Greatly appreciate pharmacy recommendations and following up on results.  Repeat blood cultures done 10/10 with no growth at 4 days.  Wound culture with no growth.  No IV access so taking abx orally.  Delirium improving and he is able to participate in conversation more every day.  Due to his severe illness and time in bed, he will require rehab prior to being able to possibly return home.    Active Problems:    Chronic bilateral low back pain with bilateral sciatica    Assessment: Stable    Plan: Tylenol and oxycodone working well, has morphine liquid as needed, has been taking his Cymbalta.  States his pain is better with current medications      Type 2 diabetes mellitus with stage 3b chronic kidney disease (H)    Assessment: Stable    Plan: Sugars increased the past several days so increased Lantus to 22 units daily with the first increased dose on 10/13.  Overall down trending now.  Continue to cover carbs and with sliding scale if needed as he allows.  Last HgbA1C 7.1 in March 2023, now 9.6 so clearly not taking his medications regularly at home.      Gastroesophageal reflux disease without esophagitis    Assessment: Stable    Plan: Continue Protonix as he will take.      Benign essential hypertension    Assessment: Stable    Plan: On Cardura (which is also being used for urinary retention) and metoprolol providing fair control but started on an increased dose of Cardura starting the night of 10/12 with some improvement.      Hypothyroidism due to acquired atrophy of thyroid     Assessment: Stable    Plan: TSH very elevated to 21.44 at the time of admission during acute illness but this may be contributing to his lethargy but would not account for the encephalopathy.  T4 and T3 also low so increased his dose of levothyroxine.  Last chart results had TSH 6.41 in March 2023.  Family reports he does not take his medications very regularly so this may account for some of the increase in his TSH.      Mixed hyperlipidemia    Assessment: Stable    Plan: Last lipids from January 2018 with , chol 119, HDL 31, LDL 9.  Would not recommend starting any medications now.      S/P CABG x 2    Assessment: Stable    Plan: No complaints of chest pain.      Atherosclerosis of native coronary artery of native heart with stable angina pectoris (H24)    Assessment: Stable    Plan: As above.      Venous stasis dermatitis of left lower extremity    Assessment: Stable    Plan: Cellulitis of bilateral lower legs and left upper leg/hip/thigh improving.  On Cipro 500 mg daily (renal dose) currently day #8/10 as long as he continues to improve.        Peripheral vascular disease, unspecified (H24)    Assessment: Stable    Plan: As above regarding venous stasis and CAD.      Chronic pain syndrome    Assessment: Stable    Plan: Has had several back surgeries, essentially his entire back is metal per his daughter.  Had been taking oxycodone 12.5 mg 2-3 times daily PTA per her report.  He has received Dilaudid IV but he no longer has IV access.  With him sometimes responding to questions wonder if he may have some behavioral concerns and manipulation.  He also has some lab and imaging changes, including elevated GGT, which are concerning for some alcohol abuse but family denies.  Change pain medications to oxycodone or Roxanol.      Bilateral lower leg cellulitis    Assessment: Improving    Plan: Decreased erythema since admission. Bacteremic with Pseudomonas putida.  Tapered abx to just Cipro on 10/12 based on  "sensitivity results.  Repeat blood cultures negative at 4 days.  AMS could be due to sepsis encephalopathy, hx alcohol abuse or behavioral and continues to improve some.  Seems early for the thyroid meds to be making a difference but this is also possible.  Pt is starting to eat, drink and cooperate with cares more consistently.  He will need to take po enough to support his needs as he no longer has IV access.      Severe malnutrition    Assessment: Stable    Plan: Taking supplements, starting to eat very little but not refusing much.  Had hypokalemia following Lasix administration so replacing po.        Diet: Regular Diet Adult  Snacks/Supplements Adult: Ensure Enlive; With Meals    DVT Prophylaxis: Heparin SQ  Loja Catheter: Not present  Lines: None     Cardiac Monitoring: None  Code Status: Full Code      Clinically Significant Risk Factors        # Hypokalemia: Lowest K = 3 mmol/L in last 2 days, will replace as needed       # Hypoalbuminemia: Lowest albumin = 1.2 g/dL at 10/6/2023  3:45 AM, will monitor as appropriate     # Hypertension: Noted on problem list       # DMII: A1C = 9.6 % (Ref range: 4.0 - 6.2 %) within past 6 months   # Obesity: Estimated body mass index is 31.09 kg/m  as calculated from the following:    Height as of this encounter: 1.702 m (5' 7\").    Weight as of this encounter: 90 kg (198 lb 8 oz).             Disposition Plan    10/18/2023         Vania Pugh MD  Hospitalist Service  [facility]  Securely message with [employer] (more info)  Text page via [facility] Paging/Directory   ______________________________________________________________________    Interval History   Improving behaviors.  States his pain is better.  Able to participate in conversation to some extent but vague answers.  Does interact.      Physical Exam   Vital Signs: Temp: 98.1  F (36.7  C) Temp src: Tympanic BP: (!) 143/58 Pulse: 73   Resp: 16 SpO2: 94 % O2 Device: None (Room air)    Weight: 198 " lbs 8 oz    Constitutional: awake, alert throughout the visit, more cooperative, and no apparent distress.  Participating more in conversation.    Eyes: pupils equal, round and reactive to light, constricted, extra-ocular muscles intact, and conjunctiva normal  ENT: normocepalic, without obvious abnormality, atramatic  Respiratory: no increased work of breathing, no audible wheezing.  Cardiovascular:  No cyanosis noted.  GI: Bowel sounds present.  Skin: Erythema of lower legs continues to decrease except on the distal dorsum of his left foot which remains stable to minimally decreased today.  Musculoskeletal:  Moving more today.  Trace pitting edema left lower leg, 1+ pitting on dorsum of left foot, right leg without edema.  Did not scream when legs were touched.  Arms with decreasing edema.  Neurologic: Mental Status Exam:  Level of Alertness:   awake  Orientation:   To person  Memory:  seems to be improving.  Fund of Knowledge:  unable to assess  Cranial Nerves:  cranial nerves II-XII are grossly intact    Medical Decision Making             Data     I have personally reviewed the following data over the past 24 hrs:    N/A  \   N/A   / N/A     N/A N/A N/A /  219 (H)   3.2 (L) N/A N/A \

## 2023-10-14 NOTE — PLAN OF CARE
Patient is orientated to self, situation, and place. VSS. Patient has been cooperative all shift. Lung sounds clear but diminished bilaterally. Patient complains of pain in legs at 10/10. PRN oxy given. Patient resting with eyes closed. Edema present in all extremities. Dressing changed on LLE. Patient is on potassium protocol, and has been needing potassium replacement. CCR q2h.      Goal Outcome Evaluation:      Plan of Care Reviewed With: patient, spouse    Overall Patient Progress: no changeOverall Patient Progress: no change

## 2023-10-14 NOTE — PROGRESS NOTES
10/14/23 1400   Therapeutic Activities   Therapeutic Activity Minutes (26518) 30   Symptoms noted during/after treatment fatigue;increased pain   Treatment Detail/Skilled Intervention Spouse was in room with patient, notes UE edema is getting better. More edema present in the right arm vs. left especially around the elbow. Reiterated elevating UEs and continuing with ball squeezes. Supine to sit with max assist x3, complains of LE pain with any movement worse on left vs right. Sat at EOb with mod assist x2 for minutes. Sit to stand x3 with mod-max assist x3. Not able to come to a full stand but did clear butt off bed and get hands to walker. Max assist x3 for sit to supine and to reposition in bed.   OT Discharge Planning   OT Plan Contionue OT while hospitalized.   OT Discharge Recommendation (DC Rec) Transitional Care Facility   OT Rationale for DC Rec Continues to be in a lot of pain but is clearing some and able to particiapte better with therapy.   OT Brief overview of current status See above   Total Session Time   Timed Code Treatment Minutes 30   Total Session Time (sum of timed and untimed services) 30

## 2023-10-14 NOTE — PLAN OF CARE
"Alert and knows his own name, not date of birth. Check, change and repositioned [id number]. Low pressure air mattress in place. Mepilex to sacrum. Left lower leg dressing changed per order. Bed alarm on for safety. Potassium came back at 2300-3.2, 20 mEq given, re-draw due at 0500 with results of 3.1, 20 mEq given re-draw at 1048. C/o pain, PRN Oxycodone given upon request. Blood glucose at bedtime 300, 3units of Novolog given per order, 0200 269 results. /75 (BP Location: Right arm, Patient Position: Semi-Martin's, Cuff Size: Adult Large)   Pulse 78   Temp 96.9  F (36.1  C) (Tympanic)   Resp 16   Ht 1.702 m (5' 7\")   Wt 90 kg (198 lb 8 oz)   SpO2 97%   BMI 31.09 kg/m        Goal Outcome Evaluation:  Problem: Plan of Care - These are the overarching goals to be used throughout the patient stay.    Goal: Plan of Care Review  Description: The Plan of Care Review/Shift note should be completed every shift.  The Outcome Evaluation is a brief statement about your assessment that the patient is improving, declining, or no change.  This information will be displayed automatically on your shift note.  Outcome: Progressing  Goal: Patient-Specific Goal (Individualized)  Description: You can add care plan individualizations to a care plan. Examples of Individualization might be:  \"Parent requests to be called daily at 9am for status\", \"I have a hard time hearing out of my right ear\", or \"Do not touch me to wake me up as it startles me\".  Outcome: Progressing  Goal: Absence of Hospital-Acquired Illness or Injury  Outcome: Progressing  Intervention: Identify and Manage Fall Risk  Recent Flowsheet Documentation  Taken 10/13/2023 2000 by Queta Aranda, RN  Safety Promotion/Fall Prevention:   activity supervised   safety round/check completed   supervised activity  Intervention: Prevent Skin Injury  Recent Flowsheet Documentation  Taken 10/13/2023 1954 by Queta Aranda, RN  Body Position: supine, head " elevated  Intervention: Prevent Infection  Recent Flowsheet Documentation  Taken 10/13/2023 2000 by Queta Aranda RN  Infection Prevention:   hand hygiene promoted   rest/sleep promoted   single patient room provided  Goal: Optimal Comfort and Wellbeing  Outcome: Progressing  Intervention: Monitor Pain and Promote Comfort  Recent Flowsheet Documentation  Taken 10/13/2023 1954 by Queta Aranda RN  Pain Management Interventions:   medication (see MAR)   repositioned  Intervention: Provide Person-Centered Care  Recent Flowsheet Documentation  Taken 10/13/2023 2000 by Queta Aranda RN  Trust Relationship/Rapport:   care explained   thoughts/feelings acknowledged   questions answered  Goal: Readiness for Transition of Care  Outcome: Progressing     Problem: Sepsis/Septic Shock  Goal: Optimal Coping  Outcome: Progressing  Goal: Blood Glucose Level Within Targeted Range  Outcome: Progressing  Goal: Absence of Infection Signs and Symptoms  Outcome: Progressing  Intervention: Initiate Sepsis Management  Recent Flowsheet Documentation  Taken 10/13/2023 2000 by Queta Aranda RN  Infection Prevention:   hand hygiene promoted   rest/sleep promoted   single patient room provided  Isolation Precautions: contact precautions maintained  Intervention: Promote Recovery  Recent Flowsheet Documentation  Taken 10/13/2023 1954 by Queta Aranda RN  Activity Management: activity adjusted per tolerance  Goal: Optimal Nutrition Intake  Outcome: Progressing     Problem: Sepsis/Septic Shock  Goal: Optimal Coping  Outcome: Progressing  Goal: Absence of Bleeding  Outcome: Progressing  Goal: Blood Glucose Level Within Targeted Range  Outcome: Progressing  Goal: Absence of Infection Signs and Symptoms  Outcome: Progressing  Intervention: Initiate Sepsis Management  Recent Flowsheet Documentation  Taken 10/13/2023 2000 by Queta Aranda RN  Infection Prevention:   hand hygiene promoted   rest/sleep promoted   single patient room provided  Isolation  Precautions: contact precautions maintained  Intervention: Promote Recovery  Recent Flowsheet Documentation  Taken 10/13/2023 1954 by Queta Aranda, RN  Activity Management: activity adjusted per tolerance  Goal: Optimal Nutrition Intake  Outcome: Progressing     Problem: Diabetes Comorbidity  Goal: Blood Glucose Level Within Targeted Range  Outcome: Progressing

## 2023-10-14 NOTE — PROGRESS NOTES
10/14/23 1400   Appointment Info   Signing Clinician's Name / Credentials (PT) FABIOLA GarciaT,PT   Interventions   Interventions Quick Adds Therapeutic Activity;Therapeutic Procedure   Therapeutic Procedure/Exercise   Symptoms Noted During/After Treatment fatigue;increased pain   Treatment Detail/Skilled Intervention PROM to LEs with poor patient participation due to his c/o pain   Therapeutic Activity   Therapeutic Activities: dynamic activities to improve functional performance Minutes (10902) 30   Symptoms Noted During/After Treatment Increased pain;Fatigue   Treatment Detail/Skilled Intervention patient required maximal assist of 3 for bed mobilities, supine<>sit, sitting at edge of bed, and sit<>stand   PT Discharge Planning   PT Plan continue PT as patient will allow   PT Discharge Recommendation (DC Rec) Transitional Care Facility   PT Rationale for DC Rec to promote patient's highest level of functional mobility for gait and transfers.   PT Brief overview of current status Pt more cooperative today and allowed Rehab to help him with bed mobility, supine<>sit, sitting at edge of bed, and sit<>stand. Pt had load complaints of extreme pain especially moving his left LE. Pt needed max assist of 3 for bed mobility, supien<>sit, and sit<>stand. Pt stood for less than 10 seconds x 2.   PT Equipment Needed at Discharge walker, rolling;wheelchair   Total Session Time   Timed Code Treatment Minutes 30   Total Session Time (sum of timed and untimed services) 30

## 2023-10-15 NOTE — PROGRESS NOTES
[facility]    Medicine Progress Note - Hospitalist Service    Date of Admission:  10/6/2023    Assessment & Plan   Principal Problem:    Sepsis due to Streptococcus species without acute organ dysfunction (H)  Cellulitis bilateral lower legs  Pseudomonas putida bacteremia    Assessment: Improving    Plan: Bacitracin topically as needed.  Sensitivities returned indicating the Pseudomonas putida is sensitive to Cipro.  Greatly appreciate pharmacy recommendations and following up on results.  Repeat blood cultures done 10/10 with no growth at 5 days.  Wound culture with no growth.  No IV access so taking abx orally.  Will complete 10 days of abx on 10/16 and stop after that dose of Cipro.  Delirium improving and he is able to participate in conversation more every day.  Due to his severe illness and time in bed, he will require rehab prior to being able to possibly return home.    Active Problems:    Chronic bilateral low back pain with bilateral sciatica    Assessment: Stable    Plan: Tylenol and oxycodone working well, has morphine liquid as needed, has been taking his Cymbalta.  States his pain is better with current medications      Type 2 diabetes mellitus with stage 3b chronic kidney disease (H)    Assessment: Stable    Plan: Sugars increased the past several days so increased Lantus to 22 units daily with the first increased dose on 10/13.  Overall down trending now.  Continue to cover carbs and with sliding scale if needed as he allows.  Last HgbA1C 7.1 in March 2023, now 9.6 so clearly not taking his medications regularly at home.      Gastroesophageal reflux disease without esophagitis    Assessment: Stable    Plan: Continue Protonix.      Benign essential hypertension    Assessment: Stable    Plan: On Cardura (which is also being used for urinary retention) and metoprolol providing fair control but started on an increased dose of Cardura starting the night of 10/12 with improvement.       Hypothyroidism due to acquired atrophy of thyroid    Assessment: Stable    Plan: TSH very elevated to 21.44 at the time of admission during acute illness but this may be contributing to his lethargy but would not account for the encephalopathy.  T4 and T3 also low so increased his dose of levothyroxine.  Last chart results had TSH 6.41 in March 2023.  Family reports he does not take his medications very regularly so this may account for some of the increase in his TSH.      Mixed hyperlipidemia    Assessment: Stable    Plan: Last lipids from January 2018 with , chol 119, HDL 31, LDL 9.  Would not recommend starting any medications now.      S/P CABG x 2    Assessment: Stable    Plan: No complaints of chest pain.      Atherosclerosis of native coronary artery of native heart with stable angina pectoris (H24)    Assessment: Stable    Plan: As above.      Venous stasis dermatitis of left lower extremity    Assessment: Stable    Plan: Cellulitis of bilateral lower legs and left upper leg/hip/thigh improving.  On Cipro 500 mg daily (renal dose) currently day #9/10 as long as he continues to improve.        Peripheral vascular disease, unspecified (H24)    Assessment: Stable    Plan: As above regarding venous stasis and CAD.      Chronic pain syndrome    Assessment: Stable    Plan: Has had several back surgeries, essentially his entire back is metal per his daughter.  Had been taking oxycodone 12.5 mg 2-3 times daily PTA per her report.  He has received Dilaudid IV but he no longer has IV access.  With him sometimes responding to questions wonder if he may have some behavioral concerns and manipulation.  He also has some lab and imaging changes, including elevated GGT, which are concerning for some alcohol abuse but family denies.  Tolerating oxycodone for pain.      Bilateral lower leg cellulitis    Assessment: Improving    Plan: Decreased erythema since admission. Bacteremic with Pseudomonas putida.  Tapered abx  "to just Cipro on 10/12 based on sensitivity results.  Repeat blood cultures negative at 5 days.  AMS could be due to sepsis encephalopathy, hx alcohol abuse, less likely hypothyroidism or behavioral and continues to improve.  Seems early for the thyroid meds to be making a difference but this is also possible.  Pt is starting to eat, drink and cooperate with cares more consistently.  He will need to take po enough to support his needs as he no longer has IV access.      Severe malnutrition    Assessment: Stable    Plan: Taking supplements, starting to eat very little but not refusing much.  Had hypokalemia following Lasix administration so replacing po.  Give laxative today to help abdominal pain he feels from constipation.  Renal and liver functions improving.          Diet: Regular Diet Adult  Snacks/Supplements Adult: Ensure Enlive; With Meals    DVT Prophylaxis: Heparin SQ  Loja Catheter: Not present  Lines: None     Cardiac Monitoring: None  Code Status: Full Code      Clinically Significant Risk Factors        # Hypokalemia: Lowest K = 3 mmol/L in last 2 days, will replace as needed       # Hypoalbuminemia: Lowest albumin = 1.2 g/dL at 10/6/2023  3:45 AM, will monitor as appropriate     # Hypertension: Noted on problem list       # DMII: A1C = 9.6 % (Ref range: 4.0 - 6.2 %) within past 6 months   # Overweight: Estimated body mass index is 29.88 kg/m  as calculated from the following:    Height as of this encounter: 1.702 m (5' 7\").    Weight as of this encounter: 86.5 kg (190 lb 12.8 oz).             Disposition Plan    this week         Vania Pugh MD  Hospitalist Service  [facility]  Securely message with [employer] (more info)  Text page via [facility] Paging/Directory   ______________________________________________________________________    Interval History   No new concerns.  Staff and family note he is very crabby today.  Reports he does not feel good and his stomach hurts.  \"I feel " "like I have to shit but I can't.\"  Discussed taking a laxative pill but states he is \"not sure\" he will do that.  Wife and daughter at bedside.    Physical Exam   Vital Signs: Temp: 97.8  F (36.6  C) Temp src: Tympanic BP: (!) 149/64 Pulse: 72   Resp: 16 SpO2: 92 % O2 Device: None (Room air)    Weight: 190 lbs 12.8 oz    Constitutional: awake, alert throughout the visit, more cooperative at times but not during the visit, and no apparent distress.  Participating more in conversation and was aware of the MD's name.    Eyes: pupils equal, round and reactive to light, constricted, extra-ocular muscles intact, and conjunctiva normal  ENT: normocepalic, without obvious abnormality, atramatic  Respiratory: no increased work of breathing, no audible wheezing, fair aeration.  Cardiovascular:  RRR without murmur.  GI: Bowel sounds present, soft, not distended.  Skin: Erythema of lower legs continues to decrease, now more brawny in color.  Scabs on left calf without drainage.  Musculoskeletal:  Moving more today, up in the recliner for a while.  Trace pitting edema left lower leg and dorsum of left foot, right leg without edema.  Did not scream when legs were touched.  Arms with decreasing edema.  Neurologic: Mental Status Exam:  Level of Alertness:   awake  Orientation:   To person  Memory:  seems to be improving.  Fund of Knowledge:  normal  Cranial Nerves:  cranial nerves II-XII are grossly intact    Medical Decision Making             Data     I have personally reviewed the following data over the past 24 hrs:    7.5  \   9.1 (L)   / 326     140 104 72.6 (H) /  192 (H)   3.6; 3.6 24 3.87 (H) \     ALT: 8 AST: 18 AP: 276 (H) TBILI: 1.3 (H)   ALB: 2.3 (L) TOT PROTEIN: 5.3 (L) LIPASE: N/A     Trop: N/A BNP: 42,448 (H)     Procal: 1.23 (H) CRP: N/A Lactic Acid: N/A         "

## 2023-10-15 NOTE — PROGRESS NOTES
Alert and orientated to self. CCR q2h. Lung sounds are clear but diminished. Blood glucose have been elevated. Contact precautions in place for MRSA. Patient complains of pain in back and legs, PRN oxy given. Dressing changed on LLE.     Patient got out of bed with PT and sat in chair for around three hours. Kell gamino used to get back into bed.

## 2023-10-15 NOTE — PROGRESS NOTES
10/15/23 1100   Appointment Info   Signing Clinician's Name / Credentials (OT) Rowena Carlisle OTR/L   Therapeutic Activities   Therapeutic Activity Minutes (01955) 19   Symptoms noted during/after treatment fatigue;increased pain   Treatment Detail/Skilled Intervention Patient appears more awake/alter today and was able to participte in appropriate conversation. Patients legs were less painful and he was able to help move them out of bed and did not yell out when therpapist assisted with LEs this date. Supine to sit with max assist x2. Sit to stand x3 with max assist x2. Pivot transferred to the bedside chair with max assist x2 and third person there but did not provide much physical assist.   OT Discharge Planning   OT Plan Continue with OT while hospitalized.   OT Discharge Recommendation (DC Rec) Transitional Care Facility   OT Rationale for DC Rec Pain seems better today, moving better, more alert. But still requiring max assist x2 with bed mobilty and pivot transfer.   OT Brief overview of current status See above   Total Session Time   Timed Code Treatment Minutes 19   Total Session Time (sum of timed and untimed services) 19

## 2023-10-15 NOTE — PROGRESS NOTES
10/15/23 1100   Appointment Info   Signing Clinician's Name / Credentials (PT) FABIOLA GarciaT,PT   Gait/Stairs (Locomotion)   Distance in Feet (Gait) 2 ft   Interventions   Interventions Quick Adds Therapeutic Activity;Therapeutic Procedure   Therapeutic Activity   Therapeutic Activities: dynamic activities to improve functional performance Minutes (12019) 20   Symptoms Noted During/After Treatment Increased pain;Fatigue   Treatment Detail/Skilled Intervention patient required maximal assist of 2 for bed mobilities, supine<>sit, sitting at edge of bed, and sit<>stand. Pt was able to initiate and assist with mobility today with decrease pain complaints.   Gait Training   Gait Training Minutes (59388) 10   Symptoms Noted During/After Treatment (Gait Training) fatigue;increased pain   Treatment Detail/Skilled Intervention Pt took less than 10 steps from Bed to recliner   [place] Level (Gait Training) maximum assist (25% patient effort)   Physical Assistance Level (Gait Training) set-up required;verbal cues;nonverbal cues (demo/gestures);other (see comments)  (3 person assist)   Weight Bearing (Gait Training) weight-bearing as tolerated   Assistive Device (Gait Training) rolling walker   Gait Analysis Deviations increased time in double stance;decreased step length;decreased weight-shifting ability;decreased toe-to-floor clearance   Impairments (Gait Analysis/Training) balance impaired;coordination impaired;flexibility decreased;pain;ROM decreased;strength decreased;unable to maintain weight bearing status   PT Discharge Planning   PT Plan continue PT   PT Discharge Recommendation (DC Rec) Transitional Care Facility   PT Rationale for DC Rec to promote patient's highest level of functional mobility for gait and transfers.   PT Brief overview of current status Pt continues to be more cooperative  and allowed Rehab to help him with bed mobility, supine<>sit, sitting at edge of bed, sit<>stand, and transfer  from bed to recliner. Pt was able to take a few steps with max assist of 3. Nsg reports that this is the first time pt was george to get into recliner since being in the hospital .   PT Equipment Needed at Discharge walker, rolling;wheelchair   Total Session Time   Timed Code Treatment Minutes 30   Total Session Time (sum of timed and untimed services) 30

## 2023-10-15 NOTE — PLAN OF CARE
"Alert and oriented to self only. Check, change and repositioned Q2 hours. Low pressure air bed in place. Blood glucose at bedtime 197, no insulin needed per order. Contact precautions in place for MRSA. Patient pulled out his IV, he is a hard start by anesthesia. C/o pain, PRN Oxycodone given upon request. /70 (BP Location: Right arm, Patient Position: Semi-Martin's, Cuff Size: Adult Large)   Pulse 72   Temp 97  F (36.1  C) (Tympanic)   Resp 18   Ht 1.702 m (5' 7\")   Wt 90 kg (198 lb 8 oz)   SpO2 97%   BMI 31.09 kg/m  on RA.   Potassium lab draw at 2249->3.1, 20 mEq administered; Lab re-draw at 0430->3.2, 20mEq given.    Goal Outcome Evaluation:  Problem: Plan of Care - These are the overarching goals to be used throughout the patient stay.    Goal: Plan of Care Review  Description: The Plan of Care Review/Shift note should be completed every shift.  The Outcome Evaluation is a brief statement about your assessment that the patient is improving, declining, or no change.  This information will be displayed automatically on your shift note.  Outcome: Progressing  Goal: Patient-Specific Goal (Individualized)  Description: You can add care plan individualizations to a care plan. Examples of Individualization might be:  \"Parent requests to be called daily at 9am for status\", \"I have a hard time hearing out of my right ear\", or \"Do not touch me to wake me up as it startles me\".  Outcome: Progressing  Goal: Absence of Hospital-Acquired Illness or Injury  Outcome: Progressing  Intervention: Identify and Manage Fall Risk  Recent Flowsheet Documentation  Taken 10/14/2023 2018 by Queta Aranda, RN  Safety Promotion/Fall Prevention:   activity supervised   safety round/check completed   supervised activity  Intervention: Prevent Skin Injury  Recent Flowsheet Documentation  Taken 10/14/2023 2018 by Queta Aranda, RN  Body Position:   left   turned   weight shifting  Intervention: Prevent Infection  Recent Flowsheet " Documentation  Taken 10/14/2023 2018 by Queta Aranda RN  Infection Prevention:   hand hygiene promoted   rest/sleep promoted   single patient room provided  Goal: Optimal Comfort and Wellbeing  Outcome: Progressing  Intervention: Provide Person-Centered Care  Recent Flowsheet Documentation  Taken 10/14/2023 2018 by Queta Aranda RN  Trust Relationship/Rapport:   care explained   thoughts/feelings acknowledged   questions answered  Goal: Readiness for Transition of Care  Outcome: Progressing     Problem: Sepsis/Septic Shock  Goal: Optimal Coping  Outcome: Progressing  Goal: Blood Glucose Level Within Targeted Range  Outcome: Progressing  Goal: Absence of Infection Signs and Symptoms  Outcome: Progressing  Intervention: Initiate Sepsis Management  Recent Flowsheet Documentation  Taken 10/14/2023 2018 by Queta Aranda RN  Infection Prevention:   hand hygiene promoted   rest/sleep promoted   single patient room provided  Isolation Precautions: contact precautions maintained  Intervention: Promote Recovery  Recent Flowsheet Documentation  Taken 10/14/2023 2018 by Queta Aranda RN  Activity Management: activity adjusted per tolerance  Goal: Optimal Nutrition Intake  Outcome: Progressing     Problem: Sepsis/Septic Shock  Goal: Optimal Coping  Outcome: Progressing  Goal: Absence of Bleeding  Outcome: Progressing  Goal: Blood Glucose Level Within Targeted Range  Outcome: Progressing  Goal: Absence of Infection Signs and Symptoms  Outcome: Progressing  Intervention: Initiate Sepsis Management  Recent Flowsheet Documentation  Taken 10/14/2023 2018 by Queta Aranda RN  Infection Prevention:   hand hygiene promoted   rest/sleep promoted   single patient room provided  Isolation Precautions: contact precautions maintained  Intervention: Promote Recovery  Recent Flowsheet Documentation  Taken 10/14/2023 2018 by Queta Aranda, RN  Activity Management: activity adjusted per tolerance  Goal: Optimal Nutrition Intake  Outcome:  Progressing     Problem: Diabetes Comorbidity  Goal: Blood Glucose Level Within Targeted Range  Outcome: Progressing

## 2023-10-16 NOTE — PROGRESS NOTES
Patient requested pain medication, offered PRN tylenol and patient refused.   Virginie Sylvester RN on 10/16/2023 at 1:11 PM

## 2023-10-16 NOTE — PROGRESS NOTES
10/16/23 8528   Appointment Info   Signing Clinician's Name / Credentials (OT) Carine Bazzi, OTR/L   Interventions   Interventions Quick Adds Self-Care/Home Management   Self-Care/Home Management   Self-Care/Home Mgmt/ADL, Compensatory, Meal Prep Minutes (05122) 60   Symptoms Noted During/After Treatment (Meal Preparation/Planning Training) fatigue;increased pain   Assistance (Eating/Self-Feeding Training) supervision;set-up required   Lower Body Dressing Training Assistance maximum assist (25% patient effort)   Lower Body Dressing Training Assistance 1 person assist   [place] Level (Toilet Training) maximum assist (25% patient effort)  (for padilla care after toileting)   OT Discharge Planning   OT Plan Continue with OT while hospitalized.   OT Discharge Recommendation (DC Rec) Transitional Care Facility   OT Rationale for DC Rec Pt is improving, will greatly benefit from daily therapies at [facility] to maximize level of independence needed to return home with wife as previous   OT Brief overview of current status Pt is progressing well, was pleasant today, motivated to get OOB and to the bathroom, continues to have increased pain with weight bearing on left L/E, pt able to pull self up and stand with assist of [employer] device. Pt was cooperative and able to problem solve techniques with Therapists.   Total Session Time   Timed Code Treatment Minutes 60   Total Session Time (sum of timed and untimed services) 60

## 2023-10-16 NOTE — PROGRESS NOTES
:     Spoke with patients family and they stated they are agreeable to SNF placement for patient. Referrals have been sent to [facility] and [facility].     Pt/family was given the Medicare Compare list for SNF, with associated star ratings to assist with choice for referrals/discharge planning Yes    Education was given to pt/family that star ratings are updated/maintained by Medicare and can be reviewed by visiting www.medicare.gov Yes    [facility] has denied patient at this time. They will screen again if patient continues to clear.     PETER Bee on 10/16/2023 at 10:22 AM     :     Met with patient and wife in room to discuss discharge planning needs. Patient stated that he is agreeable to going to [facility] SNF and [facility]. He stated that he does not want to go to [facility]F.     A referral has been sent to [facility].     PETER Bee on 10/16/2023 at 1:41 PM

## 2023-10-16 NOTE — PLAN OF CARE
"Alert and oriented to self only. CCR [id number], low pressure air mattress in place. C/o pain, PRN Oxycodone given upon request.   BP (!) 140/70 (BP Location: Right arm, Patient Position: Semi-Martin's, Cuff Size: Adult Large)   Pulse 75   Temp 97.9  F (36.6  C) (Tympanic)   Resp 18   Ht 1.702 m (5' 7\")   Wt 89.9 kg (198 lb 1.6 oz)   SpO2 97%   BMI 31.03 kg/m  on RA.     Goal Outcome Evaluation:    Problem: Plan of Care - These are the overarching goals to be used throughout the patient stay.    Goal: Plan of Care Review  Description: The Plan of Care Review/Shift note should be completed every shift.  The Outcome Evaluation is a brief statement about your assessment that the patient is improving, declining, or no change.  This information will be displayed automatically on your shift note.  Outcome: Progressing  Goal: Patient-Specific Goal (Individualized)  Description: You can add care plan individualizations to a care plan. Examples of Individualization might be:  \"Parent requests to be called daily at 9am for status\", \"I have a hard time hearing out of my right ear\", or \"Do not touch me to wake me up as it startles me\".  Outcome: Progressing  Goal: Absence of Hospital-Acquired Illness or Injury  Outcome: Progressing  Intervention: Identify and Manage Fall Risk  Recent Flowsheet Documentation  Taken 10/16/2023 0119 by Queta Aranda, RN  Safety Promotion/Fall Prevention:   activity supervised   safety round/check completed   supervised activity  Taken 10/15/2023 1936 by Queta Aranda, RN  Safety Promotion/Fall Prevention:   activity supervised   safety round/check completed   supervised activity  Intervention: Prevent Skin Injury  Recent Flowsheet Documentation  Taken 10/15/2023 1936 by Queta Aranda, RN  Body Position:   left   turned   weight shifting  Intervention: Prevent Infection  Recent Flowsheet Documentation  Taken 10/16/2023 0119 by Queta Aranda, RN  Infection Prevention:   hand hygiene promoted   " rest/sleep promoted   single patient room provided  Taken 10/15/2023 1936 by Queta Aranda RN  Infection Prevention:   hand hygiene promoted   rest/sleep promoted   single patient room provided  Goal: Optimal Comfort and Wellbeing  Outcome: Progressing  Intervention: Provide Person-Centered Care  Recent Flowsheet Documentation  Taken 10/16/2023 0119 by Queta Aranda RN  Trust Relationship/Rapport:   care explained   thoughts/feelings acknowledged   questions answered  Taken 10/15/2023 1936 by Queta Aranda RN  Trust Relationship/Rapport:   care explained   thoughts/feelings acknowledged   questions answered  Goal: Readiness for Transition of Care  Outcome: Progressing     Problem: Sepsis/Septic Shock  Goal: Optimal Coping  Outcome: Progressing  Goal: Blood Glucose Level Within Targeted Range  Outcome: Progressing  Goal: Absence of Infection Signs and Symptoms  Outcome: Progressing  Intervention: Initiate Sepsis Management  Recent Flowsheet Documentation  Taken 10/16/2023 0119 by Queta Aranda RN  Infection Prevention:   hand hygiene promoted   rest/sleep promoted   single patient room provided  Isolation Precautions: contact precautions maintained  Taken 10/15/2023 1936 by Queta Aranda RN  Infection Prevention:   hand hygiene promoted   rest/sleep promoted   single patient room provided  Isolation Precautions: contact precautions maintained  Intervention: Promote Recovery  Recent Flowsheet Documentation  Taken 10/16/2023 0119 by Queta Aranda RN  Activity Management: activity adjusted per tolerance  Taken 10/15/2023 1936 by Queta Aranda RN  Activity Management: activity adjusted per tolerance  Goal: Optimal Nutrition Intake  Outcome: Progressing     Problem: Sepsis/Septic Shock  Goal: Optimal Coping  Outcome: Progressing  Goal: Absence of Bleeding  Outcome: Progressing  Goal: Blood Glucose Level Within Targeted Range  Outcome: Progressing  Goal: Absence of Infection Signs and Symptoms  Outcome:  Progressing  Intervention: Initiate Sepsis Management  Recent Flowsheet Documentation  Taken 10/16/2023 0119 by Queta Aranda, RN  Infection Prevention:   hand hygiene promoted   rest/sleep promoted   single patient room provided  Isolation Precautions: contact precautions maintained  Taken 10/15/2023 1936 by Queta Aranda, RN  Infection Prevention:   hand hygiene promoted   rest/sleep promoted   single patient room provided  Isolation Precautions: contact precautions maintained  Intervention: Promote Recovery  Recent Flowsheet Documentation  Taken 10/16/2023 0119 by Queta Aranda, RN  Activity Management: activity adjusted per tolerance  Taken 10/15/2023 1936 by Queta Aranda, RN  Activity Management: activity adjusted per tolerance  Goal: Optimal Nutrition Intake  Outcome: Progressing     Problem: Diabetes Comorbidity  Goal: Blood Glucose Level Within Targeted Range  Outcome: Progressing

## 2023-10-16 NOTE — PROGRESS NOTES
[facility]    Medicine Progress Note - Hospitalist Service    Date of Admission:  10/6/2023    Assessment & Plan   Principal Problem:    Sepsis due to Streptococcus species without acute organ dysfunction (H)  Cellulitis bilateral lower legs  Pseudomonas putida bacteremia    Assessment: Improving    Plan: Sensitivities returned indicating the Pseudomonas putida is sensitive to Cipro.  Greatly appreciate pharmacy recommendations and following up on results.  Repeat blood cultures done 10/10 with no growth at 5 days.  Wound culture with no growth.  No IV access so taking abx orally.  Completed 10 days of abx on 10/16.  Delirium improving and he is able to participate in conversation more every day.  Due to his severe illness and time in bed, he will require rehab prior to being able to possibly return home.  He is medically ready for discharge and has shown significant improvement toward his baseline over the past 3 days.  Encephalopathy has cleared.    Active Problems:    Chronic bilateral low back pain with bilateral sciatica    Assessment: Stable    Plan: Tylenol and oxycodone working well, has morphine liquid as needed, has been taking his Cymbalta.  States his pain is better with current medications and is able to participate with therapies.      Type 2 diabetes mellitus with stage 3b chronic kidney disease (H)    Assessment: Stable    Plan: Sugars improving the past several days with increased Lantus to 22 units daily 10/13.  Continue to cover carbs and with sliding scale if needed.  Last HgbA1C 7.1 in March 2023, now 9.6 so clearly not taking his medications regularly at home.  Will benefit from a rehab stay and probably home health after that.      Gastroesophageal reflux disease without esophagitis    Assessment: Stable    Plan: Continue Protonix.      Benign essential hypertension    Assessment: Stable    Plan: On Cardura (which is also being used for urinary retention) and metoprolol  providing fair control but started on an increased dose of Cardura starting the night of 10/12 with improvement.      Hypothyroidism due to acquired atrophy of thyroid    Assessment: Stable    Plan: TSH very elevated to 21.44 at the time of admission during acute illness but this may be contributing to his lethargy but would not account for the encephalopathy.  T4 and T3 also low so increased his dose of levothyroxine.  Last chart results had TSH 6.41 in March 2023.  Family reports he does not take his medications very regularly so this may account for some of the increase in his TSH.      Mixed hyperlipidemia    Assessment: Stable    Plan: Last lipids from January 2018 with , chol 119, HDL 31, LDL 9.  Would not recommend starting any medications now.      S/P CABG x 2    Assessment: Stable    Plan: No complaints of chest pain.      Atherosclerosis of native coronary artery of native heart with stable angina pectoris (H24)    Assessment: Stable    Plan: As above.      Venous stasis dermatitis of left lower extremity    Assessment: Stable    Plan: Cellulitis of bilateral lower legs and left upper leg/hip/thigh improving.  On Cipro 500 mg daily (renal dose) currently day #10/10 as long as he continues to improve.        Peripheral vascular disease, unspecified (H24)    Assessment: Stable    Plan: As above regarding venous stasis and CAD.      Chronic pain syndrome    Assessment: Stable    Plan: Has had several back surgeries, essentially his entire back is metal per his daughter.  Tolerating oxycodone for pain.      Bilateral lower leg cellulitis    Assessment: Improving    Plan: Nearly resolved. Bacteremic with Pseudomonas putida.  Tapered abx to just Cipro on 10/12 based on sensitivity results, completed course of abx 10/16.  Repeat blood cultures negative at 5 days.  AMS could be due to sepsis encephalopathy, hx alcohol abuse, less likely hypothyroidism or behavioral and continues to improve.  Seems early for  "the thyroid meds to be making a difference but this is also possible.  Pt is starting to eat, drink and cooperate with cares consistently.       Severe malnutrition    Assessment: Stable    Plan: Taking supplements, starting to eat and not refusing cares.  Had hypokalemia following Lasix administration so replaced po.  Given laxative x 2 days with good results.          Diet: Regular Diet Adult  Snacks/Supplements Adult: Ensure Enlive; With Meals    DVT Prophylaxis: Heparin SQ  Loja Catheter: Not present  Lines: None     Cardiac Monitoring: None  Code Status: Full Code      Clinically Significant Risk Factors        # Hypokalemia: Lowest K = 3.1 mmol/L in last 2 days, will replace as needed       # Hypoalbuminemia: Lowest albumin = 1.2 g/dL at 10/6/2023  3:45 AM, will monitor as appropriate     # Hypertension: Noted on problem list       # DMII: A1C = 9.6 % (Ref range: 4.0 - 6.2 %) within past 6 months   # Obesity: Estimated body mass index is 31.03 kg/m  as calculated from the following:    Height as of this encounter: 1.702 m (5' 7\").    Weight as of this encounter: 89.9 kg (198 lb 1.6 oz).             Disposition Plan    10/18/2023         Vania Pugh MD  Hospitalist Service  [facility]  Securely message with [employer] (more info)  Text page via [employer] Paging/Directory   ______________________________________________________________________    Interval History   Feeling better this afternoon after having a very large bowel movement today.  Trying to eat but has not had much appetite.  Wife visiting.  Discussed discharge plans and the need for rehab prior to returning home.    Physical Exam   Vital Signs: Temp: 98.1  F (36.7  C) Temp src: Tympanic BP: (!) 144/70 Pulse: 74   Resp: 16 SpO2: 95 % O2 Device: None (Room air)    Weight: 198 lbs 1.6 oz    Constitutional: awake, alert throughout the visit, more consistently cooperative and appropriate and no apparent distress.  Participating more in " conversation and was aware of the MD's name, able to discuss discharge desires.    Eyes: pupils equal, round and reactive to light, extra-ocular muscles intact, and conjunctiva normal  ENT: normocepalic, without obvious abnormality, atramatic  Respiratory: no increased work of breathing, no audible wheezing, fair aeration.  Cardiovascular:  RRR without murmur.  GI: Bowel sounds present, soft, not distended.  Skin: Erythema of lower legs essentially resolved, now more brawny in color.  Scabs on left calf without drainage.  Gauze on left mid lower leg.  Musculoskeletal:  Moving more, up in the recliner for a while.  Trace pitting edema left lower leg and dorsum of left foot, right leg without edema.    Neurologic: Mental Status Exam:  Level of Alertness:   awake  Orientation:   To person, place, time, situation  Memory:  seems to be improving.  Able to carry on a conversation.  Fund of Knowledge:  normal  Cranial Nerves:  cranial nerves II-XII are grossly intact    Medical Decision Making             Data

## 2023-10-16 NOTE — PROGRESS NOTES
10/16/23 8869   Appointment Info   Signing Clinician's Name / Credentials (PT) Young Nicholson MPT   Therapeutic Activity   Symptoms Noted During/After Treatment Increased pain;Fatigue   Treatment Detail/Skilled Intervention supine to sit with maximal assist of 2; sit to stand with moderate assist of 1-2; use of Kell Stedy transport device   Gait Training   Weight Bearing (Gait Training) weight-bearing as tolerated   PT Discharge Planning   PT Plan continue PT   PT Discharge Recommendation (DC Rec) Transitional Care Facility   PT Rationale for DC Rec to promote strength, stability and safe mobility allowing patient to return home with family   PT Brief overview of current status patient continues with increased alertness, orientation and ability to participate with PT/OT; discomfort in his feet limited his gait tolerance today (he was able to take some steps yesterday), however, he was able to tolerate standing in [employer] for transfers to bathroom and then to recliner; patient understanding that he will benefit from increased PT intensity to allow him to gain the mobility needed to return home   PT Equipment Needed at Discharge walker, rolling;wheelchair

## 2023-10-16 NOTE — PROGRESS NOTES
Interdisciplinary Discharge Planning Note    Anticipated Discharge Date:TBD     Anticipated Discharge Location: TBD     Clinical Needs Before Discharge:   Continue antibiotics     Treatment Needs After Discharge:  rehab (PT, OT, ST)    Potential Barriers to Discharge: None Identified at this time     PETER Bee  10/16/2023,  12:29 PM

## 2023-10-17 NOTE — PROGRESS NOTES
[facility]    Medicine Progress Note - Hospitalist Service    Date of Admission:  10/6/2023    Assessment & Plan   Principal Problem:    Sepsis due to Streptococcus species without acute organ dysfunction (H)  Cellulitis bilateral lower legs  Pseudomonas putida bacteremia    Assessment: Improving    Plan: Pseudomonas putida is sensitive to Cipro. Completed 10 days of abx on 10/16.  Delirium resolved and he is able to participate in conversation more every day.  Due to his severe illness and time in bed, he will require rehab prior to being able to possibly return home.  He is medically ready for discharge and has shown significant improvement toward his baseline.  Encephalopathy has cleared.  Now just waiting for placement.    Active Problems:    Chronic bilateral low back pain with bilateral sciatica    Assessment: Stable    Plan: Tylenol and oxycodone working well, has morphine liquid as needed, has been taking his Cymbalta.  States his pain is better with current medications and is able to participate with therapies.      Type 2 diabetes mellitus with stage 3b chronic kidney disease (H)    Assessment: Stable    Plan: Sugars improving with all values under 200 with increased Lantus to 22 units daily 10/13.  Continue to cover carbs and with sliding scale if needed.  Last HgbA1C 7.1 in March 2023, now 9.6 so clearly not taking his medications regularly at home.  Will benefit from a rehab stay and probably home health after that.      Gastroesophageal reflux disease without esophagitis    Assessment: Stable    Plan: Continue Protonix.  Stomach feels better since he had a large bowel movement.      Benign essential hypertension    Assessment: Stable    Plan: On Cardura (which is also being used for urinary retention) and metoprolol providing fair control but started on an increased dose of Cardura starting the night of 10/12 with improvement.  Sometimes mildly elevated.  Was on nifedipine and  irbesartan PTA but BP's not significantly elevated.  Restarting Ranexa so this may bring his BP down a little more.      Hypothyroidism due to acquired atrophy of thyroid    Assessment: Stable    Plan: TSH very elevated to 21.44 at the time of admission during acute illness but this may be contributing to his lethargy, would not be expected to account for the encephalopathy.  T4 and T3 also low so increased his dose of levothyroxine.  Last chart results had TSH 6.41 in March 2023.  Family reports he does not take his medications very regularly so this may account for some of the increase in his TSH.  Recommend recheck in December.      Mixed hyperlipidemia    Assessment: Stable    Plan: Last lipids from January 2018 with , chol 119, HDL 31, LDL 9.  Would not recommend starting any medications now.      S/P CABG x 2    Assessment: Stable    Plan: No complaints of chest pain.  As he is increasing his activity will resume his Ranexa as his heart will need more O2.      Atherosclerosis of native coronary artery of native heart with stable angina pectoris (H24)    Assessment: Stable    Plan: As above.      Venous stasis dermatitis of left lower extremity    Assessment: Stable    Plan: Cellulitis of bilateral lower legs and left upper leg/hip/thigh resolved.  Completed Cipro 500 mg daily (renal dose) on 10/16.        Peripheral vascular disease, unspecified (H24)    Assessment: Stable    Plan: As above regarding venous stasis and CAD.      Chronic pain syndrome    Assessment: Stable    Plan: Has had several back surgeries, essentially his entire back is metal per his daughter.  Tolerating oxycodone for pain.      Bilateral lower leg cellulitis    Assessment: Improving    Plan: Nearly resolved. Bacteremic with Pseudomonas putida.  Tapered abx to just Cipro on 10/12 based on sensitivity results, completed course of abx 10/16.  Repeat blood cultures negative at 5 days.  AMS could be due to sepsis encephalopathy, less  "likely hypothyroidism or behavioral and continues to improve.  Seems early for the thyroid meds to be making a difference but this is also possible.  Pt now more pleasant and interactive.  Waiting for placement.       Severe malnutrition    Assessment: Stable    Plan: Taking supplements, starting to eat small amounts and not refusing cares.  Had hypokalemia following Lasix administration so replaced po.       Narcolepsy    Assessment:  Worse    Plan:  Has not been receiving his Ritalin as he just mentioned this diagnosis today.  Does not like taking the medications but would like to resume so he can be more awake.          Diet: Regular Diet Adult  Snacks/Supplements Adult: Ensure Enlive; With Meals    DVT Prophylaxis: Heparin SQ  Loja Catheter: Not present  Lines: None     Cardiac Monitoring: None  Code Status: Full Code      Clinically Significant Risk Factors        # Hypokalemia: Lowest K = 3 mmol/L in last 2 days, will replace as needed       # Hypoalbuminemia: Lowest albumin = 1.2 g/dL at 10/6/2023  3:45 AM, will monitor as appropriate     # Hypertension: Noted on problem list       # DMII: A1C = 9.6 % (Ref range: 4.0 - 6.2 %) within past 6 months   # Obesity: Estimated body mass index is 31.03 kg/m  as calculated from the following:    Height as of this encounter: 1.702 m (5' 7\").    Weight as of this encounter: 89.9 kg (198 lb 1.6 oz).             Disposition Plan    Undetermined - placement need         Vania Pugh MD  Hospitalist Service  [facility]  Securely message with [employer] (more info)  Text page via [facility] Paging/Directory   ______________________________________________________________________    Interval History   When asked how he is doing today he replied \"fine as jennings fur.\"  Very pleasant conversation.  Apologized for falling asleep during the visit but \"that is what happens when you have narcolepsy.\"  Does not like having to take medications but would like to get back on " his Ritalin so he can be more awake to participate in cares.    Physical Exam   Vital Signs: Temp: 99.2  F (37.3  C) Temp src: Tympanic BP: (!) 143/63 Pulse: 76   Resp: 16 SpO2: 95 % O2 Device: None (Room air)    Weight: 198 lbs 1.6 oz    Constitutional: awake, alert throughout the visit with a brief episode of dozing off.    Eyes: pupils equal, round and reactive to light, extra-ocular muscles intact, and conjunctiva normal  ENT: normocepalic, without obvious abnormality, atramatic  Respiratory: no increased work of breathing, no audible wheezing, fair aeration.  Cardiovascular:  RRR without murmur.  GI: Bowel sounds present, soft, not distended.  Skin: Erythema of lower legs essentially resolved, now more brawny in color.  Scabs on left calf without drainage.  Gauze on left mid lower leg.  Musculoskeletal:  Moving more, up in the recliner for a while.  Trace pitting edema left lower leg and dorsum of left foot remains more puffy than other areas, right leg without edema.    Neurologic: Mental Status Exam:  Level of Alertness:   awake  Orientation:   To person, place, time, situation  Memory:  improving.  Able to carry on a conversation.  Fund of Knowledge:  normal  Cranial Nerves:  cranial nerves II-XII are grossly intact    Medical Decision Making             Data     I have personally reviewed the following data over the past 24 hrs:    6.2  \   7.5 (L)   / 322     140 105 58.8 (H) /  120 (H)   3.6 25 3.39 (H) \     ALT: 8 AST: 22 AP: 224 (H) TBILI: 1.0   ALB: 2.2 (L) TOT PROTEIN: 4.7 (L) LIPASE: N/A     Trop: N/A BNP: 34,337 (H)

## 2023-10-17 NOTE — PLAN OF CARE
"  Problem: Plan of Care - These are the overarching goals to be used throughout the patient stay.    Goal: Plan of Care Review  Description: The Plan of Care Review/Shift note should be completed every shift.  The Outcome Evaluation is a brief statement about your assessment that the patient is improving, declining, or no change.  This information will be displayed automatically on your shift note.  Outcome: Progressing  Flowsheets (Taken 10/17/2023 0407)  Plan of Care Reviewed With: patient  Overall Patient Progress: no change  Goal: Patient-Specific Goal (Individualized)  Description: You can add care plan individualizations to a care plan. Examples of Individualization might be:  \"Parent requests to be called daily at 9am for status\", \"I have a hard time hearing out of my right ear\", or \"Do not touch me to wake me up as it startles me\".  Outcome: Progressing  Goal: Absence of Hospital-Acquired Illness or Injury  Outcome: Progressing  Intervention: Identify and Manage Fall Risk  Recent Flowsheet Documentation  Taken 10/16/2023 2101 by Kayleigh Iqbal RN  Safety Promotion/Fall Prevention: safety round/check completed  Intervention: Prevent Skin Injury  Recent Flowsheet Documentation  Taken 10/16/2023 2101 by Kayleigh Iqbal RN  Body Position:   supine   log-rolled   turned   left  Intervention: Prevent Infection  Recent Flowsheet Documentation  Taken 10/16/2023 2101 by Kayleigh Iqbal RN  Infection Prevention: hand hygiene promoted  Goal: Optimal Comfort and Wellbeing  Outcome: Progressing  Intervention: Monitor Pain and Promote Comfort  Recent Flowsheet Documentation  Taken 10/16/2023 2101 by Kayleigh Iqbal RN  Pain Management Interventions: medication (see MAR)  Intervention: Provide Person-Centered Care  Recent Flowsheet Documentation  Taken 10/17/2023 0304 by Kayleigh Iqbal RN  Trust Relationship/Rapport: care explained  Goal: Readiness for Transition of Care  Outcome: Progressing   Goal " Outcome Evaluation:      Plan of Care Reviewed With: patient    Overall Patient Progress: no changeOverall Patient Progress: no change

## 2023-10-17 NOTE — PLAN OF CARE
"Patient has been A&O all day, VSS. Patient ambulated with Ax2, gait belt, and walker to and from the chair today. Patient is at baseline for pain. Patient has accepted all cares today.       Problem: Plan of Care - These are the overarching goals to be used throughout the patient stay.    Goal: Plan of Care Review  Description: The Plan of Care Review/Shift note should be completed every shift.  The Outcome Evaluation is a brief statement about your assessment that the patient is improving, declining, or no change.  This information will be displayed automatically on your shift note.  Outcome: Progressing  Flowsheets (Taken 10/17/2023 1842)  Overall Patient Progress: improving  Goal: Patient-Specific Goal (Individualized)  Description: You can add care plan individualizations to a care plan. Examples of Individualization might be:  \"Parent requests to be called daily at 9am for status\", \"I have a hard time hearing out of my right ear\", or \"Do not touch me to wake me up as it startles me\".  Outcome: Progressing  Goal: Absence of Hospital-Acquired Illness or Injury  Outcome: Progressing  Intervention: Identify and Manage Fall Risk  Recent Flowsheet Documentation  Taken 10/17/2023 1611 by Radha Duong, RN  Safety Promotion/Fall Prevention:   assistive device/personal items within reach   clutter free environment maintained   nonskid shoes/slippers when out of bed   patient and family education   room door open   room near nurse's station   safety round/check completed   room organization consistent   treat reversible contributory factors   treat underlying cause  Taken 10/17/2023 0720 by Radha Duong, RN  Safety Promotion/Fall Prevention:   assistive device/personal items within reach   clutter free environment maintained   nonskid shoes/slippers when out of bed   patient and family education   room door open   room near nurse's station   safety round/check completed   room organization consistent   treat " reversible contributory factors   treat underlying cause  Intervention: Prevent and Manage VTE (Venous Thromboembolism) Risk  Recent Flowsheet Documentation  Taken 10/17/2023 1611 by Radha Duong RN  VTE Prevention/Management: (Lovenox injection) other (see comments)  Taken 10/17/2023 0720 by Radha Duong RN  VTE Prevention/Management: (Lovenox injection) other (see comments)  Goal: Optimal Comfort and Wellbeing  Outcome: Progressing  Intervention: Monitor Pain and Promote Comfort  Recent Flowsheet Documentation  Taken 10/17/2023 1530 by Radha Duong RN  Pain Management Interventions: medication (see MAR)  Intervention: Provide Person-Centered Care  Recent Flowsheet Documentation  Taken 10/17/2023 1611 by Radha Duong RN  Trust Relationship/Rapport:   care explained   choices provided   empathic listening provided   questions answered   questions encouraged   thoughts/feelings acknowledged  Taken 10/17/2023 0720 by Radha Duong RN  Trust Relationship/Rapport:   care explained   choices provided   empathic listening provided   questions answered   questions encouraged   thoughts/feelings acknowledged  Goal: Readiness for Transition of Care  Outcome: Progressing     Problem: Sepsis/Septic Shock  Goal: Optimal Coping  Outcome: Progressing  Intervention: Optimize Psychosocial Adjustment to Illness  Recent Flowsheet Documentation  Taken 10/17/2023 1611 by Radha Duong RN  Family/Support System Care:   involvement promoted   self-care encouraged   support provided  Taken 10/17/2023 0720 by Radha Duong RN  Family/Support System Care:   involvement promoted   self-care encouraged   support provided  Goal: Blood Glucose Level Within Targeted Range  Outcome: Progressing  Goal: Absence of Infection Signs and Symptoms  Outcome: Progressing  Goal: Optimal Nutrition Intake  Outcome: Progressing     Problem: Sepsis/Septic Shock  Goal: Optimal Coping  Outcome: Progressing  Intervention:  Optimize Psychosocial Adjustment to Illness  Recent Flowsheet Documentation  Taken 10/17/2023 1611 by Radha Duong, RN  Family/Support System Care:   involvement promoted   self-care encouraged   support provided  Taken 10/17/2023 0720 by Radha Duong, RN  Family/Support System Care:   involvement promoted   self-care encouraged   support provided  Goal: Absence of Bleeding  Outcome: Progressing  Goal: Blood Glucose Level Within Targeted Range  Outcome: Progressing  Goal: Absence of Infection Signs and Symptoms  Outcome: Progressing  Goal: Optimal Nutrition Intake  Outcome: Progressing     Problem: Diabetes Comorbidity  Goal: Blood Glucose Level Within Targeted Range  Outcome: Progressing   Goal Outcome Evaluation:           Overall Patient Progress: improvingOverall Patient Progress: improving

## 2023-10-17 NOTE — PROGRESS NOTES
"Pt had 8/10 aching pain to back and BLE. Gave prn oxycodone around 3 AM, dsg changed completed @ 0300 to  L shin/foot and R outer lateral ankle dsg changed @ 2300 pt did not tolerate procedure well was in a lot of pain, pt on pure wick for incontinence. Blood pressure (!) 147/63, pulse 66, temperature 98.7  F (37.1  C), temperature source Tympanic, resp. rate 16, height 1.702 m (5' 7\"), weight 89.9 kg (198 lb 1.6 oz), SpO2 96%.   "

## 2023-10-17 NOTE — PROGRESS NOTES
PHYSICIAN DISCHARGE SUMMARY                                                                        [facility]    Patient Identification:  Name: Agnieszka Rizzo  Age: 87 y.o.  Sex: female  :  1929  MRN: [id number]  Primary Care Physician: Gabe Horne MD    Admit date: 3/11/2017  Discharge date and time:3/20/2017  Discharged Condition: good    Discharge Diagnoses:Principal Problem:    C. 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 difficile diarrhea A04.7   • DNR (do not resuscitate) Z66   • Sepsis A41.9   • Chronic diastolic CHF (congestive heart failure) I50.32       PMHX:   Past Medical History   Diagnosis Date   • Asthma    • Atrial fibrillation    • Atrial flutter    • Bronchiectasis    • C. difficile diarrhea 3/11/2017   • CAD (coronary artery disease)      nonobstructive   • Colitis    • Cough    • Cryoglobulinemia    • Diastolic heart failure    • Fall    • Hyperlipidemia    • Hypertension    • Hyponatremia    • Hypoxia    • Infectious viral hepatitis      AGE 14   • Leg swelling    • Lesion of lung    • MR (mitral regurgitation)      mild   •  :     Reached out to [facility] to see if they could re-screen patient. They are not able to take patient.     Left voicemail with Stephen at [facility] to see where they are at with the referral. Waiting to hear back.     ALEX Cruz on 10/17/2023 at 9:08 AM    Spoke with Stephen at [facility]. She did not receive the referral.     Refaxed the referral.     ALEX Cruz on 10/17/2023 at 11:08 AM    Stephen from [facility] states she is passing the referral on to their DON. Wants to make sure patient is aware that there is COVID in their building.     Spoke with patient and wife in the room. Patient is agreeable to go to [facility]. Informed them of the COVID.      will continue to follow for discharge planning needs.     ALEX Cruz on 10/17/2023 at 1:36 PM    Received a call from [facility]ich[facility], who stated they are working on their schedules and will be able to provide an answer if they are able to accept patient tomorrow. If they are able to figure out their staffing they would be able to accept him.     will continue to follow for discharge planning needs.     ALEX Cruz on 10/17/2023 at 3:12 PM         MVP (mitral valve prolapse)    • Permanent atrial fibrillation    • Pneumonia with the fungal infection aspergillosis 1/6/2017   • SOB (shortness of breath)    • UTI (urinary tract infection)    • Wheeze      mild     PSHX:   Past Surgical History   Procedure Laterality Date   • Hysterectomy     • D&c with suction     • Cataract extraction extracapsular w/ intraocular lens implantation     • Knee arthroscopy Left    • Colonoscopy       2013   • Bronchoscopy N/A 11/12/2016     Procedure: BRONCHOSCOPY WITH FLUORO, BRUSHINGS, BAL, AND BIOPSIES;  Surgeon: Rogelio Tucker MD;  Location: [facility] ENDOSCOPY;  Service:        Hospital Course: Agnieszka Rizzo  Is a pleasant 88 yo female with complicated PMH including Afib, CHF, CAD, pulmonary aspergillosis with recent voriconazole treatment (just finished ~1 week ago). She also has remote history of Cdiff (not sure when this infection was). She has been taking abx recently for UTI as directed by Urology. Starting about 3-4 days ago she began having diarrhea. Progressively worsened to ~10-15 BM/Day. Also associated with abdominal cramping and fever. Came to [facility] ER. Due to suspicion for Cdiff started on po vanc and some IVFs.          The patient was admitted the hospital and treated with oral vancomycin for C. difficile.  She was feeling better and having less bowel movements after being in the hospital for several days.  She'll continue 14 day course of vancomycin and follow-up her primary care doctor in a couple weeks for continuing care.      Consults:     Consults     Date and Time Order Name Status Description    3/12/2017 0150 Inpatient Consult to Infectious Diseases Completed     3/12/2017 0150 Inpatient Consult to Cardiology Completed     3/11/2017 1921 [facility] (on-call MD unless specified) Completed           Results from last 7 days  Lab Units 03/20/17  0538   WBC 10*3/mm3 11.14*   HEMOGLOBIN g/dL 9.9*   HEMATOCRIT % 30.4*   PLATELETS 10*3/mm3 367       Results from last 7  days  Lab Units 03/20/17  0538   SODIUM mmol/L 136   POTASSIUM mmol/L 4.5   CHLORIDE mmol/L 99   TOTAL CO2 mmol/L 26.3   BUN mg/dL 7*   CREATININE mg/dL 0.46*   GLUCOSE mg/dL 97   CALCIUM mg/dL 8.1*     Significant Diagnostic Studies:   Lab Results   Component Value Date    WBC 11.14 (H) 03/20/2017    HGB 9.9 (L) 03/20/2017    HCT 30.4 (L) 03/20/2017     03/20/2017     Lab Results   Component Value Date     03/20/2017    K 4.5 03/20/2017    CL 99 03/20/2017    CO2 26.3 03/20/2017    BUN 7 (L) 03/20/2017    CREATININE 0.46 (L) 03/20/2017    GLUCOSE 97 03/20/2017     Lab Results   Component Value Date    CALCIUM 8.1 (L) 03/20/2017     Lab Results   Component Value Date    AST 19 03/18/2017    ALT 14 03/18/2017    ALKPHOS 52 03/18/2017     No results found for: APTT, INR  No results found for: COLORU, CLARITYU, SPECGRAV, PHUR, PROTEINUR, GLUCOSEU, KETONESU, BLOODU, NITRITE, LEUKOCYTESUR, BILIRUBINUR, UROBILINOGEN, RBCUA, WBCUA, BACTERIA  No results found for: TROPONINT, TROPONINI, BNP  No components found for: HGBA1C;2  No components found for: TSH;2  Imaging Results (all)     Procedure Component Value Units Date/Time    XR Chest 1 View [[id number]] Collected:  03/11/17 1957     Updated:  03/11/17 2001    Narrative:       ONE VIEW PORTABLE CHEST     HISTORY: Fever. Atrial fibrillation.     There is prominent cardiomegaly but no evidence of overt CHF and this is  unchanged from 02/08/2017. There is now some localized haziness at the  right base medially associated with a tiny right pleural effusion and  suspicious for an area of developing pneumonia.     This report was finalized on 3/11/2017 7:58 PM by Dr. Milo Castillo MD.       CT Abdomen Pelvis Without Contrast [[id number]] Collected:  03/11/17 2118     Updated:  03/11/17 2149    Narrative:       CT SCAN OF THE ABDOMEN AND PELVIS WITHOUT CONTRAST     HISTORY: Abdominal pain and diarrhea.     The CT scan was performed as an emergency procedure through the  abdomen  and pelvis without contrast and demonstrates the followin. There is generalized wall thickening and inflammatory change  throughout the colon most consistent with C. difficile colitis and  further clinical correlation is recommended. The small bowel loops are  normal in caliber.     2. There is a tiny right pleural effusion and there is some strandlike  atelectasis at the lung bases. There are several small cysts scattered  in the liver and these are unchanged from 2009. The spleen,  pancreas, and both adrenal glands are unremarkable.     3. The right kidney appears normal. The left kidney contains a prominent  cyst measuring 7.3 cm which is unchanged. There is no aortic aneurysm or  retroperitoneal lymphadenopathy.     4. In the pelvis, there is a very small amount of free fluid consistent  with the generalized colitis. The remainder of the CT scan is  unremarkable.     This report was finalized on 3/11/2017 9:46 PM by Dr. Milo Castillo MD.       XR Abdomen KUB [[id number]] Collected:  17 1250     Updated:  17 1610    Narrative:       KUB     HISTORY: C. difficile colitis, abdominal pain, ileus.     TECHNIQUE:  A single view of the abdomen and pelvis is provided and  correlated with a CT scan performed 2 days ago.     FINDINGS: The visualized lung bases are clear. There is some gas in  loops of both large and small bowel but there is no abnormally dilated  bowel. No intraperitoneal free air is evident. There is no gas in the  region of the sigmoid colon, descending colon, or rectum.       Impression:       Nonspecific bowel gas pattern. There is no evidence of  obstruction or perforation.     This report was finalized on 3/13/2017 4:07 PM by Dr. Geovanny Pollard MD.       XR Abdomen KUB [[id number]] Collected:  17 1503     Updated:  17 1511    Narrative:       XR ABDOMEN KUB-     INDICATIONS: Abdominal distention     TECHNIQUE: SUPINE VIEWS OF THE ABDOMEN     COMPARISON:  03/13/2017     FINDINGS:      The bowel gas pattern is nonspecific, with persistent gas filled small  bowel loops, and gas also seen in the colon. Appearance is similar to  the prior exam. Free intraperitoneal gas cannot be excluded on a supine  radiograph. Small nonspecific soft tissue calcifications in the pelvis  appear similar to prior exam. Follow-up is suggested as indications  persist. If there is further clinical concern, CT can be obtained for  further examination. Heart is enlarged.       Impression:          As described.     This report was finalized on 3/17/2017 3:08 PM by Dr. Steven Garza MD.           Lab Results (last 7 days)     Procedure Component Value Units Date/Time    CBC (No Diff) [[id number]]  (Abnormal) Collected:  03/14/17 0601    Specimen:  Blood Updated:  03/14/17 0636     WBC 19.13 (H) 10*3/mm3      RBC 3.00 (L) 10*6/mm3      Hemoglobin 9.6 (L) g/dL      Hematocrit 28.0 (L) %      MCV 93.3 fL      MCH 32.0 pg      MCHC 34.3 g/dL      RDW 15.9 (H) %      RDW-SD 53.9 fl      MPV 9.9 fL      Platelets 396 10*3/mm3     Creatinine, Serum [[id number]]  (Abnormal) Collected:  03/14/17 0601    Specimen:  Blood Updated:  03/14/17 0646     Creatinine 0.46 (L) mg/dL      eGFR Non African Amer 129 mL/min/1.73     Narrative:       The MDRD GFR formula is only valid for adults with stable renal function between ages 18 and 70.    CBC (No Diff) [[id number]]  (Abnormal) Collected:  03/15/17 0629    Specimen:  Blood Updated:  03/15/17 0742     WBC 14.69 (H) 10*3/mm3      RBC 3.21 (L) 10*6/mm3      Hemoglobin 9.8 (L) g/dL      Hematocrit 30.5 (L) %      MCV 95.0 fL      MCH 30.5 pg      MCHC 32.1 (L) g/dL      RDW 16.0 (H) %      RDW-SD 55.4 (H) fl      MPV 9.9 fL      Platelets 464 10*3/mm3     Renal Function Panel [[id number]]  (Abnormal) Collected:  03/15/17 0629    Specimen:  Blood Updated:  03/15/17 0801     Glucose 99 mg/dL      BUN 11 mg/dL      Creatinine 0.45 (L) mg/dL      Sodium 136 mmol/L       Potassium 4.1 mmol/L      Chloride 103 mmol/L      CO2 22.4 mmol/L      Calcium 8.5 (L) mg/dL      Albumin 2.50 (L) g/dL      Phosphorus 1.7 (L) mg/dL      Anion Gap 10.6 mmol/L      BUN/Creatinine Ratio 24.4      eGFR Non African Amer 132 mL/min/1.73     Narrative:       The MDRD GFR formula is only valid for adults with stable renal function between ages 18 and 70.    CBC (No Diff) [[id number]]  (Abnormal) Collected:  03/16/17 0705    Specimen:  Blood Updated:  03/16/17 0805     WBC 13.75 (H) 10*3/mm3      RBC 3.03 (L) 10*6/mm3      Hemoglobin 9.3 (L) g/dL      Hematocrit 28.4 (L) %      MCV 93.7 fL      MCH 30.7 pg      MCHC 32.7 g/dL      RDW 15.9 (H) %      RDW-SD 54.2 (H) fl      MPV 9.7 fL      Platelets 426 10*3/mm3     Basic Metabolic Panel [[id number]]  (Abnormal) Collected:  03/16/17 0705    Specimen:  Blood Updated:  03/16/17 0815     Glucose 101 (H) mg/dL      BUN 7 (L) mg/dL      Creatinine 0.42 (L) mg/dL      Sodium 134 (L) mmol/L      Potassium 3.7 mmol/L      Chloride 100 mmol/L      CO2 24.2 mmol/L      Calcium 8.0 (L) mg/dL      eGFR Non African Amer 143 mL/min/1.73      BUN/Creatinine Ratio 16.7      Anion Gap 9.8 mmol/L     Narrative:       The MDRD GFR formula is only valid for adults with stable renal function between ages 18 and 70.    Blood Culture [[id number]]  (Normal) Collected:  03/11/17 1810    Specimen:  Blood from Arm, Right Updated:  03/16/17 2001     Blood Culture No growth at 5 days     Blood Culture [[id number]]  (Normal) Collected:  03/11/17 1808    Specimen:  Blood from Arm, Left Updated:  03/16/17 2001     Blood Culture No growth at 5 days     CBC (No Diff) [[id number]]  (Abnormal) Collected:  03/17/17 0640    Specimen:  Blood Updated:  03/17/17 0719     WBC 11.56 (H) 10*3/mm3      RBC 3.41 (L) 10*6/mm3      Hemoglobin 10.5 (L) g/dL      Hematocrit 32.5 (L) %      MCV 95.3 fL      MCH 30.8 pg      MCHC 32.3 (L) g/dL      RDW 15.9 (H) %      RDW-SD 55.6 (H) fl      MPV 9.6 fL       Platelets 458 10*3/mm3     Creatinine, Serum [[id number]]  (Abnormal) Collected:  03/17/17 0640    Specimen:  Blood Updated:  03/17/17 0742     Creatinine 0.49 (L) mg/dL      eGFR Non African Amer 120 mL/min/1.73     Narrative:       The MDRD GFR formula is only valid for adults with stable renal function between ages 18 and 70.    CBC (No Diff) [[id number]]  (Abnormal) Collected:  03/18/17 0535    Specimen:  Blood Updated:  03/18/17 0624     WBC 9.21 10*3/mm3      RBC 3.20 (L) 10*6/mm3      Hemoglobin 9.6 (L) g/dL      Hematocrit 30.4 (L) %      MCV 95.0 fL      MCH 30.0 pg      MCHC 31.6 (L) g/dL      RDW 15.9 (H) %      RDW-SD 55.4 (H) fl      MPV 9.7 fL      Platelets 413 10*3/mm3     Prealbumin [[id number]]  (Abnormal) Collected:  03/18/17 0535    Specimen:  Blood Updated:  03/18/17 0643     Prealbumin 8.9 (L) mg/dL     Comprehensive Metabolic Panel [[id number]]  (Abnormal) Collected:  03/18/17 0535    Specimen:  Blood Updated:  03/18/17 0653     Glucose 101 (H) mg/dL      BUN 6 (L) mg/dL      Creatinine 0.35 (L) mg/dL      Sodium 136 mmol/L      Potassium 3.5 mmol/L      Chloride 101 mmol/L      CO2 27.6 mmol/L      Calcium 7.9 (L) mg/dL      Total Protein 4.5 (L) g/dL      Albumin 2.10 (L) g/dL      ALT (SGPT) 14 U/L      AST (SGOT) 19 U/L      Alkaline Phosphatase 52 U/L      Total Bilirubin 0.3 mg/dL      eGFR Non African Amer >150 mL/min/1.73      Globulin 2.4 gm/dL      A/G Ratio 0.9 g/dL      BUN/Creatinine Ratio 17.1      Anion Gap 7.4 mmol/L     Narrative:       The MDRD GFR formula is only valid for adults with stable renal function between ages 18 and 70.    CBC (No Diff) [[id number]]  (Abnormal) Collected:  03/19/17 0726    Specimen:  Blood Updated:  03/19/17 0742     WBC 9.23 10*3/mm3      RBC 3.23 (L) 10*6/mm3      Hemoglobin 10.0 (L) g/dL      Hematocrit 30.6 (L) %      MCV 94.7 fL      MCH 31.0 pg      MCHC 32.7 g/dL      RDW 16.0 (H) %      RDW-SD 55.2 (H) fl      MPV 9.4 fL      Platelets 409 10*3/mm3      Basic Metabolic Panel [[id number]]  (Abnormal) Collected:  03/20/17 0538    Specimen:  Blood Updated:  03/20/17 0701     Glucose 97 mg/dL      BUN 7 (L) mg/dL      Creatinine 0.46 (L) mg/dL      Sodium 136 mmol/L      Potassium 4.5 mmol/L      Chloride 99 mmol/L      CO2 26.3 mmol/L      Calcium 8.1 (L) mg/dL      eGFR Non African Amer 129 mL/min/1.73      BUN/Creatinine Ratio 15.2      Anion Gap 10.7 mmol/L     Narrative:       The MDRD GFR formula is only valid for adults with stable renal function between ages 18 and 70.    CBC & Differential [[id number]] Collected:  03/20/17 0538    Specimen:  Blood Updated:  03/20/17 0836    Narrative:       The following orders were created for panel order CBC & Differential.  Procedure                               Abnormality         Status                     ---------                               -----------         ------                     Scan Slide[[id number]]                                        Final result               CBC Auto Differential[[id number]]         Abnormal            Final result                 Please view results for these tests on the individual orders.    CBC Auto Differential [[id number]]  (Abnormal) Collected:  03/20/17 0538    Specimen:  Blood Updated:  03/20/17 0836     WBC 11.14 (H) 10*3/mm3      RBC 3.22 (L) 10*6/mm3      Hemoglobin 9.9 (L) g/dL      Hematocrit 30.4 (L) %      MCV 94.4 fL      MCH 30.7 pg      MCHC 32.6 g/dL      RDW 16.1 (H) %      RDW-SD 55.4 (H) fl      MPV 10.3 fL      Platelets 367 10*3/mm3      Neutrophil % 73.8 %      Lymphocyte % 12.0 (L) %      Monocyte % 9.1 %      Eosinophil % 3.5 %      Basophil % 0.6 %      Immature Grans % 1.0 (H) %      Neutrophils, Absolute 8.22 (H) 10*3/mm3      Lymphocytes, Absolute 1.34 10*3/mm3      Monocytes, Absolute 1.01 10*3/mm3      Eosinophils, Absolute 0.39 10*3/mm3      Basophils, Absolute 0.07 10*3/mm3      Immature Grans, Absolute 0.11 (H) 10*3/mm3      nRBC 0.2 (H) /100 WBC      "Scan Slide [[id number]] Collected:  03/20/17 0538    Specimen:  Blood Updated:  03/20/17 0836     Crenated RBC's Mod/2+      WBC Morphology Normal      Clumped Platelets Present         Visit Vitals   • /66 (BP Location: Left arm, Patient Position: Sitting)   • Pulse 96   • Temp 98.4 °F (36.9 °C)   • Resp 16   • Ht 65\" (165.1 cm)   • Wt 127 lb 12.8 oz (58 kg)   • SpO2 98%   • BMI 21.27 kg/m2       Discharge Exam:  General Appearance:    Alert, cooperative, no distress                          Head:    Normocephalic, without obvious abnormality, atraumatic                          Eyes:                            Throat:   Lips, tongue, gums normal                          Neck:   Supple, symmetrical, trachea midline, no JVD                        Lungs:     Clear to auscultation bilaterally, respirations unlabored                Chest Wall:    No tenderness or deformity                        Heart:    Regular rate and rhythm, S1 and S2 normal, no murmur,no  Rub or gallop                  Abdomen:     Soft, non-tender, bowel sounds active, no masses, no organomegaly                  Extremities:   Extremities normal, atraumatic, no cyanosis or edema                             Skin:   Skin is warm and dry,  no rashes or palpable lesions                  Neurologic:   no focal deficits noted     Disposition:  Home    Patient Instructions:    Agnieszka Rizzo   Home Medication Instructions SILVIA:[id number]    Printed on:03/20/17 1048   Medication Information                      acetylcysteine (MUCOMYST) 20 % nebulizer solution  Take 4 mL by nebulization 4 (Four) Times a Day.             ADVAIR -21 MCG/ACT inhaler  Inhale 2 puffs 2 (Two) Times a Day.             atorvastatin (LIPITOR) 40 MG tablet  Take 1 tablet by mouth Daily.             B Complex-C (SUPER B COMPLEX PO)  Take  by mouth.             cholecalciferol (VITAMIN D3) 1000 UNITS tablet  Take 1,000 Units by mouth Daily.             dabigatran " etexilate (PRADAXA) 150 MG capsu  Take 1 capsule by mouth Every 12 (Twelve) Hours.             diltiaZEM CD (CARDIZEM CD) 240 MG 24 hr capsule  Take 1 capsule by mouth Daily.             diltiaZEM CD (CARDIZEM CD) 240 MG 24 hr capsule  Take 1 capsule by mouth Daily.             ezetimibe (ZETIA) 10 MG tablet  Take 1 tablet by mouth Daily.             furosemide (LASIX) 20 MG tablet  Take 20 mg by mouth 2 (Two) Times a Day.             losartan (COZAAR) 50 MG tablet  50 mg 2 (Two) Times a Day.             Multiple Vitamin (MULTIVITAMIN) tablet  Take 1 tablet by mouth Daily.             pantoprazole (PROTONIX) 40 MG EC tablet  Take 1 tablet by mouth Daily.             vancomycin 50 MG/ML solution oral solution  Take 10 mL by mouth Every 6 (Six) Hours for 22 doses. Indications: Clostridium Difficile Infection             VERAMYST 27.5 MCG/SPRAY nasal spray  1 spray into each nostril Daily.               Future Appointments  Date Time Provider Department Center   4/10/2017 1:00 PM Matt Rose MD [facility] None   4/10/2017 1:30 PM [facility]   4/13/2017 10:20 AM Marcie Robbins MD [facility] None   4/13/2017 11:40 AM Marcie Robbins MD [facility] None       Discharge Order     Start     Ordered    03/20/17 1044  Discharge patient  Once     Expected Discharge Date:  03/20/17    Discharge Disposition:  Home or Self Care        03/20/17 1047          Total time spent discharging patient including evaluation,post hospitalization follow up,  medication and post hospitalization instructions and education total time exceeds 30 minutes.    Signed:  Bronson Lopez MD  3/20/2017  10:48 AM

## 2023-10-17 NOTE — PROGRESS NOTES
Interdisciplinary Discharge Planning Note    Anticipated Discharge Date: TBD    Anticipated Discharge Location: SNF - needs placement    Clinical Needs Before Discharge:   Patient is medically stable     Treatment Needs After Discharge:  rehab (PT, OT, ST)    Potential Barriers to Discharge: [facility] in [place] is still screening at this time    ALEX Cruz  10/17/2023,  12:57 PM

## 2023-10-17 NOTE — PROGRESS NOTES
SAFETY CHECKLIST  ID Bands and Risk clasps correct and in place (DNR, Fall risk, Allergy, Latex, Limb):  Yes  All Lines Reconciled and labeled correctly: Yes  Whiteboard updated:Yes  Environmental interventions: Yes  Verify Tele #:  no

## 2023-10-18 NOTE — PROGRESS NOTES
10/18/23 9012   Appointment Info   Signing Clinician's Name / Credentials (PT) Young Nicholson MPT   Therapeutic Activity   Symptoms Noted During/After Treatment Increased pain;Fatigue   Treatment Detail/Skilled Intervention supine<>sit with maximal assist of 2; sit to stand with moderate assist of 1-2; stand pivot transfer with Fww and moderate assist of 1-2   Gait Training   Symptoms Noted During/After Treatment (Gait Training) fatigue;increased pain   Treatment Detail/Skilled Intervention a few steps with bed to recliner   Distance in Feet 3-4   [place] Level (Gait Training) moderate assist (50% patient effort)   Physical Assistance Level (Gait Training) 2 person assist   Weight Bearing (Gait Training) weight-bearing as tolerated   Pattern Analysis (Gait Training) 3-point gait   Gait Analysis Deviations increased time in double stance;decreased step length;decreased weight-shifting ability   Impairments (Gait Analysis/Training) balance impaired;pain;strength decreased   PT Discharge Planning   PT Plan continue PT   PT Discharge Recommendation (DC Rec) Transitional Care Facility   PT Rationale for DC Rec to promote strength, stability and safe mobility allowing patient to return home with family   PT Brief overview of current status increased c/o left foot/lower leg pain today decreased patient's stability and weight bearing today; PT asked RN for pain medication for patient which assists in patient's tolerance to standing   PT Equipment Needed at Discharge walker, rolling;walker, standard

## 2023-10-18 NOTE — PROGRESS NOTES
10/17/23 0062   Appointment Info   Signing Clinician's Name / Credentials (OT) Carine Bazzi OTR/L   Self-Care/Home Management   Self-Care/Home Mgmt/ADL, Compensatory, Meal Prep Minutes (53244) 30   Symptoms Noted During/After Treatment (Meal Preparation/Planning Training) fatigue;increased pain   Real Level (Bathing Training) stand-by assist   Assistance (Bathing Training) supervision;set-up required  (for upper body sponge bathing while seated)   Real Level (Upper Body Dressing Training) moderate assist (50% patient effort)   Assistance (Upper Body Dressing Training) 1 person assist   Therapeutic Activities   Therapeutic Activity Minutes (80299) 15   Symptoms noted during/after treatment fatigue;increased pain   Treatment Detail/Skilled Intervention Pt was pleasant and willing to participate in therapy today, required max assist of 2 for supine to sit, pt able to move sit to stand with Min assist of 1 and took a few steps to recliner with FWW and Min assist of 1-2 for safety.   OT Discharge Planning   OT Plan Continue with OT while hospitalized.   OT Discharge Recommendation (DC Rec) Transitional Care Facility   OT Rationale for DC Rec Pt is improving, will greatly benefit from daily therapies at [facility] to maximize level of independence needed to return home with wife as previous   OT Brief overview of current status see above, pt is making good progress, is able to tolerate increased activity and motivated to participate   Total Session Time   Timed Code Treatment Minutes 45   Total Session Time (sum of timed and untimed services) 45

## 2023-10-18 NOTE — PROGRESS NOTES
Interdisciplinary Discharge Planning Note    Anticipated Discharge Date: 10/19-10/20    Anticipated Discharge Location: STR placement    Clinical Needs Before Discharge:   Cleared for discharge waiting for placement    Treatment Needs After Discharge:  rehab (PT, OT, ST)    Potential Barriers to Discharge: Waiting for [facility] to accept patient. [facility] and [facility] also screening.    ALEX Cruz  10/18/2023,  12:38 PM

## 2023-10-18 NOTE — PROGRESS NOTES
10/18/23 1510   Appointment Info   Signing Clinician's Name / Credentials (OT) Carine Bazzi OTR/L   Self-Care/Home Management   Self-Care/Home Mgmt/ADL, Compensatory, Meal Prep Minutes (35990) 30   Symptoms Noted During/After Treatment (Meal Preparation/Planning Training) fatigue   [place] Level (Grooming Training) stand-by assist   Assistance (Grooming Training) supervision;set-up required;verbal cues   [place] Level (Bathing Training) stand-by assist   Assistance (Bathing Training) supervision;set-up required  (upper body while seated)   Lower Body Dressing Training Assistance maximum assist (25% patient effort)   Lower Body Dressing Training Assistance 2 persons   [place] Level (Toilet Training) dependent (less than 25% patient effort)   Therapeutic Activities   Therapeutic Activity Minutes (55752) 15   Symptoms noted during/after treatment fatigue;increased pain   Treatment Detail/Skilled Intervention Max assist of 2 for supine to sit, moderate assist of 2 with FWW to take a few steps to recliner   OT Discharge Planning   OT Plan cont OT   OT Discharge Recommendation (DC Rec) Transitional Care Facility   OT Rationale for DC Rec Pt is improving, will greatly benefit from daily therapies at [facility] to maximize level of independence needed to return home with wife as previous   OT Brief overview of current status see above   Total Session Time   Timed Code Treatment Minutes 45   Total Session Time (sum of timed and untimed services) 45

## 2023-10-18 NOTE — PROGRESS NOTES
:    Left a voicemail with [facility] in [place]. Waiting to hear back on if they are able to accept patient.     ALEX Cruz on 10/18/2023 at 9:12 AM    Received a call from [facility] [place]. They are still working on staffing and will know by the end of today. If they are able to figure out staffing they would be able to accept him and admit him Friday. Stephen will update  once they have a determination.     ALEX Cruz on 10/18/2023 at 9:26 AM    Called and spoke with patient's wife to discuss further discharge planning. The goal is for [facility] to accept but if not there needs to be further plans. Patient' wife was agreeable to have referrals sent to [facility].     Referral sent to [facility].     ALEX Cruz on 10/18/2023 at 10:02 AM    [facility] in [place] has accepted patient and is able to admit him tomorrow. They do need patient to get a COVID test and have an Inter-facility infection control transfer form filled out. Updated family.     Called [facility] and they are able to transport patient tomorrow at 7:30. They will need to borrow a wheelchair. Due to patient's insurance it would be private pay. Waiting to hear back from [facility] on the exact amount will be.     Spoke with patient's wife about transportation and she is unsure if they would be able to cover the cost.     ALEX Cruz on 10/18/2023 at 2:02 PM    [facility] stated the cost would be $193 for transportation. Spoke with wife and she decided she would like to transport patient herself. RN, Jed, was in the room and after discussing discharge time with him wife plans to discharge with patient tomorrow at 10:00. Updated Ayad to cancel ride.     Updated [facility] on patient's discharge time and plan. Received email stating that patient may be close to his 100 calendar days through  his insurance and may have a co-pay but they are unsure. [facility] will discuss this with patient and family.       Completed PAS. [id number] and Inter-Facility forms and sent to [facility].     Received a call from [facility]. They would be able to take patient Friday. Updated her about patient's discharge plan.      will continue to follow for discharge planning needs.     ALEX Cruz on 10/18/2023 at 3:32 PM

## 2023-10-18 NOTE — PROGRESS NOTES
Clinical Nutrition / Reassessment     Assessment:   Client History:  appetite slowly improving, accepting of cares/meds/meals now. Awaiting safe discharge plan, likely to a care facility for rehab before returning home.   Diet Order:  regular   Oral Intake:  improving, 25-75% recently  Supplement Intake:  continue with Ensure TID to trays  Weight:   Vitals:    10/06/23 0322 10/07/23 0515 10/08/23 0641 10/09/23 0338   Weight: 89.8 kg (198 lb) 90.8 kg (200 lb 1.6 oz) 94.6 kg (208 lb 9.6 oz) 96.8 kg (213 lb 4.8 oz)    10/10/23 0157 10/10/23 0200 10/11/23 0017 10/12/23 0549   Weight: 94.1 kg (207 lb 8 oz) 86.7 kg (191 lb 3.2 oz) 93.4 kg (206 lb) 89.5 kg (197 lb 6.4 oz)    10/14/23 0358 10/15/23 0600 10/16/23 0031 10/18/23 0613   Weight: 90 kg (198 lb 8 oz) 86.5 kg (190 lb 12.8 oz) 89.9 kg (198 lb 1.6 oz) 90 kg (198 lb 8 oz)       Malnutrition Criteria:  (Need to have 2 indicators to qualify recommendation)  Energy Intake:  Acute Severe: </= 50% of estimated energy requirement for >/= 5 days-improving   Interpretation of Weight Loss:  No significant weight loss  Physical Findings:  Acute Fluid Accumulation:  moderate to severe-improving   Reduced  Strength:  Not Measured    Recommended Nutrition Diagnosis:   Severe Malnutrition in the context of acute illness or injury - based on AND/ASPEN Clinical Characterstics of Malnutrition May 2012  Malnutrition:    - Level of malnutrition: Severe - improving      Nutrition Education: Nutrition education will be provided as appropriate.    Nutrition Diagnosis: Nutrient Intake:  inadequate energy intakes  related to decreased appetite and cooperativeness as evidenced by refusal of meals at times, poor intakes- improving    Intervention:  Nutrition Prescription:     Nutrition Intervention(s):Recommended general, healthful diet  1. Meals and Snacks: small/frequent meals/snacks  2. Medical Food Supplement: Ensure TID on trays    Nutrition Goal(s):  1. Pt will consume 50% or more at  meals and supplements - on going  2. Pt will allow staff to assist at meals/snacks -met, on going  3. Pt will tolerate diet as ordered - met, on going    Monitoring and Evaluation:   Food Intake, diet tolerance     Discharge Recommendation:   Nutrition Discharge Planning  Recommend supplements on discharge if intakes remain less than 50% at meals    RD will reassess in within 1-5 days or sooner.  Maria Antonia Dunne RD on 10/18/2023 at 9:57 AM

## 2023-10-18 NOTE — PROGRESS NOTES
[facility]    Medicine Progress Note - Hospitalist Service    Date of Admission:  10/6/2023    Assessment & Plan   Principal Problem:    Sepsis due to Streptococcus species without acute organ dysfunction (H)  Cellulitis bilateral lower legs  Pseudomonas putida bacteremia    Assessment: Improving    Plan: Pseudomonas putida is sensitive to Cipro. Completed 10 days of abx on 10/16.  Delirium resolved and he is able to participate in conversation more every day.  Due to his severe illness and time in bed, he will require rehab prior to being able to possibly return home.  He is medically ready for discharge and has shown significant improvement toward his baseline.  Encephalopathy has cleared.  Now just waiting for placement.    Active Problems:    Chronic bilateral low back pain with bilateral sciatica    Assessment: Stable    Plan: Tylenol and oxycodone working well, has morphine liquid as needed, has been taking his Cymbalta.  States his pain is better with current medications and is able to participate with therapies.      Type 2 diabetes mellitus with stage 3b chronic kidney disease (H)    Assessment: Stable    Plan: Sugars improving with all values under 200 with increased Lantus to 22 units daily 10/13.  Had some lower sugars overnight 10/16-17 so insulin held.  Continue to cover carbs and with sliding scale if needed.  Last HgbA1C 7.1 in March 2023, now 9.6 so clearly not taking his medications regularly at home.  Will benefit from a rehab stay and probably home health after that.      Gastroesophageal reflux disease without esophagitis    Assessment: Stable    Plan: Continue Protonix.  Stomach feels better since he had a large bowel movement.  Use daily Miralax to maintain as he is not very active and is continuing appropriately on pain medications.      Benign essential hypertension    Assessment: Stable    Plan: On Cardura (which is also being used for urinary retention) and metoprolol  providing fair control but started on an increased dose of Cardura starting the night of 10/12 with improvement.  Sometimes mildly elevated.  Was on nifedipine and irbesartan PTA but BP's not significantly elevated.  Restarting Ranexa so this may bring his BP down a little more.      Hypothyroidism due to acquired atrophy of thyroid    Assessment: Stable    Plan: TSH very elevated to 21.44 at the time of admission during acute illness but this may be contributing to his lethargy, would not be expected to account for the encephalopathy.  T4 and T3 also low so increased his dose of levothyroxine.  Last chart results had TSH 6.41 in March 2023.  Family reports he does not take his medications very regularly so this may account for some of the increase in his TSH.  Recommend recheck in December.      Mixed hyperlipidemia    Assessment: Stable    Plan: Last lipids from January 2018 with , chol 119, HDL 31, LDL 9.  Would not recommend starting any medications now.      S/P CABG x 2    Assessment: Stable    Plan: No complaints of chest pain.  As he is increasing his activity will resume his Ranexa as his heart will need more O2.      Atherosclerosis of native coronary artery of native heart with stable angina pectoris (H24)    Assessment: Stable    Plan: As above.      Venous stasis dermatitis of left lower extremity    Assessment: Stable    Plan: Cellulitis of bilateral lower legs and left upper leg/hip/thigh resolved.  Completed Cipro 500 mg daily (renal dose) on 10/16.        Peripheral vascular disease, unspecified (H24)    Assessment: Stable    Plan: As above regarding venous stasis and CAD.      Chronic pain syndrome    Assessment: Stable    Plan: Has had several back surgeries, essentially his entire back is metal per his daughter.  Tolerating oxycodone for pain.  Increase activity with therapies.      Bilateral lower leg cellulitis    Assessment: Improving    Plan: Resolved. Bacteremic with Pseudomonas  "putida, resolved.  Tapered abx to just Cipro on 10/12 based on sensitivity results, completed course of abx 10/16.  Repeat blood cultures negative at 5 days.  No need for continued abx at this time.  Continue wound care.  Waiting for placement.       Severe malnutrition    Assessment: Stable    Plan: Taking supplements, starting to eat small amounts and not refusing cares.  Had hypokalemia following Lasix administration so replaced po.  Recheck labs in AM.      Narcolepsy    Assessment:  Worse    Plan:  Had not been receiving his Ritalin initially as he just mentioned this diagnosis on 10/17.  Does not like taking the medications but would like to resume so he can be more awake. Restarted Ritalin 10/17 PM.          Diet: Regular Diet Adult  Snacks/Supplements Adult: Ensure Enlive; With Meals    DVT Prophylaxis: Heparin SQ  Loja Catheter: Not present  Lines: None     Cardiac Monitoring: None  Code Status: Full Code      Clinically Significant Risk Factors        # Hypokalemia: Lowest K = 3 mmol/L in last 2 days, will replace as needed       # Hypoalbuminemia: Lowest albumin = 1.2 g/dL at 10/6/2023  3:45 AM, will monitor as appropriate     # Hypertension: Noted on problem list       # DMII: A1C = 9.6 % (Ref range: 4.0 - 6.2 %) within past 6 months   # Obesity: Estimated body mass index is 31.09 kg/m  as calculated from the following:    Height as of this encounter: 1.702 m (5' 7\").    Weight as of this encounter: 90 kg (198 lb 8 oz).             Disposition Plan    10/23/2023         Vania Pugh MD  Hospitalist Service  [facility]  Securely message with [employer] (more info)  Text page via [facility] Paging/Directory   ______________________________________________________________________    Interval History   Had some lower blood sugars overnight.  Not eating very much.  Has been on Ritalin for so long he does not think it helps his narcolepsy very much anymore.  Pain controlled.  Talkative and " joking.  Wife at bedside.    Physical Exam   Vital Signs: Temp: 98.9  F (37.2  C) Temp src: Tympanic BP: 136/55 Pulse: 71   Resp: 16 SpO2: 94 % O2 Device: None (Room air)    Weight: 198 lbs 8 oz    Constitutional: awake, alert throughout the visit with a brief episode of paused conversation but eyes did not close.  Talkative, joking.    Eyes: pupils equal, round and reactive to light, extra-ocular muscles intact, and conjunctiva normal  ENT: normocepalic, without obvious abnormality, atramatic  Respiratory: no increased work of breathing, no audible wheezing, fair aeration.  Cardiovascular:  RRR without murmur.  GI: Bowel sounds present, soft, not distended.  Skin: Erythema of lower legs resolved.  Scabs on left calf without drainage.  Gauze on left mid lower leg and right ankle.  Musculoskeletal: Trace pitting edema left lower leg and dorsum of left foot remains more puffy than other areas, right leg with trace edema.    Neurologic: Mental Status Exam:  Level of Alertness:   awake  Orientation:   To person, place, time, situation  Memory:  improving.  Able to carry on a conversation.  Fund of Knowledge:  normal  Cranial Nerves:  cranial nerves II-XII are grossly intact    Medical Decision Making             Data

## 2023-10-18 NOTE — PROGRESS NOTES
"   10/17/23 1500   Appointment Info   Signing Clinician's Name / Credentials (PT) Young Nicholson MPT   Therapeutic Activity   Symptoms Noted During/After Treatment Increased pain;Fatigue   Treatment Detail/Skilled Intervention supine<>sit with maximal assist of 2; sit to stand with moderate assist of 1-2; stand pivot transfer with Fww and minimal assist of 2   Gait Training   Symptoms Noted During/After Treatment (Gait Training) fatigue;increased pain   Treatment Detail/Skilled Intervention a few steps with bed to recliner   Distance in Feet 5-6   [place] Level (Gait Training) minimum assist (75% patient effort)   Physical Assistance Level (Gait Training) 2 person assist   Weight Bearing (Gait Training) weight-bearing as tolerated   Assistive Device (Gait Training) rolling walker   Pattern Analysis (Gait Training) 3-point gait   Gait Analysis Deviations increased time in double stance;decreased step length;decreased weight-shifting ability   Impairments (Gait Analysis/Training) pain;strength decreased   PT Discharge Planning   PT Plan continue PT   PT Discharge Recommendation (DC Rec) Transitional Care Facility   PT Rationale for DC Rec to promote strength, stability and safe mobility allowing patient to return home with family   PT Brief overview of current status patient expressing \" thank you\" for PT/OT assistance today; he continues with increased alertness and ability to participate with therapies; patient able to ambulate a short distance today within his room; LE/foot  discomfort continue to limit his tolerance to gait activities, however, I anticipate that as his pain decreases, that he will tolerate increased gait distances; he will certainly benefit from continued PT in short term rehab to promote return to his prior level of independent mobility allowing prior to his return home   PT Equipment Needed at Discharge walker, rolling;walker, standard       "

## 2023-10-18 NOTE — PLAN OF CARE
"Patient admitted for sepsis. VSS. Room air. Afebrile. Alert and Oriented x 3. Pleasant and cooperative. Blood sugars low at start of shift and increased by morning. 59-92. Refused oral glucose gel, given apple juice multiple times. CCRQ 2 hr. Incontinent of bladder. Dressings changed to bilateral legs and buttocks.     Goal Outcome Evaluation: Progressing      Problem: Plan of Care - These are the overarching goals to be used throughout the patient stay.    Goal: Plan of Care Review  Description: The Plan of Care Review/Shift note should be completed every shift.  The Outcome Evaluation is a brief statement about your assessment that the patient is improving, declining, or no change.  This information will be displayed automatically on your shift note.  Outcome: Progressing  Goal: Patient-Specific Goal (Individualized)  Description: You can add care plan individualizations to a care plan. Examples of Individualization might be:  \"Parent requests to be called daily at 9am for status\", \"I have a hard time hearing out of my right ear\", or \"Do not touch me to wake me up as it startles me\".  Outcome: Progressing  Goal: Absence of Hospital-Acquired Illness or Injury  Outcome: Progressing  Intervention: Identify and Manage Fall Risk  Recent Flowsheet Documentation  Taken 10/18/2023 0613 by Toshia Milan RN  Safety Promotion/Fall Prevention:   activity supervised   assistive device/personal items within reach   clutter free environment maintained   increased rounding and observation   increase visualization of patient   nonskid shoes/slippers when out of bed   patient and family education   room door open   room near nurse's station   safety round/check completed   supervised activity   treat reversible contributory factors   treat underlying cause  Taken 10/17/2023 2203 by Toshia Milan, RN  Safety Promotion/Fall Prevention:   activity supervised   assistive device/personal items within reach   clutter free " environment maintained   increased rounding and observation   increase visualization of patient   nonskid shoes/slippers when out of bed   patient and family education   room door open   room near nurse's station   safety round/check completed   supervised activity   treat reversible contributory factors   treat underlying cause  Intervention: Prevent and Manage VTE (Venous Thromboembolism) Risk  Recent Flowsheet Documentation  Taken 10/18/2023 0613 by Toshia Milan RN  VTE Prevention/Management: (heparin) --  Taken 10/17/2023 2203 by Toshia Milan RN  VTE Prevention/Management: (heparin) --  Intervention: Prevent Infection  Recent Flowsheet Documentation  Taken 10/18/2023 0613 by Toshia Milan RN  Infection Prevention:   hand hygiene promoted   personal protective equipment utilized   rest/sleep promoted   single patient room provided   equipment surfaces disinfected  Taken 10/17/2023 2203 by Toshia Milan RN  Infection Prevention:   hand hygiene promoted   personal protective equipment utilized   rest/sleep promoted   single patient room provided   equipment surfaces disinfected  Goal: Optimal Comfort and Wellbeing  Outcome: Progressing  Intervention: Monitor Pain and Promote Comfort  Recent Flowsheet Documentation  Taken 10/17/2023 2245 by Toshia Milan RN  Pain Management Interventions:   medication (see MAR)   repositioned  Intervention: Provide Person-Centered Care  Recent Flowsheet Documentation  Taken 10/18/2023 0613 by Toshia Milan RN  Trust Relationship/Rapport:   care explained   choices provided   empathic listening provided   questions answered   questions encouraged   thoughts/feelings acknowledged  Taken 10/17/2023 2203 by Toshia Milan RN  Trust Relationship/Rapport:   care explained   choices provided   empathic listening provided   questions answered   questions encouraged   thoughts/feelings acknowledged  Goal: Readiness for Transition of  Care  Outcome: Progressing

## 2023-10-18 NOTE — PROGRESS NOTES
SAFETY CHECKLIST  ID Bands and Risk clasps correct and in place (DNR, Fall risk, Allergy, Latex, Limb):  Yes  All Lines Reconciled and labeled correctly: Yes  Whiteboard updated:Yes  Environmental interventions: Yes  Verify Tele #: na

## 2023-10-19 NOTE — PROGRESS NOTES
:     Call placed to Bernadette at [facility] to update on patients current status. Patient will not be discharging to [facility] at this time.     Update given to Anny at [facility] [place] regarding patients discharge plan.     PETER Bee on 10/19/2023 at 8:43 AM

## 2023-10-19 NOTE — PLAN OF CARE
Goal Outcome Evaluation:       Following Heparin drip protocols. Patients feels satisfied  with his life.  He wants to stay here for his care .  Does not want to be transferred.  Family doesn't agree with his plan and are tearful at times.      Patient gets clearer minded every day.  Very cheerful today.

## 2023-10-19 NOTE — PROGRESS NOTES
Wife called writer back, pt currently talking to wife. Updated pts daughter who is on call list.  pt confirmed this was okay.

## 2023-10-19 NOTE — PROGRESS NOTES
[facility]    Medicine Progress Note - Hospitalist Service    Date of Admission:  10/6/2023    Assessment & Plan      NSTEMI  Troponin continues to rise.    Patient continues to refuse transfer to [place] for intervention even though he was accepted.  Family continues to support patient's decision not to transfer.  Patient counseled extensively today.  Patient agreeable to change the CODE STATUS to DNR.  We will continue heparin drip and medical management for the next 48 hours.  If patient remains stable will be discharged.    Sepsis due to Streptococcus species without acute organ dysfunction (H)  Cellulitis bilateral lower legs  Pseudomonas putida bacteremia    Assessment: Improving    Plan: Pseudomonas putida is sensitive to Cipro. Completed 10 days of abx on 10/16.  Delirium resolved and he is able to participate in conversation more every day.  Due to his severe illness and time in bed, he will require rehab prior to being able to possibly return home.  He is medically ready for discharge and has shown significant improvement toward his baseline.  Encephalopathy has cleared.  Now just waiting for placement.     Active Problems:    Chronic bilateral low back pain with bilateral sciatica    Assessment: Stable    Plan: Tylenol and oxycodone working well, has morphine liquid as needed, has been taking his Cymbalta.  States his pain is better with current medications and is able to participate with therapies.       Type 2 diabetes mellitus with stage 3b chronic kidney disease (H)    Assessment: Stable    Plan: Sugars improving with all values under 200 with increased Lantus to 22 units daily 10/13.  Had some lower sugars overnight 10/16-17 so insulin held.  Continue to cover carbs and with sliding scale if needed.  Last HgbA1C 7.1 in March 2023, now 9.6 so clearly not taking his medications regularly at home.  Will benefit from a rehab stay and probably home health after that.        Gastroesophageal reflux disease without esophagitis    Assessment: Stable    Plan: Continue Protonix.  Stomach feels better since he had a large bowel movement.  Use daily Miralax to maintain as he is not very active and is continuing appropriately on pain medications.       Benign essential hypertension    Assessment: Stable    Plan: On Cardura (which is also being used for urinary retention) and metoprolol providing fair control but started on an increased dose of Cardura starting the night of 10/12 with improvement.  Sometimes mildly elevated.  Was on nifedipine and irbesartan PTA but BP's not significantly elevated.  Restarting Ranexa so this may bring his BP down a little more.       Hypothyroidism due to acquired atrophy of thyroid    Assessment: Stable    Plan: TSH very elevated to 21.44 at the time of admission during acute illness but this may be contributing to his lethargy, would not be expected to account for the encephalopathy.  T4 and T3 also low so increased his dose of levothyroxine.  Last chart results had TSH 6.41 in March 2023.  Family reports he does not take his medications very regularly so this may account for some of the increase in his TSH.  Recommend recheck in December.       Mixed hyperlipidemia    Assessment: Stable    Plan: Last lipids from January 2018 with , chol 119, HDL 31, LDL 9.  Would not recommend starting any medications now.       S/P CABG x 2    Assessment: Stable    Plan: No complaints of chest pain.  As he is increasing his activity will resume his Ranexa as his heart will need more O2.       Atherosclerosis of native coronary artery of native heart with stable angina pectoris (H24)    Assessment: Stable    Plan: As above.       Venous stasis dermatitis of left lower extremity    Assessment: Stable    Plan: Cellulitis of bilateral lower legs and left upper leg/hip/thigh resolved.  Completed Cipro 500 mg daily (renal dose) on 10/16.         Peripheral vascular disease,  "unspecified (H24)    Assessment: Stable    Plan: As above regarding venous stasis and CAD.       Chronic pain syndrome    Assessment: Stable    Plan: Has had several back surgeries, essentially his entire back is metal per his daughter.  Tolerating oxycodone for pain.  Increase activity with therapies.       Bilateral lower leg cellulitis    Assessment: Improving    Plan: Resolved. Bacteremic with Pseudomonas putida, resolved.  Tapered abx to just Cipro on 10/12 based on sensitivity results, completed course of abx 10/16.  Repeat blood cultures negative at 5 days.  No need for continued abx at this time.  Continue wound care.  Waiting for placement.        Severe malnutrition    Assessment: Stable    Plan: Taking supplements, starting to eat small amounts and not refusing cares.  Had hypokalemia following Lasix administration so replaced po.  Recheck labs in AM.       Narcolepsy    Assessment:  Worse    Plan:  Had not been receiving his Ritalin initially as he just mentioned this diagnosis on 10/17.  Does not like taking the medications but would like to resume so he can be more awake. Restarted Ritalin 10/17 PM.           Diet: Regular Diet Adult  Snacks/Supplements Adult: Ensure Enlive; With Meals    DVT Prophylaxis: Heparin SQ  Loja Catheter: Not present  Lines: None     Cardiac Monitoring: None  Code Status: Full Code          Clinically Significant Risk Factors          # Hypokalemia: Lowest K = 3 mmol/L in last 2 days, will replace as needed       # Hypoalbuminemia: Lowest albumin = 1.2 g/dL at 10/6/2023  3:45 AM, will monitor as appropriate     # Hypertension: Noted on problem list       # DMII: A1C = 9.6 % (Ref range: 4.0 - 6.2 %) within past 6 months   # Obesity: Estimated body mass index is 31.09 kg/m  as calculated from the following:    Height as of this encounter: 1.702 m (5' 7\").    Weight as of this encounter: 90 kg (198 lb 8 oz).                Disposition Plan             Jose Golden, " MD  Hospitalist Service  [facility]  Securely message with [employer] (more info)  Text page via [employer] Paging/Directory   ______________________________________________________________________    Interval History   Patient developed chest pain overnight.  Found to have NSTEMI.  Cardiology intubated accepted for possible angiogram.  Family does not want to transfer.  Patient change CODE STATUS to DNR DNI.    Physical Exam   Vital Signs: Temp: 98.5  F (36.9  C) Temp src: Tympanic BP: 130/62 Pulse: 87   Resp: 17 SpO2: 96 % O2 Device: None (Room air)    Weight: 198 lbs 8 oz    General: Patient laying in bed not in acute distress  Respiratory: Diminished air entry to both of his atrial fibrillation  Cardiovascular: Regular rate  GI: Abdomen soft positive bowel sounds        55 minutes spent by me on the date of service doing chart review  History  , examination, documentation and further activities per the note    Data     I have personally reviewed the following data over the past 24 hrs:    6.1  \   7.4 (L)   / 361     138 104 52.3 (H) /  144 (H)   4.3 22 2.94 (H) \     Trop: 873 (HH) BNP: N/A     INR:  1.32 (H) PTT:  130 (HH)   D-dimer:  N/A Fibrinogen:  N/A       Imaging results reviewed over the past 24 hrs:   No results found for this or any previous visit (from the past 24 hour(s)).

## 2023-10-19 NOTE — PLAN OF CARE
Assumed patient care from GLENDA Torres at 1530. Pt stable throughout shift for me. VS stable. Pt is on Heparin drip running at 500 mL/hr. Pt has an elevated Troponin on shift, provider notified.     Litzy Adkins RN .......  10/19/2023  6:44 PM

## 2023-10-19 NOTE — PROGRESS NOTES
73 years old male with a past medical history of coronary disease status post CABG, peripheral vascular disease, diabetes mellitus type 2 has been admitted for sepsis due to Streptococcus species and Pseudomonas putida bacteremia.  Has completed antibiotic course on 10/16/23.  Paged for chest pain.      Patient was seen and examined by video communication  device.  Reports pain is mainly in the retrosternal/epigastric region.  No diaphoresis dizziness.  Denies any bleeding.  Have some acid reflux-like symptoms.  Blood pressure is 139/69 with a pulse of 101.  EKG is concerning for ischemic changes.  Troponin is 766    Discussed with the patient and advised transfer to another facility for evaluation by cardiology and potential angiogram/cardiac intervention.  At this time patient has declined transfer to another facility.  Offered to talk to the family members to update but the patient has declined the offer.  He wants to sleep on the decision for now.  Wants to try nitroglycerin.  Patient is well aware of the risks of delay in getting cardiac care.  He is oriented X3 and able to make disposition.  Possible delay in care could cause life-threatening complications if patient does not have cardiac intervention/evaluation cardiology.    Subsequently given nitroglycerin/morphine for the pain.  Initiate heparin drip per protocol and watch the hemoglobin closely.  No evidence of active bleeding at this time but his hemoglobin is on the low side.  Monitor closely in the current hospital per patient request

## 2023-10-19 NOTE — PROGRESS NOTES
Discussed with Cardiology-- at  [facility] and -Hospitalist. Patient has been accepted for transfer for higher level of care.    Given the renal failure/ anemia and recent sepsis, patient is a high risk for coronary angiogram or other cardiac intervention

## 2023-10-19 NOTE — PROGRESS NOTES
Wife and daughter at bedside. Pt is refusing to be transferred and states he wants to be a DNR. MD notified.

## 2023-10-19 NOTE — PROGRESS NOTES
Discussed with the wife (Shanna)about the patient's condition and requested her presence at the bedside to review with the patient.  Spouse is coming to the hospital to review with patient about the need for transfer.  Further course is dependent on patient's decision on transfer/agree for any potential procedures. Staff updated

## 2023-10-19 NOTE — PROGRESS NOTES
Interdisciplinary Discharge Planning Note    Anticipated Discharge Date:TBD     Anticipated Discharge Location: TBD     Clinical Needs Before Discharge:   Medical Stability     Treatment Needs After Discharge:  homecare and rehab (PT, OT, ST)    Potential Barriers to Discharge: Patient is currently refusing transfer to a higher level of care. Patient is now DNR/DNI     PETER Bee  10/19/2023,  12:46 PM

## 2023-10-19 NOTE — PROGRESS NOTES
"Pt states that he is unsure if he wants to be transferred to see a cardiologist. Pt aware of risks and states \"if I die in the presence of wesley iyer I am okay with that\". Pt does seem a bit confused and doesn't seem to understand the seriousness of the situation.   "

## 2023-10-19 NOTE — PROGRESS NOTES
Pt and wife talked on the phone for quite awhile, wife then called nursing station stating she does not believe pt is at his baseline mental state and shouldn't make his own decisions. She states she would like him to be transferred. MD notified and had discussion with wife. Per physician, wife is going to come to bedside and talk to  so they can make a decision together.

## 2023-10-20 NOTE — PROGRESS NOTES
10/20/23 1360   Appointment Info   Signing Clinician's Name / Credentials (OT) Carine Bazzi, OTR/L   Therapeutic Activities   Therapeutic Activity Minutes (79169) 30   Symptoms noted during/after treatment fatigue;increased pain   Treatment Detail/Skilled Intervention Pt moved supine to sit today with moderate assist of 2, able to complete more of this on his own today, and moved sit to stand with min assist of 1 only with FWW. Pt needed encouragement and cues to take small steps to recliner using FWW and min assist of 2. Pt had self cares completed for him prior to session.   OT Discharge Planning   OT Plan cont OT   OT Discharge Recommendation (DC Rec) Transitional Care Facility   OT Rationale for DC Rec Pt is improving, will greatly benefit from daily therapies at STR to maximize level of independence needed to return home with wife as previous   OT Brief overview of current status Pt was very pleasant today and motivated to attempt trying mobility without assist. see above for details, pt improved with bed mobility and level of assist for transfer today. Pt comtinues to c/o some pain in left L/E but minimal today.   Total Session Time   Timed Code Treatment Minutes 30   Total Session Time (sum of timed and untimed services) 30

## 2023-10-20 NOTE — PROGRESS NOTES
:     Spoke with patients wife to discuss discharge planning needs. She stated that she does not feel comfortable bringing patient home at this time due to his weakness.     Spoke with Bernadette at [facility]. She stated that they can hold a bed for patient until Monday as they do not do weekend admissions due to staffing.      Update given to MD and wife.     PETER Bee on 10/20/2023 at 9:54 AM

## 2023-10-20 NOTE — PROGRESS NOTES
SAFETY CHECKLIST  ID Bands and Risk clasps correct and in place (DNR, Fall risk, Allergy, Latex, Limb):  Yes  All Lines Reconciled and labeled correctly: Yes  Whiteboard updated:Yes  Environmental interventions: Yes  Verify Tele #:  [phone]

## 2023-10-20 NOTE — PLAN OF CARE
"  Pt is unpleasant and uncooperative. Refuses meds and other interventions at times. It's difficult to tell if pt is just unwilling or unable to stay on task. Often yells out and says staff is \"hurting me\" before anyone is even touching him.    Problem: Plan of Care - These are the overarching goals to be used throughout the patient stay.    Goal: Plan of Care Review  Description: The Plan of Care Review/Shift note should be completed every shift.  The Outcome Evaluation is a brief statement about your assessment that the patient is improving, declining, or no change.  This information will be displayed automatically on your shift note.  Outcome: Not Progressing  Goal: Patient-Specific Goal (Individualized)  Description: You can add care plan individualizations to a care plan. Examples of Individualization might be:  \"Parent requests to be called daily at 9am for status\", \"I have a hard time hearing out of my right ear\", or \"Do not touch me to wake me up as it startles me\".  Outcome: Not Progressing  Goal: Absence of Hospital-Acquired Illness or Injury  Outcome: Not Progressing  Intervention: Identify and Manage Fall Risk  Recent Flowsheet Documentation  Taken 10/20/2023 1741 by Fransisca Whitman RN  Safety Promotion/Fall Prevention:   clutter free environment maintained   nonskid shoes/slippers when out of bed   safety round/check completed  Goal: Optimal Comfort and Wellbeing  Outcome: Not Progressing  Goal: Readiness for Transition of Care  Outcome: Not Progressing     Problem: Sepsis/Septic Shock  Goal: Optimal Coping  Outcome: Not Progressing  Goal: Blood Glucose Level Within Targeted Range  Outcome: Not Progressing  Goal: Absence of Infection Signs and Symptoms  Outcome: Not Progressing  Goal: Optimal Nutrition Intake  Outcome: Not Progressing     Problem: Sepsis/Septic Shock  Goal: Optimal Coping  Outcome: Not Progressing  Goal: Absence of Bleeding  Outcome: Not Progressing  Goal: Blood Glucose Level Within " Targeted Range  Outcome: Not Progressing  Goal: Absence of Infection Signs and Symptoms  Outcome: Not Progressing  Goal: Optimal Nutrition Intake  Outcome: Not Progressing     Problem: Diabetes Comorbidity  Goal: Blood Glucose Level Within Targeted Range  Outcome: Not Progressing   Goal Outcome Evaluation:

## 2023-10-20 NOTE — PROGRESS NOTES
10/20/23 1200   Appointment Info   Signing Clinician's Name / Credentials (PT) Young Nicholson MPT   Therapeutic Activity   Symptoms Noted During/After Treatment Fatigue;Increased pain   Treatment Detail/Skilled Intervention supine<>sit with moderate assist of 2; sit to stand with moderate assist of 1; stand pivot transfer with Fww and mo assist of 2 and verbal cues for erect posture and direction   Gait Training   Symptoms Noted During/After Treatment (Gait Training) fatigue;increased pain   Treatment Detail/Skilled Intervention a few steps with bed to recliner   Distance in Feet 5-6   [place] Level (Gait Training) minimum assist (75% patient effort)   Physical Assistance Level (Gait Training) 2 person assist   Weight Bearing (Gait Training) weight-bearing as tolerated   Assistive Device (Gait Training) rolling walker   Pattern Analysis (Gait Training) 3-point gait   Gait Analysis Deviations increased time in double stance;decreased step length;decreased weight-shifting ability   Impairments (Gait Analysis/Training) balance impaired   PT Discharge Planning   PT Plan continue PT   PT Discharge Recommendation (DC Rec) Transitional Care Facility   PT Rationale for DC Rec to promote strength, stability and safe mobility allowing patient to return home with family   PT Brief overview of current status patient continues to slowly progress with mobility tasks requiring less assistance at time of PT session today; discomfort/pain in LEs along with fatigue/weakness presently does also limit gait activity; he will certainly benefit from continued PT in short term rehab to promote return to prior level of mobility allowing him to return home with family   PT Equipment Needed at Discharge walker, rolling;walker, standard

## 2023-10-20 NOTE — PROGRESS NOTES
[facility]    Medicine Progress Note - Hospitalist Service    Date of Admission:  10/6/2023    Assessment & Plan      NSTEMI  Troponin continues to rise.    We will plan to discontinue heparin drip tomorrow  Troponin has not peaked yet  We will continue medical management    Sepsis due to Streptococcus species without acute organ dysfunction (H)  Cellulitis bilateral lower legs  Pseudomonas putida bacteremia    Assessment: Improving  I will continue to monitor     Active Problems:    Chronic bilateral low back pain with bilateral sciatica    Assessment: Stable  Pain control       Type 2 diabetes mellitus with stage 3b chronic kidney disease (H)    Assessment: Stable  Blood sugar control       Gastroesophageal reflux disease without esophagitis    Assessment: Stable    Plan: Continue Protonix.         Benign essential hypertension    Assessment: Stable  Blood pressure control     hypothyroidism due to acquired atrophy of thyroid    Assessment: Stable  Continue Synthroid       Mixed hyperlipidemia    Assessment: Stable         S/P CABG x 2    Assessment: Stable  No diet       Atherosclerosis of native coronary artery of native heart with stable angina pectoris (H24)    Assessment: Stable    Plan: As above.       Venous stasis dermatitis of left lower extremity    Assessment: Stable  Continue Cipro       Peripheral vascular disease, unspecified (H24)    Assessment: Stable    Plan: As above regarding venous stasis and CAD.       Chronic pain syndrome    Assessment: Stable    Plan: Has had several back surgeries, essentially his entire back is metal per his daughter.  Tolerating oxycodone for pain.  Increase activity with therapies.       Bilateral lower leg cellulitis    Assessment: Improving  Wound care  Antibiotics      Severe malnutrition    Assessment: Stable  Encourage oral       Narcolepsy    Assessment: Stable     Diet: Regular Diet Adult  Snacks/Supplements Adult: Ensure Enlive; With Meals   "  DVT Prophylaxis: Heparin SQ  Loja Catheter: Not present  Lines: None     Cardiac Monitoring: None  Code Status: Full Code          Clinically Significant Risk Factors          # Hypokalemia: Lowest K = 3 mmol/L in last 2 days, will replace as needed       # Hypoalbuminemia: Lowest albumin = 1.2 g/dL at 10/6/2023  3:45 AM, will monitor as appropriate     # Hypertension: Noted on problem list       # DMII: A1C = 9.6 % (Ref range: 4.0 - 6.2 %) within past 6 months   # Obesity: Estimated body mass index is 31.09 kg/m  as calculated from the following:    Height as of this encounter: 1.702 m (5' 7\").    Weight as of this encounter: 90 kg (198 lb 8 oz).                Disposition Plan             Joes Golden MD  Hospitalist Service  [facility]  Securely message with [email or web address] (more info)  Text page via [employer] Paging/Directory   ______________________________________________________________________    Interval History   Troponin continues to rise.  Patient appears stable.  Plan is to transfer to skilled facility on Monday    Physical Exam   Vital Signs: Temp: 97.5  F (36.4  C) Temp src: Tympanic BP: 129/64 Pulse: 85   Resp: 18 SpO2: 95 % O2 Device: None (Room air)    Weight: 198 lbs 8 oz    General: Patient laying in bed not in acute distress  Respiratory: Diminished air entry to both of his atrial fibrillation  Cardiovascular: Regular rate  GI: Abdomen soft positive bowel sounds        35 minutes spent by me on the date of service doing chart review  History  , examination, documentation and further activities per the note    Data     "

## 2023-10-20 NOTE — PLAN OF CARE
"  Problem: Plan of Care - These are the overarching goals to be used throughout the patient stay.    Goal: Plan of Care Review  Description: The Plan of Care Review/Shift note should be completed every shift.  The Outcome Evaluation is a brief statement about your assessment that the patient is improving, declining, or no change.  This information will be displayed automatically on your shift note.  Outcome: Progressing  Flowsheets (Taken 10/20/2023 0521)  Outcome Evaluation: Pt A/O to self and sometimes place and situation. Many conversations this shift related to passing and when it is \"his time to go.\"  Cooperative this shift. VSS. Denied any chest pain. Tele in place. Heparin gtt has remained at 500 units/hr, last aPTT was 69, which is within goal range. Lab currently drawing recheck. C/O pain in legs and feet and received Oxycodone and Tylenol X2 this shift with relief. Dressing changes completed before HS and pt tolerated well. CCRQ2 hrs throughout night.  Plan of Care Reviewed With: patient  Overall Patient Progress: no change  Goal: Absence of Hospital-Acquired Illness or Injury  Intervention: Identify and Manage Fall Risk  Recent Flowsheet Documentation  Taken 10/19/2023 1929 by Charlotte Brown RN  Safety Promotion/Fall Prevention:   clutter free environment maintained   safety round/check completed   room door open   room near nurse's station   supervised activity  Intervention: Prevent and Manage VTE (Venous Thromboembolism) Risk  Recent Flowsheet Documentation  Taken 10/19/2023 1929 by Charlotte Brown RN  VTE Prevention/Management: patient refused intervention  Intervention: Prevent Infection  Recent Flowsheet Documentation  Taken 10/19/2023 1929 by Charlotte Brown RN  Infection Prevention: single patient room provided  Goal: Optimal Comfort and Wellbeing  Intervention: Monitor Pain and Promote Comfort  Recent Flowsheet Documentation  Taken 10/20/2023 0244 by Charlotte Brown RN  Pain " Management Interventions:   declines   repositioned  Intervention: Provide Person-Centered Care  Recent Flowsheet Documentation  Taken 10/19/2023 1929 by Charlotte Brown RN  Trust Relationship/Rapport:   care explained   choices provided   emotional support provided   empathic listening provided     Problem: Sepsis/Septic Shock  Goal: Blood Glucose Level Within Targeted Range  Intervention: Optimize Glycemic Control  Recent Flowsheet Documentation  Taken 10/19/2023 2145 by Charlotte Brown, RN  Glycemic Management:   blood glucose monitored   supplemental insulin given  Goal: Absence of Infection Signs and Symptoms  Intervention: Initiate Sepsis Management  Recent Flowsheet Documentation  Taken 10/19/2023 1929 by Charlotte Brown, RN  Infection Prevention: single patient room provided  Isolation Precautions: contact precautions maintained  Intervention: Promote Recovery  Recent Flowsheet Documentation  Taken 10/19/2023 1929 by Charlotte Brown RN  Activity Management:   activity adjusted per tolerance   back to bed     Problem: Sepsis/Septic Shock  Goal: Blood Glucose Level Within Targeted Range  Intervention: Optimize Glycemic Control  Recent Flowsheet Documentation  Taken 10/19/2023 2145 by Charlotte Brown, RN  Glycemic Management:   blood glucose monitored   supplemental insulin given  Goal: Absence of Infection Signs and Symptoms  Intervention: Initiate Sepsis Management  Recent Flowsheet Documentation  Taken 10/19/2023 1929 by Charlotte Brown, RN  Infection Prevention: single patient room provided  Isolation Precautions: contact precautions maintained  Intervention: Promote Recovery  Recent Flowsheet Documentation  Taken 10/19/2023 1929 by Charlotte Brown, RN  Activity Management:   activity adjusted per tolerance   back to bed     Problem: Diabetes Comorbidity  Goal: Blood Glucose Level Within Targeted Range  Intervention: Monitor and Manage Glycemia  Recent Flowsheet Documentation  Taken  "10/19/2023 2145 by Charlotte Brown, RN  Glycemic Management:   blood glucose monitored   supplemental insulin given   Goal Outcome Evaluation:      Plan of Care Reviewed With: patient    Overall Patient Progress: no changeOverall Patient Progress: no change    Outcome Evaluation: Pt A/O to self and sometimes place and situation. Many conversations this shift related to passing and when it is \"his time to go.\"  Cooperative this shift. VSS. Denied any chest pain. Tele in place. Heparin gtt has remained at 500 units/hr, last PTT was 69, which is within goal range. Lab currently drawing recheck. C/O pain in legs and feet and received Oxycodone and Tylenol X2 this shift with relief. Dressing changes completed before HS and pt tolerated well. CCRQ2 hrs throughout night.      "

## 2023-10-20 NOTE — PROGRESS NOTES
Interdisciplinary Discharge Planning Note    Anticipated Discharge Date:10/20    Anticipated Discharge Location: [facility]     Clinical Needs Before Discharge:  stable cardiac status (angina, heart failure, arrhythmia)    Treatment Needs After Discharge:  rehab (PT, OT, ST)    Potential Barriers to Discharge: None Identified     PETER Bee  10/20/2023,  12:56 PM

## 2023-10-21 NOTE — PROGRESS NOTES
[facility]    Medicine Progress Note - Hospitalist Service    Date of Admission:  10/6/2023    Assessment & Plan      NSTEMI  Troponin continues to rise.    Off heparin drip  We will continue to monitor    Sepsis due to Streptococcus species without acute organ dysfunction (H)  Cellulitis bilateral lower legs  Pseudomonas putida bacteremia    Assessment: Improving  I will continue to monitor     Active Problems:    Chronic bilateral low back pain with bilateral sciatica    Assessment: Stable  Pain control       Type 2 diabetes mellitus with stage 3b chronic kidney disease (H)    Assessment: Stable  Blood sugar control       Gastroesophageal reflux disease without esophagitis    Assessment: Stable    Plan: Continue Protonix.         Benign essential hypertension    Assessment: Stable  Blood pressure control     hypothyroidism due to acquired atrophy of thyroid    Assessment: Stable  Continue Synthroid       Mixed hyperlipidemia    Assessment: Stable         S/P CABG x 2    Assessment: Stable  No diet       Atherosclerosis of native coronary artery of native heart with stable angina pectoris (H24)    Assessment: Stable    Plan: As above.       Venous stasis dermatitis of left lower extremity    Assessment: Stable  Continue Cipro       Peripheral vascular disease, unspecified (H24)    Assessment: Stable    Plan: As above regarding venous stasis and CAD.       Chronic pain syndrome    Assessment: Stable    Plan: Has had several back surgeries, essentially his entire back is metal per his daughter.  Tolerating oxycodone for pain.  Increase activity with therapies.       Bilateral lower leg cellulitis    Assessment: Improving  Wound care  Antibiotics      Severe malnutrition    Assessment: Stable  Encourage oral       Narcolepsy    Assessment: Stable     Diet: Regular Diet Adult  Snacks/Supplements Adult: Ensure Enlive; With Meals    DVT Prophylaxis: Heparin SQ  Loja Catheter: Not present  Lines:  "None     Cardiac Monitoring: None  Code Status: Full Code          Clinically Significant Risk Factors          # Hypokalemia: Lowest K = 3 mmol/L in last 2 days, will replace as needed       # Hypoalbuminemia: Lowest albumin = 1.2 g/dL at 10/6/2023  3:45 AM, will monitor as appropriate     # Hypertension: Noted on problem list       # DMII: A1C = 9.6 % (Ref range: 4.0 - 6.2 %) within past 6 months   # Obesity: Estimated body mass index is 31.09 kg/m  as calculated from the following:    Height as of this encounter: 1.702 m (5' 7\").    Weight as of this encounter: 90 kg (198 lb 8 oz).                Disposition Plan             Jose Golden MD  Hospitalist Service  [facility]  Securely message with [employer] (more info)  Text page via [employer] Paging/Directory   ______________________________________________________________________    Interval History   Troponin continues to rise.  Patient appears stable.  Plan is to transfer to skilled facility on Monday    Physical Exam   Vital Signs: Temp: 97.1  F (36.2  C) Temp src: Tympanic BP: (!) 146/73 Pulse: 80   Resp: 18 SpO2: 98 % O2 Device: None (Room air)    Weight: 186 lbs 9.6 oz    General: Patient laying in bed not in acute distress  Respiratory: Diminished air entry to both of his atrial fibrillation  Cardiovascular: Regular rate  GI: Abdomen soft positive bowel sounds        35 minutes spent by me on the date of service doing chart review  History  , examination, documentation and further activities per the note    Data     "

## 2023-10-21 NOTE — PLAN OF CARE
"Patient appears in good spirits, singing and interacting with staff.  Appears intermittently confused, disoriented.  Cooperative with evening medications, blood glucose and cares.  Will likely discharge Monday am to [facility] in [place], wife will transport.  1 unit of novolog given for blood glucose of 189.    /61 (BP Location: Right arm)   Pulse 71   Temp 97.9  F (36.6  C) (Tympanic)   Resp 16   Ht 1.702 m (5' 7\")   Wt 84.6 kg (186 lb 9.6 oz)   SpO2 95%   BMI 29.23 kg/m          Goal Outcome Evaluation:           Overall Patient Progress: no changeOverall Patient Progress: no change    Outcome Evaluation: Pt very confused at times, singing and illogical speech.      "

## 2023-10-21 NOTE — PROGRESS NOTES
"   10/21/23 5999   Appointment Info   Signing Clinician's Name / Credentials (OT) Daxa Das OTR/L   Self-Care/Home Management   Self-Care/Home Mgmt/ADL, Compensatory, Meal Prep Minutes (11344) 10   Treatment Detail/Skilled Intervention set up assistance and verbal cues for initiation of self feeding today. Once patient initiated, he was able to continue on his wife. Wife present in the room to supervise.   Therapeutic Activities   Therapeutic Activity Minutes (72182) 30   Symptoms noted during/after treatment fatigue   Treatment Detail/Skilled Intervention Pt pleasant upon arrival. Is agreeable to therapy activities. Patient requires mod A for supine to sit EOB this date with increased time required for processing. Patient tells therapists \"you guys are great\" several times in visit. Does at times state \"wait\" when feeling he needs increased time to process request. Verbal, visual and tactile cues for transfer technique. Heavy L lean while seated edge of bed this date; however, is able to spontaneously lean and reach towards R side. Patient benefits from increased time and concise directions. Patient sat edge of bed for 25 minutes. Patient on air bed and began sliding towards edge, so transfer to chair not completed this date as patient having difficulty initiating sit to stand movements. He does demonstrate ability to reach and slide feet back as needed to complete transfer.   OT Discharge Planning   OT Plan cont OT   OT Discharge Recommendation (DC Rec) Transitional Care Facility   OT Rationale for DC Rec Patient is not at baseline.He will benefit from daily therapy activities to improve strength and safety with ADLs.   OT Brief overview of current status Pleasant throughout most of visit, does tell therapists to \"wait\" at times when he does not feel ready. Difficulty motor planning sit to stand motion this date. Able to tolerate sitting edge of bed while supporting self with UEs this date.   Total Session " Time   Timed Code Treatment Minutes 40   Total Session Time (sum of timed and untimed services) 40

## 2023-10-21 NOTE — PROGRESS NOTES
"Staff was attempting to check the patient's blood sugar. Patient was refusing multiple times. Multiple staff members attempted. Patient yelled and swore at staff. Patient stated that it hurt and he feels lousy. When asked what hurt the patient stated \"his ass\" when staff offered to reposition him the patient stated to not touch him and to leave him alone. Staff attempted to place a pillow under the patient and the patient yelled and swore at there staff member. Patient was offered pain medication multiple times. The patient refused pain meds and and told the staff to leave and leave him alone. Staff attempted to explain the importance of checking the blood sugar, repositioning, and pain control. Staff also told the patient that he could be feeling lousy because of his blood sugar and that is why it needs to be checked. Patient stated that staff cannot help him and to leave him alone.   "

## 2023-10-21 NOTE — PROGRESS NOTES
"   10/21/23 1500   Appointment Info   Signing Clinician's Name / Credentials (PT) Stephan Quevedo DPT   Therapeutic Activity   Therapeutic Activities: dynamic activities to improve functional performance Minutes (07798) 40   Symptoms Noted During/After Treatment Fatigue;Increased pain   Treatment Detail/Skilled Intervention Supine to sit with MOD A. Needed cuing for directions and processing today. Sitting EOB with increased L sided trunk lean, corrected with verbal and tactile cuing 50% of time. Sit to stand attempts with increased LE pain, frequent stops with pt wanting to \"wait\". Began sliding off bed after sitting EOB for 25 minutes. Returned to bed with MOD A with boost needed.   PT Discharge Planning   PT Plan continue PT   PT Discharge Recommendation (DC Rec) Transitional Care Facility   PT Rationale for DC Rec to promote strength, stability and safe mobility allowing patient to return home with family   PT Brief overview of current status Pt pleasant with PT and OT but needed encouragement and cuing for all activity today. Pt did not stand with therapy during attempts to get to recliner however did remain upright at EOB for extended period of time.. Challenged motor planning today.   Total Session Time   Timed Code Treatment Minutes 40   Total Session Time (sum of timed and untimed services) 40       "

## 2023-10-21 NOTE — PLAN OF CARE
Patient was pleasant at times. Vitals are stable. Patient needed extra encouragement to take meds at time. Patient refused repositioning. Patient was refusing to let staff touch him at times (see previous note for details). Patient's blood sugar was at 45 at HS and patient refused oral glucose or food (see previous note for more details). Patient was encouraged to drink orange juice and patient stated that he wanted a chocolate bar. Patient would complain of pain and Prn meds were utilized and were minimally effective. Patient refused to let staff replace telemetry pads. No new concerns at this time.         Goal Outcome Evaluation:      Plan of Care Reviewed With: patient    Overall Patient Progress: no changeOverall Patient Progress: no change

## 2023-10-21 NOTE — PLAN OF CARE
Goal Outcome Evaluation:       PTT is normal.Heparin is off.  Managing chronic pain.  Discharge still on for Monday.

## 2023-10-21 NOTE — PROGRESS NOTES
"Patient was complaining of pain and Roxanol was offered because patient only has one IV which is infusing heparin and the patient was refusing a second IV. Patient stated he would take Roxanol. When staff entered with the medication the patient stated \"wait\" patient would groan in pain and whenever staff offered the med the patient would tell them to wait. Patient would continue to moan and when staff would ask them if they wanted the med the patient would say yes but when staff offered the med to the patient the patient told them to wait. Patient took the pain med after about 30 minutes.     Patient was also refusing repositioning. Patient stated that he can do it himself and if staff offered to help him he would yell at staff stating \"don't touch me\". Staff educated the patient on the importance of repositioning.   "

## 2023-10-21 NOTE — PROGRESS NOTES
Patient's HS blood sugar was 45. Patient was given Orange juice and had a chocolate bar. Patient did not want oral glucose. Patient was telling staff to wait when they attempted to recheck his blood sugar. Staff attempted for about 15 minutes, then the patient allowed staff to check blood sugar. Upon recheck the blood sugar came up to 62 and the patient refused juice or food. Patient stated that he wanted a chocolate bar.

## 2023-10-22 NOTE — PLAN OF CARE
"Patient is disoriented to time and situation. Patient allowed staff to get one set of vitals at the beginning of the shift. Patient was soaked in urine and when staff was attempting to change the patient and the patient was arguing with staff that he did not need to be change. Patient stated that he did not need to be changed and that it can wait a week. Staff told the patient that it cannot wait because the moisture will cause skin break down. The patient stated \"how do you know that will happen\" and staff stated that they have seen it happen before. The patient stated that staff could change him. Patient slept on and off throughout the night. No new concerns at this time.    Goal Outcome Evaluation:      Plan of Care Reviewed With: patient    Overall Patient Progress: no changeOverall Patient Progress: no change           "

## 2023-10-22 NOTE — PLAN OF CARE
PT/OT attempted to see patient. Pt busy throughout the day with nursing, eating, or sleeping at various times. Will continue to follow.

## 2023-10-22 NOTE — PROGRESS NOTES
[facility]    Medicine Progress Note - Hospitalist Service    Date of Admission:  10/6/2023    Assessment & Plan     NSTEMI  -Continue medical management  -Possible discharge tomorrow     Sepsis due to Streptococcus species without acute organ dysfunction (H)  Cellulitis bilateral lower legs  Pseudomonas putida bacteremia    Assessment: Improving  I will continue to monitor     Active Problems:    Chronic bilateral low back pain with bilateral sciatica    Assessment: Stable  Pain control       Type 2 diabetes mellitus with stage 3b chronic kidney disease (H)    Assessment: Stable  Blood sugar control       Gastroesophageal reflux disease without esophagitis    Assessment: Stable    Plan: Continue Protonix.         Benign essential hypertension    Assessment: Stable  Blood pressure control      hypothyroidism due to acquired atrophy of thyroid    Assessment: Stable  Continue Synthroid       Mixed hyperlipidemia    Assessment: Stable          S/P CABG x 2    Assessment: Stable  No diet       Atherosclerosis of native coronary artery of native heart with stable angina pectoris (H24)    Assessment: Stable    Plan: As above.       Venous stasis dermatitis of left lower extremity    Assessment: Stable  Continue Cipro       Peripheral vascular disease, unspecified (H24)    Assessment: Stable    Plan: As above regarding venous stasis and CAD.       Chronic pain syndrome    Assessment: Stable    Plan: Has had several back surgeries, essentially his entire back is metal per his daughter.  Tolerating oxycodone for pain.  Increase activity with therapies.       Bilateral lower leg cellulitis    Assessment: Improving  Wound care  Antibiotics       Severe malnutrition    Assessment: Stable  Encourage oral       Narcolepsy    Assessment: Stable        Diet: Regular Diet Adult  Snacks/Supplements Adult: Ensure Enlive; With Meals    DVT Prophylaxis: Heparin SQ  Loja Catheter: Not present  Lines: None    "  Cardiac Monitoring: None  Code Status: No CPR- Do NOT Intubate      Clinically Significant Risk Factors        # Hyperkalemia: Highest K = 5.4 mmol/L in last 2 days, will monitor as appropriate       # Hypoalbuminemia: Lowest albumin = 1.2 g/dL at 10/6/2023  3:45 AM, will monitor as appropriate     # Hypertension: Noted on problem list       # DMII: A1C = 9.6 % (Ref range: 4.0 - 6.2 %) within past 6 months   # Overweight: Estimated body mass index is 29.74 kg/m  as calculated from the following:    Height as of this encounter: 1.702 m (5' 7\").    Weight as of this encounter: 86.1 kg (189 lb 14.4 oz).             Disposition Plan plan is to discharge tomorrow to skilled       Jose Golden MD  Hospitalist Service  [facility]  Securely message with [employer] (more info)  Text page via [employer] Paging/Directory   ______________________________________________________________________    Interval History   Patient doing fairly okay.  Looking forward to discharging tomorrow.  Chest pain improved.  Patient considering hospice.  This could be done in the skilled nursing facility.    Physical Exam   Vital Signs: Temp: 98.7  F (37.1  C) Temp src: Tympanic BP: 135/64 Pulse: 85   Resp: 20 SpO2: 91 % O2 Device: None (Room air)    Weight: 189 lbs 14.4 oz    General: Patient lying in bed not in acute distress  Respiratory: Good air entry bilaterally without any added sound  Cardiovascular: Regular rate and rhythm with first and second heart sound  GI: Abdomen is soft with positive bowel sounds  Neuro: Awake and oriented times    Medical Decision Making       40 MINUTES SPENT BY ME on the date of service doing chart review, history, exam, documentation & further activities per the note.      Data     "

## 2023-10-22 NOTE — PROGRESS NOTES
Patient premedicated for dressing change with Oxycodone 5 mg. Attempted to complete dressing change x3 and patient adamantly refused. Began yelling when educating patient about the risks of declining dressing changes.

## 2023-10-23 PROBLEM — G93.41: Status: ACTIVE | Noted: 2023-01-01

## 2023-10-23 PROBLEM — I21.4 NSTEMI (NON-ST ELEVATED MYOCARDIAL INFARCTION) (H): Status: ACTIVE | Noted: 2023-01-01

## 2023-10-23 PROBLEM — N17.9 ACUTE KIDNEY INJURY SUPERIMPOSED ON CHRONIC KIDNEY DISEASE (H): Status: ACTIVE | Noted: 2023-01-01

## 2023-10-23 PROBLEM — R65.20: Status: ACTIVE | Noted: 2023-01-01

## 2023-10-23 PROBLEM — N18.9 ACUTE KIDNEY INJURY SUPERIMPOSED ON CHRONIC KIDNEY DISEASE (H): Status: ACTIVE | Noted: 2023-01-01

## 2023-10-23 NOTE — PLAN OF CARE
VSS and recorded. Patient resistant with repositioning today. Patient educated on the risk vs benefits of repositioning. Continues to be on air mattress. Patient refused dressing change today. Afebrile.

## 2023-10-23 NOTE — PROGRESS NOTES
SAFETY CHECKLIST  ID Bands and Risk clasps correct and in place (DNR, Fall risk, Allergy, Latex, Limb):  Yes  All Lines Reconciled and labeled correctly: Yes  Whiteboard updated:Yes  Environmental interventions: Yes  Verify Tele #:  SAYDA Sylvester RN on 10/23/2023 at 7:40 AM

## 2023-10-23 NOTE — PLAN OF CARE
Goal Outcome Evaluation:      Patient disoriented x3, VSS. Afebrile, Denies pain. Patient has been falling asleep during cares and medications. Patient refusing dressing change this morning. Patient appetite is good. Primo-fit in place, patient incontinent of urine. Wife is at bedside.     Plan of Care Reviewed With: patient, spouse    Overall Patient Progress: no changeOverall Patient Progress: no change    Outcome Evaluation: Patient disoriented x3, continues to refuse cares at time.

## 2023-10-23 NOTE — PROGRESS NOTES
Clinical Nutrition / Reassessment     Assessment:   Client History:  tolerating diet, continue with supplements as his intakes are variable. Planning to transfer for cardiac care.   Diet Order:  regular   Oral Intake:  0-75% most meals  Supplement Intake:  continue with Ensure TID to trays  Weight:   Vitals:    10/06/23 0322 10/07/23 0515 10/08/23 0641 10/09/23 0338   Weight: 89.8 kg (198 lb) 90.8 kg (200 lb 1.6 oz) 94.6 kg (208 lb 9.6 oz) 96.8 kg (213 lb 4.8 oz)    10/10/23 0157 10/10/23 0200 10/11/23 0017 10/12/23 0549   Weight: 94.1 kg (207 lb 8 oz) 86.7 kg (191 lb 3.2 oz) 93.4 kg (206 lb) 89.5 kg (197 lb 6.4 oz)    10/14/23 0358 10/15/23 0600 10/16/23 0031 10/18/23 0613   Weight: 90 kg (198 lb 8 oz) 86.5 kg (190 lb 12.8 oz) 89.9 kg (198 lb 1.6 oz) 90 kg (198 lb 8 oz)    10/21/23 0127 10/22/23 0153 10/23/23 0230   Weight: 84.6 kg (186 lb 9.6 oz) 86.1 kg (189 lb 14.4 oz) 85.4 kg (188 lb 4.8 oz)       Malnutrition Criteria:  (Need to have 2 indicators to qualify recommendation)  Energy Intake:  Acute Severe: </= 50% of estimated energy requirement for >/= 5 days-improving   Interpretation of Weight Loss:  No significant weight loss  Physical Findings:  Acute Fluid Accumulation:  moderate to severe-improving   Reduced  Strength:  Not Measured    Recommended Nutrition Diagnosis:   Severe Malnutrition in the context of acute illness or injury - based on AND/ASPEN Clinical Characterstics of Malnutrition May 2012  Malnutrition:    - Level of malnutrition: Severe - improving      Nutrition Education: Nutrition education will be provided as appropriate.    Nutrition Diagnosis: Nutrient Intake:  inadequate energy intakes  related to decreased appetite and cooperativeness as evidenced by refusal of meals at times, poor intakes- improving    Intervention:  Nutrition Prescription:     Nutrition Intervention(s):Recommended general, healthful diet  1. Meals and Snacks: small/frequent meals/snacks  2. Medical Food  Supplement: Ensure TID on trays    Nutrition Goal(s):  1. Pt will consume 50% or more at meals and supplements - on going  2. Pt will allow staff to assist at meals/snacks -met, on going  3. Pt will tolerate diet as ordered - met, on going    Monitoring and Evaluation:   Food Intake, diet tolerance     Discharge Recommendation:   Nutrition Discharge Planning  Recommend supplements on discharge if intakes remain less than 50% at meals    RD will reassess in within 1-5 days or sooner.  Maria Antonia Dunne RD on 10/23/2023 at 2:28 PM

## 2023-10-23 NOTE — DISCHARGE SUMMARY
"[facility]  Hospitalist Discharge Summary      Date of Admission:  10/6/2023  Date of Discharge:  10/23/2023  Discharging Provider: Cuco Mathis MD  Discharge Service: Hospitalist Service    Discharge Diagnoses   Principal Problem:    Sepsis due to Streptococcus species with encephalopathy without septic shock (H)  Active Problems:    Chronic bilateral low back pain with bilateral sciatica    Type 2 diabetes mellitus with stage 3b chronic kidney disease, with long-term current use of insulin (H)    Gastroesophageal reflux disease without esophagitis    Benign essential hypertension    Hypothyroidism due to acquired atrophy of thyroid    Mixed hyperlipidemia    Primary narcolepsy without cataplexy    S/P CABG x 2    Atherosclerosis of native coronary artery of native heart with stable angina pectoris (H24)    Venous stasis dermatitis of left lower extremity    Peripheral vascular disease, unspecified (H24)    Chronic pain syndrome    Bilateral lower leg cellulitis    Severe malnutrition (H24)    NSTEMI (non-ST elevated myocardial infarction) (H)    Acute kidney injury superimposed on chronic kidney disease       Clinically Significant Risk Factors     # DMII: A1C = 9.6 % (Ref range: 4.0 - 6.2 %) within past 6 months  # Overweight: Estimated body mass index is 29.49 kg/m  as calculated from the following:    Height as of this encounter: 1.702 m (5' 7\").    Weight as of this encounter: 85.4 kg (188 lb 4.8 oz).       Follow-ups Needed After Discharge   As per [facility]    Discharge Disposition   Transferred to [facility]  Condition at discharge: Stable    Hospital Course   Moses Whittaker is a 73 year old male who presented for leg pain. He was initially at [facility] ED. Transferred to [facility] for admission. Patient cannot give history due to his altered mental status.  Found to have sepsis due to bilateral cellulitis.  Started on broad-spectrum antibiotics.  Blood " cultures eventually returned positive for Pseudomonas putida and strep species. Over time his encephalopathy cleared.  Completed 10 days of antibiotics.  He was awaiting placement at an SNF when he he developed chest pain and was found to have an elevated troponin.  Initially declined transfer to [place] for cardiac cares on 10/19.  Troponin continues to increase. Echocardiogram with reduced EF on 10/23. He now accepts transfer.    NSTEMI   Noted on 10/19 with chest pain. Initial troponin 766.  Accepted for transfer to [place], but he declined transfer.   Treated with 48 hours of heparin drip. Now on aspirin and clopidogrel, which were home medications.  Troponin has continued to increase up to 2254 today.  Echocardiogram completed today showing an EF of 35%.  This is a new reduction in EF compared to 9/28 echocardiogram at [facility] with normal EF of 55 to 60%.  He continues to have chest pain.  Known underlying CAD with previous stenting and CABG.  I discussed the findings with the patient and his wife.  He does not want a decline in his cardiac status and is not ready to die, but was initially declining transfer.  After further conversation, patient excepts transfer and likely angiogram.  His wife is supportive of transfer for cardiology evaluation and services.  Called [facility] and discussed with cardiology, Dr Pendleton, who notes that cardiac cath is indicated. Dr Mendoza accepting patient in transfer.   We greatly appreciate assistance of [facility] in care of this patient.       Atherosclerosis of native coronary artery of native heart with stable angina pectoris     S/P CABG x 2  Ongoing chest pain  Ranexa has been restarted. Remains on home isosorbide, metoprolol.  Home nifedipine and irbesartan have been on hold during hospitalization and will need to be addressed after cardiac cath.  Patient stopped statin at home.  Has refused PCSK9 inhibitor per cardiology note 9/2823    Sepsis  due to Streptococcus species with encephalopathy without septic shock (H)   Cellulitis bilateral lower legs with leg ulceration  Pseudomonas putida bacteremia  See initial pictures in media tab.   Pseudomonas putida is sensitive to Cipro. Completed 10 days of antibiotics on 10/16.  Completed 10 days of abx on 10/16.  Encephalopathy resolved and he is able to participate in conversation more every day.  Due to his severe illness and time in bed, he will require rehab prior to being able to possibly return home.  He was medically ready for discharge, was just awaiting placement at [facility], when the NSTEMI occurred.   Continue wound care      Type 2 diabetes mellitus with stage 3b chronic kidney disease (H)  Uncontrolled with an A1c of 9.6 on admission.  Sugars improving with all values under 200 with increased Lantus to 22 units daily 10/13. Had some lower sugars overnight 10/16-17 so insulin held.    Since then sugars . Reduce Lantus to 20 units.  Continue to cover carbs and with sliding scale if needed.      Acute kidney injury superimposed on chronic kidney disease stage 3b  Initial creatinine 3.5.  Has improved to 2.9 during hospitalization.  Baseline appears to be around 2.2, although most recently was 2.5 when checked by primary on 10/3.    Hyperkalemia  Not POA. Potassium 6.0 on 10/23.  He was actually hypokalemic on admission at 3.3.  Has been receiving scheduled potassium, which is now discontinued.  EKG shows no peaked T waves.  Recheck this afternoon down to 5.8 only by holding oral potassium.      Anemia  Chronic, admit at 10.4. Stable past 6 days around 7.3.  Listed as iron deficiency anemia under [facility].       Gastroesophageal reflux disease without esophagitis  Stable. Continue Protonix.         Benign essential hypertension  Blood pressure controlled on current regimen.   On doxazosin which is also being used for urinary retention. Dose increased from 2 to 4 mg while  hospitalized.  Continues on home metoprolol dose.  Home nifedipine and irbesartan have been on hold since admission due to normal blood pressure. These will need to be addressed depending on cardiac intervention as above.       Hypothyroidism due to acquired atrophy of thyroid  Stable. TSH very elevated to 21.44 at the time of admission during acute illness but this may be contributing to his lethargy, would not be expected to account for the encephalopathy.  T4 and T3 also low so increased his dose of levothyroxine.  Last chart results had TSH 6.41 in March 2023.  Family reports he does not take his medications very regularly so this may account for some of the increase in his TSH.  Recommend recheck at future clinic follow up       Mixed hyperlipidemia    Assessment: Stable    Plan: Last lipids from January 2018 with , chol 119, HDL 31, LDL 9.  Would not recommend starting any medications now.       Venous stasis dermatitis of left lower extremity  Cellulitis of bilateral lower legs and left upper leg/hip/thigh resolved on antibiotics.       Peripheral vascular disease, unspecified (H24)  On aspirin and clopidogrel       Chronic pain syndrome    Chronic bilateral low back pain with bilateral sciatica  Has had several back surgeries, essentially his entire back is metal per his daughter. Tylenol and oxycodone working well, has morphine liquid as needed, has been taking his Cymbalta.  Pain is better with current medications. Was able to participate with therapies, but his desire to participate was variable.        Severe malnutrition  Noted during admission by nutrition. Taking supplements. Eating small amounts and not refusing cares.        Narcolepsy  Chronic. Had not been receiving his Ritalin initially as he just mentioned this diagnosis on 10/17, so it was restarted at that time.     Consultations This Hospital Stay   OCCUPATIONAL THERAPY ADULT IP CONSULT  PHYSICAL THERAPY ADULT IP CONSULT  PHARMACY TO  DOSE VANCO  SOCIAL WORK IP CONSULT  SOCIAL WORK IP CONSULT  SOCIAL WORK IP CONSULT  PHARMACY IP CONSULT  SOCIAL WORK IP CONSULT    Code Status   No CPR- Do NOT Intubate    Time Spent on this Encounter   I, Cuco Mathis MD, personally saw the patient today and spent greater than 70 minutes discharging this patient.       Cuco Mathis MD  [facility]  [address]  [place] [address]  Phone: [phone]  Fax: [phone]  ______________________________________________________________________    Physical Exam   Vital Signs: Temp: 97.8  F (36.6  C) Temp src: Tympanic BP: 110/62 Pulse: 81   Resp: 20 SpO2: 90 % O2 Device: None (Room air)    Weight: 188 lbs 4.8 oz  General Appearance: Alert. No acute distress  Chest/Respiratory Exam: Clear to auscultation bilaterally  Cardiovascular Exam: Regular rate and rhythm. S1, S2, no murmur, gallop, or rubs.  Gastrointestinal Exam: Soft, nontender  Extremities: Trace pitting lower extremity edema.  Skin: Both legs with dressings in place.  Psychiatric: Normal affect and mentation       Primary Care Physician   Physician No Ref-Primary    Discharge Orders   No discharge procedures on file.    Significant Results and Procedures   Most Recent 3 CBC's:  Recent Labs   Lab Test 10/23/23  0538 10/19/23  0829 10/19/23  0135   WBC 5.3 6.1 7.3   HGB 7.3* 7.4* 7.7*   * 100 99    361 373     Most Recent 3 BMP's:  Recent Labs   Lab Test 10/23/23  1328 10/23/23  1205 10/23/23  0804 10/23/23  0538 10/22/23  1119 10/22/23  0910 10/19/23  0802 10/19/23  0135 10/17/23  1129 10/17/23  0814   NA  --   --   --  138  --   --   --  138  --  140   POTASSIUM 5.8*  --   --  6.0*  6.0*  --  5.4*   < > 4.3   < > 3.0*   CHLORIDE  --   --   --  104  --   --   --  104  --  105   CO2  --   --   --  22  --   --   --  22  --  25   BUN  --   --   --  34.4*  --   --   --  52.3*  --  58.8*   CR  --   --   --  2.87*  --   --   --  2.94*  --  3.39*    ANIONGAP  --   --   --  12  --   --   --  12  --  10   RYAN  --   --   --  8.4*  --   --   --  8.0*  --  7.7*   GLC  --  121* 155* 157*   < >  --    < > 191*   < > 170*    < > = values in this interval not displayed.     Most Recent 2 LFT's:  Recent Labs   Lab Test 10/23/23  0538 10/17/23  0814   AST 32 22   ALT 12 8   ALKPHOS 278* 224*   BILITOTAL 0.8 1.0   ,   Results for orders placed or performed during the hospital encounter of 10/06/23   US Lower Extremity Venous Duplex Left    Narrative    Exam:US LOWER EXTREMITY VENOUS DUPLEX LEFT    History: Leg pain and swelling    Comparisons: None    Technique: Venous duplex ultrasonography of the left lower extremity  was performed. Study was limited by the patient's inability to  cooperate.     Findings: The common femoral vein, superficial femoral vein and  popliteal vein are fully compressible with spontaneous and augmentable  venous flow.           Impression    Impression: No evidence of deep venous thrombosis within the left  lower extremity.    IOANA NIEVES MD         SYSTEM ID:  [id number]   US Abdomen Limited    Narrative    PROCEDURES: US ABDOMEN LIMITED    HISTORY: elevated direct bili, here with sepsis (cellulitis) and  bacteremia    TECHNIQUE: Grayscale ultrasound of the upper abdomen was performed.    COMPARISON: none     FINDINGS:    MEASUREMENTS:   Liver length:  16 cm.   Common duct diameter:  0.7 cm.    LIVER: The liver is free of masses. No gallstones are seen. There is  no biliary ductal enlargement.      ABDOMINAL AORTA AND IVC: The mid and distal portions of the aorta was  obscured by bowel gas. The inferior vena cava is patent        PANCREAS: Pancreas was obscured by bowel gas    Right KIDNEY: No right renal masses or hydronephrosis is seen.    OTHER: Small amount of free fluid is seen in the abdomen.      Impression    IMPRESSION:     Small amount of ascites.    No gallstones or biliary ductal enlargement    IOANA NIEVES MD         SYSTEM  ID:  [id number]   US Renal Complete Non-Vascular    Narrative    PROCEDURE: US RENAL COMPLETE NON-VASCULAR    HISTORY: .    TECHNIQUE: Targeted sonographic assessment of the kidneys and bladder  was performed.    COMPARISON:  None.    MEASUREMENTS:    Right renal length: 9.5 cm  Left renal length: 11.8 cm    RENAL FINDINGS: No renal masses or hydronephrosis is noted.    BLADDER: Bladder volume was 109 mL's. No bladder masses are seen. A  small amount of free fluid is seen in the pelvic cul-de-sac      Impression    IMPRESSION:  Poor visualization of the kidneys. No renal masses or  hydronephrosis is seen.      IOANA NIEVES MD         SYSTEM ID:  [id number]   XR Chest Port 1 View    Narrative    PROCEDURE:  XR CHEST PORT 1 VIEW    HISTORY:  crackles in lungs, SOB.     COMPARISON:      FINDINGS:   The heart is enlarged widespread interstitial opacities are noted  suspicious for interstitial pulmonary edema there is a possible  left-sided pleural effusion. There is some increased density at the  left lung base most likely compressive atelectasis.      Impression    IMPRESSION:  Cardiomegaly, interstitial thickening and possible  left-sided pleural effusion. These findings would be most consistent  with congestive heart failure and interstitial pulmonary edema      IOANA NIEVES MD         SYSTEM ID:  [id number]   Echocardiogram Complete     Value    LVEF  30-35% (moderately reduced)    Narrative    [id number]  [id number]  [id number]  [id number]^QAMAR^VALARIE^MARTIN     [facility]  [address] [place] [address]     Name: STEPHIE MORELOS  MRN: [id number]  : 1950  Study Date: 10/23/2023 09:05 AM  Age: 73 yrs  Gender: Male  Patient Location: [facility]  Reason For Study: MI - Acute  Ordering Physician: VALARIE FOOTE  Performed By: WASHINGTON Webb, RDCS, RVT     BSA: 2.0 m2  Height: 67 in  Weight: 188 lb  HR: 96  BP: 101/57  mmHg  ______________________________________________________________________________  Procedure  Complete Portable Echo Adult.  ______________________________________________________________________________  Interpretation Summary  Left ventricular function is decreased. The ejection fraction is 30-35%  (moderately reduced). Moderate diffuse hypokinesis is present. There is a  pattern of regional wall motion abnormalities that is consistent with a prior  LAD culprit infarction.  Global right ventricular function is mildly reduced. The right ventricle is  normal size.  No significant valvular abnormalities.  There is a calcified nodule on the aortic side of the aortic valve. It  measures 0.7 x 0.5 cm.  A left pleural effusion is present.  This study was compared with the study from 06/01/2020. The biventricular  function has declined. The calcified nodule is new.  ______________________________________________________________________________  Left Ventricle  Left ventricular wall thickness is normal. Left ventricular size is normal.  Left ventricular function is decreased. The ejection fraction is 30-35%  (moderately reduced). There is severe hypokinesis of the mid anterior, mid  anteroseptal, and mid inferoseptal segments. Moderate diffuse hypokinesis is  present.     Right Ventricle  The right ventricle is normal size. Global right ventricular function is  mildly reduced.     Atria  The right atria appears normal. Mild left atrial enlargement is present.     Mitral Valve  Mild mitral annular calcification is present. Mild mitral insufficiency is  present.     Aortic Valve  Mild aortic valve calcification is present. There is a calcified nodule on the  aortic side of the aortic valve. It measures 0.7 x 0.5 cm.     Tricuspid Valve  The tricuspid valve is normal. Trace tricuspid insufficiency is present. The  right ventricular systolic pressure is approximated at 21.3 mmHg plus the  right atrial pressure.     Pulmonic  Valve  The valve leaflets are not well visualized. Trace pulmonic insufficiency is  present.     Vessels  The aorta root is normal. The thoracic aorta is normal. IVC diameter <2.1 cm  collapsing >50% with sniff suggests a normal RA pressure of 3 mmHg.     Pericardium  No pericardial effusion is present.     Miscellaneous  A left pleural effusion is present.     Compared to Previous Study  This study was compared with the study from 2020 . The biventricular  function has declined. The calcified nodule is new.  ______________________________________________________________________________  MMode/2D Measurements & Calculations     IVSd: 0.88 cm  LVIDd: 5.1 cm  LVIDs: 4.2 cm  LVPWd: 0.94 cm  FS: 16.9 %  LV mass(C)d: 163.3 grams  LV mass(C)dI: 82.9 grams/m2  asc Aorta Diam: 3.2 cm  LVOT diam: 2.5 cm  LVOT area: 4.8 cm2  Asc Ao diam index BSA (cm/m2): 1.7  Asc Ao diam index Ht(cm/m): 1.9  LA Volume (BP): 70.0 ml  LA Volume Index (BP): 35.5 ml/m2     RWT: 0.37  TAPSE: 0.86 cm     Doppler Measurements & Calculations  Ao V2 max: 114.0 cm/sec  Ao max P.0 mmHg  Ao V2 mean: 80.1 cm/sec  Ao mean PG: 3.0 mmHg  Ao V2 VTI: 19.9 cm  ALBERTINA(I,D): 4.0 cm2  ALBERTINA(V,D): 3.9 cm2  LV V1 max PG: 3.4 mmHg  LV V1 max: 91.7 cm/sec  LV V1 VTI: 16.4 cm  SV(LVOT): 79.0 ml  SI(LVOT): 40.1 ml/m2  PA acc time: 0.10 sec  TR max derek: 230.8 cm/sec  TR max P.3 mmHg  AV Derek Ratio (DI): 0.80  ALBERTINA Index (cm2/m2): 2.0     ______________________________________________________________________________  Report approved by: Binh Rogers 10/23/2023 12:19 PM           *Note: Due to a large number of results and/or encounters for the requested time period, some results have not been displayed. A complete set of results can be found in Results Review.       Discharge Medications   Current Discharge Medication List        CONTINUE these medications which have NOT CHANGED    Details   acetaminophen (TYLENOL) 500 MG tablet Take 1,000 mg by mouth every  6 hours as needed for mild pain or fever      aspirin (ASA) 81 MG tablet Take 1 tablet (81 mg) by mouth daily -- Dose Reduced 2/18/2020    Associated Diagnoses: Atherosclerosis of native coronary artery of native heart with stable angina pectoris (H24)      bacitracin 500 UNIT/GM external ointment Apply topically 2 times daily - Apply to legs      blood glucose monitoring (ONETOUCH ULTRA) test strip Dispense test strips covered by the patient insurance. Test 10 times per day.      Blood Glucose Monitoring Suppl (VennliM BLOOD GLUCOSE MONITOR) WAQAS Dispense glucose meter, test strips and lancets covered by the patient insurance. Test 10 times per day.      cholecalciferol (VITAMIN D3) 25 mcg (1000 units) capsule Take 1 capsule by mouth daily      clopidogrel (PLAVIX) 75 MG tablet Take 1 tablet (75 mg) by mouth daily  Qty: 90 tablet, Refills: 3    Associated Diagnoses: Atherosclerosis of native coronary artery of native heart with stable angina pectoris (H24)      COMPRESSION STOCKINGS JOBST REIEF 30-40mmHg OPEN/ TOE/KNEE HIGH COMPRESSION STOCKINGS ITEM #[id number] DX:I83.022/I83.029 [facility]: SZ:LARGE/BEIGE LEFT LEG  Qty: 3 each, Refills: 6    Associated Diagnoses: Peripheral vascular disease (H24); Bilateral lower extremity edema      Cyanocobalamin 1000 MCG CAPS Take 1,000 mcg by mouth daily      doxazosin (CARDURA) 2 MG tablet Take 2 mg by mouth at bedtime  Qty:      Associated Diagnoses: Atherosclerosis of native coronary artery of native heart with stable angina pectoris (H24); Benign essential hypertension      DULoxetine (CYMBALTA) 20 MG capsule Take 2 capsules by mouth every morning      famotidine (PEPCID) 20 MG tablet Take 1 tablet (20 mg) by mouth 2 times daily as needed (indigestion)  Qty: 60 tablet, Refills: 5    Associated Diagnoses: Heartburn      glucagon 1 MG kit Inject  as directed.      insulin lispro (HUMALOG VIAL) 100 UNIT/ML vial Inject Subcutaneous 3 times daily (before meals) Per sliding scale       Insulin Pen Needle (PEN NEEDLES) 29G X 12MM MISC For administering insulin at home.      irbesartan (AVAPRO) 150 MG tablet Take 1 tablet (150 mg) by mouth 2 times daily -- needs 2 smaller pills daily  Qty: 180 tablet, Refills: 3    Associated Diagnoses: Benign essential hypertension; Chronic heart failure with preserved ejection fraction (H)      LANTUS SOLOSTAR 100 UNIT/ML soln 30 Units at bedtime      levothyroxine (SYNTHROID/LEVOTHROID) 200 MCG tablet Take 1 tablet by mouth daily      Magnesium Cl-Calcium Carbonate 71.5-119 MG TBEC Take 2 tablets by mouth 2 times daily For 5 days - then take 2 tablets daily for 90 days      methylphenidate (RITALIN) 10 MG tablet Take 10 mg by mouth 4 times daily      metoprolol tartrate (LOPRESSOR) 25 MG tablet Take 6.25 mg by mouth 2 times daily      NIFEdipine ER (ADALAT CC) 30 MG 24 hr tablet Take 1 tablet (30 mg) by mouth 2 times daily  Qty: 90 tablet, Refills: 3    Associated Diagnoses: Atherosclerosis of native coronary artery of native heart with stable angina pectoris (H24); Benign essential hypertension      nitroGLYcerin (NITROSTAT) 0.4 MG sublingual tablet Place 1 tablet (0.4 mg) under the tongue every 5 minutes as needed for chest pain Call 911 after 1st dose.  Qty: 25 tablet, Refills: 0    Associated Diagnoses: Angina pectoris (H24)      oxyCODONE (ROXICODONE) 5 MG tablet Take 1 tablet by mouth every 6 hours as needed      potassium chloride ER (K-TAB) 20 MEQ CR tablet Take 40 mEq by mouth daily      ranolazine (RANEXA) 500 MG 12 hr tablet Take 500 mg by mouth daily      senna-docusate (SENOKOT-S/PERICOLACE) 8.6-50 MG tablet Take 2 tablets by mouth 2 times daily      thin (NO BRAND SPECIFIED) lancets Test 10 times per day.      torsemide (DEMADEX) 20 MG tablet Take 40 mg by mouth 2 times daily - may also take once daily as needed for weight gain of 5 pounds.      vitamin E (TOCOPHEROL) 400 units (360 mg) capsule Take 1 capsule by mouth daily           Allergies  "  Allergies   Allergen Reactions    Atenolol Other (See Comments)     Atenolol caused constipation and Indigestion -- Toprol XL (Metoprolol Succinate ER) worked and tolerated well    Gabapentin      Other reaction(s): Mental Status Change \"felt like in outer space\"    Hydrocortisone Other (See Comments)     Burning and stinging sensation with Hydrocortisone - doesn't help itching or rash -- tolerated Desoximetasone cream and worked well.     Atorvastatin Hives    Celebrex [Celecoxib]      Swelling     Lisinopril Cough    No Clinical Screening - See Comments Hives     Chlorine water    Norvasc [Amlodipine] Other (See Comments)     -- can't remember, didn't tolerate.     Pregabalin      Lyrica - Other reaction(s): Mental Status Change    Valdecoxib Swelling     \"bextra\" NSAID    Adhesive Tape Rash     Irritation at skin site with insulin infusion sets and CGM sensor sites.  Patient uses tegaderm bandage ([id number]) as barrier.    Insulin Aspart Rash    Rosuvastatin Other (See Comments)     Feels unwell on this medication  Feels unwell on this medication       "

## 2023-10-23 NOTE — PROGRESS NOTES
:     Spoke with Bernadette at [facility]. She stated they can accept patient today if he is there by 1300. After speaking with MD, he does not feel patient is medically stable to discharge today. [facility] can hold patients bed until tomorrow.      will continue to follow for discharge planning needs.     PETER Bee on 10/23/2023 at 9:31 AM     :     Per MD, Patient is now on the wait list to be transferred to [place] for a higher level of care. Update given to [facility] SNF.     PETER Bee on 10/23/2023 at 11:07 AM

## 2023-10-23 NOTE — PROGRESS NOTES
[facility]    Medicine Progress Note - Hospitalist Service    Date of Admission:  10/6/2023    Assessment & Plan   Moses Whittaker is a 73 year old male who presented for leg pain. He was initially at [facility] ED. Transferred to [facility] for admission. Patient cannot give history due to his altered mental status.  Found to have sepsis due to bilateral cellulitis.  Started on broad-spectrum antibiotics.  Blood cultures eventually returned positive for Pseudomonas putida and strep species. Over time his encephalopathy cleared.  Completed 10 days of antibiotics.  He was awaiting placement at an SNF when he he developed chest pain and was found to have an elevated troponin.  Initially declined transfer to [place] for cardiac cares on 10/19.  Troponin continues to increase. Echocardiogram with reduced EF on 10/23. He now accepts transfer.    NSTEMI   Noted on 10/19 with chest pain. Initial troponin 766.  Accepted for transfer to [place], but he declined transfer.   Treated with 48 hours of heparin drip. Now on aspirin and clopidogrel, which were home medications.  Troponin has continued to increase up to 2254 today.  Echocardiogram completed today showing an EF of 35%.  This is a new reduction in EF compared to 9/28 echocardiogram at [facility] with normal EF of 55 to 60%.  He continues to have chest pain.  Known underlying CAD with previous stenting and CABG.  I discussed the findings with the patient and his wife.  He does not want a decline in his cardiac status and is not ready to die, but was initially declining transfer.  After further conversation, patient excepts transfer and likely angiogram.  His wife is supportive of transfer for cardiology evaluation and services.  Called [facility] and discussed with cardiology, Dr Pendleton, who notes that cardiac cath is indicated. Dr Mendoza accepting patient in transfer.   Greatly appreciate assistance of [facility] in care of this patient. Patient currently on a wait list for a cardiac bed.      Atherosclerosis of native coronary artery of native heart with stable angina pectoris     S/P CABG x 2  Ongoing chest pain  Ranexa has been restarted. Remains on home isosorbide, metoprolol.  Home nifedipine and irbesartan have been on hold during hospitalization and will need to be addressed after cardiac cath.  Patient stopped statin at home.  Has refused PCSK9 inhibitor per cardiology note 9/2823    Sepsis due to Streptococcus species with encephalopathy without septic shock (H)   Cellulitis bilateral lower legs with leg ulceration  Pseudomonas putida bacteremia  See initial pictures in media tab.   Pseudomonas putida is sensitive to Cipro. Completed 10 days of antibiotics on 10/16.  Completed 10 days of abx on 10/16.  Encephalopathy resolved and he is able to participate in conversation more every day.  Due to his severe illness and time in bed, he will require rehab prior to being able to possibly return home.  He was medically ready for discharge, was just awaiting placement at [facility], when the NSTEMI occurred.   Continue wound care      Type 2 diabetes mellitus with stage 3b chronic kidney disease (H)  Uncontrolled with an A1c of 9.6 on admission.  Sugars improving with all values under 200 with increased Lantus to 22 units daily 10/13. Had some lower sugars overnight 10/16-17 so insulin held.    Since then sugars . Reduce Lantus to 20 units.  Continue to cover carbs and with sliding scale if needed.      Acute kidney injury superimposed on chronic kidney disease stage 3b  Initial creatinine 3.5.  Has improved to 2.9 during hospitalization.  Baseline appears to be around 2.2, although most recently was 2.5 when checked by primary on 10/3.    Hyperkalemia  Not POA. Potassium 6.0 on 10/23.  He was actually hypokalemic on admission at 3.3.  Has been receiving scheduled potassium, which is now discontinued.  EKG  shows no peaked T waves.  This will need to be rechecked in the next day.       Gastroesophageal reflux disease without esophagitis  Stable. Continue Protonix.         Benign essential hypertension  Blood pressure controlled on current regimen.   On doxazosin which is also being used for urinary retention. Dose increased from 2 to 4 mg while hospitalized.  Continues on home metoprolol dose.  Home nifedipine and irbesartan have been on hold since admission due to normal blood pressure. These will need to be addressed depending on cardiac intervention as above.       Hypothyroidism due to acquired atrophy of thyroid  Stable. TSH very elevated to 21.44 at the time of admission during acute illness but this may be contributing to his lethargy, would not be expected to account for the encephalopathy.  T4 and T3 also low so increased his dose of levothyroxine.  Last chart results had TSH 6.41 in March 2023.  Family reports he does not take his medications very regularly so this may account for some of the increase in his TSH.  Recommend recheck at future clinic follow up       Mixed hyperlipidemia    Assessment: Stable    Plan: Last lipids from January 2018 with , chol 119, HDL 31, LDL 9.  Would not recommend starting any medications now.       Venous stasis dermatitis of left lower extremity  Cellulitis of bilateral lower legs and left upper leg/hip/thigh resolved on antibiotics.       Peripheral vascular disease, unspecified (H24)  On aspirin and clopidogrel       Chronic pain syndrome    Chronic bilateral low back pain with bilateral sciatica  Has had several back surgeries, essentially his entire back is metal per his daughter. Tylenol and oxycodone working well, has morphine liquid as needed, has been taking his Cymbalta.  Pain is better with current medications. Was able to participate with therapies, but his desire to participate was variable.        Severe malnutrition  Noted during admission by nutrition.  "Taking supplements. Eating small amounts and not refusing cares.        Narcolepsy  Chronic. Had not been receiving his Ritalin initially as he just mentioned this diagnosis on 10/17, so it was restarted at that time.         Diet: Regular Diet Adult  Snacks/Supplements Adult: Ensure Enlive; With Meals    DVT Prophylaxis: Heparin SQ  Loja Catheter: Not present  Lines: None     Cardiac Monitoring: None  Code Status: No CPR- Do NOT Intubate      Clinically Significant Risk Factors        # Hyperkalemia: Highest K = 6 mmol/L in last 2 days, will monitor as appropriate       # Hypoalbuminemia: Lowest albumin = 1.2 g/dL at 10/6/2023  3:45 AM, will monitor as appropriate     # Hypertension: Noted on problem list    # Chronic heart failure with reduced ejection fraction: last echo with EF <40%      # DMII: A1C = 9.6 % (Ref range: 4.0 - 6.2 %) within past 6 months   # Overweight: Estimated body mass index is 29.49 kg/m  as calculated from the following:    Height as of this encounter: 1.702 m (5' 7\").    Weight as of this encounter: 85.4 kg (188 lb 4.8 oz).             Disposition Plan      Expected Discharge Date: 10/23/2023                    Cuco Mathis MD  Hospitalist Service  [facility]  Securely message with [email or web address] (more info)  Text page via [facility] Paging/Directory   ______________________________________________________________________    Interval History   Falling asleep while talking, but he has narcolepsy. Reporting 19/10 chest pain.  Troponin continues to increase.  Echocardiogram completed earlier.  Had declined transfer to [place] this weekend.  We spent a lot of time discussing his heart condition and results of decisions.  If he has progression of non-STEMI, he could have cardiac arrest.  He will be best served by being placed on hospice.  His wife would like him to accept transfer.  After a lot of conversation, patient agreed to transfer.    Physical Exam   Vital Signs: Temp: 97.8 "  F (36.6  C) Temp src: Tympanic BP: 110/62 Pulse: 81   Resp: 20 SpO2: 90 % O2 Device: None (Room air)    Weight: 188 lbs 4.8 oz    General Appearance: Alert. No acute distress  Chest/Respiratory Exam: Clear to auscultation bilaterally  Cardiovascular Exam: Regular rate and rhythm. S1, S2, no murmur, gallop, or rubs.  Gastrointestinal Exam: Soft, nontender  Extremities: Trace pitting lower extremity edema.  Skin: Both legs with dressings in place.  Psychiatric: Normal affect and mentation      Medical Decision Making             Data     I have personally reviewed the following data over the past 24 hrs:    5.3  \   7.3 (L)   / 421     138 104 34.4 (H) /  121 (H)   6.0 (H); 6.0 (H) 22 2.87 (H) \     ALT: 12 AST: 32 AP: 278 (H) TBILI: 0.8   ALB: 2.4 (L) TOT PROTEIN: 5.6 (L) LIPASE: N/A     Trop: 2,258 (HH) BNP: N/A       Imaging results reviewed over the past 24 hrs:   Recent Results (from the past 24 hour(s))   Echocardiogram Complete   Result Value    LVEF  30-35% (moderately reduced)    Narrative    [id number]  [id number]  [id number]  [id number]^QAMAR^VALARIE^MARTIN     [facility]  [address] [place] [address]     Name: STEPHIE MORELOS  MRN: [id number]  : 1950  Study Date: 10/23/2023 09:05 AM  Age: 73 yrs  Gender: Male  Patient Location: [facility]  Reason For Study: MI - Acute  Ordering Physician: VALARIE FOOTE  Performed By: Cher Stout, [place], RDCS, RVT     BSA: 2.0 m2  Height: 67 in  Weight: 188 lb  HR: 96  BP: 101/57 mmHg  ______________________________________________________________________________  Procedure  Complete Portable Echo Adult.  ______________________________________________________________________________  Interpretation Summary  Left ventricular function is decreased. The ejection fraction is 30-35%  (moderately reduced). Moderate diffuse hypokinesis is present. There is a  pattern of regional wall motion abnormalities that is consistent with a prior  LAD culprit  infarction.  Global right ventricular function is mildly reduced. The right ventricle is  normal size.  No significant valvular abnormalities.  There is a calcified nodule on the aortic side of the aortic valve. It  measures 0.7 x 0.5 cm.  A left pleural effusion is present.  This study was compared with the study from 06/01/2020. The biventricular  function has declined. The calcified nodule is new.  ______________________________________________________________________________  Left Ventricle  Left ventricular wall thickness is normal. Left ventricular size is normal.  Left ventricular function is decreased. The ejection fraction is 30-35%  (moderately reduced). There is severe hypokinesis of the mid anterior, mid  anteroseptal, and mid inferoseptal segments. Moderate diffuse hypokinesis is  present.     Right Ventricle  The right ventricle is normal size. Global right ventricular function is  mildly reduced.     Atria  The right atria appears normal. Mild left atrial enlargement is present.     Mitral Valve  Mild mitral annular calcification is present. Mild mitral insufficiency is  present.     Aortic Valve  Mild aortic valve calcification is present. There is a calcified nodule on the  aortic side of the aortic valve. It measures 0.7 x 0.5 cm.     Tricuspid Valve  The tricuspid valve is normal. Trace tricuspid insufficiency is present. The  right ventricular systolic pressure is approximated at 21.3 mmHg plus the  right atrial pressure.     Pulmonic Valve  The valve leaflets are not well visualized. Trace pulmonic insufficiency is  present.     Vessels  The aorta root is normal. The thoracic aorta is normal. IVC diameter <2.1 cm  collapsing >50% with sniff suggests a normal RA pressure of 3 mmHg.     Pericardium  No pericardial effusion is present.     Miscellaneous  A left pleural effusion is present.     Compared to Previous Study  This study was compared with the study from 06/01/2020 . The  biventricular  function has declined. The calcified nodule is new.  ______________________________________________________________________________  MMode/2D Measurements & Calculations     IVSd: 0.88 cm  LVIDd: 5.1 cm  LVIDs: 4.2 cm  LVPWd: 0.94 cm  FS: 16.9 %  LV mass(C)d: 163.3 grams  LV mass(C)dI: 82.9 grams/m2  asc Aorta Diam: 3.2 cm  LVOT diam: 2.5 cm  LVOT area: 4.8 cm2  Asc Ao diam index BSA (cm/m2): 1.7  Asc Ao diam index Ht(cm/m): 1.9  LA Volume (BP): 70.0 ml  LA Volume Index (BP): 35.5 ml/m2     RWT: 0.37  TAPSE: 0.86 cm     Doppler Measurements & Calculations  Ao V2 max: 114.0 cm/sec  Ao max P.0 mmHg  Ao V2 mean: 80.1 cm/sec  Ao mean PG: 3.0 mmHg  Ao V2 VTI: 19.9 cm  ALBERTINA(I,D): 4.0 cm2  ALBERTINA(V,D): 3.9 cm2  LV V1 max PG: 3.4 mmHg  LV V1 max: 91.7 cm/sec  LV V1 VTI: 16.4 cm  SV(LVOT): 79.0 ml  SI(LVOT): 40.1 ml/m2  PA acc time: 0.10 sec  TR max derek: 230.8 cm/sec  TR max P.3 mmHg  AV Derek Ratio (DI): 0.80  ALBERTINA Index (cm2/m2): 2.0     ______________________________________________________________________________  Report approved by: Binh Rogers 10/23/2023 12:19 PM

## 2023-10-23 NOTE — PLAN OF CARE
Patient's vitals were stable at the beginning of the shift and the patient refused a second set of vitals and a second assessment. Patient refused to let staff change his dressing on his leg and would yell at staff to not touch him. Patient complained of pain and was willing to take PRN oxycodone which was effective. Patient would refuse repositioning at times. Plan is for discharge to SNF. No new concerns at this time    Goal Outcome Evaluation:      Plan of Care Reviewed With: patient    Overall Patient Progress: no changeOverall Patient Progress: no change    Outcome Evaluation: Patient confused and refuse cares at times

## 2023-10-23 NOTE — PROGRESS NOTES
Interdisciplinary Discharge Planning Note    Anticipated Discharge Date:TBD     Anticipated Discharge Location: Patient on waitlist for [facility]     Clinical Needs Before Discharge:  stable cardiac status (angina, heart failure, arrhythmia)    Treatment Needs After Discharge:   Patient to be transferred to a higher level of care     Potential Barriers to Discharge: None identified    PETER Bee  10/23/2023,  12:21 PM

## 2023-10-24 NOTE — PLAN OF CARE
[facility] DISCHARGE NOTE    Patient discharged to [facility] at 8:30 PM via ambulance. Accompanied by spouse and staff. Discharge instructions reviewed with patient and spouse, opportunity offered to ask questions. Prescriptions - None ordered for discharge. All belongings sent with patient.    Kalpana Ariza, RNGoal Outcome Evaluation:

## 2023-10-24 NOTE — TELEPHONE ENCOUNTER
Patient transferred to higher level of care. No TCM call required per policy.   Patient noted to have a hospital follow up scheduled with Ela Briceno on 10/30/23 .  Patient transferred to [facility]  Called and spoke with wife , Shanna , she states that patients PCP is at [facility], Dr. Morton.  Wife states that she is going to see patient today and will check with him if he wants that appointment with Ela Briceno.    Margie Calderón RN on 10/24/2023 at 8:10 AM

## 2023-10-30 NOTE — TELEPHONE ENCOUNTER
Patient noted to have an appointment today with Ela Briceno for hospital follow up.  Called and spoke with wife Shanna and she states patient is still at [facility] .  Informed wife appointment will be cancelled.  Called and informed scheduling and they cancelled appointment.  Margie Calderón RN on 10/30/2023 at 9:17 AM

## (undated) DEVICE — ENDO KIT COMPLIANCE DYKENDOCMPLY

## (undated) DEVICE — SUCTION MANIFOLD NEPTUNE 2 SYS 4 PORT 0702-020-000

## (undated) DEVICE — ENDO BRUSH CHANNEL MASTER CLEANING 2-4.2MM BW-412T

## (undated) DEVICE — ENDO SNARE EXACTO COLD 9MM LOOP 2.4MMX230CM 00711115

## (undated) DEVICE — TUBING SUCTION 10'X3/16" N510

## (undated) DEVICE — SOL WATER 1500ML

## (undated) RX ORDER — SODIUM CHLORIDE, SODIUM LACTATE, POTASSIUM CHLORIDE, CALCIUM CHLORIDE 600; 310; 30; 20 MG/100ML; MG/100ML; MG/100ML; MG/100ML
INJECTION, SOLUTION INTRAVENOUS
Status: DISPENSED
Start: 2020-05-29

## (undated) RX ORDER — NITROGLYCERIN 0.4 MG/1
TABLET SUBLINGUAL
Status: DISPENSED
Start: 2021-03-10

## (undated) RX ORDER — ACETAMINOPHEN 500 MG
TABLET ORAL
Status: DISPENSED
Start: 2022-03-13

## (undated) RX ORDER — SODIUM CHLORIDE 9 MG/ML
INJECTION, SOLUTION INTRAVENOUS
Status: DISPENSED
Start: 2020-03-14

## (undated) RX ORDER — PROPOFOL 10 MG/ML
INJECTION, EMULSION INTRAVENOUS
Status: DISPENSED
Start: 2019-04-18

## (undated) RX ORDER — MAGNESIUM OXIDE 400 MG/1
TABLET ORAL
Status: DISPENSED
Start: 2021-03-10

## (undated) RX ORDER — GINSENG 100 MG
CAPSULE ORAL
Status: DISPENSED
Start: 2023-01-01

## (undated) RX ORDER — IPRATROPIUM BROMIDE AND ALBUTEROL SULFATE 2.5; .5 MG/3ML; MG/3ML
SOLUTION RESPIRATORY (INHALATION)
Status: DISPENSED
Start: 2019-01-18

## (undated) RX ORDER — CEFTRIAXONE 2 G/50ML
INJECTION, SOLUTION INTRAVENOUS
Status: DISPENSED
Start: 2020-05-29